# Patient Record
Sex: MALE | Race: WHITE | NOT HISPANIC OR LATINO | Employment: OTHER | URBAN - METROPOLITAN AREA
[De-identification: names, ages, dates, MRNs, and addresses within clinical notes are randomized per-mention and may not be internally consistent; named-entity substitution may affect disease eponyms.]

---

## 2020-08-03 ENCOUNTER — APPOINTMENT (OUTPATIENT)
Dept: GENERAL RADIOLOGY | Facility: HOSPITAL | Age: 30
End: 2020-08-03

## 2020-08-03 ENCOUNTER — ANESTHESIA (OUTPATIENT)
Dept: PERIOP | Facility: HOSPITAL | Age: 30
End: 2020-08-03

## 2020-08-03 ENCOUNTER — APPOINTMENT (OUTPATIENT)
Dept: CT IMAGING | Facility: HOSPITAL | Age: 30
End: 2020-08-03

## 2020-08-03 ENCOUNTER — HOSPITAL ENCOUNTER (INPATIENT)
Facility: HOSPITAL | Age: 30
LOS: 12 days | Discharge: NURSING, REHAB OR LONG-TERM CARE | End: 2020-08-15
Attending: EMERGENCY MEDICINE | Admitting: NEUROLOGICAL SURGERY

## 2020-08-03 ENCOUNTER — ANESTHESIA EVENT (OUTPATIENT)
Dept: PERIOP | Facility: HOSPITAL | Age: 30
End: 2020-08-03

## 2020-08-03 DIAGNOSIS — S06.5XAA SUBDURAL HEMATOMA (HCC): ICD-10-CM

## 2020-08-03 DIAGNOSIS — I61.0 NONTRAUMATIC SUBCORTICAL HEMORRHAGE OF RIGHT CEREBRAL HEMISPHERE (HCC): ICD-10-CM

## 2020-08-03 DIAGNOSIS — I61.9 RIGHT-SIDED NONTRAUMATIC INTRACEREBRAL HEMORRHAGE, UNSPECIFIED CEREBRAL LOCATION (HCC): Primary | ICD-10-CM

## 2020-08-03 LAB
ABO GROUP BLD: NORMAL
ALBUMIN SERPL-MCNC: 4.4 G/DL (ref 3.5–5.2)
ALBUMIN/GLOB SERPL: 1 G/DL
ALP SERPL-CCNC: 137 U/L (ref 39–117)
ALT SERPL W P-5'-P-CCNC: 66 U/L (ref 1–41)
ANION GAP SERPL CALCULATED.3IONS-SCNC: 16.2 MMOL/L (ref 5–15)
APTT PPP: 40.9 SECONDS (ref 22.7–35.4)
AST SERPL-CCNC: 174 U/L (ref 1–40)
B PARAPERT DNA SPEC QL NAA+PROBE: NOT DETECTED
B PERT DNA SPEC QL NAA+PROBE: NOT DETECTED
BASOPHILS # BLD AUTO: 0.07 10*3/MM3 (ref 0–0.2)
BASOPHILS NFR BLD AUTO: 0.8 % (ref 0–1.5)
BILIRUB SERPL-MCNC: 3.5 MG/DL (ref 0–1.2)
BLD GP AB SCN SERPL QL: NEGATIVE
BUN SERPL-MCNC: 4 MG/DL (ref 6–20)
BUN/CREAT SERPL: 5.5 (ref 7–25)
C PNEUM DNA NPH QL NAA+NON-PROBE: NOT DETECTED
CALCIUM SPEC-SCNC: 9.4 MG/DL (ref 8.6–10.5)
CHLORIDE SERPL-SCNC: 99 MMOL/L (ref 98–107)
CO2 SERPL-SCNC: 24.8 MMOL/L (ref 22–29)
CREAT SERPL-MCNC: 0.73 MG/DL (ref 0.76–1.27)
DEPRECATED RDW RBC AUTO: 45.7 FL (ref 37–54)
EOSINOPHIL # BLD AUTO: 0.27 10*3/MM3 (ref 0–0.4)
EOSINOPHIL NFR BLD AUTO: 3.2 % (ref 0.3–6.2)
ERYTHROCYTE [DISTWIDTH] IN BLOOD BY AUTOMATED COUNT: 12.7 % (ref 12.3–15.4)
ETHANOL BLD-MCNC: 154 MG/DL (ref 0–10)
ETHANOL UR QL: 0.15 %
FLUAV H1 2009 PAND RNA NPH QL NAA+PROBE: NOT DETECTED
FLUAV H1 HA GENE NPH QL NAA+PROBE: NOT DETECTED
FLUAV H3 RNA NPH QL NAA+PROBE: NOT DETECTED
FLUAV SUBTYP SPEC NAA+PROBE: NOT DETECTED
FLUBV RNA ISLT QL NAA+PROBE: NOT DETECTED
GFR SERPL CREATININE-BSD FRML MDRD: 126 ML/MIN/1.73
GLOBULIN UR ELPH-MCNC: 4.3 GM/DL
GLUCOSE BLDC GLUCOMTR-MCNC: 105 MG/DL (ref 70–130)
GLUCOSE SERPL-MCNC: 113 MG/DL (ref 65–99)
HADV DNA SPEC NAA+PROBE: NOT DETECTED
HCOV 229E RNA SPEC QL NAA+PROBE: NOT DETECTED
HCOV HKU1 RNA SPEC QL NAA+PROBE: NOT DETECTED
HCOV NL63 RNA SPEC QL NAA+PROBE: NOT DETECTED
HCOV OC43 RNA SPEC QL NAA+PROBE: NOT DETECTED
HCT VFR BLD AUTO: 35.7 % (ref 37.5–51)
HGB BLD-MCNC: 13 G/DL (ref 13–17.7)
HMPV RNA NPH QL NAA+NON-PROBE: NOT DETECTED
HOLD SPECIMEN: NORMAL
HOLD SPECIMEN: NORMAL
HPIV1 RNA SPEC QL NAA+PROBE: NOT DETECTED
HPIV2 RNA SPEC QL NAA+PROBE: NOT DETECTED
HPIV3 RNA NPH QL NAA+PROBE: NOT DETECTED
HPIV4 P GENE NPH QL NAA+PROBE: NOT DETECTED
IMM GRANULOCYTES # BLD AUTO: 0.06 10*3/MM3 (ref 0–0.05)
IMM GRANULOCYTES NFR BLD AUTO: 0.7 % (ref 0–0.5)
INR PPP: 1.39 (ref 0.9–1.1)
LYMPHOCYTES # BLD AUTO: 1.32 10*3/MM3 (ref 0.7–3.1)
LYMPHOCYTES NFR BLD AUTO: 15.5 % (ref 19.6–45.3)
M PNEUMO IGG SER IA-ACNC: NOT DETECTED
MCH RBC QN AUTO: 35.9 PG (ref 26.6–33)
MCHC RBC AUTO-ENTMCNC: 36.4 G/DL (ref 31.5–35.7)
MCV RBC AUTO: 98.6 FL (ref 79–97)
MONOCYTES # BLD AUTO: 0.48 10*3/MM3 (ref 0.1–0.9)
MONOCYTES NFR BLD AUTO: 5.6 % (ref 5–12)
NEUTROPHILS NFR BLD AUTO: 6.31 10*3/MM3 (ref 1.7–7)
NEUTROPHILS NFR BLD AUTO: 74.2 % (ref 42.7–76)
NRBC BLD AUTO-RTO: 0 /100 WBC (ref 0–0.2)
PLATELET # BLD AUTO: 136 10*3/MM3 (ref 140–450)
PMV BLD AUTO: 9.1 FL (ref 6–12)
POTASSIUM SERPL-SCNC: 3.2 MMOL/L (ref 3.5–5.2)
PROT SERPL-MCNC: 8.7 G/DL (ref 6–8.5)
PROTHROMBIN TIME: 16.8 SECONDS (ref 11.7–14.2)
RBC # BLD AUTO: 3.62 10*6/MM3 (ref 4.14–5.8)
RH BLD: POSITIVE
RHINOVIRUS RNA SPEC NAA+PROBE: NOT DETECTED
RSV RNA NPH QL NAA+NON-PROBE: NOT DETECTED
SARS-COV-2 RNA NPH QL NAA+NON-PROBE: NOT DETECTED
SODIUM SERPL-SCNC: 140 MMOL/L (ref 136–145)
T&S EXPIRATION DATE: NORMAL
TROPONIN T SERPL-MCNC: <0.01 NG/ML (ref 0–0.03)
WBC # BLD AUTO: 8.51 10*3/MM3 (ref 3.4–10.8)
WHOLE BLOOD HOLD SPECIMEN: NORMAL
WHOLE BLOOD HOLD SPECIMEN: NORMAL

## 2020-08-03 PROCEDURE — 94799 UNLISTED PULMONARY SVC/PX: CPT

## 2020-08-03 PROCEDURE — 70450 CT HEAD/BRAIN W/O DYE: CPT

## 2020-08-03 PROCEDURE — 25010000002 PROPOFOL 10 MG/ML EMULSION: Performed by: ANESTHESIOLOGY

## 2020-08-03 PROCEDURE — 61313 CRNEC/CRNOT STTL ICERE: CPT | Performed by: NEUROLOGICAL SURGERY

## 2020-08-03 PROCEDURE — 86900 BLOOD TYPING SEROLOGIC ABO: CPT | Performed by: EMERGENCY MEDICINE

## 2020-08-03 PROCEDURE — 86850 RBC ANTIBODY SCREEN: CPT | Performed by: EMERGENCY MEDICINE

## 2020-08-03 PROCEDURE — 00C70ZZ EXTIRPATION OF MATTER FROM CEREBRAL HEMISPHERE, OPEN APPROACH: ICD-10-PCS | Performed by: NEUROLOGICAL SURGERY

## 2020-08-03 PROCEDURE — 82962 GLUCOSE BLOOD TEST: CPT

## 2020-08-03 PROCEDURE — 69990 MICROSURGERY ADD-ON: CPT | Performed by: SPECIALIST/TECHNOLOGIST, OTHER

## 2020-08-03 PROCEDURE — 82565 ASSAY OF CREATININE: CPT

## 2020-08-03 PROCEDURE — C1713 ANCHOR/SCREW BN/BN,TIS/BN: HCPCS | Performed by: NEUROLOGICAL SURGERY

## 2020-08-03 PROCEDURE — 69990 MICROSURGERY ADD-ON: CPT | Performed by: NEUROLOGICAL SURGERY

## 2020-08-03 PROCEDURE — 86901 BLOOD TYPING SEROLOGIC RH(D): CPT | Performed by: EMERGENCY MEDICINE

## 2020-08-03 PROCEDURE — 93005 ELECTROCARDIOGRAM TRACING: CPT | Performed by: EMERGENCY MEDICINE

## 2020-08-03 PROCEDURE — 84484 ASSAY OF TROPONIN QUANT: CPT | Performed by: EMERGENCY MEDICINE

## 2020-08-03 PROCEDURE — 31500 INSERT EMERGENCY AIRWAY: CPT

## 2020-08-03 PROCEDURE — 25010000003 LEVETIRACETAM IN NACL 0.75% 1000 MG/100ML SOLUTION: Performed by: EMERGENCY MEDICINE

## 2020-08-03 PROCEDURE — 99255 IP/OBS CONSLTJ NEW/EST HI 80: CPT | Performed by: NEUROLOGICAL SURGERY

## 2020-08-03 PROCEDURE — 25010000002 PROPOFOL 10 MG/ML EMULSION: Performed by: EMERGENCY MEDICINE

## 2020-08-03 PROCEDURE — 25010000003 CEFAZOLIN IN DEXTROSE 2-4 GM/100ML-% SOLUTION: Performed by: ANESTHESIOLOGY

## 2020-08-03 PROCEDURE — 71045 X-RAY EXAM CHEST 1 VIEW: CPT

## 2020-08-03 PROCEDURE — 80307 DRUG TEST PRSMV CHEM ANLYZR: CPT | Performed by: EMERGENCY MEDICINE

## 2020-08-03 PROCEDURE — 5A1945Z RESPIRATORY VENTILATION, 24-96 CONSECUTIVE HOURS: ICD-10-PCS | Performed by: INTERNAL MEDICINE

## 2020-08-03 PROCEDURE — 0202U NFCT DS 22 TRGT SARS-COV-2: CPT | Performed by: EMERGENCY MEDICINE

## 2020-08-03 PROCEDURE — 85730 THROMBOPLASTIN TIME PARTIAL: CPT | Performed by: EMERGENCY MEDICINE

## 2020-08-03 PROCEDURE — 88304 TISSUE EXAM BY PATHOLOGIST: CPT | Performed by: NEUROLOGICAL SURGERY

## 2020-08-03 PROCEDURE — 93010 ELECTROCARDIOGRAM REPORT: CPT | Performed by: INTERNAL MEDICINE

## 2020-08-03 PROCEDURE — 0BH17EZ INSERTION OF ENDOTRACHEAL AIRWAY INTO TRACHEA, VIA NATURAL OR ARTIFICIAL OPENING: ICD-10-PCS | Performed by: INTERNAL MEDICINE

## 2020-08-03 PROCEDURE — 88305 TISSUE EXAM BY PATHOLOGIST: CPT | Performed by: NEUROLOGICAL SURGERY

## 2020-08-03 PROCEDURE — 25010000002 PHENYLEPHRINE PER 1 ML: Performed by: ANESTHESIOLOGY

## 2020-08-03 PROCEDURE — 80053 COMPREHEN METABOLIC PANEL: CPT | Performed by: EMERGENCY MEDICINE

## 2020-08-03 PROCEDURE — 25010000002 PROPOFOL 10 MG/ML EMULSION

## 2020-08-03 PROCEDURE — 25010000002 SUCCINYLCHOLINE PER 20 MG: Performed by: EMERGENCY MEDICINE

## 2020-08-03 PROCEDURE — 94002 VENT MGMT INPAT INIT DAY: CPT

## 2020-08-03 PROCEDURE — 85610 PROTHROMBIN TIME: CPT | Performed by: EMERGENCY MEDICINE

## 2020-08-03 PROCEDURE — 25010000002 DEXAMETHASONE PER 1 MG: Performed by: ANESTHESIOLOGY

## 2020-08-03 PROCEDURE — 99291 CRITICAL CARE FIRST HOUR: CPT

## 2020-08-03 PROCEDURE — 85025 COMPLETE CBC W/AUTO DIFF WBC: CPT | Performed by: EMERGENCY MEDICINE

## 2020-08-03 PROCEDURE — 61313 CRNEC/CRNOT STTL ICERE: CPT | Performed by: SPECIALIST/TECHNOLOGIST, OTHER

## 2020-08-03 DEVICE — SCRW CRS/DRV DRL FREE MICRO TI 1.5X4MM: Type: IMPLANTABLE DEVICE | Site: BRAIN | Status: FUNCTIONAL

## 2020-08-03 DEVICE — FLOSEAL HEMOSTATIC MATRIX, 5ML
Type: IMPLANTABLE DEVICE | Site: BRAIN | Status: FUNCTIONAL
Brand: FLOSEAL HEMOSTATIC MATRIX

## 2020-08-03 DEVICE — DURAGEN® PLUS DURAL REGENERATION MATRIX , 4 IN X 5 IN
Type: IMPLANTABLE DEVICE | Site: BRAIN | Status: FUNCTIONAL
Brand: DURAGEN® PLUS

## 2020-08-03 DEVICE — SSC BONE WAX
Type: IMPLANTABLE DEVICE | Site: BRAIN | Status: FUNCTIONAL
Brand: SSC BONE WAX

## 2020-08-03 DEVICE — PLT CMF LEVELONE LP NEURO SQ SEG TI SYS 2X2HL .6X1.5MM: Type: IMPLANTABLE DEVICE | Site: BRAIN | Status: FUNCTIONAL

## 2020-08-03 DEVICE — FLOSEAL HEMOSTATIC MATRIX, 10ML
Type: IMPLANTABLE DEVICE | Site: BRAIN | Status: FUNCTIONAL
Brand: FLOSEAL HEMOSTATIC MATRIX

## 2020-08-03 RX ORDER — ETOMIDATE 2 MG/ML
INJECTION INTRAVENOUS
Status: COMPLETED | OUTPATIENT
Start: 2020-08-03 | End: 2020-08-03

## 2020-08-03 RX ORDER — MAGNESIUM SULFATE HEPTAHYDRATE 40 MG/ML
2 INJECTION, SOLUTION INTRAVENOUS AS NEEDED
Status: DISCONTINUED | OUTPATIENT
Start: 2020-08-03 | End: 2020-08-15 | Stop reason: HOSPADM

## 2020-08-03 RX ORDER — ACETAMINOPHEN 650 MG/1
650 SUPPOSITORY RECTAL EVERY 4 HOURS PRN
Status: DISCONTINUED | OUTPATIENT
Start: 2020-08-03 | End: 2020-08-15 | Stop reason: HOSPADM

## 2020-08-03 RX ORDER — FENTANYL CITRATE 50 UG/ML
INJECTION, SOLUTION INTRAMUSCULAR; INTRAVENOUS AS NEEDED
Status: DISCONTINUED | OUTPATIENT
Start: 2020-08-03 | End: 2020-08-04 | Stop reason: SURG

## 2020-08-03 RX ORDER — SODIUM CHLORIDE, SODIUM LACTATE, POTASSIUM CHLORIDE, CALCIUM CHLORIDE 600; 310; 30; 20 MG/100ML; MG/100ML; MG/100ML; MG/100ML
INJECTION, SOLUTION INTRAVENOUS CONTINUOUS PRN
Status: DISCONTINUED | OUTPATIENT
Start: 2020-08-03 | End: 2020-08-04 | Stop reason: SURG

## 2020-08-03 RX ORDER — PROPOFOL 10 MG/ML
VIAL (ML) INTRAVENOUS
Status: COMPLETED | OUTPATIENT
Start: 2020-08-03 | End: 2020-08-03

## 2020-08-03 RX ORDER — CEFAZOLIN SODIUM 2 G/100ML
INJECTION, SOLUTION INTRAVENOUS AS NEEDED
Status: DISCONTINUED | OUTPATIENT
Start: 2020-08-03 | End: 2020-08-04 | Stop reason: SURG

## 2020-08-03 RX ORDER — DEXAMETHASONE SODIUM PHOSPHATE 10 MG/ML
INJECTION INTRAMUSCULAR; INTRAVENOUS AS NEEDED
Status: DISCONTINUED | OUTPATIENT
Start: 2020-08-03 | End: 2020-08-04 | Stop reason: SURG

## 2020-08-03 RX ORDER — ACETAMINOPHEN 325 MG/1
650 TABLET ORAL EVERY 4 HOURS PRN
Status: DISCONTINUED | OUTPATIENT
Start: 2020-08-03 | End: 2020-08-04

## 2020-08-03 RX ORDER — PROPOFOL 10 MG/ML
VIAL (ML) INTRAVENOUS AS NEEDED
Status: DISCONTINUED | OUTPATIENT
Start: 2020-08-03 | End: 2020-08-04 | Stop reason: SURG

## 2020-08-03 RX ORDER — HYDROCODONE BITARTRATE AND ACETAMINOPHEN 5; 325 MG/1; MG/1
1 TABLET ORAL EVERY 4 HOURS PRN
Status: DISCONTINUED | OUTPATIENT
Start: 2020-08-03 | End: 2020-08-04

## 2020-08-03 RX ORDER — SODIUM CHLORIDE 0.9 % (FLUSH) 0.9 %
10 SYRINGE (ML) INJECTION EVERY 12 HOURS SCHEDULED
Status: DISCONTINUED | OUTPATIENT
Start: 2020-08-03 | End: 2020-08-15 | Stop reason: HOSPADM

## 2020-08-03 RX ORDER — LEVETIRACETAM 10 MG/ML
1000 INJECTION INTRAVASCULAR ONCE
Status: COMPLETED | OUTPATIENT
Start: 2020-08-03 | End: 2020-08-03

## 2020-08-03 RX ORDER — SODIUM CHLORIDE 0.9 % (FLUSH) 0.9 %
10 SYRINGE (ML) INJECTION AS NEEDED
Status: DISCONTINUED | OUTPATIENT
Start: 2020-08-03 | End: 2020-08-15 | Stop reason: HOSPADM

## 2020-08-03 RX ORDER — PROPOFOL 10 MG/ML
VIAL (ML) INTRAVENOUS
Status: DISPENSED
Start: 2020-08-03 | End: 2020-08-04

## 2020-08-03 RX ORDER — POTASSIUM CHLORIDE 1.5 G/1.77G
40 POWDER, FOR SOLUTION ORAL AS NEEDED
Status: DISCONTINUED | OUTPATIENT
Start: 2020-08-03 | End: 2020-08-15 | Stop reason: HOSPADM

## 2020-08-03 RX ORDER — POTASSIUM CHLORIDE 7.45 MG/ML
10 INJECTION INTRAVENOUS
Status: DISCONTINUED | OUTPATIENT
Start: 2020-08-03 | End: 2020-08-15 | Stop reason: HOSPADM

## 2020-08-03 RX ORDER — ROCURONIUM BROMIDE 10 MG/ML
INJECTION, SOLUTION INTRAVENOUS AS NEEDED
Status: DISCONTINUED | OUTPATIENT
Start: 2020-08-03 | End: 2020-08-04 | Stop reason: SURG

## 2020-08-03 RX ORDER — MAGNESIUM SULFATE HEPTAHYDRATE 40 MG/ML
4 INJECTION, SOLUTION INTRAVENOUS AS NEEDED
Status: DISCONTINUED | OUTPATIENT
Start: 2020-08-03 | End: 2020-08-15 | Stop reason: HOSPADM

## 2020-08-03 RX ORDER — SODIUM CHLORIDE 0.9 % (FLUSH) 0.9 %
10 SYRINGE (ML) INJECTION AS NEEDED
Status: DISCONTINUED | OUTPATIENT
Start: 2020-08-03 | End: 2020-08-09

## 2020-08-03 RX ORDER — MANNITOL 20 G/100ML
50 INJECTION, SOLUTION INTRAVENOUS ONCE
Status: COMPLETED | OUTPATIENT
Start: 2020-08-03 | End: 2020-08-03

## 2020-08-03 RX ORDER — POTASSIUM CHLORIDE 750 MG/1
40 CAPSULE, EXTENDED RELEASE ORAL AS NEEDED
Status: DISCONTINUED | OUTPATIENT
Start: 2020-08-03 | End: 2020-08-15 | Stop reason: HOSPADM

## 2020-08-03 RX ORDER — SUCCINYLCHOLINE CHLORIDE 20 MG/ML
INJECTION INTRAMUSCULAR; INTRAVENOUS
Status: COMPLETED | OUTPATIENT
Start: 2020-08-03 | End: 2020-08-03

## 2020-08-03 RX ADMIN — PROPOFOL 100 MG: 10 INJECTION, EMULSION INTRAVENOUS at 23:01

## 2020-08-03 RX ADMIN — EPHEDRINE SULFATE 10 MG: 50 INJECTION INTRAVENOUS at 23:51

## 2020-08-03 RX ADMIN — ROCURONIUM BROMIDE 20 MG: 10 INJECTION INTRAVENOUS at 23:19

## 2020-08-03 RX ADMIN — SUCCINYLCHOLINE CHLORIDE 100 MG: 20 INJECTION, SOLUTION INTRAMUSCULAR; INTRAVENOUS at 22:27

## 2020-08-03 RX ADMIN — FENTANYL CITRATE 50 MCG: 50 INJECTION INTRAMUSCULAR; INTRAVENOUS at 23:00

## 2020-08-03 RX ADMIN — PHENYLEPHRINE HYDROCHLORIDE 100 MCG: 10 INJECTION INTRAVENOUS at 23:45

## 2020-08-03 RX ADMIN — LEVETIRACETAM 1000 MG: 10 INJECTION INTRAVENOUS at 22:18

## 2020-08-03 RX ADMIN — CEFAZOLIN SODIUM 2 G: 2 INJECTION, SOLUTION INTRAVENOUS at 23:16

## 2020-08-03 RX ADMIN — DEXAMETHASONE SODIUM PHOSPHATE 8 MG: 10 INJECTION INTRAMUSCULAR; INTRAVENOUS at 23:03

## 2020-08-03 RX ADMIN — PHENYLEPHRINE HYDROCHLORIDE 100 MCG: 10 INJECTION INTRAVENOUS at 23:43

## 2020-08-03 RX ADMIN — PHENYLEPHRINE HYDROCHLORIDE 200 MCG: 10 INJECTION INTRAVENOUS at 23:51

## 2020-08-03 RX ADMIN — SODIUM CHLORIDE, POTASSIUM CHLORIDE, SODIUM LACTATE AND CALCIUM CHLORIDE: 600; 310; 30; 20 INJECTION, SOLUTION INTRAVENOUS at 23:00

## 2020-08-03 RX ADMIN — ROCURONIUM BROMIDE 30 MG: 10 INJECTION INTRAVENOUS at 23:14

## 2020-08-03 RX ADMIN — ETOMIDATE 20 MG: 2 INJECTION INTRAVENOUS at 22:24

## 2020-08-03 RX ADMIN — MANNITOL 50 G: 20 INJECTION, SOLUTION INTRAVENOUS at 22:43

## 2020-08-03 RX ADMIN — FENTANYL CITRATE 50 MCG: 50 INJECTION INTRAMUSCULAR; INTRAVENOUS at 23:24

## 2020-08-03 RX ADMIN — PROPOFOL 100 MG: 10 INJECTION, EMULSION INTRAVENOUS at 22:21

## 2020-08-03 RX ADMIN — PROPOFOL 30 MCG/KG/MIN: 10 INJECTION, EMULSION INTRAVENOUS at 22:33

## 2020-08-04 ENCOUNTER — ANESTHESIA (OUTPATIENT)
Dept: PERIOP | Facility: HOSPITAL | Age: 30
End: 2020-08-04

## 2020-08-04 ENCOUNTER — APPOINTMENT (OUTPATIENT)
Dept: GENERAL RADIOLOGY | Facility: HOSPITAL | Age: 30
End: 2020-08-04

## 2020-08-04 ENCOUNTER — APPOINTMENT (OUTPATIENT)
Dept: CT IMAGING | Facility: HOSPITAL | Age: 30
End: 2020-08-04

## 2020-08-04 ENCOUNTER — ANESTHESIA EVENT (OUTPATIENT)
Dept: PERIOP | Facility: HOSPITAL | Age: 30
End: 2020-08-04

## 2020-08-04 ENCOUNTER — APPOINTMENT (OUTPATIENT)
Dept: CARDIOLOGY | Facility: HOSPITAL | Age: 30
End: 2020-08-04

## 2020-08-04 PROBLEM — I61.0 NONTRAUMATIC SUBCORTICAL HEMORRHAGE OF RIGHT CEREBRAL HEMISPHERE (HCC): Status: ACTIVE | Noted: 2020-08-03

## 2020-08-04 LAB
ALBUMIN SERPL-MCNC: 3.8 G/DL (ref 3.5–5.2)
ALBUMIN/GLOB SERPL: 1 G/DL
ALP SERPL-CCNC: 128 U/L (ref 39–117)
ALT SERPL W P-5'-P-CCNC: 59 U/L (ref 1–41)
AMPHET+METHAMPHET UR QL: NEGATIVE
ANION GAP SERPL CALCULATED.3IONS-SCNC: 11.7 MMOL/L (ref 5–15)
ANION GAP SERPL CALCULATED.3IONS-SCNC: 16.2 MMOL/L (ref 5–15)
APTT PPP: 38.6 SECONDS (ref 22.7–35.4)
ARTERIAL PATENCY WRIST A: ABNORMAL
ASCENDING AORTA: 3.36 CM
AST SERPL-CCNC: 151 U/L (ref 1–40)
ATMOSPHERIC PRESS: 749.9 MMHG
BARBITURATES UR QL SCN: NEGATIVE
BASE EXCESS BLDA CALC-SCNC: 0.9 MMOL/L (ref 0–2)
BASOPHILS # BLD AUTO: 0.02 10*3/MM3 (ref 0–0.2)
BASOPHILS # BLD AUTO: 0.04 10*3/MM3 (ref 0–0.2)
BASOPHILS NFR BLD AUTO: 0.2 % (ref 0–1.5)
BASOPHILS NFR BLD AUTO: 0.4 % (ref 0–1.5)
BDY SITE: ABNORMAL
BENZODIAZ UR QL SCN: NEGATIVE
BH CV ECHO MEAS - ACS: 2.2 CM
BH CV ECHO MEAS - AO MAX PG (FULL): 7.6 MMHG
BH CV ECHO MEAS - AO MAX PG: 17.8 MMHG
BH CV ECHO MEAS - AO MEAN PG (FULL): 5 MMHG
BH CV ECHO MEAS - AO MEAN PG: 10 MMHG
BH CV ECHO MEAS - AO ROOT AREA (BSA CORRECTED): 1.6
BH CV ECHO MEAS - AO ROOT AREA: 8 CM^2
BH CV ECHO MEAS - AO ROOT DIAM: 3.2 CM
BH CV ECHO MEAS - AO V2 MAX: 211 CM/SEC
BH CV ECHO MEAS - AO V2 MEAN: 141 CM/SEC
BH CV ECHO MEAS - AO V2 VTI: 42.1 CM
BH CV ECHO MEAS - AVA(I,A): 2.2 CM^2
BH CV ECHO MEAS - AVA(I,D): 2.2 CM^2
BH CV ECHO MEAS - AVA(V,A): 2.4 CM^2
BH CV ECHO MEAS - AVA(V,D): 2.4 CM^2
BH CV ECHO MEAS - BSA(HAYCOCK): 2.1 M^2
BH CV ECHO MEAS - BSA: 2 M^2
BH CV ECHO MEAS - BZI_BMI: 26.8 KILOGRAMS/M^2
BH CV ECHO MEAS - BZI_METRIC_HEIGHT: 177.8 CM
BH CV ECHO MEAS - BZI_METRIC_WEIGHT: 84.8 KG
BH CV ECHO MEAS - EDV(MOD-SP2): 140 ML
BH CV ECHO MEAS - EDV(MOD-SP4): 169 ML
BH CV ECHO MEAS - EDV(TEICH): 109.6 ML
BH CV ECHO MEAS - EF(CUBED): 73.5 %
BH CV ECHO MEAS - EF(MOD-BP): 70 %
BH CV ECHO MEAS - EF(MOD-SP2): 69.3 %
BH CV ECHO MEAS - EF(MOD-SP4): 70.4 %
BH CV ECHO MEAS - EF(TEICH): 65.1 %
BH CV ECHO MEAS - ESV(MOD-SP2): 43 ML
BH CV ECHO MEAS - ESV(MOD-SP4): 50 ML
BH CV ECHO MEAS - ESV(TEICH): 38.2 ML
BH CV ECHO MEAS - FS: 35.7 %
BH CV ECHO MEAS - IVS/LVPW: 0.92
BH CV ECHO MEAS - IVSD: 0.88 CM
BH CV ECHO MEAS - LAT PEAK E' VEL: 19.7 CM/SEC
BH CV ECHO MEAS - LV DIASTOLIC VOL/BSA (35-75): 83.3 ML/M^2
BH CV ECHO MEAS - LV MASS(C)D: 154.6 GRAMS
BH CV ECHO MEAS - LV MASS(C)DI: 76.2 GRAMS/M^2
BH CV ECHO MEAS - LV MAX PG: 10.2 MMHG
BH CV ECHO MEAS - LV MEAN PG: 5 MMHG
BH CV ECHO MEAS - LV SYSTOLIC VOL/BSA (12-30): 24.6 ML/M^2
BH CV ECHO MEAS - LV V1 MAX: 160 CM/SEC
BH CV ECHO MEAS - LV V1 MEAN: 100 CM/SEC
BH CV ECHO MEAS - LV V1 VTI: 29.4 CM
BH CV ECHO MEAS - LVIDD: 4.8 CM
BH CV ECHO MEAS - LVIDS: 3.1 CM
BH CV ECHO MEAS - LVLD AP2: 9.1 CM
BH CV ECHO MEAS - LVLD AP4: 9.8 CM
BH CV ECHO MEAS - LVLS AP2: 6.4 CM
BH CV ECHO MEAS - LVLS AP4: 7 CM
BH CV ECHO MEAS - LVOT AREA (M): 3.1 CM^2
BH CV ECHO MEAS - LVOT AREA: 3.1 CM^2
BH CV ECHO MEAS - LVOT DIAM: 2 CM
BH CV ECHO MEAS - LVPWD: 0.96 CM
BH CV ECHO MEAS - MED PEAK E' VEL: 14.3 CM/SEC
BH CV ECHO MEAS - MV A DUR: 0.12 SEC
BH CV ECHO MEAS - MV A MAX VEL: 125 CM/SEC
BH CV ECHO MEAS - MV DEC SLOPE: 1102 CM/SEC^2
BH CV ECHO MEAS - MV DEC TIME: 0.07 SEC
BH CV ECHO MEAS - MV E MAX VEL: 93.4 CM/SEC
BH CV ECHO MEAS - MV E/A: 0.75
BH CV ECHO MEAS - MV MAX PG: 5.1 MMHG
BH CV ECHO MEAS - MV MEAN PG: 3 MMHG
BH CV ECHO MEAS - MV P1/2T MAX VEL: 96.1 CM/SEC
BH CV ECHO MEAS - MV P1/2T: 25.5 MSEC
BH CV ECHO MEAS - MV V2 MAX: 113 CM/SEC
BH CV ECHO MEAS - MV V2 MEAN: 83.6 CM/SEC
BH CV ECHO MEAS - MV V2 VTI: 22.5 CM
BH CV ECHO MEAS - MVA P1/2T LCG: 2.3 CM^2
BH CV ECHO MEAS - MVA(P1/2T): 8.6 CM^2
BH CV ECHO MEAS - MVA(VTI): 4.1 CM^2
BH CV ECHO MEAS - PA ACC TIME: 0.11 SEC
BH CV ECHO MEAS - PA MAX PG (FULL): 4.5 MMHG
BH CV ECHO MEAS - PA MAX PG: 9.6 MMHG
BH CV ECHO MEAS - PA PR(ACCEL): 28.2 MMHG
BH CV ECHO MEAS - PA V2 MAX: 155 CM/SEC
BH CV ECHO MEAS - PULM A REVS VEL: 18.2 CM/SEC
BH CV ECHO MEAS - PULM DIAS VEL: 55.4 CM/SEC
BH CV ECHO MEAS - PULM S/D: 1.3
BH CV ECHO MEAS - PULM SYS VEL: 70.5 CM/SEC
BH CV ECHO MEAS - PVA(V,A): 3.3 CM^2
BH CV ECHO MEAS - PVA(V,D): 3.3 CM^2
BH CV ECHO MEAS - QP/QS: 1
BH CV ECHO MEAS - RAP SYSTOLE: 15 MMHG
BH CV ECHO MEAS - RV MAX PG: 5.1 MMHG
BH CV ECHO MEAS - RV MEAN PG: 2 MMHG
BH CV ECHO MEAS - RV V1 MAX: 113 CM/SEC
BH CV ECHO MEAS - RV V1 MEAN: 71.8 CM/SEC
BH CV ECHO MEAS - RV V1 VTI: 20.5 CM
BH CV ECHO MEAS - RVOT AREA: 4.5 CM^2
BH CV ECHO MEAS - RVOT DIAM: 2.4 CM
BH CV ECHO MEAS - RVSP: 38 MMHG
BH CV ECHO MEAS - SI(AO): 166.9 ML/M^2
BH CV ECHO MEAS - SI(CUBED): 41.1 ML/M^2
BH CV ECHO MEAS - SI(LVOT): 45.5 ML/M^2
BH CV ECHO MEAS - SI(MOD-SP2): 47.8 ML/M^2
BH CV ECHO MEAS - SI(MOD-SP4): 58.7 ML/M^2
BH CV ECHO MEAS - SI(TEICH): 35.2 ML/M^2
BH CV ECHO MEAS - SUP REN AO DIAM: 1.8 CM
BH CV ECHO MEAS - SV(AO): 338.6 ML
BH CV ECHO MEAS - SV(CUBED): 83.3 ML
BH CV ECHO MEAS - SV(LVOT): 92.4 ML
BH CV ECHO MEAS - SV(MOD-SP2): 97 ML
BH CV ECHO MEAS - SV(MOD-SP4): 119 ML
BH CV ECHO MEAS - SV(RVOT): 92.7 ML
BH CV ECHO MEAS - SV(TEICH): 71.4 ML
BH CV ECHO MEAS - TAPSE (>1.6): 2.3 CM2
BH CV ECHO MEAS - TR MAX VEL: 236 CM/SEC
BH CV ECHO MEASUREMENTS AVERAGE E/E' RATIO: 5.49
BH CV XLRA - RV BASE: 4 CM
BH CV XLRA - RV LENGTH: 6.9 CM
BH CV XLRA - RV MID: 3.9 CM
BH CV XLRA - TDI S': 14.9 CM/SEC
BILIRUB SERPL-MCNC: 3.2 MG/DL (ref 0–1.2)
BUN SERPL-MCNC: 4 MG/DL (ref 6–20)
BUN SERPL-MCNC: 4 MG/DL (ref 6–20)
BUN/CREAT SERPL: 6.9 (ref 7–25)
BUN/CREAT SERPL: 8.7 (ref 7–25)
CALCIUM SPEC-SCNC: 8.8 MG/DL (ref 8.6–10.5)
CALCIUM SPEC-SCNC: 8.9 MG/DL (ref 8.6–10.5)
CANNABINOIDS SERPL QL: NEGATIVE
CHLORIDE SERPL-SCNC: 100 MMOL/L (ref 98–107)
CHLORIDE SERPL-SCNC: 97 MMOL/L (ref 98–107)
CHOLEST SERPL-MCNC: 250 MG/DL (ref 0–200)
CO2 SERPL-SCNC: 22.8 MMOL/L (ref 22–29)
CO2 SERPL-SCNC: 24.3 MMOL/L (ref 22–29)
COCAINE UR QL: NEGATIVE
CREAT SERPL-MCNC: 0.46 MG/DL (ref 0.76–1.27)
CREAT SERPL-MCNC: 0.58 MG/DL (ref 0.76–1.27)
DEPRECATED RDW RBC AUTO: 43.3 FL (ref 37–54)
DEPRECATED RDW RBC AUTO: 49.8 FL (ref 37–54)
EOSINOPHIL # BLD AUTO: 0.01 10*3/MM3 (ref 0–0.4)
EOSINOPHIL # BLD AUTO: 0.03 10*3/MM3 (ref 0–0.4)
EOSINOPHIL NFR BLD AUTO: 0.1 % (ref 0.3–6.2)
EOSINOPHIL NFR BLD AUTO: 0.3 % (ref 0.3–6.2)
ERYTHROCYTE [DISTWIDTH] IN BLOOD BY AUTOMATED COUNT: 12.1 % (ref 12.3–15.4)
ERYTHROCYTE [DISTWIDTH] IN BLOOD BY AUTOMATED COUNT: 12.9 % (ref 12.3–15.4)
GFR SERPL CREATININE-BSD FRML MDRD: >150 ML/MIN/1.73
GFR SERPL CREATININE-BSD FRML MDRD: >150 ML/MIN/1.73
GLOBULIN UR ELPH-MCNC: 4 GM/DL
GLUCOSE BLDC GLUCOMTR-MCNC: 157 MG/DL (ref 70–130)
GLUCOSE BLDC GLUCOMTR-MCNC: 160 MG/DL (ref 70–130)
GLUCOSE BLDC GLUCOMTR-MCNC: 161 MG/DL (ref 70–130)
GLUCOSE BLDC GLUCOMTR-MCNC: 176 MG/DL (ref 70–130)
GLUCOSE SERPL-MCNC: 121 MG/DL (ref 65–99)
GLUCOSE SERPL-MCNC: 163 MG/DL (ref 65–99)
HBA1C MFR BLD: 4.6 % (ref 4.8–5.6)
HCO3 BLDA-SCNC: 26.5 MMOL/L (ref 22–28)
HCT VFR BLD AUTO: 32.7 % (ref 37.5–51)
HCT VFR BLD AUTO: 34.8 % (ref 37.5–51)
HDLC SERPL-MCNC: 42 MG/DL (ref 40–60)
HGB BLD-MCNC: 11.8 G/DL (ref 13–17.7)
HGB BLD-MCNC: 11.9 G/DL (ref 13–17.7)
IMM GRANULOCYTES # BLD AUTO: 0.04 10*3/MM3 (ref 0–0.05)
IMM GRANULOCYTES # BLD AUTO: 0.05 10*3/MM3 (ref 0–0.05)
IMM GRANULOCYTES NFR BLD AUTO: 0.4 % (ref 0–0.5)
IMM GRANULOCYTES NFR BLD AUTO: 0.5 % (ref 0–0.5)
INHALED O2 CONCENTRATION: 100 %
INR PPP: 1.5 (ref 0.9–1.1)
INR PPP: 1.56 (ref 0.9–1.1)
LDLC SERPL CALC-MCNC: 169 MG/DL (ref 0–100)
LDLC/HDLC SERPL: 4.03 {RATIO}
LEFT ATRIUM VOLUME INDEX: 28 ML/M2
LV EF 2D ECHO EST: 70 %
LYMPHOCYTES # BLD AUTO: 0.26 10*3/MM3 (ref 0.7–3.1)
LYMPHOCYTES # BLD AUTO: 0.32 10*3/MM3 (ref 0.7–3.1)
LYMPHOCYTES NFR BLD AUTO: 2.6 % (ref 19.6–45.3)
LYMPHOCYTES NFR BLD AUTO: 3.5 % (ref 19.6–45.3)
MCH RBC QN AUTO: 35.4 PG (ref 26.6–33)
MCH RBC QN AUTO: 35.8 PG (ref 26.6–33)
MCHC RBC AUTO-ENTMCNC: 34.2 G/DL (ref 31.5–35.7)
MCHC RBC AUTO-ENTMCNC: 36.1 G/DL (ref 31.5–35.7)
MCV RBC AUTO: 104.8 FL (ref 79–97)
MCV RBC AUTO: 98.2 FL (ref 79–97)
METHADONE UR QL SCN: NEGATIVE
MODALITY: ABNORMAL
MONOCYTES # BLD AUTO: 0.17 10*3/MM3 (ref 0.1–0.9)
MONOCYTES # BLD AUTO: 0.25 10*3/MM3 (ref 0.1–0.9)
MONOCYTES NFR BLD AUTO: 1.7 % (ref 5–12)
MONOCYTES NFR BLD AUTO: 2.7 % (ref 5–12)
MRSA DNA SPEC QL NAA+PROBE: NORMAL
NEUTROPHILS NFR BLD AUTO: 8.58 10*3/MM3 (ref 1.7–7)
NEUTROPHILS NFR BLD AUTO: 9.44 10*3/MM3 (ref 1.7–7)
NEUTROPHILS NFR BLD AUTO: 92.7 % (ref 42.7–76)
NEUTROPHILS NFR BLD AUTO: 94.9 % (ref 42.7–76)
NRBC BLD AUTO-RTO: 0 /100 WBC (ref 0–0.2)
NRBC BLD AUTO-RTO: 0 /100 WBC (ref 0–0.2)
O2 A-A PPRESDIFF RESPIRATORY: 0.5 MMHG
OPIATES UR QL: NEGATIVE
OXYCODONE UR QL SCN: NEGATIVE
PCO2 BLDA: 45.6 MM HG (ref 35–45)
PEEP RESPIRATORY: 5 CM[H2O]
PH BLDA: 7.37 PH UNITS (ref 7.35–7.45)
PLATELET # BLD AUTO: 119 10*3/MM3 (ref 140–450)
PLATELET # BLD AUTO: 133 10*3/MM3 (ref 140–450)
PMV BLD AUTO: 9 FL (ref 6–12)
PMV BLD AUTO: 9.5 FL (ref 6–12)
PO2 BLDA: 317.8 MM HG (ref 80–100)
POTASSIUM SERPL-SCNC: 4.1 MMOL/L (ref 3.5–5.2)
POTASSIUM SERPL-SCNC: 4.4 MMOL/L (ref 3.5–5.2)
PROT SERPL-MCNC: 7.8 G/DL (ref 6–8.5)
PROTHROMBIN TIME: 17.8 SECONDS (ref 11.7–14.2)
PROTHROMBIN TIME: 18.3 SECONDS (ref 11.7–14.2)
RBC # BLD AUTO: 3.32 10*6/MM3 (ref 4.14–5.8)
RBC # BLD AUTO: 3.33 10*6/MM3 (ref 4.14–5.8)
SAO2 % BLDCOA: 99.9 % (ref 92–99)
SET MECH RESP RATE: 16
SINUS: 3 CM
SODIUM SERPL-SCNC: 136 MMOL/L (ref 136–145)
SODIUM SERPL-SCNC: 136 MMOL/L (ref 136–145)
STJ: 2.7 CM
TOTAL RATE: 16 BREATHS/MINUTE
TRIGL SERPL-MCNC: 194 MG/DL (ref 0–150)
VENTILATOR MODE: AC
VLDLC SERPL-MCNC: 38.8 MG/DL (ref 5–40)
VT ON VENT VENT: 500 ML
WBC # BLD AUTO: 9.26 10*3/MM3 (ref 3.4–10.8)
WBC # BLD AUTO: 9.95 10*3/MM3 (ref 3.4–10.8)

## 2020-08-04 PROCEDURE — 25010000002 FENTANYL CITRATE (PF) 100 MCG/2ML SOLUTION 20 ML VIAL: Performed by: INTERNAL MEDICINE

## 2020-08-04 PROCEDURE — 94799 UNLISTED PULMONARY SVC/PX: CPT

## 2020-08-04 PROCEDURE — 70450 CT HEAD/BRAIN W/O DYE: CPT

## 2020-08-04 PROCEDURE — 86927 PLASMA FRESH FROZEN: CPT

## 2020-08-04 PROCEDURE — 25010000003 CEFAZOLIN IN DEXTROSE 2-4 GM/100ML-% SOLUTION: Performed by: NEUROLOGICAL SURGERY

## 2020-08-04 PROCEDURE — 80307 DRUG TEST PRSMV CHEM ANLYZR: CPT | Performed by: EMERGENCY MEDICINE

## 2020-08-04 PROCEDURE — 82962 GLUCOSE BLOOD TEST: CPT

## 2020-08-04 PROCEDURE — 25010000002 LEVETIRACETAM IN NACL 0.82% 500 MG/100ML SOLUTION: Performed by: NEUROLOGICAL SURGERY

## 2020-08-04 PROCEDURE — 71045 X-RAY EXAM CHEST 1 VIEW: CPT

## 2020-08-04 PROCEDURE — 85730 THROMBOPLASTIN TIME PARTIAL: CPT | Performed by: NEUROLOGICAL SURGERY

## 2020-08-04 PROCEDURE — 25010000002 ONDANSETRON PER 1 MG: Performed by: ANESTHESIOLOGY

## 2020-08-04 PROCEDURE — 80048 BASIC METABOLIC PNL TOTAL CA: CPT | Performed by: NEUROLOGICAL SURGERY

## 2020-08-04 PROCEDURE — 0 IOPAMIDOL PER 1 ML: Performed by: INTERNAL MEDICINE

## 2020-08-04 PROCEDURE — 25010000002 THIAMINE PER 100 MG: Performed by: NEUROLOGICAL SURGERY

## 2020-08-04 PROCEDURE — 70496 CT ANGIOGRAPHY HEAD: CPT

## 2020-08-04 PROCEDURE — 25010000002 FENTANYL CITRATE (PF) 100 MCG/2ML SOLUTION 5 ML AMPULE: Performed by: INTERNAL MEDICINE

## 2020-08-04 PROCEDURE — 93306 TTE W/DOPPLER COMPLETE: CPT | Performed by: INTERNAL MEDICINE

## 2020-08-04 PROCEDURE — 25010000002 FENTANYL CITRATE (PF) 100 MCG/2ML SOLUTION: Performed by: ANESTHESIOLOGY

## 2020-08-04 PROCEDURE — 36600 WITHDRAWAL OF ARTERIAL BLOOD: CPT

## 2020-08-04 PROCEDURE — 80053 COMPREHEN METABOLIC PANEL: CPT | Performed by: NEUROLOGICAL SURGERY

## 2020-08-04 PROCEDURE — 82803 BLOOD GASES ANY COMBINATION: CPT

## 2020-08-04 PROCEDURE — 25010000002 PERFLUTREN (DEFINITY) 8.476 MG IN SODIUM CHLORIDE 0.9 % 10 ML INJECTION: Performed by: NEUROLOGICAL SURGERY

## 2020-08-04 PROCEDURE — 25010000002 PROPOFOL 10 MG/ML EMULSION: Performed by: INTERNAL MEDICINE

## 2020-08-04 PROCEDURE — 94003 VENT MGMT INPAT SUBQ DAY: CPT

## 2020-08-04 PROCEDURE — 87641 MR-STAPH DNA AMP PROBE: CPT | Performed by: NEUROLOGICAL SURGERY

## 2020-08-04 PROCEDURE — 36430 TRANSFUSION BLD/BLD COMPNT: CPT

## 2020-08-04 PROCEDURE — 25010000002 DIPHENHYDRAMINE PER 50 MG: Performed by: INTERNAL MEDICINE

## 2020-08-04 PROCEDURE — 80061 LIPID PANEL: CPT | Performed by: NEUROLOGICAL SURGERY

## 2020-08-04 PROCEDURE — 85610 PROTHROMBIN TIME: CPT | Performed by: NEUROLOGICAL SURGERY

## 2020-08-04 PROCEDURE — 93306 TTE W/DOPPLER COMPLETE: CPT

## 2020-08-04 PROCEDURE — 85025 COMPLETE CBC W/AUTO DIFF WBC: CPT | Performed by: NEUROLOGICAL SURGERY

## 2020-08-04 PROCEDURE — 70498 CT ANGIOGRAPHY NECK: CPT

## 2020-08-04 PROCEDURE — P9017 PLASMA 1 DONOR FRZ W/IN 8 HR: HCPCS

## 2020-08-04 PROCEDURE — 83036 HEMOGLOBIN GLYCOSYLATED A1C: CPT | Performed by: NEUROLOGICAL SURGERY

## 2020-08-04 PROCEDURE — 25010000002 FENTANYL CITRATE (PF) 100 MCG/2ML SOLUTION 50 ML VIAL: Performed by: INTERNAL MEDICINE

## 2020-08-04 PROCEDURE — 25010000002 HYDROMORPHONE PER 4 MG: Performed by: NEUROLOGICAL SURGERY

## 2020-08-04 PROCEDURE — 99253 IP/OBS CNSLTJ NEW/EST LOW 45: CPT | Performed by: PSYCHIATRY & NEUROLOGY

## 2020-08-04 RX ORDER — SODIUM CHLORIDE, SODIUM LACTATE, POTASSIUM CHLORIDE, CALCIUM CHLORIDE 600; 310; 30; 20 MG/100ML; MG/100ML; MG/100ML; MG/100ML
100 INJECTION, SOLUTION INTRAVENOUS CONTINUOUS
Status: DISCONTINUED | OUTPATIENT
Start: 2020-08-04 | End: 2020-08-04

## 2020-08-04 RX ORDER — CEFAZOLIN SODIUM 2 G/100ML
2 INJECTION, SOLUTION INTRAVENOUS EVERY 8 HOURS
Status: COMPLETED | OUTPATIENT
Start: 2020-08-04 | End: 2020-08-04

## 2020-08-04 RX ORDER — HYDROCODONE BITARTRATE AND ACETAMINOPHEN 7.5; 325 MG/1; MG/1
1 TABLET ORAL ONCE AS NEEDED
Status: DISCONTINUED | OUTPATIENT
Start: 2020-08-04 | End: 2020-08-04 | Stop reason: HOSPADM

## 2020-08-04 RX ORDER — HYDROMORPHONE HYDROCHLORIDE 1 MG/ML
0.5 INJECTION, SOLUTION INTRAMUSCULAR; INTRAVENOUS; SUBCUTANEOUS
Status: DISCONTINUED | OUTPATIENT
Start: 2020-08-04 | End: 2020-08-12

## 2020-08-04 RX ORDER — ONDANSETRON 2 MG/ML
INJECTION INTRAMUSCULAR; INTRAVENOUS AS NEEDED
Status: DISCONTINUED | OUTPATIENT
Start: 2020-08-04 | End: 2020-08-04 | Stop reason: SURG

## 2020-08-04 RX ORDER — NALOXONE HCL 0.4 MG/ML
0.2 VIAL (ML) INJECTION AS NEEDED
Status: DISCONTINUED | OUTPATIENT
Start: 2020-08-04 | End: 2020-08-04 | Stop reason: HOSPADM

## 2020-08-04 RX ORDER — PROMETHAZINE HYDROCHLORIDE 25 MG/ML
6.25 INJECTION, SOLUTION INTRAMUSCULAR; INTRAVENOUS
Status: DISCONTINUED | OUTPATIENT
Start: 2020-08-04 | End: 2020-08-04 | Stop reason: HOSPADM

## 2020-08-04 RX ORDER — SODIUM CHLORIDE 0.9 % (FLUSH) 0.9 %
1-10 SYRINGE (ML) INJECTION AS NEEDED
Status: CANCELLED | OUTPATIENT
Start: 2020-08-05

## 2020-08-04 RX ORDER — LEVETIRACETAM 5 MG/ML
500 INJECTION INTRAVASCULAR EVERY 12 HOURS
Status: DISCONTINUED | OUTPATIENT
Start: 2020-08-04 | End: 2020-08-08

## 2020-08-04 RX ORDER — PROMETHAZINE HYDROCHLORIDE 25 MG/ML
12.5 INJECTION, SOLUTION INTRAMUSCULAR; INTRAVENOUS ONCE AS NEEDED
Status: DISCONTINUED | OUTPATIENT
Start: 2020-08-04 | End: 2020-08-04 | Stop reason: HOSPADM

## 2020-08-04 RX ORDER — DIPHENHYDRAMINE HYDROCHLORIDE 50 MG/ML
12.5 INJECTION INTRAMUSCULAR; INTRAVENOUS
Status: DISCONTINUED | OUTPATIENT
Start: 2020-08-04 | End: 2020-08-04 | Stop reason: HOSPADM

## 2020-08-04 RX ORDER — DIPHENHYDRAMINE HCL 25 MG
25 CAPSULE ORAL
Status: DISCONTINUED | OUTPATIENT
Start: 2020-08-04 | End: 2020-08-04 | Stop reason: HOSPADM

## 2020-08-04 RX ORDER — SODIUM CHLORIDE 9 MG/ML
100 INJECTION, SOLUTION INTRAVENOUS CONTINUOUS
Status: DISCONTINUED | OUTPATIENT
Start: 2020-08-04 | End: 2020-08-08

## 2020-08-04 RX ORDER — PANTOPRAZOLE SODIUM 40 MG/10ML
40 INJECTION, POWDER, LYOPHILIZED, FOR SOLUTION INTRAVENOUS
Status: DISCONTINUED | OUTPATIENT
Start: 2020-08-04 | End: 2020-08-07

## 2020-08-04 RX ORDER — ACETAMINOPHEN 325 MG/1
650 TABLET ORAL ONCE AS NEEDED
Status: DISCONTINUED | OUTPATIENT
Start: 2020-08-04 | End: 2020-08-04 | Stop reason: HOSPADM

## 2020-08-04 RX ORDER — NALOXONE HCL 0.4 MG/ML
0.4 VIAL (ML) INJECTION
Status: DISCONTINUED | OUTPATIENT
Start: 2020-08-04 | End: 2020-08-12

## 2020-08-04 RX ORDER — HYDROMORPHONE HYDROCHLORIDE 1 MG/ML
0.5 INJECTION, SOLUTION INTRAMUSCULAR; INTRAVENOUS; SUBCUTANEOUS
Status: DISCONTINUED | OUTPATIENT
Start: 2020-08-04 | End: 2020-08-04 | Stop reason: HOSPADM

## 2020-08-04 RX ORDER — PROMETHAZINE HYDROCHLORIDE 25 MG/1
25 SUPPOSITORY RECTAL ONCE AS NEEDED
Status: DISCONTINUED | OUTPATIENT
Start: 2020-08-04 | End: 2020-08-04 | Stop reason: HOSPADM

## 2020-08-04 RX ORDER — LABETALOL HYDROCHLORIDE 5 MG/ML
5 INJECTION, SOLUTION INTRAVENOUS
Status: DISCONTINUED | OUTPATIENT
Start: 2020-08-04 | End: 2020-08-04 | Stop reason: HOSPADM

## 2020-08-04 RX ORDER — SODIUM CHLORIDE 0.9 % (FLUSH) 0.9 %
3 SYRINGE (ML) INJECTION EVERY 12 HOURS SCHEDULED
Status: CANCELLED | OUTPATIENT
Start: 2020-08-05

## 2020-08-04 RX ORDER — DIPHENHYDRAMINE HYDROCHLORIDE 50 MG/ML
25 INJECTION INTRAMUSCULAR; INTRAVENOUS ONCE
Status: COMPLETED | OUTPATIENT
Start: 2020-08-04 | End: 2020-08-04

## 2020-08-04 RX ORDER — ONDANSETRON 2 MG/ML
4 INJECTION INTRAMUSCULAR; INTRAVENOUS ONCE AS NEEDED
Status: DISCONTINUED | OUTPATIENT
Start: 2020-08-04 | End: 2020-08-04 | Stop reason: HOSPADM

## 2020-08-04 RX ORDER — PROMETHAZINE HYDROCHLORIDE 25 MG/1
25 TABLET ORAL ONCE AS NEEDED
Status: DISCONTINUED | OUTPATIENT
Start: 2020-08-04 | End: 2020-08-04 | Stop reason: HOSPADM

## 2020-08-04 RX ORDER — OXYCODONE AND ACETAMINOPHEN 7.5; 325 MG/1; MG/1
1 TABLET ORAL ONCE AS NEEDED
Status: DISCONTINUED | OUTPATIENT
Start: 2020-08-04 | End: 2020-08-04 | Stop reason: HOSPADM

## 2020-08-04 RX ORDER — BISACODYL 10 MG
10 SUPPOSITORY, RECTAL RECTAL DAILY PRN
Status: DISCONTINUED | OUTPATIENT
Start: 2020-08-04 | End: 2020-08-15 | Stop reason: HOSPADM

## 2020-08-04 RX ORDER — FENTANYL CITRATE 50 UG/ML
50 INJECTION, SOLUTION INTRAMUSCULAR; INTRAVENOUS
Status: DISCONTINUED | OUTPATIENT
Start: 2020-08-04 | End: 2020-08-04 | Stop reason: HOSPADM

## 2020-08-04 RX ORDER — CHLORHEXIDINE GLUCONATE 0.12 MG/ML
15 RINSE ORAL EVERY 12 HOURS SCHEDULED
Status: DISCONTINUED | OUTPATIENT
Start: 2020-08-04 | End: 2020-08-07

## 2020-08-04 RX ORDER — EPHEDRINE SULFATE 50 MG/ML
INJECTION, SOLUTION INTRAVENOUS AS NEEDED
Status: DISCONTINUED | OUTPATIENT
Start: 2020-08-03 | End: 2020-08-04 | Stop reason: SURG

## 2020-08-04 RX ORDER — HYDRALAZINE HYDROCHLORIDE 20 MG/ML
5 INJECTION INTRAMUSCULAR; INTRAVENOUS
Status: DISCONTINUED | OUTPATIENT
Start: 2020-08-04 | End: 2020-08-04 | Stop reason: HOSPADM

## 2020-08-04 RX ORDER — ONDANSETRON 2 MG/ML
4 INJECTION INTRAMUSCULAR; INTRAVENOUS EVERY 6 HOURS PRN
Status: DISCONTINUED | OUTPATIENT
Start: 2020-08-04 | End: 2020-08-05

## 2020-08-04 RX ORDER — FLUMAZENIL 0.1 MG/ML
0.2 INJECTION INTRAVENOUS AS NEEDED
Status: DISCONTINUED | OUTPATIENT
Start: 2020-08-04 | End: 2020-08-04 | Stop reason: HOSPADM

## 2020-08-04 RX ORDER — BUPIVACAINE HYDROCHLORIDE AND EPINEPHRINE 2.5; 5 MG/ML; UG/ML
INJECTION, SOLUTION INFILTRATION; PERINEURAL AS NEEDED
Status: DISCONTINUED | OUTPATIENT
Start: 2020-08-04 | End: 2020-08-04 | Stop reason: HOSPADM

## 2020-08-04 RX ORDER — EPHEDRINE SULFATE 50 MG/ML
5 INJECTION, SOLUTION INTRAVENOUS ONCE AS NEEDED
Status: DISCONTINUED | OUTPATIENT
Start: 2020-08-04 | End: 2020-08-04 | Stop reason: HOSPADM

## 2020-08-04 RX ADMIN — EPHEDRINE SULFATE 10 MG: 50 INJECTION INTRAVENOUS at 00:09

## 2020-08-04 RX ADMIN — HYDROMORPHONE HYDROCHLORIDE 0.5 MG: 1 INJECTION, SOLUTION INTRAMUSCULAR; INTRAVENOUS; SUBCUTANEOUS at 04:08

## 2020-08-04 RX ADMIN — FENTANYL CITRATE 50 MCG: 50 INJECTION INTRAMUSCULAR; INTRAVENOUS at 00:38

## 2020-08-04 RX ADMIN — CEFAZOLIN SODIUM 2 G: 2 INJECTION, SOLUTION INTRAVENOUS at 16:14

## 2020-08-04 RX ADMIN — THIAMINE HYDROCHLORIDE 100 MG: 100 INJECTION, SOLUTION INTRAMUSCULAR; INTRAVENOUS at 10:01

## 2020-08-04 RX ADMIN — HYDROMORPHONE HYDROCHLORIDE 0.5 MG: 1 INJECTION, SOLUTION INTRAMUSCULAR; INTRAVENOUS; SUBCUTANEOUS at 05:56

## 2020-08-04 RX ADMIN — SODIUM CHLORIDE 100 ML/HR: 9 INJECTION, SOLUTION INTRAVENOUS at 10:01

## 2020-08-04 RX ADMIN — HYDROMORPHONE HYDROCHLORIDE 0.5 MG: 1 INJECTION, SOLUTION INTRAMUSCULAR; INTRAVENOUS; SUBCUTANEOUS at 23:19

## 2020-08-04 RX ADMIN — PROPOFOL 40 MCG/KG/MIN: 10 INJECTION, EMULSION INTRAVENOUS at 19:10

## 2020-08-04 RX ADMIN — PROPOFOL 50 MCG/KG/MIN: 10 INJECTION, EMULSION INTRAVENOUS at 12:29

## 2020-08-04 RX ADMIN — LEVETIRACETAM 500 MG: 5 INJECTION, SOLUTION INTRAVENOUS at 21:04

## 2020-08-04 RX ADMIN — DIPHENHYDRAMINE HYDROCHLORIDE 25 MG: 50 INJECTION INTRAMUSCULAR; INTRAVENOUS at 13:58

## 2020-08-04 RX ADMIN — HYDROMORPHONE HYDROCHLORIDE 0.5 MG: 1 INJECTION, SOLUTION INTRAMUSCULAR; INTRAVENOUS; SUBCUTANEOUS at 10:38

## 2020-08-04 RX ADMIN — SODIUM CHLORIDE, PRESERVATIVE FREE 10 ML: 5 INJECTION INTRAVENOUS at 20:22

## 2020-08-04 RX ADMIN — IOPAMIDOL 95 ML: 755 INJECTION, SOLUTION INTRAVENOUS at 11:29

## 2020-08-04 RX ADMIN — SODIUM CHLORIDE 5 MG/HR: 9 INJECTION, SOLUTION INTRAVENOUS at 02:24

## 2020-08-04 RX ADMIN — PROPOFOL 50 MCG/KG/MIN: 10 INJECTION, EMULSION INTRAVENOUS at 02:08

## 2020-08-04 RX ADMIN — FOLIC ACID 1 MG: 5 INJECTION, SOLUTION INTRAMUSCULAR; INTRAVENOUS; SUBCUTANEOUS at 10:01

## 2020-08-04 RX ADMIN — FENTANYL CITRATE 25 MCG/HR: 0.05 INJECTION, SOLUTION INTRAMUSCULAR; INTRAVENOUS at 12:33

## 2020-08-04 RX ADMIN — ONDANSETRON HYDROCHLORIDE 4 MG: 2 SOLUTION INTRAMUSCULAR; INTRAVENOUS at 00:05

## 2020-08-04 RX ADMIN — FENTANYL CITRATE 50 MCG: 50 INJECTION INTRAMUSCULAR; INTRAVENOUS at 00:22

## 2020-08-04 RX ADMIN — FENTANYL CITRATE 50 MCG: 50 INJECTION INTRAMUSCULAR; INTRAVENOUS at 02:09

## 2020-08-04 RX ADMIN — CHLORHEXIDINE GLUCONATE 15 ML: 1.2 RINSE ORAL at 20:22

## 2020-08-04 RX ADMIN — CHLORHEXIDINE GLUCONATE 15 ML: 1.2 RINSE ORAL at 12:30

## 2020-08-04 RX ADMIN — PROPOFOL 50 MCG/KG/MIN: 10 INJECTION, EMULSION INTRAVENOUS at 08:55

## 2020-08-04 RX ADMIN — PROPOFOL 50 MCG/KG/MIN: 10 INJECTION, EMULSION INTRAVENOUS at 23:19

## 2020-08-04 RX ADMIN — CEFAZOLIN SODIUM 2 G: 2 INJECTION, SOLUTION INTRAVENOUS at 06:29

## 2020-08-04 RX ADMIN — PANTOPRAZOLE SODIUM 40 MG: 40 INJECTION, POWDER, FOR SOLUTION INTRAVENOUS at 16:13

## 2020-08-04 RX ADMIN — SODIUM CHLORIDE, POTASSIUM CHLORIDE, SODIUM LACTATE AND CALCIUM CHLORIDE 100 ML/HR: 600; 310; 30; 20 INJECTION, SOLUTION INTRAVENOUS at 02:29

## 2020-08-04 RX ADMIN — SODIUM CHLORIDE, PRESERVATIVE FREE 10 ML: 5 INJECTION INTRAVENOUS at 10:02

## 2020-08-04 RX ADMIN — PROPOFOL 50 MCG/KG/MIN: 10 INJECTION, EMULSION INTRAVENOUS at 05:23

## 2020-08-04 RX ADMIN — PERFLUTREN 1 ML: 6.52 INJECTION, SUSPENSION INTRAVENOUS at 08:12

## 2020-08-04 RX ADMIN — HYDROMORPHONE HYDROCHLORIDE 0.5 MG: 1 INJECTION, SOLUTION INTRAMUSCULAR; INTRAVENOUS; SUBCUTANEOUS at 07:30

## 2020-08-04 RX ADMIN — LEVETIRACETAM 500 MG: 5 INJECTION, SOLUTION INTRAVENOUS at 10:38

## 2020-08-04 RX ADMIN — FENTANYL CITRATE 50 MCG: 50 INJECTION INTRAMUSCULAR; INTRAVENOUS at 03:14

## 2020-08-04 RX ADMIN — FENTANYL CITRATE 125 MCG/HR: 0.05 INJECTION, SOLUTION INTRAMUSCULAR; INTRAVENOUS at 22:40

## 2020-08-04 NOTE — PROGRESS NOTES
Discharge Planning Assessment  [facility]     Patient Name: Avinash Everett  MRN: [id number]  Today's Date: 8/4/2020    Admit Date: 8/3/2020    Discharge Needs Assessment     Row Name 08/04/20 1407       Living Environment    Lives With  spouse;child(essence), dependent    Name(s) of Who Lives With Patient  4 kids 15,13, 13,and 3    Current Living Arrangements  home/apartment/condo    Potentially Unsafe Housing Conditions  unable to assess    Primary Care Provided by  self;spouse/significant other    Provides Primary Care For  no one    Family Caregiver if Needed  none    Quality of Family Relationships  supportive    Able to Return to Prior Arrangements  yes       Resource/Environmental Concerns    Resource/Environmental Concerns  none    Transportation Concerns  car, none       Transition Planning    Patient/Family Anticipates Transition to  inpatient rehabilitation facility    Patient/Family Anticipated Services at Transition  rehabilitation services    Transportation Anticipated  family or friend will provide       Discharge Needs Assessment    Readmission Within the Last 30 Days  unable to assess    Concerns to be Addressed  discharge planning    Equipment Currently Used at Home  none    Equipment Needed After Discharge  other (see comments)    Discharge Facility/Level of Care Needs  other (see comments)        Discharge Plan     Row Name 08/04/20 0154       Plan    Plan  Undetermined    Plan Comments  CCP spoke to patient's wife Alea [phone] at bedside to discuss discharge planning.  Pt emergency contact is his wife.   Pt does not have a PCP.  Pt lives in a house with his wife and his 4 children.  He uses no DME to ambulate.  He is independent with ADL's.  He has no past history of Home Health.  He has not been to rehab.  Pt obtains his medications from [facility] pharmacy in [facility]  Pt has no difficulty paying for medications.  Plan is undetermined.  Acute vs sub acute rehab.  CCP following            Destination      Coordination has not been started for this encounter.      Durable Medical Equipment      Coordination has not been started for this encounter.      Dialysis/Infusion      Coordination has not been started for this encounter.      Home Medical Care      Coordination has not been started for this encounter.      Therapy      Coordination has not been started for this encounter.      Community Resources      Coordination has not been started for this encounter.          Demographic Summary    No documentation.       Functional Status    No documentation.       Psychosocial    No documentation.       Abuse/Neglect    No documentation.       Legal    No documentation.       Substance Abuse    No documentation.       Patient Forms    No documentation.           Cynthia Barney RN

## 2020-08-04 NOTE — PLAN OF CARE
Assumed care of patient in PACU around 0200, at that time patient was extremely restless and trying to get out of bed/fighting ventilator, but was not following commands. In the next 30 minutes patient had been settled down with adequate meds and started to follow commands on the right side, but was still flaccid on left side. Patient requiring max sedation of propofol and prn pushes of pain meds. BP kept under 140, cardene used initially but has been off for a few hours now. Patient neuro exam has been unchanged since assuming care, will continue to monitor.

## 2020-08-04 NOTE — OP NOTE
Preoperative diagnosis: Large right frontotemporoparietal intracerebral hemorrhage with mass-effect    Postoperative diagnosis: Same as above    Procedures performed: Emergent right frontoparietal craniotomy for evacuation of intracerebral hemorrhage    Surgeon: Miguel    First Assistant: Dixie Pratt  (She greatly assisted in the exposure, visualization of neural structures, control of bleeding, retraction, and closure of the incision. Her skilled assistance was necessary for the success of this case.)    Anesthesia: GET    EBL: 100 cc    Complications: none    Specimen sent: Blood clot and some brain tissue within the clot sent to pathology    Drains: None    Findings: Large hematoma with mass-effect    Postoperative condition: Critical    Indications for the operation:The patient is a 30-year-old gentleman who is generally healthy and who experienced severe headache with drowsiness and left-sided weakness. He was seen in the emergency room and a CT scan showed a very large, approximately 6-7 cm intracerebral hemorrhage in the right frontoparietal and temporal region causing significant mass effect.He was not on any blood thinners, and because of his declining neurological status and the presence of a large blood clot, he was taken emergently after being intubated in the emergency room for emergency surgery.         Informed consent:The patient's wife understood that the goal of surgery was evacuation of the clot to decrease mass effect and prevent neurological decline including death. The risks include, but are not limited to, infection, hemorrhage requiring transfusion or reoperation, CSF leak requiring reoperation, incomplete relief of symptoms, potential need for additional surgeries in the future, stroke, paralysis, coma, and death. She agreed to proceed.         Details of the operation:The patient was taken, already intubated from the emergency room, to the operating room and placed on the operating  table in the supine position. He already had a Ramirez catheter in place. We gave him 2 g of Kefzol. He had been given 50 g of mannitol and 1 g of Keppra in the emergency room. He was put in 3-point fixation with the Clayton headrest. The right side of his head had been shaved. The right frontoparietal temporal region was then prepped and draped in the usual sterile fashion. We did a surgical timeout. I injected 5 mL of 0.25% Marcaine with epinephrine into the scalp in the right frontoparietal junction. A linear incision that was approximately 10 cm in length was made just above the pinna close to the midline with a #10 skin knife. Hemostasis was obtained with monopolar cautery and Francisco clips. The temporalis muscle was split and retracted with self-retaining retractors. An AM3 drill bit on the electric [place] drill was used to make a single luis enrique hole in the inferior frontoparietal junction. A B1 with a full plate was used to create a trephine craniotomy. The dura was quite tense. Epidural bleeding was controlled with thrombin-soaked Gelfoam and Floseal. A cruciate incision was made in the dura and the dural leaflets were tacked up with 4-0 Nurolon. There was fungating brain coming out of the craniotomy defect. The clot was actually presenting itself to the surface, so using bipolar cautery and suction, I opened up a corticectomy site and immediately sucked out some intracerebral clot, which loosened the brain quite a bit. The microscope was draped and brought into the field. Its use was essential to the performance of microneurosurgical technique. The Usman halo was applied. Retractors in 4 quadrants were placed. Under magnification I advanced the retractors and removed blood clot, dissecting anteriorly, posteriorly, superiorly and inferiorly. There was 1 area where there was quite a bit of vascularized brain tissue potentially representing an arteriovenous malformation. It was hard to say for sure, but I simply  coagulated any bleeding points that I saw. These seemed to be mostly venous bleeders. I did not see any obvious arterial bleeders throughout the entire surgery, but I was able to extract quite a bit of clot and, therefore, decompress the brain. Cynthia arachnoidal oozing was controlled with bipolar cautery and I used cotton balls soaked in 0.5% peroxide as well as Floseal to stop the intracerebral oozing. Once that was under control, I then closed the dura with 4-0 Nurolons. Two layers of DuraGen were then placed, as well as Floseal. The bone flap was replaced with KLS plates. The galea was reapproximated with 2-0 interrupted Vicryl suture. The skin was closed with a running 4-0 Vicryl subcuticular. Steri-Strips and a clean, dry dressing were placed. The patient was taken out of 3-point fixation. His head was wrapped with Kerlix, Brittani and a stockinette dressing. We decided to keep him intubated and he was taken in critical but stable condition to the recovery room. He will get a CT scan on the way up to the ICU and eventually he will need a 4-vessel cerebral angiogram.

## 2020-08-04 NOTE — H&P
[facility]    Patient Care Team:  Provider, No Known as PCP - General    CC: Acute onset hemiplegia    HPI: Patient is a pleasant 30-year-old white male with no significant past medical history who was at his baseline health till around 8:30 PM when he started noticing acute onset left-sided weakness and came to the ER due to concern for stroke.  Patient had headache as well.  He was reportedly drinking alcohol today and took several shots.  He denied any illicit drug abuse or prior issues with any neurological conditions.  In the ER he was noted to have persistent left-sided weakness and stat CT head was done which showed large right intracerebral hemorrhage and significant midline shift.  Neurosurgery has been consulted stat and they have evaluated the patient and patient was placed on mechanical ventilator for airway protection and need for urgent surgery and we have been asked admit the patient to the ICU and help in management of medical issues.    No history could be obtained as patient already is on the mechanical ventilator and on his way to the OR.    I have reviewed (if any available) last discharge summaries as well as the outpatient notes from the patients other consultants available in the [facility] system as well previous notes from our group and summarized above    Objective     I have discussed the case with ED physician     Records Reviewed  Old medical records.  Nursing notes.  Previous radiology studies.    Review of Systems:  Unable to obtain as patient is intubated    History reviewed. No pertinent past medical history.    History reviewed. No pertinent surgical history.    Prior to Admission Medications     None          Promestriene    No family history on file.    Social History     Socioeconomic History   • Marital status:      Spouse name: Not on file   • Number of children: Not on file   • Years of education: Not on file   • Highest education level: Not on file        Physical Exam    Temp:  [96.5 °F (35.8 °C)] 96.5 °F (35.8 °C)  Heart Rate:  [112] 112  Resp:  [20] 20  BP: (143)/(80) 143/80    PHYSICAL EXAM   Constitutional:  Young white male pt in bed, sedated on the mechanical ventilator  Head: - NCAT  Eyes: No pallor, Anicteric conjunctiva, EOMI.  ENMT:  ETT+, no oral thrush. Moist MM.   NECK: Trachea midline, No thyromegaly, no palpable cervical LNpathy  Heart: RRR, no murmur. No pedal edema   Lungs: KATE +, No wheezes/ crackles heard    Abdomen: Soft. No tenderness, guarding or rigidity. No palpable masses  Extremities: Extremities warm and well perfused. No cyanosis/ clubbing  Neuro: Conscious, Sedated, Lt hemiplegia  Psych: Mood and affect unable to obtain     LABS and IMAGING DATA:    Lab Results (last 24 hours)     Procedure Component Value Units Date/Time    Troponin [[id number]]  (Normal) Collected:  08/03/20 2144    Specimen:  Blood Updated:  08/03/20 2223     Troponin T <0.010 ng/mL     Narrative:       Troponin T Reference Range:  <= 0.03 ng/mL-   Negative for AMI  >0.03 ng/mL-     Abnormal for myocardial necrosis.  Clinicians would have to utilize clinical acumen, EKG, Troponin and serial changes to determine if it is an Acute Myocardial Infarction or myocardial injury due to an underlying chronic condition.       Results may be falsely decreased if patient taking Biotin.      Comprehensive Metabolic Panel [[id number]]  (Abnormal) Collected:  08/03/20 2144    Specimen:  Blood Updated:  08/03/20 2223     Glucose 113 mg/dL      BUN 4 mg/dL      Creatinine 0.73 mg/dL      Sodium 140 mmol/L      Potassium 3.2 mmol/L      Chloride 99 mmol/L      CO2 24.8 mmol/L      Calcium 9.4 mg/dL      Total Protein 8.7 g/dL      Albumin 4.40 g/dL      ALT (SGPT) 66 U/L      AST (SGOT) 174 U/L      Alkaline Phosphatase 137 U/L      Total Bilirubin 3.5 mg/dL      eGFR Non African Amer 126 mL/min/1.73      Globulin 4.3 gm/dL      A/G Ratio 1.0 g/dL      BUN/Creatinine Ratio 5.5      Anion Gap 16.2 mmol/L     Narrative:       GFR Normal >60  Chronic Kidney Disease <60  Kidney Failure <15      Ethanol [[id number]] Collected:  08/03/20 2144    Specimen:  Blood Updated:  08/03/20 2221    Protime-INR [[id number]]  (Abnormal) Collected:  08/03/20 2144    Specimen:  Blood Updated:  08/03/20 2212     Protime 16.8 Seconds      INR 1.39    aPTT [[id number]]  (Abnormal) Collected:  08/03/20 2144    Specimen:  Blood Updated:  08/03/20 2212     PTT 40.9 seconds     CBC & Differential [[id number]] Collected:  08/03/20 2144    Specimen:  Blood Updated:  08/03/20 2200    Narrative:       The following orders were created for panel order CBC & Differential.  Procedure                               Abnormality         Status                     ---------                               -----------         ------                     CBC Auto Differential[[id number]]        Abnormal            Final result                 Please view results for these tests on the individual orders.    CBC Auto Differential [[id number]]  (Abnormal) Collected:  08/03/20 2144    Specimen:  Blood Updated:  08/03/20 2200     WBC 8.51 10*3/mm3      RBC 3.62 10*6/mm3      Hemoglobin 13.0 g/dL      Hematocrit 35.7 %      MCV 98.6 fL      MCH 35.9 pg      MCHC 36.4 g/dL      RDW 12.7 %      RDW-SD 45.7 fl      MPV 9.1 fL      Platelets 136 10*3/mm3      Neutrophil % 74.2 %      Lymphocyte % 15.5 %      Monocyte % 5.6 %      Eosinophil % 3.2 %      Basophil % 0.8 %      Immature Grans % 0.7 %      Neutrophils, Absolute 6.31 10*3/mm3      Lymphocytes, Absolute 1.32 10*3/mm3      Monocytes, Absolute 0.48 10*3/mm3      Eosinophils, Absolute 0.27 10*3/mm3      Basophils, Absolute 0.07 10*3/mm3      Immature Grans, Absolute 0.06 10*3/mm3      nRBC 0.0 /100 WBC     Light Blue Top [[id number]] Collected:  08/03/20 2144    Specimen:  Blood Updated:  08/03/20 2156    Lavender Top [[id number]] Collected:  08/03/20 2144    Specimen:  Blood Updated:  08/03/20  2156    Green Top (Gel) [[id number]] Collected:  08/03/20 2144    Specimen:  Blood Updated:  08/03/20 2156    [place] Draw [[id number]] Collected:  08/03/20 2144    Specimen:  Blood Updated:  08/03/20 2156    Narrative:       The following orders were created for panel order [place] Draw.  Procedure                               Abnormality         Status                     ---------                               -----------         ------                     Light Blue Top[[id number]]                                   In process                 Green Top (Gel)[[id number]]                                  In process                 Lavender Top[[id number]]                                     In process                 Gold Top - SST[[id number]]                                   In process                   Please view results for these tests on the individual orders.    Gold Top - SST [[id number]] Collected:  08/03/20 2144    Specimen:  Blood Updated:  08/03/20 2156    POC Glucose Once [[id number]]  (Normal) Collected:  08/03/20 2137    Specimen:  Blood Updated:  08/03/20 2139     Glucose 105 mg/dL         Lab Results   Component Value Date    TROPONINT <0.010 08/03/2020             Results from last 7 days   Lab Units 08/03/20 2144   INR  1.39*            I have personally reviewed the chest imaging from the last 3 days, agree with assessment of the radiologist and summarized it below    Assessment     ASSESSMENT:  Acute left-sided weakness  Acute right intracerebral hemorrhage; Large and nontraumatic  Brain compression with midline shift  Acute respiratory failure s/p mechanical ventilator  Acute alcohol intoxication  Acute alcoholic hepatitis  Acute anion gap metabolic acidosis  Acute hypokalemia    PLAN:  Patient with acute left-sided weakness from large right intracerebral hemorrhage of unclear etiology.  No obvious head injury noted  Neurosurgery has been consulted stat and the patient is going to the OR and has  been intubated by the emergency room for airway protection and planned surgery.   Blood pressure control per recommendations  Acute alcohol intoxication with alcoholic hepatitis noted.  Will start PRN low-dose Ativan once acute issues resolve given high risk of withdrawal given significant alcohol use.IV thiamine and folate   Electrolyte abnormalities are noted and will be corrected  Full code    Mechanical DVT prophylaxis  GI prophylaxis    Guarded prognosis    CC - 32 mins    I have discussed my findings and recommendations with nursing staff.     Nuno Witt MD  8/3/2020  22:25

## 2020-08-04 NOTE — CONSULTS
"Neurology Consult Note    Consult Date: 8/4/2020    Referring MD: Nuno Witt*    Reason for Consult I have been asked to see the patient in neurological consultation to render advice and opinion regarding intracerebral hemorrhage    Avinash Everett is a 30 y.o. male with no significant past medical history not on aspirin or anticoagulation, no known drug use apart from alcohol who last night at 8:30 PM developed headache and left-sided weakness.  CT head the emergency department showed large right frontal intracerebral hemorrhage with significant midline shift.  His mental status worsened in the emergency department requiring intubation.  He was taken emergently for craniotomy during which there was some suspicion for possible arteriovenous malformation.  Postoperatively his neurologic status has markedly improved and he has been following commands and moving the right side.  No witnessed seizures.  He was started on Keppra prophylactically.    History reviewed. No pertinent past medical history.    ROS: Per chart review  No fevers, chills  + Headache, weakness    Exam  /71   Pulse (!) 130   Temp 98.5 °F (36.9 °C) (Oral)   Resp 24   Ht 177.8 cm (70\")   Wt 84.8 kg (187 lb)   SpO2 98%   BMI 26.83 kg/m²   Gen: NAD, vitals reviewed  MS: Awake, alert, following commands, unable to assess orientation, memory, fund of knowledge due to endotracheal tube  CN: visual acuity grossly normal, PERRL, EOMI with some right gaze preference, left facial droop, tongue midline  Motor: 5/5 right upper and lower extremity, normal tone, no response to pain left upper extremity, triple flexion to pain left lower extremity, decreased tone left side  Reflexes: Plantars upgoing bilaterally    DATA:    Lab Results   Component Value Date    GLUCOSE 163 (H) 08/04/2020    CALCIUM 8.9 08/04/2020     08/04/2020    K 4.1 08/04/2020    CO2 24.3 08/04/2020     08/04/2020    BUN 4 (L) 08/04/2020    CREATININE 0.46 (L) " 08/04/2020    EGFRIFNONA >150 08/04/2020    BCR 8.7 08/04/2020    ANIONGAP 11.7 08/04/2020     Lab Results   Component Value Date    WBC 9.95 08/04/2020    HGB 11.8 (L) 08/04/2020    HCT 32.7 (L) 08/04/2020    MCV 98.2 (H) 08/04/2020     (L) 08/04/2020       Lab review: Hemoglobin 11.8, platelets 133, UDS negative    Imaging review: I personally reviewed his preop CT yesterday evening which showed a large right posterior frontal intracerebral hemorrhage with midline shift.  Discussed with the reading radiologist    Diagnoses:  Intracerebral hemorrhage, cortical right-sided  Status post craniotomy    Comment: Spontaneous right frontal lobar hemorrhage, unclear etiology.  Angiography planned.  Significant improvement after emergency surgery last night.    PLAN:  CTA and/or angio planned by neurosurgery  Would do MRI brain wo/w when able depending on angio results  No ASA/AC  BP <150

## 2020-08-04 NOTE — BRIEF OP NOTE
CRANIOTOMY FOR SUBDURAL HEMATOMA  Progress Note    Avinash Everett  8/3/2020 - 8/4/2020    Pre-op Diagnosis: Right frontotemporoparietal intracerebral hemorrhage with mass-effect       Post-Op Diagnosis Codes: Same as above       Procedure/CPT® Codes:        Procedure(s):  Emergent RIGHT FRONTOPARIETAL CRANIOTOMY FOR EVACUATION OF INTRACEREBRAL HEMORRHAGE    Surgeon(s):  Leobardo Rivera MD    Anesthesia: General    Staff:   Circulator: Elke Corrales RN; Rosalind Mcginnis RN; Tsering Ayoub RN; Naomi Oliveiar RN  Scrub Person: Jasmine Sargent; Subhash Aguilera         Estimated Blood Loss: 100 cc    Urine Voided: 300 mL    Specimens:                Specimens     ID Source Type Tests Collected By Collected At Frozen?      A Brain Tissue · TISSUE PATHOLOGY EXAM   Leobardo Rivera MD 8/3/20 5266      Description: blood clot    This specimen was not marked as sent.    B Brain Tissue · TISSUE PATHOLOGY EXAM   Leobardo Rivera MD 8/3/20 7144 No     Description: nigus    This specimen was not marked as sent.                Drains:   Urethral Catheter Silicone 16 Fr. (Active)   Site Assessment Clean;Skin intact 8/3/2020 10:41 PM   Collection Container Standard drainage bag 8/3/2020 10:41 PM   Securement Method Leg strap 8/3/2020 10:41 PM   Catheter care complete Yes 8/3/2020 10:41 PM       Findings: Large hematoma with significant mass-effect    Complications: None          Leobardo Rivera MD     Date: 8/4/2020  Time: 00:27

## 2020-08-04 NOTE — PROGRESS NOTES
[facility] NEUROSURGERY PROGRESS NOTE    PATIENT IDENTIFICATION:   Name:  Avinash Everett      MRN:  [id number]     30 y.o.  male               CC: ICH; POD 0 right Frontoparietal crani for evacuation of ICH    Subjective     Interval History:  Following commands; weaning vent. Had PO CTH    ROS:  Neuro: left side plegia    Objective     Vital signs in last 24 hours:  Temp:  [96.5 °F (35.8 °C)-98.6 °F (37 °C)] 98.5 °F (36.9 °C)  Heart Rate:  [] 130  Resp:  [16-24] 24  BP: (117-170)/() 127/71  FiO2 (%):  [39 %-100 %] 39 %      Intake/Output this shift:  No intake/output data recorded.      Intake/Output last 3 shifts:  I/O last 3 completed shifts:  In: 1344 [I.V.:1344]  Out: 1150 [Urine:950; Blood:200]    LABS:  Results from last 7 days   Lab Units 08/04/20  0606 08/04/20  0128 08/03/20  2144   WBC 10*3/mm3 9.95 9.26 8.51   HEMOGLOBIN g/dL 11.8* 11.9* 13.0   HEMATOCRIT % 32.7* 34.8* 35.7*   PLATELETS 10*3/mm3 133* 119* 136*     Results from last 7 days   Lab Units 08/04/20  0606 08/04/20  0130 08/03/20  2144   SODIUM mmol/L 136 136 140   POTASSIUM mmol/L 4.1 4.4 3.2*   CHLORIDE mmol/L 100 97* 99   CO2 mmol/L 24.3 22.8 24.8   BUN mg/dL 4* 4* 4*   CREATININE mg/dL 0.46* 0.58* 0.73*   CALCIUM mg/dL 8.9 8.8 9.4   BILIRUBIN mg/dL  --  3.2* 3.5*   ALK PHOS U/L  --  128* 137*   ALT (SGPT) U/L  --  59* 66*   AST (SGOT) U/L  --  151* 174*   GLUCOSE mg/dL 163* 121* 113*     Results from last 7 days   Lab Units 08/03/20  2144   INR  1.39*     Lab Results   Lab Value Date/Time    PTT 40.9 (H) 08/03/2020 4281        IMAGING STUDIES:  CTH- significant reduction in IPH, mass effect, and MLS    I personally viewed and interpreted the patient's cTH.    Meds reviewed/changed: Yes    Current Facility-Administered Medications:   •  [DISCONTINUED] acetaminophen (TYLENOL) tablet 650 mg, 650 mg, Oral, Q4H PRN **OR** acetaminophen (TYLENOL) suppository 650 mg, 650 mg, Rectal, Q4H PRN, Leobardo Rivera MD  •  bisacodyl (DULCOLAX)  suppository 10 mg, 10 mg, Rectal, Daily PRN, Leobardo Rivera MD  •  ceFAZolin in dextrose (ANCEF) IVPB solution 2 g, 2 g, Intravenous, Q8H, Leobardo Rivera MD, 2 g at 08/04/20 0629  •  folic acid 1 mg in sodium chloride 0.9 % 50 mL IVPB, 1 mg, Intravenous, Daily, Leobardo Rivera MD  •  HYDROmorphone (DILAUDID) injection 0.5 mg, 0.5 mg, Intravenous, Q2H PRN, 0.5 mg at 08/04/20 0730 **AND** naloxone (NARCAN) injection 0.4 mg, 0.4 mg, Intravenous, Q5 Min PRN, Leobardo Rivera MD  •  levETIRAcetam in NaCl 0.82% (KEPPRA) IVPB 500 mg, 500 mg, Intravenous, Q12H, Leobardo Rivera MD  •  Magnesium Sulfate 2 gram Bolus, followed by 8 gram infusion (total Mg dose 10 grams)- Mg less than or equal to 1mg/dL, 2 g, Intravenous, PRN **OR** Magnesium Sulfate 2 gram / 50mL Infusion (GIVE X 3 BAGS TO EQUAL 6GM TOTAL DOSE) - Mg 1.1 - 1.5 mg/dl, 2 g, Intravenous, PRN **OR** Magnesium Sulfate 4 gram infusion- Mg 1.6-1.9 mg/dL, 4 g, Intravenous, PRN, Leobardo Rivera MD  •  niCARdipine (CARDENE) 25 mg in 250 mL NS (0.1 mg/mL) infusion kit, 5-15 mg/hr, Intravenous, Titrated, Leobardo Rivera MD, Stopped at 08/04/20 0441  •  ondansetron (ZOFRAN) injection 4 mg, 4 mg, Intravenous, Q6H PRN, Leobardo Rivera MD  •  potassium chloride (MICRO-K) CR capsule 40 mEq, 40 mEq, Oral, PRN **OR** potassium chloride (KLOR-CON) packet 40 mEq, 40 mEq, Oral, PRN **OR** potassium chloride 10 mEq in 100 mL IVPB, 10 mEq, Intravenous, Q1H PRN, Leobardo Rivera MD  •  potassium phosphate 45 mmol in sodium chloride 0.9 % 500 mL infusion, 45 mmol, Intravenous, PRN **OR** potassium phosphate 30 mmol in sodium chloride 0.9 % 250 mL infusion, 30 mmol, Intravenous, PRN **OR** potassium phosphate 15 mmol in sodium chloride 0.9 % 100 mL infusion, 15 mmol, Intravenous, PRN **OR** sodium phosphates 40 mmol in sodium chloride 0.9 % 500 mL IVPB, 40 mmol, Intravenous, PRN **OR** sodium phosphates 20 mmol in sodium chloride 0.9 % 250 mL IVPB, 20 mmol,  Intravenous, PRN, Leobardo Rivera MD  •  propofol (DIPRIVAN) infusion 10 mg/mL 100 mL, 5-50 mcg/kg/min, Intravenous, Continuous, Nuno Witt MD, Last Rate: 26.9 mL/hr at 08/04/20 0523, 50 mcg/kg/min at 08/04/20 0523  •  sodium chloride 0.9 % flush 10 mL, 10 mL, Intravenous, PRN, Leobardo Rivera MD  •  sodium chloride 0.9 % flush 10 mL, 10 mL, Intravenous, Q12H, Leobardo Rivera MD  •  sodium chloride 0.9 % flush 10 mL, 10 mL, Intravenous, PRN, Leobardo Rivera MD  •  sodium chloride 0.9 % infusion, 100 mL/hr, Intravenous, Continuous, Margarita Fuentes APRN  •  thiamine (B-1) 100 mg in sodium chloride 0.9 % 100 mL IVPB, 100 mg, Intravenous, Daily, Leobardo Rivera MD      Physical Exam:    General:   Eyes open to name; follows commands HEENT:    Intubated; cranial dressing in place;     CN III IV VI: PERRL     Motor: Normal strength/movement on right UE/LE; flaccid left side  Sensation: Absent to pain/light touch left side  Extremities:   Wearing SCD    Assessment/Plan     ASSESSMENT:      Nontraumatic subcortical hemorrhage of right cerebral hemisphere (CMS/HCC)    Right-sided nontraumatic intracerebral hemorrhage (CMS/HCC)      PLAN: Patient with presentation of severe headache and left-sided plegia with findings of spontaneous right intraparenchymal hemorrhage.  Status post craniotomy for emergent evacuation of intracranial hemorrhage.  He is doing better this morning with spontaneous, purposeful movement on the right side and following commands.  Left side is plegic.  He is actually been weaning from the vent.  Etiology of hemorrhage unknown at this time.  No known history of illicit drugs or significant hypertension.  Vital signs are stable with systolic maintaining less than 140.  Dr. Rivera would like neuro interventionalists to evaluate for possible angiogram.  Seen by Dr. Malcolm this morning.  He is planning cerebral angiogram today.  He spoke to the patient's wife and discussed  "RBA's and obtained phone consent for procedure.  I modified his IV fluids from lactated Ringer's to normal saline due to intracranial hemorrhage.  Postoperative CT looks significantly better.  CTA head and neck to be obtained today as well.  Repeat coags.  Remain on vent until procedure complete.    I discussed the patients findings and my recommendations with patient, nursing staff and Dr. Rivera, Dr. Malcolm       LOS: 1 day       Margarita Fuentes, APRN  8/4/2020  08:47    \"Dictated utilizing Dragon dictation\".      "

## 2020-08-04 NOTE — CONSULTS
Consults    Patient Care Team:  Provider, No Known as PCP - General    Chief complaint: Headache, weakness, decline in level of consciousness related to a large right-sided intracerebral hemorrhage    Subjective     I was asked to see this patient by the emergency room physician Dr. Yee.  Is generally healthy individual although he does not go to the doctor.  He works as a video .  He does consume alcohol and had an elevated ethanol level today.  At about 8:30 in the evening apparently he experienced a headache drowsiness and weakness on the left side of his body.  He was taken to the emergency room where he was scanned and found to have a very large right frontal temporoparietal intracerebral hemorrhage without much subarachnoid blood with significant mass-effect.  He became more drowsy as I was speaking to Dr. Yee Quiring intubation.  I told the operating room that we were going to bring this patient up for an emergency craniotomy.  He had not been taking any blood thinners although his PT, PTT, and INR were slightly elevated, not dramatically so.  Just prior to surgery his pupils were sluggish but reactive at 3 mm and he was plegic on the left side.      Review of Systems   Eyes: Negative.    Respiratory: Negative.    Cardiovascular: Negative.    Gastrointestinal: Negative.    Endocrine: Negative.    Genitourinary: Negative.    Musculoskeletal: Negative.    Skin: Negative.    Allergic/Immunologic: Negative.    Neurological: Positive for weakness and headaches.   Hematological: Negative.    Psychiatric/Behavioral: Negative.         History reviewed. No pertinent past medical history., History reviewed. No pertinent surgical history., History reviewed. No pertinent family history.,   Social History     Tobacco Use   • Smoking status: Not on file   Substance Use Topics   • Alcohol use: Not on file   • Drug use: Not on file   ,   No medications prior to admission.   , Scheduled Meds:    [MAR Hold] folic  acid (FOLVITE) IVPB 1 mg Intravenous Daily   [MAR Hold] sodium chloride 10 mL Intravenous Q12H   [MAR Hold] thiamine (VITAMIN B1) IVPB 100 mg Intravenous Daily   , Continuous Infusions:    niCARdipine 5-15 mg/hr   , PRN Meds:  •  [MAR Hold] acetaminophen **OR** [MAR Hold] acetaminophen  •  bupivacaine-EPINEPHrine  •  gelatin absorbable  •  [MAR Hold] HYDROcodone-acetaminophen  •  [MAR Hold] magnesium sulfate **OR** [MAR Hold] magnesium sulfate **OR** [MAR Hold] magnesium sulfate  •  potassium chloride **OR** potassium chloride **OR** potassium chloride  •  [MAR Hold] potassium phosphate infusion greater than 15 mMoles **OR** [MAR Hold] potassium phosphate infusion greater than 15 mMoles **OR** [MAR Hold] potassium phosphate **OR** [MAR Hold] sodium phosphate IVPB **OR** [MAR Hold] sodium phosphate IVPB  •  [MAR Hold] sodium chloride  •  [MAR Hold] sodium chloride  •  BH OR ANTIBIOTIC IRRIGATION BUILDER  •  thrombin and Allergies:  Promestriene    Objective      Vital Signs  Temp:  [96.5 °F (35.8 °C)] 96.5 °F (35.8 °C)  Heart Rate:  [] 97  Resp:  [20] 20  BP: (133-170)/() 149/84  FiO2 (%):  [100 %] 100 %    Physical Exam   Constitutional: He appears well-developed and well-nourished.   HENT:   Head: Normocephalic and atraumatic.   Eyes:   Pupils just after intubation were 3 mm and sluggish bilaterally.   Neck: No tracheal deviation present. No thyromegaly present.   Cardiovascular: Normal rate.   Pulmonary/Chest:   Intubated on ventilator   Abdominal: Soft.   Musculoskeletal: He exhibits no edema or deformity.   Neurological:   Intubated on ventilator.  Prior to intubation he was purposeful with the right side but no longer obeying commands.  He was plegic on the left.   Skin: Skin is warm and dry.   Psychiatric:   Not applicable   Nursing note and vitals reviewed.      Results Review:    I reviewed the patient's new clinical results.  I reviewed the patient's new imaging results and agree with the  interpretation.  I reviewed the patient's other test results and agree with the interpretation  I reviewed the patient's new clinical results.  I reviewed the patient's new imaging results and agree with the interpretation.  I reviewed the patient's other test results and agree with the interpretation    CT HEAD WO CONTRAST STROKE PROTOCOL-     CLINICAL HISTORY: CVA. Left-sided weakness.     TECHNIQUE: Transverse 3 mm thick images were acquired from the base of  the skull to vertex without IV contrast.     Radiation dose reduction techniques were utilized, including automated  exposure control and exposure modulation based on body size.     COMPARISON: None     FINDINGS: There is a large area of acute intracerebral hemorrhage within  the posterior aspect of the right frontal lobe extending into the right  parietal lobe that measures approximately 9.6 cm in greatest AP  dimension and 4.9 cm in greatest mediolateral dimension no subarachnoid  or intraventricular hemorrhage is identified. There is mild edema  surrounding the area of hemorrhage. There is moderate mass effect with  right-to-left shift of midline structures by 1.2 cm. Effacement of  cortical sulci throughout the right cerebral hemisphere is also present.  There is no evidence of acute infarct.     IMPRESSION:  Large area of acute intracerebral hemorrhage in the right  cerebral hemisphere as described with moderate associated mass effect.  Otherwise unremarkable CT scan of the head.     Findings were communicated to the stroke neurologist 08/03/2020 at 9:56  PM     This report was finalized on 8/3/2020 10:09 PM by Dr. Norbert Mcgee,    Lab Results   Component Value Date    GLUCOSE 113 (H) 08/03/2020    BUN 4 (L) 08/03/2020    CREATININE 0.73 (L) 08/03/2020    EGFRIFNONA 126 08/03/2020    BCR 5.5 (L) 08/03/2020    K 3.2 (L) 08/03/2020    CO2 24.8 08/03/2020    CALCIUM 9.4 08/03/2020    ALBUMIN 4.40 08/03/2020     (H) 08/03/2020    ALT 66 (H)  08/03/2020     WBC   Date Value Ref Range Status   08/03/2020 8.51 3.40 - 10.80 10*3/mm3 Final     RBC   Date Value Ref Range Status   08/03/2020 3.62 (L) 4.14 - 5.80 10*6/mm3 Final     Hemoglobin   Date Value Ref Range Status   08/03/2020 13.0 13.0 - 17.7 g/dL Final     Hematocrit   Date Value Ref Range Status   08/03/2020 35.7 (L) 37.5 - 51.0 % Final     MCV   Date Value Ref Range Status   08/03/2020 98.6 (H) 79.0 - 97.0 fL Final     MCH   Date Value Ref Range Status   08/03/2020 35.9 (H) 26.6 - 33.0 pg Final     MCHC   Date Value Ref Range Status   08/03/2020 36.4 (H) 31.5 - 35.7 g/dL Final     RDW   Date Value Ref Range Status   08/03/2020 12.7 12.3 - 15.4 % Final     RDW-SD   Date Value Ref Range Status   08/03/2020 45.7 37.0 - 54.0 fl Final     MPV   Date Value Ref Range Status   08/03/2020 9.1 6.0 - 12.0 fL Final     Platelets   Date Value Ref Range Status   08/03/2020 136 (L) 140 - 450 10*3/mm3 Final     Neutrophil %   Date Value Ref Range Status   08/03/2020 74.2 42.7 - 76.0 % Final     Lymphocyte %   Date Value Ref Range Status   08/03/2020 15.5 (L) 19.6 - 45.3 % Final     Monocyte %   Date Value Ref Range Status   08/03/2020 5.6 5.0 - 12.0 % Final     Eosinophil %   Date Value Ref Range Status   08/03/2020 3.2 0.3 - 6.2 % Final     Basophil %   Date Value Ref Range Status   08/03/2020 0.8 0.0 - 1.5 % Final     Immature Grans %   Date Value Ref Range Status   08/03/2020 0.7 (H) 0.0 - 0.5 % Final     Neutrophils, Absolute   Date Value Ref Range Status   08/03/2020 6.31 1.70 - 7.00 10*3/mm3 Final     Lymphocytes, Absolute   Date Value Ref Range Status   08/03/2020 1.32 0.70 - 3.10 10*3/mm3 Final     Monocytes, Absolute   Date Value Ref Range Status   08/03/2020 0.48 0.10 - 0.90 10*3/mm3 Final     Eosinophils, Absolute   Date Value Ref Range Status   08/03/2020 0.27 0.00 - 0.40 10*3/mm3 Final     Basophils, Absolute   Date Value Ref Range Status   08/03/2020 0.07 0.00 - 0.20 10*3/mm3 Final     Immature Grans,  Absolute   Date Value Ref Range Status   08/03/2020 0.06 (H) 0.00 - 0.05 10*3/mm3 Final     nRBC   Date Value Ref Range Status   08/03/2020 0.0 0.0 - 0.2 /100 WBC Final         Assessment/Plan       Right-sided nontraumatic intracerebral hemorrhage (CMS/HCC)      Assessment:    Condition: In critical condition.  Worsening.   (Very large right frontotemporoparietal intracerebral hemorrhage with mass-effect.  No significant subarachnoid hemorrhage.  No history of hypertension.  No known history of drug use although there was some ethanol use.  The etiology of this is unclear although a arteriovenous malformation is a possibility.  I doubt hypertensive hemorrhage.  Drug related intracerebral hemorrhage is a possibility but the tox screen is still pending.).     Plan:   (I spoke briefly to the wife.  He will need an emergency craniotomy for evacuation of his hematoma.  The goal of surgery is removal of the hematoma to decrease mass-effect and prevent neurological deterioration and prevent death.  The risks include, but are not limited to, infection, hemorrhage requiring transfusion or reoperation, CSF leak requiring reoperation, incomplete relief of symptoms, potential need for additional surgeries in the future, stroke, paralysis, coma, and death.  The wife agrees to proceed.).       I discussed the patients findings and my recommendations with family, nursing staff and primary care team    Leobardo Rivera MD  08/04/20  00:45    Time:

## 2020-08-04 NOTE — ED TRIAGE NOTES
To [facility]    Pt was at bday party for son, started complaining of HA, with left sided numbness and weakness.  815pm -830pm aunt checks on pt, pt was complete flaccid on left side.

## 2020-08-04 NOTE — SIGNIFICANT NOTE
Called to evaluate patient with desaturation on the vent.  Patient was noted to be a little bit more agitated and currently on 80% FiO2.  He is following commands.  Neurosurgery at bedside discussing with family.  Vent was adjusted with multiple settings.  With higher PEEP and pressure control ventilation sats did improve finally.  Chest x-ray stat ordered.  Chest x-ray clear will need to have a CT chest PE protocol rule out acute PE.  Discussed plan of care in detail with the patient's family at bedside  Discussed with neurosurgery they are deciding about the timing of his angiogram      Critical care time 32 minutes

## 2020-08-04 NOTE — ED PROVIDER NOTES
EMERGENCY DEPARTMENT ENCOUNTER    Room Number:  19/19  Date of encounter:  8/3/2020  PCP: Provider, No Known  Historian: Patient, EMS    I used full protective equipment while examining this patient.  This includes face mask, gloves and protective eyewear.  I washed my hands before entering the room and immediately upon leaving the room      HPI:  Chief Complaint: Possible stroke  A complete HPI/ROS/PMH/PSH/SH/FH are unobtainable due to: None    Context: Avinash Everett is a 30 y.o. male who presents to the ED c/o possible stroke.  Patient had onset of left-sided weakness with onset about 8:30 PM.  Patient reports mild headache.  He does report drinking several shots of alcohol today.  He has no chronic medical problems and takes no medications on a daily basis.  Weakness is severe and worsened by nothing, improved by nothing.  EMS reports normal glucose and on remarkable vital signs.      PAST MEDICAL HISTORY  Active Ambulatory Problems     Diagnosis Date Noted   • No Active Ambulatory Problems     Resolved Ambulatory Problems     Diagnosis Date Noted   • No Resolved Ambulatory Problems     No Additional Past Medical History         PAST SURGICAL HISTORY  History reviewed. No pertinent surgical history.      FAMILY HISTORY  History reviewed. No pertinent family history.      SOCIAL HISTORY  Social History     Socioeconomic History   • Marital status:      Spouse name: Not on file   • Number of children: Not on file   • Years of education: Not on file   • Highest education level: Not on file         ALLERGIES  Promestriene       REVIEW OF SYSTEMS  Review of Systems   Constitutional: Negative for fever.   HENT: Negative.  Negative for sore throat.    Eyes: Negative.    Respiratory: Negative.  Negative for cough.    Cardiovascular: Negative.  Negative for chest pain.   Gastrointestinal: Negative.    Genitourinary: Negative.  Negative for dysuria.   Musculoskeletal: Negative.  Negative for back pain.   Skin:  Negative.  Negative for rash.   Neurological: Positive for weakness and numbness. Negative for headaches.   All other systems reviewed and are negative.          PHYSICAL EXAM    I have reviewed the triage vital signs and nursing notes.    ED Triage Vitals [08/03/20 2140]   Temp Heart Rate Resp BP SpO2   -- 112 20 143/80 --      Temp src Heart Rate Source Patient Position BP Location FiO2 (%)   -- Monitor -- Right arm --       Physical Exam  GENERAL: Drowsy male who is oriented to name and year but not location.  He does follow commands and answers questions reasonably appropriately  HENT: nares patent, atraumatic  EYES: no scleral icterus  CV: regular rhythm, regular rate-no murmur  RESPIRATORY: normal effort clear to auscultation bilaterally  ABDOMEN: soft, nontender to palpation  MUSCULOSKELETAL: no deformity  NEURO: Marketed left-sided weakness involving the face arm and leg.  There is also left-sided inattention and sensory deficit.  NIH scale equals 13.  SKIN: warm, dry      LAB RESULTS  Recent Results (from the past 24 hour(s))   POC Glucose Once    Collection Time: 08/03/20  9:37 PM   Result Value Ref Range    Glucose 105 70 - 130 mg/dL   Protime-INR    Collection Time: 08/03/20  9:44 PM   Result Value Ref Range    Protime 16.8 (H) 11.7 - 14.2 Seconds    INR 1.39 (H) 0.90 - 1.10   aPTT    Collection Time: 08/03/20  9:44 PM   Result Value Ref Range    PTT 40.9 (H) 22.7 - 35.4 seconds   Troponin    Collection Time: 08/03/20  9:44 PM   Result Value Ref Range    Troponin T <0.010 0.000 - 0.030 ng/mL   Comprehensive Metabolic Panel    Collection Time: 08/03/20  9:44 PM   Result Value Ref Range    Glucose 113 (H) 65 - 99 mg/dL    BUN 4 (L) 6 - 20 mg/dL    Creatinine 0.73 (L) 0.76 - 1.27 mg/dL    Sodium 140 136 - 145 mmol/L    Potassium 3.2 (L) 3.5 - 5.2 mmol/L    Chloride 99 98 - 107 mmol/L    CO2 24.8 22.0 - 29.0 mmol/L    Calcium 9.4 8.6 - 10.5 mg/dL    Total Protein 8.7 (H) 6.0 - 8.5 g/dL    Albumin 4.40 3.50 -  5.20 g/dL    ALT (SGPT) 66 (H) 1 - 41 U/L    AST (SGOT) 174 (H) 1 - 40 U/L    Alkaline Phosphatase 137 (H) 39 - 117 U/L    Total Bilirubin 3.5 (H) 0.0 - 1.2 mg/dL    eGFR Non African Amer 126 >60 mL/min/1.73    Globulin 4.3 gm/dL    A/G Ratio 1.0 g/dL    BUN/Creatinine Ratio 5.5 (L) 7.0 - 25.0    Anion Gap 16.2 (H) 5.0 - 15.0 mmol/L   Light Blue Top    Collection Time: 08/03/20  9:44 PM   Result Value Ref Range    Extra Tube hold for add-on    Green Top (Gel)    Collection Time: 08/03/20  9:44 PM   Result Value Ref Range    Extra Tube Hold for add-ons.    Lavender Top    Collection Time: 08/03/20  9:44 PM   Result Value Ref Range    Extra Tube hold for add-on    Gold Top - SST    Collection Time: 08/03/20  9:44 PM   Result Value Ref Range    Extra Tube Hold for add-ons.    CBC Auto Differential    Collection Time: 08/03/20  9:44 PM   Result Value Ref Range    WBC 8.51 3.40 - 10.80 10*3/mm3    RBC 3.62 (L) 4.14 - 5.80 10*6/mm3    Hemoglobin 13.0 13.0 - 17.7 g/dL    Hematocrit 35.7 (L) 37.5 - 51.0 %    MCV 98.6 (H) 79.0 - 97.0 fL    MCH 35.9 (H) 26.6 - 33.0 pg    MCHC 36.4 (H) 31.5 - 35.7 g/dL    RDW 12.7 12.3 - 15.4 %    RDW-SD 45.7 37.0 - 54.0 fl    MPV 9.1 6.0 - 12.0 fL    Platelets 136 (L) 140 - 450 10*3/mm3    Neutrophil % 74.2 42.7 - 76.0 %    Lymphocyte % 15.5 (L) 19.6 - 45.3 %    Monocyte % 5.6 5.0 - 12.0 %    Eosinophil % 3.2 0.3 - 6.2 %    Basophil % 0.8 0.0 - 1.5 %    Immature Grans % 0.7 (H) 0.0 - 0.5 %    Neutrophils, Absolute 6.31 1.70 - 7.00 10*3/mm3    Lymphocytes, Absolute 1.32 0.70 - 3.10 10*3/mm3    Monocytes, Absolute 0.48 0.10 - 0.90 10*3/mm3    Eosinophils, Absolute 0.27 0.00 - 0.40 10*3/mm3    Basophils, Absolute 0.07 0.00 - 0.20 10*3/mm3    Immature Grans, Absolute 0.06 (H) 0.00 - 0.05 10*3/mm3    nRBC 0.0 0.0 - 0.2 /100 WBC   Ethanol    Collection Time: 08/03/20  9:44 PM   Result Value Ref Range    Ethanol 154 (H) 0 - 10 mg/dL    Ethanol % 0.154 %   Type & Screen    Collection Time: 08/03/20   9:45 PM   Result Value Ref Range    ABO Type O     RH type Positive     Antibody Screen Negative     T&S Expiration Date 8/6/2020 11:59:59 PM        Ordered the above labs and independently reviewed the results.      RADIOLOGY  Xr Chest 1 View    Result Date: 8/3/2020  PORTABLE CHEST 08/03/2020 AT 9:57 PM  CLINICAL HISTORY: Acute CVA. Rule out aspiration.  The lungs are somewhat poorly inflated but appear free of acute infiltrates. There are no pleural effusions. The cardiomediastinal silhouette is unremarkable  IMPRESSIONS: No evidence of acute disease within the chest.  This report was finalized on 8/3/2020 10:21 PM by Dr. Norbert Mcgee M.D.      Ct Head Without Contrast Stroke Protocol    Result Date: 8/3/2020  CT HEAD WO CONTRAST STROKE PROTOCOL-  CLINICAL HISTORY: CVA. Left-sided weakness.  TECHNIQUE: Transverse 3 mm thick images were acquired from the base of the skull to vertex without IV contrast.  Radiation dose reduction techniques were utilized, including automated exposure control and exposure modulation based on body size.  COMPARISON: None  FINDINGS: There is a large area of acute intracerebral hemorrhage within the posterior aspect of the right frontal lobe extending into the right parietal lobe that measures approximately 9.6 cm in greatest AP dimension and 4.9 cm in greatest mediolateral dimension no subarachnoid or intraventricular hemorrhage is identified. There is mild edema surrounding the area of hemorrhage. There is moderate mass effect with right-to-left shift of midline structures by 1.2 cm. Effacement of cortical sulci throughout the right cerebral hemisphere is also present. There is no evidence of acute infarct.      Large area of acute intracerebral hemorrhage in the right cerebral hemisphere as described with moderate associated mass effect. Otherwise unremarkable CT scan of the head.  Findings were communicated to the stroke neurologist 08/03/2020 at 9:56 PM  This report was finalized  on 8/3/2020 10:09 PM by Dr. Norbert Mcgee M.D.        I ordered the above noted radiological studies. Reviewed by me and discussed with radiologist.  See dictation for official radiology interpretation.      PROCEDURES  Intubation  Date/Time: 8/3/2020 10:59 PM  Performed by: Norbert Yee MD  Authorized by: Norbert Yee MD     Consent:     Consent obtained:  Emergent situation  Pre-procedure details:     Patient status:  Altered mental status    Pretreatment meds: Etomidate, propofol.    Paralytics:  Succinylcholine  Procedure details:     Preoxygenation:  Bag valve mask    CPR in progress: no      Intubation method:  Oral    Oral intubation technique:  Video-assisted    Laryngoscope blade:  Mac 4    Tube size (mm):  7.5    Tube type:  Cuffed    Number of attempts:  2    Ventilation between attempts: yes      Cricoid pressure: no    Placement assessment:     ETT to teeth:  23    Tube secured with:  ETT navarrete    Breath sounds:  Equal    Placement verification: chest rise, ETCO2 detector, fiberoptic scope and tube exhalation    Post-procedure details:     Patient tolerance of procedure:  Tolerated well, no immediate complications  Critical Care  Performed by: Norbert Yee MD  Authorized by: Norbert Yee MD     Critical care provider statement:     Critical care time (minutes):  40    Critical care time was exclusive of:  Separately billable procedures and treating other patients    Critical care was time spent personally by me on the following activities:  Obtaining history from patient or surrogate, evaluation of patient's response to treatment, examination of patient, discussions with consultants, development of treatment plan with patient or surrogate, ordering and review of laboratory studies, ordering and review of radiographic studies, pulse oximetry, re-evaluation of patient's condition and ordering and performing treatments and interventions          MEDICATIONS GIVEN IN ER    Medications    sodium chloride 0.9 % flush 10 mL (has no administration in time range)   mannitol 20 % infusion 50 g (50 g Intravenous New Bag 8/3/20 3718)   thiamine (B-1) 100 mg in sodium chloride 0.9 % 100 mL IVPB (has no administration in time range)   folic acid 1 mg in sodium chloride 0.9 % 50 mL IVPB (has no administration in time range)   sodium chloride 0.9 % flush 10 mL (has no administration in time range)   sodium chloride 0.9 % flush 10 mL (has no administration in time range)   niCARdipine (CARDENE) 25 mg in 250 mL NS (0.1 mg/mL) infusion kit (has no administration in time range)   acetaminophen (TYLENOL) tablet 650 mg (has no administration in time range)     Or   acetaminophen (TYLENOL) suppository 650 mg (has no administration in time range)   HYDROcodone-acetaminophen (NORCO) 5-325 MG per tablet 1 tablet (has no administration in time range)   Magnesium Sulfate 2 gram Bolus, followed by 8 gram infusion (total Mg dose 10 grams)- Mg less than or equal to 1mg/dL (has no administration in time range)     Or   Magnesium Sulfate 2 gram / 50mL Infusion (GIVE X 3 BAGS TO EQUAL 6GM TOTAL DOSE) - Mg 1.1 - 1.5 mg/dl (has no administration in time range)     Or   Magnesium Sulfate 4 gram infusion- Mg 1.6-1.9 mg/dL (has no administration in time range)   potassium chloride (MICRO-K) CR capsule 40 mEq (has no administration in time range)     Or   potassium chloride (KLOR-CON) packet 40 mEq (has no administration in time range)     Or   potassium chloride 10 mEq in 100 mL IVPB (has no administration in time range)   potassium phosphate 45 mmol in sodium chloride 0.9 % 500 mL infusion (has no administration in time range)     Or   potassium phosphate 30 mmol in sodium chloride 0.9 % 250 mL infusion (has no administration in time range)     Or   potassium phosphate 15 mmol in sodium chloride 0.9 % 100 mL infusion (has no administration in time range)     Or   sodium phosphates 40 mmol in sodium chloride 0.9 % 500 mL IVPB (has  no administration in time range)     Or   sodium phosphates 20 mmol in sodium chloride 0.9 % 250 mL IVPB (has no administration in time range)   levETIRAcetam in NaCl 0.75% (KEPPRA) IVPB 1,000 mg (0 mg Intravenous Stopped 8/3/20 2233)   Propofol (DIPRIVAN) injection (100 mg Intravenous Not Given 8/3/20 2233)   etomidate (AMIDATE) injection (20 mg Intravenous Given 8/3/20 2224)   succinylcholine (ANECTINE) injection (100 mg Intravenous Given 8/3/20 2227)         PROGRESS, DATA ANALYSIS, CONSULTS, AND MEDICAL DECISION MAKING    All labs have been independently reviewed by me.  All radiology studies have been reviewed by me and discussed with radiologist dictating the report.   EKG's independently viewed and interpreted by me.  Discussion below represents my analysis of pertinent findings related to patient's condition, differential diagnosis, treatment plan and final disposition.      ED Course as of Aug 03 2303   Mon Aug 03, 2020   2150 I discussed patient's evaluation with on-call stroke physician Dr. Giuseppe Felipe.  It is a team D candidate and he has gone off to radiology to obtain multiple imaging studies.    [DB]   2152 Very large bleed noted on CT scan with midline shift.  Will contact neurosurgery on-call.    [DB]   2204 Discussed patient's evaluation with Dr. Leobardo Rivera on-call for neurosurgery.  We reviewed the images and he is asking that I give 1 g of Keppra as well as 50 mg of mannitol.  I will go ahead and intubate due to patient's worsening level of consciousness.  Dr. Rivera is planning to come in for likely operative intervention.    [DB]   2208 EKG          EKG time: 2200  Rhythm/Rate: Sinus tachycardia 101  P waves and AL: Normal P waves and AL intervals  QRS, axis: Left axis deviation, IVCD  ST and T waves: Unremarkable ST and T wave-prolonged QT interval    Interpreted Contemporaneously by me, independently viewed  No prior to compare      [DB]   2216 I discussed patient's evaluation with  Dr. double-lumen from the ICU who will admit after he gets out of the OR.    [DB]   2256 Multiple repeat evaluations of the patient show that he had worsening of his mental status and became more drowsy and less responsive.  I did discuss patient's evaluation with wife who showed up after his initial arrival.  I also discussed with Dr. Darci Rivera who came from home to take patient to the operating room.    [DB]   2257 Patient is been orally intubated, see procedure note.  He has been given IV Keppra as well as mannitol.  He is currently on his way to the operating room for likely intracranial evacuation.    [DB]   2302 Labs have been reviewed and are notable for elevated LFTs of 66 and 174 on AST and ALT.  Serum alcohol is 154.  CBC is fairly unremarkable with exception of slightly low platelet count of 136.    [DB]   2303 Of note Capper was worn throughout intubation as well as gown and gloves.  I did wash my hands prior to entering room and upon leaving the room.    [DB]      ED Course User Index  [DB] Norbert Yee MD       AS OF 23:03 VITALS:    BP - 143/80  HR - 112  TEMP - 96.5 °F (35.8 °C) (Tympanic)  O2 SATS - 96%      DIAGNOSIS  Final diagnoses:   Right-sided nontraumatic intracerebral hemorrhage, unspecified cerebral location (CMS/HCC)         DISPOSITION  To operating room         Nobrert Yee MD  08/03/20 8673

## 2020-08-04 NOTE — ED NOTES
During pt encounter, MD, RT, Tech and 2 nurses wore CAPRs.     Urmila Jackson, AVA  08/03/20 0043

## 2020-08-04 NOTE — PROGRESS NOTES
"NEUROSURGERY - ADDENDUM TO PREVIOUS NOTE      Spoke to patient's wife over phone who states that she and the patient are recently . She was able to confirm that patient takes no blood thinning medications whatsoever at home. She stated that he is an alcoholic who to her knowledge had been sober for the last month.    Just returned from CTA head and neck. Awakens to verbal stimuli, nods head \"yes\" \"no\" to questions. States that his head hurts but not as severe as before surgery. Uses R hand to make gestures to questions and the responses are verified by asking simple yes, no questions.     Lab work this am reveals INR of 1.56. Patient has a known history of alcohol abuse. Held up 2 fingers when asked if he drank beer last night. I reiterated question stating \"so you drank two beers last night?\" He nodded head yes. When asked if he had been told by a physician that his liver is functioning poorly he held up 5 fingers which were confirmed when he nodded head yes to 5 months and no to 5 years. Follows commands in R arm and R leg. Plegic on L side with obvious L neglect.       Blood pressure 127/71, pulse (!) 130, temperature 98.6 °F (37 °C), temperature source Oral, resp. rate 24, height 177.8 cm (70\"), weight 84.8 kg (187 lb), SpO2 98 %.    Intubated, sedated on propofol.  Opened eyes readily to verbal stimuli.   Unable to participate with EOM exam - gets drowsy  Nods head in response to questions and motions with R hand to elaborate his responses to questions  Follows commands readily R arm and R leg.  Plegic on L side  Cranial dressing intact.   .  Results from last 7 days   Lab Units 08/04/20  0927 08/03/20  2144   INR  1.56* 1.39*   APTT seconds 38.6* 40.9*     .  Results from last 7 days   Lab Units 08/04/20  0606 08/04/20  0128 08/03/20  2144   WBC 10*3/mm3 9.95 9.26 8.51   HEMOGLOBIN g/dL 11.8* 11.9* 13.0   HEMATOCRIT % 32.7* 34.8* 35.7*   PLATELETS 10*3/mm3 133* 119* 136*       Informed Dr. Malcolm of " above.  Transfuse 2 units FFP  Check CTA head - just completed  Recheck INR after FFP infused  Plan for 4 vessel cerebral angiogram this afternoon

## 2020-08-04 NOTE — NURSING NOTE
Acute rehab referral received via stroke order set. Please note that the acute rehab admission RNs will not be actively evaluating this patient. If it is felt that the patient is or will be acute rehab appropriate please call the admissions office at [phone] and a full evaluation will be initiated.    Thank you!    Chino Adler RN  p   f

## 2020-08-04 NOTE — CONSULTS
" consult per pt request.    Pt was feeling a little anxious and nervous and requested to see a  for prayer. Pt shared about his growing up in a [employer] and grade school and how that was really formative and helpful for him. In his adult years he has not regularly attended [employer] or a part of any [employer] community. However, the recent and unexpected changes in his health have caused him some anxiety and a sense of \"need some support from the [place] upstairs.\" Pt is very worried about his wife and her well being, which is causing him more stress. I offered prayer and pt expressed the prayer and my listening to him was helpful.     I will follow up for spiritual/emotional support. Contact  if needed for emergency.   "

## 2020-08-04 NOTE — ANESTHESIA PROCEDURE NOTES
Airway  Date/Time: 8/3/2020 11:02 PM    General Information and Staff    Patient location during procedure: OR  Anesthesiologist: Ramy Singleton DO    Indications and Patient Condition  Indications for airway management: airway protection and CNS depression    Preoxygenated: NA  Mask difficulty assessment: 0 - not attempted    Final Airway Details  Final airway type: endotracheal airway      Cuffed: yes   Assessment: lips, teeth, and gum same as pre-op and atraumatic intubation    Additional Comments  Insitu from ER, emergently placed following ICH

## 2020-08-04 NOTE — SIGNIFICANT NOTE
08/04/20 1246   Rehab Time/Intention   Evaluation Not Performed unable to evaluate, medical status change  (Attempted to see pt for swallowing evaluation, however, pt remains intubated at this time. RN states pt may be extubated later today or tomorrow. Will follow up tomorrow to determine appropriateness of evaluation.)   Rehab Treatment   Discipline speech language pathologist

## 2020-08-04 NOTE — ANESTHESIA PREPROCEDURE EVALUATION
Anesthesia Evaluation     NPO Solid Status: Waived due to emergency  NPO Liquid Status: Waived due to emergency           Airway   Dental      Pulmonary - normal exam   Cardiovascular - normal exam  Exercise tolerance: unable to assess    ECG reviewed        Neuro/Psych  (+) CVA (R Frontal ICH), weakness (L face), numbness,     GI/Hepatic/Renal/Endo      Musculoskeletal     Abdominal    Substance History      OB/GYN          Other            Phys Exam Other: intubated              Anesthesia Plan    ASA 4 - emergent     general     intravenous induction   Postoperative Plan: Expected vent after surgery

## 2020-08-04 NOTE — PROGRESS NOTES
"      [facility]         Dr Robertson Sayied   LOS: 1 day   Patient Care Team:  Provider, No Known as PCP - General    Chief Complaint: Acute ICH large and nontraumatic status post emergent craniotomy suspected to be due to AVM postop on the vent    Interval History: Events noted chart reviewed.  Patient agitated on the vent.  Currently on propofol drip.  Following simple commands.    REVIEW OF SYSTEMS:   Sedated intubated    Ventilator/Non-Invasive Ventilation Settings (From admission, onward)     Start     Ordered    08/03/20 2320  Ventilator - AC/VC; (16); 100; 5; 500  Continuous     Question Answer Comment   Vent Mode AC/VC    Breath rate  16   FiO2 100    PEEP 5    Tidal Volume 500        08/03/20 2319                  Vital Signs  Temp:  [96.5 °F (35.8 °C)-98.6 °F (37 °C)] 98.5 °F (36.9 °C)  Heart Rate:  [] 130  Resp:  [16-24] 24  BP: (117-170)/() 127/71  FiO2 (%):  [39 %-100 %] 39 %    Intake/Output Summary (Last 24 hours) at 8/4/2020 0940  Last data filed at 8/4/2020 0400  Gross per 24 hour   Intake 1344 ml   Output 1150 ml   Net 194 ml     Flowsheet Rows      First Filed Value   Admission Height  177.8 cm (70\") Documented at 08/03/2020 2203   Admission Weight  96 kg (211 lb 11.2 oz) Documented at 08/03/2020 2140          Physical Exam:  Patient is examined using the personal protective equipment as per guidelines from infection control for this particular patient as enacted.  Hand hygiene was performed before and after patient interaction.   General Appearance:   Sedated intubated.  Wakes up follow simple commands.  Gets agitated at times.  ET tube good position orally  Neck midline trachea no thyromegaly   Lungs:    Equal breath sounds on the vent.    Heart:    Regular rhythm and normal rate, normal S1 and S2, no            murmur, no gallop, no rub, no click   Chest Wall:    No abnormalities observed   Abdomen:    Soft nondistended bowel sounds positive   Extremities:   Moves all " extremities well, no edema, no cyanosis, no             redness  CNS wakes up follow simple commands  Skin no rashes no nodules  Musculoskeletal no cyanosis no clubbing normal range of motion     Results Review:        Results from last 7 days   Lab Units 08/04/20  0606 08/04/20  0130 08/03/20  2144   SODIUM mmol/L 136 136 140   POTASSIUM mmol/L 4.1 4.4 3.2*   CHLORIDE mmol/L 100 97* 99   CO2 mmol/L 24.3 22.8 24.8   BUN mg/dL 4* 4* 4*   CREATININE mg/dL 0.46* 0.58* 0.73*   GLUCOSE mg/dL 163* 121* 113*   CALCIUM mg/dL 8.9 8.8 9.4     Results from last 7 days   Lab Units 08/03/20  2144   TROPONIN T ng/mL <0.010     Results from last 7 days   Lab Units 08/04/20  0606 08/04/20  0128 08/03/20  2144   WBC 10*3/mm3 9.95 9.26 8.51   HEMOGLOBIN g/dL 11.8* 11.9* 13.0   HEMATOCRIT % 32.7* 34.8* 35.7*   PLATELETS 10*3/mm3 133* 119* 136*     Results from last 7 days   Lab Units 08/03/20  2144   INR  1.39*   APTT seconds 40.9*     Results from last 7 days   Lab Units 08/04/20  0606   CHOLESTEROL mg/dL 250*         Results from last 7 days   Lab Units 08/04/20  0606   CHOLESTEROL mg/dL 250*   TRIGLYCERIDES mg/dL 194*   HDL CHOL mg/dL 42   LDL CHOL mg/dL 169*     Results from last 7 days   Lab Units 08/04/20  0124   PH, ARTERIAL pH units 7.372   PO2 ART mm Hg 317.8*   PCO2, ARTERIAL mm Hg 45.6*   HCO3 ART mmol/L 26.5       I reviewed the patient's new clinical results.  I personally viewed and interpreted the patient's CXR        Medication Review:     ceFAZolin 2 g Intravenous Q8H   folic acid (FOLVITE) IVPB 1 mg Intravenous Daily   levETIRAcetam 500 mg Intravenous Q12H   sodium chloride 10 mL Intravenous Q12H   thiamine (VITAMIN B1) IVPB 100 mg Intravenous Daily         fentaNYL (SUBLIMAZE) infusion 5 mcg/ml 250 mL  mcg/hr    niCARdipine 5-15 mg/hr Last Rate: Stopped (08/04/20 0441)   propofol 5-50 mcg/kg/min Last Rate: 50 mcg/kg/min (08/04/20 0523)   sodium chloride 100 mL/hr        ASSESSMENT:   Acute left-sided weakness  due to right ICH  Acute right intracerebral hemorrhage; Large and nontraumatic status post emergent craniotomy  Brain compression with midline shift status post emergent craniotomy  Acute respiratory failure s/p mechanical ventilator  Acute alcohol intoxication  Acute alcoholic hepatitis  Acute anion gap metabolic acidosis  Acute hypokalemia    PLAN:  Status post emergent craniotomy last night.  Postop on vent following commands.  Wean off the vent once okay with neurosurgery and no plans for OR again.  Currently on propofol drip with increased agitation.  Will add fentanyl drip.  ICU core measures  Thiamine will be continued  Keppra for seizure prophylaxis.  Plans for cerebral angiogram per neurosurgery.  Fluid and electrolyte replacement  Discussed with nursing staff  Continue current supportive care    Critical care time 35 minutes    Ailyn Bateman MD  08/04/20  09:40

## 2020-08-05 ENCOUNTER — APPOINTMENT (OUTPATIENT)
Dept: GENERAL RADIOLOGY | Facility: HOSPITAL | Age: 30
End: 2020-08-05

## 2020-08-05 ENCOUNTER — APPOINTMENT (OUTPATIENT)
Dept: CT IMAGING | Facility: HOSPITAL | Age: 30
End: 2020-08-05

## 2020-08-05 LAB
ALBUMIN SERPL-MCNC: 3.7 G/DL (ref 3.5–5.2)
ALBUMIN/GLOB SERPL: 1.1 G/DL
ALP SERPL-CCNC: 97 U/L (ref 39–117)
ALT SERPL W P-5'-P-CCNC: 39 U/L (ref 1–41)
ANION GAP SERPL CALCULATED.3IONS-SCNC: 7.7 MMOL/L (ref 5–15)
APTT PPP: 35.1 SECONDS (ref 22.7–35.4)
AST SERPL-CCNC: 85 U/L (ref 1–40)
BACTERIA UR QL AUTO: ABNORMAL /HPF
BASOPHILS # BLD AUTO: 0.03 10*3/MM3 (ref 0–0.2)
BASOPHILS # BLD AUTO: 0.05 10*3/MM3 (ref 0–0.2)
BASOPHILS NFR BLD AUTO: 0.4 % (ref 0–1.5)
BASOPHILS NFR BLD AUTO: 0.6 % (ref 0–1.5)
BH BB BLOOD EXPIRATION DATE: NORMAL
BH BB BLOOD EXPIRATION DATE: NORMAL
BH BB BLOOD TYPE BARCODE: 1700
BH BB BLOOD TYPE BARCODE: 1700
BH BB DISPENSE STATUS: NORMAL
BH BB DISPENSE STATUS: NORMAL
BH BB PRODUCT CODE: NORMAL
BH BB PRODUCT CODE: NORMAL
BH BB UNIT NUMBER: NORMAL
BH BB UNIT NUMBER: NORMAL
BILIRUB SERPL-MCNC: 3.1 MG/DL (ref 0–1.2)
BILIRUB UR QL STRIP: ABNORMAL
BUN SERPL-MCNC: 7 MG/DL (ref 6–20)
BUN/CREAT SERPL: 13.7 (ref 7–25)
CALCIUM SPEC-SCNC: 7.9 MG/DL (ref 8.6–10.5)
CHLORIDE SERPL-SCNC: 102 MMOL/L (ref 98–107)
CLARITY UR: ABNORMAL
CLOSURE TME COLL+ADP BLD: 84 SECONDS (ref 52–122)
CLOSURE TME COLL+EPINEP BLD: 87 SECONDS (ref 68–148)
CO2 SERPL-SCNC: 27.3 MMOL/L (ref 22–29)
COAG MIXING STUDY INTERP: ABNORMAL
COAG MIXING STUDY INTERP: ABNORMAL
COLOR UR: ABNORMAL
CREAT SERPL-MCNC: 0.51 MG/DL (ref 0.76–1.27)
CRP SERPL-MCNC: 1.48 MG/DL (ref 0–0.5)
DAT POLY-SP REAG RBC QL: NEGATIVE
DEPRECATED RDW RBC AUTO: 44.3 FL (ref 37–54)
DEPRECATED RDW RBC AUTO: 45.4 FL (ref 37–54)
EOSINOPHIL # BLD AUTO: 0.03 10*3/MM3 (ref 0–0.4)
EOSINOPHIL # BLD AUTO: 0.08 10*3/MM3 (ref 0–0.4)
EOSINOPHIL NFR BLD AUTO: 0.4 % (ref 0.3–6.2)
EOSINOPHIL NFR BLD AUTO: 1 % (ref 0.3–6.2)
ERYTHROCYTE [DISTWIDTH] IN BLOOD BY AUTOMATED COUNT: 12 % (ref 12.3–15.4)
ERYTHROCYTE [DISTWIDTH] IN BLOOD BY AUTOMATED COUNT: 12.2 % (ref 12.3–15.4)
ERYTHROCYTE [SEDIMENTATION RATE] IN BLOOD: 48 MM/HR (ref 0–15)
FIBRINOGEN PPP-MCNC: 274 MG/DL (ref 219–464)
FOLATE SERPL-MCNC: 2.71 NG/ML (ref 4.78–24.2)
FSP PPP LA-ACNC: ABNORMAL
GFR SERPL CREATININE-BSD FRML MDRD: >150 ML/MIN/1.73
GLOBULIN UR ELPH-MCNC: 3.4 GM/DL
GLUCOSE BLDC GLUCOMTR-MCNC: 80 MG/DL (ref 70–130)
GLUCOSE BLDC GLUCOMTR-MCNC: 87 MG/DL (ref 70–130)
GLUCOSE BLDC GLUCOMTR-MCNC: 97 MG/DL (ref 70–130)
GLUCOSE SERPL-MCNC: 118 MG/DL (ref 65–99)
GLUCOSE UR STRIP-MCNC: NEGATIVE MG/DL
HAPTOGLOB SERPL-MCNC: 19 MG/DL (ref 30–200)
HCT VFR BLD AUTO: 30.5 % (ref 37.5–51)
HCT VFR BLD AUTO: 31.4 % (ref 37.5–51)
HGB BLD-MCNC: 10.8 G/DL (ref 13–17.7)
HGB BLD-MCNC: 11 G/DL (ref 13–17.7)
HGB UR QL STRIP.AUTO: ABNORMAL
HYALINE CASTS UR QL AUTO: ABNORMAL /LPF
IMM GRANULOCYTES # BLD AUTO: 0.03 10*3/MM3 (ref 0–0.05)
IMM GRANULOCYTES # BLD AUTO: 0.03 10*3/MM3 (ref 0–0.05)
IMM GRANULOCYTES NFR BLD AUTO: 0.4 % (ref 0–0.5)
IMM GRANULOCYTES NFR BLD AUTO: 0.4 % (ref 0–0.5)
INR PPP: 1.47 (ref 0.9–1.1)
INR PPP: 1.49 (ref 0.9–1.1)
KETONES UR QL STRIP: NEGATIVE
LAB AP CASE REPORT: NORMAL
LAB AP CLINICAL INFORMATION: NORMAL
LAB AP INTRADEPARTMENTAL CONSULT: NORMAL
LDH SERPL-CCNC: 139 U/L (ref 135–225)
LDH SERPL-CCNC: 152 U/L (ref 135–225)
LEUKOCYTE ESTERASE UR QL STRIP.AUTO: ABNORMAL
LYMPHOCYTES # BLD AUTO: 0.66 10*3/MM3 (ref 0.7–3.1)
LYMPHOCYTES # BLD AUTO: 0.98 10*3/MM3 (ref 0.7–3.1)
LYMPHOCYTES NFR BLD AUTO: 11.8 % (ref 19.6–45.3)
LYMPHOCYTES NFR BLD AUTO: 8.5 % (ref 19.6–45.3)
MCH RBC QN AUTO: 35.8 PG (ref 26.6–33)
MCH RBC QN AUTO: 36.4 PG (ref 26.6–33)
MCHC RBC AUTO-ENTMCNC: 35 G/DL (ref 31.5–35.7)
MCHC RBC AUTO-ENTMCNC: 35.4 G/DL (ref 31.5–35.7)
MCV RBC AUTO: 102.3 FL (ref 79–97)
MCV RBC AUTO: 102.7 FL (ref 79–97)
MONOCYTES # BLD AUTO: 0.66 10*3/MM3 (ref 0.1–0.9)
MONOCYTES # BLD AUTO: 0.76 10*3/MM3 (ref 0.1–0.9)
MONOCYTES NFR BLD AUTO: 8.5 % (ref 5–12)
MONOCYTES NFR BLD AUTO: 9.1 % (ref 5–12)
NEUTROPHILS NFR BLD AUTO: 6.4 10*3/MM3 (ref 1.7–7)
NEUTROPHILS NFR BLD AUTO: 6.43 10*3/MM3 (ref 1.7–7)
NEUTROPHILS NFR BLD AUTO: 77.1 % (ref 42.7–76)
NEUTROPHILS NFR BLD AUTO: 81.8 % (ref 42.7–76)
NITRITE UR QL STRIP: POSITIVE
NORMAL PLASMA PT: 13.5 SECONDS (ref 11.7–14.2)
NRBC BLD AUTO-RTO: 0 /100 WBC (ref 0–0.2)
NRBC BLD AUTO-RTO: 0 /100 WBC (ref 0–0.2)
PATH REPORT.FINAL DX SPEC: NORMAL
PH UR STRIP.AUTO: <=5 [PH] (ref 5–8)
PHOSPHATE SERPL-MCNC: 2.7 MG/DL (ref 2.5–4.5)
PLATELET # BLD AUTO: 125 10*3/MM3 (ref 140–450)
PLATELET # BLD AUTO: 141 10*3/MM3 (ref 140–450)
PLATELET # BLD AUTO: 141 10*3/MM3 (ref 140–450)
PLATELETS.RETICULATED NFR BLD AUTO: 3.3 % (ref 0.9–6.5)
PMV BLD AUTO: 9.4 FL (ref 6–12)
PMV BLD AUTO: 9.6 FL (ref 6–12)
POTASSIUM SERPL-SCNC: 3.6 MMOL/L (ref 3.5–5.2)
PROT SERPL-MCNC: 7.1 G/DL (ref 6–8.5)
PROT UR QL STRIP: ABNORMAL
PROTHROMBIN TIME: 17.4 SECONDS (ref 11.7–14.2)
PROTHROMBIN TIME: 17.4 SECONDS (ref 11.7–14.2)
PROTHROMBIN TIME: 17.6 SECONDS (ref 11.7–14.2)
PROTHROMBIN TIME: 17.7 SECONDS (ref 11.7–14.2)
PT MIX W PLASMA: 14.7 SECONDS (ref 11.7–14.2)
RBC # BLD AUTO: 2.97 10*6/MM3 (ref 4.14–5.8)
RBC # BLD AUTO: 3.07 10*6/MM3 (ref 4.14–5.8)
RBC # UR: ABNORMAL /HPF
REF LAB TEST METHOD: ABNORMAL
RETICS # AUTO: 0.1 10*6/MM3 (ref 0.02–0.13)
RETICS/RBC NFR AUTO: 3.33 % (ref 0.7–1.9)
SODIUM SERPL-SCNC: 137 MMOL/L (ref 136–145)
SP GR UR STRIP: >=1.03 (ref 1–1.03)
SQUAMOUS #/AREA URNS HPF: ABNORMAL /HPF
THROMBIN TIME: 18.5 SECONDS (ref 15.7–20.4)
THROMBIN TIME: 18.5 SECONDS (ref 15.7–20.4)
TRANSFUSION REACTION INTERPRETATION 1: NORMAL
TRIGL SERPL-MCNC: 231 MG/DL (ref 0–150)
UNIT  ABO: NORMAL
UNIT  ABO: NORMAL
UNIT  RH: NORMAL
UNIT  RH: NORMAL
URINE MYOGLOBIN, QUALITATIVE: POSITIVE
UROBILINOGEN UR QL STRIP: ABNORMAL
VIT B12 BLD-MCNC: 1718 PG/ML (ref 211–946)
WBC # BLD AUTO: 7.81 10*3/MM3 (ref 3.4–10.8)
WBC # BLD AUTO: 8.33 10*3/MM3 (ref 3.4–10.8)
WBC UR QL AUTO: ABNORMAL /HPF

## 2020-08-05 PROCEDURE — B31N1ZZ FLUOROSCOPY OF OTHER UPPER ARTERIES USING LOW OSMOLAR CONTRAST: ICD-10-PCS | Performed by: NEUROLOGICAL SURGERY

## 2020-08-05 PROCEDURE — 25010000002 LEVETIRACETAM IN NACL 0.82% 500 MG/100ML SOLUTION: Performed by: NEUROLOGICAL SURGERY

## 2020-08-05 PROCEDURE — 85610 PROTHROMBIN TIME: CPT | Performed by: INTERNAL MEDICINE

## 2020-08-05 PROCEDURE — 83874 ASSAY OF MYOGLOBIN: CPT | Performed by: INTERNAL MEDICINE

## 2020-08-05 PROCEDURE — C1760 CLOSURE DEV, VASC: HCPCS | Performed by: NEUROLOGICAL SURGERY

## 2020-08-05 PROCEDURE — C9132 KCENTRA, PER I.U.: HCPCS | Performed by: INTERNAL MEDICINE

## 2020-08-05 PROCEDURE — 85220 BLOOC CLOT FACTOR V TEST: CPT | Performed by: INTERNAL MEDICINE

## 2020-08-05 PROCEDURE — 25010000002 FENTANYL CITRATE (PF) 100 MCG/2ML SOLUTION 20 ML VIAL: Performed by: NEUROLOGICAL SURGERY

## 2020-08-05 PROCEDURE — 85210 CLOT FACTOR II PROTHROM SPEC: CPT | Performed by: INTERNAL MEDICINE

## 2020-08-05 PROCEDURE — 86235 NUCLEAR ANTIGEN ANTIBODY: CPT | Performed by: INTERNAL MEDICINE

## 2020-08-05 PROCEDURE — 25010000002 PIPERACILLIN SOD-TAZOBACTAM PER 1 G: Performed by: INTERNAL MEDICINE

## 2020-08-05 PROCEDURE — 94799 UNLISTED PULMONARY SVC/PX: CPT

## 2020-08-05 PROCEDURE — 86880 COOMBS TEST DIRECT: CPT | Performed by: INTERNAL MEDICINE

## 2020-08-05 PROCEDURE — B3121ZZ FLUOROSCOPY OF LEFT SUBCLAVIAN ARTERY USING LOW OSMOLAR CONTRAST: ICD-10-PCS | Performed by: NEUROLOGICAL SURGERY

## 2020-08-05 PROCEDURE — 85260 CLOT FACTOR X STUART-POWER: CPT | Performed by: INTERNAL MEDICINE

## 2020-08-05 PROCEDURE — 85240 CLOT FACTOR VIII AHG 1 STAGE: CPT | Performed by: INTERNAL MEDICINE

## 2020-08-05 PROCEDURE — 85652 RBC SED RATE AUTOMATED: CPT | Performed by: INTERNAL MEDICINE

## 2020-08-05 PROCEDURE — 25010000002 HEPARIN (PORCINE) PER 1000 UNITS: Performed by: NEUROLOGICAL SURGERY

## 2020-08-05 PROCEDURE — 85245 CLOT FACTOR VIII VW RISTOCTN: CPT | Performed by: INTERNAL MEDICINE

## 2020-08-05 PROCEDURE — 80053 COMPREHEN METABOLIC PANEL: CPT | Performed by: NEUROLOGICAL SURGERY

## 2020-08-05 PROCEDURE — B3181ZZ FLUOROSCOPY OF BILATERAL INTERNAL CAROTID ARTERIES USING LOW OSMOLAR CONTRAST: ICD-10-PCS | Performed by: NEUROLOGICAL SURGERY

## 2020-08-05 PROCEDURE — 85055 RETICULATED PLATELET ASSAY: CPT | Performed by: INTERNAL MEDICINE

## 2020-08-05 PROCEDURE — 86225 DNA ANTIBODY NATIVE: CPT | Performed by: INTERNAL MEDICINE

## 2020-08-05 PROCEDURE — 25010000002 PROPOFOL 10 MG/ML EMULSION: Performed by: INTERNAL MEDICINE

## 2020-08-05 PROCEDURE — 93005 ELECTROCARDIOGRAM TRACING: CPT | Performed by: INTERNAL MEDICINE

## 2020-08-05 PROCEDURE — 25010000002 FENTANYL CITRATE (PF) 100 MCG/2ML SOLUTION 50 ML VIAL: Performed by: INTERNAL MEDICINE

## 2020-08-05 PROCEDURE — 83615 LACTATE (LD) (LDH) ENZYME: CPT | Performed by: INTERNAL MEDICINE

## 2020-08-05 PROCEDURE — 83010 ASSAY OF HAPTOGLOBIN QUANT: CPT | Performed by: INTERNAL MEDICINE

## 2020-08-05 PROCEDURE — C1769 GUIDE WIRE: HCPCS | Performed by: NEUROLOGICAL SURGERY

## 2020-08-05 PROCEDURE — 36224 PLACE CATH CAROTD ART: CPT | Performed by: NEUROLOGICAL SURGERY

## 2020-08-05 PROCEDURE — 87040 BLOOD CULTURE FOR BACTERIA: CPT | Performed by: INTERNAL MEDICINE

## 2020-08-05 PROCEDURE — 94003 VENT MGMT INPAT SUBQ DAY: CPT

## 2020-08-05 PROCEDURE — 85230 CLOT FACTOR VII PROCONVERTIN: CPT | Performed by: INTERNAL MEDICINE

## 2020-08-05 PROCEDURE — 99255 IP/OBS CONSLTJ NEW/EST HI 80: CPT | Performed by: INTERNAL MEDICINE

## 2020-08-05 PROCEDURE — 85025 COMPLETE CBC W/AUTO DIFF WBC: CPT | Performed by: INTERNAL MEDICINE

## 2020-08-05 PROCEDURE — 83520 IMMUNOASSAY QUANT NOS NONAB: CPT | Performed by: INTERNAL MEDICINE

## 2020-08-05 PROCEDURE — 84100 ASSAY OF PHOSPHORUS: CPT | Performed by: INTERNAL MEDICINE

## 2020-08-05 PROCEDURE — 82746 ASSAY OF FOLIC ACID SERUM: CPT | Performed by: INTERNAL MEDICINE

## 2020-08-05 PROCEDURE — B31C1ZZ FLUOROSCOPY OF BILATERAL EXTERNAL CAROTID ARTERIES USING LOW OSMOLAR CONTRAST: ICD-10-PCS | Performed by: NEUROLOGICAL SURGERY

## 2020-08-05 PROCEDURE — 81001 URINALYSIS AUTO W/SCOPE: CPT | Performed by: INTERNAL MEDICINE

## 2020-08-05 PROCEDURE — 99024 POSTOP FOLLOW-UP VISIT: CPT | Performed by: NURSE PRACTITIONER

## 2020-08-05 PROCEDURE — 36227 PLACE CATH XTRNL CAROTID: CPT | Performed by: NEUROLOGICAL SURGERY

## 2020-08-05 PROCEDURE — 76377 3D RENDER W/INTRP POSTPROCES: CPT

## 2020-08-05 PROCEDURE — 93010 ELECTROCARDIOGRAM REPORT: CPT | Performed by: INTERNAL MEDICINE

## 2020-08-05 PROCEDURE — 25010000002 THIAMINE PER 100 MG: Performed by: NEUROLOGICAL SURGERY

## 2020-08-05 PROCEDURE — 85576 BLOOD PLATELET AGGREGATION: CPT | Performed by: INTERNAL MEDICINE

## 2020-08-05 PROCEDURE — 71045 X-RAY EXAM CHEST 1 VIEW: CPT

## 2020-08-05 PROCEDURE — 84478 ASSAY OF TRIGLYCERIDES: CPT | Performed by: INTERNAL MEDICINE

## 2020-08-05 PROCEDURE — C1894 INTRO/SHEATH, NON-LASER: HCPCS | Performed by: NEUROLOGICAL SURGERY

## 2020-08-05 PROCEDURE — 86140 C-REACTIVE PROTEIN: CPT | Performed by: INTERNAL MEDICINE

## 2020-08-05 PROCEDURE — 86901 BLOOD TYPING SEROLOGIC RH(D): CPT

## 2020-08-05 PROCEDURE — 25010000002 VITAMIN K1 PER 1 MG: Performed by: INTERNAL MEDICINE

## 2020-08-05 PROCEDURE — 83051 HEMOGLOBIN PLASMA: CPT | Performed by: INTERNAL MEDICINE

## 2020-08-05 PROCEDURE — 85246 CLOT FACTOR VIII VW ANTIGEN: CPT | Performed by: INTERNAL MEDICINE

## 2020-08-05 PROCEDURE — 85730 THROMBOPLASTIN TIME PARTIAL: CPT | Performed by: INTERNAL MEDICINE

## 2020-08-05 PROCEDURE — 82962 GLUCOSE BLOOD TEST: CPT

## 2020-08-05 PROCEDURE — 85362 FIBRIN DEGRADATION PRODUCTS: CPT | Performed by: INTERNAL MEDICINE

## 2020-08-05 PROCEDURE — 25010000002 PROPOFOL 10 MG/ML EMULSION: Performed by: NEUROLOGICAL SURGERY

## 2020-08-05 PROCEDURE — 25010000002 FENTANYL CITRATE (PF) 100 MCG/2ML SOLUTION 5 ML AMPULE: Performed by: NEUROLOGICAL SURGERY

## 2020-08-05 PROCEDURE — 83070 ASSAY OF HEMOSIDERIN QUAL: CPT | Performed by: INTERNAL MEDICINE

## 2020-08-05 PROCEDURE — 36226 PLACE CATH VERTEBRAL ART: CPT | Performed by: NEUROLOGICAL SURGERY

## 2020-08-05 PROCEDURE — 85611 PROTHROMBIN TEST: CPT | Performed by: INTERNAL MEDICINE

## 2020-08-05 PROCEDURE — 25010000002 PROTHROMBIN COMPLEX CONC HUMAN 1000 UNITS KIT: Performed by: INTERNAL MEDICINE

## 2020-08-05 PROCEDURE — B31G1ZZ FLUOROSCOPY OF BILATERAL VERTEBRAL ARTERIES USING LOW OSMOLAR CONTRAST: ICD-10-PCS | Performed by: NEUROLOGICAL SURGERY

## 2020-08-05 PROCEDURE — 85670 THROMBIN TIME PLASMA: CPT | Performed by: INTERNAL MEDICINE

## 2020-08-05 PROCEDURE — 85384 FIBRINOGEN ACTIVITY: CPT | Performed by: INTERNAL MEDICINE

## 2020-08-05 PROCEDURE — 87086 URINE CULTURE/COLONY COUNT: CPT | Performed by: INTERNAL MEDICINE

## 2020-08-05 PROCEDURE — 86880 COOMBS TEST DIRECT: CPT

## 2020-08-05 PROCEDURE — 85045 AUTOMATED RETICULOCYTE COUNT: CPT | Performed by: INTERNAL MEDICINE

## 2020-08-05 PROCEDURE — 86900 BLOOD TYPING SEROLOGIC ABO: CPT

## 2020-08-05 PROCEDURE — 70450 CT HEAD/BRAIN W/O DYE: CPT

## 2020-08-05 PROCEDURE — 82607 VITAMIN B-12: CPT | Performed by: INTERNAL MEDICINE

## 2020-08-05 RX ORDER — PROMETHAZINE HYDROCHLORIDE 25 MG/ML
6.25 INJECTION, SOLUTION INTRAMUSCULAR; INTRAVENOUS
Status: DISCONTINUED | OUTPATIENT
Start: 2020-08-05 | End: 2020-08-05 | Stop reason: HOSPADM

## 2020-08-05 RX ORDER — SODIUM CHLORIDE 0.9 % (FLUSH) 0.9 %
3-10 SYRINGE (ML) INJECTION AS NEEDED
Status: CANCELLED | OUTPATIENT
Start: 2020-08-05

## 2020-08-05 RX ORDER — LIDOCAINE HYDROCHLORIDE 10 MG/ML
0.5 INJECTION, SOLUTION EPIDURAL; INFILTRATION; INTRACAUDAL; PERINEURAL ONCE AS NEEDED
Status: CANCELLED | OUTPATIENT
Start: 2020-08-05

## 2020-08-05 RX ORDER — FENTANYL CITRATE 50 UG/ML
50 INJECTION, SOLUTION INTRAMUSCULAR; INTRAVENOUS
Status: DISCONTINUED | OUTPATIENT
Start: 2020-08-05 | End: 2020-08-05 | Stop reason: HOSPADM

## 2020-08-05 RX ORDER — LABETALOL HYDROCHLORIDE 5 MG/ML
5 INJECTION, SOLUTION INTRAVENOUS
Status: DISCONTINUED | OUTPATIENT
Start: 2020-08-05 | End: 2020-08-05 | Stop reason: HOSPADM

## 2020-08-05 RX ORDER — ONDANSETRON 2 MG/ML
4 INJECTION INTRAMUSCULAR; INTRAVENOUS ONCE AS NEEDED
Status: DISCONTINUED | OUTPATIENT
Start: 2020-08-05 | End: 2020-08-05 | Stop reason: HOSPADM

## 2020-08-05 RX ORDER — FLUMAZENIL 0.1 MG/ML
0.2 INJECTION INTRAVENOUS AS NEEDED
Status: DISCONTINUED | OUTPATIENT
Start: 2020-08-05 | End: 2020-08-05 | Stop reason: HOSPADM

## 2020-08-05 RX ORDER — ACETAMINOPHEN 325 MG/1
650 TABLET ORAL ONCE AS NEEDED
Status: DISCONTINUED | OUTPATIENT
Start: 2020-08-05 | End: 2020-08-05 | Stop reason: HOSPADM

## 2020-08-05 RX ORDER — EPHEDRINE SULFATE 50 MG/ML
5 INJECTION, SOLUTION INTRAVENOUS ONCE AS NEEDED
Status: DISCONTINUED | OUTPATIENT
Start: 2020-08-05 | End: 2020-08-05 | Stop reason: HOSPADM

## 2020-08-05 RX ORDER — PHYTONADIONE 10 MG/ML
5 INJECTION, EMULSION INTRAMUSCULAR; INTRAVENOUS; SUBCUTANEOUS ONCE
Status: DISCONTINUED | OUTPATIENT
Start: 2020-08-05 | End: 2020-08-05

## 2020-08-05 RX ORDER — NALOXONE HCL 0.4 MG/ML
0.2 VIAL (ML) INJECTION AS NEEDED
Status: DISCONTINUED | OUTPATIENT
Start: 2020-08-05 | End: 2020-08-05 | Stop reason: HOSPADM

## 2020-08-05 RX ORDER — SODIUM CHLORIDE, SODIUM LACTATE, POTASSIUM CHLORIDE, CALCIUM CHLORIDE 600; 310; 30; 20 MG/100ML; MG/100ML; MG/100ML; MG/100ML
9 INJECTION, SOLUTION INTRAVENOUS CONTINUOUS
Status: CANCELLED | OUTPATIENT
Start: 2020-08-05

## 2020-08-05 RX ORDER — HYDRALAZINE HYDROCHLORIDE 20 MG/ML
5 INJECTION INTRAMUSCULAR; INTRAVENOUS
Status: DISCONTINUED | OUTPATIENT
Start: 2020-08-05 | End: 2020-08-05 | Stop reason: HOSPADM

## 2020-08-05 RX ORDER — DIPHENHYDRAMINE HCL 25 MG
25 CAPSULE ORAL
Status: DISCONTINUED | OUTPATIENT
Start: 2020-08-05 | End: 2020-08-05 | Stop reason: HOSPADM

## 2020-08-05 RX ORDER — OXYCODONE AND ACETAMINOPHEN 7.5; 325 MG/1; MG/1
1 TABLET ORAL ONCE AS NEEDED
Status: DISCONTINUED | OUTPATIENT
Start: 2020-08-05 | End: 2020-08-05 | Stop reason: HOSPADM

## 2020-08-05 RX ORDER — PROMETHAZINE HYDROCHLORIDE 25 MG/ML
12.5 INJECTION, SOLUTION INTRAMUSCULAR; INTRAVENOUS ONCE AS NEEDED
Status: DISCONTINUED | OUTPATIENT
Start: 2020-08-05 | End: 2020-08-05 | Stop reason: HOSPADM

## 2020-08-05 RX ORDER — DIPHENHYDRAMINE HYDROCHLORIDE 50 MG/ML
12.5 INJECTION INTRAMUSCULAR; INTRAVENOUS
Status: DISCONTINUED | OUTPATIENT
Start: 2020-08-05 | End: 2020-08-05 | Stop reason: HOSPADM

## 2020-08-05 RX ORDER — SODIUM CHLORIDE 0.9 % (FLUSH) 0.9 %
3 SYRINGE (ML) INJECTION EVERY 12 HOURS SCHEDULED
Status: CANCELLED | OUTPATIENT
Start: 2020-08-05

## 2020-08-05 RX ORDER — HYDROMORPHONE HYDROCHLORIDE 1 MG/ML
0.5 INJECTION, SOLUTION INTRAMUSCULAR; INTRAVENOUS; SUBCUTANEOUS
Status: DISCONTINUED | OUTPATIENT
Start: 2020-08-05 | End: 2020-08-05 | Stop reason: HOSPADM

## 2020-08-05 RX ORDER — FAMOTIDINE 10 MG/ML
20 INJECTION, SOLUTION INTRAVENOUS ONCE
Status: CANCELLED | OUTPATIENT
Start: 2020-08-05 | End: 2020-08-05

## 2020-08-05 RX ORDER — LORAZEPAM 2 MG/ML
1 INJECTION INTRAMUSCULAR EVERY 4 HOURS PRN
Status: DISPENSED | OUTPATIENT
Start: 2020-08-05 | End: 2020-08-15

## 2020-08-05 RX ORDER — MIDAZOLAM HYDROCHLORIDE 1 MG/ML
1 INJECTION INTRAMUSCULAR; INTRAVENOUS
Status: CANCELLED | OUTPATIENT
Start: 2020-08-05

## 2020-08-05 RX ORDER — HYDROCODONE BITARTRATE AND ACETAMINOPHEN 7.5; 325 MG/1; MG/1
1 TABLET ORAL ONCE AS NEEDED
Status: DISCONTINUED | OUTPATIENT
Start: 2020-08-05 | End: 2020-08-05 | Stop reason: HOSPADM

## 2020-08-05 RX ORDER — IODIXANOL 320 MG/ML
250 INJECTION, SOLUTION INTRAVASCULAR
Status: DISCONTINUED | OUTPATIENT
Start: 2020-08-05 | End: 2020-08-08

## 2020-08-05 RX ORDER — PROMETHAZINE HYDROCHLORIDE 25 MG/1
25 TABLET ORAL ONCE AS NEEDED
Status: DISCONTINUED | OUTPATIENT
Start: 2020-08-05 | End: 2020-08-05 | Stop reason: HOSPADM

## 2020-08-05 RX ORDER — FENTANYL CITRATE 50 UG/ML
50 INJECTION, SOLUTION INTRAMUSCULAR; INTRAVENOUS
Status: CANCELLED | OUTPATIENT
Start: 2020-08-05

## 2020-08-05 RX ORDER — PROMETHAZINE HYDROCHLORIDE 25 MG/1
25 SUPPOSITORY RECTAL ONCE AS NEEDED
Status: DISCONTINUED | OUTPATIENT
Start: 2020-08-05 | End: 2020-08-05 | Stop reason: HOSPADM

## 2020-08-05 RX ADMIN — FENTANYL CITRATE 150 MCG/HR: 0.05 INJECTION, SOLUTION INTRAMUSCULAR; INTRAVENOUS at 06:30

## 2020-08-05 RX ADMIN — SODIUM CHLORIDE 100 ML/HR: 9 INJECTION, SOLUTION INTRAVENOUS at 00:34

## 2020-08-05 RX ADMIN — LEVETIRACETAM 500 MG: 5 INJECTION, SOLUTION INTRAVENOUS at 09:22

## 2020-08-05 RX ADMIN — FOLIC ACID 1 MG: 5 INJECTION, SOLUTION INTRAMUSCULAR; INTRAVENOUS; SUBCUTANEOUS at 08:46

## 2020-08-05 RX ADMIN — SODIUM CHLORIDE, PRESERVATIVE FREE 10 ML: 5 INJECTION INTRAVENOUS at 21:00

## 2020-08-05 RX ADMIN — SODIUM CHLORIDE, PRESERVATIVE FREE 10 ML: 5 INJECTION INTRAVENOUS at 08:46

## 2020-08-05 RX ADMIN — SODIUM CHLORIDE 100 ML/HR: 9 INJECTION, SOLUTION INTRAVENOUS at 23:33

## 2020-08-05 RX ADMIN — CHLORHEXIDINE GLUCONATE 15 ML: 1.2 RINSE ORAL at 08:47

## 2020-08-05 RX ADMIN — FENTANYL CITRATE 150 MCG/HR: 0.05 INJECTION, SOLUTION INTRAMUSCULAR; INTRAVENOUS at 14:10

## 2020-08-05 RX ADMIN — PHYTONADIONE 5 MG: 10 INJECTION, EMULSION INTRAMUSCULAR; INTRAVENOUS; SUBCUTANEOUS at 00:33

## 2020-08-05 RX ADMIN — PROPOFOL 10 MCG/KG/MIN: 10 INJECTION, EMULSION INTRAVENOUS at 23:50

## 2020-08-05 RX ADMIN — SODIUM CHLORIDE 100 ML/HR: 9 INJECTION, SOLUTION INTRAVENOUS at 13:51

## 2020-08-05 RX ADMIN — PROPOFOL 20 MCG/KG/MIN: 10 INJECTION, EMULSION INTRAVENOUS at 07:45

## 2020-08-05 RX ADMIN — PROPOFOL 50 MCG/KG/MIN: 10 INJECTION, EMULSION INTRAVENOUS at 02:56

## 2020-08-05 RX ADMIN — FENTANYL CITRATE 150 MCG/HR: 0.05 INJECTION, SOLUTION INTRAMUSCULAR; INTRAVENOUS at 22:39

## 2020-08-05 RX ADMIN — TAZOBACTAM SODIUM AND PIPERACILLIN SODIUM 3.38 G: 375; 3 INJECTION, SOLUTION INTRAVENOUS at 23:29

## 2020-08-05 RX ADMIN — PANTOPRAZOLE SODIUM 40 MG: 40 INJECTION, POWDER, FOR SOLUTION INTRAVENOUS at 08:46

## 2020-08-05 RX ADMIN — CHLORHEXIDINE GLUCONATE 15 ML: 1.2 RINSE ORAL at 21:00

## 2020-08-05 RX ADMIN — Medication 2183 UNITS: at 11:54

## 2020-08-05 RX ADMIN — LEVETIRACETAM 500 MG: 5 INJECTION, SOLUTION INTRAVENOUS at 22:38

## 2020-08-05 RX ADMIN — PHYTONADIONE 5 MG: 10 INJECTION, EMULSION INTRAMUSCULAR; INTRAVENOUS; SUBCUTANEOUS at 01:52

## 2020-08-05 RX ADMIN — THIAMINE HYDROCHLORIDE 100 MG: 100 INJECTION, SOLUTION INTRAMUSCULAR; INTRAVENOUS at 08:46

## 2020-08-05 NOTE — CONSULTS
Subjective     REASON FOR CONSULTATION: Coagulopathy  Provide an opinion on any further workup or treatment                             REQUESTING PHYSICIAN: Dr. Nj    RECORDS OBTAINED:  Records of the patients history including those obtained from the referring provider were reviewed and summarized in detail.    HISTORY OF PRESENT ILLNESS:  The patient is a 30 y.o. year old male who is here for an opinion about the above issue.    History of Present Illness patient is a 30-year-old white male with history of alcohol abuse who was admitted on August 3, 2020 because of stating he noticed acute onset of left-sided weakness and came to the emergency room because of concern of stroke.  He had severe headache as well.  He has been drinking alcohol and took several shots the day he got admitted.  He denied any illicit drug use or prior issues with any neurological conditions.  In the emergency room he was noticed to have a persistent left-sided weakness and stat CT head was done which showed large right intracerebral hemorrhage and significant midline shift.  Neurosurgery was consulted and the patient was placed on mechanical ventilator for airway protection and need for urgent surgery.  The patient was admitted to the ICU.    On admission he was afebrile and a heart rate of 112.  His blood pressure was stable at one 3043 x 80.  Troponin was less than 0.01.  BUN of 4 and a creatinine of 2.73.  His potassium was 3.2.  His liver function tests were elevated with ALT of 66 AST of 174 and an alkaline phosphatase of 137.  His total bilirubin was elevated to 3.5.  At admission his hemoglobin was 13.0 with a white count of 8.5 and a platelet of 136.  He had 74% neutrophils 15% lymphocytes 5.6% monocytes 3.2% eosinophils and 0.8% basophils    Patient was taken to surgery by Dr. Leobardo Rivera.  He underwent right frontoparietal craniotomy for evacuation of intracerebral hemorrhage.  He had blood loss of 100 cc at that  time.  He had a large hematoma with significant mass-effect.  The etiology of the hemorrhage was unclear but there was a concern that could be arteriovenous malformation as a possibility.  And drug related intracerebral hemorrhage is a possibility but tox screen was pending.  Following the surgery patient initially was restless but after 30minutes he calmed down and started to follow commands.  He required sedation with propofol.    At surgery Dr. Rivera thought there was one area where there was quite a bit of vascularized brain tissue potentially representing an arteriovenous malformation.  He coagulated the bleeding points that he saw.  These seem to be mostly venous bleeders.  He did not see any obvious arterial bleeders throughout the entire surgery.  He did extract quite a bit of clot and decompress the  Brain.    On August 4 he was doing better with spontaneous purposeful movements on the right side and following commands though he is hemiplegic on the left side.  He did not have significant hypertension and no history of illicit drug abuse except that he had been drinking alcohol.  Dr. Rivera consulted neuro interventionalists to evaluate for possible angiogram.  Patient was seen by Dr. leon.  He was planning to do the angiogram.    His hemoglobin was 13-11 point 9-11 point 8- 10.8.  INR 1.39 on August 3 and PTT was 40.9.    Patient has been placed on Keppra for seizure prophylaxis and was placed on thiamine.    Allergy has been consulted.  CT in the emergency room had shown right frontal frontal intracerebral hemorrhage which was large with midline shift.  Patient was thought to have a spontaneous right frontal lobar hemorrhage unclear etiology.  Angiography was planned.  They also suggested obtaining an MRI brain.    Given elevated INR patient was given 2 units of FFP.  On August 4, 2020.    According to blood bank the first unit of FFP was given at 12 noon.  30 minutes into the infusion  patient had itching, hypoxia, tachypnea and tachycardia and they had to hold it.  They thought it was an allergic reaction to FFP.  A second unit was given  with Benadryl prior and according to the nurse patient did fine with the second unit of FFP.    Patient was seen by Dr. Aguillon pulmonary patient was agitated with oxygen saturation of 80% FiO2.  Chest x-ray was ordered and if CT chest x-ray is clear then he was considering ordering CT chest PE protocol.    Patient has not been on any anticoagulants.  His wife states he is alcoholic but she was recently .  Though she did not know that he was drinking patient had initially stated he was drinking on the day he we came in in the emergency room.  His INR yesterday was 1.56 prior to FFP and dropped down to 1.5 following FFP.  The plan was to do a four-vessel cerebral angiogram.    I had discussed with blood bank and they were not notified about the reaction to the FFP yesterday at 12 noon.  On discussing with the supervisor again today they will do work-up but patient got compatible FFP.    Dr. Bello wanted his INR to be below 1.2 before proceeding with four-vessel cerebral angiogram.  Patient received 10 mg total of vitaminK.  Dose was given at 11 PM 5 mg and a second dose of 5 mg was given at 12:15 AM on August 5, 2020.    Patient's urine analysis was positive for nitrites, 100 mg of protein, too numerous to count RBCs and very small bilirubin.    Day his potassium was 3.6 BUN of 7 and a creatinine of 0.51.  His AST has improved to 85 ALT 39, total bilirubin is down to 3.1.    Folate is 2.71 which is decreased, B12 1718.  INR 1.49, PTT 35.1.  Hemoglobin 10.8 with white count of 7.8 and a platelet of 141.  His reticulocyte count is elevated to 3.3.  LDH pending.    We have been consulted in order to see the cause of his elevated INR.    History reviewed. No pertinent past medical history.     History reviewed. No pertinent surgical history.     No current  facility-administered medications on file prior to encounter.      No current outpatient medications on file prior to encounter.        ALLERGIES:    Allergies   Allergen Reactions   • Promestriene GI Intolerance        Social History     Socioeconomic History   • Marital status:      Spouse name: Not on file   • Number of children: Not on file   • Years of education: Not on file   • Highest education level: Not on file        History reviewed. No pertinent family history.     Review of Systems   Patient is on the vent but communicates well.  He is able to write on a piece of paper and can communicate with us.  He does say he did drink alcohol on the day and he does drink heavily.    Objective     Vitals:    08/05/20 0559 08/05/20 0600 08/05/20 0700 08/05/20 0750   BP:  98/63 115/86    Pulse:  73 98 102   Resp:    16   Temp:       TempSrc:       SpO2:  99% 97% 99%   Weight: 88.3 kg (194 lb 10.7 oz)      Height:         No flowsheet data found.    Physical examination    Patient is on the vent, opens his eyes and responds to questions and communicates.  His left arm is weak but he is able to move the right upper extremity right lower extremity and left lower extremity.  HEENT: S/p surgery and is not actively bleeding.  Lungs clear  Cardiac examination regular rate rhythm  Abdomen soft nontender positive bowel sounds no organomegaly  Extremities no cyanosis congestion or edema  There is no bleeding at the site of IV lines or in the ET tube  Urine does look bloody        RECENT LABS:  Hematology WBC   Date Value Ref Range Status   08/05/2020 7.81 3.40 - 10.80 10*3/mm3 Final     RBC   Date Value Ref Range Status   08/05/2020 2.97 (L) 4.14 - 5.80 10*6/mm3 Final     Hemoglobin   Date Value Ref Range Status   08/05/2020 10.8 (L) 13.0 - 17.7 g/dL Final     Hematocrit   Date Value Ref Range Status   08/05/2020 30.5 (L) 37.5 - 51.0 % Final     Platelets   Date Value Ref Range Status   08/05/2020 141 140 - 450 10*3/mm3  Final   08/05/2020 141 140 - 450 10*3/mm3 Final      CT head , August 3, 2020  FINDINGS: There is a large area of acute intracerebral hemorrhage within  the posterior aspect of the right frontal lobe extending into the right  parietal lobe that measures approximately 9.6 cm in greatest AP  dimension and 4.9 cm in greatest mediolateral dimension no subarachnoid  or intraventricular hemorrhage is identified. There is mild edema  surrounding the area of hemorrhage. There is moderate mass effect with  right-to-left shift of midline structures by 1.2 cm. Effacement of  cortical sulci throughout the right cerebral hemisphere is also present.  There is no evidence of acute infarct.     IMPRESSION:  Large area of acute intracerebral hemorrhage in the right  cerebral hemisphere as described with moderate associated mass effect.  Otherwise unremarkable CT scan of the head.     Chest x-ray negative    CT head August 4, 2020     FINDINGS: There is a closed craniotomy defect in the right parietal bone  that is new since the preceding CT scan. The large intracerebral  hematoma within the right cerebral hemisphere has been completely  evacuated. Only minimal residual hemorrhage persists along the periphery  of the resection cavity. No new hemorrhage is identified. Mass effect  has diminished significantly. There is only very minimal residual  right-to-left shift of the midline structures.    CT angiogram of the head and  IMPRESSION:     The patient is status post a right lateral parietal craniotomy for the  purposes of evacuation of an intraparenchymal hematoma involving the  lateral aspect of the right frontal and parietal lobes. An evacuation  cavity within the lateral aspect of the right frontal and parietal lobes  traverses the expected locations of the right pre and postcentral gyri.  Again, the evacuation cavity measures up to 5.9 x 2.8 x 4.0 cm in  greatest dimensions and there is a thin rim of circumscribing  hemorrhage  which is stable when compared to the prior study. More discontiguous  foci of hemorrhage within the right frontal and parietal lobes are also  stable when compared to the head CT performed earlier today. A thin  acute subdural hematoma lateral to the left frontal and parietal lobes  measures 3-4 mm in diameter and is also stable.     Otherwise, the CT angiogram of the head and neck is unremarkable. No  asymmetric vasculature is appreciated to suggest an underlying AVM at  the site of the hemorrhage although an AVM cannot be excluded on the  basis of this study and would necessitate a catheter-based arteriogram  for definitive assessment.    Chest x-ray from August 4 minimal left base atelectasis at the left base that is new compared to the previous chest x-ray.  The heart is borderline enlarged.       C-reactive protein 1.48, haptoglobin 19, FSP greater than 5 less than 20, PT 17.6 with INR of 1.47.,  Thrombin time 18.5 normal, myoglobin screen positive, plasma hemoglobin pending    Reviewed peripheral smear, no evidence of schistocytes, decreased platelets, no spherocytes, no immature cells.  Neutrophils increased    Mixing study corrected partially.  Repeat hemoglobin of 11 with white count of 8.33 and a platelet of 125    Chest x-ray, atelectasis and mild vascular crowding    Blood bank work-up: Findings are nonfebrile non-hemolytic allergic transfusion reaction, ESR 48    Plasma hemoglobin pending, urine for hemosiderin pending    Assessment/Plan     1.  Right frontal parietal hemorrhage with mildly elevated INR at 1.56 on admission.  Patient has history of alcohol abuse and in the ER he stated that he was drinking alcohol several shots on the day of admission.  He presented with left hemiplegia and severe headache.  CT of the head showed a large rent right frontal hemorrhage  · CT head showed 5.4 cm large right frontal hemorrhage with 2 small additional hemorrhages in the parenchyma with midline  shift  · S/p evacuation of the large frontal hemorrhage with improvement in his clinical symptoms  · Repeat CT showed no bleeding in the cavity but mild increase in the 2 additional hemorrhages in the parenchyma.  With 1 lesion increased from 1.2 cm to 1.7 cm and the second area of hemorrhage increased from 2.7 cm to 3.1 cm.  · New onset right lateral and medial epidural which on discussing with neurosurgery they think it secondary to underlying evacuation  · Neurosurgery wants to do CT angiogram in order to evaluate for AV malformation  · On discussing with neurosurgery when they try to evacuate there was bleeding and likely AV malformation that Dr. Rivera cauterized  · Interventionist neurosurgery  wants to do CT angiogram in order to evaluate if bleeding from arteriovenous malformation and likely due a second surgery to clip this    2.  Coagulopathy with elevated PT.  INR was 1.56  · Patient received FFP without much benefit with INR going from 1.56-1.5  · This a.m. INR of 1.49.  · Neurosurgery wanted INR to be less than 1.2 as they need to do CT angiogram to evaluate for concern of AV malformation  · Mixing study done with partial correction of INR from 17.4-14.7 both immediate and delayed  · Discussion done between Dr. Anderson and myself and gave 1 dose of Kcentra 25 mg/kg IV x1 dose  · On discussion with patient's mother, patient's grandmother is a patient of     3.  Allergic reaction to FFP, discussed with blood bank and they are going to do a transfusion work-up    4.  Blood in the urine, on discussing with patient's wife he has had dark urine for at least 6 months and she has been trying to hydrate him.  · Urine analysis shows too numerous to count red blood cells 100 mg of protein and mild bilirubin  · Discussed with Dr. Aguillon, questionable rhabdomyolysis    5.  Seizures prophylaxis on Keppra    6.  Questionable autoimmune hemolytic anemia,  · Patient was evaluated by her internist at  [facility] who thought he had autoimmune hemolytic anemia  · Received records but no indication of that.    Plan  1. Reviewed mixing study, only partially corrected  2. Obtain PFA-100, von Willebrand's profile  3. S/p 1 dose of Kcentra given today  4. Patient to go for CT angiogram today  5. Reviewed repeat CT of the head which shows mild worsening  6. Will obtain haptoglobin, plasma hemoglobin, urine hemosiderin, LDH as patient's hemoglobin dropping.  7. Questionable allergic reaction to FFP.  Transfusion work-up pending  8. Direct antiglobulin test negative.  9. Urine culture pending.  Will check rest of the labs    Mya Tee MD

## 2020-08-05 NOTE — PROGRESS NOTES
I spoke to Dr Nj on the phone. I reviewed the chart . pts INR is 1.5 . And no bleeding anywere else. Hemoglobin is not dropping . It is stable. Pt had allergic reaction after first FFP with itching ,tachypnea and tachycardia. This was at 12 noon on 8/4/20. Pt then received 2nd unit FFP at 2 pm with benadryl prior and he tolerated it. But I was consulted  at 11 pm . On discussing with Dr Nj he said the patient can be seen morning of 8/52020. I have given the patient vitamin k 10 mgs iv. I have also discussed with blood bank . Blood Bank will discuss with supervisor tomorrow morning to see if any testing needs to be done. But feel this could be allergic reaction and if we need to try FFPin am needs to be given with benadryl.    At present will check if INR will correct with vitamin k.   Mya Tee MD

## 2020-08-05 NOTE — SIGNIFICANT NOTE
08/05/20 0922   Rehab Time/Intention   Evaluation Not Performed other (see comments)  (SLP discussed patient care with RN, patient is still intubated and surgery is tentatively planned. Will s/o, please re consult when indicated.)   Rehab Treatment   Discipline speech language pathologist

## 2020-08-05 NOTE — NURSING NOTE
~2100- Spoke with Iman Ritter over the phone about patient due to INR being too low for for surgery tomorrow. Dr. Malcolm would like INR to be lower than 1.2 before proceeding. Riya did not want to give FFP due to patient having reaction with first unit earlier in the day. She said she would communicate to Dr. Malcolm and call back to the unit.     ~2215- Riya called back to unit, had not heard back from Dr. Malcolm. She informed me to hold off on any intervention for the night as intervention could be achieved before patient case tomorrow if need be.    ~2230- Spoke with Dr. Malcolm on unit. Informed me he also did not want to give FFP because of reaction, but wanted to request advice from hematology stat as patient could be bleeding and this needed to be corrected before surgery tomorrow. Stat consult was called at this time.    ~2300- Spoke with Dr. Tee from hematology. Her advice was to give 5mg vitamin K, but she needed to speak with pharmacy about IV dosing. Her number was relayed to pharmacist and then Dr. Tee placed appropriate orders.    ~2345- Dr. Tee called back to unit regarding events of likely transfusion reaction earlier in the day before assumed care of patient, I communicated these events to the best of my ability through reviewing the chart. Dr. Tee spoke with blood bank and then blood Bank contacted me and informed me that they will likely need to draw labs related to this suspected reaction at some point tomorrow (8/5). Randal requested to speak to Dr. Malcolm directly so I relayed his number to her, she also ordered a series of labs for a.m. Draw.    ~0015- Randal called back to unit after speaking with Yun, requested that I ordered an additional 5mg of vitamin K. Appropriate orders were placed.

## 2020-08-05 NOTE — PLAN OF CARE
Hematology added to the case last night for high INR that could not be corrected. 10 mg of vitamin k given IV. Awaiting results of morning labs. Dr. Malcolm would like INR below 1.2 to proceed with surgery. Neuro exam is unchanged, currently decreasing patient sedation. Vitals are stable, will continue to monitor.

## 2020-08-05 NOTE — PROGRESS NOTES
"      [facility]         Dr Robertson Sayied   LOS: 2 days   Patient Care Team:  Provider, No Known as PCP - General    Chief Complaint: Acute ICH large and nontraumatic status post emergent craniotomy suspected to be due to AVM postop on the vent    Interval History: Sedated intubated on the vent.  Wakes up follow simple commands.  Apparently yesterday had transfusion reaction with FFP.  Currently being worked up per hematology.  Noted dark tan urine in the Rmairez catheter.    REVIEW OF SYSTEMS:   Sedated intubated    Ventilator/Non-Invasive Ventilation Settings (From admission, onward)     Start     Ordered    08/03/20 2320  Ventilator - AC/VC; (16); 100; 5; 500  Continuous     Question Answer Comment   Vent Mode AC/VC    Breath rate  16   FiO2 100    PEEP 5    Tidal Volume 500        08/03/20 2319                  Vital Signs  Temp:  [98.3 °F (36.8 °C)-99 °F (37.2 °C)] 98.5 °F (36.9 °C)  Heart Rate:  [] 78  Resp:  [16-21] 17  BP: ()/(63-86) 110/64  FiO2 (%):  [30 %-50 %] 50 %    Intake/Output Summary (Last 24 hours) at 8/5/2020 1213  Last data filed at 8/5/2020 0559  Gross per 24 hour   Intake 4105 ml   Output 1350 ml   Net 2755 ml     Flowsheet Rows      First Filed Value   Admission Height  177.8 cm (70\") Documented at 08/03/2020 2203   Admission Weight  96 kg (211 lb 11.2 oz) Documented at 08/03/2020 2140          Physical Exam:  Patient is examined using the personal protective equipment as per guidelines from infection control for this particular patient as enacted.  Hand hygiene was performed before and after patient interaction.   General Appearance:   Sedated intubated.  Wakes up follow simple commands.  Gets agitated at times.  ET tube good position orally  Neck midline trachea no thyromegaly   Lungs:    Equal breath sounds on the vent.    Heart:    Regular rhythm and normal rate, normal S1 and S2, no            murmur, no gallop, no rub, no click   Chest Wall:    No abnormalities " observed   Abdomen:    Soft nondistended bowel sounds positive   Extremities:   Moves all extremities well, no edema, no cyanosis, no             redness  CNS wakes up follow simple commands  Skin no rashes no nodules  Musculoskeletal no cyanosis no clubbing normal range of motion     Results Review:        Results from last 7 days   Lab Units 08/05/20  0613 08/04/20  0606 08/04/20  0130   SODIUM mmol/L 137 136 136   POTASSIUM mmol/L 3.6 4.1 4.4   CHLORIDE mmol/L 102 100 97*   CO2 mmol/L 27.3 24.3 22.8   BUN mg/dL 7 4* 4*   CREATININE mg/dL 0.51* 0.46* 0.58*   GLUCOSE mg/dL 118* 163* 121*   CALCIUM mg/dL 7.9* 8.9 8.8     Results from last 7 days   Lab Units 08/03/20  2144   TROPONIN T ng/mL <0.010     Results from last 7 days   Lab Units 08/05/20  0613 08/04/20  0606 08/04/20  0128   WBC 10*3/mm3 7.81 9.95 9.26   HEMOGLOBIN g/dL 10.8* 11.8* 11.9*   HEMATOCRIT % 30.5* 32.7* 34.8*   PLATELETS 10*3/mm3 141  141 133* 119*     Results from last 7 days   Lab Units 08/05/20  0613 08/04/20  1657 08/04/20  0927 08/03/20  2144   INR  1.49* 1.50* 1.56* 1.39*   APTT seconds 35.1  --  38.6* 40.9*     Results from last 7 days   Lab Units 08/04/20  0606   CHOLESTEROL mg/dL 250*         Results from last 7 days   Lab Units 08/05/20  0613 08/04/20  0606   CHOLESTEROL mg/dL  --  250*   TRIGLYCERIDES mg/dL 231* 194*   HDL CHOL mg/dL  --  42   LDL CHOL mg/dL  --  169*     Results from last 7 days   Lab Units 08/04/20  0124   PH, ARTERIAL pH units 7.372   PO2 ART mm Hg 317.8*   PCO2, ARTERIAL mm Hg 45.6*   HCO3 ART mmol/L 26.5       I reviewed the patient's new clinical results.  I personally viewed and interpreted the patient's CXR        Medication Review:     chlorhexidine 15 mL Mouth/Throat Q12H   folic acid (FOLVITE) IVPB 1 mg Intravenous Daily   levETIRAcetam 500 mg Intravenous Q12H   pantoprazole 40 mg Intravenous Q24H   sodium chloride 10 mL Intravenous Q12H   thiamine (VITAMIN B1) IVPB 100 mg Intravenous Daily         fentaNYL  (SUBLIMAZE) infusion 5 mcg/ml 250 mL  mcg/hr Last Rate: 150 mcg/hr (08/05/20 0616)   niCARdipine 5-15 mg/hr Last Rate: Stopped (08/04/20 0441)   propofol 5-50 mcg/kg/min Last Rate: 20 mcg/kg/min (08/05/20 0745)   sodium chloride 100 mL/hr Last Rate: 100 mL/hr (08/05/20 0034)       ASSESSMENT:   Acute left-sided weakness due to right ICH  Acute right intracerebral hemorrhage; Large and nontraumatic status post emergent craniotomy  Possible transfusion reaction  Brain compression with midline shift status post emergent craniotomy  Acute respiratory failure s/p mechanical ventilator  Acute alcohol intoxication  Acute alcoholic hepatitis  Acute anion gap metabolic acidosis  Acute hypokalemia    PLAN:  Yesterday possibly had a transfusion reaction with FFP.  Discussed with hematology.  Plans to give Kcentra.  Reviewed urine with hematology.  They will do additional testing.  I will check myoglobin in the urine also.  Status post emergent craniotomy .  Postop on vent following commands.  Wean off the vent once okay with neurosurgery after angiogram  Propofol and fentanyl for agitation  ICU core measures  Thiamine will be continued  Keppra for seizure prophylaxis.  Plans for cerebral angiogram per neurosurgery.  Fluid and electrolyte replacement  Discussed with nursing staff  Continue current supportive care  Replace electrolytes per protocol  Hematology currently following for correction of INR    Critical care time 35 minutes    Ailyn Bateman MD  08/05/20  12:13

## 2020-08-05 NOTE — PROGRESS NOTES
"Adult Nutrition  Assessment/PES    Patient Name:  Avinash Everett  YOB: 1990  MRN: [id number]  Admit Date:  8/3/2020    Assessment Date:  8/5/2020    Comments:  Nutrition assessment complete. Pt has been on the vent x 2 days s/p emergent crani for large ICH.  Plan is to extubate him after he gets his angiogram.  If unable to extubate in the next 24 hrs, would consider starting enteral nutrition. Will follow clinical course, nutritional needs.     Reason for Assessment     Row Name 08/05/20 1400          Reason for Assessment    Reason For Assessment  identified at risk by screening criteria     Diagnosis  pulmonary disease;neurologic conditions  Acute ICH large and nontraumatic S/P emergent craniotomy suspected to be due to AVM; on vent post op, ETOH Abuse         Nutrition/Diet History     Row Name 08/05/20 1402          Nutrition/Diet History    Typical Food/Fluid Intake  Needs Angiogram - to extubate afterwards     Factors Affecting Nutritional Intake  compromised airway         Anthropometrics     Row Name 08/05/20 1402          Anthropometrics    Height  177.8 cm (70\")        Admit Weight    Admit Weight  89.8 kg (197 lb 15.6 oz)        Ideal Body Weight (IBW)    Ideal Body Weight (IBW) (kg)  76.48     % of Ideal Body Weight Assessment  -- 111% IBW        Body Mass Index (BMI)    BMI Assessment  BMI 25-29.9: overweight BMI 27         Labs/Tests/Procedures/Meds     Row Name 08/05/20 1403          Labs/Procedures/Meds    Lab Results Reviewed  reviewed     Lab Results Comments  INR, h/h, chol, trig, tbili        Diagnostic Tests/Procedures    Diagnostic Test/Procedure Reviewed  reviewed     Diagnostic Test/Procedures Comments  head CT, CT angiogram        Medications    Pertinent Medications Reviewed  reviewed     Pertinent Medications Comments  folic acid, protonix, keppra, thiamine, fentanyl, propofol, ivf         Physical Findings     Row Name 08/05/20 1407          Physical Findings    Overall " "Physical Appearance  overweight;on ventilator support     Skin  other (see comments);surgical incision;jaundice B=14         Estimated/Assessed Needs     Row Name 08/05/20 1408 08/05/20 1402       Calculation Measurements    Weight Used For Calculations  88.3 kg (194 lb 10.7 oz)  --    Height  --  177.8 cm (70\")       Estimated/Assessed Needs    Additional Documentation  Fluid Requirements (Group);KCAL/KG (Group);Protein Requirements (Group)  --       KCAL/KG    KCAL/KG  25 Kcal/Kg (kcal)  --    25 Kcal/Kg (kcal)  2207.5  --       Protein Requirements    Weight Used For Protein Calculations  88.3 kg (194 lb 10.7 oz)  --    Est Protein Requirement Amount (gms/kg)  1.2 gm protein  --    Estimated Protein Requirements (gms/day)  105.96  --       Fluid Requirements    Estimated Fluid Requirements (mL/day)  2400  --       --        Nutrition Prescription Ordered     Row Name 08/05/20 1408          Nutrition Prescription PO    Current PO Diet  NPO        Propofol Considerations    Propofol (mL/hr)  10 mL/hr     Propofol (Kcal/day)  264 Kcal/day         Evaluation of Received Nutrient/Fluid Intake     Row Name 08/05/20 1408          Fluid Intake Evaluation    IV Fluid (mL)  2400         Problem/Interventions:  Problem 1     Row Name 08/05/20 1409          Nutrition Diagnoses Problem 1    Problem 1  Nutrition Appropriate for Condition at this Time         Intervention Goal     Row Name 08/05/20 1409          Intervention Goal    General  Maintain nutrition;Reduce/improve symptoms;Meet nutritional needs for age/condition;Improved nutrition related lab(s)     PO  Initiate feeding     TF/PN  Inititiate TF/PN     Weight  No significant weight loss         Nutrition Intervention     Row Name 08/05/20 1409          Nutrition Intervention    RD/Tech Action  Care plan reviewd;Follow Tx progress;Await begin PO           Education/Evaluation     Row Name 08/05/20 1409          Education    Education  Education not appropriate at this " time     Please explain  Patient intubated;Patient sedation status        Monitor/Evaluation    Monitor  Per protocol;Weight;Skin status;I&O;Symptoms;Pertinent labs     Education Follow-up  Reinforce PRN           Electronically signed by:  Milka Howell RD  08/05/20 14:10

## 2020-08-05 NOTE — PLAN OF CARE
Problem: Patient Care Overview  Goal: Plan of Care Review  Outcome: Ongoing (interventions implemented as appropriate)  Flowsheets  Taken 8/5/2020 1657  Progress: no change  Taken 8/5/2020 1600  Plan of Care Reviewed With: patient;spouse;mother     Problem: Skin Injury Risk (Adult)  Goal: Skin Health and Integrity  Outcome: Ongoing (interventions implemented as appropriate)  Flowsheets (Taken 8/5/2020 1657)  Skin Health and Integrity: making progress toward outcome     Problem: Fall Risk (Adult)  Goal: Absence of Fall  Outcome: Ongoing (interventions implemented as appropriate)  Flowsheets (Taken 8/5/2020 1657)  Absence of Fall: making progress toward outcome     Problem: Restraint, Nonbehavioral (Nonviolent)  Goal: Rationale and Justification  Outcome: Ongoing (interventions implemented as appropriate)  Flowsheets (Taken 8/5/2020 1657)  Rationale and Justification: prevent harm to self; prevent line/tube removal; failure of less restrictive safety measures  Goal: Nonbehavioral (Nonviolent) Restraint: Absence of Injury/Harm  Outcome: Ongoing (interventions implemented as appropriate)  Flowsheets (Taken 8/5/2020 1657)  Nonbehavioral (Nonviolent) Restraint: Absence of Injury/Harm: met  Goal: Nonbehavioral (Nonviolent) Restraint: Achievement of Discontinuation Criteria  Outcome: Ongoing (interventions implemented as appropriate)  Flowsheets (Taken 8/5/2020 1657)  Nonbehavioral (Nonviolent) Restraint: Achievement of Discontinuation Criteria: not met  Goal: Nonbehavioral (Nonviolent) Restraint: Preservation of Dignity and Wellbeing  Outcome: Ongoing (interventions implemented as appropriate)  Flowsheets (Taken 8/5/2020 1657)  Nonbehavioral (Nonviolent) Restraint: Preservation of Dignity and Wellbeing: met

## 2020-08-05 NOTE — PROGRESS NOTES
[facility] NEUROSURGERY PROGRESS NOTE    PATIENT IDENTIFICATION:   Name:  Avinash Everett      MRN:  [id number]     30 y.o.  male               CC: Unable to obtain - patient intubated    Subjective     Interval History:  4 vessel cerebral angiogram postponed due to elevated INR despite FFP and Vitamin K. Hematology consulted and will see today. Patient had a reaction after first unit of FFP infused but tolerated transfusion of 2nd unit after premedication with Benadryl.   .      ROS: Unable to obtain - patient intubated      Objective      Vital signs in last 24 hours:  Temp:  [98.3 °F (36.8 °C)-99 °F (37.2 °C)] 98.7 °F (37.1 °C)  Heart Rate:  [] 102  Resp:  [16-21] 16  BP: ()/(60-86) 115/86  FiO2 (%):  [30 %-50 %] 50 %    LABS:    .  Results from last 7 days   Lab Units 08/05/20  1049 08/05/20  0613 08/04/20  0606   WBC 10*3/mm3 8.33 7.81 9.95   HEMOGLOBIN g/dL 11.0* 10.8* 11.8*   HEMATOCRIT % 31.4* 30.5* 32.7*   PLATELETS 10*3/mm3 125* 141  141 133*     .  Results from last 7 days   Lab Units 08/05/20  0613 08/04/20  0606 08/04/20  0130   SODIUM mmol/L 137 136 136   POTASSIUM mmol/L 3.6 4.1 4.4   CHLORIDE mmol/L 102 100 97*   CO2 mmol/L 27.3 24.3 22.8   BUN mg/dL 7 4* 4*   CREATININE mg/dL 0.51* 0.46* 0.58*   GLUCOSE mg/dL 118* 163* 121*   CALCIUM mg/dL 7.9* 8.9 8.8     Results from last 7 days   Lab Units 08/05/20  1049 08/05/20  0613 08/04/20  1657 08/04/20  0927 08/03/20  2144   INR  1.47* 1.49* 1.50* 1.56* 1.39*   APTT seconds  --  35.1  --  38.6* 40.9*       IMAGING STUDIES:    CT SCAN HEAD WITHOUT CONTRAST 8/5/2020    Right lateral parietal craniotomy noted for evacuation of an acute intraparenchymal hemorrhage within the right frontal and parietal lobes.  A thin rim of hemorrhage circumscribes the resection cavity and there are areas of hemorrhage abutting the resection cavity as well.  There is no sign of interval change in this area.  There is also a some increase in size of the hemorrhage  "located in the right posterior frontal and right parietal lobes.  It currently measures 1.7 x 1.5 cm whereas previously it measured 1.3 x 1.3 cm.  No interval change in the thin 3 to 4 mm acute left frontal and parietal subdural hematoma along the anterior falx.  New area of subdural hematoma layering along left tentorial leaf near the medial aspects of the left temporal and occipital lobes measures 2 to 3 mm in diameter.  Mass-effect noted with 2 to 3 mm of right to left midline shift.    I personally viewed and discussed the above images with Dr Rivera by phone.       Physical Exam:    Opens eyes to verbal stimuli. PERRL.   Nods head yes/no to questions  Follows commands in and spontaneously moves R arm, R leg.  Plegic on L side.      Assessment/Plan     ASSESSMENT:      Nontraumatic subcortical hemorrhage of right cerebral hemisphere (CMS/HCC)    Right-sided nontraumatic intracerebral hemorrhage (CMS/HCC)    Brain compression with midline shift status post emergent    craniotomy    Suspected arteriovenous malformation - not confirmed    L hemiplegia    Coagulopathy; ETOH abuse        PLAN:       Continue Keppra for seizure prophylaxis     Needs 4 vessel cerebral angiogram; on hold due to coagulopathy - prefer INR 1.4 or less and PT < 15 - relayed this information to Dr. Reynolds with Hematology.       Dr. Malcolm also made aware of continued coagulopathy and will decide later today on whether to proceed.         I discussed the patients findings and my recommendations with Dr. Rivera.       LOS: 2 days       Katya Centeno, APRN  8/5/2020  08:08    \"Dictated utilizing Dragon dictation\".    "

## 2020-08-05 NOTE — ANESTHESIA PREPROCEDURE EVALUATION
Anesthesia Evaluation     Patient summary reviewed and Nursing notes reviewed                Airway   Mallampati: II  Dental      Pulmonary - negative pulmonary ROS     ROS comment: Intubated, respiratory failure s/p intracerebral hemorrhage and craniotomy   PE comment: Intubated    Cardiovascular - negative cardio ROS    ECG reviewed  Rhythm: regular  Rate: normal        Neuro/Psych- negative ROS  GI/Hepatic/Renal/Endo    (+)   hepatitis, liver disease,     Musculoskeletal (-) negative ROS    Abdominal    Substance History   (+) alcohol use,      OB/GYN negative ob/gyn ROS         Other                      Anesthesia Plan    ASA 4     general   (Currently intubated and ventilated.  Will plan to continue mechanical ventilation postop.)  Postoperative Plan: Expected vent after surgery

## 2020-08-06 ENCOUNTER — APPOINTMENT (OUTPATIENT)
Dept: CARDIOLOGY | Facility: HOSPITAL | Age: 30
End: 2020-08-06

## 2020-08-06 LAB
ALBUMIN SERPL-MCNC: 3.3 G/DL (ref 3.5–5.2)
ALBUMIN/GLOB SERPL: 1.1 G/DL
ALP SERPL-CCNC: 78 U/L (ref 39–117)
ALT SERPL W P-5'-P-CCNC: 32 U/L (ref 1–41)
ANION GAP SERPL CALCULATED.3IONS-SCNC: 9.9 MMOL/L (ref 5–15)
APTT PPP: 37.7 SECONDS (ref 22.7–35.4)
AST SERPL-CCNC: 70 U/L (ref 1–40)
BACTERIA SPEC AEROBE CULT: NO GROWTH
BASOPHILS # BLD AUTO: 0.05 10*3/MM3 (ref 0–0.2)
BASOPHILS NFR BLD AUTO: 0.5 % (ref 0–1.5)
BH CV LOW VAS LEFT LESSER SAPH VESSEL: 1
BH CV LOWER VASCULAR LEFT COMMON FEMORAL AUGMENT: NORMAL
BH CV LOWER VASCULAR LEFT COMMON FEMORAL COMPETENT: NORMAL
BH CV LOWER VASCULAR LEFT COMMON FEMORAL COMPRESS: NORMAL
BH CV LOWER VASCULAR LEFT COMMON FEMORAL PHASIC: NORMAL
BH CV LOWER VASCULAR LEFT COMMON FEMORAL SPONT: NORMAL
BH CV LOWER VASCULAR LEFT DISTAL FEMORAL COMPRESS: NORMAL
BH CV LOWER VASCULAR LEFT GASTRONEMIUS COMPRESS: NORMAL
BH CV LOWER VASCULAR LEFT GREATER SAPH AK COMPRESS: NORMAL
BH CV LOWER VASCULAR LEFT GREATER SAPH BK COMPRESS: NORMAL
BH CV LOWER VASCULAR LEFT LESSER SAPH COMPRESS: NORMAL
BH CV LOWER VASCULAR LEFT LESSER SAPH THROMBUS: NORMAL
BH CV LOWER VASCULAR LEFT MID FEMORAL AUGMENT: NORMAL
BH CV LOWER VASCULAR LEFT MID FEMORAL COMPETENT: NORMAL
BH CV LOWER VASCULAR LEFT MID FEMORAL COMPRESS: NORMAL
BH CV LOWER VASCULAR LEFT MID FEMORAL PHASIC: NORMAL
BH CV LOWER VASCULAR LEFT MID FEMORAL SPONT: NORMAL
BH CV LOWER VASCULAR LEFT PERONEAL COMPRESS: NORMAL
BH CV LOWER VASCULAR LEFT POPLITEAL AUGMENT: NORMAL
BH CV LOWER VASCULAR LEFT POPLITEAL COMPETENT: NORMAL
BH CV LOWER VASCULAR LEFT POPLITEAL COMPRESS: NORMAL
BH CV LOWER VASCULAR LEFT POPLITEAL PHASIC: NORMAL
BH CV LOWER VASCULAR LEFT POPLITEAL SPONT: NORMAL
BH CV LOWER VASCULAR LEFT POSTERIOR TIBIAL COMPRESS: NORMAL
BH CV LOWER VASCULAR LEFT PROXIMAL FEMORAL COMPRESS: NORMAL
BH CV LOWER VASCULAR LEFT SAPHENOFEMORAL JUNCTION COMPRESS: NORMAL
BH CV LOWER VASCULAR RIGHT COMMON FEMORAL AUGMENT: NORMAL
BH CV LOWER VASCULAR RIGHT COMMON FEMORAL COMPETENT: NORMAL
BH CV LOWER VASCULAR RIGHT COMMON FEMORAL COMPRESS: NORMAL
BH CV LOWER VASCULAR RIGHT COMMON FEMORAL PHASIC: NORMAL
BH CV LOWER VASCULAR RIGHT COMMON FEMORAL SPONT: NORMAL
BH CV LOWER VASCULAR RIGHT DISTAL FEMORAL COMPRESS: NORMAL
BH CV LOWER VASCULAR RIGHT GASTRONEMIUS COMPRESS: NORMAL
BH CV LOWER VASCULAR RIGHT GREATER SAPH AK COMPRESS: NORMAL
BH CV LOWER VASCULAR RIGHT GREATER SAPH BK COMPRESS: NORMAL
BH CV LOWER VASCULAR RIGHT MID FEMORAL AUGMENT: NORMAL
BH CV LOWER VASCULAR RIGHT MID FEMORAL COMPETENT: NORMAL
BH CV LOWER VASCULAR RIGHT MID FEMORAL COMPRESS: NORMAL
BH CV LOWER VASCULAR RIGHT MID FEMORAL PHASIC: NORMAL
BH CV LOWER VASCULAR RIGHT MID FEMORAL SPONT: NORMAL
BH CV LOWER VASCULAR RIGHT PERONEAL COMPRESS: NORMAL
BH CV LOWER VASCULAR RIGHT POPLITEAL AUGMENT: NORMAL
BH CV LOWER VASCULAR RIGHT POPLITEAL COMPETENT: NORMAL
BH CV LOWER VASCULAR RIGHT POPLITEAL COMPRESS: NORMAL
BH CV LOWER VASCULAR RIGHT POPLITEAL PHASIC: NORMAL
BH CV LOWER VASCULAR RIGHT POPLITEAL SPONT: NORMAL
BH CV LOWER VASCULAR RIGHT POSTERIOR TIBIAL COMPRESS: NORMAL
BH CV LOWER VASCULAR RIGHT PROXIMAL FEMORAL COMPRESS: NORMAL
BH CV LOWER VASCULAR RIGHT SAPHENOFEMORAL JUNCTION COMPRESS: NORMAL
BH CV UPPER VENOUS LEFT AXILLARY AUGMENT: NORMAL
BH CV UPPER VENOUS LEFT AXILLARY COMPETENT: NORMAL
BH CV UPPER VENOUS LEFT AXILLARY COMPRESS: NORMAL
BH CV UPPER VENOUS LEFT AXILLARY PHASIC: NORMAL
BH CV UPPER VENOUS LEFT AXILLARY SPONT: NORMAL
BH CV UPPER VENOUS LEFT BASILIC FOREARM COMPRESS: NORMAL
BH CV UPPER VENOUS LEFT BASILIC UPPER COMPRESS: NORMAL
BH CV UPPER VENOUS LEFT BRACHIAL COMPRESS: NORMAL
BH CV UPPER VENOUS LEFT CEPHALIC FOREARM COMPRESS: NORMAL
BH CV UPPER VENOUS LEFT CEPHALIC UPPER COMPRESS: NORMAL
BH CV UPPER VENOUS LEFT INTERNAL JUGULAR AUGMENT: NORMAL
BH CV UPPER VENOUS LEFT INTERNAL JUGULAR COMPETENT: NORMAL
BH CV UPPER VENOUS LEFT INTERNAL JUGULAR COMPRESS: NORMAL
BH CV UPPER VENOUS LEFT INTERNAL JUGULAR PHASIC: NORMAL
BH CV UPPER VENOUS LEFT INTERNAL JUGULAR SPONT: NORMAL
BH CV UPPER VENOUS LEFT RADIAL COMPRESS: NORMAL
BH CV UPPER VENOUS LEFT SUBCLAVIAN AUGMENT: NORMAL
BH CV UPPER VENOUS LEFT SUBCLAVIAN COMPETENT: NORMAL
BH CV UPPER VENOUS LEFT SUBCLAVIAN COMPRESS: NORMAL
BH CV UPPER VENOUS LEFT SUBCLAVIAN PHASIC: NORMAL
BH CV UPPER VENOUS LEFT SUBCLAVIAN SPONT: NORMAL
BH CV UPPER VENOUS LEFT ULNAR COMPRESS: NORMAL
BH CV UPPER VENOUS RIGHT AXILLARY AUGMENT: NORMAL
BH CV UPPER VENOUS RIGHT AXILLARY COMPETENT: NORMAL
BH CV UPPER VENOUS RIGHT AXILLARY COMPRESS: NORMAL
BH CV UPPER VENOUS RIGHT AXILLARY PHASIC: NORMAL
BH CV UPPER VENOUS RIGHT AXILLARY SPONT: NORMAL
BH CV UPPER VENOUS RIGHT BASILIC FOREARM COMPRESS: NORMAL
BH CV UPPER VENOUS RIGHT BASILIC UPPER COMPRESS: NORMAL
BH CV UPPER VENOUS RIGHT BRACHIAL COMPRESS: NORMAL
BH CV UPPER VENOUS RIGHT CEPHALIC FOREARM COLOR: 1
BH CV UPPER VENOUS RIGHT CEPHALIC FOREARM COMPRESS: NORMAL
BH CV UPPER VENOUS RIGHT CEPHALIC FOREARM THROMBUS: NORMAL
BH CV UPPER VENOUS RIGHT CEPHALIC UPPER COLOR: 1
BH CV UPPER VENOUS RIGHT CEPHALIC UPPER COMPRESS: NORMAL
BH CV UPPER VENOUS RIGHT CEPHALIC UPPER THROMBUS: NORMAL
BH CV UPPER VENOUS RIGHT INTERNAL JUGULAR AUGMENT: NORMAL
BH CV UPPER VENOUS RIGHT INTERNAL JUGULAR COMPETENT: NORMAL
BH CV UPPER VENOUS RIGHT INTERNAL JUGULAR COMPRESS: NORMAL
BH CV UPPER VENOUS RIGHT INTERNAL JUGULAR PHASIC: NORMAL
BH CV UPPER VENOUS RIGHT INTERNAL JUGULAR SPONT: NORMAL
BH CV UPPER VENOUS RIGHT RADIAL COMPRESS: NORMAL
BH CV UPPER VENOUS RIGHT SUBCLAVIAN AUGMENT: NORMAL
BH CV UPPER VENOUS RIGHT SUBCLAVIAN COMPETENT: NORMAL
BH CV UPPER VENOUS RIGHT SUBCLAVIAN COMPRESS: NORMAL
BH CV UPPER VENOUS RIGHT SUBCLAVIAN PHASIC: NORMAL
BH CV UPPER VENOUS RIGHT SUBCLAVIAN SPONT: NORMAL
BH CV UPPER VENOUS RIGHT ULNAR COMPRESS: NORMAL
BILIRUB CONJ SERPL-MCNC: 4.6 MG/DL (ref 0–0.3)
BILIRUB SERPL-MCNC: 5.3 MG/DL (ref 0–1.2)
BUN SERPL-MCNC: 7 MG/DL (ref 6–20)
BUN/CREAT SERPL: 13.2 (ref 7–25)
CALCIUM SPEC-SCNC: 7.8 MG/DL (ref 8.6–10.5)
CENTROMERE B AB SER-ACNC: <0.2 AI (ref 0–0.9)
CHLORIDE SERPL-SCNC: 104 MMOL/L (ref 98–107)
CHROMATIN AB SERPL-ACNC: <0.2 AI (ref 0–0.9)
CO2 SERPL-SCNC: 24.1 MMOL/L (ref 22–29)
CREAT SERPL-MCNC: 0.53 MG/DL (ref 0.76–1.27)
DAT POLY-SP REAG RBC QL: NEGATIVE
DEPRECATED RDW RBC AUTO: 43 FL (ref 37–54)
DSDNA AB SER-ACNC: <1 IU/ML (ref 0–9)
ENA JO1 AB SER-ACNC: <0.2 AI (ref 0–0.9)
ENA RNP AB SER-ACNC: <0.2 AI (ref 0–0.9)
ENA SCL70 AB SER-ACNC: <0.2 AI (ref 0–0.9)
ENA SM AB SER-ACNC: <0.2 AI (ref 0–0.9)
ENA SS-A AB SER-ACNC: 0.3 AI (ref 0–0.9)
ENA SS-B AB SER-ACNC: <0.2 AI (ref 0–0.9)
EOSINOPHIL # BLD AUTO: 0.07 10*3/MM3 (ref 0–0.4)
EOSINOPHIL NFR BLD AUTO: 0.7 % (ref 0.3–6.2)
ERYTHROCYTE [DISTWIDTH] IN BLOOD BY AUTOMATED COUNT: 11.7 % (ref 12.3–15.4)
GFR SERPL CREATININE-BSD FRML MDRD: >150 ML/MIN/1.73
GLOBULIN UR ELPH-MCNC: 3 GM/DL
GLUCOSE BLDC GLUCOMTR-MCNC: 107 MG/DL (ref 70–130)
GLUCOSE BLDC GLUCOMTR-MCNC: 111 MG/DL (ref 70–130)
GLUCOSE BLDC GLUCOMTR-MCNC: 117 MG/DL (ref 70–130)
GLUCOSE BLDC GLUCOMTR-MCNC: 91 MG/DL (ref 70–130)
GLUCOSE SERPL-MCNC: 99 MG/DL (ref 65–99)
HAPTOGLOB SERPL-MCNC: 30 MG/DL (ref 30–200)
HCT VFR BLD AUTO: 27.7 % (ref 37.5–51)
HGB BLD-MCNC: 10 G/DL (ref 13–17.7)
INR PPP: 1.42 (ref 0.9–1.1)
LDH SERPL-CCNC: 145 U/L (ref 135–225)
LYMPHOCYTES # BLD AUTO: 0.61 10*3/MM3 (ref 0.7–3.1)
LYMPHOCYTES NFR BLD AUTO: 6 % (ref 19.6–45.3)
Lab: NORMAL
MAGNESIUM SERPL-MCNC: 1.2 MG/DL (ref 1.6–2.6)
MCH RBC QN AUTO: 36.1 PG (ref 26.6–33)
MCHC RBC AUTO-ENTMCNC: 36.1 G/DL (ref 31.5–35.7)
MCV RBC AUTO: 100 FL (ref 79–97)
MONOCYTES # BLD AUTO: 0.89 10*3/MM3 (ref 0.1–0.9)
MONOCYTES NFR BLD AUTO: 8.8 % (ref 5–12)
NEUTROPHILS NFR BLD AUTO: 8.38 10*3/MM3 (ref 1.7–7)
NEUTROPHILS NFR BLD AUTO: 82.6 % (ref 42.7–76)
PHOSPHATE SERPL-MCNC: 1.3 MG/DL (ref 2.5–4.5)
PLATELET # BLD AUTO: 131 10*3/MM3 (ref 140–450)
PMV BLD AUTO: 9.6 FL (ref 6–12)
POTASSIUM SERPL-SCNC: 2.9 MMOL/L (ref 3.5–5.2)
PROCALCITONIN SERPL-MCNC: 0.49 NG/ML (ref 0–0.25)
PROT SERPL-MCNC: 6.3 G/DL (ref 6–8.5)
PROTHROMBIN TIME: 17.1 SECONDS (ref 11.7–14.2)
RBC # BLD AUTO: 2.77 10*6/MM3 (ref 4.14–5.8)
RETICS # AUTO: 0.08 10*6/MM3 (ref 0.02–0.13)
RETICS/RBC NFR AUTO: 2.97 % (ref 0.7–1.9)
SODIUM SERPL-SCNC: 138 MMOL/L (ref 136–145)
TRIGL SERPL-MCNC: 159 MG/DL (ref 0–150)
WBC # BLD AUTO: 10.14 10*3/MM3 (ref 3.4–10.8)

## 2020-08-06 PROCEDURE — 83010 ASSAY OF HAPTOGLOBIN QUANT: CPT | Performed by: INTERNAL MEDICINE

## 2020-08-06 PROCEDURE — 85610 PROTHROMBIN TIME: CPT | Performed by: NEUROLOGICAL SURGERY

## 2020-08-06 PROCEDURE — 82962 GLUCOSE BLOOD TEST: CPT

## 2020-08-06 PROCEDURE — 25010000003 POTASSIUM CHLORIDE 10 MEQ/100ML SOLUTION: Performed by: NEUROLOGICAL SURGERY

## 2020-08-06 PROCEDURE — 93970 EXTREMITY STUDY: CPT

## 2020-08-06 PROCEDURE — 94799 UNLISTED PULMONARY SVC/PX: CPT

## 2020-08-06 PROCEDURE — 86880 COOMBS TEST DIRECT: CPT | Performed by: INTERNAL MEDICINE

## 2020-08-06 PROCEDURE — 84478 ASSAY OF TRIGLYCERIDES: CPT | Performed by: NEUROLOGICAL SURGERY

## 2020-08-06 PROCEDURE — 80053 COMPREHEN METABOLIC PANEL: CPT | Performed by: NEUROLOGICAL SURGERY

## 2020-08-06 PROCEDURE — 85027 COMPLETE CBC AUTOMATED: CPT | Performed by: NEUROLOGICAL SURGERY

## 2020-08-06 PROCEDURE — 85730 THROMBOPLASTIN TIME PARTIAL: CPT | Performed by: NEUROLOGICAL SURGERY

## 2020-08-06 PROCEDURE — 87205 SMEAR GRAM STAIN: CPT | Performed by: INTERNAL MEDICINE

## 2020-08-06 PROCEDURE — 25010000002 MAGNESIUM SULFATE 2 GM/50ML SOLUTION: Performed by: NEUROLOGICAL SURGERY

## 2020-08-06 PROCEDURE — 84145 PROCALCITONIN (PCT): CPT | Performed by: INTERNAL MEDICINE

## 2020-08-06 PROCEDURE — 87070 CULTURE OTHR SPECIMN AEROBIC: CPT | Performed by: INTERNAL MEDICINE

## 2020-08-06 PROCEDURE — 25010000002 PIPERACILLIN SOD-TAZOBACTAM PER 1 G: Performed by: INTERNAL MEDICINE

## 2020-08-06 PROCEDURE — 84100 ASSAY OF PHOSPHORUS: CPT | Performed by: INTERNAL MEDICINE

## 2020-08-06 PROCEDURE — 99232 SBSQ HOSP IP/OBS MODERATE 35: CPT | Performed by: INTERNAL MEDICINE

## 2020-08-06 PROCEDURE — 83615 LACTATE (LD) (LDH) ENZYME: CPT | Performed by: NEUROLOGICAL SURGERY

## 2020-08-06 PROCEDURE — 94003 VENT MGMT INPAT SUBQ DAY: CPT

## 2020-08-06 PROCEDURE — 25010000002 HYDROMORPHONE PER 4 MG: Performed by: NEUROLOGICAL SURGERY

## 2020-08-06 PROCEDURE — 99024 POSTOP FOLLOW-UP VISIT: CPT | Performed by: NURSE PRACTITIONER

## 2020-08-06 PROCEDURE — 25010000002 LEVETIRACETAM IN NACL 0.82% 500 MG/100ML SOLUTION: Performed by: NEUROLOGICAL SURGERY

## 2020-08-06 PROCEDURE — 82248 BILIRUBIN DIRECT: CPT | Performed by: INTERNAL MEDICINE

## 2020-08-06 PROCEDURE — 99232 SBSQ HOSP IP/OBS MODERATE 35: CPT | Performed by: PSYCHIATRY & NEUROLOGY

## 2020-08-06 PROCEDURE — 83735 ASSAY OF MAGNESIUM: CPT | Performed by: INTERNAL MEDICINE

## 2020-08-06 PROCEDURE — 85045 AUTOMATED RETICULOCYTE COUNT: CPT | Performed by: NEUROLOGICAL SURGERY

## 2020-08-06 PROCEDURE — 25010000002 THIAMINE PER 100 MG: Performed by: NEUROLOGICAL SURGERY

## 2020-08-06 RX ADMIN — PANTOPRAZOLE SODIUM 40 MG: 40 INJECTION, POWDER, FOR SOLUTION INTRAVENOUS at 08:25

## 2020-08-06 RX ADMIN — THIAMINE HYDROCHLORIDE 100 MG: 100 INJECTION, SOLUTION INTRAMUSCULAR; INTRAVENOUS at 08:25

## 2020-08-06 RX ADMIN — TAZOBACTAM SODIUM AND PIPERACILLIN SODIUM 3.38 G: 375; 3 INJECTION, SOLUTION INTRAVENOUS at 05:34

## 2020-08-06 RX ADMIN — MAGNESIUM SULFATE HEPTAHYDRATE 2 G: 40 INJECTION, SOLUTION INTRAVENOUS at 17:46

## 2020-08-06 RX ADMIN — HYDROMORPHONE HYDROCHLORIDE 0.5 MG: 1 INJECTION, SOLUTION INTRAMUSCULAR; INTRAVENOUS; SUBCUTANEOUS at 17:46

## 2020-08-06 RX ADMIN — SODIUM CHLORIDE 100 ML/HR: 9 INJECTION, SOLUTION INTRAVENOUS at 05:32

## 2020-08-06 RX ADMIN — SODIUM CHLORIDE, PRESERVATIVE FREE 10 ML: 5 INJECTION INTRAVENOUS at 08:25

## 2020-08-06 RX ADMIN — TAZOBACTAM SODIUM AND PIPERACILLIN SODIUM 3.38 G: 375; 3 INJECTION, SOLUTION INTRAVENOUS at 14:33

## 2020-08-06 RX ADMIN — HYDROMORPHONE HYDROCHLORIDE 0.5 MG: 1 INJECTION, SOLUTION INTRAMUSCULAR; INTRAVENOUS; SUBCUTANEOUS at 12:10

## 2020-08-06 RX ADMIN — CHLORHEXIDINE GLUCONATE 15 ML: 1.2 RINSE ORAL at 08:22

## 2020-08-06 RX ADMIN — POTASSIUM PHOSPHATE, MONOBASIC AND POTASSIUM PHOSPHATE, DIBASIC 30 MMOL: 224; 236 INJECTION, SOLUTION, CONCENTRATE INTRAVENOUS at 12:28

## 2020-08-06 RX ADMIN — MAGNESIUM SULFATE HEPTAHYDRATE 2 G: 40 INJECTION, SOLUTION INTRAVENOUS at 11:10

## 2020-08-06 RX ADMIN — LEVETIRACETAM 500 MG: 5 INJECTION, SOLUTION INTRAVENOUS at 10:04

## 2020-08-06 RX ADMIN — SODIUM CHLORIDE, PRESERVATIVE FREE 10 ML: 5 INJECTION INTRAVENOUS at 21:34

## 2020-08-06 RX ADMIN — ACETAMINOPHEN 650 MG: 650 SUPPOSITORY RECTAL at 01:05

## 2020-08-06 RX ADMIN — MAGNESIUM SULFATE HEPTAHYDRATE 2 G: 40 INJECTION, SOLUTION INTRAVENOUS at 14:33

## 2020-08-06 RX ADMIN — POTASSIUM CHLORIDE 10 MEQ: 7.46 INJECTION, SOLUTION INTRAVENOUS at 10:04

## 2020-08-06 RX ADMIN — FOLIC ACID 1 MG: 5 INJECTION, SOLUTION INTRAMUSCULAR; INTRAVENOUS; SUBCUTANEOUS at 08:25

## 2020-08-06 RX ADMIN — LEVETIRACETAM 500 MG: 5 INJECTION, SOLUTION INTRAVENOUS at 21:34

## 2020-08-06 RX ADMIN — CHLORHEXIDINE GLUCONATE 15 ML: 1.2 RINSE ORAL at 21:34

## 2020-08-06 RX ADMIN — TAZOBACTAM SODIUM AND PIPERACILLIN SODIUM 3.38 G: 375; 3 INJECTION, SOLUTION INTRAVENOUS at 21:34

## 2020-08-06 NOTE — ANESTHESIA POSTPROCEDURE EVALUATION
"Patient: Avinash Everett    Procedure Summary     Date:  08/05/20 Room / Location:  [facility]    Anesthesia Start:  1818 Anesthesia Stop:  2036    Procedure:  diagnostic angiogram possible EMBOLIZATION CEREBRAL. right radial approach (N/A ) Diagnosis:       Nontraumatic subcortical hemorrhage of right cerebral hemisphere (CMS/HCC)      (Nontraumatic subcortical hemorrhage of right cerebral hemisphere (CMS/HCC) [I61.0])    Surgeon:  Alfonso Malcolm MD Provider:  Prateek Hendrickson MD    Anesthesia Type:  general ASA Status:  4          Anesthesia Type: general    Vitals  Vitals Value Taken Time   /79 8/5/2020  9:00 PM   Temp     Pulse 111 8/5/2020  9:21 PM   Resp     SpO2 95 % 8/5/2020  9:21 PM   Vitals shown include unvalidated device data.        Post Anesthesia Care and Evaluation    Patient location during evaluation: ICU  Patient participation: complete - patient cannot participate  Level of consciousness: obtunded/minimal responses  Pain management: adequate  Airway patency: patent  Anesthetic complications: No anesthetic complications  PONV Status: none  Cardiovascular status: acceptable  Respiratory status: intubated and acceptable  Hydration status: acceptable    Comments: /77   Pulse 94   Temp 37.4 °C (99.3 °F) (Oral)   Resp 17   Ht 177.8 cm (70\")   Wt 88.3 kg (194 lb 10.7 oz)   SpO2 96%   BMI 27.93 kg/m²         "

## 2020-08-06 NOTE — PROGRESS NOTES
"      [place] [facility]         Dr Robertson Sayied   LOS: 3 days   Patient Care Team:  Provider, No Known as PCP - General    Chief Complaint: Acute ICH large and nontraumatic status post emergent craniotomy suspected to be due to AVM postop on the vent    Interval History: Status post diagnostic cerebral angiogram.  Febrile up to 102.6.  Sedated intubated.  Wakes up follow simple commands    REVIEW OF SYSTEMS:   Sedated intubated    Ventilator/Non-Invasive Ventilation Settings (From admission, onward)     Start     Ordered    08/03/20 2320  Ventilator - AC/VC; (16); 100; 5; 500  Continuous     Question Answer Comment   Vent Mode AC/VC    Breath rate  16   FiO2 100    PEEP 5    Tidal Volume 500        08/03/20 2319                  Vital Signs  Temp:  [99.1 °F (37.3 °C)-102.6 °F (39.2 °C)] 99.1 °F (37.3 °C)  Heart Rate:  [] 117  Resp:  [16-18] 16  BP: (103-139)/(45-85) 113/60  FiO2 (%):  [24 %-40 %] 24 %    Intake/Output Summary (Last 24 hours) at 8/6/2020 1319  Last data filed at 8/6/2020 1200  Gross per 24 hour   Intake 3829.28 ml   Output 650 ml   Net 3179.28 ml     Flowsheet Rows      First Filed Value   Admission Height  177.8 cm (70\") Documented at 08/03/2020 2203   Admission Weight  96 kg (211 lb 11.2 oz) Documented at 08/03/2020 2140          Physical Exam:  Patient is examined using the personal protective equipment as per guidelines from infection control for this particular patient as enacted.  Hand hygiene was performed before and after patient interaction.   General Appearance:   Sedated intubated.  Wakes up follow simple commands.  Gets agitated at times.  ET tube good position orally  Neck midline trachea no thyromegaly   Lungs:    Equal breath sounds on the vent.    Heart:    Regular rhythm and normal rate, normal S1 and S2, no            murmur, no gallop, no rub, no click   Chest Wall:    No abnormalities observed   Abdomen:    Soft nondistended bowel sounds positive   Extremities:   " Moves all extremities well, no edema, no cyanosis, no             redness  CNS wakes up follow simple commands  Skin no rashes no nodules  Musculoskeletal no cyanosis no clubbing normal range of motion     Results Review:        Results from last 7 days   Lab Units 08/06/20 0552 08/05/20 0613 08/04/20  0606   SODIUM mmol/L 138 137 136   POTASSIUM mmol/L 2.9* 3.6 4.1   CHLORIDE mmol/L 104 102 100   CO2 mmol/L 24.1 27.3 24.3   BUN mg/dL 7 7 4*   CREATININE mg/dL 0.53* 0.51* 0.46*   GLUCOSE mg/dL 99 118* 163*   CALCIUM mg/dL 7.8* 7.9* 8.9     Results from last 7 days   Lab Units 08/03/20  2144   TROPONIN T ng/mL <0.010     Results from last 7 days   Lab Units 08/06/20  0552 08/05/20  1049 08/05/20  0613   WBC 10*3/mm3 10.14 8.33 7.81   HEMOGLOBIN g/dL 10.0* 11.0* 10.8*   HEMATOCRIT % 27.7* 31.4* 30.5*   PLATELETS 10*3/mm3 131* 125* 141  141     Results from last 7 days   Lab Units 08/06/20  0552 08/05/20  1049 08/05/20 0613  08/04/20  0927   INR  1.42* 1.47* 1.49*   < > 1.56*   APTT seconds 37.7*  --  35.1  --  38.6*    < > = values in this interval not displayed.     Results from last 7 days   Lab Units 08/04/20  0606   CHOLESTEROL mg/dL 250*     Results from last 7 days   Lab Units 08/06/20  0552   MAGNESIUM mg/dL 1.2*     Results from last 7 days   Lab Units 08/06/20  0552  08/04/20  0606   CHOLESTEROL mg/dL  --   --  250*   TRIGLYCERIDES mg/dL 159*   < > 194*   HDL CHOL mg/dL  --   --  42   LDL CHOL mg/dL  --   --  169*    < > = values in this interval not displayed.     Results from last 7 days   Lab Units 08/04/20  0124   PH, ARTERIAL pH units 7.372   PO2 ART mm Hg 317.8*   PCO2, ARTERIAL mm Hg 45.6*   HCO3 ART mmol/L 26.5       I reviewed the patient's new clinical results.  I personally viewed and interpreted the patient's CXR        Medication Review:     chlorhexidine 15 mL Mouth/Throat Q12H   folic acid (FOLVITE) IVPB 1 mg Intravenous Daily   iodixanol 250 mL Intra-arterial Once in imaging    levETIRAcetam 500 mg Intravenous Q12H   pantoprazole 40 mg Intravenous Q24H   piperacillin-tazobactam 3.375 g Intravenous Q8H   sodium chloride 10 mL Intravenous Q12H   thiamine (VITAMIN B1) IVPB 100 mg Intravenous Daily         fentaNYL (SUBLIMAZE) infusion 5 mcg/ml 250 mL  mcg/hr Last Rate: Stopped (08/06/20 1132)   niCARdipine 5-15 mg/hr Last Rate: Stopped (08/04/20 0441)   propofol 5-50 mcg/kg/min Last Rate: Stopped (08/06/20 0043)   sodium chloride 100 mL/hr Last Rate: 100 mL/hr (08/06/20 0532)       ASSESSMENT:   Acute left-sided weakness due to right ICH  Acute right intracerebral hemorrhage; Large and nontraumatic status post emergent craniotomy  Possible transfusion reaction  Brain compression with midline shift status post emergent craniotomy  Acute respiratory failure s/p mechanical ventilator  Possible aspiration pneumonia  Acute alcohol intoxication  Acute alcoholic hepatitis  Acute anion gap metabolic acidosis  Acute hypokalemia  Fever      PLAN:  Discussed with neurosurgery no objections to extubation.  Will wean from the vent and possibly extubate today.  Fever suspect likely noninfectious however patient currently on Zosyn to cover for aspiration pneumonia.  We will see what Dopplers of her upper extremities show.  Some thrombophlebitis noted  Transfusion reaction hematology currently following..  Etiology of ICH unclear.  Discussed with neurology could be related to his coagulopathy?  His INR is now corrected per hematology.  Continue thiamine folate  Keppra for seizure prophylaxis  ICU core measures    Critical care time 35 minutes    Ailyn Bateman MD  08/06/20  13:19

## 2020-08-06 NOTE — PROGRESS NOTES
"DOS: 2020  NAME: Avinash Everett   : 1990  PCP: Provider, No Known  Chief Complaint   Patient presents with   • Neuro Deficit(s)       Chief complaint: ICH  Subjective: febrile yesterday after returning from [facility]. SB this am    Objective:  Vital signs: /51   Pulse (!) 130   Temp 100.4 °F (38 °C) (Oral)   Resp 18   Ht 177.8 cm (70\")   Wt 88 kg (194 lb 0.1 oz)   SpO2 94%   BMI 27.84 kg/m²    Gen: NAD, vitals reviewed, intubated, sedated on fentanyl  MS: awake, alert, following commands on right side  CN: visual acuity grossly normal, PERRL, EOMI, left facial droop, tongue midline  Motor: 5/5 RUE/LE, 0/5 LUE/LE decreased tone  Reflexes, right plantar downgoing, left plantar upgoing    ROS:  + fevers, no chills  + weakness, numbness      Laboratory results:  Lab Results   Component Value Date    GLUCOSE 99 2020    CALCIUM 7.8 (L) 2020     2020    K 2.9 (L) 2020    CO2 24.1 2020     2020    BUN 7 2020    CREATININE 0.53 (L) 2020    EGFRIFNONA >150 2020    BCR 13.2 2020    ANIONGAP 9.9 2020     Lab Results   Component Value Date    WBC 10.14 2020    HGB 10.0 (L) 2020    HCT 27.7 (L) 2020    .0 (H) 2020     (L) 2020     Lab Results   Component Value Date     (H) 2020     @[email or web address]@     Review of labs: Hgb 10.0, platelets 131, INR 1.4,     Review and interpretation of imaging: arteriogram images and report reviewed, no AVM seen.    Workup to date: large spontaneous right frontal intracerebral hemorrhage requiring emergency decompression, etiology unclear, suspected related to underlying coagulopathy.    CT 8/3: 9.6x4.9 cm ICH with 1.2cm midline shift  S/p craniotomy and drainage  CTA : negative for AVM  Dx angio : negative for aneurysm or AVM  2D echo: EF 70%, grade I diastolic dysfunction  INR 1.39 on admission, remaining 1.4-1.5 despite FFP, vit " K  ALT 59    Hematology workup ongoing, reported history of autoimmune hemolytic anemia but workup not consistent with that.    Diagnoses:  Spontaneous intracerebral hemorrhagic, right frontal  Elevated INR  History of alcohol dependence    Comment: Angio negative for AVM. Suspect spontaneous ICH related to coagulopathy, etiology unknown. Hematology workup ongoing, ? Hepatic etiology    Plan:  On prophylactic Keppra, no witnessed seizures

## 2020-08-06 NOTE — PROGRESS NOTES
"Pharmacy Consult - Zosyn Dosing     Avinash Everett has a consult for pharmacy to dose Zosyn for bacteremia prophylaxis.  Pharmacy dosing Zosyn per Dr. Witt's request.       Relevant clinical data and objective history reviewed:  30 y.o. male 177.8 cm (70\") 88.3 kg (194 lb 10.7 oz)    History reviewed. No pertinent past medical history.  Creatinine   Date Value Ref Range Status   08/05/2020 0.51 (L) 0.76 - 1.27 mg/dL Final   08/04/2020 0.46 (L) 0.76 - 1.27 mg/dL Final   08/04/2020 0.58 (L) 0.76 - 1.27 mg/dL Final     BUN   Date Value Ref Range Status   08/05/2020 7 6 - 20 mg/dL Final     Estimated Creatinine Clearance: 237 mL/min (A) (by C-G formula based on SCr of 0.51 mg/dL (L)).    Lab Results   Component Value Date    WBC 8.33 08/05/2020     Temp Readings from Last 3 Encounters:   08/05/20 99.3 °F (37.4 °C) (Oral)          Assessment/Plan  Will start Zosyn 3.375g IV q8h extended-interval infusion. Pharmacy will continue to follow daily while on Zosyn.     Pharmacy will discontinue the Pharmacy to Dose consult at this time. Renal function will continue to be monitored and dosing adjustments will be made by pharmacy based on renal function if necessary.      Carley Hill, [facility]    "

## 2020-08-06 NOTE — PLAN OF CARE
Patient extubated this am to 4 liters; doing well and handling secretions reasonably well. Fentanyl gtt weaned off; PRN dilaudid given. Frequent loose stools per incontinence pad and per bedpan. ST ordered; waiting for evaluation before starting diet/placing cortrak. Vital signs stable. Neurologically stable with visual field cut, gaze deviation, and left side flaccidity.     Problem: Patient Care Overview  Goal: Plan of Care Review  Outcome: Ongoing (interventions implemented as appropriate)     Problem: Patient Care Overview  Goal: Interprofessional Rounds/Family Conf  Outcome: Ongoing (interventions implemented as appropriate)     Problem: Skin Injury Risk (Adult)  Goal: Identify Related Risk Factors and Signs and Symptoms  Outcome: Ongoing (interventions implemented as appropriate)     Problem: Skin Injury Risk (Adult)  Goal: Skin Health and Integrity  Outcome: Ongoing (interventions implemented as appropriate)     Problem: Fall Risk (Adult)  Goal: Identify Related Risk Factors and Signs and Symptoms  Outcome: Ongoing (interventions implemented as appropriate)     Problem: Fall Risk (Adult)  Goal: Absence of Fall  Outcome: Ongoing (interventions implemented as appropriate)     Problem: Stroke (Hemorrhagic) (Adult)  Goal: Signs and Symptoms of Listed Potential Problems Will be Absent, Minimized or Managed (Stroke)  Outcome: Ongoing (interventions implemented as appropriate)

## 2020-08-06 NOTE — PROGRESS NOTES
[facility] INPATIENT PROGRESS NOTE  Subjective Wakes to simple commands    CC: Coagulopathy    Interval history:     The patient is a 30-year-old male with a history of alcohol abuse admitted August 3 because of acute onset of left-sided weakness.  He had been drinking fairly heavily the day of admission and after presentation had persistent left-sided weakness with stat CT had demonstrated a large right intracerebral hemorrhage and significant midline shift.  Patient was mated to ICU and seen by neurosurgery.  He proceeded to surgery undergoing right frontoparietal craniotomy for evacuation of intracerebral hemorrhage having a large hematoma with significant mass-effect with concerned that he had AV malformation as underlying issue.  On August 4 the patient had improvement per right side though he remained hemiplegic on the left.  He was placed on Keppra and thiamine and had received FFP August 4 with an apparent reaction.  We had seen him August 5 for coagulopathy with the patient receiving additional IV vitamin K and request to improve his PT/INR to below 1.2.  Additional laboratory studies included normal thrombin time, normal platelet function testing, repeat PT/INR at that point of 1.47  We proceeded to a mixing study finding only partial correction, obtained additional laboratory studies and the patient was given Kcentra.         August 6, 2020  T-max 102.1, T 98.3, normotensive, repeat PT/INR 1.42, BUN/creatinine of 17.53, BUN 3.3 total bilirubin of 5.3 reticulocyte count of 2.97, CBC with H&H of 10.0 and 27.7 white count of 10,001 or 40 platelet count of 31,000.  No abnormalities found on cerebral vessel angiogram occluding any AVM-suspected spontaneous ICH related to coagulopathy.  Additional studies pending     Medications:  The current medication list was reviewed in the EMR.    Allergies:    Allergies   Allergen Reactions   • Promestriene GI Intolerance       Objective      Vitals:     08/06/20 1603   BP:    Pulse:    Resp:    Temp: 98.3 °F (36.8 °C)   SpO2:         Physical Exam   Constitutional: He appears well-developed and well-nourished.   HENT:   Nose: Nose normal.   Mouth/Throat: Oropharynx is clear and moist.   Status post craniotomy   Eyes: Pupils are equal, round, and reactive to light. Conjunctivae are normal.   Neck: Neck supple.   Cardiovascular: Normal rate, regular rhythm, normal heart sounds and intact distal pulses.   Pulmonary/Chest: Effort normal and breath sounds normal.   Abdominal: Soft. Bowel sounds are normal.   Musculoskeletal:   Left-sided weakness upper and lower extremities   Neurological: He is alert.           RECENT LABS:    Results from last 7 days   Lab Units 08/06/20  0552 08/05/20  1049 08/05/20  0613   WBC 10*3/mm3 10.14 8.33 7.81   HEMOGLOBIN g/dL 10.0* 11.0* 10.8*   HEMATOCRIT % 27.7* 31.4* 30.5*   PLATELETS 10*3/mm3 131* 125* 141  141     Results from last 7 days   Lab Units 08/06/20  0552 08/05/20  0613 08/04/20  0606 08/04/20  0130   SODIUM mmol/L 138 137 136 136   POTASSIUM mmol/L 2.9* 3.6 4.1 4.4   CHLORIDE mmol/L 104 102 100 97*   CO2 mmol/L 24.1 27.3 24.3 22.8   BUN mg/dL 7 7 4* 4*   CREATININE mg/dL 0.53* 0.51* 0.46* 0.58*   CALCIUM mg/dL 7.8* 7.9* 8.9 8.8   BILIRUBIN mg/dL 5.3* 3.1*  --  3.2*   ALK PHOS U/L 78 97  --  128*   ALT (SGPT) U/L 32 39  --  59*   AST (SGOT) U/L 70* 85*  --  151*   GLUCOSE mg/dL 99 118* 163* 121*     Results from last 7 days   Lab Units 08/06/20  0552   MAGNESIUM mg/dL 1.2*       Assessment/Plan     1.  Right frontal parietal hemorrhage with mildly elevated INR at 1.56 on admission.  Patient has history of alcohol abuse and in the ER he stated that he was drinking alcohol several shots on the day of admission.  He presented with left hemiplegia and severe headache.  CT of the head showed a large rent right frontal hemorrhage  · CT head showed 5.4 cm large right frontal hemorrhage with 2 small additional hemorrhages in the  parenchyma with midline shift  · S/p evacuation of the large frontal hemorrhage with improvement in his clinical symptoms  · Repeat CT showed no bleeding in the cavity but mild increase in the 2 additional hemorrhages in the parenchyma.  With 1 lesion increased from 1.2 cm to 1.7 cm and the second area of hemorrhage increased from 2.7 cm to 3.1 cm.  · New onset right lateral and medial epidural which on discussing with neurosurgery they think it secondary to underlying evacuation  · Neurosurgery wants to do CT angiogram in order to evaluate for AV malformation  · On discussing with neurosurgery when they try to evacuate there was bleeding and likely AV malformation that Dr. Rivera cauterized  · Interventionist neurosurgery  wants to do CT angiogram in order to evaluate if bleeding from arteriovenous malformation and likely due a second surgery to clip this  · Subsequent arteriogram without evidence of AVM     2.  Coagulopathy with elevated PT.  INR was 1.56  · Patient received FFP without much benefit with INR going from 1.56-1.5  · This a.m. INR of 1.49.  · Neurosurgery wanted INR to be less than 1.2 as they need to do CT angiogram to evaluate for concern of AV malformation  · Mixing study done with partial correction of INR from 17.4-14.7 both immediate and delayed  · Discussion done between Dr. Anderson and myself and gave 1 dose of Kcentra 25 mg/kg IV x1 dose  · On discussion with patient's mother, patient's grandmother is a patient of      3.  Allergic reaction to FFP, discussed with blood bank and they are going to do a transfusion work-up     4.  Blood in the urine, on discussing with patient's wife he has had dark urine for at least 6 months and she has been trying to hydrate him.  · Urine analysis shows too numerous to count red blood cells 100 mg of protein and mild bilirubin  · Discussed with Dr. Aguillon, questionable rhabdomyolysis     5.  Seizures prophylaxis on Keppra     6.   Questionable autoimmune hemolytic anemia,  · Patient was evaluated by his internist at [facility] who thought he had autoimmune hemolytic anemia  · Received records but no indication of that.  · Elevated bilirubin, plan fractionation, haptoglobin and ELIESER testing                 Michael Anderson MD  8/6/2020  16:23

## 2020-08-06 NOTE — OP NOTE
Procedure: Diagnostic cerebral angiogram      Surgeon: Taco Malcolm MD     Anesthesia Type         General endotracheal anesthesia.  See anesthesia note for complete details     Pre-operative Diagnosis: Right frontal intraparenchymal hematoma       Post-operative Diagnosis:  No evidence of aneurysm, AVM, or dural AV fistula.        Name of Procedure: Diagnostic cerebral angiogram.     Vessels catheterized:  1.  Right femoral artery  2.  Right internal carotid artery   3.  Right external carotid artery  4.  Left internal carotid artery  5.  Left external carotid artery  6.  Left subclavian artery   8.  Left vertebral artery  9.  Right subclavian artery  10. Right vertebral artery      Indications for procedure and informed consent:        Patient is a 30-year-old male status post emergency craniotomy for evacuation of a right frontal hematoma.  There was concern that the hematoma was caused by an underlying AVM.  I was consulted to do a diagnostic angiogram to evaluate and rule out AVM.  The patient's family was consented for the procedure the risks and benefits of the procedure were discussed with the patient's family including but not limited to infection, hemorrhage, vessel injury, stroke.  The patient's family expressed understanding of the risks and benefits of the procedure and elected to proceed with the diagnostic angiogram and possible intervention.    Sedation and monitoring:  General endotracheal anesthesia.  See anesthesia note for complete details     Description of the procedure: The patient was brought to the angio suite and placed in a supine position on the exam table.    The patient's groin was shaved, prepped, and draped in the usual sterile fashion.  A timeout was performed.  Access to the right femoral artery was obtained using a micropuncture needle.  There was no resistance to threading the microwire. Using modified Seldinger technique, a 5 Armenian introducer sheath was inserted into the right  femoral artery.  A femoral artery angiogram was performed which showed the sheath in good position with access over the femoral head and no extravasation of contrast.  Then a 5 Irish diagnostic catheter was introduced into the system over 35 Glidewire with the wire ahead of the catheter and the system was navigated through the normal vascular channels into the right internal carotid artery, right external carotid artery, left internal carotid artery, left external carotid artery, left subclavian artery, left vertebral artery, right subclavian artery, right vertebral artery.   Roadmap was performed in front of each vessel and cranial and cervical angiograms were performed.  The catheter positioned in the right internal carotid artery a 3D angiogram was performed using the power injector and images were processed and stored on a separate machine and measurements were taken under my direct supervision.  After completing the study all the images were reviewed carefully there was no evidence of AVM, aneurysm, or dural AV fistula.  The catheter was then removed and the groin was closed with a 5 Irish minx closure device.  His groin was flat and dry and he was transferred to the ICU in stable condition.           Interpretation of the films:  1. Right common carotid artery: AP, lateral, and obliques. Cervical views. Left common carotid artery bifurcates into the internal and external carotid arteries with normal branches of the external carotid artery. Mild tortuosity is noted. There is  no significant atherosclerotic changes or stenosis of the external or internal carotid arteries distal to the bifurcation.   2. Right internal carotid artery. Intracranial views: AP, lateral, and obliques. The internal carotid artery injection shows the course of the ICA at the skull base and there is mild tortuosity with no significant stenosis. The cavernous segment is unremarkable.  The branches of the internal carotid artery are seen  occluding the posterior communicating arteries. the internal carotid artery terminates bifurcating into the middle cerebral artery and anterior cerebral artery.   There is no evidence of aneurysm or AVM from these images.    3. Right external carotid artery: Intracranial views: AP, lateral; the external carotid artery branches are seen with no evidence of dural arteriovenous fistula and no significant flow-limiting stenosis.  4. Left common carotid artery: AP, lateral, and obliques. Cervical views. Left common carotid artery bifurcates into the internal and external carotid arteries with normal branches of the external carotid artery. Mild tortuosity is noted. There is  no significant atherosclerotic changes or stenosis of the external or internal carotid arteries distal to the bifurcation.   5. Left internal carotid artery. Intracranial views: AP, lateral, and obliques. The internal carotid artery injection shows the course of the ICA at the skull base and there is mild tortuosity with no significant stenosis. The cavernous segment is unremarkable.  The branches of the internal carotid artery are seen occluding the posterior communicating arteries. the internal carotid artery terminates bifurcating into the middle cerebral artery and anterior cerebral artery.   There is no evidence of aneurysm or AVM from these images.    6.  Left external carotid artery: Intracranial views: AP, lateral; the external carotid artery branches are seen with no evidence of dural arteriovenous fistula and no significant flow-limiting stenosis.  7.  Left subclavian artery, AP view.  There is no evidence of significant stenosis, the origin of the vertebral artery is seen with no significant stenosis.  8.  Left vertebral artery, AP and lateral cervical views.  The catheter is seen positioned with the tip in the V1 segment.  There is no evidence of significant flow-limiting stenosis in the V2 or V3 segments  9.  Left vertebral artery: AP  and lateral cranial views.  The left V4 segment is seen tapering as it merges into the right vertebral artery to become the basilar artery.  The normal branches are seen including the bilateral PCA, SCA's, and the AICAs.  There is no evidence of aneurysm or AVM in this view  10.  Right subclavian artery: AP view.  There is no evidence of significant stenosis, the origin of the vertebral artery is seen with no significant stenosis.  11. Right vertebral artery, AP and lateral cervical views.  The catheter is seen positioned with the tip in the V1 segment.  There is no evidence of significant flow-limiting stenosis in the V2 or V3 segments  12. Right vertebral artery: AP and lateral cranial views.  The left V4 segment is seen tapering as it merges into the right vertebral artery to become the basilar artery.  PICA is seen without any evidence of aneurysm.  The normal branches are seen including the bilateral PCA, SCA's, and the AICAs.  There is no evidence of aneurysm or AVM in this view

## 2020-08-06 NOTE — PROGRESS NOTES
NEUROSURGERY PROGRESS NOTE     LOS: 3 days   Patient Care Team:  Provider, No Known as PCP - General    Chief Complaint:  Unable to obtain    Subjective     Interval History:     Spoke with mother at bedside.  Diagnostic four-vessel cerebral angiogram performed yesterday showed no evidence of aneurysm, AVM, or dural AV fistula.  Febrile last evening. T-max 102.6; currently 100.4.  Remains intubated.  Hopefully extubate today.    History taken from: chart family RN    Objective      Vital Signs  Temp:  [99.3 °F (37.4 °C)-102.6 °F (39.2 °C)] 100.4 °F (38 °C)  Heart Rate:  [] 122  Resp:  [16-18] 18  BP: (103-139)/(45-85) 103/51  FiO2 (%):  [24 %-40 %] 24 %  Body mass index is 27.84 kg/m².      Physical Exam    Cranial incision well approximated. Steri strips intact. Mild swelling to surrounding tissue.   Opens eyes to verbal stimuli.  PERRL.   Unable to participate with extraocular movement. Cannot assess completely as patient is intubated.    Plegic on left side.  Holds up 2 fingers on the right, raises right leg on command.    Results Review:     I reviewed the patient's new clinical results.    Labs:    Lab Results (last 24 hours)     Procedure Component Value Units Date/Time    Platelet Function Test [[id number]]  (Normal) Collected:  08/05/20 1049    Specimen:  Blood Updated:  08/05/20 1252     Collagen/Epinephrine 87 Seconds      Collagen/ADP 84 Seconds     Narrative:       PFA Interpretation    Norm ADP / Norm EPI: Normal Plt Function                                                                                                                  Norm ADP / Abn EPI: Drug Induced Platelet Dysfunction, most commonly seen with Aspirin                              Abn ADP / Abn EPI: Abnormal Platelet Function, recommend further evaluation    Fibrin Split Products [[id number]]  (Abnormal) Collected:  08/05/20 1049    Specimen:  Blood Updated:  08/05/20 1319     Fibrin Split Products Greater than or equal to 5, but  less than 20 mcg/mL    Thrombin Time [[id number]]  (Normal) Collected:  08/05/20 1049    Specimen:  Blood Updated:  08/05/20 1303     Thrombin Time 18.5 SECONDS     Von Willebrand Disease Profile [[id number]] Collected:  08/05/20 1049    Specimen:  Blood Updated:  08/05/20 1202    Factor 7 Activity [[id number]] Collected:  08/05/20 1049    Specimen:  Blood Updated:  08/05/20 1202    Factor 2 Activity [[id number]] Collected:  08/05/20 1049    Specimen:  Blood Updated:  08/05/20 1202    Factor 5 Activity [[id number]] Collected:  08/05/20 1049    Specimen:  Blood Updated:  08/05/20 1202    Factor 10 Activity [[id number]] Collected:  08/05/20 1049    Specimen:  Blood Updated:  08/05/20 1203    CBC & Differential [[id number]] Collected:  08/05/20 1049    Specimen:  Blood Updated:  08/05/20 1216    Narrative:       The following orders were created for panel order CBC & Differential.  Procedure                               Abnormality         Status                     ---------                               -----------         ------                     CBC Auto Differential[[id number]]        Abnormal            Final result                 Please view results for these tests on the individual orders.    CBC Auto Differential [[id number]]  (Abnormal) Collected:  08/05/20 1049    Specimen:  Blood Updated:  08/05/20 1216     WBC 8.33 10*3/mm3      RBC 3.07 10*6/mm3      Hemoglobin 11.0 g/dL      Hematocrit 31.4 %      .3 fL      MCH 35.8 pg      MCHC 35.0 g/dL      RDW 12.0 %      RDW-SD 44.3 fl      MPV 9.4 fL      Platelets 125 10*3/mm3      Neutrophil % 77.1 %      Lymphocyte % 11.8 %      Monocyte % 9.1 %      Eosinophil % 1.0 %      Basophil % 0.6 %      Immature Grans % 0.4 %      Neutrophils, Absolute 6.43 10*3/mm3      Lymphocytes, Absolute 0.98 10*3/mm3      Monocytes, Absolute 0.76 10*3/mm3      Eosinophils, Absolute 0.08 10*3/mm3      Basophils, Absolute 0.05 10*3/mm3      Immature Grans, Absolute 0.03 10*3/mm3       nRBC 0.0 /100 WBC     Protime-INR [[id number]]  (Abnormal) Collected:  08/05/20 1049    Specimen:  Blood Updated:  08/05/20 1303     Protime 17.6 Seconds      INR 1.47    Hemosiderin, Urine - Urine, Catheter [[id number]] Collected:  08/05/20 1124    Specimen:  Urine, Catheter Updated:  08/05/20 1204    Hemoglobin Free, Plasma [[id number]] Collected:  08/05/20 1155    Specimen:  Blood Updated:  08/05/20 1202    Haptoglobin [[id number]]  (Abnormal) Collected:  08/05/20 1155    Specimen:  Blood Updated:  08/05/20 1238     Haptoglobin 19 mg/dL     [facility] Comprehensive Panel [[id number]] Collected:  08/05/20 1155    Specimen:  Blood Updated:  08/05/20 1202    C-reactive Protein [[id number]]  (Abnormal) Collected:  08/05/20 1155    Specimen:  Blood Updated:  08/05/20 1238     C-Reactive Protein 1.48 mg/dL     Myoglobin Screen, Urine - Urine, Clean Catch [[id number]]  (Abnormal) Collected:  08/05/20 1259    Specimen:  Urine, Clean Catch Updated:  08/05/20 1356     Myoglobin, Qualitative Positive    Narrative:       Due to the similarities in the chemical properties of Hemoglobin and and Myoglobin, the presence of either will yield a positive Myoglobin Screen.    POC Glucose Once [[id number]]  (Normal) Collected:  08/05/20 1732    Specimen:  Blood Updated:  08/05/20 1745     Glucose 97 mg/dL     POC Glucose Once [[id number]]  (Normal) Collected:  08/05/20 2036    Specimen:  Blood Updated:  08/05/20 2038     Glucose 80 mg/dL     Blood Culture - Blood, Arm, Right [[id number]] Collected:  08/05/20 2326    Specimen:  Blood from Arm, Right Updated:  08/05/20 2346    Blood Culture - Blood, Arm, Left [[id number]] Collected:  08/05/20 2326    Specimen:  Blood from Arm, Left Updated:  08/05/20 2346    POC Glucose Once [[id number]]  (Normal) Collected:  08/06/20 0046    Specimen:  Blood Updated:  08/06/20 0047     Glucose 91 mg/dL     Respiratory Culture - Sputum, ET Suction [[id number]] Collected:  08/06/20 0353    Specimen:  Sputum from ET  Suction Updated:  08/06/20 0446    Protime-INR [[id number]]  (Abnormal) Collected:  08/06/20 0552    Specimen:  Blood Updated:  08/06/20 0632     Protime 17.1 Seconds      INR 1.42    Comprehensive Metabolic Panel [[id number]]  (Abnormal) Collected:  08/06/20 0552    Specimen:  Blood Updated:  08/06/20 0639     Glucose 99 mg/dL      BUN 7 mg/dL      Creatinine 0.53 mg/dL      Sodium 138 mmol/L      Potassium 2.9 mmol/L      Chloride 104 mmol/L      CO2 24.1 mmol/L      Calcium 7.8 mg/dL      Total Protein 6.3 g/dL      Albumin 3.30 g/dL      ALT (SGPT) 32 U/L      AST (SGOT) 70 U/L      Alkaline Phosphatase 78 U/L      Total Bilirubin 5.3 mg/dL      eGFR Non African Amer >150 mL/min/1.73      Globulin 3.0 gm/dL      A/G Ratio 1.1 g/dL      BUN/Creatinine Ratio 13.2     Anion Gap 9.9 mmol/L     Narrative:       GFR Normal >60  Chronic Kidney Disease <60  Kidney Failure <15      CBC & Differential [[id number]] Collected:  08/06/20 0552    Specimen:  Blood Updated:  08/06/20 0656    Narrative:       The following orders were created for panel order CBC & Differential.  Procedure                               Abnormality         Status                     ---------                               -----------         ------                     Manual Differential[[id number]]                                                         CBC Auto Differential[[id number]]        Abnormal            Final result                 Please view results for these tests on the individual orders.    aPTT [[id number]]  (Abnormal) Collected:  08/06/20 0552    Specimen:  Blood Updated:  08/06/20 0632     PTT 37.7 seconds     Lactate Dehydrogenase [[id number]]  (Normal) Collected:  08/06/20 0552    Specimen:  Blood Updated:  08/06/20 0637      U/L     Reticulocytes [[id number]]  (Abnormal) Collected:  08/06/20 0552    Specimen:  Blood Updated:  08/06/20 0648     Reticulocyte % 2.97 %      Reticulocyte Absolute 0.0823 10*6/mm3     Triglycerides  [[id number]]  (Abnormal) Collected:  08/06/20 0552    Specimen:  Blood Updated:  08/06/20 0637     Triglycerides 159 mg/dL     CBC Auto Differential [[id number]]  (Abnormal) Collected:  08/06/20 0552    Specimen:  Blood Updated:  08/06/20 0656     WBC 10.14 10*3/mm3      RBC 2.77 10*6/mm3      Hemoglobin 10.0 g/dL      Hematocrit 27.7 %      .0 fL      MCH 36.1 pg      MCHC 36.1 g/dL      RDW 11.7 %      RDW-SD 43.0 fl      MPV 9.6 fL      Platelets 131 10*3/mm3      Neutrophil % 82.6 %      Lymphocyte % 6.0 %      Monocyte % 8.8 %      Eosinophil % 0.7 %      Basophil % 0.5 %      Neutrophils, Absolute 8.38 10*3/mm3      Lymphocytes, Absolute 0.61 10*3/mm3      Monocytes, Absolute 0.89 10*3/mm3      Eosinophils, Absolute 0.07 10*3/mm3      Basophils, Absolute 0.05 10*3/mm3           Current Medications:   Scheduled Meds:  chlorhexidine 15 mL Mouth/Throat Q12H   folic acid (FOLVITE) IVPB 1 mg Intravenous Daily   iodixanol 250 mL Intra-arterial Once in imaging   levETIRAcetam 500 mg Intravenous Q12H   pantoprazole 40 mg Intravenous Q24H   piperacillin-tazobactam 3.375 g Intravenous Q8H   sodium chloride 10 mL Intravenous Q12H   thiamine (VITAMIN B1) IVPB 100 mg Intravenous Daily     Continuous Infusions:  fentaNYL (SUBLIMAZE) infusion 5 mcg/ml 250 mL  mcg/hr Last Rate: 100 mcg/hr (08/06/20 0043)   niCARdipine 5-15 mg/hr Last Rate: Stopped (08/04/20 0441)   propofol 5-50 mcg/kg/min Last Rate: Stopped (08/06/20 0043)   sodium chloride 100 mL/hr Last Rate: 100 mL/hr (08/06/20 0532)       Assessment/Plan       Nontraumatic subcortical hemorrhage of right cerebral hemisphere (CMS/HCC)    Right-sided nontraumatic intracerebral hemorrhage (CMS/HCC)    Elevated INR; ETOH dependency    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

## 2020-08-06 NOTE — PLAN OF CARE
No abnormalities found on cerebral vessel angiogram. Patient had a fever of 101.5 upon return from surgery. Blood/sputum cx drawn. Lower extremity doppler ordered. Zosyn started. Neuro exam is unchanged. Propofol titrated off. Will continue to monitor.

## 2020-08-07 ENCOUNTER — APPOINTMENT (OUTPATIENT)
Dept: MRI IMAGING | Facility: HOSPITAL | Age: 30
End: 2020-08-07

## 2020-08-07 LAB
ALBUMIN SERPL-MCNC: 3.2 G/DL (ref 3.5–5.2)
ALBUMIN/GLOB SERPL: 0.9 G/DL
ALP SERPL-CCNC: 76 U/L (ref 39–117)
ALT SERPL W P-5'-P-CCNC: 27 U/L (ref 1–41)
ANION GAP SERPL CALCULATED.3IONS-SCNC: 8.4 MMOL/L (ref 5–15)
AST SERPL-CCNC: 45 U/L (ref 1–40)
BILIRUB SERPL-MCNC: 5.1 MG/DL (ref 0–1.2)
BUN SERPL-MCNC: 8 MG/DL (ref 6–20)
BUN/CREAT SERPL: 18.6 (ref 7–25)
CALCIUM SPEC-SCNC: 7.8 MG/DL (ref 8.6–10.5)
CHLORIDE SERPL-SCNC: 106 MMOL/L (ref 98–107)
CO2 SERPL-SCNC: 23.6 MMOL/L (ref 22–29)
CREAT SERPL-MCNC: 0.43 MG/DL (ref 0.76–1.27)
DEPRECATED RDW RBC AUTO: 42.3 FL (ref 37–54)
ERYTHROCYTE [DISTWIDTH] IN BLOOD BY AUTOMATED COUNT: 11.6 % (ref 12.3–15.4)
FACT V ACT/NOR PPP: 52 % (ref 70–150)
FACT VII ACT/NOR PPP: 40 % (ref 51–186)
FACT X ACT/NOR PPP: 54 % (ref 76–183)
GFR SERPL CREATININE-BSD FRML MDRD: >150 ML/MIN/1.73
GLOBULIN UR ELPH-MCNC: 3.4 GM/DL
GLUCOSE BLDC GLUCOMTR-MCNC: 96 MG/DL (ref 70–130)
GLUCOSE SERPL-MCNC: 90 MG/DL (ref 65–99)
HAPTOGLOB SERPL-MCNC: 61 MG/DL (ref 30–200)
HCT VFR BLD AUTO: 29.8 % (ref 37.5–51)
HGB BLD-MCNC: 10.9 G/DL (ref 13–17.7)
HGB FREE SER-MCNC: 3.3 MG/DL (ref 0–4.9)
MAGNESIUM SERPL-MCNC: 2.1 MG/DL (ref 1.6–2.6)
MCH RBC QN AUTO: 36.6 PG (ref 26.6–33)
MCHC RBC AUTO-ENTMCNC: 36.6 G/DL (ref 31.5–35.7)
MCV RBC AUTO: 100 FL (ref 79–97)
PHOSPHATE SERPL-MCNC: 1.4 MG/DL (ref 2.5–4.5)
PLATELET # BLD AUTO: 130 10*3/MM3 (ref 140–450)
PMV BLD AUTO: 9.2 FL (ref 6–12)
POTASSIUM SERPL-SCNC: 2.8 MMOL/L (ref 3.5–5.2)
PROCALCITONIN SERPL-MCNC: 0.7 NG/ML (ref 0–0.25)
PROT SERPL-MCNC: 6.6 G/DL (ref 6–8.5)
PROTHROM ACT/NOR PPP: 57 % (ref 50–154)
RBC # BLD AUTO: 2.98 10*6/MM3 (ref 4.14–5.8)
SODIUM SERPL-SCNC: 138 MMOL/L (ref 136–145)
WBC # BLD AUTO: 7.04 10*3/MM3 (ref 3.4–10.8)

## 2020-08-07 PROCEDURE — 83735 ASSAY OF MAGNESIUM: CPT | Performed by: NEUROLOGICAL SURGERY

## 2020-08-07 PROCEDURE — 25010000002 LEVETIRACETAM IN NACL 0.82% 500 MG/100ML SOLUTION: Performed by: INTERNAL MEDICINE

## 2020-08-07 PROCEDURE — 25010000002 PIPERACILLIN SOD-TAZOBACTAM PER 1 G: Performed by: INTERNAL MEDICINE

## 2020-08-07 PROCEDURE — 83010 ASSAY OF HAPTOGLOBIN QUANT: CPT | Performed by: INTERNAL MEDICINE

## 2020-08-07 PROCEDURE — 25010000002 LEVETIRACETAM IN NACL 0.82% 500 MG/100ML SOLUTION: Performed by: NEUROLOGICAL SURGERY

## 2020-08-07 PROCEDURE — 85027 COMPLETE CBC AUTOMATED: CPT | Performed by: NEUROLOGICAL SURGERY

## 2020-08-07 PROCEDURE — 99233 SBSQ HOSP IP/OBS HIGH 50: CPT | Performed by: INTERNAL MEDICINE

## 2020-08-07 PROCEDURE — 84145 PROCALCITONIN (PCT): CPT | Performed by: INTERNAL MEDICINE

## 2020-08-07 PROCEDURE — 92610 EVALUATE SWALLOWING FUNCTION: CPT

## 2020-08-07 PROCEDURE — 25010000002 THIAMINE PER 100 MG: Performed by: NEUROLOGICAL SURGERY

## 2020-08-07 PROCEDURE — 84100 ASSAY OF PHOSPHORUS: CPT | Performed by: INTERNAL MEDICINE

## 2020-08-07 PROCEDURE — 82962 GLUCOSE BLOOD TEST: CPT

## 2020-08-07 PROCEDURE — 97530 THERAPEUTIC ACTIVITIES: CPT

## 2020-08-07 PROCEDURE — 99024 POSTOP FOLLOW-UP VISIT: CPT | Performed by: NURSE PRACTITIONER

## 2020-08-07 PROCEDURE — A9575 INJ GADOTERATE MEGLUMI 0.1ML: HCPCS | Performed by: INTERNAL MEDICINE

## 2020-08-07 PROCEDURE — 70553 MRI BRAIN STEM W/O & W/DYE: CPT

## 2020-08-07 PROCEDURE — 97162 PT EVAL MOD COMPLEX 30 MIN: CPT | Performed by: PHYSICAL THERAPIST

## 2020-08-07 PROCEDURE — 97167 OT EVAL HIGH COMPLEX 60 MIN: CPT

## 2020-08-07 PROCEDURE — 99232 SBSQ HOSP IP/OBS MODERATE 35: CPT | Performed by: PSYCHIATRY & NEUROLOGY

## 2020-08-07 PROCEDURE — 97112 NEUROMUSCULAR REEDUCATION: CPT

## 2020-08-07 PROCEDURE — 97110 THERAPEUTIC EXERCISES: CPT | Performed by: PHYSICAL THERAPIST

## 2020-08-07 PROCEDURE — 80053 COMPREHEN METABOLIC PANEL: CPT | Performed by: NEUROLOGICAL SURGERY

## 2020-08-07 PROCEDURE — 25010000002 GADOTERATE MEGLUMINE 10 MMOL/20ML SOLUTION: Performed by: INTERNAL MEDICINE

## 2020-08-07 PROCEDURE — 25010000002 HYDROMORPHONE PER 4 MG: Performed by: NEUROLOGICAL SURGERY

## 2020-08-07 RX ORDER — GADOTERATE MEGLUMINE 376.9 MG/ML
19 INJECTION INTRAVENOUS
Status: COMPLETED | OUTPATIENT
Start: 2020-08-07 | End: 2020-08-07

## 2020-08-07 RX ADMIN — POTASSIUM CHLORIDE 40 MEQ: 1.5 POWDER, FOR SOLUTION ORAL at 15:54

## 2020-08-07 RX ADMIN — TAZOBACTAM SODIUM AND PIPERACILLIN SODIUM 3.38 G: 375; 3 INJECTION, SOLUTION INTRAVENOUS at 21:44

## 2020-08-07 RX ADMIN — THIAMINE HYDROCHLORIDE 100 MG: 100 INJECTION, SOLUTION INTRAMUSCULAR; INTRAVENOUS at 08:53

## 2020-08-07 RX ADMIN — TAZOBACTAM SODIUM AND PIPERACILLIN SODIUM 3.38 G: 375; 3 INJECTION, SOLUTION INTRAVENOUS at 15:37

## 2020-08-07 RX ADMIN — PANTOPRAZOLE SODIUM 40 MG: 40 INJECTION, POWDER, FOR SOLUTION INTRAVENOUS at 08:53

## 2020-08-07 RX ADMIN — FOLIC ACID 1 MG: 5 INJECTION, SOLUTION INTRAMUSCULAR; INTRAVENOUS; SUBCUTANEOUS at 08:53

## 2020-08-07 RX ADMIN — LEVETIRACETAM 500 MG: 5 INJECTION, SOLUTION INTRAVENOUS at 21:44

## 2020-08-07 RX ADMIN — SODIUM CHLORIDE, PRESERVATIVE FREE 10 ML: 5 INJECTION INTRAVENOUS at 08:53

## 2020-08-07 RX ADMIN — HYDROMORPHONE HYDROCHLORIDE 0.5 MG: 1 INJECTION, SOLUTION INTRAMUSCULAR; INTRAVENOUS; SUBCUTANEOUS at 09:23

## 2020-08-07 RX ADMIN — POTASSIUM PHOSPHATE, MONOBASIC AND POTASSIUM PHOSPHATE, DIBASIC 30 MMOL: 224; 236 INJECTION, SOLUTION, CONCENTRATE INTRAVENOUS at 11:41

## 2020-08-07 RX ADMIN — GADOTERATE MEGLUMINE 19 ML: 376.9 INJECTION, SOLUTION INTRAVENOUS at 14:47

## 2020-08-07 RX ADMIN — LEVETIRACETAM 500 MG: 5 INJECTION, SOLUTION INTRAVENOUS at 09:23

## 2020-08-07 RX ADMIN — POTASSIUM CHLORIDE 40 MEQ: 1.5 POWDER, FOR SOLUTION ORAL at 20:13

## 2020-08-07 RX ADMIN — TAZOBACTAM SODIUM AND PIPERACILLIN SODIUM 3.38 G: 375; 3 INJECTION, SOLUTION INTRAVENOUS at 06:35

## 2020-08-07 NOTE — PROGRESS NOTES
Continued Stay Note  [facility]     Patient Name: Avinash Everett  MRN: [id number]  Today's Date: 8/7/2020    Admit Date: 8/3/2020    Discharge Plan     Row Name 08/07/20 0905       Plan    Plan  Undetermined    Plan Comments  [facility] following clinicals for discharge needs  List for 3 closest acute  rehabs given.        Discharge Codes    No documentation.             Cynthia Barney RN

## 2020-08-07 NOTE — THERAPY EVALUATION
Acute Care - Speech Language Pathology   Swallow Initial Evaluation [facility]     Patient Name: Avinash Everett  : 1990  MRN: [id number]  Today's Date: 2020               Admit Date: 8/3/2020    Visit Dx:     ICD-10-CM ICD-9-CM   1. Right-sided nontraumatic intracerebral hemorrhage, unspecified cerebral location (CMS/HCC) I61.9 431   2. Subdural hematoma (CMS/HCC) S06.5X9A 432.1   3. Nontraumatic subcortical hemorrhage of right cerebral hemisphere (CMS/HCC) I61.0 431     Patient Active Problem List   Diagnosis   • Right-sided nontraumatic intracerebral hemorrhage (CMS/HCC)   • Nontraumatic subcortical hemorrhage of right cerebral hemisphere (CMS/HCC)     History reviewed. No pertinent past medical history.  History reviewed. No pertinent surgical history.     SWALLOW EVALUATION (last 72 hours)      SLP Adult Swallow Evaluation     Row Name 20 1200                   Rehab Evaluation    Document Type  evaluation  -AW        Subjective Information  no complaints  -AW        Patient Observations  alert;cooperative;agree to therapy  -AW        Patient/Family Observations  Pt positioned upright in bed, wife at bedside. Pt a little confused, L neglect noted.  -AW        Patient Effort  good  -AW        Symptoms Noted During/After Treatment  none  -AW           General Information    Patient Profile Reviewed  yes  -AW        Pertinent History Of Current Problem  Pt s/p emergent crani for evacuation of R frontal hematoma, intubated 8/3 - 20.  -AW        Current Method of Nutrition  NPO  -AW        Precautions/Limitations, Vision  neglect, left  -AW        Precautions/Limitations, Hearing  WFL  -AW        Prior Level of Function-Communication  WFL  -AW        Prior Level of Function-Swallowing  no diet consistency restrictions  -AW        Plans/Goals Discussed with  patient;spouse/S.O.;agreed upon  -AW        Barriers to Rehab  none identified  -AW        Patient's Goals for Discharge  return to PO diet   -AW        Family Goals for Discharge  patient able to return to PO diet  -AW           Pain Assessment    Additional Documentation  Pain Scale: Numbers Pre/Post-Treatment (Group)  -AW           Pain Scale: Numbers Pre/Post-Treatment    Pain Scale: Numbers, Pretreatment  8/10  -AW        Pain Scale: Numbers, Post-Treatment  8/10  -AW        Pain Location  head  -AW        Pain Intervention(s)  Repositioned  -AW           Oral Motor and Function    Dentition Assessment  natural, present and adequate  -AW        Secretion Management  requires suctioning to control secretions;other (see comments) at times  -AW        Mucosal Quality  moist, healthy  -AW        Volitional Swallow  weak  -AW        Volitional Cough  non-productive  -AW           Oral Musculature and Cranial Nerve Assessment    Oral Labial or Buccal Impairment, Detail, Cranial Nerve VII (Facial):  left labial droop  -AW           General Eating/Swallowing Observations    Respiratory Support Currently in Use  room air  -AW        Eating/Swallowing Skills  fed by SLP;self-fed  -AW        Positioning During Eating  upright in bed  -AW        Utensils Used  spoon;cup  -AW        Consistencies Trialed  thin liquids;honey-thick liquids;pureed  -AW        Pre SpO2 (%)  98  -AW        Post SpO2 (%)  97  -AW           Clinical Swallow Eval    Oral Prep Phase  WFL  -AW        Oral Transit  impaired  -AW        Oral Residue  WFL  -AW        Pharyngeal Phase  suspected pharyngeal impairment  -AW        Clinical Swallow Evaluation Summary  Pt very anxious to eat and drink. Pt had multiple swallows with ice chips. Wet vocal quality noted with teaspoon sip of thin. Pt cleared with cue. With cup sip of thin, pt had choking noted. Suspect premature spillage. Swallow delayed and weak with reduced laryngeal elevation. Pt tolerated spoon and cup sips of HTL and pureed. No overt s/s. Pt needed cues for small sips and to not talk immediately after swallowing. Recommend start  on pureed diet with HTL; meds crushed in pureed; upright for meals and 30 min after; slow rate; small bites/sips; 1:1 supervision due to impulsivity. ST will follow for reeval and diet tolerance/advancement as indicated. Recommend therapy at next level of care for dysphagia and cognition, consider [facility] acute rehab. ST to follow.  -AW           Clinical Impression    SLP Swallowing Diagnosis  oral dysfunction;suspected pharyngeal dysfunction  -AW        Functional Impact  risk of aspiration/pneumonia  -AW        Rehab Potential/Prognosis, Swallowing  good, to achieve stated therapy goals  -AW        Swallow Criteria for Skilled Therapeutic Interventions Met  demonstrates skilled criteria  -AW           Recommendations    Therapy Frequency (Swallow)  PRN  -AW        Predicted Duration Therapy Intervention (Days)  until discharge  -AW        SLP Diet Recommendation  puree;honey thick liquids  -AW        Recommended Diagnostics  reassess via clinical swallow evaluation  -AW        Recommended Precautions and Strategies  upright posture during/after eating;small bites of food and sips of liquid;no straw  -AW        SLP Rec. for Method of Medication Administration  meds crushed;with pudding or applesauce  -AW        Monitor for Signs of Aspiration  yes  -AW        Anticipated Dischage Disposition (SLP)  inpatient rehabilitation facility;anticipate therapy at next level of care  -AW           Swallow Goals (SLP)    Oral Nutrition/Hydration Goal Selection (SLP)  oral nutrition/hydration, SLP goal 1  -AW           Oral Nutrition/Hydration Goal 1 (SLP)    Oral Nutrition/Hydration Goal 1, SLP  Pt will safely tolerate the least restrictive diet with no s/s of aspiration.  -AW        Time Frame (Oral Nutrition/Hydration Goal 1, SLP)  by discharge  -AW          User Key  (r) = Recorded By, (t) = Taken By, (c) = Cosigned By    Initials Name Effective Dates    Ailin Kwan MS CCC-SLP 06/08/18 -           EDUCATION  The patient  has been educated in the following areas:   Dysphagia (Swallowing Impairment) Oral Care/Hydration Modified Diet Instruction.    SLP Recommendation and Plan  SLP Swallowing Diagnosis: oral dysfunction, suspected pharyngeal dysfunction  SLP Diet Recommendation: puree, honey thick liquids  Recommended Precautions and Strategies: upright posture during/after eating, small bites of food and sips of liquid, no straw  SLP Rec. for Method of Medication Administration: meds crushed, with pudding or applesauce     Monitor for Signs of Aspiration: yes  Recommended Diagnostics: reassess via clinical swallow evaluation  Swallow Criteria for Skilled Therapeutic Interventions Met: demonstrates skilled criteria  Anticipated Dischage Disposition (SLP): inpatient rehabilitation facility, anticipate therapy at next level of care  Rehab Potential/Prognosis, Swallowing: good, to achieve stated therapy goals  Therapy Frequency (Swallow): PRN  Predicted Duration Therapy Intervention (Days): until discharge       Plan of Care Reviewed With: patient  Outcome Summary: Pt very anxious to eat and drink. Pt had multiple swallows with ice chips. Wet vocal quality noted with teaspoon sip of thin. Pt cleared with cue. With cup sip of thin, pt had choking noted. Suspect premature spillage. Swallow delayed and weak with reduced laryngeal elevation. Pt tolerated spoon and cup sips of HTL and pureed. No overt s/s. Pt needed cues for small sips and to not talk immediately after swallowing. Recommend start on pureed diet with HTL; meds crushed in pureed; upright for meals and 30 min after; slow rate; small bites/sips; 1:1 supervision due to impulsivity. ST will follow for reeval and diet tolerance/advancement as indicated. Recommend therapy at next level of care for dysphagia and cognition, consider [facility] acute rehab. ST to follow.     Patient was not wearing a face mask during this therapy encounter. Therapist used appropriate personal protective  equipment including mask, eye protection and gloves.  Mask used was standard procedure mask. Appropriate PPE was worn during the entire therapy session. Hand hygiene was completed before and after therapy session. Patient is not in enhanced droplet precautions.         SLP GOALS     Row Name 08/07/20 1200             Oral Nutrition/Hydration Goal 1 (SLP)    Oral Nutrition/Hydration Goal 1, SLP  Pt will safely tolerate the least restrictive diet with no s/s of aspiration.  -AW      Time Frame (Oral Nutrition/Hydration Goal 1, SLP)  by discharge  -AW        User Key  (r) = Recorded By, (t) = Taken By, (c) = Cosigned By    Initials Name Provider Type    Ailin Kwan MS CCC-SLP Speech and Language Pathologist           SLP Outcome Measures (last 72 hours)      SLP Outcome Measures     Row Name 08/07/20 1400             SLP Outcome Measures    Outcome Measure Used?  Adult NOMS  -AW         Adult FCM Scores    FCM Chosen  Swallowing  -AW      Swallowing FCM Score  3  -AW        User Key  (r) = Recorded By, (t) = Taken By, (c) = Cosigned By    Initials Name Effective Dates    Ailin Kwan MS CCC-SLP 06/08/18 -            Time Calculation:   Time Calculation- SLP     Row Name 08/07/20 1412             Time Calculation- SLP    SLP Start Time  1200  -AW      SLP Received On  08/07/20  -AW        User Key  (r) = Recorded By, (t) = Taken By, (c) = Cosigned By    Initials Name Provider Type    Ailin Kwan MS CCC-SLP Speech and Language Pathologist          Therapy Charges for Today     Code Description Service Date Service Provider Modifiers Qty    [id number] HC ST EVAL ORAL PHARYNG SWALLOW 4 8/7/2020 Ailin Ty MS CCC-SLP GN 1               MS MAREK Galaviz  8/7/2020

## 2020-08-07 NOTE — CONSULTS
Responded to pt req. Pt is is very teary, feels that he is going to die. He did not seem to have problms speaking. I said a prayer with him assured him of my support

## 2020-08-07 NOTE — PROGRESS NOTES
"[facility] PULMONARY CARE         Dr Robertson Sayied   LOS: 4 days   Patient Care Team:  Provider, No Known as PCP - General    Chief Complaint: Acute ICH large and nontraumatic status post emergent craniotomy suspected to be due to AVM postop on the vent    Interval History: Extubated yesterday.  Tolerating extubation well.  Wants to eat    REVIEW OF SYSTEMS:   No chest pain no shortness of breath    Ventilator/Non-Invasive Ventilation Settings (From admission, onward)   Nasal cannula oxygen      Vital Signs  Temp:  [98 °F (36.7 °C)-98.4 °F (36.9 °C)] 98 °F (36.7 °C)  Heart Rate:  [] 91  Resp:  [16] 16  BP: ()/(52-80) 112/72    Intake/Output Summary (Last 24 hours) at 8/7/2020 1154  Last data filed at 8/7/2020 0559  Gross per 24 hour   Intake 2201 ml   Output 1150 ml   Net 1051 ml     Flowsheet Rows      First Filed Value   Admission Height  177.8 cm (70\") Documented at 08/03/2020 2203   Admission Weight  96 kg (211 lb 11.2 oz) Documented at 08/03/2020 2140          Physical Exam:  Patient is examined using the personal protective equipment as per guidelines from infection control for this particular patient as enacted.  Hand hygiene was performed before and after patient interaction.   General Appearance:   Sedated intubated.  Wakes up follow simple commands.  Gets agitated at times.  ET tube good position orally  Neck midline trachea no thyromegaly   Lungs:    Diminished breath sounds rhonchi bilaterally in the bases    Heart:    Regular rhythm and normal rate, normal S1 and S2, no            murmur, no gallop, no rub, no click   Chest Wall:    No abnormalities observed   Abdomen:    Soft nondistended bowel sounds positive   Extremities:   Moves all extremities well, no edema, no cyanosis, no             redness  CNS left hemiparesis following commands  Skin no rashes no nodules  Musculoskeletal no cyanosis no clubbing normal range of motion     Results Review:        Results from last 7 days "   Lab Units 08/07/20  0612 08/06/20  0552 08/05/20  0613   SODIUM mmol/L 138 138 137   POTASSIUM mmol/L 2.8* 2.9* 3.6   CHLORIDE mmol/L 106 104 102   CO2 mmol/L 23.6 24.1 27.3   BUN mg/dL 8 7 7   CREATININE mg/dL 0.43* 0.53* 0.51*   GLUCOSE mg/dL 90 99 118*   CALCIUM mg/dL 7.8* 7.8* 7.9*     Results from last 7 days   Lab Units 08/03/20  2144   TROPONIN T ng/mL <0.010     Results from last 7 days   Lab Units 08/07/20  0612 08/06/20  0552 08/05/20  1049   WBC 10*3/mm3 7.04 10.14 8.33   HEMOGLOBIN g/dL 10.9* 10.0* 11.0*   HEMATOCRIT % 29.8* 27.7* 31.4*   PLATELETS 10*3/mm3 130* 131* 125*     Results from last 7 days   Lab Units 08/06/20  0552 08/05/20  1049 08/05/20  0613  08/04/20  0927   INR  1.42* 1.47* 1.49*   < > 1.56*   APTT seconds 37.7*  --  35.1  --  38.6*    < > = values in this interval not displayed.     Results from last 7 days   Lab Units 08/04/20  0606   CHOLESTEROL mg/dL 250*     Results from last 7 days   Lab Units 08/07/20  0612   MAGNESIUM mg/dL 2.1     Results from last 7 days   Lab Units 08/06/20  0552  08/04/20  0606   CHOLESTEROL mg/dL  --   --  250*   TRIGLYCERIDES mg/dL 159*   < > 194*   HDL CHOL mg/dL  --   --  42   LDL CHOL mg/dL  --   --  169*    < > = values in this interval not displayed.     Results from last 7 days   Lab Units 08/04/20  0124   PH, ARTERIAL pH units 7.372   PO2 ART mm Hg 317.8*   PCO2, ARTERIAL mm Hg 45.6*   HCO3 ART mmol/L 26.5       I reviewed the patient's new clinical results.  I personally viewed and interpreted the patient's CXR        Medication Review:     chlorhexidine 15 mL Mouth/Throat Q12H   folic acid (FOLVITE) IVPB 1 mg Intravenous Daily   iodixanol 250 mL Intra-arterial Once in imaging   levETIRAcetam 500 mg Intravenous Q12H   pantoprazole 40 mg Intravenous Q24H   piperacillin-tazobactam 3.375 g Intravenous Q8H   sodium chloride 10 mL Intravenous Q12H   thiamine (VITAMIN B1) IVPB 100 mg Intravenous Daily         niCARdipine 5-15 mg/hr Last Rate: Stopped  (08/04/20 0441)   sodium chloride 100 mL/hr Last Rate: 100 mL/hr (08/06/20 0532)       ASSESSMENT:   Acute left-sided weakness due to right ICH  Acute right intracerebral hemorrhage; Large and nontraumatic status post emergent craniotomy  Possible transfusion reaction  Coagulopathy  Brain compression with midline shift status post emergent craniotomy  Acute respiratory failure s/p mechanical ventilator  Possible aspiration pneumonia  Acute alcohol intoxication  Acute alcoholic hepatitis  Acute anion gap metabolic acidosis  Acute hypokalemia  Fever      PLAN:  Discussed with neurosurgery no objections to extubation.  Will wean from the vent and possibly extubate today.  Fever curve is improved.  Doppler upper and lower extremity superficial thrombophlebitis.  Suspect likely noninfectious however patient currently on Zosyn to cover for aspiration pneumonia.   extremities show.  Some thrombophlebitis noted  Transfusion reaction hematology currently following.  Coagulopathy currently being worked up by hematology.  Etiology of ICH unclear.  Discussed with neurology could be related to his coagulopathy?  His INR is now corrected per hematology.  Continue thiamine folate  Keppra for seizure prophylaxis  PT OT  Diet per speech  Transfer patient out of the ICU    Ailyn Bateman MD  08/07/20  11:54

## 2020-08-07 NOTE — PROGRESS NOTES
"NEUROSURGERY POST OPERATIVE PROGRESS NOTE     LOS: 4 days   Patient Care Team:  Provider, No Known as PCP - General    Chief Complaint:      Subjective     Interval History:     Extubated. Denies headache but does feel \"sore all over.\"    History taken from: patient chart RN    Objective      Vital Signs  Temp:  [98 °F (36.7 °C)-98.4 °F (36.9 °C)] 98.2 °F (36.8 °C)  Heart Rate:  [] 86  BP: (102-144)/(55-80) 115/70  Body mass index is 29.04 kg/m².      Physical Exam    AA&O x 3. Respirations even and unlabored.   A bit drowsy but conversation is for the most part appropriate. Able to answer questions and follow commands.  R cranial incision well approximated. No redness, swelling or drainage. Steri strips intact.   PERRL.  Had difficulty with EOM's   Tongue midline; face symmetric.  L hemiplegia.  Follows commands readily R arm and R leg.    Results Review:     I reviewed the patient's new clinical results.  I reviewed the patient's new imaging results and agree with the interpretation.    Labs:    Lab Results (last 24 hours)     Procedure Component Value Units Date/Time    POC Glucose Once [[id number]]  (Normal) Collected:  08/06/20 1715    Specimen:  Blood Updated:  08/06/20 1716     Glucose 117 mg/dL     Bilirubin, Direct [[id number]]  (Abnormal) Collected:  08/06/20 1816    Specimen:  Blood Updated:  08/06/20 1844     Bilirubin, Direct 4.6 mg/dL     POC Glucose Once [[id number]]  (Normal) Collected:  08/06/20 2141    Specimen:  Blood Updated:  08/06/20 2143     Glucose 111 mg/dL     CBC (No Diff) [[id number]]  (Abnormal) Collected:  08/07/20 0612    Specimen:  Blood Updated:  08/07/20 0642     WBC 7.04 10*3/mm3      RBC 2.98 10*6/mm3      Hemoglobin 10.9 g/dL      Hematocrit 29.8 %      .0 fL      MCH 36.6 pg      MCHC 36.6 g/dL      RDW 11.6 %      RDW-SD 42.3 fl      MPV 9.2 fL      Platelets 130 10*3/mm3     Comprehensive Metabolic Panel [[id number]]  (Abnormal) Collected:  08/07/20 0612    Specimen:  " "Blood Updated:  08/07/20 0720     Glucose 90 mg/dL      BUN 8 mg/dL      Creatinine 0.43 mg/dL      Sodium 138 mmol/L      Potassium 2.8 mmol/L      Chloride 106 mmol/L      CO2 23.6 mmol/L      Calcium 7.8 mg/dL      Total Protein 6.6 g/dL      Albumin 3.20 g/dL      ALT (SGPT) 27 U/L      AST (SGOT) 45 U/L      Alkaline Phosphatase 76 U/L      Total Bilirubin 5.1 mg/dL      eGFR Non African Amer >150 mL/min/1.73      Globulin 3.4 gm/dL      A/G Ratio 0.9 g/dL      BUN/Creatinine Ratio 18.6     Anion Gap 8.4 mmol/L     Narrative:       GFR Normal >60  Chronic Kidney Disease <60  Kidney Failure <15      Phosphorus [[id number]]  (Abnormal) Collected:  08/07/20 0612    Specimen:  Blood Updated:  08/07/20 0800     Phosphorus 1.4 mg/dL     Procalcitonin [[id number]]  (Abnormal) Collected:  08/07/20 0612    Specimen:  Blood Updated:  08/07/20 0759     Procalcitonin 0.70 ng/mL     Narrative:       As a Marker for Sepsis (Non-Neonates):   1. <0.5 ng/mL represents a low risk of severe sepsis and/or septic shock.  1. >2 ng/mL represents a high risk of severe sepsis and/or septic shock.    As a Marker for Lower Respiratory Tract Infections that require antibiotic therapy:  PCT on Admission     Antibiotic Therapy             6-12 Hrs later  > 0.5                Strongly Recommended            >0.25 - <0.5         Recommended  0.1 - 0.25           Discouraged                   Remeasure/reassess PCT  <0.1                 Strongly Discouraged          Remeasure/reassess PCT      As 28 day mortality risk marker: \"Change in Procalcitonin Result\" (> 80 % or <=80 %) if Day 0 (or Day 1) and Day 4 values are available. Refer to http://www.[employer]-pct-calculator.com/   Change in PCT <=80 %   A decrease of PCT levels below or equal to 80 % defines a positive change in PCT test result representing a higher risk for 28-day all-cause mortality of patients diagnosed with severe sepsis or septic shock.  Change in PCT > 80 %   A decrease of PCT " levels of more than 80 % defines a negative change in PCT result representing a lower risk for 28-day all-cause mortality of patients diagnosed with severe sepsis or septic shock.                Results may be falsely decreased if patient taking Biotin.     Haptoglobin [[id number]]  (Normal) Collected:  08/07/20 0612    Specimen:  Blood Updated:  08/07/20 1006     Haptoglobin 61 mg/dL     Magnesium [[id number]]  (Normal) Collected:  08/07/20 0612    Specimen:  Blood Updated:  08/07/20 0856     Magnesium 2.1 mg/dL     POC Glucose Once [[id number]]  (Normal) Collected:  08/07/20 1214    Specimen:  Blood Updated:  08/07/20 1229     Glucose 96 mg/dL         Blood cultures 8/5 negative x 24 hrs  Sputum cultures 8/6 negative   Urine - negative micro      Current Medications:   Scheduled Meds:  folic acid (FOLVITE) IVPB 1 mg Intravenous Daily   iodixanol 250 mL Intra-arterial Once in imaging   levETIRAcetam 500 mg Intravenous Q12H   piperacillin-tazobactam 3.375 g Intravenous Q8H   sodium chloride 10 mL Intravenous Q12H   thiamine (VITAMIN B1) IVPB 100 mg Intravenous Daily     Continuous Infusions:  sodium chloride 100 mL/hr Last Rate: 100 mL/hr (08/06/20 0532)       Assessment/Plan       Nontraumatic subcortical hemorrhage of right cerebral hemisphere (CMS/HCC)    Right-sided nontraumatic intracerebral hemorrhage (CMS/HCC)      Elevated INR; ETOH dependency    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      OK for transfer to out of ICU per DARLEEN standpoint        ADDENDUM:    Pathology 8/3/20    Final Diagnosis   1. Hematoma, Subdural, Evacuation:               A. Blood, organizing clot and mixed inflammatory cells.                 2. Blood, Hematoma, Evacuation:               A. Disorganized vascular channels suggesting possible malformation.               B. Blood  and clot.               C. Reactive brain parenchyma.               D. Negative for malignancy.     Discussed with Dr. Rivera; request faxed to pathology requesting specimen be sent for outside pathologic consultation.     Katya Centeno, NATHALY  08/07/20  16:30

## 2020-08-07 NOTE — PLAN OF CARE
Problem: Patient Care Overview  Goal: Plan of Care Review  Note:   Pt presents to [facility] 2/2 to Acute ICH large and nontraumatic status post emergent craniotomy suspected to be due to AVM. Pt reports living with his wife and children and working full time. Pt oriented x3 and noticed to be inattentive to people/objects in L visual field. Pt completed bed mobility to EOB with Mod A x1-2 and cues for safety due to impulsivity. Pt required hands on assist for movement of LUE. Pt with no active movement in L shoulder, elbow flexion, wrist or hand. Minimal active L elbow extension noticed in gravity eliminated positions. Mod-max verbal cues required for pt to attend to L side. Min-Mod A provided for static sitting balance - cues to lean to R side. Pt attempted to pull up R sock - impaired dynamic sitting balance and Mod-Max A required. Pt verbalizing he wanted to walk in hallway and OT provided explanation on safety and current assist levels for static and dynamic sitting. Mod A x2 provided for transfer to supine. Education provided on HEP exercises and positioning of LUE - good carryover of education provided by wife. OT recommends continued skilled inpatient OT services with a d/c to Acute Rehab. Pt and wife in agreement with this d/c plan.    Patient was wearing a face mask during this therapy encounter. Therapist used appropriate personal protective equipment including mask, goggles and gloves. Mask used was standard procedure mask. Appropriate PPE was worn during the entire therapy session. Hand hygiene was completed before and after therapy session. Patient is not in enhanced droplet precautions.

## 2020-08-07 NOTE — PROGRESS NOTES
Continued Stay Note  [facility]     Patient Name: Avinash Everett  MRN: [id number]  Today's Date: 8/7/2020    Admit Date: 8/3/2020    Discharge Plan     Row Name 08/07/20 0905       Plan    Plan  Undetermined    Plan Comments  CCP following clinicals for discharge needs         Discharge Codes    No documentation.             Cynthia Barney RN

## 2020-08-07 NOTE — PROGRESS NOTES
REASON FOR FOLLOWUP/CHIEF COMPLAINT:  Elevated INR  HISTORY OF PRESENT ILLNESS:   No changes overnight.    Past Medical History, Past Surgical History, Social History, Family History have been reviewed and are without significant changes except as mentioned.    Review of Systems   Review of Systems   Constitutional: Negative for activity change.   HENT: Negative for nosebleeds and trouble swallowing.    Respiratory: Negative for shortness of breath and wheezing.    Cardiovascular: Negative for chest pain and palpitations.   Gastrointestinal: Negative for constipation, diarrhea and nausea.   Genitourinary: Negative for dysuria and hematuria.   Musculoskeletal: Negative for arthralgias and myalgias.   Neurological: Negative for seizures and syncope.   Hematological: Negative for adenopathy. Does not bruise/bleed easily.   Psychiatric/Behavioral: Negative for confusion.       Medications:  The current medication list was reviewed in the EMR    ALLERGIES:    Allergies   Allergen Reactions   • Promestriene GI Intolerance              Vitals:    08/07/20 0500 08/07/20 0559 08/07/20 0600 08/07/20 0700   BP: 108/60  115/80 121/77   Pulse: 93  100 95   Resp:       Temp:    98 °F (36.7 °C)   TempSrc:    Oral   SpO2: 90%  94% 95%   Weight:  91.8 kg (202 lb 6.1 oz)     Height:         Physical Exam    CONSTITUTIONAL:  Vital signs reviewed.  No distress, looks comfortable.  EYES:  Conjunctiva and lids unremarkable.  PERRLA  EARS,NOSE,MOUTH,THROAT:  Ears and nose appear unremarkable.  Lips, teeth, gums appear unremarkable.  RESPIRATORY:  Normal respiratory effort.  Lungs clear to auscultation bilaterally.  CARDIOVASCULAR:  Normal S1, S2.  No murmurs rubs or gallops.  No significant lower extremity edema.  GASTROINTESTINAL: Abdomen appears unremarkable.  Nontender.  No hepatomegaly.  No splenomegaly.  NEURO: cranial nerves 2-12 grossly intact.  Left hemiplegia   mUSCULOSKELETAL:  Unremarkable digits/nails.  No cyanosis or  clubbing.  SKIN:  Warm.  No rashes.  PSYCHIATRIC: Normal attention and concentration.  Appropriate affect.       RECENT LABS:  WBC   Date Value Ref Range Status   08/07/2020 7.04 3.40 - 10.80 10*3/mm3 Final   08/06/2020 10.14 3.40 - 10.80 10*3/mm3 Final   08/05/2020 8.33 3.40 - 10.80 10*3/mm3 Final   08/05/2020 7.81 3.40 - 10.80 10*3/mm3 Final     Hemoglobin   Date Value Ref Range Status   08/07/2020 10.9 (L) 13.0 - 17.7 g/dL Final   08/06/2020 10.0 (L) 13.0 - 17.7 g/dL Final   08/05/2020 11.0 (L) 13.0 - 17.7 g/dL Final   08/05/2020 10.8 (L) 13.0 - 17.7 g/dL Final     Platelets   Date Value Ref Range Status   08/07/2020 130 (L) 140 - 450 10*3/mm3 Final   08/06/2020 131 (L) 140 - 450 10*3/mm3 Final   08/05/2020 125 (L) 140 - 450 10*3/mm3 Final   08/05/2020 141 140 - 450 10*3/mm3 Final   08/05/2020 141 140 - 450 10*3/mm3 Final       ASSESSMENT/PLAN:  Avinash Everett I385/1        1.  Right frontal parietal hemorrhage with mildly elevated INR at 1.56 on admission.  Patient has history of alcohol abuse and in the ER he stated that he was drinking alcohol several shots on the day of admission.  He presented with left hemiplegia and severe headache.  CT of the head showed a large rent right frontal hemorrhage  · CT head showed 5.4 cm large right frontal hemorrhage with 2 small additional hemorrhages in the parenchyma with midline shift  · S/p evacuation of the large frontal hemorrhage with improvement in his clinical symptoms  · Repeat CT showed no bleeding in the cavity but mild increase in the 2 additional hemorrhages in the parenchyma.  With 1 lesion increased from 1.2 cm to 1.7 cm and the second area of hemorrhage increased from 2.7 cm to 3.1 cm.  · New onset right lateral and medial epidural which on discussing with neurosurgery they think it secondary to underlying evacuation  · Neurosurgery wants to do CT angiogram in order to evaluate for AV malformation  · On discussing with neurosurgery when they try to evacuate  there was bleeding and likely AV malformation that Dr. Rivera cauterized  · Interventionist neurosurgery  wants to do CT angiogram in order to evaluate if bleeding from arteriovenous malformation and likely due a second surgery to clip this  · Subsequent arteriogram without evidence of AVM     2.  Coagulopathy with elevated PT.  INR was 1.56  · Patient received FFP without much benefit with INR going from 1.56-1.5  · This a.m. INR of 1.49.  · Neurosurgery wanted INR to be less than 1.2 as they need to do CT angiogram to evaluate for concern of AV malformation  · Mixing study done with partial correction of INR from 17.4-14.7 both immediate and delayed  · Discussion done between Dr. Anderson and Dr. Tee and 28/5/20, she gave 1 dose of Kcentra 25 mg/kg IV x1 dose  · On discussion with patient's mother, patient's grandmother is a patient of   · Current INR 1.42, reasonably stable  · I do not think an INR of 1.4 should be the reason for intracranial hemorrhage     3.  Allergic reaction to FFP, discussed with blood bank and they are going to do a transfusion work-up     4.  Blood in the urine, on discussing with patient's wife he has had dark urine for at least 6 months and she has been trying to hydrate him.  · Urine analysis shows too numerous to count red blood cells 100 mg of protein and mild bilirubin  · Discussed with Dr. Aguillon, questionable rhabdomyolysis     5.  Seizures prophylaxis on Keppra     6.  Questionable autoimmune hemolytic anemia,  · Patient was evaluated by his internist at [facility] who thought he had autoimmune hemolytic anemia  · Received records but no indication of that.  · Elevated bilirubin, plan fractionation, haptoglobin and ELIESER testing  · Haptoglobin was only 19 on 8/5/2015  · With paralysis.  However, it is up to 61 today, 8/7/2020.  Direct Wendy negative.  LDH normal.  Total bilirubin elevated, but mostly is direct.  Hemolysis should have an elevation  of indirect bilirubin.    *Acute alcohol intoxication.  Acute alcoholic hepatitis.    *Possible aspiration pneumonia.  On Zosyn to cover for this.    *Acute right superficial cephalic thrombophlebitis and chronic left superficial small saphenous thrombophlebitis on 8/6/2020.  Not on prophylactic anticoagulation due to intracranial hemorrhage.    Plans  We will follow.    Wife assisted with history.  Chart reviewed and summarized including pulmonary note.

## 2020-08-07 NOTE — PROGRESS NOTES
"DOS: 2020  NAME: Avinash Everett   : 1990  PCP: Provider, No Known  Chief Complaint   Patient presents with   • Neuro Deficit(s)       Chief complaint: intracerebral hemorrhage  Subjective: Extubated. Complaining of throat pain and dry mouth. Persistent left hemiplegia    Objective:  Vital signs: /77   Pulse 95   Temp 98 °F (36.7 °C) (Oral)   Resp 16   Ht 177.8 cm (70\")   Wt 91.8 kg (202 lb 6.1 oz)   SpO2 95%   BMI 29.04 kg/m²    Gen: NAD, vitals reviewed  MS: oriented x3, recent/remote memory intact, normal attention/concentration, language intact, minimal neglect.  CN: visual acuity grossly normal, decreased blink to threat left side, PERRL, right gaze preference, cannot cross midline, left facial droop, moderate dysarthria  Motor: 5/5 throughout RUE/LE, 0/5 LUE, 1/5 LLE. Decreased tone left side  Sensory: intact to light touch all 4 ext.    ROS:  + weakness, numbness  No fevers, chills      Laboratory results:  Lab Results   Component Value Date    GLUCOSE 90 2020    CALCIUM 7.8 (L) 2020     2020    K 2.8 (L) 2020    CO2 23.6 2020     2020    BUN 8 2020    CREATININE 0.43 (L) 2020    EGFRIFNONA >150 2020    BCR 18.6 2020    ANIONGAP 8.4 2020     Lab Results   Component Value Date    WBC 7.04 2020    HGB 10.9 (L) 2020    HCT 29.8 (L) 2020    .0 (H) 2020     (L) 2020     Lab Results   Component Value Date     (H) 2020     @[email or web address]@     Review of labs: Hgb 10.9, platelets 130, INR    Review and interpretation of imaging: MRI brain pending    Workup to date: large spontaneous right frontal intracerebral hemorrhage requiring emergency decompression, etiology unclear, suspected related to underlying coagulopathy.     CT 8/3: 9.6x4.9 cm ICH with 1.2cm midline shift  S/p craniotomy and drainage  CTA : negative for AVM  Dx angio : negative for aneurysm or AVM  2D " "echo: EF 70%, grade I diastolic dysfunction  INR 1.39 on admission, remaining 1.4-1.5 despite FFP, vit K  ALT 59      Reviewed heme eval, they really do not think that INR of 1.4 on admission was high enough for severe spontaneous ICH. Overall heme workup for coagulopathy has been negative     Diagnoses:  Spontaneous intracerebral hemorrhagic, right frontal  Elevated INR  History of alcohol dependence    Comment: Given negative heme workup, negative angio, etiology for this very large spontaneous ICH in this 30 year old patient is entirely unclear. MRI brain is pending to look for any other structural cause. He has been extubated and is neurologically improving    Plan:  1. Keppra 500 bid for sz prophylaxis  2. MRI brain wo/w pending    Discussed with Dr. Bateman    Addendum: Neurosurgery informed me that pathology here reviewed, has been read out as \"disorganized vascular channels suggesting possible malformation.\" This will be sent out for neuropath eval. So at this point etiology of his ICH is more consistent with AVM.     "

## 2020-08-07 NOTE — PROGRESS NOTES
Adult Nutrition  Assessment/PES    Patient Name:  Avinash Everett  YOB: 1990  MRN: [id number]  Admit Date:  8/3/2020    Assessment Date:  8/7/2020    Comments:  Nutrition follow up. Extubated yesterday.   Awaiting Swallow eval.  Has been NPO x 4 days.  RN hopeful he will pass, has facial droop.  PT working with him now.  Labs reviewed.  Await po, intake. Will cont to follow.    Reason for Assessment     Row Name 08/07/20 1122          Reason for Assessment    Reason For Assessment  follow-up protocol     Diagnosis  -- extubated yesterday         Nutrition/Diet History     Row Name 08/07/20 1122          Nutrition/Diet History    Factors Affecting Nutritional Intake  other (see comments) facial droop         Anthropometrics     Row Name 08/07/20 0559          Anthropometrics    Weight  91.8 kg (202 lb 6.1 oz)         Labs/Tests/Procedures/Meds     Row Name 08/07/20 1123          Labs/Procedures/Meds    Lab Results Reviewed  reviewed, pertinent     Lab Results Comments  K+, Phos, tbili, H/H        Diagnostic Tests/Procedures    Diagnostic Test/Procedure Reviewed  reviewed, pertinent     Diagnostic Test/Procedures Comments  SLP eval pending        Medications    Pertinent Medications Reviewed  reviewed, pertinent     Pertinent Medications Comments  folic acid, keppra, protonix, zosyn, tihamine         Physical Findings     Row Name 08/07/20 1124          Physical Findings    Overall Physical Appearance  overweight     Skin  other (see comments);surgical incision b=12           Nutrition Prescription Ordered     Row Name 08/07/20 1126          Nutrition Prescription PO    Current PO Diet  NPO         Evaluation of Received Nutrient/Fluid Intake     Row Name 08/07/20 1126          Fluid Intake Evaluation    IV Fluid (mL)  2400         Problem/Interventions:    Intervention Goal     Row Name 08/07/20 1126          Intervention Goal    General  Maintain nutrition;Reduce/improve symptoms;Disease  management/therapy;Meet nutritional needs for age/condition     PO  Tolerate PO;Initiate feeding     Weight  No significant weight loss         Nutrition Intervention     Row Name 08/07/20 1126          Nutrition Intervention    RD/Tech Action  Follow Tx progress;Await begin PO;Care plan reviewd           Education/Evaluation     Row Name 08/07/20 1126          Education    Education  Will Instruct as appropriate        Monitor/Evaluation    Monitor  Per protocol;Skin status;Weight;I&O;Symptoms;PO intake;Pertinent labs           Electronically signed by:  Milka Howell RD  08/07/20 11:26

## 2020-08-07 NOTE — PLAN OF CARE
Problem: Patient Care Overview  Goal: Plan of Care Review  Outcome: Ongoing (interventions implemented as appropriate)  Flowsheets (Taken 8/7/2020 1410)  Plan of Care Reviewed With: patient  Outcome Summary: Clinical swallow eval completed. Pt very anxious to eat and drink. Pt had multiple swallows with ice chips. Wet vocal quality noted with teaspoon sip of thin. Pt cleared with cue. With cup sip of thin, pt had choking noted. Suspect premature spillage. Swallow delayed and weak with reduced laryngeal elevation. Pt tolerated spoon and cup sips of HTL and pureed. No overt s/s. Pt needed cues for small sips and to not talk immediately after swallowing. Recommend start on pureed diet with HTL; meds crushed in pureed; upright for meals and 30 min after; slow rate; small bites/sips; no straws; 1:1 supervision due to impulsivity. ST will follow for reeval and diet tolerance/advancement as indicated. Recommend therapy at next level of care for dysphagia and cognition, consider [facility] acute rehab. ST to follow.

## 2020-08-07 NOTE — PLAN OF CARE
Patient typically alert and oriented x4, but requires reorientation to location at times. Still flaccid on left but says he can feel deep pressure on the left now. Has done well off the ventilator, still on room air. No fevers for shift. Vitals stable will continue to monitor.

## 2020-08-07 NOTE — THERAPY EVALUATION
Acute Care - Occupational Therapy Initial Evaluation  [facility]     Patient Name: Avinash Everett  : 1990  MRN: [id number]  Today's Date: 2020             Admit Date: 8/3/2020       ICD-10-CM ICD-9-CM   1. Right-sided nontraumatic intracerebral hemorrhage, unspecified cerebral location (CMS/HCC) I61.9 431   2. Subdural hematoma (CMS/HCC) S06.5X9A 432.1   3. Nontraumatic subcortical hemorrhage of right cerebral hemisphere (CMS/HCC) I61.0 431     Patient Active Problem List   Diagnosis   • Right-sided nontraumatic intracerebral hemorrhage (CMS/HCC)   • Nontraumatic subcortical hemorrhage of right cerebral hemisphere (CMS/HCC)     History reviewed. No pertinent past medical history.  History reviewed. No pertinent surgical history.       OT ASSESSMENT FLOWSHEET (last 12 hours)      Occupational Therapy Evaluation     Row Name 20 1324                   OT Evaluation Time/Intention    Subjective Information  complains of;fatigue  -RB        Document Type  evaluation  -RB        Mode of Treatment  individual therapy;occupational therapy  -RB        Patient Effort  good  -RB        Symptoms Noted During/After Treatment  fatigue  -RB        Comment  Pt pleasantly confused today.   -RB           General Information    Patient Profile Reviewed?  yes  -RB        Patient Observations  alert;cooperative;agree to therapy  -RB        Prior Level of Function  independent:;ADL's;transfer  -RB        Existing Precautions/Restrictions  fall  -RB        Limitations/Impairments  safety/cognitive  -RB        Benefits Reviewed  patient:;spouse/S.O.:  -RB        Barriers to Rehab  medically complex;cognitive status  -RB           Relationship/Environment    Primary Source of Support/Comfort  spouse  -RB        Lives With  spouse  -RB        Family Caregiver if Needed  none  -RB           Resource/Environmental Concerns    Current Living Arrangements  home/apartment/condo  -RB        Resource/Environmental Concerns  none   -RB        Transportation Concerns  car, none  -RB           Cognitive Assessment/Intervention- PT/OT    Orientation Status (Cognition)  oriented to;person;place  -RB        Follows Commands (Cognition)  50-74% accuracy;delayed response/completion;increased processing time needed;initiation impaired;physical/tactile prompts required;repetition of directions required;verbal cues/prompting required  -RB        Safety Deficit (Cognitive)  moderate deficit;awareness of need for assistance;impulsivity;insight into deficits/self awareness;judgment;safety precautions awareness;problem solving;safety precautions follow-through/compliance  -RB           Safety Issues, Functional Mobility    Impairments Affecting Function (Mobility)  balance;cognition;coordination;endurance/activity tolerance;grasp;motor control;motor planning;muscle tone abnormal;pain;postural/trunk control;range of motion (ROM);sensation/sensory awareness;strength;visual/perceptual  -RB        Comment, Safety Issues/Impairments (Mobility)  Impulsive, impaired insight into his deficits.   -RB           Bed Mobility Assessment/Treatment    Bed Mobility Assessment/Treatment  bed mobility (all) activities  -RB        [place] Level (Bed Mobility)  moderate assist (50% patient effort);2 person assist  -RB        Assistive Device (Bed Mobility)  bed rails;draw sheet;head of bed elevated  -RB        Comment (Bed Mobility)  Cues to decrease impulsive movements. Attempting to transfer very quickly. Hands on assist for positioning of LUE due to inattention.   -RB           Functional Mobility    Functional Mobility- Ind. Level  unable to perform;not tested  -RB           Transfer Assessment/Treatment    Comment (Transfers)  No transfers completed due to working on static/dynamic sitting balance.  -RB           Sit-Stand Transfer    Sit-Stand [place] (Transfers)  unable to assess;not tested  -RB           ADL Assessment/Intervention    BADL  Assessment/Intervention  lower body dressing  -RB           Lower Body Dressing Assessment/Training    Lower Body Dressing [place] Level  socks;maximum assist (25% patient effort)  -RB        Lower Body Dressing Position  edge of bed sitting  -RB        Comment (Lower Body Dressing)  Pt quickly placed RLE over LLE. Pt used RUE to pull up sock on RLE with Mod A provided for dynamic sitting balance.   -RB           BADL Safety/Performance    Skilled BADL Treatment/Intervention  BADL process/adaptation training;adaptive equipment training;cognitive/safety deficit modifications;compensatory training;energy conservation;environmental modifications;hand-over-hand training/cues;anny/one-hand technique;sensory/visual deficit compensatory training  -RB        Progress in BADL Status  level of supervision required  -RB           General ROM    GENERAL ROM COMMENTS  RUE WFL, Trace L elbow extension - remaining portion of arm flacid. Baseline L wrist/hand impairment and minimal functional participation completed with that hand prior to admission.   -RB           MMT (Manual Muscle Testing)    General MMT Comments  5/5 RUE, 2/5 L elbow extension. Flaccid wrist/hand.   -RB           Motor Assessment/Interventions    Additional Documentation  Therapeutic Exercise (Group);Therapeutic Exercise Interventions (Group)  -RB           Therapeutic Exercise    Upper Extremity Range of Motion (Therapeutic Exercise)  shoulder flexion/extension, left;shoulder abduction/adduction, left;shoulder horizontal abduction/adduction, left;shoulder internal/external rotation, left;elbow flexion/extension, left;forearm supination/pronation, left;wrist flexion/extension, left  -RB        Hand (Therapeutic Exercise)  finger flexion/extension, left;thumb finger opposition, left;hand , left  -RB        Exercise Type (Therapeutic Exercise)  AROM (active range of motion);AAROM (active assistive range of motion);PROM (passive range of motion) SROM   -RB        Position (Therapeutic Exercise)  seated;supine  -RB        Sets/Reps (Therapeutic Exercise)  1 X 10  -RB        Expected Outcome (Therapeutic Exercise)  improve functional stability;facilitate normal movement patterns;improve motor control;improve neuromuscular control;improve performance, BADLs  -RB        Comment (Therapeutic Exercise)  Pt's LUE in dependent position on OT arrival. OT provided PROM/AAROM in LUE. OT taught wife exercises as well as SROM to pt for carryover outside of session.   -RB           Static Sitting Balance    Level of [place] (Unsupported Sitting, Static Balance)  minimal assist, 75% patient effort;1 person assist;moderate assist, 50 to 74% patient effort  -RB        Sitting Position (Unsupported Sitting, Static Balance)  sitting on edge of bed  -RB        Time Able to Maintain Position (Unsupported Sitting, Static Balance)  more than 5 minutes  -RB        Comment (Unsupported Sitting, Static Balance)  Cues to lean to R due to moderate L lateral lean.   -RB           Dynamic Sitting Balance    Level of [place], Reaches Outside Midline (Sitting, Dynamic Balance)  moderate assist, 50 to 74% patient effort LB dressing tasks.   -RB        Sitting Position, Reaches Outside Midline (Sitting, Dynamic Balance)  sitting on edge of bed  -RB           Static Standing Balance    Level of [place] (Supported Standing, Static Balance)  unable to perform activity  -RB           Dynamic Standing Balance    Level of [place], Reaches Outside Midline (Standing, Dynamic Balance)  unable to perform activity  -RB           Sensory Assessment/Intervention    Sensory General Assessment  -- Impaired LUE. Inattention.  -RB           Positioning and Restraints    Pre-Treatment Position  in bed  -RB        Post Treatment Position  bed  -RB        In Bed  notified nsg;supine;call light within reach;encouraged to call for assist;exit alarm on;with family/caregiver  -RB           Pain Scale:  Numbers Pre/Post-Treatment    Pain Scale: Numbers, Pretreatment  5/10  -RB        Pain Scale: Numbers, Post-Treatment  5/10  -RB        Pain Location  neck  -RB        Pain Intervention(s)  Repositioned  -RB           Wound 08/04/20 Right head Incision    Wound - Properties Group Date first assessed: 08/04/20  -SB Present on Hospital Admission: N  -SB Side: Right  -SB Location: head  -SB Primary Wound Type: Incision  -SB       Wound 08/05/20 1858 Right groin Puncture    Wound - Properties Group Date first assessed: 08/05/20  -KM Time first assessed: 1858 -KM Side: Right  -KM Location: groin  -KM Primary Wound Type: Puncture  -KM       Coping    Observed Emotional State  accepting;calm;cooperative  -RB        Verbalized Emotional State  acceptance  -RB           Plan of Care Review    Plan of Care Reviewed With  patient;spouse  -RB           Clinical Impression (OT)    Criteria for Skilled Therapeutic Interventions Met (OT Eval)  yes;treatment indicated  -RB        Rehab Potential (OT Eval)  good, to achieve stated therapy goals  -RB        Therapy Frequency (OT Eval)  5 times/wk  -RB        Care Plan Review (OT)  patient/other agree to care plan  -RB        Care Plan Review, Other Participant (OT Eval)  spouse  -RB        Anticipated Discharge Disposition (OT)  inpatient rehabilitation facility  -RB           Vital Signs    O2 Delivery Pre Treatment  room air  -RB        O2 Delivery Intra Treatment  room air  -RB        O2 Delivery Post Treatment  room air  -RB           Planned OT Interventions    Planned Therapy Interventions (OT Eval)  activity tolerance training;adaptive equipment training;BADL retraining;transfer/mobility retraining;strengthening exercise;ROM/therapeutic exercise;patient/caregiver education/training;passive ROM/stretching;occupation/activity based interventions;neuromuscular control/coordination retraining;IADL retraining;functional balance retraining;cognitive/visual perception retraining   -RB           OT Goals    Bed Mobility Goal Selection (OT)  bed mobility, OT goal 1  -RB        Transfer Goal Selection (OT)  transfer, OT goal 1  -RB        Bathing Goal Selection (OT)  bathing, OT goal 1  -RB        Dressing Goal Selection (OT)  dressing, OT goal 1  -RB        Toileting Goal Selection (OT)  toileting, OT goal 1  -RB        Grooming Goal Selection (OT)  grooming, OT goal 1  -RB        Balance Goal Selection (OT)  balance, OT goal 1  -RB        Isolated Movement Goal Selection (OT)  isolated movement, OT goal 1  -RB        Additional Documentation  Grooming Goal Selection (OT) (Row);Balance Goal Selection (OT) (Row);Isolated Movement Goal Selection (OT) (Row)  -RB           Bed Mobility Goal 1 (OT)    Activity/Assistive Device (Bed Mobility Goal 1, OT)  bed mobility activities, all  -RB        [place] Level/Cues Needed (Bed Mobility Goal 1, OT)  supervision required  -RB        Time Frame (Bed Mobility Goal 1, OT)  short term goal (STG);2 weeks  -RB        Progress/Outcomes (Bed Mobility Goal 1, OT)  continuing progress toward goal  -RB           Transfer Goal 1 (OT)    Activity/Assistive Device (Transfer Goal 1, OT)  transfers, all  -RB        [place] Level/Cues Needed (Transfer Goal 1, OT)  moderate assist (50-74% patient effort)  -RB        Time Frame (Transfer Goal 1, OT)  short term goal (STG);2 weeks  -RB        Progress/Outcome (Transfer Goal 1, OT)  continuing progress toward goal  -RB           Bathing Goal 1 (OT)    Activity/Assistive Device (Bathing Goal 1, OT)  bathing skills, all  -RB        [place] Level/Cues Needed (Bathing Goal 1, OT)  moderate assist (50-74% patient effort)  -RB        Time Frame (Bathing Goal 1, OT)  short term goal (STG);2 weeks  -RB        Progress/Outcomes (Bathing Goal 1, OT)  continuing progress toward goal  -RB           Dressing Goal 1 (OT)    Activity/Assistive Device (Dressing Goal 1, OT)  dressing skills, all  -RB        [place]/Cues  Needed (Dressing Goal 1, OT)  moderate assist (50-74% patient effort)  -RB        Time Frame (Dressing Goal 1, OT)  short term goal (STG);2 weeks  -RB        Progress/Outcome (Dressing Goal 1, OT)  continuing progress toward goal  -RB           Toileting Goal 1 (OT)    Activity/Device (Toileting Goal 1, OT)  toileting skills, all  -RB        [place] Level/Cues Needed (Toileting Goal 1, OT)  moderate assist (50-74% patient effort)  -RB        Time Frame (Toileting Goal 1, OT)  short term goal (STG);2 weeks  -RB        Progress/Outcome (Toileting Goal 1, OT)  continuing progress toward goal  -RB           Grooming Goal 1 (OT)    Activity/Device (Grooming Goal 1, OT)  grooming skills, all  -RB        [place] (Grooming Goal 1, OT)  minimum assist (75% or more patient effort)  -RB        Time Frame (Grooming Goal 1, OT)  short term goal (STG);2 weeks  -RB        Progress/Outcome (Grooming Goal 1, OT)  continuing progress toward goal  -RB           Balance Goal 1 (OT)    Activity/Assistive Device (Balance Goal 1, OT)  sitting, static;sitting, dynamic  -RB        [place] Level/Cues Needed (Balance Goal 1, OT)  supervision required  -RB        Time Frame (Balance Goal 1, OT)  short term goal (STG);2 weeks  -RB        Progress/Outcomes (Balance Goal 1, OT)  continuing progress toward goal  -RB           Isolated Movement Goal 1 (OT)    Body Area (Isolated Movement Goal 1, OT)  left scapula;left shoulder;left elbow;left forearm;left wrist;left fingers  -RB        Level (Isolated Movement Goal 1, OT)  facilitate movement  -RB        Time Frame (Isolated Movement Goal 1, OT)  short term goal (STG);2 weeks  -RB        Progress/Outcomes (Isolated Movement Goal 1, OT)  continuing progress toward goal  -RB          User Key  (r) = Recorded By, (t) = Taken By, (c) = Cosigned By    Initials Name Effective Dates    Gracie Larry, AVA 11/27/17 -     Elke Barillas RN 06/16/16 -     Giovana Rosas  OT 04/02/20 -          Occupational Therapy Education                 Title: PT OT SLP Therapies (In Progress)     Topic: Occupational Therapy (Not Started)     Point: ADL training (Not Started)     Description:   Instruct learner(s) on proper safety adaptation and remediation techniques during self care or transfers.   Instruct in proper use of assistive devices.              Learner Progress:   Not documented in this visit.          Point: Home exercise program (Not Started)     Description:   Instruct learner(s) on appropriate technique for monitoring, assisting and/or progressing therapeutic exercises/activities.              Learner Progress:   Not documented in this visit.          Point: Precautions (Not Started)     Description:   Instruct learner(s) on prescribed precautions during self-care and functional transfers.              Learner Progress:   Not documented in this visit.          Point: Body mechanics (Not Started)     Description:   Instruct learner(s) on proper positioning and spine alignment during self-care, functional mobility activities and/or exercises.              Learner Progress:   Not documented in this visit.                              OT Recommendation and Plan  Outcome Summary/Treatment Plan (OT)  Anticipated Discharge Disposition (OT): inpatient rehabilitation facility  Planned Therapy Interventions (OT Eval): activity tolerance training, adaptive equipment training, BADL retraining, transfer/mobility retraining, strengthening exercise, ROM/therapeutic exercise, patient/caregiver education/training, passive ROM/stretching, occupation/activity based interventions, neuromuscular control/coordination retraining, IADL retraining, functional balance retraining, cognitive/visual perception retraining  Therapy Frequency (OT Eval): 5 times/wk  Plan of Care Review  Plan of Care Reviewed With: patient, spouse  Plan of Care Reviewed With: patient, spouse    Outcome Measures     Row Name  08/07/20 1300             How much help from another is currently needed...    Putting on and taking off regular lower body clothing?  2  -RB      Bathing (including washing, rinsing, and drying)  2  -RB      Toileting (which includes using toilet bed pan or urinal)  1  -RB      Putting on and taking off regular upper body clothing  2  -RB      Taking care of personal grooming (such as brushing teeth)  2  -RB      Eating meals  2  -RB      AM-PAC 6 Clicks Score (OT)  11  -RB         Modified Whitley Scale    Modified [place]osh Scale  4 - Moderately severe disability.  Unable to walk without assistance, and unable to attend to own bodily needs without assistance.  -RB         Functional Assessment    Outcome Measure Options  AM-PAC 6 Clicks Daily Activity (OT);Modified [place]  -RB        User Key  (r) = Recorded By, (t) = Taken By, (c) = Cosigned By    Initials Name Provider Type    Giovana Rosas OT Occupational Therapist          Time Calculation:   Time Calculation- OT     Row Name 08/07/20 1336             Time Calculation- OT    OT Start Time  1247  -RB      OT Stop Time  1320  -RB      OT Time Calculation (min)  33 min  -RB      Total Timed Code Minutes- OT  23 minute(s)  -RB      OT Received On  08/07/20  -RB      OT - Next Appointment  08/08/20  -RB      OT Goal Re-Cert Due Date  08/21/20  -RB        User Key  (r) = Recorded By, (t) = Taken By, (c) = Cosigned By    Initials Name Provider Type    Giovana Rosas OT Occupational Therapist        Therapy Charges for Today     Code Description Service Date Service Provider Modifiers Qty    [id number]  OT EVAL HIGH COMPLEXITY 2 8/7/2020 Giovana Jenkins OT GO 1    [id number]  OT NEUROMUSC RE EDUCATION EA 15 MIN 8/7/2020 Giovana Jenkins OT GO 1    [id number]  OT THERAPEUTIC ACT EA 15 MIN 8/7/2020 Giovana Jenkins OT GO 1               Giovana Jenkins OT  8/7/2020

## 2020-08-07 NOTE — PLAN OF CARE
Patient worked with PT, OT and ST. Cleared for a puree with HTL diet. MRI completed. Vital signs clear. Left side mostly flaccid with slight movement in LLE. Overflow to neuro-telemetry. Potassium and phosphorous low and replacing.     Problem: Patient Care Overview  Goal: Plan of Care Review  Outcome: Ongoing (interventions implemented as appropriate)     Problem: Patient Care Overview  Goal: Individualization and Mutuality  Outcome: Ongoing (interventions implemented as appropriate)     Problem: Patient Care Overview  Goal: Interprofessional Rounds/Family Conf  Outcome: Ongoing (interventions implemented as appropriate)     Problem: Skin Injury Risk (Adult)  Goal: Identify Related Risk Factors and Signs and Symptoms  Outcome: Ongoing (interventions implemented as appropriate)     Problem: Skin Injury Risk (Adult)  Goal: Skin Health and Integrity  Outcome: Ongoing (interventions implemented as appropriate)     Problem: Fall Risk (Adult)  Goal: Identify Related Risk Factors and Signs and Symptoms  Outcome: Ongoing (interventions implemented as appropriate)     Problem: Fall Risk (Adult)  Goal: Absence of Fall  Outcome: Ongoing (interventions implemented as appropriate)     Problem: Stroke (Hemorrhagic) (Adult)  Goal: Signs and Symptoms of Listed Potential Problems Will be Absent, Minimized or Managed (Stroke)  Outcome: Ongoing (interventions implemented as appropriate)

## 2020-08-07 NOTE — PLAN OF CARE
Problem: Patient Care Overview  Goal: Plan of Care Review  Flowsheets (Taken 8/7/2020 1157)  Outcome Summary: Pt admitted with R cerebral hemorrhage. Pt is flaccid on the L side and presents with L neglect. Pt was able to shift his gaze to midline but not past it. He tends to hold his head tilted and rotated to the R.  He was able to sit EOB with max Ax2. Sitting balance initally required max A with pt pushing to the L. After WB through RUE, pt's orientation to midline was improved and with verbal and tactile cueing he was able to weight shift to the R to sit in a neutral position with min A. Pt fatigued quickly and c/o nausea at times througohut the session. Pt would benefit from Pt to address his impairments and work towards the goals outlined.  Patient was not wearing a face mask during this therapy encounter. Therapist used appropriate personal protective equipment including eye protection, mask, and gloves.  Mask used was standard procedure mask. Appropriate PPE was worn during the entire therapy session. Hand hygiene was completed before and after therapy session. Patient is not in enhanced droplet precautions.

## 2020-08-07 NOTE — THERAPY EVALUATION
Patient Name: Avinash Everett  : 1990    MRN: [id number]                              Today's Date: 2020       Admit Date: 8/3/2020    Visit Dx:     ICD-10-CM ICD-9-CM   1. Right-sided nontraumatic intracerebral hemorrhage, unspecified cerebral location (CMS/HCC) I61.9 431   2. Subdural hematoma (CMS/HCC) S06.5X9A 432.1   3. Nontraumatic subcortical hemorrhage of right cerebral hemisphere (CMS/HCC) I61.0 431     Patient Active Problem List   Diagnosis   • Right-sided nontraumatic intracerebral hemorrhage (CMS/HCC)   • Nontraumatic subcortical hemorrhage of right cerebral hemisphere (CMS/HCC)     History reviewed. No pertinent past medical history.  History reviewed. No pertinent surgical history.  General Information     Row Name 20 1150          PT Evaluation Time/Intention    Document Type  evaluation  -     Mode of Treatment  individual therapy;physical therapy  -     Row Name 20 1150          General Information    Prior Level of Function  independent:  -     Existing Precautions/Restrictions  fall  -     Barriers to Rehab  medically complex  -     Row Name 20 1150          Relationship/Environment    Lives With  spouse  -     Row Name 20 1150          Cognitive Assessment/Intervention- PT/OT    Orientation Status (Cognition)  oriented to;person;place  -     Row Name 20 1150          Safety Issues, Functional Mobility    Impairments Affecting Function (Mobility)  balance;cognition;endurance/activity tolerance;strength;postural/trunk control  -       User Key  (r) = Recorded By, (t) = Taken By, (c) = Cosigned By    Initials Name Provider Type     Raysa Devine, PT Physical Therapist        Mobility     Row Name 20 1151          Bed Mobility Assessment/Treatment    Bed Mobility Assessment/Treatment  supine-sit;sit-supine  -     Supine-Sit [place] (Bed Mobility)  maximum assist (25% patient effort);2 person assist  -     Sit-Supine  [place] (Bed Mobility)  maximum assist (25% patient effort);2 person assist  -     Assistive Device (Bed Mobility)  bed rails;draw sheet;head of bed elevated  -KH     Row Name 08/07/20 1151          Transfer Assessment/Treatment    Comment (Transfers)  Transfers unsafe to attempt secondary to poor sitting balance and fatigue  -KH     Row Name 08/07/20 1151          Sit-Stand Transfer    Sit-Stand [place] (Transfers)  not tested  -KH     Row Name 08/07/20 1151          Gait/Stairs Assessment/Training    [place] Level (Gait)  not tested  -       User Key  (r) = Recorded By, (t) = Taken By, (c) = Cosigned By    Initials Name Provider Type    Raysa Ga, PT Physical Therapist        Obj/Interventions     Row Name 08/07/20 1152          General ROM    GENERAL ROM COMMENTS  RLE WFL AROM, LLE PROM WFL  -KH     Row Name 08/07/20 1152          MMT (Manual Muscle Testing)    General MMT Comments  5/5 on RLE, 0/5 on LLE  -[facility] 08/07/20 1152          Static Sitting Balance    Level of [place] (Unsupported Sitting, Static Balance)  minimal assist, 75% patient effort;maximal assist, 25 to 49% patient effort  -     Sitting Position (Unsupported Sitting, Static Balance)  sitting on edge of bed  -     Time Able to Maintain Position (Unsupported Sitting, Static Balance)  more than 5 minutes  -     Comment (Unsupported Sitting, Static Balance)  max A with poor trunbk and head control initially, pushing to the L. Improved with verbal and tactile cueing and after WB on R forearm  -KH     Row Name 08/07/20 1152          Dynamic Sitting Balance    Level of [place], Reaches Outside Midline (Sitting, Dynamic Balance)  moderate assist, 50 to 74% patient effort  -     Sitting Position, Reaches Outside Midline (Sitting, Dynamic Balance)  sitting on edge of bed  -KH     Row Name 08/07/20 1152          Static Standing Balance    Level of [place] (Supported Standing, Static  Balance)  unable to perform activity  -[facility] Name 08/07/20 1152          Dynamic Standing Balance    Level of [place], Reaches Outside Midline (Standing, Dynamic Balance)  unable to perform activity  -       User Key  (r) = Recorded By, (t) = Taken By, (c) = Cosigned By    Initials Name Provider Type    Raysa Ga, PT Physical Therapist        Goals/Plan     Row Name 08/07/20 1202          Bed Mobility Goal 1 (PT)    Activity/Assistive Device (Bed Mobility Goal 1, PT)  bed mobility activities, all  -     [place] Level/Cues Needed (Bed Mobility Goal 1, PT)  moderate assist (50-74% patient effort)  -     Time Frame (Bed Mobility Goal 1, PT)  1 week  -[facility] Name 08/07/20 1202          Transfer Goal 1 (PT)    Activity/Assistive Device (Transfer Goal 1, PT)  transfers, all;sit-to-stand/stand-to-sit  -     [place] Level/Cues Needed (Transfer Goal 1, PT)  maximum assist (25-49% patient effort)  -     Time Frame (Transfer Goal 1, PT)  1 week  -KH     Row Name 08/07/20 1202          Patient Education Goal (PT)    Activity (Patient Education Goal, PT)  HEP  -     [place]/Cues/Accuracy (Memory Goal 2, PT)  demonstrates adequately  -     Time Frame (Patient Education Goal, PT)  1 week  -       User Key  (r) = Recorded By, (t) = Taken By, (c) = Cosigned By    Initials Name Provider Type    Raysa Ga, PT Physical Therapist        Clinical Impression     Row Name 08/07/20 1154          Pain Assessment    Additional Documentation  Pain Scale: Numbers Pre/Post-Treatment (Group)  -KH     Row Name 08/07/20 1154          Pain Scale: Numbers Pre/Post-Treatment    Pain Scale: Numbers, Pretreatment  8/10  -     Pain Scale: Numbers, Post-Treatment  9/10  -     Pain Location  head and neck  -     Pain Intervention(s)  Repositioned  -KH     Row Name 08/07/20 1154          Plan of Care Review    Plan of Care Reviewed With  patient;spouse  -     Outcome  Summary  Pt admitted with R cerebral hemorrhage. Pt is flaccid on the L side and presents with L neglect. Pt was able to shift his gaze to midline but not past it. He tends to hold his head tilted and rotated to the R.  He was able to sit EOB with max Ax2. Sitting balance initally required max A with pt pushing to the L. After WB through RUE, pt's orientation to midline was improved and with verbal and tactile cueing he was able to weight shift to the R to sit in a neutral position with min A. Pt fatigued quickly and c/o nausea at times througohut the session. Pt would benefit from Pt to address his impairments and work towards the goals outlined.    -COLLIN     Row Name 08/07/20 1154          Physical Therapy Clinical Impression    Patient/Family Goals Statement (PT Clinical Impression)  return to PLOF  -     Criteria for Skilled Interventions Met (PT Clinical Impression)  treatment indicated  -COLLIN     Rehab Potential (PT Clinical Summary)  good, to achieve stated therapy goals  -     Row Name 08/07/20 1154          Positioning and Restraints    Pre-Treatment Position  in bed  -     Post Treatment Position  bed  -KH     In Bed  fowlers;call light within reach;encouraged to call for assist;with family/caregiver;notified nsg pt placed on bedpan, RN notified  -       User Key  (r) = Recorded By, (t) = Taken By, (c) = Cosigned By    Initials Name Provider Type    Raysa Ga, PT Physical Therapist        Outcome Measures     Row Name 08/07/20 1203          How much help from another person do you currently need...    Turning from your back to your side while in flat bed without using bedrails?  2  -KH     Moving from lying on back to sitting on the side of a flat bed without bedrails?  2  -KH     Moving to and from a bed to a chair (including a wheelchair)?  1  -KH     Standing up from a chair using your arms (e.g., wheelchair, bedside chair)?  1  -KH     Climbing 3-5 steps with a railing?  1  -KH      To walk in hospital room?  1  -     AM-PAC 6 Clicks Score (PT)  8  -     Row Name 08/07/20 1203          Functional Assessment    Outcome Measure Options  AM-PAC 6 Clicks Basic Mobility (PT)  -COLLIN       User Key  (r) = Recorded By, (t) = Taken By, (c) = Cosigned By    Initials Name Provider Type    Raysa Ga, PT Physical Therapist        Physical Therapy Education                 Title: PT OT SLP Therapies (In Progress)     Topic: Physical Therapy (In Progress)     Point: Mobility training (In Progress)     Description:   Instruct learner(s) on safety and technique for assisting patient out of bed, chair or wheelchair.  Instruct in the proper use of assistive devices, such as walker, crutches, cane or brace.              Patient Friendly Description:   It's important to get you on your feet again, but we need to do so in a way that is safe for you. Falling has serious consequences, and your personal safety is the most important thing of all.        When it's time to get out of bed, one of us or a family member will sit next to you on the bed to give you support.     If your doctor or nurse tells you to use a walker, crutches, a cane, or a brace, be sure you use it every time you get out of bed, even if you think you don't need it.    Learning Progress Summary           Patient Acceptance, E,D, NR by COLLIN at 8/7/2020 1204                   Point: Home exercise program (In Progress)     Description:   Instruct learner(s) on appropriate technique for monitoring, assisting and/or progressing patient with therapeutic exercises and activities.              Learning Progress Summary           Patient Acceptance, E,D, NR by COLLIN at 8/7/2020 1204                   Point: Body mechanics (In Progress)     Description:   Instruct learner(s) on proper positioning and spine alignment for patient and/or caregiver during mobility tasks and/or exercises.              Learning Progress Summary           Patient  Acceptance, E,D, NR by  at 8/7/2020 1204                   Point: Precautions (In Progress)     Description:   Instruct learner(s) on prescribed precautions during mobility and gait tasks              Learning Progress Summary           Patient Acceptance, E,D, NR by  at 8/7/2020 1204                               User Key     Initials Effective Dates Name Provider Type Discipline     02/05/19 -  Raysa Devine, PT Physical Therapist PT              PT Recommendation and Plan  Planned Therapy Interventions (PT Eval): balance training, bed mobility training, gait training, home exercise program, patient/family education, strengthening, stretching, ROM (range of motion), transfer training  Outcome Summary/Treatment Plan (PT)  Anticipated Discharge Disposition (PT): inpatient rehabilitation facility  Plan of Care Reviewed With: patient, spouse  Outcome Summary: Pt admitted with R cerebral hemorrhage. Pt is flaccid on the L side and presents with L neglect. Pt was able to shift his gaze to midline but not past it. He tends to hold his head tilted and rotated to the R.  He was able to sit EOB with max Ax2. Sitting balance initally required max A with pt pushing to the L. After WB through RUE, pt's orientation to midline was improved and with verbal and tactile cueing he was able to weight shift to the R to sit in a neutral position with min A. Pt fatigued quickly and c/o nausea at times througohut the session. Pt would benefit from Pt to address his impairments and work towards the goals outlined.       Time Calculation:   PT Charges     Row Name 08/07/20 1204             Time Calculation    Start Time  1105  -      Stop Time  1130  -      Time Calculation (min)  25 min  -      PT Received On  08/07/20  -      PT - Next Appointment  08/08/20  -         Time Calculation- PT    Total Timed Code Minutes- PT  10 minute(s)  -        User Key  (r) = Recorded By, (t) = Taken By, (c) = Cosigned By     Initials Name Provider Type     Raysa Devine, PT Physical Therapist        Therapy Charges for Today     Code Description Service Date Service Provider Modifiers Qty    49184770696 HC PT EVAL MOD COMPLEXITY 2 8/7/2020 Raysa Devine, PT GP 1    23338278036 HC PT THER PROC EA 15 MIN 8/7/2020 Raysa Devine, PT GP 1    37857800473 HC PT THER SUPP EA 15 MIN 8/7/2020 Raysa Devine, PT GP 1          PT G-Codes  Outcome Measure Options: AM-PAC 6 Clicks Basic Mobility (PT)  AM-PAC 6 Clicks Score (PT): 8    Raysa Devine, PT  8/7/2020

## 2020-08-08 ENCOUNTER — APPOINTMENT (OUTPATIENT)
Dept: GENERAL RADIOLOGY | Facility: HOSPITAL | Age: 30
End: 2020-08-08

## 2020-08-08 LAB
ALBUMIN SERPL-MCNC: 3.2 G/DL (ref 3.5–5.2)
ALBUMIN/GLOB SERPL: 0.9 G/DL
ALP SERPL-CCNC: 77 U/L (ref 39–117)
ALT SERPL W P-5'-P-CCNC: 25 U/L (ref 1–41)
ANION GAP SERPL CALCULATED.3IONS-SCNC: 8.2 MMOL/L (ref 5–15)
APTT PPP: 36.7 SECONDS (ref 22.7–35.4)
AST SERPL-CCNC: 36 U/L (ref 1–40)
BACTERIA SPEC RESP CULT: NORMAL
BILIRUB SERPL-MCNC: 3.8 MG/DL (ref 0–1.2)
BUN SERPL-MCNC: 7 MG/DL (ref 6–20)
BUN/CREAT SERPL: 15.6 (ref 7–25)
CALCIUM SPEC-SCNC: 8.2 MG/DL (ref 8.6–10.5)
CHLORIDE SERPL-SCNC: 108 MMOL/L (ref 98–107)
CO2 SERPL-SCNC: 18.8 MMOL/L (ref 22–29)
CREAT SERPL-MCNC: 0.45 MG/DL (ref 0.76–1.27)
DEPRECATED RDW RBC AUTO: 42 FL (ref 37–54)
ERYTHROCYTE [DISTWIDTH] IN BLOOD BY AUTOMATED COUNT: 11.6 % (ref 12.3–15.4)
GFR SERPL CREATININE-BSD FRML MDRD: >150 ML/MIN/1.73
GLOBULIN UR ELPH-MCNC: 3.4 GM/DL
GLUCOSE SERPL-MCNC: 98 MG/DL (ref 65–99)
GRAM STN SPEC: NORMAL
HCT VFR BLD AUTO: 30.5 % (ref 37.5–51)
HGB BLD-MCNC: 10.9 G/DL (ref 13–17.7)
INR PPP: 1.42 (ref 0.9–1.1)
MCH RBC QN AUTO: 35.5 PG (ref 26.6–33)
MCHC RBC AUTO-ENTMCNC: 35.7 G/DL (ref 31.5–35.7)
MCV RBC AUTO: 99.3 FL (ref 79–97)
PHOSPHATE SERPL-MCNC: 1.5 MG/DL (ref 2.5–4.5)
PLATELET # BLD AUTO: 151 10*3/MM3 (ref 140–450)
PMV BLD AUTO: 8.9 FL (ref 6–12)
POTASSIUM SERPL-SCNC: 3.5 MMOL/L (ref 3.5–5.2)
PROT SERPL-MCNC: 6.6 G/DL (ref 6–8.5)
PROTHROMBIN TIME: 17.1 SECONDS (ref 11.7–14.2)
RBC # BLD AUTO: 3.07 10*6/MM3 (ref 4.14–5.8)
SODIUM SERPL-SCNC: 135 MMOL/L (ref 136–145)
WBC # BLD AUTO: 5.88 10*3/MM3 (ref 3.4–10.8)

## 2020-08-08 PROCEDURE — 85610 PROTHROMBIN TIME: CPT | Performed by: INTERNAL MEDICINE

## 2020-08-08 PROCEDURE — 99024 POSTOP FOLLOW-UP VISIT: CPT | Performed by: NURSE PRACTITIONER

## 2020-08-08 PROCEDURE — 84100 ASSAY OF PHOSPHORUS: CPT | Performed by: INTERNAL MEDICINE

## 2020-08-08 PROCEDURE — 97530 THERAPEUTIC ACTIVITIES: CPT

## 2020-08-08 PROCEDURE — 99232 SBSQ HOSP IP/OBS MODERATE 35: CPT | Performed by: INTERNAL MEDICINE

## 2020-08-08 PROCEDURE — 99232 SBSQ HOSP IP/OBS MODERATE 35: CPT | Performed by: PSYCHIATRY & NEUROLOGY

## 2020-08-08 PROCEDURE — 80053 COMPREHEN METABOLIC PANEL: CPT | Performed by: INTERNAL MEDICINE

## 2020-08-08 PROCEDURE — 36415 COLL VENOUS BLD VENIPUNCTURE: CPT | Performed by: INTERNAL MEDICINE

## 2020-08-08 PROCEDURE — 25010000002 THIAMINE PER 100 MG: Performed by: INTERNAL MEDICINE

## 2020-08-08 PROCEDURE — 71045 X-RAY EXAM CHEST 1 VIEW: CPT

## 2020-08-08 PROCEDURE — 85730 THROMBOPLASTIN TIME PARTIAL: CPT | Performed by: INTERNAL MEDICINE

## 2020-08-08 PROCEDURE — 25010000002 PIPERACILLIN SOD-TAZOBACTAM PER 1 G: Performed by: INTERNAL MEDICINE

## 2020-08-08 PROCEDURE — 92526 ORAL FUNCTION THERAPY: CPT

## 2020-08-08 PROCEDURE — 25010000002 LORAZEPAM PER 2 MG: Performed by: INTERNAL MEDICINE

## 2020-08-08 PROCEDURE — 85027 COMPLETE CBC AUTOMATED: CPT | Performed by: INTERNAL MEDICINE

## 2020-08-08 PROCEDURE — 25010000002 HYDROMORPHONE PER 4 MG: Performed by: INTERNAL MEDICINE

## 2020-08-08 RX ORDER — FOLIC ACID 1 MG/1
1 TABLET ORAL DAILY
Status: DISCONTINUED | OUTPATIENT
Start: 2020-08-09 | End: 2020-08-09

## 2020-08-08 RX ORDER — THIAMINE MONONITRATE (VIT B1) 100 MG
100 TABLET ORAL DAILY
Status: DISCONTINUED | OUTPATIENT
Start: 2020-08-09 | End: 2020-08-09

## 2020-08-08 RX ORDER — AMOXICILLIN AND CLAVULANATE POTASSIUM 500; 125 MG/1; MG/1
1 TABLET, FILM COATED ORAL EVERY 8 HOURS SCHEDULED
Status: DISCONTINUED | OUTPATIENT
Start: 2020-08-08 | End: 2020-08-09

## 2020-08-08 RX ORDER — LEVETIRACETAM 100 MG/ML
500 SOLUTION ORAL EVERY 12 HOURS SCHEDULED
Status: DISCONTINUED | OUTPATIENT
Start: 2020-08-08 | End: 2020-08-09

## 2020-08-08 RX ADMIN — TAZOBACTAM SODIUM AND PIPERACILLIN SODIUM 3.38 G: 375; 3 INJECTION, SOLUTION INTRAVENOUS at 13:43

## 2020-08-08 RX ADMIN — HYDROMORPHONE HYDROCHLORIDE 0.5 MG: 1 INJECTION, SOLUTION INTRAMUSCULAR; INTRAVENOUS; SUBCUTANEOUS at 00:19

## 2020-08-08 RX ADMIN — SODIUM CHLORIDE, PRESERVATIVE FREE 10 ML: 5 INJECTION INTRAVENOUS at 10:51

## 2020-08-08 RX ADMIN — FOLIC ACID 1 MG: 5 INJECTION, SOLUTION INTRAMUSCULAR; INTRAVENOUS; SUBCUTANEOUS at 10:50

## 2020-08-08 RX ADMIN — HYDROMORPHONE HYDROCHLORIDE 0.5 MG: 1 INJECTION, SOLUTION INTRAMUSCULAR; INTRAVENOUS; SUBCUTANEOUS at 03:54

## 2020-08-08 RX ADMIN — LORAZEPAM 1 MG: 2 INJECTION INTRAMUSCULAR; INTRAVENOUS at 03:55

## 2020-08-08 RX ADMIN — HYDROMORPHONE HYDROCHLORIDE 0.5 MG: 1 INJECTION, SOLUTION INTRAMUSCULAR; INTRAVENOUS; SUBCUTANEOUS at 10:51

## 2020-08-08 RX ADMIN — POTASSIUM CHLORIDE 40 MEQ: 1.5 POWDER, FOR SOLUTION ORAL at 00:01

## 2020-08-08 RX ADMIN — HYDROMORPHONE HYDROCHLORIDE 0.5 MG: 1 INJECTION, SOLUTION INTRAMUSCULAR; INTRAVENOUS; SUBCUTANEOUS at 13:43

## 2020-08-08 RX ADMIN — THIAMINE HYDROCHLORIDE 100 MG: 100 INJECTION, SOLUTION INTRAMUSCULAR; INTRAVENOUS at 10:50

## 2020-08-08 RX ADMIN — HYDROMORPHONE HYDROCHLORIDE 0.5 MG: 1 INJECTION, SOLUTION INTRAMUSCULAR; INTRAVENOUS; SUBCUTANEOUS at 17:31

## 2020-08-08 RX ADMIN — LEVETIRACETAM 500 MG: 100 SOLUTION ORAL at 10:50

## 2020-08-08 RX ADMIN — HYDROMORPHONE HYDROCHLORIDE 0.5 MG: 1 INJECTION, SOLUTION INTRAMUSCULAR; INTRAVENOUS; SUBCUTANEOUS at 21:52

## 2020-08-08 RX ADMIN — TAZOBACTAM SODIUM AND PIPERACILLIN SODIUM 3.38 G: 375; 3 INJECTION, SOLUTION INTRAVENOUS at 06:15

## 2020-08-08 RX ADMIN — AMOXICILLIN AND CLAVULANATE POTASSIUM 500 MG: 500; 125 TABLET, FILM COATED ORAL at 20:37

## 2020-08-08 RX ADMIN — POTASSIUM PHOSPHATE, MONOBASIC AND POTASSIUM PHOSPHATE, DIBASIC 30 MMOL: 224; 236 INJECTION, SOLUTION, CONCENTRATE INTRAVENOUS at 18:47

## 2020-08-08 RX ADMIN — LEVETIRACETAM 500 MG: 100 SOLUTION ORAL at 20:37

## 2020-08-08 NOTE — THERAPY TREATMENT NOTE
Patient Name: Avinash Everett  : 1990    MRN: [id number]                              Today's Date: 2020       Admit Date: 8/3/2020    Visit Dx:     ICD-10-CM ICD-9-CM   1. Right-sided nontraumatic intracerebral hemorrhage, unspecified cerebral location (CMS/HCC) I61.9 431   2. Subdural hematoma (CMS/HCC) S06.5X9A 432.1   3. Nontraumatic subcortical hemorrhage of right cerebral hemisphere (CMS/HCC) I61.0 431     Patient Active Problem List   Diagnosis   • Right-sided nontraumatic intracerebral hemorrhage (CMS/HCC)   • Nontraumatic subcortical hemorrhage of right cerebral hemisphere (CMS/HCC)     History reviewed. No pertinent past medical history.  History reviewed. No pertinent surgical history.  General Information     Row Name 20 1518          PT Evaluation Time/Intention    Document Type  therapy note (daily note)  -MW     Mode of Treatment  physical therapy  -     Row Name 20 1518          General Information    Patient Profile Reviewed?  yes  -MW     Existing Precautions/Restrictions  fall  -     Row Name 20 1518          Cognitive Assessment/Intervention- PT/OT    Orientation Status (Cognition)  oriented to;person;place  -     Row Name 20 1518          Safety Issues, Functional Mobility    Safety Issues Affecting Function (Mobility)  at risk behavior observed;insight into deficits/self awareness  -     Impairments Affecting Function (Mobility)  balance;cognition;coordination;endurance/activity tolerance;motor control;motor planning;muscle tone abnormal;pain;postural/trunk control;strength  -     Comment, Safety Issues/Impairments (Mobility)  Gazit belt used for safety.   -       User Key  (r) = Recorded By, (t) = Taken By, (c) = Cosigned By    Initials Name Provider Type    MW Geri Glez, PT Physical Therapist        Mobility     Row Name 20 1520          Bed Mobility Assessment/Treatment    Bed Mobility Assessment/Treatment  supine-sit-supine  -MW      Supine-Sit-Supine [place] (Bed Mobility)  2 person assist;maximum assist (25% patient effort);verbal cues  -MW     Assistive Device (Bed Mobility)  bed rails;draw sheet;head of bed elevated  -MW     Row Name 08/08/20 1520          Transfer Assessment/Treatment    Comment (Transfers)  Unable to perform tranfers at this time due to impaired sitting balance.  -MW     Row Name 08/08/20 1520          Sit-Stand Transfer    Sit-Stand [place] (Transfers)  unable to assess;not tested  -MW     Row Name 08/08/20 1520          Gait/Stairs Assessment/Training    [place] Level (Gait)  unable to assess;not tested  -       User Key  (r) = Recorded By, (t) = Taken By, (c) = Cosigned By    Initials Name Provider Type    Geri Marley PT Physical Therapist        Obj/Interventions     Row Name 08/08/20 1521          Static Sitting Balance    Level of [place] (Unsupported Sitting, Static Balance)  moderate assist, 50 to 74% patient effort  -MW     Sitting Position (Unsupported Sitting, Static Balance)  sitting edge of mat  -MW     Time Able to Maintain Position (Unsupported Sitting, Static Balance)  4 to 5 minutes  -MW     Comment (Unsupported Sitting, Static Balance)  Loses balance backwards and to the left and also over corrects by leaning forward excessively; able to sit without support with frequent cueing for 5-10 seconds  -MW       User Key  (r) = Recorded By, (t) = Taken By, (c) = Cosigned By    Initials Name Provider Type    Geri Marley PT Physical Therapist        Goals/Plan    No documentation.       Clinical Impression     Row Name 08/08/20 1523          Pain Assessment    Additional Documentation  Pain Scale: Numbers Pre/Post-Treatment (Group)  -MW     Row Name 08/08/20 1523          Pain Scale: Numbers Pre/Post-Treatment    Pain Scale: Numbers, Pretreatment  6/10  -MW     Pain Scale: Numbers, Post-Treatment  6/10  -MW     Pain Location  neck neck and head  -MW     Pain Intervention(s)   Repositioned;Rest  -MW     Row Name 08/08/20 1523          Plan of Care Review    Plan of Care Reviewed With  patient;family  -MW     Progress  no change  -MW     Outcome Summary  Patient presents with continued right head tilt and c/o neck pain. He is unable to hold his head in midline or upright. Able to partially raise his head to approx 45 deg for 5 seconds then drops his head back down. Mod A required for sitting balance at EOB, able to sit with CG/Sup and frequent cueing for 5-10 sec before losing balance. Cont with skillled PT for strength and balance with functional mobility.  -MW     Row Name 08/08/20 1523          Vital Signs    Pre Systolic BP Rehab  121  -MW     Pre Treatment Diastolic BP  69  -MW     Pre SpO2 (%)  94  -MW     O2 Delivery Pre Treatment  room air  -MW     O2 Delivery Intra Treatment  room air  -MW     Post SpO2 (%)  95  -MW     O2 Delivery Post Treatment  room air  -MW     Pre Patient Position  Supine  -MW     Intra Patient Position  Sitting  -MW     Post Patient Position  Supine  -MW     Row Name 08/08/20 1523          Positioning and Restraints    Pre-Treatment Position  in bed  -MW     Post Treatment Position  bed  -MW     In Bed  with family/caregiver;supine;call light within reach;encouraged to call for assist;exit alarm on  -MW       User Key  (r) = Recorded By, (t) = Taken By, (c) = Cosigned By    Initials Name Provider Type    Geri Marley, PT Physical Therapist        Outcome Measures     Row Name 08/08/20 1529          How much help from another person do you currently need...    Turning from your back to your side while in flat bed without using bedrails?  2  -MW     Moving from lying on back to sitting on the side of a flat bed without bedrails?  2  -MW     Moving to and from a bed to a chair (including a wheelchair)?  1  -MW     Standing up from a chair using your arms (e.g., wheelchair, bedside chair)?  1  -MW     Climbing 3-5 steps with a railing?  1  -MW     To walk  in hospital room?  1  -     AM-[facility] 6 Clicks Score (PT)  8  -     Row Name 08/08/20 1529          Modified [place] Scale    Modified [place] Scale  4 - Moderately severe disability.  Unable to walk without assistance, and unable to attend to own bodily needs without assistance.  -     Row Name 08/08/20 1529          Functional Assessment    Outcome Measure Options  AM-PAC 6 Clicks Basic Mobility (PT);Modified [place]  -       User Key  (r) = Recorded By, (t) = Taken By, (c) = Cosigned By    Initials Name Provider Type    Geri Marley, PT Physical Therapist        Physical Therapy Education                 Title: PT OT SLP Therapies (In Progress)     Topic: Physical Therapy (In Progress)     Point: Mobility training (Done)     Description:   Instruct learner(s) on safety and technique for assisting patient out of bed, chair or wheelchair.  Instruct in the proper use of assistive devices, such as walker, crutches, cane or brace.              Patient Friendly Description:   It's important to get you on your feet again, but we need to do so in a way that is safe for you. Falling has serious consequences, and your personal safety is the most important thing of all.        When it's time to get out of bed, one of us or a family member will sit next to you on the bed to give you support.     If your doctor or nurse tells you to use a walker, crutches, a cane, or a brace, be sure you use it every time you get out of bed, even if you think you don't need it.    Learning Progress Summary           Patient Acceptance, E, VU,NR by ERIS at 8/8/2020 1530    Acceptance, E,D, NR by COLLIN at 8/7/2020 1204   Family Acceptance, E, VU,NR by ERIS at 8/8/2020 1530                   Point: Home exercise program (In Progress)     Description:   Instruct learner(s) on appropriate technique for monitoring, assisting and/or progressing patient with therapeutic exercises and activities.              Learning Progress Summary            Patient Acceptance, E,D, NR by  at 8/7/2020 1204                   Point: Body mechanics (Done)     Description:   Instruct learner(s) on proper positioning and spine alignment for patient and/or caregiver during mobility tasks and/or exercises.              Learning Progress Summary           Patient Acceptance, E, VU,NR by  at 8/8/2020 1530    Acceptance, E,D, NR by  at 8/7/2020 1204   Family Acceptance, E, VU,NR by  at 8/8/2020 1530                   Point: Precautions (Done)     Description:   Instruct learner(s) on prescribed precautions during mobility and gait tasks              Learning Progress Summary           Patient Acceptance, E, VU,NR by  at 8/8/2020 1530    Acceptance, E,D, NR by  at 8/7/2020 1204   Family Acceptance, E, VU,NR by  at 8/8/2020 1530                               User Key     Initials Effective Dates Name Provider Type Discipline     02/05/19 -  Raysa Devine, PT Physical Therapist PT     09/17/19 -  Geri Glez PT Physical Therapist PT              PT Recommendation and Plan     Outcome Summary/Treatment Plan (PT)  Anticipated Discharge Disposition (PT): inpatient rehabilitation facility  Plan of Care Reviewed With: patient, family  Progress: no change  Outcome Summary: Patient presents with continued right head tilt and c/o neck pain. He is unable to hold his head in midline or upright. Able to partially raise his head to approx 45 deg for 5 seconds then drops his head back down. Mod A required for sitting balance at EOB, able to sit with CG/Sup and frequent cueing for 5-10 sec before losing balance. Cont with skillled PT for strength and balance with functional mobility.     Time Calculation:   PT Charges     Row Name 08/08/20 1532             Time Calculation    Start Time  1343  -MW      Stop Time  1401  -MW      Time Calculation (min)  18 min  -MW      PT Received On  08/08/20  -MW      PT - Next Appointment  08/09/20  -MW         Time  Calculation- PT    Total Timed Code Minutes- PT  17 minute(s)  -ERIS        User Key  (r) = Recorded By, (t) = Taken By, (c) = Cosigned By    Initials Name Provider Type    Geri Marley PT Physical Therapist        Therapy Charges for Today     Code Description Service Date Service Provider Modifiers Qty    86003877943  PT THER SUPP EA 15 MIN 8/8/2020 Geri Glez, PT GP 1    [id number]  PT THERAPEUTIC ACT EA 15 MIN 8/8/2020 Geri Glez, PT GP 1          PT G-Codes  Outcome Measure Options: AM-PAC 6 Clicks Basic Mobility (PT), Modified [place]  AM-PAC 6 Clicks Score (PT): 8  AM-PAC 6 Clicks Score (OT): 11  Modified [place] Scale: 4 - Moderately severe disability.  Unable to walk without assistance, and unable to attend to own bodily needs without assistance.    Geri Glez PT  8/8/2020

## 2020-08-08 NOTE — THERAPY RE-EVALUATION
Acute Care - Speech Language Pathology   Swallow Re-Evaluation [facility]     Patient Name: Avinash Everett  : 1990  MRN: [id number]  Today's Date: 2020               Admit Date: 8/3/2020    Visit Dx:     ICD-10-CM ICD-9-CM   1. Right-sided nontraumatic intracerebral hemorrhage, unspecified cerebral location (CMS/HCC) I61.9 431   2. Subdural hematoma (CMS/HCC) S06.5X9A 432.1   3. Nontraumatic subcortical hemorrhage of right cerebral hemisphere (CMS/HCC) I61.0 431     Patient Active Problem List   Diagnosis   • Right-sided nontraumatic intracerebral hemorrhage (CMS/HCC)   • Nontraumatic subcortical hemorrhage of right cerebral hemisphere (CMS/HCC)     History reviewed. No pertinent past medical history.  History reviewed. No pertinent surgical history.     SWALLOW EVALUATION (last 72 hours)      SLP Adult Swallow Evaluation     Row Name 20 1525 20 1200                Rehab Evaluation    Document Type  re-evaluation  -CP  evaluation  -AW       Subjective Information  fatigue  -CP  no complaints  -AW       Patient Observations  lethargic;cooperative  -CP  alert;cooperative;agree to therapy  -AW       Patient/Family Observations  confused  -CP  Pt positioned upright in bed, wife at bedside. Pt a little confused, L neglect noted.  -AW       Patient Effort  fair  -CP  good  -AW       Symptoms Noted During/After Treatment  none  -CP  none  -AW          General Information    Patient Profile Reviewed  yes  -CP  yes  -AW       Pertinent History Of Current Problem  Suspected aspiration PNA  -CP  Pt s/p emergent crani for evacuation of R frontal hematoma, intubated 8/3 - 20.  -AW       Current Method of Nutrition  pureed;honey-thick liquids  -CP  NPO  -AW       Precautions/Limitations, Vision  --  neglect, left  -AW       Precautions/Limitations, Hearing  --  WFL  -AW       Prior Level of Function-Communication  --  WFL  -AW       Prior Level of Function-Swallowing  --  no diet consistency  restrictions  -AW       Plans/Goals Discussed with  patient;agreed upon  -CP  patient;spouse/S.O.;agreed upon  -AW       Barriers to Rehab  medically complex  -CP  none identified  -AW       Patient's Goals for Discharge  return to PO diet  -CP  return to PO diet  -AW       Family Goals for Discharge  --  patient able to return to PO diet  -AW          Pain Assessment    Additional Documentation  --  Pain Scale: Numbers Pre/Post-Treatment (Group)  -AW          Pain Scale: Numbers Pre/Post-Treatment    Pain Scale: Numbers, Pretreatment  0/10 - no pain  -CP  8/10  -AW       Pain Scale: Numbers, Post-Treatment  0/10 - no pain  -CP  8/10  -AW       Pain Location  --  head  -AW       Pain Intervention(s)  --  Repositioned  -AW          Oral Motor and Function    Dentition Assessment  --  natural, present and adequate  -AW       Secretion Management  --  requires suctioning to control secretions;other (see comments) at times  -AW       Mucosal Quality  --  moist, healthy  -AW       Volitional Swallow  --  weak  -AW       Volitional Cough  --  non-productive  -AW          Oral Musculature and Cranial Nerve Assessment    Oral Labial or Buccal Impairment, Detail, Cranial Nerve VII (Facial):  --  left labial droop  -AW          General Eating/Swallowing Observations    Respiratory Support Currently in Use  room air  -CP  room air  -AW       Eating/Swallowing Skills  self-fed;fed by SLP  -CP  fed by SLP;self-fed  -AW       Positioning During Eating  upright in bed  -CP  upright in bed  -AW       Utensils Used  spoon;cup  -CP  spoon;cup  -AW       Consistencies Trialed  honey-thick liquids;pureed  -CP  thin liquids;honey-thick liquids;pureed  -AW       Pre SpO2 (%)  95  -CP  98  -AW       Post SpO2 (%)  95  -CP  97  -AW          Clinical Swallow Eval    Oral Prep Phase  --  WFL  -AW       Oral Transit  --  impaired  -AW       Oral Residue  --  WFL  -AW       Pharyngeal Phase  --  suspected pharyngeal impairment  -AW        Clinical Swallow Evaluation Summary  RN reports significant rhonchi on R. Pt being treated for aspiration PNA. Mother reports minimal PO intake since being cleared- only a couple bites per meal. The patient was sleepy and confused for re-eval, though his mother reports this is not atypical the past few days. Wet, congested cough noted with both HTL and pureed trials. Given lethargy, cognition, weakness, and lung status, do not feel patient is safe for PO at this time, and he is not currently eating enough to meet nutritional needs regardless. REC NPO, and consideration of cortrak for meds and nutrition. OK for ice after oral care. Will f/u Mon for re-eval as appropriate.  -CP  Pt very anxious to eat and drink. Pt had multiple swallows with ice chips. Wet vocal quality noted with teaspoon sip of thin. Pt cleared with cue. With cup sip of thin, pt had choking noted. Suspect premature spillage. Swallow delayed and weak with reduced laryngeal elevation. Pt tolerated spoon and cup sips of HTL and pureed. No overt s/s. Pt needed cues for small sips and to not talk immediately after swallowing. Recommend start on pureed diet with HTL; meds crushed in pureed; upright for meals and 30 min after; slow rate; small bites/sips; 1:1 supervision due to impulsivity. ST will follow for reeval and diet tolerance/advancement as indicated. Recommend therapy at next level of care for dysphagia and cognition, consider [facility] acute rehab. ST to follow.  -AW          Clinical Impression    SLP Swallowing Diagnosis  oral dysfunction;suspected pharyngeal dysfunction  -CP  oral dysfunction;suspected pharyngeal dysfunction  -AW       Functional Impact  risk of aspiration/pneumonia  -CP  risk of aspiration/pneumonia  -AW       Rehab Potential/Prognosis, Swallowing  good, to achieve stated therapy goals  -CP  good, to achieve stated therapy goals  -AW       Swallow Criteria for Skilled Therapeutic Interventions Met  demonstrates skilled criteria   -CP  demonstrates skilled criteria  -AW          Recommendations    Therapy Frequency (Swallow)  PRN  -CP  PRN  -AW       Predicted Duration Therapy Intervention (Days)  until discharge  -CP  until discharge  -AW       SLP Diet Recommendation  NPO;ice chips between meals after oral care, with supervision;temporary alternate methods of nutrition/hydration  -CP  puree;honey thick liquids  -AW       Recommended Diagnostics  reassess via clinical swallow evaluation  -CP  reassess via clinical swallow evaluation  -AW       Recommended Precautions and Strategies  --  upright posture during/after eating;small bites of food and sips of liquid;no straw  -AW       SLP Rec. for Method of Medication Administration  meds via alternate route  -CP  meds crushed;with pudding or applesauce  -AW       Monitor for Signs of Aspiration  --  yes  -AW       Anticipated Dischage Disposition (SLP)  inpatient rehabilitation facility  -CP  inpatient rehabilitation facility;anticipate therapy at next level of care  -AW          Swallow Goals (SLP)    Oral Nutrition/Hydration Goal Selection (SLP)  --  oral nutrition/hydration, SLP goal 1  -AW          Oral Nutrition/Hydration Goal 1 (SLP)    Oral Nutrition/Hydration Goal 1, SLP  --  Pt will safely tolerate the least restrictive diet with no s/s of aspiration.  -AW       Time Frame (Oral Nutrition/Hydration Goal 1, SLP)  --  by discharge  -AW         User Key  (r) = Recorded By, (t) = Taken By, (c) = Cosigned By    Initials Name Effective Dates    Gwendolyn Ulloa, MS CCC-SLP 06/08/18 -     Ailin Kwan, MS CCC-SLP 06/08/18 -           EDUCATION  The patient has been educated in the following areas:   Dysphagia (Swallowing Impairment) Oral Care/Hydration NPO rationale.    SLP Recommendation and Plan  SLP Swallowing Diagnosis: oral dysfunction, suspected pharyngeal dysfunction  SLP Diet Recommendation: NPO, ice chips between meals after oral care, with supervision, temporary alternate  methods of nutrition/hydration     SLP Rec. for Method of Medication Administration: meds via alternate route        Recommended Diagnostics: reassess via clinical swallow evaluation  Swallow Criteria for Skilled Therapeutic Interventions Met: demonstrates skilled criteria  Anticipated Dischage Disposition (SLP): inpatient rehabilitation facility  Rehab Potential/Prognosis, Swallowing: good, to achieve stated therapy goals  Therapy Frequency (Swallow): PRN  Predicted Duration Therapy Intervention (Days): until discharge       Plan of Care Reviewed With: patient  Outcome Summary: RN reports significant rhonchi on R. Pt being treated for aspiration PNA. Mother reports minimal PO intake since being cleared- only a couple bites per meal. The patient was sleepy and confused for re-eval, though his mother reports this is not atypical the past few days. Wet, congested cough noted with both HTL and pureed trials. Given lethargy, cognition, weakness, and lung status, do not feel patient is safe for PO at this time, and he is not currently eating enough to meet nutritional needs regardless. REC NPO, and consideration of cortrak for meds and nutrition. OK for ice after oral care. Will f/u Mon for re-eval as appropriate.    SLP GOALS     Row Name 08/07/20 1200             Oral Nutrition/Hydration Goal 1 (SLP)    Oral Nutrition/Hydration Goal 1, SLP  Pt will safely tolerate the least restrictive diet with no s/s of aspiration.  -AW      Time Frame (Oral Nutrition/Hydration Goal 1, SLP)  by discharge  -AW        User Key  (r) = Recorded By, (t) = Taken By, (c) = Cosigned By    Initials Name Provider Type    Ailin Kwan MS CCC-SLP Speech and Language Pathologist           SLP Outcome Measures (last 72 hours)      SLP Outcome Measures     Row Name 08/08/20 1500 08/07/20 1400          SLP Outcome Measures    Outcome Measure Used?  Adult NOMS  -CP  Adult NOMS  -AW        Adult FCM Scores    FCM Chosen  Swallowing  -CP  Swallowing   -AW     Swallowing FCM Score  1  -CP  3  -AW       User Key  (r) = Recorded By, (t) = Taken By, (c) = Cosigned By    Initials Name Effective Dates    Gwendolyn Ulloa MS CCC-SLP 06/08/18 -     Ailin Kwan MS CCC-SLP 06/08/18 -            Time Calculation:   Time Calculation- SLP     Row Name 08/08/20 1538             Time Calculation- SLP    SLP Start Time  1300  -CP      SLP Stop Time  1400  -CP      SLP Time Calculation (min)  60 min  -CP      SLP Received On  08/08/20  -CP        User Key  (r) = Recorded By, (t) = Taken By, (c) = Cosigned By    Initials Name Provider Type    Gwendolyn Ulloa MS CCC-SLP Speech and Language Pathologist          Therapy Charges for Today     Code Description Service Date Service Provider Modifiers Qty    [id number]  ST TREATMENT SWALLOW 4 8/8/2020 Gwendolyn Ash MS CCC-SLP GN 1               Gwendolyn Ash MS CCC-ANGELES  8/8/2020

## 2020-08-08 NOTE — PLAN OF CARE
Problem: Patient Care Overview  Goal: Plan of Care Review  Outcome: Ongoing (interventions implemented as appropriate)   Patient rested well today, still c/o of severe headache right frontal area. Worse with coughing. Lungs sound with a lot of rhonchi, encouraged TCDB and IS use.     ST recommended NPO status and DHT. Nutrition consult pending, DHT inserted without issue.     VSS NIH16. Mother at bedside. Remains flaccid on left side, UP to BSC with max assist of 3 today 2x. Still no BM but refuses the suppository at this time. Emotionally labile at times. Meds changed to PO.

## 2020-08-08 NOTE — PROGRESS NOTES
"        REASON FOR FOLLOWUP/CHIEF COMPLAINT: Intracranial hemorrhage, coagulopathy     INTERVAL HISTORY:   Some cough.  Remains weak.      Past Medical History, Past Surgical History, Social History, Family History have been reviewed and are without significant changes except as mentioned.    Review of Systems  + for weakness, cough  - for bleeding    Medications:  The current medication list was reviewed in the EMR    ALLERGIES:    Allergies   Allergen Reactions   • Promestriene GI Intolerance              Vitals:    08/07/20 1833 08/07/20 1952 08/07/20 2319 08/08/20 0708   BP: 113/80 104/66 96/59 114/71   BP Location: Right arm Right arm Left arm Right arm   Patient Position: Lying Lying Lying Lying   Pulse: 89 80 96 98   Resp: 18 16 16 16   Temp: 98.3 °F (36.8 °C) 97.8 °F (36.6 °C) 97.9 °F (36.6 °C) 98.2 °F (36.8 °C)   TempSrc: Oral Oral Oral Oral   SpO2: 96% 95% 94% 91%   Weight: 92.8 kg (204 lb 8 oz)      Height: 185.4 cm (73\")        Physical Exam    CONSTITUTIONAL:  Vital signs reviewed.  No distress, looks comfortable.  Cranial incisions without erythema  Erythamatous facies  Answers questions appropriately       RECENT LABS:  WBC   Date Value Ref Range Status   08/08/2020 5.88 3.40 - 10.80 10*3/mm3 Final   08/07/2020 7.04 3.40 - 10.80 10*3/mm3 Final   08/06/2020 10.14 3.40 - 10.80 10*3/mm3 Final     Hemoglobin   Date Value Ref Range Status   08/08/2020 10.9 (L) 13.0 - 17.7 g/dL Final   08/07/2020 10.9 (L) 13.0 - 17.7 g/dL Final   08/06/2020 10.0 (L) 13.0 - 17.7 g/dL Final     Platelets   Date Value Ref Range Status   08/08/2020 151 140 - 450 10*3/mm3 Final   08/07/2020 130 (L) 140 - 450 10*3/mm3 Final   08/06/2020 131 (L) 140 - 450 10*3/mm3 Final       ASSESSMENT/PLAN:  Avinash Everett S503/[id number]        1.  Right frontal parietal hemorrhage with mildly elevated INR at 1.56 on admission.  Patient has history of alcohol abuse and in the ER he stated that he was drinking alcohol several shots on the day of admission. "  He presented with left hemiplegia and severe headache.  CT of the head showed a large rent right frontal hemorrhage  · CT head showed 5.4 cm large right frontal hemorrhage with 2 small additional hemorrhages in the parenchyma with midline shift  · S/p evacuation of the large frontal hemorrhage with improvement in his clinical symptoms  · Repeat CT showed no bleeding in the cavity but mild increase in the 2 additional hemorrhages in the parenchyma.  With 1 lesion increased from 1.2 cm to 1.7 cm and the second area of hemorrhage increased from 2.7 cm to 3.1 cm.  · New onset right lateral and medial epidural which on discussing with neurosurgery they think it secondary to underlying evacuation  · Neurosurgery wants to do CT angiogram in order to evaluate for AV malformation  · On discussing with neurosurgery when they try to evacuate there was bleeding and likely AV malformation that Dr. Rivera cauterized  · Subsequent arteriogram without evidence of AVM     2.  Coagulopathy with elevated PT.  INR was 1.56  · Patient received FFP without much benefit with INR going from 1.56-1.5  · This a.m. INR of 1.49.  · Neurosurgery wanted INR to be less than 1.2 as they need to do CT angiogram to evaluate for concern of AV malformation  · Mixing study done with partial correction of INR from 17.4-14.7 both immediate and delayed  · Discussion done between Dr. Anderson and Dr. Tee and 28/5/20, she gave 1 dose of Kcentra 25 mg/kg IV x1 dose  · On discussion with patient's mother, patient's grandmother is a patient of   · Current INR 1.42, stable  · we do not think an INR of 1.4 should be the reason for intracranial hemorrhage  · Factor II activity 57%, factor V activity 52%, factor VII activity 40%, factor X activity 54%.  Most of these are vitamin K dependent (2, 7, 10).  Therefore, results would be consistent with vitamin K deficiency.  · Von Willebrand's panel is pending.  His PFA-100 is normal so therefore  suspicion for von Willebrand's disease is extremely low.     3.  Allergic reaction to FFP, discussed with blood bank.  Evaluation with nonfebrile, non-hemolytic reaction.  Likely unit specific.     4.  Blood in the urine, on discussing with patient's wife he has had dark urine for at least 6 months and she has been trying to hydrate him.  · Urine analysis shows too numerous to count red blood cells 100 mg of protein and mild bilirubin  · Discussed with Dr. Aguillon, questionable rhabdomyolysis     5.  Seizures prophylaxis on Keppra     6.  Questionable autoimmune hemolytic anemia,  · Patient was evaluated by his internist at [facility] who thought he had autoimmune hemolytic anemia  · Received records but no indication of that.  · ELIESER negative  · Haptoglobin was only 19 on 8/5/2015, improved/normalized.  LDH normal.  Total bilirubin elevated, but mostly is direct.  Hemolysis should have an elevation of indirect bilirubin.  · Therefore, no evidence for hemolytic anemia    *Acute alcohol intoxication.  Acute alcoholic hepatitis.    *Possible aspiration pneumonia.  On Zosyn now Augmentin to cover for this.    *Acute right superficial cephalic thrombophlebitis and chronic left superficial small saphenous thrombophlebitis on 8/6/2020.  Not on prophylactic anticoagulation due to intracranial hemorrhage.    PLAN.  No further evaluation from our standpoint.  Needs to maintain proper nutrition and avoid alcohol consumption.      We will sign off.  Call us back if necessary.    Discussed with his mother at the bedside today.  Once he recovers we can see him in the office if desired.  His grandmother sees Dr. Anderson I believe.

## 2020-08-08 NOTE — PROGRESS NOTES
"DOS: 2020  NAME: Avinash Everett   : 1990  PCP: Provider, No Known  Chief Complaint   Patient presents with   • Neuro Deficit(s)       Chief complaint: intracerebral hemorrhage  Subjective: MRI completed. NAEON. Persistent left sided weakness    Objective:  Vital signs: /71 (BP Location: Right arm, Patient Position: Lying)   Pulse 98   Temp 98.2 °F (36.8 °C) (Oral)   Resp 16   Ht 185.4 cm (73\")   Wt 92.8 kg (204 lb 8 oz)   SpO2 91%   BMI 26.98 kg/m²    Gen: NAD, vitals reviewed  MS: oriented x3, recent/remote memory intact, normal attention/concentration, language intact, minimal neglect.  CN: visual acuity grossly normal, decreased blink to threat left side, PERRL, right gaze preference, cannot cross midline, left facial droop, moderate dysarthria  Motor: 5/5 throughout RUE/LE, 0/5 LUE, 1/5 LLE. Decreased tone left side  Sensory: intact to light touch all 4 ext.    Exam unchanged for me today     ROS:  + weakness, numbness  No fevers, chills      Laboratory results:  Lab Results   Component Value Date    GLUCOSE 90 2020    CALCIUM 7.8 (L) 2020     2020    K 2.8 (L) 2020    CO2 23.6 2020     2020    BUN 8 2020    CREATININE 0.43 (L) 2020    EGFRIFNONA >150 2020    BCR 18.6 2020    ANIONGAP 8.4 2020     Lab Results   Component Value Date    WBC 5.88 2020    HGB 10.9 (L) 2020    HCT 30.5 (L) 2020    MCV 99.3 (H) 2020     2020     Lab Results   Component Value Date     (H) 2020     @[email or web address]@     Review of labs: Hgb 10.9, GFR normal, K 2.8    Review and interpretation of imaging: I personally reviewed his brain MRI which shows no additional vascular lesions or chronic microhemorrhages apart from his large lobar hemorrhage.  There is a tiny 3 mm left frontal infarct.  Radiology report reviewed    Workup to date: large spontaneous right frontal intracerebral hemorrhage " requiring emergency decompression, etiology unclear, suspected related to underlying coagulopathy.     CT 8/3: 9.6x4.9 cm ICH with 1.2cm midline shift  S/p craniotomy and drainage  CTA 8/4: negative for AVM  Dx angio 8/5: negative for aneurysm or AVM  2D echo: EF 70%, grade I diastolic dysfunction  INR 1.39 on admission, remaining 1.4-1.5 despite FFP, vit K  ALT 59    MRI 8/7: Large right intracerebral hemorrhage, 3 mm left frontal stroke  Reviewed heme eval, they really do not think that INR of 1.4 on admission was high enough for severe spontaneous ICH. Overall heme workup for coagulopathy has been negative     Diagnoses:  Spontaneous intracerebral hemorrhagic, right frontal  Elevated INR  History of alcohol dependence    Comment: MRI shows a small incidental left frontal stroke, the significance of which is unclear.  Working diagnosis regarding the etiology of his hemorrhage is arteriovenous malformation based on intraoperative findings and pathology    Plan:  1.  Keppra 500 twice daily for seizure prophylaxis  2.  Pathology being sent down for suspected AVM  3.  Will need aggressive rehabilitation given his severe deficits    No further neurology work-up planned.  I will see him as needed

## 2020-08-08 NOTE — PROGRESS NOTES
Postop   LOS: 5 days   Patient Care Team:  Provider, No Known as PCP - General    Chief Complaint: Follow-up ICH    Subjective     Denies headache.  Mother at bedside.    Interval History:     History taken from: family    Objective        Awake   Plegic on left  Face symmetric  Right cranial incision with steri strips intact no drainage or swelling  Follows commands on right    Vital Signs  Temp:  [97.8 °F (36.6 °C)-98.3 °F (36.8 °C)] 98.2 °F (36.8 °C)  Heart Rate:  [80-98] 98  Resp:  [16-18] 16  BP: ()/(59-80) 114/71       Results Review:     I reviewed the patient's new clinical results.  I reviewed the patient's new imaging results and agree with the interpretation.    .  Results from last 7 days   Lab Units 08/08/20  1005 08/07/20  0612 08/06/20  0552   WBC 10*3/mm3 5.88 7.04 10.14   HEMOGLOBIN g/dL 10.9* 10.9* 10.0*   HEMATOCRIT % 30.5* 29.8* 27.7*   PLATELETS 10*3/mm3 151 130* 131*     .  Results from last 7 days   Lab Units 08/08/20  1005 08/07/20  0612 08/06/20  0552   SODIUM mmol/L 135* 138 138   POTASSIUM mmol/L 3.5 2.8* 2.9*   CHLORIDE mmol/L 108* 106 104   CO2 mmol/L 18.8* 23.6 24.1   BUN mg/dL 7 8 7   CREATININE mg/dL 0.45* 0.43* 0.53*   CALCIUM mg/dL 8.2* 7.8* 7.8*   BILIRUBIN mg/dL 3.8* 5.1* 5.3*   ALK PHOS U/L 77 76 78   ALT (SGPT) U/L 25 27 32   AST (SGOT) U/L 36 45* 70*   GLUCOSE mg/dL 98 90 99     Mri Brain With & Without Contrast    Result Date: 8/7/2020   The patient is status post a right lateral parietal craniotomy for the purposes of evacuation of an intraparenchymal hematoma involving the lateral aspect of the right frontal and parietal lobes. This evacuation cavity measures up to 6.3 x 3.5 cm in greatest axial dimensions and is remarkable for areas of susceptibility artifact consistent with foci of air, packing material, and blood products. Again, this evacuation cavity traverses the right pre and postcentral gyri. Additionally, there are some small areas of hemorrhage along the  periphery of the evacuation cavity as well as more discontiguous foci of hemorrhage within the right frontal and parietal lobes. Surrounding vasogenic edema is seen. Overall, it difficult to compare the extent of hemorrhage and edema to the prior head CT, and given differences in modalities although there does appear to be a greater degree of mass effect on the current exam with midline shift to the left by approximately 5 mm on the current study as compared to 2 to 3 mm on the prior exam. There is no convincing evidence to suggest an underlying tumor on this examination although I would recommend a delayed follow-up MRI of the brain for further assessment.  Note is made of a subdural hematoma along the undersurface of the left temporal and occipital lobes extending along the posterior and lateral margins of the left occipital lobe as well as to a lesser extent the lateral aspect of the left temporal lobe.  Incidental is note is made of a 3 mm focus of restricted diffusion within the left frontal lobe white matter compatible with an incidental tiny acute infarct.  This report was finalized on 8/7/2020 3:50 PM by Dr. Michael Richmond M.D.        Assessment/Plan       Nontraumatic subcortical hemorrhage of right cerebral hemisphere (CMS/HCC)    Right-sided nontraumatic intracerebral hemorrhage (CMS/HCC)    Postop day #4 right frontoparietal craniotomy for evacuation of ICH  Cerebral angiogram 8/5/2020 by Dr. Malcolm = no evidence of aneurysm, AVM or dural AV fistula   MRI revealed no evidence of underlying mass, I discussed this with the patient's mother who is at bedside, but there was a tiny incidental acute left frontal infarct  Await final path report, some preliminary findings do raise question for AVM. Await rehab plan      Iman Ritter, NATHALY  08/08/20  11:16

## 2020-08-08 NOTE — PLAN OF CARE
Problem: Patient Care Overview  Goal: Plan of Care Review  Flowsheets (Taken 8/8/2020 6874)  Plan of Care Reviewed With: patient  Outcome Summary: Clinical swallow re-eval completed. RN reports significant rhonchi on R. Pt being treated for aspiration PNA. Mother reports minimal PO intake since being cleared- only a couple bites per meal. The patient was sleepy and confused for re-eval, though his mother reports this is not atypical the past few days. Wet, congested cough noted with both HTL and pureed trials. Given lethargy, cognition, weakness, and lung status, do not feel patient is safe for PO at this time, and he is not currently eating enough to meet nutritional needs regardless. REC NPO, and consideration of cortrak for meds and nutrition. OK for ice after oral care. Will f/u Mon for re-eval as appropriate.

## 2020-08-08 NOTE — PLAN OF CARE
Problem: Patient Care Overview  Goal: Plan of Care Review  Outcome: Ongoing (interventions implemented as appropriate)  Flowsheets  Taken 8/5/2020 1877 by Jasmine Tomlin, RN  Progress: no change  Taken 8/8/2020 4568 by Mariana Bojorquez, RN  Outcome Summary: VVS. Yordan shortness of air, nausea, and vomiting. Complaints of pain, PRN medication used and seems to give patient relieve. Education patient on pain scale and voicing concerns about anxiety. Tolerated diet. NS @ 100. SR-ST on heart monitor, increased with activity. NIH totaling at 16. Patient remains flaccid on left side, face droop, and vision trouble when looking to the left. Potassium and phosphorous replaced pre protocol. No new orders at this time, continue to monitor.

## 2020-08-08 NOTE — PROGRESS NOTES
"      [facility]         Dr Robertson Sayied   LOS: 5 days   Patient Care Team:  Provider, No Known as PCP - General    Chief Complaint: Acute ICH large and nontraumatic status post emergent craniotomy suspected to be due to AVM postop on the vent    Interval History: Resting comfortably today.  No overnight complaints.    REVIEW OF SYSTEMS:   No chest pain no shortness of breath    Ventilator/Non-Invasive Ventilation Settings (From admission, onward)   Nasal cannula oxygen      Vital Signs  Temp:  [97.8 °F (36.6 °C)-98.3 °F (36.8 °C)] 98.2 °F (36.8 °C)  Heart Rate:  [] 100  Resp:  [16-22] 22  BP: ()/(59-80) 121/69    Intake/Output Summary (Last 24 hours) at 8/8/2020 1409  Last data filed at 8/8/2020 1343  Gross per 24 hour   Intake 220 ml   Output 550 ml   Net -330 ml     Flowsheet Rows      First Filed Value   Admission Height  177.8 cm (70\") Documented at 08/03/2020 2203   Admission Weight  96 kg (211 lb 11.2 oz) Documented at 08/03/2020 2140          Physical Exam:  Patient is examined using the personal protective equipment as per guidelines from infection control for this particular patient as enacted.  Hand hygiene was performed before and after patient interaction.   General Appearance:   Sedated intubated.  Wakes up follow simple commands.  Gets agitated at times.  ET tube good position orally  Neck midline trachea no thyromegaly   Lungs:    Diminished breath sounds rhonchi bilaterally in the bases    Heart:    Regular rhythm and normal rate, normal S1 and S2, no            murmur, no gallop, no rub, no click   Chest Wall:    No abnormalities observed   Abdomen:    Soft nondistended bowel sounds positive   Extremities:   Moves all extremities well, no edema, no cyanosis, no             redness  CNS left hemiparesis following commands  Skin no rashes no nodules  Musculoskeletal no cyanosis no clubbing normal range of motion     Results Review:        Results from last 7 days   Lab " Units 08/08/20  1005 08/07/20  0612 08/06/20  0552   SODIUM mmol/L 135* 138 138   POTASSIUM mmol/L 3.5 2.8* 2.9*   CHLORIDE mmol/L 108* 106 104   CO2 mmol/L 18.8* 23.6 24.1   BUN mg/dL 7 8 7   CREATININE mg/dL 0.45* 0.43* 0.53*   GLUCOSE mg/dL 98 90 99   CALCIUM mg/dL 8.2* 7.8* 7.8*     Results from last 7 days   Lab Units 08/03/20  2144   TROPONIN T ng/mL <0.010     Results from last 7 days   Lab Units 08/08/20  1005 08/07/20  0612 08/06/20  0552   WBC 10*3/mm3 5.88 7.04 10.14   HEMOGLOBIN g/dL 10.9* 10.9* 10.0*   HEMATOCRIT % 30.5* 29.8* 27.7*   PLATELETS 10*3/mm3 151 130* 131*     Results from last 7 days   Lab Units 08/08/20  1005 08/06/20  0552 08/05/20  1049 08/05/20  0613   INR  1.42* 1.42* 1.47* 1.49*   APTT seconds 36.7* 37.7*  --  35.1     Results from last 7 days   Lab Units 08/04/20  0606   CHOLESTEROL mg/dL 250*     Results from last 7 days   Lab Units 08/07/20  0612   MAGNESIUM mg/dL 2.1     Results from last 7 days   Lab Units 08/06/20  0552  08/04/20  0606   CHOLESTEROL mg/dL  --   --  250*   TRIGLYCERIDES mg/dL 159*   < > 194*   HDL CHOL mg/dL  --   --  42   LDL CHOL mg/dL  --   --  169*    < > = values in this interval not displayed.     Results from last 7 days   Lab Units 08/04/20  0124   PH, ARTERIAL pH units 7.372   PO2 ART mm Hg 317.8*   PCO2, ARTERIAL mm Hg 45.6*   HCO3 ART mmol/L 26.5       I reviewed the patient's new clinical results.  I personally viewed and interpreted the patient's CXR        Medication Review:     folic acid (FOLVITE) IVPB 1 mg Intravenous Daily   levETIRAcetam 500 mg Oral Q12H   piperacillin-tazobactam 3.375 g Intravenous Q8H   sodium chloride 10 mL Intravenous Q12H   thiamine (VITAMIN B1) IVPB 100 mg Intravenous Daily         sodium chloride 100 mL/hr Last Rate: 100 mL/hr (08/06/20 0532)       ASSESSMENT:   Acute left-sided weakness due to right ICH  Acute right intracerebral hemorrhage; Large and nontraumatic status post emergent craniotomy  Possible transfusion  reaction  Coagulopathy  Brain compression with midline shift status post emergent craniotomy  Acute respiratory failure s/p mechanical ventilator  Possible aspiration pneumonia  Acute alcohol intoxication  Acute alcoholic hepatitis  Acute anion gap metabolic acidosis  Acute hypokalemia  Fever      PLAN:  Neuro status remained stable.  Neurology input noted  Fever curve is improved.  Doppler upper and lower extremity superficial thrombophlebitis.  Suspect likely noninfectious however patient for fever.   Doppler extremities show thrombophlebitis.  I will switch to oral antibiotics and complete course for aspiration pneumonia  Transfusion reaction hematology currently following.  Coagulopathy currently being worked up by hematology.  Etiology of ICH unclear.  Discussed with neurology could be related to his coagulopathy?  His INR is now corrected per hematology.  Continue thiamine folate  Keppra for seizure prophylaxis  PT OT  Diet per speech  Will need rehab at discharge    Ailyn Bateman MD  08/08/20  14:09

## 2020-08-08 NOTE — PLAN OF CARE
Problem: Patient Care Overview  Goal: Plan of Care Review  Outcome: Ongoing (interventions implemented as appropriate)  Flowsheets (Taken 8/8/2020 1523)  Progress: no change  Plan of Care Reviewed With: patient;family  Outcome Summary: Patient presents with continued right head tilt and c/o neck pain. He is unable to hold his head in midline or upright. Able to partially raise his head to approx 45 deg for 5 seconds while sitting EOB then drops his head back down. Mod A required for sitting balance at EOB, able to sit with CG/Sup A and frequent cueing for 5-10 sec before losing balance. Cont with skillled PT for strength and balance with functional mobility.   ..Patient was not wearing a face mask during this therapy encounter. Therapist used appropriate personal protective equipment including eye protection, mask, and gloves.  Mask used was standard procedure mask. Appropriate PPE was worn during the entire therapy session. Hand hygiene was completed before and after therapy session. Patient is not in enhanced droplet precautions.

## 2020-08-09 ENCOUNTER — APPOINTMENT (OUTPATIENT)
Dept: CT IMAGING | Facility: HOSPITAL | Age: 30
End: 2020-08-09

## 2020-08-09 LAB
ALBUMIN SERPL-MCNC: 3.2 G/DL (ref 3.5–5.2)
ALBUMIN/GLOB SERPL: 0.9 G/DL
ALP SERPL-CCNC: 80 U/L (ref 39–117)
ALT SERPL W P-5'-P-CCNC: 24 U/L (ref 1–41)
ANION GAP SERPL CALCULATED.3IONS-SCNC: 8.6 MMOL/L (ref 5–15)
AST SERPL-CCNC: 36 U/L (ref 1–40)
BILIRUB SERPL-MCNC: 3.8 MG/DL (ref 0–1.2)
BUN SERPL-MCNC: 4 MG/DL (ref 6–20)
BUN/CREAT SERPL: 10 (ref 7–25)
CALCIUM SPEC-SCNC: 8.6 MG/DL (ref 8.6–10.5)
CHLORIDE SERPL-SCNC: 109 MMOL/L (ref 98–107)
CO2 SERPL-SCNC: 18.4 MMOL/L (ref 22–29)
CREAT SERPL-MCNC: 0.4 MG/DL (ref 0.76–1.27)
DEPRECATED RDW RBC AUTO: 43.5 FL (ref 37–54)
ERYTHROCYTE [DISTWIDTH] IN BLOOD BY AUTOMATED COUNT: 11.7 % (ref 12.3–15.4)
GFR SERPL CREATININE-BSD FRML MDRD: >150 ML/MIN/1.73
GLOBULIN UR ELPH-MCNC: 3.5 GM/DL
GLUCOSE SERPL-MCNC: 85 MG/DL (ref 65–99)
HCT VFR BLD AUTO: 31.6 % (ref 37.5–51)
HGB BLD-MCNC: 10.9 G/DL (ref 13–17.7)
MAGNESIUM SERPL-MCNC: 1.6 MG/DL (ref 1.6–2.6)
MCH RBC QN AUTO: 35.2 PG (ref 26.6–33)
MCHC RBC AUTO-ENTMCNC: 34.5 G/DL (ref 31.5–35.7)
MCV RBC AUTO: 101.9 FL (ref 79–97)
PHOSPHATE SERPL-MCNC: 2.9 MG/DL (ref 2.5–4.5)
PLATELET # BLD AUTO: 140 10*3/MM3 (ref 140–450)
PMV BLD AUTO: 8.8 FL (ref 6–12)
POTASSIUM SERPL-SCNC: 3.4 MMOL/L (ref 3.5–5.2)
PROT SERPL-MCNC: 6.7 G/DL (ref 6–8.5)
RBC # BLD AUTO: 3.1 10*6/MM3 (ref 4.14–5.8)
SODIUM SERPL-SCNC: 136 MMOL/L (ref 136–145)
WBC # BLD AUTO: 5.36 10*3/MM3 (ref 3.4–10.8)

## 2020-08-09 PROCEDURE — 83735 ASSAY OF MAGNESIUM: CPT | Performed by: INTERNAL MEDICINE

## 2020-08-09 PROCEDURE — 25010000002 ENOXAPARIN PER 10 MG: Performed by: NEUROLOGICAL SURGERY

## 2020-08-09 PROCEDURE — 94799 UNLISTED PULMONARY SVC/PX: CPT

## 2020-08-09 PROCEDURE — 99024 POSTOP FOLLOW-UP VISIT: CPT | Performed by: NEUROLOGICAL SURGERY

## 2020-08-09 PROCEDURE — 25010000002 DEXAMETHASONE PER 1 MG: Performed by: NEUROLOGICAL SURGERY

## 2020-08-09 PROCEDURE — 25010000002 LORAZEPAM PER 2 MG: Performed by: INTERNAL MEDICINE

## 2020-08-09 PROCEDURE — 25010000002 DEXAMETHASONE SODIUM PHOSPHATE 100 MG/10ML SOLUTION 10 ML VIAL: Performed by: NURSE PRACTITIONER

## 2020-08-09 PROCEDURE — 94640 AIRWAY INHALATION TREATMENT: CPT

## 2020-08-09 PROCEDURE — 80053 COMPREHEN METABOLIC PANEL: CPT | Performed by: INTERNAL MEDICINE

## 2020-08-09 PROCEDURE — 85027 COMPLETE CBC AUTOMATED: CPT | Performed by: INTERNAL MEDICINE

## 2020-08-09 PROCEDURE — 25010000002 HYDROMORPHONE PER 4 MG: Performed by: INTERNAL MEDICINE

## 2020-08-09 PROCEDURE — 70450 CT HEAD/BRAIN W/O DYE: CPT

## 2020-08-09 PROCEDURE — 84100 ASSAY OF PHOSPHORUS: CPT | Performed by: INTERNAL MEDICINE

## 2020-08-09 RX ORDER — DEXAMETHASONE SODIUM PHOSPHATE 4 MG/ML
4 INJECTION, SOLUTION INTRA-ARTICULAR; INTRALESIONAL; INTRAMUSCULAR; INTRAVENOUS; SOFT TISSUE EVERY 6 HOURS
Status: DISCONTINUED | OUTPATIENT
Start: 2020-08-09 | End: 2020-08-10

## 2020-08-09 RX ORDER — FOLIC ACID 1 MG/1
1 TABLET ORAL DAILY
Status: DISCONTINUED | OUTPATIENT
Start: 2020-08-09 | End: 2020-08-15 | Stop reason: HOSPADM

## 2020-08-09 RX ORDER — OXYCODONE HYDROCHLORIDE AND ACETAMINOPHEN 5; 325 MG/1; MG/1
1 TABLET ORAL EVERY 4 HOURS PRN
Status: DISCONTINUED | OUTPATIENT
Start: 2020-08-09 | End: 2020-08-15 | Stop reason: HOSPADM

## 2020-08-09 RX ORDER — IPRATROPIUM BROMIDE AND ALBUTEROL SULFATE 2.5; .5 MG/3ML; MG/3ML
3 SOLUTION RESPIRATORY (INHALATION)
Status: DISCONTINUED | OUTPATIENT
Start: 2020-08-09 | End: 2020-08-11

## 2020-08-09 RX ORDER — AMOXICILLIN AND CLAVULANATE POTASSIUM 500; 125 MG/1; MG/1
1 TABLET, FILM COATED ORAL EVERY 8 HOURS SCHEDULED
Status: DISPENSED | OUTPATIENT
Start: 2020-08-09 | End: 2020-08-12

## 2020-08-09 RX ORDER — THIAMINE MONONITRATE (VIT B1) 100 MG
100 TABLET ORAL DAILY
Status: DISCONTINUED | OUTPATIENT
Start: 2020-08-09 | End: 2020-08-15 | Stop reason: HOSPADM

## 2020-08-09 RX ORDER — LEVETIRACETAM 100 MG/ML
500 SOLUTION ORAL EVERY 12 HOURS SCHEDULED
Status: DISCONTINUED | OUTPATIENT
Start: 2020-08-09 | End: 2020-08-11

## 2020-08-09 RX ORDER — QUETIAPINE FUMARATE 25 MG/1
25 TABLET, FILM COATED ORAL NIGHTLY
Status: DISCONTINUED | OUTPATIENT
Start: 2020-08-09 | End: 2020-08-09

## 2020-08-09 RX ORDER — DEXAMETHASONE SODIUM PHOSPHATE 4 MG/ML
4 INJECTION, SOLUTION INTRA-ARTICULAR; INTRALESIONAL; INTRAMUSCULAR; INTRAVENOUS; SOFT TISSUE EVERY 6 HOURS
Status: DISCONTINUED | OUTPATIENT
Start: 2020-08-09 | End: 2020-08-09

## 2020-08-09 RX ORDER — ONDANSETRON 2 MG/ML
4 INJECTION INTRAMUSCULAR; INTRAVENOUS EVERY 6 HOURS PRN
Status: DISCONTINUED | OUTPATIENT
Start: 2020-08-09 | End: 2020-08-15 | Stop reason: HOSPADM

## 2020-08-09 RX ORDER — QUETIAPINE FUMARATE 25 MG/1
25 TABLET, FILM COATED ORAL NIGHTLY
Status: DISCONTINUED | OUTPATIENT
Start: 2020-08-09 | End: 2020-08-10

## 2020-08-09 RX ADMIN — BISACODYL 10 MG: 10 SUPPOSITORY RECTAL at 20:33

## 2020-08-09 RX ADMIN — AMOXICILLIN AND CLAVULANATE POTASSIUM 500 MG: 500; 125 TABLET, FILM COATED ORAL at 13:53

## 2020-08-09 RX ADMIN — QUETIAPINE FUMARATE 25 MG: 25 TABLET ORAL at 20:31

## 2020-08-09 RX ADMIN — HYDROMORPHONE HYDROCHLORIDE 0.5 MG: 1 INJECTION, SOLUTION INTRAMUSCULAR; INTRAVENOUS; SUBCUTANEOUS at 08:31

## 2020-08-09 RX ADMIN — POTASSIUM CHLORIDE 40 MEQ: 1.5 POWDER, FOR SOLUTION ORAL at 06:24

## 2020-08-09 RX ADMIN — LORAZEPAM 1 MG: 2 INJECTION INTRAMUSCULAR; INTRAVENOUS at 21:55

## 2020-08-09 RX ADMIN — HYDROMORPHONE HYDROCHLORIDE 0.5 MG: 1 INJECTION, SOLUTION INTRAMUSCULAR; INTRAVENOUS; SUBCUTANEOUS at 00:07

## 2020-08-09 RX ADMIN — Medication 100 MG: at 08:31

## 2020-08-09 RX ADMIN — LEVETIRACETAM 500 MG: 100 SOLUTION ORAL at 08:31

## 2020-08-09 RX ADMIN — IPRATROPIUM BROMIDE AND ALBUTEROL SULFATE 3 ML: 2.5; .5 SOLUTION RESPIRATORY (INHALATION) at 11:13

## 2020-08-09 RX ADMIN — POTASSIUM CHLORIDE 40 MEQ: 1.5 POWDER, FOR SOLUTION ORAL at 12:23

## 2020-08-09 RX ADMIN — FOLIC ACID 1 MG: 1 TABLET ORAL at 08:31

## 2020-08-09 RX ADMIN — HYDROCODONE POLISTIREX AND CHLORPHENIRAMINE POLISITREX 5 ML: 10; 8 SUSPENSION, EXTENDED RELEASE ORAL at 17:40

## 2020-08-09 RX ADMIN — IPRATROPIUM BROMIDE AND ALBUTEROL SULFATE 3 ML: 2.5; .5 SOLUTION RESPIRATORY (INHALATION) at 16:11

## 2020-08-09 RX ADMIN — LEVETIRACETAM 500 MG: 100 SOLUTION ORAL at 20:34

## 2020-08-09 RX ADMIN — LORAZEPAM 1 MG: 2 INJECTION INTRAMUSCULAR; INTRAVENOUS at 00:07

## 2020-08-09 RX ADMIN — SODIUM CHLORIDE, PRESERVATIVE FREE 10 ML: 5 INJECTION INTRAVENOUS at 08:31

## 2020-08-09 RX ADMIN — HYDROMORPHONE HYDROCHLORIDE 0.5 MG: 1 INJECTION, SOLUTION INTRAMUSCULAR; INTRAVENOUS; SUBCUTANEOUS at 12:23

## 2020-08-09 RX ADMIN — ENOXAPARIN SODIUM 40 MG: 40 INJECTION SUBCUTANEOUS at 17:40

## 2020-08-09 RX ADMIN — OXYCODONE HYDROCHLORIDE AND ACETAMINOPHEN 1 TABLET: 5; 325 TABLET ORAL at 13:53

## 2020-08-09 RX ADMIN — SODIUM CHLORIDE, PRESERVATIVE FREE 10 ML: 5 INJECTION INTRAVENOUS at 20:32

## 2020-08-09 RX ADMIN — DEXAMETHASONE SODIUM PHOSPHATE 10 MG: 10 INJECTION, SOLUTION INTRAMUSCULAR; INTRAVENOUS at 13:53

## 2020-08-09 RX ADMIN — AMOXICILLIN AND CLAVULANATE POTASSIUM 500 MG: 500; 125 TABLET, FILM COATED ORAL at 21:54

## 2020-08-09 RX ADMIN — IPRATROPIUM BROMIDE AND ALBUTEROL SULFATE 3 ML: 2.5; .5 SOLUTION RESPIRATORY (INHALATION) at 20:41

## 2020-08-09 RX ADMIN — AMOXICILLIN AND CLAVULANATE POTASSIUM 500 MG: 500; 125 TABLET, FILM COATED ORAL at 06:23

## 2020-08-09 RX ADMIN — DEXAMETHASONE SODIUM PHOSPHATE 4 MG: 4 INJECTION, SOLUTION INTRAMUSCULAR; INTRAVENOUS at 20:31

## 2020-08-09 NOTE — CONSULTS
"Adult Nutrition  Assessment/PES    Patient Name:  Avinash Everett  YOB: 1990  MRN: [id number]  Admit Date:  8/3/2020    Assessment Date:  8/9/2020    Comments:  Consult for TF assessment.     Pt back to NPO status (ice chip sparingly) per SLP recommendations.     Will start Nutren 1.5 @ goal rate 60 cc/her, water flushes 50 cc hourly. Watch serum Na - may need to lower free water flush. NG in place.    Provision at goal: 2160 kcal, 98 gm pro, 2294 cc free water.     RD to follow.    RD working remotely due to covid-19 pandemic. Assessment completed based on review of electronic medical record. RD may be reached via secure chat or email.     Reason for Assessment     Row Name 08/09/20 1017          Reason for Assessment    Reason For Assessment  TF/PN;physician consult     Diagnosis  neurologic conditions Nontraumatic subcortical hemorrhage of right cerebral hemisphere     Identified At Risk by Screening Criteria  tube feeding or parenteral nutrition;difficulty chewing/swallowing           Anthropometrics     Row Name 08/09/20 1018          Anthropometrics    Height  185.4 cm (73\")        Admit Weight    Admit Weight  89.4 kg (197 lb)        Ideal Body Weight (IBW)    Ideal Body Weight (IBW) (kg)  84.86        Body Mass Index (BMI)    BMI Assessment  BMI 25-29.9: overweight         Labs/Tests/Procedures/Meds     Row Name 08/09/20 1019          Labs/Procedures/Meds    Lab Results Reviewed  reviewed        Diagnostic Tests/Procedures    Diagnostic Test/Procedure Reviewed  reviewed     Diagnostic Test/Procedures Comments  SLP bedside eval 8/8 - too lethargic, not safe for PO        Medications    Pertinent Medications Reviewed  reviewed         Physical Findings     Row Name 08/09/20 1020          Physical Findings    Overall Physical Appearance  overweight     Gastrointestinal  feeding tube     Tubes  nasogastric tube     Skin  surgical incision         Estimated/Assessed Needs     Row Name 08/09/20 1018    " "      Calculation Measurements    Height  185.4 cm (73\")         Nutrition Prescription Ordered     Row Name 08/09/20 1023          Nutrition Prescription PO    Current PO Diet  NPO;Sips/ice chips                 Problem/Interventions:  Problem 1     Row Name 08/09/20 1023          Nutrition Diagnoses Problem 1    Problem 1  Needs Alternate Route     Etiology (related to)  MNT for Treatment/Condition     Signs/Symptoms (evidenced by)  SLP/Swallow eval     Swallow eval status  Done     Type of SLP Evaluation  Bedside               Intervention Goal     Row Name 08/09/20 1024          Intervention Goal    General  Maintain nutrition;Nutrition support treatment     TF/PN  Inititiate TF/PN;Tolerate TF at goal     Weight  No significant weight loss         Nutrition Intervention     Row Name 08/09/20 1024          Nutrition Intervention    RD/Tech Action  Recommend/ordered;Follow Tx progress;Care plan reviewd     Recommended/Ordered  EN         Nutrition Prescription     Row Name 08/09/20 1024          Nutrition Prescription EN    Enteral Prescription  Enteral begin/change;Enteral to supply     Enteral Route  NG     Product  Nutren 1.5 richa     TF Delivery Method  Continuous     Continuous TF Goal Rate (mL/hr)  60 mL/hr     Continuous TF Starting Rate (mL/hr)  20 mL/hr     Continuous TF Goal Volume (mL)  1440 mL     Water flush (mL)   50 mL     Water Flush Frequency  Per hour     New EN Prescription Ordered?  Yes        EN to Supply    Kcal/Day  2160 Kcal/Day     Protein (gm/day)  98 gm/day     Meet Estimated Kcal Need (%)  97 %     Meet Estimated Protein Need (%)  93 %     TF Free H2O (mL)  1094 mL     Total Free H2O (mL/day)  2294 mL/day         Education/Evaluation     Row Name 08/09/20 1026          Education    Education  Will Instruct as appropriate        Monitor/Evaluation    Monitor  Per protocol           Electronically signed by:  Mariana Mcdowell RD  08/09/20 10:26  "

## 2020-08-09 NOTE — PLAN OF CARE
Problem: Patient Care Overview  Goal: Plan of Care Review  Outcome: Ongoing (interventions implemented as appropriate)   Patient c/o loss of vision in right eye for a few moments this morning, also blurred and slightly double vision. NIH was 13. Spoke with Dr. Felipe and ordered CT stat. It had slightly worsened. This was reported to [facility] Neuro and Dr. Bateman.     Patient still very emotionally labile, noted seroquel to be started tonight.   TF started this afternoon, tolerated well so far, no residual. Tolerated PO pain medication and states relief is much better. Can also attribute relief to steroid injection. Patient is overall more alert and not having episodes of delirium or hallucinations this afternoon.   VSS. Plan for video swallow evaluation tomorrow.   Rehab consult still pending. Confirmation of receipt completed.   Cough medication and duo nebs started. Patient's lungs sound better after treatments.  [facility] ok'd patient to continue with therapies.    Wife at bedside stated that the patient's mother would be best to be designated visitor because she can't be up here everyday right now since she has young children. I assured her if she called we would give her updates. His mother will be returning to [place] at the end of the week and she was concerned he would have no one with access to the hospital and I assured her that we would reevaluate at that time.

## 2020-08-09 NOTE — PROGRESS NOTES
"      [facility]         Dr Robertson Sayied   LOS: 6 days   Patient Care Team:  Provider, No Known as PCP - General    Chief Complaint: Acute ICH large and nontraumatic status post emergent craniotomy suspected to be due to AVM postop on the vent    Interval History: Issues with visual problems and headaches and nausea.  CT head ordered.  Patient came back from CT head reports mild headaches bifrontal.    REVIEW OF SYSTEMS:   No chest pain no shortness of breath    Ventilator/Non-Invasive Ventilation Settings (From admission, onward)   Nasal cannula oxygen      Vital Signs  Temp:  [97.4 °F (36.3 °C)-98.4 °F (36.9 °C)] 97.4 °F (36.3 °C)  Heart Rate:  [] 87  Resp:  [18-22] 20  BP: (112-126)/(64-81) 112/64    Intake/Output Summary (Last 24 hours) at 8/9/2020 1239  Last data filed at 8/9/2020 1100  Gross per 24 hour   Intake 390 ml   Output 700 ml   Net -310 ml     Flowsheet Rows      First Filed Value   Admission Height  177.8 cm (70\") Documented at 08/03/2020 2203   Admission Weight  96 kg (211 lb 11.2 oz) Documented at 08/03/2020 2140          Physical Exam:  Patient is examined using the personal protective equipment as per guidelines from infection control for this particular patient as enacted.  Hand hygiene was performed before and after patient interaction.   General Appearance:   Awake no distress.  Following simple commands  Neck midline trachea no thyromegaly   Lungs:    Diminished breath sounds rhonchi bilaterally in the bases    Heart:    Regular rhythm and normal rate, normal S1 and S2, no            murmur, no gallop, no rub, no click   Chest Wall:    No abnormalities observed   Abdomen:    Soft nondistended bowel sounds positive   Extremities:   Moves all extremities well, no edema, no cyanosis, no             redness  CNS left hemiparesis following commands  Skin no rashes no nodules  Musculoskeletal no cyanosis no clubbing normal range of motion     Results Review:        Results " from last 7 days   Lab Units 08/09/20  0656 08/08/20  1005 08/07/20  0612   SODIUM mmol/L 136 135* 138   POTASSIUM mmol/L 3.4* 3.5 2.8*   CHLORIDE mmol/L 109* 108* 106   CO2 mmol/L 18.4* 18.8* 23.6   BUN mg/dL 4* 7 8   CREATININE mg/dL 0.40* 0.45* 0.43*   GLUCOSE mg/dL 85 98 90   CALCIUM mg/dL 8.6 8.2* 7.8*     Results from last 7 days   Lab Units 08/03/20  2144   TROPONIN T ng/mL <0.010     Results from last 7 days   Lab Units 08/09/20  0656 08/08/20  1005 08/07/20  0612   WBC 10*3/mm3 5.36 5.88 7.04   HEMOGLOBIN g/dL 10.9* 10.9* 10.9*   HEMATOCRIT % 31.6* 30.5* 29.8*   PLATELETS 10*3/mm3 140 151 130*     Results from last 7 days   Lab Units 08/08/20  1005 08/06/20  0552 08/05/20  1049 08/05/20  0613   INR  1.42* 1.42* 1.47* 1.49*   APTT seconds 36.7* 37.7*  --  35.1     Results from last 7 days   Lab Units 08/04/20  0606   CHOLESTEROL mg/dL 250*     Results from last 7 days   Lab Units 08/09/20  0656   MAGNESIUM mg/dL 1.6     Results from last 7 days   Lab Units 08/06/20  0552  08/04/20  0606   CHOLESTEROL mg/dL  --   --  250*   TRIGLYCERIDES mg/dL 159*   < > 194*   HDL CHOL mg/dL  --   --  42   LDL CHOL mg/dL  --   --  169*    < > = values in this interval not displayed.     Results from last 7 days   Lab Units 08/04/20  0124   PH, ARTERIAL pH units 7.372   PO2 ART mm Hg 317.8*   PCO2, ARTERIAL mm Hg 45.6*   HCO3 ART mmol/L 26.5       I reviewed the patient's new clinical results.  I personally viewed and interpreted the patient's CXR        Medication Review:     amoxicillin-clavulanate 1 tablet Nasogastric Q8H   dexamethasone 10 mg Intravenous Once   dexamethasone 4 mg Intravenous Q6H   folic acid 1 mg Nasogastric Daily   ipratropium-albuterol 3 mL Nebulization 4x Daily - RT   levETIRAcetam 500 mg Nasogastric Q12H   QUEtiapine 25 mg Oral Nightly   sodium chloride 10 mL Intravenous Q12H   thiamine 100 mg Nasogastric Daily            ASSESSMENT:   Acute left-sided weakness due to right ICH  Acute right  intracerebral hemorrhage; Large and nontraumatic status post emergent craniotomy  Possible transfusion reaction  Coagulopathy  Brain compression with midline shift status post emergent craniotomy  Acute respiratory failure s/p mechanical ventilator  Possible aspiration pneumonia  Acute alcohol intoxication  Acute alcoholic hepatitis  Acute anion gap metabolic acidosis  Acute hypokalemia  Fever      PLAN:  CT head report noted.  Neurology currently following.  I will switch him to oral pain medications  Fever curve is improved.  Doppler upper and lower extremity superficial thrombophlebitis.  Suspect likely noninfectious however patient for fever.   Doppler extremities show thrombophlebitis. oral antibiotics and complete course for aspiration pneumonia.  Chest x-ray and oxygenation status remains stable  Coagulopathy felt to be due to vitamin K deficiency.  Etiology of ICH unclear.  His INR is now corrected per hematology.  Continue thiamine folate  Keppra for seizure prophylaxis  PT OT  Diet per speech  Will need rehab at discharge once cleared by neurology    Ailyn Bateman MD  08/09/20  12:39

## 2020-08-09 NOTE — PLAN OF CARE
Problem: Stroke (Hemorrhagic) (Adult)  Intervention: Monitor/Manage Eating/Swallowing Impairment  Flowsheets (Taken 8/9/2020 1030)  Aspiration Precautions: other (see comments)  NPO per SLP recs  TF to start today with Nutren 1.5, goal 60 cc  Water flushes 50 cc hourly

## 2020-08-09 NOTE — PROGRESS NOTES
"POD 5\  S/p Right FT craniotomy for evacuation of ICH  The final path showed possible evidence for vascular malformation but no evidence of malignancy.  We have asked them to send the path specimens out for an additional opinion.  Vitals:    08/09/20 1018 08/09/20 1113 08/09/20 1118 08/09/20 1329   BP:    120/81   BP Location:    Right arm   Patient Position:    Lying   Pulse:  87 87 91   Resp:  20 20 20   Temp:    98.5 °F (36.9 °C)   TempSrc:    Oral   SpO2:  95%     Weight:       Height: 185.4 cm (73\")      The MRI done 2 days ago and the CT scan shows a little bit more perilesional edema and perhaps a small amount of recurrent blood in the surgical bed.  There is some mass-effect and the patient has headache but he is still overall neurologically the same.  He is awake and obeys commands with the right side and is plegic on the left.  Despite the pathology report I am still concerned about the possibility of malignant glioma that bled at this initial presentation  Will await to see the second opinion about the possibility of a vascular malformation.  But will add steroids to see if that will help with some of the edema and mass-effect  We will continue to follow him with you.  I do not see a need for reoperative surgery at this point.  We will add Lovenox for DVT prophylaxis.  Lab Results   Component Value Date    GLUCOSE 85 08/09/2020    BUN 4 (L) 08/09/2020    CREATININE 0.40 (L) 08/09/2020    EGFRIFNONA >150 08/09/2020    BCR 10.0 08/09/2020    K 3.4 (L) 08/09/2020    CO2 18.4 (L) 08/09/2020    CALCIUM 8.6 08/09/2020    ALBUMIN 3.20 (L) 08/09/2020    AST 36 08/09/2020    ALT 24 08/09/2020     WBC   Date Value Ref Range Status   08/09/2020 5.36 3.40 - 10.80 10*3/mm3 Final     RBC   Date Value Ref Range Status   08/09/2020 3.10 (L) 4.14 - 5.80 10*6/mm3 Final     Hemoglobin   Date Value Ref Range Status   08/09/2020 10.9 (L) 13.0 - 17.7 g/dL Final     Hematocrit   Date Value Ref Range Status   08/09/2020 31.6 (L) " 37.5 - 51.0 % Final     MCV   Date Value Ref Range Status   08/09/2020 101.9 (H) 79.0 - 97.0 fL Final     MCH   Date Value Ref Range Status   08/09/2020 35.2 (H) 26.6 - 33.0 pg Final     MCHC   Date Value Ref Range Status   08/09/2020 34.5 31.5 - 35.7 g/dL Final     RDW   Date Value Ref Range Status   08/09/2020 11.7 (L) 12.3 - 15.4 % Final     RDW-SD   Date Value Ref Range Status   08/09/2020 43.5 37.0 - 54.0 fl Final     MPV   Date Value Ref Range Status   08/09/2020 8.8 6.0 - 12.0 fL Final     Platelets   Date Value Ref Range Status   08/09/2020 140 140 - 450 10*3/mm3 Final

## 2020-08-09 NOTE — PLAN OF CARE
Problem: Patient Care Overview  Goal: Plan of Care Review  Outcome: Ongoing (interventions implemented as appropriate)  Flowsheets  Taken 8/9/2020 0002  Plan of Care Reviewed With: patient  Taken 8/9/2020 0527  Outcome Summary: VVS. Complaints of pain and anxiety stated per patient, PRN medication given and symptoms relieved. NIH totaling 16. Med's crushed per NGT, patient tolerated well. Placement in the left mare measuring at 55cm. Left side noted to still have severe deficits. No new orders at this time, continue to monitor.

## 2020-08-10 ENCOUNTER — APPOINTMENT (OUTPATIENT)
Dept: GENERAL RADIOLOGY | Facility: HOSPITAL | Age: 30
End: 2020-08-10

## 2020-08-10 PROBLEM — E56.1 VITAMIN K DEFICIENCY: Status: ACTIVE | Noted: 2020-08-10

## 2020-08-10 PROBLEM — F10.10 ALCOHOL ABUSE: Status: ACTIVE | Noted: 2020-08-10

## 2020-08-10 PROBLEM — G93.41 METABOLIC ENCEPHALOPATHY: Status: ACTIVE | Noted: 2020-08-10

## 2020-08-10 LAB
ALBUMIN SERPL-MCNC: 3.9 G/DL (ref 3.5–5.2)
ALBUMIN/GLOB SERPL: 1 G/DL
ALP SERPL-CCNC: 100 U/L (ref 39–117)
ALT SERPL W P-5'-P-CCNC: 29 U/L (ref 1–41)
ANION GAP SERPL CALCULATED.3IONS-SCNC: 11.5 MMOL/L (ref 5–15)
AST SERPL-CCNC: 44 U/L (ref 1–40)
BILIRUB SERPL-MCNC: 3.6 MG/DL (ref 0–1.2)
BILIRUB UR QL STRIP: NEGATIVE
BUN SERPL-MCNC: 5 MG/DL (ref 6–20)
BUN/CREAT SERPL: 11.6 (ref 7–25)
CALCIUM SPEC-SCNC: 10.3 MG/DL (ref 8.6–10.5)
CHLORIDE SERPL-SCNC: 104 MMOL/L (ref 98–107)
CLARITY UR: CLEAR
CO2 SERPL-SCNC: 18.5 MMOL/L (ref 22–29)
COLOR UR: ABNORMAL
CREAT SERPL-MCNC: 0.43 MG/DL (ref 0.76–1.27)
DEPRECATED RDW RBC AUTO: 42.1 FL (ref 37–54)
ERYTHROCYTE [DISTWIDTH] IN BLOOD BY AUTOMATED COUNT: 11.7 % (ref 12.3–15.4)
GFR SERPL CREATININE-BSD FRML MDRD: >150 ML/MIN/1.73
GLOBULIN UR ELPH-MCNC: 3.8 GM/DL
GLUCOSE SERPL-MCNC: 154 MG/DL (ref 65–99)
GLUCOSE UR STRIP-MCNC: NEGATIVE MG/DL
HCT VFR BLD AUTO: 33.5 % (ref 37.5–51)
HGB BLD-MCNC: 12.2 G/DL (ref 13–17.7)
HGB UR QL STRIP.AUTO: NEGATIVE
KETONES UR QL STRIP: NEGATIVE
LEUKOCYTE ESTERASE UR QL STRIP.AUTO: NEGATIVE
MCH RBC QN AUTO: 35.9 PG (ref 26.6–33)
MCHC RBC AUTO-ENTMCNC: 36.4 G/DL (ref 31.5–35.7)
MCV RBC AUTO: 98.5 FL (ref 79–97)
NITRITE UR QL STRIP: NEGATIVE
PH UR STRIP.AUTO: 7 [PH] (ref 5–8)
PLATELET # BLD AUTO: 196 10*3/MM3 (ref 140–450)
PMV BLD AUTO: 9.1 FL (ref 6–12)
POTASSIUM SERPL-SCNC: 3.5 MMOL/L (ref 3.5–5.2)
PROCALCITONIN SERPL-MCNC: 0.16 NG/ML (ref 0–0.25)
PROT SERPL-MCNC: 7.7 G/DL (ref 6–8.5)
PROT UR QL STRIP: NEGATIVE
RBC # BLD AUTO: 3.4 10*6/MM3 (ref 4.14–5.8)
SODIUM SERPL-SCNC: 134 MMOL/L (ref 136–145)
SP GR UR STRIP: 1.02 (ref 1–1.03)
UROBILINOGEN UR QL STRIP: ABNORMAL
WBC # BLD AUTO: 6.49 10*3/MM3 (ref 3.4–10.8)

## 2020-08-10 PROCEDURE — 85027 COMPLETE CBC AUTOMATED: CPT | Performed by: INTERNAL MEDICINE

## 2020-08-10 PROCEDURE — 74018 RADEX ABDOMEN 1 VIEW: CPT

## 2020-08-10 PROCEDURE — 99024 POSTOP FOLLOW-UP VISIT: CPT | Performed by: NURSE PRACTITIONER

## 2020-08-10 PROCEDURE — 80053 COMPREHEN METABOLIC PANEL: CPT | Performed by: INTERNAL MEDICINE

## 2020-08-10 PROCEDURE — 97112 NEUROMUSCULAR REEDUCATION: CPT

## 2020-08-10 PROCEDURE — 25010000002 LORAZEPAM PER 2 MG: Performed by: INTERNAL MEDICINE

## 2020-08-10 PROCEDURE — 94799 UNLISTED PULMONARY SVC/PX: CPT

## 2020-08-10 PROCEDURE — 99232 SBSQ HOSP IP/OBS MODERATE 35: CPT | Performed by: PSYCHIATRY & NEUROLOGY

## 2020-08-10 PROCEDURE — 25010000002 DEXAMETHASONE PER 1 MG: Performed by: NEUROLOGICAL SURGERY

## 2020-08-10 PROCEDURE — 25010000002 ONDANSETRON PER 1 MG: Performed by: INTERNAL MEDICINE

## 2020-08-10 PROCEDURE — 25010000002 HALOPERIDOL LACTATE PER 5 MG: Performed by: PSYCHIATRY & NEUROLOGY

## 2020-08-10 PROCEDURE — 97110 THERAPEUTIC EXERCISES: CPT

## 2020-08-10 PROCEDURE — 84145 PROCALCITONIN (PCT): CPT | Performed by: INTERNAL MEDICINE

## 2020-08-10 PROCEDURE — 25010000002 ENOXAPARIN PER 10 MG: Performed by: NEUROLOGICAL SURGERY

## 2020-08-10 PROCEDURE — 81003 URINALYSIS AUTO W/O SCOPE: CPT | Performed by: INTERNAL MEDICINE

## 2020-08-10 RX ORDER — PANTOPRAZOLE SODIUM 40 MG/10ML
40 INJECTION, POWDER, LYOPHILIZED, FOR SOLUTION INTRAVENOUS
Status: DISCONTINUED | OUTPATIENT
Start: 2020-08-11 | End: 2020-08-13

## 2020-08-10 RX ORDER — OLANZAPINE 10 MG/1
10 TABLET ORAL
Status: DISCONTINUED | OUTPATIENT
Start: 2020-08-10 | End: 2020-08-12

## 2020-08-10 RX ORDER — DEXAMETHASONE SODIUM PHOSPHATE 4 MG/ML
4 INJECTION, SOLUTION INTRA-ARTICULAR; INTRALESIONAL; INTRAMUSCULAR; INTRAVENOUS; SOFT TISSUE EVERY 12 HOURS
Status: DISCONTINUED | OUTPATIENT
Start: 2020-08-11 | End: 2020-08-12

## 2020-08-10 RX ORDER — OLANZAPINE 5 MG/1
5 TABLET ORAL
Status: DISCONTINUED | OUTPATIENT
Start: 2020-08-10 | End: 2020-08-10

## 2020-08-10 RX ORDER — HALOPERIDOL 5 MG/ML
2 INJECTION INTRAMUSCULAR
Status: DISCONTINUED | OUTPATIENT
Start: 2020-08-10 | End: 2020-08-15 | Stop reason: HOSPADM

## 2020-08-10 RX ADMIN — IPRATROPIUM BROMIDE AND ALBUTEROL SULFATE 3 ML: 2.5; .5 SOLUTION RESPIRATORY (INHALATION) at 10:50

## 2020-08-10 RX ADMIN — IPRATROPIUM BROMIDE AND ALBUTEROL SULFATE 3 ML: 2.5; .5 SOLUTION RESPIRATORY (INHALATION) at 15:54

## 2020-08-10 RX ADMIN — DEXAMETHASONE SODIUM PHOSPHATE 4 MG: 4 INJECTION, SOLUTION INTRAMUSCULAR; INTRAVENOUS at 14:45

## 2020-08-10 RX ADMIN — OXYCODONE HYDROCHLORIDE AND ACETAMINOPHEN 1 TABLET: 5; 325 TABLET ORAL at 00:45

## 2020-08-10 RX ADMIN — SODIUM CHLORIDE, PRESERVATIVE FREE 10 ML: 5 INJECTION INTRAVENOUS at 08:03

## 2020-08-10 RX ADMIN — AMOXICILLIN AND CLAVULANATE POTASSIUM 500 MG: 500; 125 TABLET, FILM COATED ORAL at 06:13

## 2020-08-10 RX ADMIN — OLANZAPINE 10 MG: 5 TABLET ORAL at 18:27

## 2020-08-10 RX ADMIN — DEXAMETHASONE SODIUM PHOSPHATE 4 MG: 4 INJECTION, SOLUTION INTRAMUSCULAR; INTRAVENOUS at 08:03

## 2020-08-10 RX ADMIN — ENOXAPARIN SODIUM 40 MG: 40 INJECTION SUBCUTANEOUS at 14:45

## 2020-08-10 RX ADMIN — LEVETIRACETAM 500 MG: 100 SOLUTION ORAL at 08:03

## 2020-08-10 RX ADMIN — Medication 100 MG: at 08:03

## 2020-08-10 RX ADMIN — FOLIC ACID 1 MG: 1 TABLET ORAL at 08:03

## 2020-08-10 RX ADMIN — IPRATROPIUM BROMIDE AND ALBUTEROL SULFATE 3 ML: 2.5; .5 SOLUTION RESPIRATORY (INHALATION) at 20:01

## 2020-08-10 RX ADMIN — AMOXICILLIN AND CLAVULANATE POTASSIUM 500 MG: 500; 125 TABLET, FILM COATED ORAL at 14:44

## 2020-08-10 RX ADMIN — SODIUM CHLORIDE, PRESERVATIVE FREE 10 ML: 5 INJECTION INTRAVENOUS at 21:00

## 2020-08-10 RX ADMIN — ONDANSETRON 4 MG: 2 INJECTION INTRAMUSCULAR; INTRAVENOUS at 18:27

## 2020-08-10 RX ADMIN — HALOPERIDOL LACTATE 2 MG: 5 INJECTION, SOLUTION INTRAMUSCULAR at 20:59

## 2020-08-10 RX ADMIN — LORAZEPAM 1 MG: 2 INJECTION INTRAMUSCULAR; INTRAVENOUS at 09:40

## 2020-08-10 RX ADMIN — DEXAMETHASONE SODIUM PHOSPHATE 4 MG: 4 INJECTION, SOLUTION INTRAMUSCULAR; INTRAVENOUS at 03:13

## 2020-08-10 RX ADMIN — LORAZEPAM 1 MG: 2 INJECTION INTRAMUSCULAR; INTRAVENOUS at 03:13

## 2020-08-10 NOTE — CONSULTS
"  Patient Identification:  NAME:  Avinash Everett  Age:  30 y.o.   Sex:  male   :  1990   MRN:  [id number]   This is a follow-up note with a previous neurology consult performed on 2020.    Chief complaint: He does not have one, reason for consult change in mental status    History of present illness: Patient is a 30-year-old right-handed white male with an unremarkable medical history although he is a longstanding alcoholic and it is caused him his marriage.  He comes to the hospital with the sudden onset of some confusion and left-sided clumsiness context patient on CAT scan shows a large right hemisphere intraparenchymal hemorrhage modifying factors he had a craniotomy associated symptoms he is weak on the left side quality is weakness on the left side no seizure activity associated symptoms he has been wild actively hallucinating talking about \"taking him away to be murdered\" etc. context is a patient who is a heavy drinker.  No seizure activity modifying factors she is been getting Ativan here which may be helping some MRI by my independent eyeball review shows right craniotomy site hemorrhage cavity also left subdural hematoma and a small left frontal acute stroke there is no definite history of head trauma noted    PPE was warned by the patient by me at all times I was never near him for more than 5 minutes within 6 feet washed before I put the gloves gown mask goggles on and disposed of it properly afterwards and washed up afterwards no aerosols were used extreme caution was used at all times to avoid any type of COVID exposure appropriate  Past medical history:  History reviewed. No pertinent past medical history.    Past surgical history:  History reviewed. No pertinent surgical history.    Allergies:  Citrus and Promethazine    Home medications:  No medications prior to admission.        Hospital medications:    amoxicillin-clavulanate 1 tablet Nasogastric Q8H   dexamethasone 4 mg Intravenous Q6H "   enoxaparin 40 mg Subcutaneous Q24H   folic acid 1 mg Nasogastric Daily   ipratropium-albuterol 3 mL Nebulization 4x Daily - RT   levETIRAcetam 500 mg Nasogastric Q12H   [START ON 8/11/2020] pantoprazole 40 mg Intravenous Q AM   QUEtiapine 25 mg Nasogastric Nightly   sodium chloride 10 mL Intravenous Q12H   thiamine 100 mg Nasogastric Daily        •  [DISCONTINUED] acetaminophen **OR** acetaminophen  •  bisacodyl  •  HYDROcod Polst-CPM Polst ER  •  HYDROmorphone **AND** naloxone  •  LORazepam  •  magnesium sulfate **OR** magnesium sulfate **OR** magnesium sulfate  •  ondansetron  •  oxyCODONE-acetaminophen  •  potassium chloride **OR** potassium chloride **OR** potassium chloride  •  potassium phosphate infusion greater than 15 mMoles **OR** potassium phosphate infusion greater than 15 mMoles **OR** potassium phosphate **OR** sodium phosphate IVPB **OR** sodium phosphate IVPB  •  sodium chloride    Family history:  History reviewed. No pertinent family history.    Social history:  Social History     Tobacco Use   • Smoking status: Current Every Day Smoker     Packs/day: 0.50   • Smokeless tobacco: Never Used   Substance Use Topics   • Alcohol use: Not on file   • Drug use: Not on file       Review of systems:    He cannot give me any review of systems his mom is here and she cannot really give me any review of systems other than to say he is a very big drinker and it ruined his marriage and he has had recently had a break-up with his wife, no review of systems possible from the patient or his mom but I reviewed the chart fully    Objective:  Vitals Ranges:   Temp:  [98.2 °F (36.8 °C)-98.6 °F (37 °C)] 98.4 °F (36.9 °C)  Heart Rate:  [] 115  Resp:  [18-20] 20  BP: (121-146)/(75-82) 146/82      Physical Exam: Patient is lethargic keeps his eyes closed but awakens and says a few things he is hallucinating but currently will not answer any questions of orientation fund of knowledge attention span concentration is  "fair recent remote memory cannot be tested language she is able to comprehend name and repeat does not appear to be a phasic he is in no distress pupils 4 constricting to 2-1/2 eyes are conjugate decreased left nasolabial fold he would not follow any other cranial nerve testing's although he did count fingers correctly at 2 feet motor dense left hemiparesis moves right side well no rigidity atrophy or fasciculations reflexes depressed on the left side left toe upgoing right toe downgoing sensation coordination station and gait completely impossible for this patient to follow heart regular without murmur neck is moderately rigid without bruits extremities no clubbing cyanosis edema visual acuity normal at 3 feet    Results review:   I reviewed the patient's new clinical results.    Data review:  Lab Results (last 24 hours)     Procedure Component Value Units Date/Time    Procalcitonin [[id number]]  (Normal) Collected:  08/10/20 0431    Specimen:  Blood Updated:  08/10/20 0557     Procalcitonin 0.16 ng/mL     Narrative:       As a Marker for Sepsis (Non-Neonates):   1. <0.5 ng/mL represents a low risk of severe sepsis and/or septic shock.  1. >2 ng/mL represents a high risk of severe sepsis and/or septic shock.    As a Marker for Lower Respiratory Tract Infections that require antibiotic therapy:  PCT on Admission     Antibiotic Therapy             6-12 Hrs later  > 0.5                Strongly Recommended            >0.25 - <0.5         Recommended  0.1 - 0.25           Discouraged                   Remeasure/reassess PCT  <0.1                 Strongly Discouraged          Remeasure/reassess PCT      As 28 day mortality risk marker: \"Change in Procalcitonin Result\" (> 80 % or <=80 %) if Day 0 (or Day 1) and Day 4 values are available. Refer to http://www.[employer]-pct-calculator.com/   Change in PCT <=80 %   A decrease of PCT levels below or equal to 80 % defines a positive change in PCT test result representing a higher " risk for 28-day all-cause mortality of patients diagnosed with severe sepsis or septic shock.  Change in PCT > 80 %   A decrease of PCT levels of more than 80 % defines a negative change in PCT result representing a lower risk for 28-day all-cause mortality of patients diagnosed with severe sepsis or septic shock.                Results may be falsely decreased if patient taking Biotin.     Comprehensive Metabolic Panel [[id number]]  (Abnormal) Collected:  08/10/20 0431    Specimen:  Blood Updated:  08/10/20 0548     Glucose 154 mg/dL      BUN 5 mg/dL      Creatinine 0.43 mg/dL      Sodium 134 mmol/L      Potassium 3.5 mmol/L      Chloride 104 mmol/L      CO2 18.5 mmol/L      Calcium 10.3 mg/dL      Total Protein 7.7 g/dL      Albumin 3.90 g/dL      ALT (SGPT) 29 U/L      AST (SGOT) 44 U/L      Alkaline Phosphatase 100 U/L      Total Bilirubin 3.6 mg/dL      eGFR Non African Amer >150 mL/min/1.73      Globulin 3.8 gm/dL      A/G Ratio 1.0 g/dL      BUN/Creatinine Ratio 11.6     Anion Gap 11.5 mmol/L     Narrative:       GFR Normal >60  Chronic Kidney Disease <60  Kidney Failure <15      CBC (No Diff) [[id number]]  (Abnormal) Collected:  08/10/20 0431    Specimen:  Blood Updated:  08/10/20 0523     WBC 6.49 10*3/mm3      RBC 3.40 10*6/mm3      Hemoglobin 12.2 g/dL      Hematocrit 33.5 %      MCV 98.5 fL      MCH 35.9 pg      MCHC 36.4 g/dL      RDW 11.7 %      RDW-SD 42.1 fl      MPV 9.1 fL      Platelets 196 10*3/mm3     Blood Culture - Blood, Arm, Right [[id number]] Collected:  08/05/20 2326    Specimen:  Blood from Arm, Right Updated:  08/10/20 0000     Blood Culture No growth at 4 days    Blood Culture - Blood, Arm, Left [[id number]] Collected:  08/05/20 2326    Specimen:  Blood from Arm, Left Updated:  08/10/20 0000     Blood Culture No growth at 4 days           Imaging:  Imaging Results (Last 24 Hours)     Procedure Component Value Units Date/Time    XR Abdomen KUB [[id number]] Collected:  08/10/20 0549      Updated:  08/10/20 0553    Narrative:       KUB     HISTORY: Nasogastric tube placement     COMPARISON: 08/07/2020     FINDINGS:  Weighted enteric feeding tube has been removed, and nasogastric tube has  been placed. This terminates at the GE junction. I would suggest  advancement by at least 8 to 10 cm. Bowel gas pattern is unremarkable.     This report was finalized on 8/10/2020 5:50 AM by Dr. Annelise Long M.D.       XR Abdomen KUB [[id number]] Collected:  08/10/20 0442     Updated:  08/10/20 0446    Narrative:       KUB     HISTORY: Feeding tube placement     COMPARISON: None available.     FINDINGS:  Weighted enteric feeding tube appears to extend into the proximal body  of the stomach. Visualized bowel gas pattern is not frankly obstructive.     This report was finalized on 8/10/2020 4:43 AM by Dr. Annelise Long M.D.                Assessment and Plan:     This patient has been an alcoholic and I believe that his blood indices as well as his hepatic enzymes are consistent with such a diagnosis.  Likewise the right brain bleed would not be uncommon in a significant alcoholic.  Note he did not appear to have a aneurysm AVM or cavernous hemangioma, etc.  He has some been some subdural hematoma on the left side as well as a small acute stroke in the left frontal lobe which, blessedly, does not seem to be affecting his speech or his right side strength which is still good.  He does have a left hemiparesis related to the right hemisphere bleed.  He has almost also definitely gone through some alcohol withdrawal and currently is having hallucinations and psychosis.  The Zyprexa at a tiny dose along with the Haldol are both very QT interval friendly and the benefits outweigh any risk  I will add a neuroleptic tonight as that is the only class of medications that is going to get rid of his anger agitation and psychosis.  Benefits outweigh risks  There is almost certainly some element of steroid psychosis as  well and I will decrease the steroids down to 4 mg IV every 12 hours and plan on a repeat CAT scan tomorrow to follow-up accordingly.  Thank you      Prateek Villafuerte MD  08/10/20  15:33

## 2020-08-10 NOTE — PROGRESS NOTES
"      [facility]         Dr Robertson Sayied   LOS: 7 days   Patient Care Team:  Provider, No Known as PCP - General    Chief Complaint: Acute ICH large and nontraumatic status post emergent craniotomy suspected to be due to AVM postop on the vent    Interval History: Worsening mental status and currently actively hallucinating.  Patient had to be placed on restraint due to increased agitation.    REVIEW OF SYSTEMS:   Confused    Ventilator/Non-Invasive Ventilation Settings (From admission, onward)   Nasal cannula oxygen      Vital Signs  Temp:  [98.2 °F (36.8 °C)-98.6 °F (37 °C)] 98.4 °F (36.9 °C)  Heart Rate:  [] 115  Resp:  [18-20] 20  BP: (121-146)/(75-82) 146/82    Intake/Output Summary (Last 24 hours) at 8/10/2020 1339  Last data filed at 8/10/2020 1300  Gross per 24 hour   Intake 1400 ml   Output 1700 ml   Net -300 ml     Flowsheet Rows      First Filed Value   Admission Height  177.8 cm (70\") Documented at 08/03/2020 2203   Admission Weight  96 kg (211 lb 11.2 oz) Documented at 08/03/2020 2140          Physical Exam:  Patient is examined using the personal protective equipment as per guidelines from infection control for this particular patient as enacted.  Hand hygiene was performed before and after patient interaction.   General Appearance:   Confused not following commands.  Actively hallucinating  Neck midline trachea no thyromegaly   Lungs:    Diminished breath sounds rhonchi bilaterally in the bases    Heart:    Regular rhythm and normal rate, normal S1 and S2, no            murmur, no gallop, no rub, no click   Chest Wall:    No abnormalities observed   Abdomen:    Soft nondistended bowel sounds positive   Extremities:   Moves all extremities well, no edema, no cyanosis, no             redness  CNS left hemiparesis hallucinating.  Not following commands  Skin no rashes no nodules  Musculoskeletal no cyanosis no clubbing normal range of motion     Results Review:        Results from " last 7 days   Lab Units 08/10/20  0431 08/09/20  0656 08/08/20  1005   SODIUM mmol/L 134* 136 135*   POTASSIUM mmol/L 3.5 3.4* 3.5   CHLORIDE mmol/L 104 109* 108*   CO2 mmol/L 18.5* 18.4* 18.8*   BUN mg/dL 5* 4* 7   CREATININE mg/dL 0.43* 0.40* 0.45*   GLUCOSE mg/dL 154* 85 98   CALCIUM mg/dL 10.3 8.6 8.2*     Results from last 7 days   Lab Units 08/03/20  2144   TROPONIN T ng/mL <0.010     Results from last 7 days   Lab Units 08/10/20  0431 08/09/20  0656 08/08/20  1005   WBC 10*3/mm3 6.49 5.36 5.88   HEMOGLOBIN g/dL 12.2* 10.9* 10.9*   HEMATOCRIT % 33.5* 31.6* 30.5*   PLATELETS 10*3/mm3 196 140 151     Results from last 7 days   Lab Units 08/08/20  1005 08/06/20  0552 08/05/20  1049 08/05/20  0613   INR  1.42* 1.42* 1.47* 1.49*   APTT seconds 36.7* 37.7*  --  35.1     Results from last 7 days   Lab Units 08/04/20  0606   CHOLESTEROL mg/dL 250*     Results from last 7 days   Lab Units 08/09/20  0656   MAGNESIUM mg/dL 1.6     Results from last 7 days   Lab Units 08/06/20  0552  08/04/20  0606   CHOLESTEROL mg/dL  --   --  250*   TRIGLYCERIDES mg/dL 159*   < > 194*   HDL CHOL mg/dL  --   --  42   LDL CHOL mg/dL  --   --  169*    < > = values in this interval not displayed.     Results from last 7 days   Lab Units 08/04/20  0124   PH, ARTERIAL pH units 7.372   PO2 ART mm Hg 317.8*   PCO2, ARTERIAL mm Hg 45.6*   HCO3 ART mmol/L 26.5       I reviewed the patient's new clinical results.  I personally viewed and interpreted the patient's CXR        Medication Review:     amoxicillin-clavulanate 1 tablet Nasogastric Q8H   dexamethasone 4 mg Intravenous Q6H   enoxaparin 40 mg Subcutaneous Q24H   folic acid 1 mg Nasogastric Daily   ipratropium-albuterol 3 mL Nebulization 4x Daily - RT   levETIRAcetam 500 mg Nasogastric Q12H   [START ON 8/11/2020] pantoprazole 40 mg Intravenous Q AM   QUEtiapine 25 mg Nasogastric Nightly   sodium chloride 10 mL Intravenous Q12H   thiamine 100 mg Nasogastric Daily            ASSESSMENT:    Altered mental status  Acute right intracerebral hemorrhage; Large and nontraumatic status post emergent craniotomy  Possible transfusion reaction  Coagulopathy  Brain compression with midline shift status post emergent craniotomy  Acute respiratory failure s/p mechanical ventilator  Possible aspiration pneumonia  Acute alcohol intoxication  Acute alcoholic hepatitis  Acute anion gap metabolic acidosis  Acute hypokalemia  Fever      PLAN:  Worsening mental status with active hallucination.  Is too far out for DTs however seems to be actively hallucinating.  We tried Seroquel last night but he appears to be worse.  I will go ahead and asked neurology to see.  I will check a UA also  CT head report noted.  Neurosurgery currently monitoring.  Neurology consult as above  Fever curve is improved.  Doppler upper and lower extremity superficial thrombophlebitis.  Suspect likely noninfectious however patient for fever.   Doppler extremities show thrombophlebitis. oral antibiotics and complete course for aspiration pneumonia.  Chest x-ray and oxygenation status remains stable  Coagulopathy felt to be due to vitamin K deficiency.  Etiology of ICH unclear.  His INR is now corrected per hematology.  Continue thiamine folate  Keppra for seizure prophylaxis  PT OT  Diet per speech  Multiple medical problems and issues going.  I will ask hospitalist for inpatient consult and to assume care  Will need rehab at discharge once cleared by neurology    Ailyn Bateman MD  08/10/20  13:39

## 2020-08-10 NOTE — PROGRESS NOTES
"NEUROSURGERY POST OPERATIVE PROGRESS NOTE     LOS: 7 days   Patient Care Team:  Provider, No Known as PCP - General  Subjective        Interval History:       CT HEAD yesterday showed blood and worsening edema at edges of resection cavity. Increased midline shift. Dr. Rivera reviewed this study yesterday and started patient on IV steroids. Outside pathology is pending.       Objective      Vital Signs  Temp:  [98.2 °F (36.8 °C)-98.6 °F (37 °C)] 98.4 °F (36.9 °C)  Heart Rate:  [] 115  Resp:  [18-20] 20  BP: (120-146)/(75-82) 146/82  Body mass index is 26.67 kg/m².      Physical Exam    Drowsy but arouses easily to verbal stimuli.  Oriented to person and year.  Easily reoriented to place.  Initially thought that he was at \"[place].\"  Denies headache.  Feeding tube intact.  PERRLA.  Unable to participate with EOM exam due to drowsiness.  Patient is receiving Ativan on a as needed basis and has been getting it about every 6 hours.  R cranial incision is well approximated. No redness, swelling or drainage.   Follows commands readily on the R. Remains plegic on L side      Results Review:     I reviewed the patient's new clinical results.    Labs:    Lab Results (last 24 hours)     Procedure Component Value Units Date/Time    Procalcitonin [[id number]]  (Normal) Collected:  08/10/20 0431    Specimen:  Blood Updated:  08/10/20 0557     Procalcitonin 0.16 ng/mL     Narrative:       As a Marker for Sepsis (Non-Neonates):   1. <0.5 ng/mL represents a low risk of severe sepsis and/or septic shock.  1. >2 ng/mL represents a high risk of severe sepsis and/or septic shock.    As a Marker for Lower Respiratory Tract Infections that require antibiotic therapy:  PCT on Admission     Antibiotic Therapy             6-12 Hrs later  > 0.5                Strongly Recommended            >0.25 - <0.5         Recommended  0.1 - 0.25           Discouraged                   Remeasure/reassess PCT  <0.1                 Strongly " "Discouraged          Remeasure/reassess PCT      As 28 day mortality risk marker: \"Change in Procalcitonin Result\" (> 80 % or <=80 %) if Day 0 (or Day 1) and Day 4 values are available. Refer to http://[email or web address]-pct-calculator.com/   Change in PCT <=80 %   A decrease of PCT levels below or equal to 80 % defines a positive change in PCT test result representing a higher risk for 28-day all-cause mortality of patients diagnosed with severe sepsis or septic shock.  Change in PCT > 80 %   A decrease of PCT levels of more than 80 % defines a negative change in PCT result representing a lower risk for 28-day all-cause mortality of patients diagnosed with severe sepsis or septic shock.                Results may be falsely decreased if patient taking Biotin.     CBC (No Diff) [[id number]]  (Abnormal) Collected:  08/10/20 0431    Specimen:  Blood Updated:  08/10/20 0523     WBC 6.49 10*3/mm3      RBC 3.40 10*6/mm3      Hemoglobin 12.2 g/dL      Hematocrit 33.5 %      MCV 98.5 fL      MCH 35.9 pg      MCHC 36.4 g/dL      RDW 11.7 %      RDW-SD 42.1 fl      MPV 9.1 fL      Platelets 196 10*3/mm3     Comprehensive Metabolic Panel [[id number]]  (Abnormal) Collected:  08/10/20 0431    Specimen:  Blood Updated:  08/10/20 0548     Glucose 154 mg/dL      BUN 5 mg/dL      Creatinine 0.43 mg/dL      Sodium 134 mmol/L      Potassium 3.5 mmol/L      Chloride 104 mmol/L      CO2 18.5 mmol/L      Calcium 10.3 mg/dL      Total Protein 7.7 g/dL      Albumin 3.90 g/dL      ALT (SGPT) 29 U/L      AST (SGOT) 44 U/L      Alkaline Phosphatase 100 U/L      Total Bilirubin 3.6 mg/dL      eGFR Non African Amer >150 mL/min/1.73      Globulin 3.8 gm/dL      A/G Ratio 1.0 g/dL      BUN/Creatinine Ratio 11.6     Anion Gap 11.5 mmol/L     Narrative:       GFR Normal >60  Chronic Kidney Disease <60  Kidney Failure <15            Current Medications:   Scheduled Meds:  amoxicillin-clavulanate 1 tablet Nasogastric Q8H   dexamethasone 4 mg Intravenous Q6H "   enoxaparin 40 mg Subcutaneous Q24H   folic acid 1 mg Nasogastric Daily   ipratropium-albuterol 3 mL Nebulization 4x Daily - RT   levETIRAcetam 500 mg Nasogastric Q12H   [START ON 8/11/2020] pantoprazole 40 mg Intravenous Q AM   QUEtiapine 25 mg Nasogastric Nightly   sodium chloride 10 mL Intravenous Q12H   thiamine 100 mg Nasogastric Daily     Assessment/Plan       Nontraumatic subcortical hemorrhage of right cerebral hemisphere (CMS/HCC)    Right-sided nontraumatic intracerebral hemorrhage (CMS/HCC)       POD 7 emergent right frontoparietal craniotomy for evacuation of intracerebral hemorrhage of uncertain etiology     Diagnostic cerebral angiogram performed August 5, 2020 showed no evidence of aneurysm, AVM or dural AV fistula     Headache, improved with steroids     Cerebral edema on repeat head CT yesterday    Plan:       Await outside pathology results  Continue IV steroids - plan for repeat MRI brain w/wo in 1-2 weeks  Add Protonix for GI prophylaxis  Continue mobilizing with therapies      NATHALY Castellano  08/10/20  13:23

## 2020-08-10 NOTE — CONSULTS
[facility]    Inpatient Internal Medicine Consult  Consult performed by: Antonio Christie MD  Consult ordered by: Ailyn Bateman MD  Reason for consult: Medical management/assume care      Date of Admit: 8/3/2020  Date of Consult: 08/10/20    Subjective   30 y.o. male with a history of alcoholism had confusion and left-sided weakness last Sunday/Monday found to have large right sided intracerebral hemorrhage with mass-effect.  On 8/3/2020 he underwent emergent right frontoparietal craniotomy for evacuation of intracerebral hemorrhage.  After extubation he was alert according to his mother yesterday however he developed confusion and agitation and having hallucinations.  He has persistent left-sided weakness.  He is confused and not oriented.  Patient not able to provide any history, history obtained from nursing staff and patient's mother (an LPN).  CT scan yesterday shows slight worsening of mass-effect.  Patient denies any pain at this time.    History reviewed. No pertinent past medical history.  History reviewed. No pertinent surgical history.  Current medications reviewed.    History reviewed. No pertinent family history.  Social History     Tobacco Use   • Smoking status: Current Every Day Smoker     Packs/day: 0.50   • Smokeless tobacco: Never Used   Substance Use Topics   • Alcohol use: Not on file   • Drug use: Not on file     Review of Systems   Unable to perform ROS: Mental status change     Objective      Vital Signs  Temp:  [98.2 °F (36.8 °C)-98.6 °F (37 °C)] 98.4 °F (36.9 °C)  Heart Rate:  [] 115  Resp:  [18-20] 20  BP: (121-146)/(75-82) 146/82    Physical Exam   Constitutional: He appears well-developed and well-nourished.  Non-toxic appearance. He does not have a sickly appearance. He does not appear ill. No distress. He is restrained.   HENT:   Head: Normocephalic and atraumatic.   Status post right craniotomy-right-sided head shaved.   NG tube   Eyes: Conjunctivae are normal.   Neck: Neck supple. No tracheal deviation present.   Cardiovascular: Regular rhythm and intact distal pulses. Tachycardia present.   Pulses:       Radial pulses are 2+ on the right side, and 2+ on the left side.        Dorsalis pedis pulses are 2+ on the right side, and 2+ on the left side.   Pulmonary/Chest: Effort normal and breath sounds normal. No respiratory distress. He has no wheezes. He has no rales.   Abdominal: Soft. Bowel sounds are normal. He exhibits no distension. There is no tenderness. There is no rebound and no guarding.   Musculoskeletal: He exhibits no edema.   Lymphadenopathy:     He has no cervical adenopathy.   Neurological: He is alert.   Left-sided paralysis.  Oriented to year and self.  He thought he was at his uncles house.  Did not know situation.  Following commands.   Skin: Skin is warm and dry.   Psychiatric: His affect is inappropriate. He is agitated. He is not aggressive.     While in the room and during my examination of the patient I wore gloves, mask, eye protection.  I washed my hands before and after this patient encounter.     Results Review:  I reviewed the patient's new clinical results.  I reviewed the patient's new imaging results and agree with the interpretation.  I personally viewed and interpreted the patient's EKG/Telemetry data    Estimated Creatinine Clearance: 325.8 mL/min (A) (by C-G formula based on SCr of 0.43 mg/dL (L)).    Scheduled Meds  amoxicillin-clavulanate 1 tablet Nasogastric Q8H   [START ON 8/11/2020] dexamethasone 4 mg Intravenous Q12H   enoxaparin 40 mg Subcutaneous Q24H   folic acid 1 mg Nasogastric Daily   ipratropium-albuterol 3 mL Nebulization 4x Daily - RT   levETIRAcetam 500 mg Nasogastric Q12H   OLANZapine 10 mg Nasogastric Daily Before Supper   [START ON 8/11/2020] pantoprazole 40 mg Intravenous Q AM   sodium chloride 10 mL Intravenous Q12H   thiamine 100 mg Nasogastric Daily     Continuous Infusions    PRN Meds  •  [DISCONTINUED] acetaminophen **OR** acetaminophen  •  bisacodyl  •  haloperidol lactate  •  HYDROcod Polst-CPM Polst ER  •  HYDROmorphone **AND** naloxone  •  LORazepam  •  magnesium sulfate **OR** magnesium sulfate **OR** magnesium sulfate  •  ondansetron  •  oxyCODONE-acetaminophen  •  potassium chloride **OR** potassium chloride **OR** potassium chloride  •  potassium phosphate infusion greater than 15 mMoles **OR** potassium phosphate infusion greater than 15 mMoles **OR** potassium phosphate **OR** sodium phosphate IVPB **OR** sodium phosphate IVPB  •  sodium chloride     Assessment/Plan   Active Hospital Problems    Diagnosis  POA   • **Nontraumatic subcortical hemorrhage of right cerebral hemisphere (CMS/HCC) [I61.0]  Unknown   • Alcohol abuse [F10.10]  Unknown   • Metabolic encephalopathy [G93.41]  Unknown   • Vitamin K deficiency [E56.1]  Unknown   • Right-sided nontraumatic intracerebral hemorrhage (CMS/HCC) [I61.9]  Yes      Resolved Hospital Problems   No resolved problems to display.     30 y.o. male history of alcohol use admitted with large right intracerebral hemorrhage status post craniotomy 8/3/2020    · Neurosurgery plans repeat MRI 1 to 2 weeks cerebral angiogram showed no evidence of aneurysm, AVM, or dural AV fistula  · Keppra for seizure prophylaxis  · Neurology has evaluated the patient and has started Zyprexa with Haldol and decreasing steroids.  He was also taken off IV Dilaudid.  He had a recent MRI 8/7/2020 showing incidental left frontal stroke  · He is on antibiotics treating/covering aspiration-now on Augmentin.  Fever however was felt to be noninfectious.  Check follow-up x-ray.  Currently has a feeding tube  · Coagulopathy due to vitamin K deficiency (hematology signed off)  · PT/rehab efforts  · He is on Lovenox for DVT prophylaxis.  He is chronic left lower extremity superficial thrombophlebitis noted on ultrasound 8/6/2020  · discussed with patient, family and  nursing staff.  · Thank you for asking [facility] to be involved in this patient's care. Will be happy to assume care.    Antonio Christie MD  08/10/20  17:27

## 2020-08-10 NOTE — SIGNIFICANT NOTE
08/10/20 1519   Rehab Time/Intention   Evaluation Not Performed unable to evaluate, medical status change  (RN reports decline in status and not appropriate for swallow re-eval at this time.)   Rehab Treatment   Discipline speech language pathologist

## 2020-08-10 NOTE — PLAN OF CARE
Problem: Patient Care Overview  Goal: Plan of Care Review  Outcome: Ongoing (interventions implemented as appropriate)  Flowsheets (Taken 8/10/2020 1155)  Progress: improving  Plan of Care Reviewed With: patient  Outcome Summary: Pt tolerated treatment fair this date. Increased overall activity slightly, though still very fatigued by end. No movement noted on either L side extremity, though tolerated some PROM to L LE. Required mod-max A x2 for bed mobility, then max A to maintain static and dynamic sitting balance. Performed sitting balance activities including lateral leans on elbows, and reaching across midline. Pt was given several cues throughout to look head up and to the L, and was successful w/ increased time required to achieve. Pt's mother was present throughout, and RN Dianne was present most of treatment. Patient was not wearing a face mask during this therapy encounter. Therapist used appropriate personal protective equipment including eye protection, mask, and gloves.  Mask used was standard procedure mask. Appropriate PPE was worn during the entire therapy session. Hand hygiene was completed before and after therapy session. Patient is not in enhanced droplet precautions. 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

## 2020-08-10 NOTE — PLAN OF CARE
Problem: Patient Care Overview  Goal: Plan of Care Review  Flowsheets  Taken 8/9/2020 2020  Plan of Care Reviewed With: patient  Taken 8/9/2020 0527  Outcome Summary: VVS. Complaints of pain and anxiety stated per patient, PRN medication given and symptoms relieved. NIH totaling 13. Med's crushed per NGT, patient tolerated well. Awaiting placement verification. Placed in the left mare measuring at 55cm. Left side extremities noted to still have severe deficits. Non-violent restraints placed on patient, RUE soft. Voicemail left on mother and wife's cell phones. No new orders at this time, continue to monitor.

## 2020-08-10 NOTE — THERAPY TREATMENT NOTE
Patient Name: Avinash Everett  : 1990    MRN: [id number]                              Today's Date: 8/10/2020       Admit Date: 8/3/2020    Visit Dx:     ICD-10-CM ICD-9-CM   1. Right-sided nontraumatic intracerebral hemorrhage, unspecified cerebral location (CMS/HCC) I61.9 431   2. Subdural hematoma (CMS/HCC) S06.5X9A 432.1   3. Nontraumatic subcortical hemorrhage of right cerebral hemisphere (CMS/HCC) I61.0 431     Patient Active Problem List   Diagnosis   • Right-sided nontraumatic intracerebral hemorrhage (CMS/HCC)   • Nontraumatic subcortical hemorrhage of right cerebral hemisphere (CMS/HCC)     History reviewed. No pertinent past medical history.  History reviewed. No pertinent surgical history.  General Information     Row Name 08/10/20 1148          PT Evaluation Time/Intention    Document Type  therapy note (daily note)  -     Mode of Treatment  physical therapy  -     Row Name 08/10/20 1148          General Information    Existing Precautions/Restrictions  fall  -     Barriers to Rehab  cognitive status  -     Row Name 08/10/20 1148          Cognitive Assessment/Intervention- PT/OT    Orientation Status (Cognition)  oriented to;person  -       User Key  (r) = Recorded By, (t) = Taken By, (c) = Cosigned By    Initials Name Provider Type     Lissa Finn PTA Physical Therapy Assistant        Mobility     Row Name 08/10/20 1149          Bed Mobility Assessment/Treatment    Bed Mobility Assessment/Treatment  supine-sit;sit-supine  -     Supine-Sit [place] (Bed Mobility)  moderate assist (50% patient effort);maximum assist (25% patient effort);2 person assist  -     Sit-Supine [place] (Bed Mobility)  maximum assist (25% patient effort);2 person assist  -     Assistive Device (Bed Mobility)  bed rails;head of bed elevated  -     Row Name 08/10/20 1149          Transfer Assessment/Treatment    Comment (Transfers)  NT - poor sitting balance  -       User Key  (r) =  Recorded By, (t) = Taken By, (c) = Cosigned By    Initials Name Provider Type    Lissa Arriola PTA Physical Therapy Assistant        Obj/Interventions     Row Name 08/10/20 1150          Therapeutic Exercise    Lower Extremity (Therapeutic Exercise)  LAQ (long arc quad), bilateral;gastroc stretch, left  -SM     Lower Extremity Range of Motion (Therapeutic Exercise)  ankle dorsiflexion/plantar flexion, bilateral  -     Exercise Type (Therapeutic Exercise)  AROM (active range of motion);PROM (passive range of motion) PROM on L LE  -SM     Position (Therapeutic Exercise)  seated  -     Sets/Reps (Therapeutic Exercise)  5 reps  -SM     Row Name 08/10/20 1150          Static Sitting Balance    Level of [place] (Unsupported Sitting, Static Balance)  maximal assist, 25 to 49% patient effort  -     Sitting Position (Unsupported Sitting, Static Balance)  sitting on edge of bed  -     Time Able to Maintain Position (Unsupported Sitting, Static Balance)  more than 5 minutes  -     Comment (Unsupported Sitting, Static Balance)  posterior lean; cues for posture and to turn head towards the L  -SM     Row Name 08/10/20 1150          Dynamic Sitting Balance    Level of [place], Reaches Outside Midline (Sitting, Dynamic Balance)  maximal assist, 25 to 49% patient effort;2 person assist  -     Sitting Position, Reaches Outside Midline (Sitting, Dynamic Balance)  sitting on edge of bed  -     Comment, Reaches Outside Midline (Sitting, Dynamic Balance)  practiced lateral leaning on each elbow; a lot of cues for hand placement to relax R extremities  -       User Key  (r) = Recorded By, (t) = Taken By, (c) = Cosigned By    Initials Name Provider Type    Lissa Arriola PTA Physical Therapy Assistant        Goals/Plan    No documentation.       Clinical Impression     Row Name 08/10/20 1152          Pain Assessment    Additional Documentation  Pain Scale: Numbers Pre/Post-Treatment (Group)   -SM     Row Name 08/10/20 1152          Pain Scale: Numbers Pre/Post-Treatment    Pain Scale: Numbers, Pretreatment  0/10 - no pain  -SM     Pain Scale: Numbers, Post-Treatment  0/10 - no pain  -SM     Row Name 08/10/20 1152          Positioning and Restraints    Pre-Treatment Position  in bed  -SM     Post Treatment Position  bed  -SM     In Bed  supine;call light within reach;encouraged to call for assist;exit alarm on;with family/caregiver;with nsg  -SM     Restraints  reapplied:;soft limb R UE and LE  -SM       User Key  (r) = Recorded By, (t) = Taken By, (c) = Cosigned By    Initials Name Provider Type    Lissa Arriola PTA Physical Therapy Assistant        Outcome Measures     Row Name 08/10/20 1154          How much help from another person do you currently need...    Turning from your back to your side while in flat bed without using bedrails?  2  -SM     Moving from lying on back to sitting on the side of a flat bed without bedrails?  2  -SM     Moving to and from a bed to a chair (including a wheelchair)?  1  -SM     Standing up from a chair using your arms (e.g., wheelchair, bedside chair)?  1  -SM     Climbing 3-5 steps with a railing?  1  -SM     To walk in hospital room?  1  -SM     AM-PAC 6 Clicks Score (PT)  8  -SM     Row Name 08/10/20 1154          Functional Assessment    Outcome Measure Options  AM-PAC 6 Clicks Basic Mobility (PT)  -SM       User Key  (r) = Recorded By, (t) = Taken By, (c) = Cosigned By    Initials Name Provider Type    Lissa Arriola PTA Physical Therapy Assistant        Physical Therapy Education                 Title: PT OT SLP Therapies (In Progress)     Topic: Physical Therapy (In Progress)     Point: Mobility training (In Progress)     Description:   Instruct learner(s) on safety and technique for assisting patient out of bed, chair or wheelchair.  Instruct in the proper use of assistive devices, such as walker, crutches, cane or brace.               Patient Friendly Description:   It's important to get you on your feet again, but we need to do so in a way that is safe for you. Falling has serious consequences, and your personal safety is the most important thing of all.        When it's time to get out of bed, one of us or a family member will sit next to you on the bed to give you support.     If your doctor or nurse tells you to use a walker, crutches, a cane, or a brace, be sure you use it every time you get out of bed, even if you think you don't need it.    Learning Progress Summary           Patient Acceptance, E,D, NR by SM at 8/10/2020 1154    Acceptance, E, VU,NR by LL at 8/9/2020 0527    Acceptance, E, VU,NR by ERIS at 8/8/2020 1530    Acceptance, E,D, NR by COLLIN at 8/7/2020 1204   Family Acceptance, E, VU,NR by ERIS at 8/8/2020 1530                   Point: Home exercise program (In Progress)     Description:   Instruct learner(s) on appropriate technique for monitoring, assisting and/or progressing patient with therapeutic exercises and activities.              Learning Progress Summary           Patient Acceptance, E,D, NR by  at 8/10/2020 1154    Acceptance, E, VU,NR by LL at 8/9/2020 0527    Acceptance, E,D, NR by COLLIN at 8/7/2020 1204                   Point: Body mechanics (In Progress)     Description:   Instruct learner(s) on proper positioning and spine alignment for patient and/or caregiver during mobility tasks and/or exercises.              Learning Progress Summary           Patient Acceptance, E,D, NR by  at 8/10/2020 1154    Acceptance, E, VU,NR by LL at 8/9/2020 0527    Acceptance, E, VU,NR by ERIS at 8/8/2020 1530    Acceptance, E,D, NR by COLLIN at 8/7/2020 1204   Family Acceptance, E, VU,NR by ERIS at 8/8/2020 1530                   Point: Precautions (In Progress)     Description:   Instruct learner(s) on prescribed precautions during mobility and gait tasks              Learning Progress Summary           Patient Acceptance, E,D, NR by  at  8/10/2020 1154    Acceptance, E, VU,NR by  at 8/9/2020 0527    Acceptance, E, VU,NR by  at 8/8/2020 1530    Acceptance, E,D, NR by  at 8/7/2020 1204   Family Acceptance, E, VU,NR by  at 8/8/2020 1530                               User Key     Initials Effective Dates Name Provider Type Discipline     02/05/19 -  Raysa Devine, PT Physical Therapist PT     03/07/18 -  Lissa Finn PTA Physical Therapy Assistant PT     09/17/19 -  Geri Glez, PT Physical Therapist PT     02/11/20 -  Mariana Bojorquez, RN Registered Nurse Nurse              PT Recommendation and Plan     Outcome Summary/Treatment Plan (PT)  Anticipated Discharge Disposition (PT): inpatient rehabilitation facility  Plan of Care Reviewed With: patient  Progress: improving  Outcome Summary: Pt tolerated treatment fair this date. increased overall activity slightly, though still very fatigued by end. No movement noted on either L side extremity, though tolerated some PROM to L LE. Required mod-max A x2 for bed mobility, then max A to maintain static and dynamic sitting balance. Performed sitting balance activities including lateral leans on elbows, and reaching across midline. Pt was given several cues throughout to look head up and to the L, and was successful w/ increased time required to achieve. Pt's mother was present throughout, and AVA Dean was present most of treatment. Patient was not wearing a face mask during this therapy encounter. Therapist used appropriate personal protective equipment including eye protection, mask, and gloves.  Mask used was standard procedure mask. Appropriate PPE was worn during the entire therapy session. Hand hygiene was completed before and after therapy session. Patient is not in enhanced droplet precautions.     Time Calculation:   PT Charges     Row Name 08/10/20 1159             Time Calculation    Start Time  1100  -SM      Stop Time  1128  -SM      Time Calculation (min)  28  min  -SM      PT Received On  08/10/20  -ROBYN      PT - Next Appointment  08/11/20  -        User Key  (r) = Recorded By, (t) = Taken By, (c) = Cosigned By    Initials Name Provider Type    Lissa Arriola PTA Physical Therapy Assistant        Therapy Charges for Today     Code Description Service Date Service Provider Modifiers Qty    14759311902 HC PT THER PROC EA 15 MIN 8/10/2020 Lissa Finn PTA GP 1    17069051857 HC PT THER SUPP EA 15 MIN 8/10/2020 Lissa Finn PTA GP 2    77726099611 HC PT NEUROMUSC RE EDUCATION EA 15 MIN 8/10/2020 Lissa Finn, BHUPENDRA GP 1          PT G-Codes  Outcome Measure Options: AM-PAC 6 Clicks Basic Mobility (PT)  AM-PAC 6 Clicks Score (PT): 8  AM-PAC 6 Clicks Score (OT): 11  Modified Gladwin Scale: 4 - Moderately severe disability.  Unable to walk without assistance, and unable to attend to own bodily needs without assistance.    Lissa Finn PTA  8/10/2020

## 2020-08-10 NOTE — PLAN OF CARE
Problem: Restraint, Nonbehavioral (Nonviolent)  Goal: Nonbehavioral (Nonviolent) Restraint: Achievement of Discontinuation Criteria  Outcome: Ongoing (interventions implemented as appropriate)  Flowsheets (Taken 8/10/2020 0607 by Mariana Bojorquez RN)  Nonbehavioral (Nonviolent) Restraint: Achievement of Discontinuation Criteria: not met

## 2020-08-10 NOTE — NURSING NOTE
Voice mail left on wife's cell phone. Patient having difficult time pulling on lines and attempting to get out of bed. Patient placed in RUE restraints. Attempt to update wife of condition.

## 2020-08-10 NOTE — PLAN OF CARE
Problem: Restraint, Nonbehavioral (Nonviolent)  Goal: Rationale and Justification  Flowsheets (Taken 8/5/2020 1657 by Jasmine Tomlin, RN)  Rationale and Justification: prevent harm to self;prevent line/tube removal;failure of less restrictive safety measures     Problem: Restraint, Nonbehavioral (Nonviolent)  Goal: Nonbehavioral (Nonviolent) Restraint: Absence of Injury/Harm  Flowsheets (Taken 8/10/2020 0607)  Nonbehavioral (Nonviolent) Restraint: Absence of Injury/Harm: met     Problem: Restraint, Nonbehavioral (Nonviolent)  Goal: Nonbehavioral (Nonviolent) Restraint: Achievement of Discontinuation Criteria  Flowsheets (Taken 8/10/2020 0607)  Nonbehavioral (Nonviolent) Restraint: Achievement of Discontinuation Criteria: not met     Problem: Restraint, Nonbehavioral (Nonviolent)  Goal: Nonbehavioral (Nonviolent) Restraint: Preservation of Dignity and Wellbeing  Flowsheets (Taken 8/10/2020 0607)  Nonbehavioral (Nonviolent) Restraint: Preservation of Dignity and Wellbeing: met

## 2020-08-11 ENCOUNTER — APPOINTMENT (OUTPATIENT)
Dept: CT IMAGING | Facility: HOSPITAL | Age: 30
End: 2020-08-11

## 2020-08-11 ENCOUNTER — APPOINTMENT (OUTPATIENT)
Dept: GENERAL RADIOLOGY | Facility: HOSPITAL | Age: 30
End: 2020-08-11

## 2020-08-11 LAB
ALBUMIN SERPL-MCNC: 3.7 G/DL (ref 3.5–5.2)
ALBUMIN/GLOB SERPL: 0.9 G/DL
ALP SERPL-CCNC: 101 U/L (ref 39–117)
ALT SERPL W P-5'-P-CCNC: 35 U/L (ref 1–41)
ANION GAP SERPL CALCULATED.3IONS-SCNC: 12.1 MMOL/L (ref 5–15)
AST SERPL-CCNC: 49 U/L (ref 1–40)
BACTERIA SPEC AEROBE CULT: NORMAL
BACTERIA SPEC AEROBE CULT: NORMAL
BILIRUB SERPL-MCNC: 3.1 MG/DL (ref 0–1.2)
BUN SERPL-MCNC: 11 MG/DL (ref 6–20)
BUN/CREAT SERPL: 25.6 (ref 7–25)
CALCIUM SPEC-SCNC: 10 MG/DL (ref 8.6–10.5)
CHLORIDE SERPL-SCNC: 105 MMOL/L (ref 98–107)
CO2 SERPL-SCNC: 20.9 MMOL/L (ref 22–29)
CREAT SERPL-MCNC: 0.43 MG/DL (ref 0.76–1.27)
DEPRECATED RDW RBC AUTO: 42 FL (ref 37–54)
ERYTHROCYTE [DISTWIDTH] IN BLOOD BY AUTOMATED COUNT: 11.9 % (ref 12.3–15.4)
GFR SERPL CREATININE-BSD FRML MDRD: >150 ML/MIN/1.73
GLOBULIN UR ELPH-MCNC: 4.2 GM/DL
GLUCOSE BLDC GLUCOMTR-MCNC: 188 MG/DL (ref 70–130)
GLUCOSE SERPL-MCNC: 179 MG/DL (ref 65–99)
HCT VFR BLD AUTO: 33.2 % (ref 37.5–51)
HGB BLD-MCNC: 11.8 G/DL (ref 13–17.7)
MCH RBC QN AUTO: 34.5 PG (ref 26.6–33)
MCHC RBC AUTO-ENTMCNC: 35.5 G/DL (ref 31.5–35.7)
MCV RBC AUTO: 97.1 FL (ref 79–97)
PLATELET # BLD AUTO: 231 10*3/MM3 (ref 140–450)
PMV BLD AUTO: 9.4 FL (ref 6–12)
POTASSIUM SERPL-SCNC: 3.5 MMOL/L (ref 3.5–5.2)
PROT SERPL-MCNC: 7.9 G/DL (ref 6–8.5)
RBC # BLD AUTO: 3.42 10*6/MM3 (ref 4.14–5.8)
SODIUM SERPL-SCNC: 138 MMOL/L (ref 136–145)
WBC # BLD AUTO: 10.37 10*3/MM3 (ref 3.4–10.8)

## 2020-08-11 PROCEDURE — 80053 COMPREHEN METABOLIC PANEL: CPT | Performed by: INTERNAL MEDICINE

## 2020-08-11 PROCEDURE — 99024 POSTOP FOLLOW-UP VISIT: CPT | Performed by: NURSE PRACTITIONER

## 2020-08-11 PROCEDURE — 25010000002 HALOPERIDOL LACTATE PER 5 MG: Performed by: PSYCHIATRY & NEUROLOGY

## 2020-08-11 PROCEDURE — 25010000002 DEXAMETHASONE PER 1 MG: Performed by: PSYCHIATRY & NEUROLOGY

## 2020-08-11 PROCEDURE — 70450 CT HEAD/BRAIN W/O DYE: CPT

## 2020-08-11 PROCEDURE — 94799 UNLISTED PULMONARY SVC/PX: CPT

## 2020-08-11 PROCEDURE — 74018 RADEX ABDOMEN 1 VIEW: CPT

## 2020-08-11 PROCEDURE — 82962 GLUCOSE BLOOD TEST: CPT

## 2020-08-11 PROCEDURE — 85027 COMPLETE CBC AUTOMATED: CPT | Performed by: INTERNAL MEDICINE

## 2020-08-11 PROCEDURE — 99231 SBSQ HOSP IP/OBS SF/LOW 25: CPT | Performed by: PSYCHIATRY & NEUROLOGY

## 2020-08-11 PROCEDURE — 97110 THERAPEUTIC EXERCISES: CPT

## 2020-08-11 PROCEDURE — 25010000002 ENOXAPARIN PER 10 MG: Performed by: NEUROLOGICAL SURGERY

## 2020-08-11 RX ORDER — LEVETIRACETAM 5 MG/ML
500 INJECTION INTRAVASCULAR EVERY 12 HOURS SCHEDULED
Status: DISCONTINUED | OUTPATIENT
Start: 2020-08-12 | End: 2020-08-13

## 2020-08-11 RX ORDER — LEVETIRACETAM 5 MG/ML
500 INJECTION INTRAVASCULAR EVERY 12 HOURS SCHEDULED
Status: DISCONTINUED | OUTPATIENT
Start: 2020-08-12 | End: 2020-08-11

## 2020-08-11 RX ORDER — IPRATROPIUM BROMIDE AND ALBUTEROL SULFATE 2.5; .5 MG/3ML; MG/3ML
3 SOLUTION RESPIRATORY (INHALATION) EVERY 4 HOURS PRN
Status: DISCONTINUED | OUTPATIENT
Start: 2020-08-11 | End: 2020-08-15 | Stop reason: HOSPADM

## 2020-08-11 RX ADMIN — DEXAMETHASONE SODIUM PHOSPHATE 4 MG: 4 INJECTION, SOLUTION INTRAMUSCULAR; INTRAVENOUS at 14:02

## 2020-08-11 RX ADMIN — AMOXICILLIN AND CLAVULANATE POTASSIUM 500 MG: 500; 125 TABLET, FILM COATED ORAL at 05:55

## 2020-08-11 RX ADMIN — SODIUM CHLORIDE, PRESERVATIVE FREE 10 ML: 5 INJECTION INTRAVENOUS at 08:49

## 2020-08-11 RX ADMIN — HALOPERIDOL LACTATE 2 MG: 5 INJECTION, SOLUTION INTRAMUSCULAR at 08:49

## 2020-08-11 RX ADMIN — ENOXAPARIN SODIUM 40 MG: 40 INJECTION SUBCUTANEOUS at 14:03

## 2020-08-11 RX ADMIN — IPRATROPIUM BROMIDE AND ALBUTEROL SULFATE 3 ML: 2.5; .5 SOLUTION RESPIRATORY (INHALATION) at 11:14

## 2020-08-11 RX ADMIN — DEXAMETHASONE SODIUM PHOSPHATE 4 MG: 4 INJECTION, SOLUTION INTRAMUSCULAR; INTRAVENOUS at 03:24

## 2020-08-11 RX ADMIN — FOLIC ACID 1 MG: 1 TABLET ORAL at 08:49

## 2020-08-11 RX ADMIN — LEVETIRACETAM 500 MG: 100 SOLUTION ORAL at 08:49

## 2020-08-11 RX ADMIN — LEVETIRACETAM 500 MG: 100 SOLUTION ORAL at 01:16

## 2020-08-11 RX ADMIN — SODIUM CHLORIDE, PRESERVATIVE FREE 10 ML: 5 INJECTION INTRAVENOUS at 03:25

## 2020-08-11 RX ADMIN — PANTOPRAZOLE SODIUM 40 MG: 40 INJECTION, POWDER, FOR SOLUTION INTRAVENOUS at 06:26

## 2020-08-11 RX ADMIN — Medication 100 MG: at 08:49

## 2020-08-11 RX ADMIN — AMOXICILLIN AND CLAVULANATE POTASSIUM 500 MG: 500; 125 TABLET, FILM COATED ORAL at 14:02

## 2020-08-11 RX ADMIN — IPRATROPIUM BROMIDE AND ALBUTEROL SULFATE 3 ML: 2.5; .5 SOLUTION RESPIRATORY (INHALATION) at 07:10

## 2020-08-11 NOTE — PLAN OF CARE
Problem: Patient Care Overview  Goal: Plan of Care Review  Flowsheets  Taken 8/11/2020 1503  Progress: improving (Pended)  Taken 8/11/2020 1504  Plan of Care Reviewed With: patient;mother (Pended)  Outcome Summary: Pt very lethargic this session, mother said he hasnt been sleeping. Pt able to increase activity and decrease the amount of assitance needed. Pt says he felt like his LLE was burning and is able to have trace movements now. Pt LUE has trace movements in shoulder to help support his leaning on his L side. Pt improving, but can still benefit from skilled PT for balance, strength, and endurance.  (Pended)  Note:   Patient was not wearing a face mask during this therapy encounter. Therapist used appropriate personal protective equipment including eye protection, mask, and gloves.  Mask used was standard procedure mask. Appropriate PPE was worn during the entire therapy session. Hand hygiene was completed before and after therapy session. Patient is not in enhanced droplet precautions.

## 2020-08-11 NOTE — THERAPY TREATMENT NOTE
Patient Name: Avinash Everett  : 1990    MRN: [id number]                              Today's Date: 2020       Admit Date: 8/3/2020    Visit Dx:     ICD-10-CM ICD-9-CM   1. Right-sided nontraumatic intracerebral hemorrhage, unspecified cerebral location (CMS/HCC) I61.9 431   2. Subdural hematoma (CMS/HCC) S06.5X9A 432.1   3. Nontraumatic subcortical hemorrhage of right cerebral hemisphere (CMS/HCC) I61.0 431     Patient Active Problem List   Diagnosis   • Right-sided nontraumatic intracerebral hemorrhage (CMS/HCC)   • Nontraumatic subcortical hemorrhage of right cerebral hemisphere (CMS/HCC)   • Alcohol abuse   • Metabolic encephalopathy   • Vitamin K deficiency     History reviewed. No pertinent past medical history.  History reviewed. No pertinent surgical history.  General Information     Row Name 20 1457          PT Evaluation Time/Intention    Document Type  therapy note (daily note)  (Pended)   -CB     Mode of Treatment  physical therapy  (Pended)   -CB     Row Name 20 1457          General Information    Patient Profile Reviewed?  yes  (Pended)   -CB     Existing Precautions/Restrictions  fall  (Pended)   -CB     Barriers to Rehab  cognitive status;medically complex  (Pended)   -CB     Row Name 20 1457          Cognitive Assessment/Intervention- PT/OT    Orientation Status (Cognition)  disoriented to;person;place;situation;time  (Pended)   -CB     Cognitive Assessment/Intervention Comment  Pt hallucinating that we are talking to his son and not him   (Pended)   -CB     Row Name 20 1457          Safety Issues, Functional Mobility    Safety Issues Affecting Function (Mobility)  insight into deficits/self awareness  (Pended)   -CB     Impairments Affecting Function (Mobility)  balance;cognition;coordination;endurance/activity tolerance;motor control;motor planning;muscle tone abnormal;pain;postural/trunk control;strength  (Pended)   -CB       User Key  (r) = Recorded By, (t) =  Taken By, (c) = Cosigned By    Initials Name Provider Type    CB Jony Zambrano, PT Student PT Student        Mobility     Row Name 08/11/20 1459          Bed Mobility Assessment/Treatment    Bed Mobility Assessment/Treatment  bed mobility (all) activities  (Pended)   -CB     [place] Level (Bed Mobility)  2 person assist;maximum assist (25% patient effort)  (Pended)   -CB     Assistive Device (Bed Mobility)  draw sheet;head of bed elevated;bed rails  (Pended)   -CB     Comment (Bed Mobility)  Pt very lethargic today, but had some movement in extremities  (Pended)   -CB       User Key  (r) = Recorded By, (t) = Taken By, (c) = Cosigned By    Initials Name Provider Type    CB Jony Zambrano, PT Student PT Student        Obj/Interventions     Row Name 08/11/20 1500          Therapeutic Exercise    Exercise Type (Therapeutic Exercise)  AAROM (active assistive range of motion);PROM (passive range of motion)  (Pended)   -CB     Position (Therapeutic Exercise)  seated  (Pended)   -CB     Sets/Reps (Therapeutic Exercise)  B LE LAQ x15; B lateral elbow leansx2; B reaching across midline x2  (Pended)   -CB     Comment (Therapeutic Exercise)  AAROM on RLE, PROM on LLE. PT appied deep pressure stimulation to L quad during exercise  (Pended)   -CB     Row Name 08/11/20 1500          Static Sitting Balance    Level of [place] (Unsupported Sitting, Static Balance)  moderate assist, 50 to 74% patient effort;maximal assist, 25 to 49% patient effort  (Pended)   -CB     Sitting Position (Unsupported Sitting, Static Balance)  sitting on edge of bed  (Pended)   -CB     Time Able to Maintain Position (Unsupported Sitting, Static Balance)  more than 5 minutes  (Pended)   -CB     Comment (Unsupported Sitting, Static Balance)  Pt able to support himself for limited amount of time. Needed less assitance when not able to fully support himself. Lots of verbal and nonverbal cueing   (Pended)   -CB     Row Name 08/11/20 1500           Dynamic Sitting Balance    Level of [place], Reaches Outside Midline (Sitting, Dynamic Balance)  moderate assist, 50 to 74% patient effort  (Pended)   -CB     Sitting Position, Reaches Outside Midline (Sitting, Dynamic Balance)  sitting on edge of bed  (Pended)   -CB     Comment, Reaches Outside Midline (Sitting, Dynamic Balance)  Pt needed assistance to move his elbow to side to lean, but could hold himself up with less assitance  (Pended)   -CB       User Key  (r) = Recorded By, (t) = Taken By, (c) = Cosigned By    Initials Name Provider Type    CB Jony Zambrano, PT Student PT Student        Goals/Plan    No documentation.       Clinical Impression     Row Name 08/11/20 150          Pain Assessment    Additional Documentation  Pain Scale: Numbers Pre/Post-Treatment (Group)  (Pended)   -CB     Row Name 08/11/20 2491          Pain Scale: Numbers Pre/Post-Treatment    Pain Scale: Numbers, Pretreatment  0/10 - no pain  (Pended)   -CB     Pain Scale: Numbers, Post-Treatment  0/10 - no pain  (Pended)   -CB     Row Name 08/11/20 9073          Plan of Care Review    Plan of Care Reviewed With  patient;mother  (Pended)   -CB     Outcome Summary  Pt very lethargic this session, mother said he hasnt been sleeping. Pt able to increase activity and decrease the amount of assitance needed. Pt says he felt like his LLE was burning and is able to have trace movements now. Pt LUE has trace movements in shoulder to help support his leaning on his L side. Pt improving, but can still benefit from skilled PT for balance, strength, and endurance.   (Pended)   -CB     Row Name 08/11/20 1597          Physical Therapy Clinical Impression    Criteria for Skilled Interventions Met (PT Clinical Impression)  treatment indicated  (Pended)   -CB     Rehab Potential (PT Clinical Summary)  good, to achieve stated therapy goals  (Pended)   -CB     Row Name 08/11/20 6674          Positioning and Restraints    Pre-Treatment Position   in bed  (Pended)   -CB     Post Treatment Position  bed  (Pended)   -CB     In Bed  call light within reach;supine;notified nsg;encouraged to call for assist;exit alarm on;with family/caregiver  (Pended)   -CB     Restraints  soft limb;reapplied:  (Pended)  Pt mother suggested we not put them back on, but we did re-apply restraints  -CB       User Key  (r) = Recorded By, (t) = Taken By, (c) = Cosigned By    Initials Name Provider Type    Jony Cohen, PT Student PT Student        Outcome Measures     Row Name 08/11/20 1507          How much help from another person do you currently need...    Turning from your back to your side while in flat bed without using bedrails?  2  (Pended)   -CB     Moving from lying on back to sitting on the side of a flat bed without bedrails?  2  (Pended)   -CB     Moving to and from a bed to a chair (including a wheelchair)?  1  (Pended)   -CB     Standing up from a chair using your arms (e.g., wheelchair, bedside chair)?  1  (Pended)   -CB     Climbing 3-5 steps with a railing?  1  (Pended)   -CB     To walk in hospital room?  1  (Pended)   -CB     AM-PAC 6 Clicks Score (PT)  8  (Pended)   -DC (r) CB (t)     Row Name 08/11/20 1507          Functional Assessment    Outcome Measure Options  AM-PAC 6 Clicks Basic Mobility (PT)  (Pended)   -CB       User Key  (r) = Recorded By, (t) = Taken By, (c) = Cosigned By    Initials Name Provider Type    Dianne Marcos, RN Registered Nurse    Jony Cohen, PT Student PT Student        Physical Therapy Education                 Title: PT OT SLP Therapies (In Progress)     Topic: Physical Therapy (In Progress)     Point: Mobility training (In Progress)     Description:   Instruct learner(s) on safety and technique for assisting patient out of bed, chair or wheelchair.  Instruct in the proper use of assistive devices, such as walker, crutches, cane or brace.              Patient Friendly Description:   It's important to get you  on your feet again, but we need to do so in a way that is safe for you. Falling has serious consequences, and your personal safety is the most important thing of all.        When it's time to get out of bed, one of us or a family member will sit next to you on the bed to give you support.     If your doctor or nurse tells you to use a walker, crutches, a cane, or a brace, be sure you use it every time you get out of bed, even if you think you don't need it.    Learning Progress Summary           Patient Acceptance, E,D, NR by SM at 8/10/2020 1154    Acceptance, E, VU,NR by LL at 8/9/2020 0527    Acceptance, E, VU,NR by ERIS at 8/8/2020 1530    Acceptance, E,D, NR by COLLIN at 8/7/2020 1204   Family Acceptance, E, VU,NR by MW at 8/8/2020 1530                   Point: Home exercise program (In Progress)     Description:   Instruct learner(s) on appropriate technique for monitoring, assisting and/or progressing patient with therapeutic exercises and activities.              Learning Progress Summary           Patient Acceptance, E,D, NR by SM at 8/10/2020 1154    Acceptance, E, VU,NR by LL at 8/9/2020 0527    Acceptance, E,D, NR by COLLIN at 8/7/2020 1204                   Point: Body mechanics (In Progress)     Description:   Instruct learner(s) on proper positioning and spine alignment for patient and/or caregiver during mobility tasks and/or exercises.              Learning Progress Summary           Patient Acceptance, E,D, NR by SM at 8/10/2020 1154    Acceptance, E, VU,NR by LL at 8/9/2020 0527    Acceptance, E, VU,NR by MW at 8/8/2020 1530    Acceptance, E,D, NR by KH at 8/7/2020 1204   Family Acceptance, E, VU,NR by MW at 8/8/2020 1530                   Point: Precautions (In Progress)     Description:   Instruct learner(s) on prescribed precautions during mobility and gait tasks              Learning Progress Summary           Patient Acceptance, E,D, NR by SM at 8/10/2020 1154    Acceptance, E, VU,NR by LL at 8/9/2020  0527    Acceptance, E, VU,NR by  at 8/8/2020 1530    Acceptance, E,D, NR by  at 8/7/2020 1204   Family Acceptance, E, VU,NR by  at 8/8/2020 1530                               User Key     Initials Effective Dates Name Provider Type Discipline     02/05/19 -  Raysa Devine, PT Physical Therapist PT     03/07/18 -  Lissa Finn, PTA Physical Therapy Assistant PT     09/17/19 -  Geri Glez, PT Physical Therapist PT     02/11/20 -  Mariana Bojorquez, RN Registered Nurse Nurse              PT Recommendation and Plan     Outcome Summary/Treatment Plan (PT)  Anticipated Discharge Disposition (PT): (P) inpatient rehabilitation facility  Plan of Care Reviewed With: (P) patient, mother  Progress: (P) improving  Outcome Summary: (P) Pt very lethargic this session, mother said he hasnt been sleeping. Pt able to increase activity and decrease the amount of assitance needed. Pt says he felt like his LLE was burning and is able to have trace movements now. Pt LUE has trace movements in shoulder to help support his leaning on his L side. Pt improving, but can still benefit from skilled PT for balance, strength, and endurance.      Time Calculation:   PT Charges     Row Name 08/11/20 1510             Time Calculation    Start Time  1400  (Pended)   -CB      Stop Time  1418  (Pended)   -CB      Time Calculation (min)  18 min  (Pended)   -CB      PT Received On  08/11/20  (Pended)   -CB      PT - Next Appointment  08/12/20  (Pended)   -CB         Time Calculation- PT    Total Timed Code Minutes- PT  16 minute(s)  (Pended)   -CB        User Key  (r) = Recorded By, (t) = Taken By, (c) = Cosigned By    Initials Name Provider Type    CB Jony Zambrano, PT Student PT Student        Therapy Charges for Today     Code Description Service Date Service Provider Modifiers Qty    96452097137 HC PT THER PROC EA 15 MIN 8/11/2020 Jony Zambrano, PT Student GP 1          PT G-Codes  Outcome Measure Options:  (P) AM-PAC 6 Clicks Basic Mobility (PT)  AM-PAC 6 Clicks Score (PT): (P) 8  AM-PAC 6 Clicks Score (OT): 11  Modified Bailey Scale: 4 - Moderately severe disability.  Unable to walk without assistance, and unable to attend to own bodily needs without assistance.    Jony Zambrano, PT Student  8/11/2020

## 2020-08-11 NOTE — PROGRESS NOTES
"      [facility]         Dr Robertson Sayied   LOS: 8 days   Patient Care Team:  Provider, No Known as PCP - General    Chief Complaint: Acute ICH large and nontraumatic status post emergent craniotomy suspected to be due to AVM postop on the vent    Interval History: Much improved this morning.  Still having some hallucination off and on but more lucid no further agitation.    REVIEW OF SYSTEMS:   Unreliable    Ventilator/Non-Invasive Ventilation Settings (From admission, onward)   Nasal cannula oxygen      Vital Signs  Temp:  [97.8 °F (36.6 °C)-98.7 °F (37.1 °C)] 98.7 °F (37.1 °C)  Heart Rate:  [] 96  Resp:  [14-18] 14  BP: (127-139)/(73-89) 127/73    Intake/Output Summary (Last 24 hours) at 8/11/2020 1314  Last data filed at 8/11/2020 0855  Gross per 24 hour   Intake 240 ml   Output 150 ml   Net 90 ml     Flowsheet Rows      First Filed Value   Admission Height  177.8 cm (70\") Documented at 08/03/2020 2203   Admission Weight  96 kg (211 lb 11.2 oz) Documented at 08/03/2020 2140          Physical Exam:  Patient is examined using the personal protective equipment as per guidelines from infection control for this particular patient as enacted.  Hand hygiene was performed before and after patient interaction.   General Appearance:   Less restless with no active hallucination at this time.  Following simple commands   Lungs:    Diminished breath sounds rhonchi bilaterally in the bases    Heart:    Regular rhythm and normal rate, normal S1 and S2, no            murmur, no gallop, no rub, no click   Chest Wall:    No abnormalities observed   Abdomen:    Soft nondistended bowel sounds positive   Extremities:   Moves all extremities well, no edema, no cyanosis, no             redness  CNS left hemiparesis.  Not following commands  Skin no rashes no nodules  Musculoskeletal no cyanosis no clubbing normal range of motion     Results Review:        Results from last 7 days   Lab Units 08/11/20  0440 " 08/10/20  0431 08/09/20  0656   SODIUM mmol/L 138 134* 136   POTASSIUM mmol/L 3.5 3.5 3.4*   CHLORIDE mmol/L 105 104 109*   CO2 mmol/L 20.9* 18.5* 18.4*   BUN mg/dL 11 5* 4*   CREATININE mg/dL 0.43* 0.43* 0.40*   GLUCOSE mg/dL 179* 154* 85   CALCIUM mg/dL 10.0 10.3 8.6         Results from last 7 days   Lab Units 08/11/20  0440 08/10/20  0431 08/09/20  0656   WBC 10*3/mm3 10.37 6.49 5.36   HEMOGLOBIN g/dL 11.8* 12.2* 10.9*   HEMATOCRIT % 33.2* 33.5* 31.6*   PLATELETS 10*3/mm3 231 196 140     Results from last 7 days   Lab Units 08/08/20  1005 08/06/20  0552 08/05/20  1049 08/05/20  0613   INR  1.42* 1.42* 1.47* 1.49*   APTT seconds 36.7* 37.7*  --  35.1         Results from last 7 days   Lab Units 08/09/20  0656   MAGNESIUM mg/dL 1.6     Results from last 7 days   Lab Units 08/06/20  0552   TRIGLYCERIDES mg/dL 159*           I reviewed the patient's new clinical results.  I personally viewed and interpreted the patient's CXR        Medication Review:     amoxicillin-clavulanate 1 tablet Nasogastric Q8H   dexamethasone 4 mg Intravenous Q12H   enoxaparin 40 mg Subcutaneous Q24H   folic acid 1 mg Nasogastric Daily   ipratropium-albuterol 3 mL Nebulization 4x Daily - RT   levETIRAcetam 500 mg Nasogastric Q12H   OLANZapine 10 mg Nasogastric Daily Before Supper   pantoprazole 40 mg Intravenous Q AM   sodium chloride 10 mL Intravenous Q12H   thiamine 100 mg Nasogastric Daily            ASSESSMENT:   Altered mental status  Acute right intracerebral hemorrhage; Large and nontraumatic status post emergent craniotomy  Possible transfusion reaction  Coagulopathy  Brain compression with midline shift status post emergent craniotomy  Acute respiratory failure s/p mechanical ventilator  Possible aspiration pneumonia  Acute alcohol intoxication  Acute alcoholic hepatitis  Acute anion gap metabolic acidosis  Acute hypokalemia  Fever      PLAN:  Mental status better this morning.  Still having hallucination off-and-on per nursing  staff.  UA negative.  CT head report noted.  Neurosurgery currently monitoring.  Neurology following  Fever curve is improved.  Doppler upper and lower extremity superficial thrombophlebitis.  Suspect likely noninfectious however patient for fever.   Doppler extremities show thrombophlebitis. oral antibiotics and complete course for aspiration pneumonia.  Chest x-ray and oxygenation status remains stable  Coagulopathy felt to be due to vitamin K deficiency.  Etiology of ICH unclear.  His INR is now corrected per hematology.  Continue thiamine folate  Keppra for seizure prophylaxis  PT OT  Diet per speech  Multiple medical problems and issues going.  Appreciate input from hospitalist  Nothing to add from pulmonary point of view  We will sign off    Ailyn Bateman MD  08/11/20  13:14

## 2020-08-11 NOTE — PROGRESS NOTES
Continued Stay Note  [facility]     Patient Name: Avinash Everett  MRN: [id number]  Today's Date: 8/11/2020    Admit Date: 8/3/2020    Discharge Plan     Row Name 08/11/20 1500       Plan    Plan  Skilled rehab.  CCP following with family to determine location    Plan Comments  CCP spoke with wife, Alea today by phone to discuss dc planning.  She is very concerned about pt having hallucinations, she has not picked a rehab facility yet.  She lives in [place] and is considering [facility].  She is not ready for CCP to make referral yet, wanting to wait till pt is out of restraints, CCP will follow. Salima Rockwell RN        Discharge Codes    No documentation.             Salima Rockwell RN

## 2020-08-11 NOTE — PLAN OF CARE
Problem: Patient Care Overview  Goal: Plan of Care Review  Outcome: Ongoing (interventions implemented as appropriate)  Flowsheets (Taken 8/11/2020 1628)  Progress: improving  Outcome Summary: Pt. does seem to be doing better today. He is still hallucinating but he was able to hold a conversation with me. He worked better with PT today. Pt mother lossen his restraint without my knowing and pt. pulled out his dobb parag tube. Family is wanting to keep it out and have speech therapy re-evaluate his swallow. I educated family that he will not be getting any nutrition during the time that he is not getting the tube feeds. VSS. Will continue to monitor.     Problem: Restraint, Nonbehavioral (Nonviolent)  Goal: Nonbehavioral (Nonviolent) Restraint: Achievement of Discontinuation Criteria  Outcome: Ongoing (interventions implemented as appropriate)  Flowsheets (Taken 8/10/2020 0607 by Mariana Bojorquez RN)  Nonbehavioral (Nonviolent) Restraint: Achievement of Discontinuation Criteria: not met  Note:   Pt is still trying to pull out his dobb parag and climb out of bed. Pt. Mother lossen his restraints and pt did pull out his dobb parag under her supervision.

## 2020-08-11 NOTE — PROGRESS NOTES
"Adult Nutrition  Assessment/PES    Patient Name:  Avinash Everett  YOB: 1990  MRN: [id number]  Admit Date:  8/3/2020    Assessment Date:  8/11/2020    Comments:  Nutrition follow up.  TFs of Nutren 1.5 now at goal rate of 60 ml/hr.  RN reports patient is tolerating TFs well.  Alert to self only.  NG pulled out overnight by patient, replaced by nursing staff and confirmed by JUAN QUIROGA to continue to follow.    Reason for Assessment     Row Name 08/11/20 1331          Reason for Assessment    Reason For Assessment  TF/PN;follow-up protocol         Nutrition/Diet History     Row Name 08/11/20 1331          Nutrition/Diet History    Typical Food/Fluid Intake  RN report gabriella TFs well at goal rate         Anthropometrics     Row Name 08/11/20 1331          Anthropometrics    Height  185.4 cm (72.99\")        Admit Weight    Admit Weight  -- 188# 8/11        Ideal Body Weight (IBW)    Ideal Body Weight (IBW) (kg)  84.84        Body Mass Index (BMI)    BMI Assessment  BMI 18.5-24.9: normal 24.76         Labs/Tests/Procedures/Meds     Row Name 08/11/20 1332          Labs/Procedures/Meds    Lab Results Reviewed  reviewed, pertinent     Lab Results Comments  Gluc, Creat, AST, Hgb, Hct        Diagnostic Tests/Procedures    Diagnostic Test/Procedure Reviewed  reviewed, pertinent        Medications    Pertinent Medications Reviewed  reviewed, pertinent     Pertinent Medications Comments  augmentin, decadron, folic acid, keppra, protonix, thiamine         Physical Findings     Row Name 08/11/20 1336          Physical Findings    Gastrointestinal  feeding tube     Tubes  nasogastric tube     Skin  surgical incision;edema B=14, R head inc, R groin puncture; trace edema         Estimated/Assessed Needs     Row Name 08/11/20 1331          Calculation Measurements    Height  185.4 cm (72.99\")         Nutrition Prescription Ordered     Row Name 08/11/20 1344          Nutrition Prescription PO    Current PO Diet  NPO;Sips/ice " chips        Nutrition Prescription EN    Enteral Route  NG     Product  Nutren 1.5 (Osmolite 1.5)     TF Delivery Method  Continuous     Continuous TF Goal Rate (mL/hr)  60 mL/hr     Continuous TF Current Rate (mL/hr)  60 mL/hr     Water flush (mL)   50 mL     Water Flush Frequency  Per hour         Evaluation of Received Nutrient/Fluid Intake     Row Name 08/11/20 1342          Nutrient/Fluid Evaluation    Additional Documentation  Calories Evaluation (Group);Protein Evaluation (Group);Fluid Intake Evaluation (Group)        Calories Evaluation    Enteral Calories (kcal)  2160     % of Kcal Needs  98        Protein Evaluation    Enteral Protein (gm)  98     % of Protein Needs  92        Intake Assessment    Energy/Calorie Requirement Assessment  meeting needs     Protein Requirement Assessment  meeting needs     Fluid Requirement Assessment  meeting needs     Tolerance  tolerating        Fluid Intake Evaluation    Enteral (Free Water) Fluid (mL)  1094     Free Water Flush Fluid (mL)  1200     Total Free Water Intake (mL)  2294 mL        EN Evaluation    HOB  Greater than or equal to 30 degress         Problem/Interventions:    Intervention Goal     Row Name 08/11/20 9194          Intervention Goal    General  Maintain nutrition;Nutrition support treatment;Disease management/therapy;Reduce/improve symptoms     TF/PN  Maintain TF/PN;Tolerate TF at goal     Weight  Maintain weight         Nutrition Intervention     Row Name 08/11/20 5884          Nutrition Intervention    RD/Tech Action  Follow Tx progress;Care plan reviewd     Recommended/Ordered  EN         Nutrition Prescription     Row Name 08/11/20 4872          Nutrition Prescription EN    Enteral Prescription  Continue same protocol         Education/Evaluation     Row Name 08/11/20 4571          Education    Education  Will Instruct as appropriate        Monitor/Evaluation    Monitor  Per protocol;I&O;Pertinent labs;TF delivery/tolerance;Weight;Skin  status;Symptoms     Education Follow-up  Reinforce PRN           Electronically signed by:  Raiza Connelly RD  08/11/20 13:47

## 2020-08-11 NOTE — PLAN OF CARE
Pt alert to self. Confusion and hallucinations observed. Pt continues to pull at lines, handing legs over side of bed. Resp even and unlabored. Pt denies pain this shift. NG tube pulled out. Replaced to left nare and placement confirmed via KUB xray. NG taped for maintenance. Pt tolerating tube feeds well at 60ml/hr with water flush. Pt needs frequent reorientation.

## 2020-08-11 NOTE — PROGRESS NOTES
"DAILY PROGRESS NOTE  [facility]    Patient Identification:  Name: Avinash Everett  Age: 30 y.o.  Sex: male  :  1990  MRN: [id number]         Primary Care Physician: Provider, No Known      Subjective  Patient presently with no acute complaints.  His mother is at bedside.    Objective:  General Appearance:  Comfortable, well-appearing, in no acute distress and not in pain.    Vital signs: (most recent): Blood pressure 127/73, pulse 96, temperature 98.7 °F (37.1 °C), temperature source Oral, resp. rate 14, height 185.4 cm (73\"), weight 85.3 kg (188 lb), SpO2 98 %.    HEENT: (Craniotomy wound healing nicely on the right.  Feeding tube in place.)    Lungs:  Normal effort and normal respiratory rate.  Breath sounds clear to auscultation.  (Coughing frequently.)  Heart: Normal rate.  Regular rhythm.    Abdomen: Abdomen is soft and non-distended.  Bowel sounds are normal.     Extremities: There is no dependent edema.    Neurological: Patient is alert.  (Oriented to person and place.  Conversant and interactive.  Flaccid left upper extremity paralysis.  Weakness left lower extremity.).    Skin:  Warm and dry.                Vital signs in last 24 hours:  Temp:  [97.8 °F (36.6 °C)-98.7 °F (37.1 °C)] 98.7 °F (37.1 °C)  Heart Rate:  [] 96  Resp:  [14-18] 14  BP: (127-139)/(73-89) 127/73    Intake/Output:    Intake/Output Summary (Last 24 hours) at 2020 1139  Last data filed at 2020 0855  Gross per 24 hour   Intake 240 ml   Output 350 ml   Net -110 ml         Results from last 7 days   Lab Units 20  0440 08/10/20  0431 20  0656 20  1005 20  0612 20  0552 20  1049   WBC 10*3/mm3 10.37 6.49 5.36 5.88 7.04 10.14 8.33   HEMOGLOBIN g/dL 11.8* 12.2* 10.9* 10.9* 10.9* 10.0* 11.0*   PLATELETS 10*3/mm3 231 196 140 151 130* 131* 125*     Results from last 7 days   Lab Units 20  0440 08/10/20  0431 20  0656 20  1005 20  0612 20  0552 " 08/05/20  0613   SODIUM mmol/L 138 134* 136 135* 138 138 137   POTASSIUM mmol/L 3.5 3.5 3.4* 3.5 2.8* 2.9* 3.6   CHLORIDE mmol/L 105 104 109* 108* 106 104 102   CO2 mmol/L 20.9* 18.5* 18.4* 18.8* 23.6 24.1 27.3   BUN mg/dL 11 5* 4* 7 8 7 7   CREATININE mg/dL 0.43* 0.43* 0.40* 0.45* 0.43* 0.53* 0.51*   GLUCOSE mg/dL 179* 154* 85 98 90 99 118*   Estimated Creatinine Clearance: 303.1 mL/min (A) (by C-G formula based on SCr of 0.43 mg/dL (L)).  Results from last 7 days   Lab Units 08/11/20  0440 08/10/20  0431 08/09/20  0656 08/08/20  1005 08/07/20  0612 08/06/20  0552 08/05/20  0613   CALCIUM mg/dL 10.0 10.3 8.6 8.2* 7.8* 7.8* 7.9*   ALBUMIN g/dL 3.70 3.90 3.20* 3.20* 3.20* 3.30* 3.70   MAGNESIUM mg/dL  --   --  1.6  --  2.1 1.2*  --    PHOSPHORUS mg/dL  --   --  2.9 1.5* 1.4* 1.3* 2.7     Results from last 7 days   Lab Units 08/11/20  0440 08/10/20  0431 08/09/20  0656 08/08/20  1005 08/07/20  0612 08/06/20  0552 08/05/20  0613   ALBUMIN g/dL 3.70 3.90 3.20* 3.20* 3.20* 3.30* 3.70   BILIRUBIN mg/dL 3.1* 3.6* 3.8* 3.8* 5.1* 5.3* 3.1*   ALK PHOS U/L 101 100 80 77 76 78 97   AST (SGOT) U/L 49* 44* 36 36 45* 70* 85*   ALT (SGPT) U/L 35 29 24 25 27 32 39       Assessment:    Nontraumatic subcortical hemorrhage of right cerebral hemisphere: Status post right parietal craniotomy and evacuation of hemorrhage.  8/3/2020.  Follow-up CT scan stable.    Left-sided weakness secondary to above.    Severe dysphasia: Nasogastric feedings started.    Aspiration pneumonia versus pneumonitis.  Patient presently on Augmentin.    Alcohol abuse    Macrocytosis secondary to alcohol abuse and folate deficiency.  Improving.    Metabolic encephalopathy    Coagulopathy with vitamin K deficiency: Last INR 1.4.  Will have that rechecked tomorrow.    All problems new to this examiner.        Plan:  Please see above.  Discussed with neurology.  Discussed with mother at bedside.    Donato Velazquez MD  8/11/2020  11:39

## 2020-08-11 NOTE — PROGRESS NOTES
Patient Identification:  NAME:  Avinash Everett  Age:  30 y.o.   Sex:  male   :  1990   MRN:  [id number]       Chief complaint: Intracranial hemorrhage metabolic encephalopathy cerebral edema craniotomy    History of present illness: He is much more awake and alert today joking around no seizure activity his mom is here as well.  The hallucinations and paranoia that he had yesterday appears to have resolved nicely the CT by my independent eyeball review today looks good there is still evidence of some cerebral edema and hemorrhage but it is absolutely stable if not improving.      Past medical history:  History reviewed. No pertinent past medical history.    Allergies:  Citrus and Promethazine    Home medications:  No medications prior to admission.        Hospital medications:    amoxicillin-clavulanate 1 tablet Nasogastric Q8H   dexamethasone 4 mg Intravenous Q12H   enoxaparin 40 mg Subcutaneous Q24H   folic acid 1 mg Nasogastric Daily   ipratropium-albuterol 3 mL Nebulization 4x Daily - RT   levETIRAcetam 500 mg Nasogastric Q12H   OLANZapine 10 mg Nasogastric Daily Before Supper   pantoprazole 40 mg Intravenous Q AM   sodium chloride 10 mL Intravenous Q12H   thiamine 100 mg Nasogastric Daily        •  [DISCONTINUED] acetaminophen **OR** acetaminophen  •  bisacodyl  •  haloperidol lactate  •  HYDROcod Polst-CPM Polst ER  •  HYDROmorphone **AND** naloxone  •  LORazepam  •  magnesium sulfate **OR** magnesium sulfate **OR** magnesium sulfate  •  ondansetron  •  oxyCODONE-acetaminophen  •  potassium chloride **OR** potassium chloride **OR** potassium chloride  •  potassium phosphate infusion greater than 15 mMoles **OR** potassium phosphate infusion greater than 15 mMoles **OR** potassium phosphate **OR** sodium phosphate IVPB **OR** sodium phosphate IVPB  •  sodium chloride      Objective:  Vitals Ranges:   Temp:  [97.8 °F (36.6 °C)-98.6 °F (37 °C)] 98.6 °F (37 °C)  Heart Rate:  [] 90  Resp:  [14-18]  14  BP: (129-139)/(73-89) 139/79      Physical Exam:  Much more awake and alert than yesterday jokes around with me has a more symmetrical spontaneous smile voluntary smile decreased left nasolabial fold left upper extremity 0 out of 5 left lower extremity he could extend the knee but no toe wiggle he can feel light touch on the left leg.  Reflexes depressed throughout left toe upgoing right toe downgoing    Results review:   I reviewed the patient's new clinical results.    Data review:  Lab Results (last 24 hours)     Procedure Component Value Units Date/Time    POC Glucose Once [[id number]]  (Abnormal) Collected:  08/11/20 0613    Specimen:  Blood Updated:  08/11/20 0616     Glucose 188 mg/dL     Comprehensive Metabolic Panel [[id number]]  (Abnormal) Collected:  08/11/20 0440    Specimen:  Blood Updated:  08/11/20 0537     Glucose 179 mg/dL      BUN 11 mg/dL      Creatinine 0.43 mg/dL      Sodium 138 mmol/L      Potassium 3.5 mmol/L      Chloride 105 mmol/L      CO2 20.9 mmol/L      Calcium 10.0 mg/dL      Total Protein 7.9 g/dL      Albumin 3.70 g/dL      ALT (SGPT) 35 U/L      AST (SGOT) 49 U/L      Alkaline Phosphatase 101 U/L      Total Bilirubin 3.1 mg/dL      eGFR Non African Amer >150 mL/min/1.73      Globulin 4.2 gm/dL      A/G Ratio 0.9 g/dL      BUN/Creatinine Ratio 25.6     Anion Gap 12.1 mmol/L     Narrative:       GFR Normal >60  Chronic Kidney Disease <60  Kidney Failure <15      CBC (No Diff) [[id number]]  (Abnormal) Collected:  08/11/20 0440    Specimen:  Blood Updated:  08/11/20 0513     WBC 10.37 10*3/mm3      RBC 3.42 10*6/mm3      Hemoglobin 11.8 g/dL      Hematocrit 33.2 %      MCV 97.1 fL      MCH 34.5 pg      MCHC 35.5 g/dL      RDW 11.9 %      RDW-SD 42.0 fl      MPV 9.4 fL      Platelets 231 10*3/mm3     Blood Culture - Blood, Arm, Right [[id number]] Collected:  08/05/20 2326    Specimen:  Blood from Arm, Right Updated:  08/11/20 0000     Blood Culture No growth at 5 days    Blood Culture -  Blood, Arm, Left [[id number]] Collected:  08/05/20 2326    Specimen:  Blood from Arm, Left Updated:  08/11/20 0000     Blood Culture No growth at 5 days    Urinalysis With Culture If Indicated - Urine, Clean Catch [[id number]]  (Abnormal) Collected:  08/10/20 1706    Specimen:  Urine, Clean Catch Updated:  08/10/20 1717     Color, UA Dark Yellow     Appearance, UA Clear     pH, UA 7.0     Specific Gravity, UA 1.019     Glucose, UA Negative     Ketones, UA Negative     Bilirubin, UA Negative     Blood, UA Negative     Protein, UA Negative     Leuk Esterase, UA Negative     Nitrite, UA Negative     Urobilinogen, UA 1.0 E.U./dL    Narrative:       Urine microscopic not indicated.           Imaging:  Imaging Results (Last 24 Hours)     Procedure Component Value Units Date/Time    CT Head Without Contrast [[id number]] Collected:  08/11/20 0933     Updated:  08/11/20 1002    Narrative:       CT SCAN OF THE HEAD WITHOUT CONTRAST     CLINICAL HISTORY: Syncope and fainting.     TECHNIQUE: CT scan of the head was obtained with 3 mm axial images. No  intravenous contrast was administered.     COMPARISON: Comparison is made to previous CT scan of head dated  08/09/2020.     FINDINGS:     The patient is status post a right lateral parietal craniotomy for the  purposes of evacuation of an intraparenchymal hematoma involving the  lateral aspect of the right frontal and parietal lobes. An evacuation  cavity measures up to approximately 3.1 x 2.0 cm in greatest axial  dimensions on the current exam. There are surrounding areas of subacute  hemorrhage which demonstrate no significant interval change when  compared to the previous examination. The focus of hemorrhage posterior  to the evacuation cavity again measures up to 4.4 x 1.8 cm in greatest  axial dimensions. This is the largest of these areas of hemorrhage.  Surrounding areas of hypodensity are noted within the right frontal and  parietal lobes compatible with vasogenic and to  lesser extent cytotoxic  edema and these findings are also very similar in appearance compared to  the prior study. There is mass effect with midline shift to the left by  approximately 7 mm. The degree of mass effect and midline shift is  stable to slightly improved. There is a thin subdural hematoma layering  along the left tentorium cerebelli measuring up to 3-4 mm in diameter  which demonstrates no interval detrimental change. This subdural  hematoma is contiguous with a similar thin component along the posterior  aspect of left parietal and occipital lobes, as well as lateral to the  left temporal and occipital lobes. An extra-axial and extradural  collection deep to the craniotomy flap measuring up to 9 mm in diameter  consisting of air and higher density fluid components also demonstrates  no interval detrimental change.       Impression:          Stable findings when compared to the previous CT scan of the head dated  08/09/2020. Again noted are postsurgical changes from a right lateral  parietal craniotomy for the purposes of evacuation of an  intraparenchymal hematoma within the lateral aspects of the right  frontal and parietal lobes. The areas of subacute hemorrhage  circumscribing the evacuation cavity demonstrate no interval change.  Also, there is no convincing interval detrimental change with regards to  the surrounding areas of hypodensity within the right frontal and  parietal lobes compatible with a combination of vasogenic and to a  lesser extent cytotoxic edema. Overall, the degree of mass effect with  midline shift to the left by approximately 7 mm is stable to slightly  improved.     Again noted are relatively thin subdural hematomas layering along the  left tentorium cerebelli and extending along the undersurface of the  left occipital lobe, as well as posterior and lateral to the left  parietal and occipital lobes and lateral to the left temporal and  occipital lobes. These demonstrate no  interval detrimental change. An  extradural collection deep to the craniotomy flap also demonstrates no  significant interval change.     Radiation dose reduction techniques were utilized, including automated  exposure control and exposure modulation based on body size.     This report was finalized on 8/11/2020 9:58 AM by Dr. Michael Richmond M.D.       XR Abdomen KUB [[id number]] Collected:  08/11/20 0039     Updated:  08/11/20 0042    Narrative:       KUB     HISTORY: Feeding tube placement     COMPARISON: 08/10/2020     FINDINGS:  Weighted enteric feeding tube extends into the body of the stomach.  Visualized bowel gas pattern is unremarkable.     This report was finalized on 8/11/2020 12:39 AM by Dr. Annelise Long M.D.         PPE was used at all times before during and afterwards.  Washed up appropriately before and afterwards disposed of everything afterwards was never near him for more than the 10 minutes no aerosols used PPE precautions at all times    Assessment and Plan:       Nontraumatic subcortical hemorrhage of right cerebral hemisphere (CMS/HCC)    Right-sided nontraumatic intracerebral hemorrhage (CMS/HCC)    Alcohol abuse    Metabolic encephalopathy    Vitamin K deficiency  Much more alert today still significant left-sided weakness but joking around and seizure-free.  The follow-up CT by my independent eyeball review is absolutely stable.      Prateek Villafuerte MD  08/11/20  11:17

## 2020-08-11 NOTE — PROGRESS NOTES
Postop   LOS: 8 days   Patient Care Team:  Provider, No Known as PCP - General    Chief Complaint: Status post craniotomy for ICH    Subjective     Resting in bed, denies headache today    Interval History:     History taken from: patient chart    Objective    Awake and alert, answers questions appropriately  Follows commands on right, flaccid on left  Pupils equal, reactive  E OEM intact  Right cranial incision well approximated, no swelling, erythema or drainage    Vital Signs  Temp:  [97.8 °F (36.6 °C)-98.6 °F (37 °C)] 98.6 °F (37 °C)  Heart Rate:  [102-115] 105  Resp:  [14-18] 16  BP: (129-139)/(73-89) 139/79       Results Review:     I reviewed the patient's new clinical results.  I reviewed the patient's new imaging results and agree with the interpretation.  .  Results from last 7 days   Lab Units 08/11/20  0440 08/10/20  0431 08/09/20  0656   WBC 10*3/mm3 10.37 6.49 5.36   HEMOGLOBIN g/dL 11.8* 12.2* 10.9*   HEMATOCRIT % 33.2* 33.5* 31.6*   PLATELETS 10*3/mm3 231 196 140     .  Results from last 7 days   Lab Units 08/11/20  0440 08/10/20  0431 08/09/20  0656   SODIUM mmol/L 138 134* 136   POTASSIUM mmol/L 3.5 3.5 3.4*   CHLORIDE mmol/L 105 104 109*   CO2 mmol/L 20.9* 18.5* 18.4*   BUN mg/dL 11 5* 4*   CREATININE mg/dL 0.43* 0.43* 0.40*   CALCIUM mg/dL 10.0 10.3 8.6   BILIRUBIN mg/dL 3.1* 3.6* 3.8*   ALK PHOS U/L 101 100 80   ALT (SGPT) U/L 35 29 24   AST (SGOT) U/L 49* 44* 36   GLUCOSE mg/dL 179* 154* 85     Ct Head Without Contrast    Result Date: 8/11/2020   Stable findings when compared to the previous CT scan of the head dated 08/09/2020. Again noted are postsurgical changes from a right lateral parietal craniotomy for the purposes of evacuation of an intraparenchymal hematoma within the lateral aspects of the right frontal and parietal lobes. The areas of subacute hemorrhage circumscribing the evacuation cavity demonstrate no interval change. Also, there is no convincing interval detrimental change with  regards to the surrounding areas of hypodensity within the right frontal and parietal lobes compatible with a combination of vasogenic and to a lesser extent cytotoxic edema. Overall, the degree of mass effect with midline shift to the left by approximately 7 mm is stable to slightly improved.  Again noted are relatively thin subdural hematomas layering along the left tentorium cerebelli and extending along the undersurface of the left occipital lobe, as well as posterior and lateral to the left parietal and occipital lobes and lateral to the left temporal and occipital lobes. These demonstrate no interval detrimental change. An extradural collection deep to the craniotomy flap also demonstrates no significant interval change.  Radiation dose reduction techniques were utilized, including automated exposure control and exposure modulation based on body size.  This report was finalized on 8/11/2020 9:58 AM by Dr. Michael Richmond M.D.        Assessment/Plan       Nontraumatic subcortical hemorrhage of right cerebral hemisphere (CMS/HCC)    Right-sided nontraumatic intracerebral hemorrhage (CMS/HCC)    Alcohol abuse    Metabolic encephalopathy    Vitamin K deficiency      Postop day #7 right frontoparietal craniotomy for evacuation of ICH  Cerebral angiogram 8/5/2020 by Dr. Malcolm revealed no evidence of aneurysm, AVM or dural AV fistula  Postop MRI revealed no underlying mass, but will repeat in 1 to 2 weeks to confirm as of yet the spontaneous hemorrhage is unexplained, postop edema raises concern for underlying mass.  Some concern for AVM on pathology, awaiting final path from outside facility ([place]). I spoke to pathology today and it was just sent 8/10/20.  OK for rehab when medically ready. Will taper steroids in a day or two.       Iman Ritter, APRN  08/11/20  10:07

## 2020-08-12 ENCOUNTER — APPOINTMENT (OUTPATIENT)
Dept: ULTRASOUND IMAGING | Facility: HOSPITAL | Age: 30
End: 2020-08-12

## 2020-08-12 LAB
ALBUMIN SERPL-MCNC: 3.6 G/DL (ref 3.5–5.2)
ALBUMIN/GLOB SERPL: 0.9 G/DL
ALP SERPL-CCNC: 93 U/L (ref 39–117)
ALT SERPL W P-5'-P-CCNC: 59 U/L (ref 1–41)
ANION GAP SERPL CALCULATED.3IONS-SCNC: 10 MMOL/L (ref 5–15)
AST SERPL-CCNC: 82 U/L (ref 1–40)
BILIRUB SERPL-MCNC: 3.1 MG/DL (ref 0–1.2)
BUN SERPL-MCNC: 16 MG/DL (ref 6–20)
BUN/CREAT SERPL: 34.8 (ref 7–25)
CALCIUM SPEC-SCNC: 9.5 MG/DL (ref 8.6–10.5)
CHLORIDE SERPL-SCNC: 103 MMOL/L (ref 98–107)
CO2 SERPL-SCNC: 23 MMOL/L (ref 22–29)
CREAT SERPL-MCNC: 0.46 MG/DL (ref 0.76–1.27)
DEPRECATED RDW RBC AUTO: 44.8 FL (ref 37–54)
ERYTHROCYTE [DISTWIDTH] IN BLOOD BY AUTOMATED COUNT: 12.4 % (ref 12.3–15.4)
GFR SERPL CREATININE-BSD FRML MDRD: >150 ML/MIN/1.73
GLOBULIN UR ELPH-MCNC: 4.1 GM/DL
GLUCOSE SERPL-MCNC: 129 MG/DL (ref 65–99)
HCT VFR BLD AUTO: 32.7 % (ref 37.5–51)
HGB BLD-MCNC: 11.7 G/DL (ref 13–17.7)
INR PPP: 1.46 (ref 0.9–1.1)
MCH RBC QN AUTO: 35.9 PG (ref 26.6–33)
MCHC RBC AUTO-ENTMCNC: 35.8 G/DL (ref 31.5–35.7)
MCV RBC AUTO: 100.3 FL (ref 79–97)
PLATELET # BLD AUTO: 226 10*3/MM3 (ref 140–450)
PMV BLD AUTO: 9.9 FL (ref 6–12)
POTASSIUM SERPL-SCNC: 3.8 MMOL/L (ref 3.5–5.2)
PROT SERPL-MCNC: 7.7 G/DL (ref 6–8.5)
PROTHROMBIN TIME: 17.5 SECONDS (ref 11.7–14.2)
RBC # BLD AUTO: 3.26 10*6/MM3 (ref 4.14–5.8)
SODIUM SERPL-SCNC: 136 MMOL/L (ref 136–145)
WBC # BLD AUTO: 7.61 10*3/MM3 (ref 3.4–10.8)

## 2020-08-12 PROCEDURE — 85610 PROTHROMBIN TIME: CPT | Performed by: HOSPITALIST

## 2020-08-12 PROCEDURE — 25010000002 ENOXAPARIN PER 10 MG: Performed by: NEUROLOGICAL SURGERY

## 2020-08-12 PROCEDURE — 94799 UNLISTED PULMONARY SVC/PX: CPT

## 2020-08-12 PROCEDURE — 80053 COMPREHEN METABOLIC PANEL: CPT | Performed by: INTERNAL MEDICINE

## 2020-08-12 PROCEDURE — 99231 SBSQ HOSP IP/OBS SF/LOW 25: CPT | Performed by: PSYCHIATRY & NEUROLOGY

## 2020-08-12 PROCEDURE — 76705 ECHO EXAM OF ABDOMEN: CPT

## 2020-08-12 PROCEDURE — 92526 ORAL FUNCTION THERAPY: CPT

## 2020-08-12 PROCEDURE — 97112 NEUROMUSCULAR REEDUCATION: CPT

## 2020-08-12 PROCEDURE — 85027 COMPLETE CBC AUTOMATED: CPT | Performed by: INTERNAL MEDICINE

## 2020-08-12 PROCEDURE — 25010000002 LEVETIRACETAM IN NACL 0.82% 500 MG/100ML SOLUTION: Performed by: NURSE PRACTITIONER

## 2020-08-12 PROCEDURE — 97112 NEUROMUSCULAR REEDUCATION: CPT | Performed by: OCCUPATIONAL THERAPIST

## 2020-08-12 PROCEDURE — 99024 POSTOP FOLLOW-UP VISIT: CPT | Performed by: NURSE PRACTITIONER

## 2020-08-12 PROCEDURE — 97110 THERAPEUTIC EXERCISES: CPT

## 2020-08-12 PROCEDURE — 25010000002 DEXAMETHASONE PER 1 MG: Performed by: PSYCHIATRY & NEUROLOGY

## 2020-08-12 RX ORDER — PHYTONADIONE 5 MG/1
5 TABLET ORAL ONCE
Status: COMPLETED | OUTPATIENT
Start: 2020-08-12 | End: 2020-08-12

## 2020-08-12 RX ORDER — OLANZAPINE 5 MG/1
5 TABLET ORAL
Status: DISCONTINUED | OUTPATIENT
Start: 2020-08-12 | End: 2020-08-15 | Stop reason: HOSPADM

## 2020-08-12 RX ORDER — DEXAMETHASONE SODIUM PHOSPHATE 4 MG/ML
2 INJECTION, SOLUTION INTRA-ARTICULAR; INTRALESIONAL; INTRAMUSCULAR; INTRAVENOUS; SOFT TISSUE EVERY 12 HOURS
Status: DISCONTINUED | OUTPATIENT
Start: 2020-08-13 | End: 2020-08-13

## 2020-08-12 RX ADMIN — FOLIC ACID 1 MG: 1 TABLET ORAL at 14:24

## 2020-08-12 RX ADMIN — PANTOPRAZOLE SODIUM 40 MG: 40 INJECTION, POWDER, FOR SOLUTION INTRAVENOUS at 05:45

## 2020-08-12 RX ADMIN — IPRATROPIUM BROMIDE AND ALBUTEROL SULFATE 3 ML: 2.5; .5 SOLUTION RESPIRATORY (INHALATION) at 17:14

## 2020-08-12 RX ADMIN — LEVETIRACETAM 500 MG: 5 INJECTION INTRAVENOUS at 20:49

## 2020-08-12 RX ADMIN — OLANZAPINE 5 MG: 5 TABLET ORAL at 17:50

## 2020-08-12 RX ADMIN — Medication 100 MG: at 14:24

## 2020-08-12 RX ADMIN — SODIUM CHLORIDE, PRESERVATIVE FREE 10 ML: 5 INJECTION INTRAVENOUS at 03:11

## 2020-08-12 RX ADMIN — PHYTONADIONE 5 MG: 5 TABLET ORAL at 14:24

## 2020-08-12 RX ADMIN — LEVETIRACETAM 500 MG: 5 INJECTION INTRAVENOUS at 00:48

## 2020-08-12 RX ADMIN — LEVETIRACETAM 500 MG: 5 INJECTION INTRAVENOUS at 09:48

## 2020-08-12 RX ADMIN — ENOXAPARIN SODIUM 40 MG: 40 INJECTION SUBCUTANEOUS at 14:31

## 2020-08-12 RX ADMIN — DEXAMETHASONE SODIUM PHOSPHATE 4 MG: 4 INJECTION, SOLUTION INTRAMUSCULAR; INTRAVENOUS at 03:10

## 2020-08-12 RX ADMIN — SODIUM CHLORIDE, PRESERVATIVE FREE 10 ML: 5 INJECTION INTRAVENOUS at 20:49

## 2020-08-12 RX ADMIN — IPRATROPIUM BROMIDE AND ALBUTEROL SULFATE 3 ML: 2.5; .5 SOLUTION RESPIRATORY (INHALATION) at 10:40

## 2020-08-12 RX ADMIN — SODIUM CHLORIDE, PRESERVATIVE FREE 10 ML: 5 INJECTION INTRAVENOUS at 09:49

## 2020-08-12 NOTE — THERAPY EVALUATION
Acute Care - Speech Language Pathology   Swallow Initial Evaluation [facility]     Patient Name: Avinash Everett  : 1990  MRN: [id number]  Today's Date: 2020               Admit Date: 8/3/2020    Visit Dx:     ICD-10-CM ICD-9-CM   1. Right-sided nontraumatic intracerebral hemorrhage, unspecified cerebral location (CMS/HCC) I61.9 431   2. Subdural hematoma (CMS/HCC) S06.5X9A 432.1   3. Nontraumatic subcortical hemorrhage of right cerebral hemisphere (CMS/HCC) I61.0 431     Patient Active Problem List   Diagnosis   • Right-sided nontraumatic intracerebral hemorrhage (CMS/HCC)   • Nontraumatic subcortical hemorrhage of right cerebral hemisphere (CMS/HCC)   • Alcohol abuse   • Metabolic encephalopathy   • Vitamin K deficiency     History reviewed. No pertinent past medical history.  History reviewed. No pertinent surgical history.     SWALLOW EVALUATION (last 72 hours)      SLP Adult Swallow Evaluation     Row Name 20 1358          Document Type  re-evaluation  -OC    Subjective Information  fatigue  -OC    Patient Observations  alert;agree to therapy;cooperative  -OC    Patient/Family Observations  up in chair after OT  -OC    Patient Effort  good  -OC    Symptoms Noted During/After Treatment  none  -OC          Patient Profile Reviewed  yes  -OC    Current Method of Nutrition  NPO  -OC    Precautions/Limitations, Vision  neglect, left  -OC    Precautions/Limitations, Hearing  WFL  -OC    Prior Level of Function-Communication  WFL  -OC    Prior Level of Function-Swallowing  no diet consistency restrictions  -OC    Plans/Goals Discussed with  patient;agreed upon  -OC    Barriers to Rehab  medically complex  -OC    Patient's Goals for Discharge  return to PO diet  -OC    Family Goals for Discharge  patient able to return to PO diet  -OC          Pain Scale: Numbers, Pretreatment  0/10 - no pain  -OC    Pain Scale: Numbers, Post-Treatment  0/10 - no pain  -OC          Dentition Assessment  natural,  present and adequate  -OC    Secretion Management  WNL/WFL  -OC    Mucosal Quality  moist, healthy  -OC    Volitional Swallow  weak  -OC    Volitional Cough  non-productive  -OC          Oral Labial or Buccal Impairment, Detail, Cranial Nerve VII (Facial):  left labial droop  -OC          Oral Prep Phase  impaired  -OC    Oral Transit  impaired  -OC    Oral Residue  impaired  -OC    Pharyngeal Phase  suspected pharyngeal impairment  -OC    Clinical Swallow Evaluation Summary Patient demonstrated L anterior loss of thin, puree, and mech soft this date. Patient demonstrated inadequate mastication of mechanical soft with significant L oral residue. Patient required moderate verbal cues to clear oral residue. Anterior loss of puree. No change in vocal quality. Impulsive with thin liquids. No overt s/s with nectar thick and puree.     -OC          SLP Swallowing Diagnosis  oral dysfunction;suspected pharyngeal dysfunction  -OC    Functional Impact  risk of aspiration/pneumonia  -OC    Rehab Potential/Prognosis, Swallowing  good, to achieve stated therapy goals  -OC    Swallow Criteria for Skilled Therapeutic Interventions Met  demonstrates skilled criteria  -OC          Therapy Frequency (Swallow)  PRN  -OC    Predicted Duration Therapy Intervention (Days)  until discharge  -OC    SLP Diet Recommendation  puree with some mashed;nectar thick liquids;water between meals after oral care, with supervision  -OC    Recommended Diagnostics  reassess via VFSS ([facility])  -OC    Recommended Precautions and Strategies  upright posture during/after eating;small bites of food and sips of liquid;no straw  -OC    SLP Rec. for Method of Medication Administration  meds crushed;with pudding or applesauce  -OC    Monitor for Signs of Aspiration  yes;notify SLP if any concerns  -OC    Anticipated Dischage Disposition (SLP)  inpatient rehabilitation facility  -OC          Oral Nutrition/Hydration Goal 1, SLP  Pt will safely tolerate the least  restrictive diet with no s/s of aspiration.  -OC    Time Frame (Oral Nutrition/Hydration Goal 1, SLP)  by discharge  -OC      User Key  (r) = Recorded By, (t) = Taken By, (c) = Cosigned By    Initials Name Effective Dates    OC Card, MARILIN Camara,[facility]-SLP 06/08/18 -           EDUCATION  The patient has been educated in the following areas:   Dysphagia (Swallowing Impairment).    SLP Recommendation and Plan  SLP Swallowing Diagnosis: oral dysfunction, suspected pharyngeal dysfunction  SLP Diet Recommendation: puree with some mashed, nectar thick liquids, water between meals after oral care, with supervision  Recommended Precautions and Strategies: upright posture during/after eating, small bites of food and sips of liquid, no straw  SLP Rec. for Method of Medication Administration: meds crushed, with pudding or applesauce     Monitor for Signs of Aspiration: yes, notify SLP if any concerns  Recommended Diagnostics: reassess via VFSS ([facility] [place] [place])  Swallow Criteria for Skilled Therapeutic Interventions Met: demonstrates skilled criteria  Anticipated Dischage Disposition (SLP): inpatient rehabilitation facility  Rehab Potential/Prognosis, Swallowing: good, to achieve stated therapy goals  Therapy Frequency (Swallow): PRN  Predicted Duration Therapy Intervention (Days): until discharge       Progress: improving  Outcome Summary: Re-eval of swallow completed. Recommend nectar thick liquids and puree with some mash. Recommend meds crushed with puree. Recommend upright, alert, 1:1 supervision, check for L pocketing/oral residue. Water protocol with staff supervision, 30 mins after oral care. Recommend VFSS.    SLP GOALS     Row Name 08/12/20 0088             Oral Nutrition/Hydration Goal 1 (SLP)    Oral Nutrition/Hydration Goal 1, SLP  Pt will safely tolerate the least restrictive diet with no s/s of aspiration.  -OC      Time Frame (Oral Nutrition/Hydration Goal 1, SLP)  by discharge  -OC        User Key  (r) = Recorded By, (t) =  Taken By, (c) = Cosigned By    Initials Name Provider Type    Hoa Beatty MA, CCC-SLP Speech and Language Pathologist           SLP Outcome Measures (last 72 hours)      SLP Outcome Measures     Row Name 08/12/20 1414             SLP Outcome Measures    Outcome Measure Used?  Adult NOMS  -OC         Adult FCM Scores    FCM Chosen  Swallowing  -OC      Swallowing FCM Score  4  -OC        User Key  (r) = Recorded By, (t) = Taken By, (c) = Cosigned By    Initials Name Effective Dates    Hoa Beatty MA, CCC-ANGELES 06/08/18 -            Time Calculation:   Time Calculation- SLP     Row Name 08/12/20 1416             Time Calculation- SLP    SLP Start Time  1345  -OC      SLP Received On  08/12/20  -OC        User Key  (r) = Recorded By, (t) = Taken By, (c) = Cosigned By    Initials Name Provider Type    Hoa Beatty MA, CCC-SLP Speech and Language Pathologist          Therapy Charges for Today     Code Description Service Date Service Provider Modifiers Qty    [id number] HC ST TREATMENT SWALLOW 3 8/12/2020 Hoa Delaney MA, CCC-SLP GN 1               Hoa Delaney MA, CCC-SLP  8/12/2020

## 2020-08-12 NOTE — PROGRESS NOTES
Patient Identification:  NAME:  Avinash Everett  Age:  30 y.o.   Sex:  male   :  1990   MRN:  [id number]       Chief complaint: Intracranial hemorrhage  History of present illness: Much more awake and alert joking around a lot and feeling good tolerating the Keppra well moving his left leg a bit better still no movement in the left arm no seizure activity he is also tolerating the Zyprexa at night      Past medical history:  History reviewed. No pertinent past medical history.    Allergies:  Citrus and Promethazine    Home medications:  No medications prior to admission.        Hospital medications:    amoxicillin-clavulanate 1 tablet Nasogastric Q8H   dexamethasone 4 mg Intravenous Q12H   enoxaparin 40 mg Subcutaneous Q24H   folic acid 1 mg Nasogastric Daily   levETIRAcetam 500 mg Intravenous Q12H   OLANZapine 10 mg Nasogastric Daily Before Supper   pantoprazole 40 mg Intravenous Q AM   sodium chloride 10 mL Intravenous Q12H   thiamine 100 mg Nasogastric Daily        •  [DISCONTINUED] acetaminophen **OR** acetaminophen  •  bisacodyl  •  haloperidol lactate  •  HYDROcod Polst-CPM Polst ER  •  HYDROmorphone **AND** naloxone  •  ipratropium-albuterol  •  LORazepam  •  magnesium sulfate **OR** magnesium sulfate **OR** magnesium sulfate  •  ondansetron  •  oxyCODONE-acetaminophen  •  potassium chloride **OR** potassium chloride **OR** potassium chloride  •  potassium phosphate infusion greater than 15 mMoles **OR** potassium phosphate infusion greater than 15 mMoles **OR** potassium phosphate **OR** sodium phosphate IVPB **OR** sodium phosphate IVPB  •  sodium chloride      Objective:  Vitals Ranges:   Temp:  [97.7 °F (36.5 °C)-98.4 °F (36.9 °C)] 98 °F (36.7 °C)  Heart Rate:  [65-93] 67  Resp:  [16-20] 20  BP: (101-118)/(63-77) 114/66      Physical Exam: Awake alert oriented x3 joking around a lot has had a symmetrical voluntary smile symmetrical smile to a joke, left upper extremity 0 out of 5 left lower  extremity 2 out of 5 proximally moves the leg was able to stand briefly and support himself reflexes trace throughout symmetrical left toe upgoing right toe downgoing    Results review:   I reviewed the patient's new clinical results.    Data review:  Lab Results (last 24 hours)     Procedure Component Value Units Date/Time    Protime-INR [[id number]]  (Abnormal) Collected:  08/12/20 0523    Specimen:  Blood Updated:  08/12/20 0653     Protime 17.5 Seconds      INR 1.46    Comprehensive Metabolic Panel [[id number]]  (Abnormal) Collected:  08/12/20 0523    Specimen:  Blood Updated:  08/12/20 0651     Glucose 129 mg/dL      BUN 16 mg/dL      Creatinine 0.46 mg/dL      Sodium 136 mmol/L      Potassium 3.8 mmol/L      Chloride 103 mmol/L      CO2 23.0 mmol/L      Calcium 9.5 mg/dL      Total Protein 7.7 g/dL      Albumin 3.60 g/dL      ALT (SGPT) 59 U/L      AST (SGOT) 82 U/L      Alkaline Phosphatase 93 U/L      Total Bilirubin 3.1 mg/dL      eGFR Non African Amer >150 mL/min/1.73      Globulin 4.1 gm/dL      A/G Ratio 0.9 g/dL      BUN/Creatinine Ratio 34.8     Anion Gap 10.0 mmol/L     Narrative:       GFR Normal >60  Chronic Kidney Disease <60  Kidney Failure <15      CBC (No Diff) [[id number]]  (Abnormal) Collected:  08/12/20 0523    Specimen:  Blood Updated:  08/12/20 0627     WBC 7.61 10*3/mm3      RBC 3.26 10*6/mm3      Hemoglobin 11.7 g/dL      Hematocrit 32.7 %      .3 fL      MCH 35.9 pg      MCHC 35.8 g/dL      RDW 12.4 %      RDW-SD 44.8 fl      MPV 9.9 fL      Platelets 226 10*3/mm3            Imaging:  Imaging Results (Last 24 Hours)     ** No results found for the last 24 hours. **             Assessment and Plan:       Nontraumatic subcortical hemorrhage of right cerebral hemisphere (CMS/HCC)    Right-sided nontraumatic intracerebral hemorrhage (CMS/HCC)    Alcohol abuse    Metabolic encephalopathy    Vitamin K deficiency    Dramatically more alert moving the left leg a bit better still flaccid in  the left upper extremity but overall improving daily he is on Keppra which I would continue indefinitely and I will sign off and follow-up PRN reconsult thanks      Prateek Villafuerte MD  08/12/20  11:41

## 2020-08-12 NOTE — PLAN OF CARE
Pt resting quietly this shift. Mother at bedside with mask on. Pt more alert this shift. Pt able to straighten left leg out from bent position. Pt denies pain or discomfort. Pt NPO. NG pulled out by patient . Patient and mother do not wish NG tube replaced. Physician aware. Swallow evaluation possibly 8/12. Resp even and unlabored.

## 2020-08-12 NOTE — PLAN OF CARE
Problem: Patient Care Overview  Goal: Plan of Care Review  Flowsheets (Taken 8/12/2020 2562)  Progress: improving  Plan of Care Reviewed With: patient;family  Outcome Summary: pt completed bed mobility mod A. sat EOB CGA. tsf to chair w. min to mod x2 and VCs. A w. L LE. PROM/AAROM L UE noted some movement in UE. per pt states L hand draws in at night, wants a splint. OT to leave sticky note to MDs in chart. pt completed wt bearing EOB. pt cont to benefit from OT to incr ADL, strength, movement, tsf, balance.   Note:   OT wore mask, gloves, protective eye wear, and hand hygiene. Pt and parent wore mask.

## 2020-08-12 NOTE — PROGRESS NOTES
Neurosurgery Progress Note      Patient: Avinash Everett    YOB: 1990    Medical Record Number: [id number]    Attending Physician: Donato Velazquez MD    Date of Admission: 8/3/2020  9:41 PM    Admitting Dx:  Right-sided nontraumatic intracerebral hemorrhage, unspecified cerebral location (CMS/HCC) [I61.9]      Current Problem List:   Patient Active Problem List   Diagnosis   • Right-sided nontraumatic intracerebral hemorrhage (CMS/HCC)   • Nontraumatic subcortical hemorrhage of right cerebral hemisphere (CMS/HCC)   • Alcohol abuse   • Metabolic encephalopathy   • Vitamin K deficiency         History reviewed. No pertinent past medical history.    Current Medications:  Scheduled Meds:  amoxicillin-clavulanate 1 tablet Nasogastric Q8H   dexamethasone 4 mg Intravenous Q12H   enoxaparin 40 mg Subcutaneous Q24H   folic acid 1 mg Nasogastric Daily   levETIRAcetam 500 mg Intravenous Q12H   OLANZapine 10 mg Nasogastric Daily Before Supper   pantoprazole 40 mg Intravenous Q AM   sodium chloride 10 mL Intravenous Q12H   thiamine 100 mg Nasogastric Daily     PRN Meds:.•  [DISCONTINUED] acetaminophen **OR** acetaminophen  •  bisacodyl  •  haloperidol lactate  •  HYDROcod Polst-CPM Polst ER  •  HYDROmorphone **AND** naloxone  •  ipratropium-albuterol  •  LORazepam  •  magnesium sulfate **OR** magnesium sulfate **OR** magnesium sulfate  •  ondansetron  •  oxyCODONE-acetaminophen  •  potassium chloride **OR** potassium chloride **OR** potassium chloride  •  potassium phosphate infusion greater than 15 mMoles **OR** potassium phosphate infusion greater than 15 mMoles **OR** potassium phosphate **OR** sodium phosphate IVPB **OR** sodium phosphate IVPB  •  sodium chloride    SUBJECTIVE: 30 y.o.  male with non-traumatic intracerebral hemorrhage and subsequent L hemiplegia. 9 days out from craniotomy. Patient has been out of ICU for the last 3-4 days. He denies any pain or headache. L leg moving some.     OBJECTIVE:    Vitals:    08/12/20 0317 08/12/20 0712 08/12/20 0727 08/12/20 1040   BP: 104/63 118/77 114/66    BP Location: Right arm Right arm Right arm    Patient Position: Lying Lying Lying    Pulse: 73 67 65 67   Resp: 16 16 18 20   Temp: 97.7 °F (36.5 °C)  98 °F (36.7 °C)    TempSrc: Oral  Oral    SpO2: 95% 94% 94% 95%   Weight: 86.1 kg (189 lb 14.4 oz)      Height:         I/O last 3 completed shifts:  In: 360 [P.O.:240; Other:120]  Out: 550 [Urine:550]    Diagnostic Tests:   Lab Results (last 24 hours)     Procedure Component Value Units Date/Time    Protime-INR [[id number]]  (Abnormal) Collected:  08/12/20 0523    Specimen:  Blood Updated:  08/12/20 0653     Protime 17.5 Seconds      INR 1.46    Comprehensive Metabolic Panel [[id number]]  (Abnormal) Collected:  08/12/20 0523    Specimen:  Blood Updated:  08/12/20 0651     Glucose 129 mg/dL      BUN 16 mg/dL      Creatinine 0.46 mg/dL      Sodium 136 mmol/L      Potassium 3.8 mmol/L      Chloride 103 mmol/L      CO2 23.0 mmol/L      Calcium 9.5 mg/dL      Total Protein 7.7 g/dL      Albumin 3.60 g/dL      ALT (SGPT) 59 U/L      AST (SGOT) 82 U/L      Alkaline Phosphatase 93 U/L      Total Bilirubin 3.1 mg/dL      eGFR Non African Amer >150 mL/min/1.73      Globulin 4.1 gm/dL      A/G Ratio 0.9 g/dL      BUN/Creatinine Ratio 34.8     Anion Gap 10.0 mmol/L     Narrative:       GFR Normal >60  Chronic Kidney Disease <60  Kidney Failure <15      CBC (No Diff) [[id number]]  (Abnormal) Collected:  08/12/20 0523    Specimen:  Blood Updated:  08/12/20 0627     WBC 7.61 10*3/mm3      RBC 3.26 10*6/mm3      Hemoglobin 11.7 g/dL      Hematocrit 32.7 %      .3 fL      MCH 35.9 pg      MCHC 35.8 g/dL      RDW 12.4 %      RDW-SD 44.8 fl      MPV 9.9 fL      Platelets 226 10*3/mm3           Ct Head Without Contrast    Result Date: 8/11/2020   Stable findings when compared to the previous CT scan of the head dated 08/09/2020. Again noted are postsurgical changes from a right lateral  parietal craniotomy for the purposes of evacuation of an intraparenchymal hematoma within the lateral aspects of the right frontal and parietal lobes. The areas of subacute hemorrhage circumscribing the evacuation cavity demonstrate no interval change. Also, there is no convincing interval detrimental change with regards to the surrounding areas of hypodensity within the right frontal and parietal lobes compatible with a combination of vasogenic and to a lesser extent cytotoxic edema. Overall, the degree of mass effect with midline shift to the left by approximately 7 mm is stable to slightly improved.  Again noted are relatively thin subdural hematomas layering along the left tentorium cerebelli and extending along the undersurface of the left occipital lobe, as well as posterior and lateral to the left parietal and occipital lobes and lateral to the left temporal and occipital lobes. These demonstrate no interval detrimental change. An extradural collection deep to the craniotomy flap also demonstrates no significant interval change.  Radiation dose reduction techniques were utilized, including automated exposure control and exposure modulation based on body size.  This report was finalized on 8/11/2020 9:58 AM by Dr. Michael Richmond M.D.        CT HEAD 8/11/2020    Stable.  There are postsurgical changes from right lateral parietal craniotomy.  Some decrease in the cytotoxic edema.  Slight improvement in mass-effect.        PHYSICAL EXAM:    AA&O x 3. PERRL. EOM's intact.  Some continued L neglect - some difficulty maintaining eye contact centrally. Easy to re-direct.   L facial droop.   L arm plegic.   L leg paretic. Able to kick L leg out some while sitting on edge of bed.  Good strength on R side.  R cranial incision well approximated. No redness, swelling or drainage.       ASSESSMENT & PLAN:    POD 9 emergent right frontoparietal craniotomy for evacuation of intracerebral hemorrhage of uncertain  etiology  Diagnostic cerebral angiogram performed August 5, 2020 showed no evidence of aneurysm, AVM or dural AV fistula  L neglect improving some  L hemiparesis      Outside path still pending.  Decrease decadron to 2 mg q 12 in am  On Protonix for GI prophlaxis   Continue lovenox for DVT prophylaxis  Continue to mobilize  Consult rehab  Continue Keppra  Re-eval with speech therapy for swallowing and cognition  Repeat MRI brain in the next week to 10 days    Date: 8/12/2020    Katya Centeno, APRN

## 2020-08-12 NOTE — PLAN OF CARE
Problem: Patient Care Overview  Goal: Plan of Care Review  Outcome: Ongoing (interventions implemented as appropriate)  Flowsheets (Taken 8/12/2020 1411)  Progress: improving  Outcome Summary: Re-eval of swallow completed. Recommend nectar thick liquids and puree with some mash. Recommend meds crushed with puree. Recommend upright, alert, 1:1 supervision, check for L pocketing/oral residue, oral care after all PO. Water protocol with staff supervision, 30 mins after oral care. Recommend VFSS.     Patient was not wearing a face mask during this therapy encounter. Therapist used appropriate personal protective equipment including mask, eye protection and gloves.  Mask used was standard procedure mask. Appropriate PPE was worn during the entire therapy session. Hand hygiene was completed before and after therapy session. Patient is not in enhanced droplet precautions.

## 2020-08-12 NOTE — PROGRESS NOTES
Consult request received today.   Due to other clinical responsibilities, will not be able to see today.  I have asked my group for assistance with consult coverage, but none are apparently available.  On review of the clinical information in the chart, appears appropriate for acute inpt rehab, if he has adequate discharge support plan.   Patient will require pre-certification.    Prateek Diaz MD

## 2020-08-12 NOTE — PLAN OF CARE
Problem: Restraint, Nonbehavioral (Nonviolent)  Goal: Rationale and Justification  8/12/2020 0635 by Elizabeth Samuel RN  Flowsheets (Taken 8/5/2020 1657 by Jasmine Tomlin, RN)  Rationale and Justification: prevent harm to self;prevent line/tube removal;failure of less restrictive safety measures  8/12/2020 0418 by Elizabeth Samuel RN  Outcome: Ongoing (interventions implemented as appropriate)  Flowsheets (Taken 8/5/2020 1657 by Jasmine Tomlin, RN)  Rationale and Justification: prevent harm to self;prevent line/tube removal;failure of less restrictive safety measures  Goal: Nonbehavioral (Nonviolent) Restraint: Absence of Injury/Harm  8/12/2020 0635 by Elizabeth Samuel RN  Flowsheets (Taken 8/10/2020 0607 by Mariana Bojorquez RN)  Nonbehavioral (Nonviolent) Restraint: Absence of Injury/Harm: met  8/12/2020 0418 by Elizabeth Samuel RN  Outcome: Ongoing (interventions implemented as appropriate)  Flowsheets (Taken 8/10/2020 0607 by Mariana Bojorquez RN)  Nonbehavioral (Nonviolent) Restraint: Absence of Injury/Harm: met  Goal: Nonbehavioral (Nonviolent) Restraint: Achievement of Discontinuation Criteria  8/12/2020 0635 by Elizabeth Samuel RN  Flowsheets (Taken 8/10/2020 0607 by Mariana Bojorquez RN)  Nonbehavioral (Nonviolent) Restraint: Achievement of Discontinuation Criteria: not met  8/12/2020 0418 by Elizbaeth Samuel RN  Outcome: Ongoing (interventions implemented as appropriate)  Flowsheets (Taken 8/10/2020 0607 by Mariana Bojorquez RN)  Nonbehavioral (Nonviolent) Restraint: Achievement of Discontinuation Criteria: not met  Goal: Nonbehavioral (Nonviolent) Restraint: Preservation of Dignity and Wellbeing  8/12/2020 0635 by Elizabeth Samuel RN  Flowsheets (Taken 8/10/2020 0607 by Mariana Bojorquez RN)  Nonbehavioral (Nonviolent) Restraint: Preservation of Dignity and Wellbeing: met  8/12/2020 0418 by Elizabeth Samuel, RN  Outcome: Ongoing (interventions implemented as appropriate)  Flowsheets  (Taken 8/10/2020 0607 by Mariana Bojorquez RN)  Nonbehavioral (Nonviolent) Restraint: Preservation of Dignity and Wellbeing: met  Intervention: Protect Skin and Joint Integrity  Flowsheets (Taken 8/11/2020 0720)  LUE Range of Motion: ROM (range of motion) performed  RUE Range of Motion: ROM (range of motion) performed

## 2020-08-12 NOTE — PROGRESS NOTES
Continued Stay Note  [facility]     Patient Name: Avinash Everett  MRN: [id number]  Today's Date: 8/12/2020    Admit Date: 8/3/2020    Discharge Plan     Row Name 08/12/20 1647       Plan    Plan  CCP spoke with wife, Alea again today by phone, pt is doing much better, out of restraints.  Discussed making a referral to [facility], she wants to discuss with pt and call CCP back with answer on 8/13 Salima Rockwell RN        Discharge Codes    No documentation.             Salima Rockwell RN

## 2020-08-12 NOTE — THERAPY TREATMENT NOTE
Acute Care - Occupational Therapy Treatment Note  [facility]     Patient Name: Avinash Everett  : 1990  MRN: [id number]  Today's Date: 2020             Admit Date: 8/3/2020       ICD-10-CM ICD-9-CM   1. Right-sided nontraumatic intracerebral hemorrhage, unspecified cerebral location (CMS/HCC) I61.9 431   2. Subdural hematoma (CMS/HCC) S06.5X9A 432.1   3. Nontraumatic subcortical hemorrhage of right cerebral hemisphere (CMS/HCC) I61.0 431     Patient Active Problem List   Diagnosis   • Right-sided nontraumatic intracerebral hemorrhage (CMS/HCC)   • Nontraumatic subcortical hemorrhage of right cerebral hemisphere (CMS/HCC)   • Alcohol abuse   • Metabolic encephalopathy   • Vitamin K deficiency     History reviewed. No pertinent past medical history.  History reviewed. No pertinent surgical history.    Therapy Treatment    Rehabilitation Treatment Summary     Row Name 20 1527             Treatment Time/Intention    Discipline  occupational therapist  -      Document Type  therapy note (daily note)  -      Subjective Information  no complaints  -      Mode of Treatment  individual therapy;occupational therapy  -      Patient/Family Observations  pt supine bed. mom is present.  -KP      Patient Effort  good  -KP      Existing Precautions/Restrictions  fall  -KP      Recorded by [KP] Gracie Matos OTDONTA 20 1532      Row Name 20 1527             Cognitive Assessment/Intervention- PT/OT    Orientation Status (Cognition)  oriented x 4  -KP      Follows Commands (Cognition)  follows one step commands;over 90% accuracy  -KP      Safety Deficit (Cognitive)  mild deficit  -KP      Recorded by [KP] Gracie Matos OTR 20 1532      Row Name 20 1527             Bed Mobility Assessment/Treatment    Supine-Sit [place] (Bed Mobility)  set up;moderate assist (50% patient effort)  -KP      Recorded by [KP] Gracie Matos OTR 20 153      Row Name  08/12/20 1527             Transfer Assessment/Treatment    Transfer Assessment/Treatment  sit-stand transfer;stand-sit transfer;bed-chair transfer  -KP      Recorded by [KP] Gracie Matos R 08/12/20 1532      Row Name 08/12/20 1527             Bed-Chair Transfer    Bed-Chair [place] (Transfers)  set up;moderate assist (50% patient effort);minimum assist (75% patient effort);2 person assist;verbal cues  -KP      Recorded by [KP] Gracie Matos R 08/12/20 1532      Row Name 08/12/20 1527             Sit-Stand Transfer    Sit-Stand [place] (Transfers)  set up;moderate assist (50% patient effort)  -KP      Recorded by [] Gracie Matos [facility] 08/12/20 1532      Row Name 08/12/20 1527             Stand-Sit Transfer    Stand-Sit [place] (Transfers)  set up;minimum assist (75% patient effort);moderate assist (50% patient effort);2 person assist  -KP      Recorded by [KP] Gracie Matos R 08/12/20 1532      Row Name 08/12/20 1527             Lower Body Dressing Assessment/Training    Comment (Lower Body Dressing)  already washed up  -KP      Recorded by [] Gracie Matos R 08/12/20 1532      Row Name 08/12/20 1527             Therapeutic Exercise    Upper Extremity Range of Motion (Therapeutic Exercise)  shoulder flexion/extension, left;shoulder abduction/adduction, left;shoulder horizontal abduction/adduction, left;elbow flexion/extension, left;forearm supination/pronation, left;wrist flexion/extension, left  -KP      Hand (Therapeutic Exercise)  finger flexion/extension, left;hand , left  -KP      Exercise Type (Therapeutic Exercise)  PROM (passive range of motion);AROM (active range of motion)  -KP      Position (Therapeutic Exercise)  seated;supine  -KP      Sets/Reps (Therapeutic Exercise)  10x3 w rest breaks  -KP      Expected Outcome (Therapeutic Exercise)  improve neuromuscular control;improve motor control;improve performance, BADLs;improve  performance, transfer skills;improve functional tolerance, self-care activity;facilitate normal movement patterns  -KP      Comment (Therapeutic Exercise)  wt bearing EOB  -KP      Recorded by [KP] Gracie Matos OTR 08/12/20 1532      Row Name 08/12/20 1527             Static Sitting Balance    Level of [place] (Unsupported Sitting, Static Balance)  contact guard assist  -KP      Sitting Position (Unsupported Sitting, Static Balance)  sitting on edge of bed  -KP      Time Able to Maintain Position (Unsupported Sitting, Static Balance)  1 to 2 minutes  -KP      Comment (Unsupported Sitting, Static Balance)  VC for upright posture  -KP      Recorded by [KP] Gracie Matos OTR 08/12/20 1532      Row Name 08/12/20 1527             Static Standing Balance    Level of [place] (Supported Standing, Static Balance)  moderate assist, 50 to 74% patient effort;minimal assist, 75% patient effort;2 person assist  -KP      Recorded by [KP] Gracie Matos OTR 08/12/20 1532      Row Name 08/12/20 1527             Positioning and Restraints    Pre-Treatment Position  in bed  -KP      Post Treatment Position  chair  -KP      In Chair  sitting;call light within reach;encouraged to call for assist;with family/caregiver;notified nsg pillow on L side  -KP      Recorded by [KP] Gracie Matos OTR 08/12/20 1532      Row Name 08/12/20 1527             Pain Scale: Numbers Pre/Post-Treatment    Pain Scale: Numbers, Pretreatment  0/10 - no pain  -KP      Pain Scale: Numbers, Post-Treatment  0/10 - no pain  -KP      Recorded by [KP] Gracie Matos OTR 08/12/20 1532      Row Name                Wound 08/04/20 Right head Incision    Wound - Properties Group Date first assessed: 08/04/20 [SB] Present on Hospital Admission: N [SB] Side: Right [SB] Location: head [SB] Primary Wound Type: Incision [SB] Recorded by:  [SB] Elke Corrales RN 08/04/20 0020    Row Name                Wound  08/05/20 1858 Right groin Puncture    Wound - Properties Group Date first assessed: 08/05/20 [KM] Time first assessed: 1858 [KM] Side: Right [KM] Location: groin [KM] Primary Wound Type: Puncture [KM] Recorded by:  [KM] Gracie Valencia RN 08/05/20 1858    Row Name 08/12/20 1527             Plan of Care Review    Plan of Care Reviewed With  patient;family  -KP      Progress  improving  -      Outcome Summary  pt completed bed mobility mod A. sat EOB CGA. tsf to chair w. min to mod x2 and VCs. A w. L LE. PROM/AAROM L UE noted some movement in UE. per pt states L hand draws in at night, wants a splint. OT to leave sticky note to MDs in chart. pt completed wt bearing EOB. pt cont to benefit from OT to incr ADL, strength, movement, tsf, balance.   -KP      Recorded by [KP] Gracie Matos, OTR 08/12/20 1532      Row Name 08/12/20 1527             Outcome Summary/Treatment Plan (OT)    Daily Summary of Progress (OT)  progress toward functional goals is good  -KP      Anticipated Discharge Disposition (OT)  inpatient rehabilitation facility  -KP      Recorded by [KP] Gracie Matos, OTR 08/12/20 1532        User Key  (r) = Recorded By, (t) = Taken By, (c) = Cosigned By    Initials Name Effective Dates Discipline    KP Gracie Matos, OTR 06/08/18 -  OT     Gracie Valencia RN 11/27/17 -  Nurse    SB Elke Corrales RN 06/16/16 -  Nurse        Wound 08/04/20 Right head Incision (Active)   Dressing Appearance open to air;no drainage 8/12/2020  2:47 PM   Closure Liquid skin adhesive 8/12/2020  2:47 PM   Base clean;dry 8/12/2020  2:47 PM   Drainage Amount none 8/12/2020  2:47 PM   Dressing Care, Wound open to air 8/12/2020  9:56 AM       Wound 08/05/20 1858 Right groin Puncture (Active)   Dressing Appearance open to air;no drainage 8/12/2020  2:47 PM   Closure Open to air 8/12/2020  2:47 PM   Drainage Amount none 8/12/2020  2:47 PM   Dressing Care, Wound open to air 8/12/2020  9:56 AM      Rehab Goal Summary     Row Name 08/12/20 1358             Oral Nutrition/Hydration Goal 1 (SLP)    Oral Nutrition/Hydration Goal 1, SLP  Pt will safely tolerate the least restrictive diet with no s/s of aspiration.  -OC      Time Frame (Oral Nutrition/Hydration Goal 1, SLP)  by discharge  -OC        User Key  (r) = Recorded By, (t) = Taken By, (c) = Cosigned By    Initials Name Provider Type Discipline    OC Hoa Delaney MA,[facility]-SLP Speech and Language Pathologist SLP        Occupational Therapy Education                 Title: PT OT SLP Therapies (Done)     Topic: Occupational Therapy (Done)     Point: ADL training (Done)     Description:   Instruct learner(s) on proper safety adaptation and remediation techniques during self care or transfers.   Instruct in proper use of assistive devices.              Learning Progress Summary           Patient Acceptance, E, VU,NR by  at 8/9/2020 0527                   Point: Home exercise program (Done)     Description:   Instruct learner(s) on appropriate technique for monitoring, assisting and/or progressing therapeutic exercises/activities.              Learning Progress Summary           Patient Acceptance, E, VU,NR by  at 8/9/2020 0527                   Point: Precautions (Done)     Description:   Instruct learner(s) on prescribed precautions during self-care and functional transfers.              Learning Progress Summary           Patient Acceptance, E, VU,NR by  at 8/9/2020 0527                   Point: Body mechanics (Done)     Description:   Instruct learner(s) on proper positioning and spine alignment during self-care, functional mobility activities and/or exercises.              Learning Progress Summary           Patient Acceptance, E, VU,NR by  at 8/9/2020 0527                               User Key     Initials Effective Dates Name Provider Type Discipline     02/11/20 -  Mariana Bojorquez, RN Registered Nurse Nurse                OT Recommendation and  Plan  Outcome Summary/Treatment Plan (OT)  Daily Summary of Progress (OT): progress toward functional goals is good  Anticipated Discharge Disposition (OT): inpatient rehabilitation facility  Daily Summary of Progress (OT): progress toward functional goals is good  Plan of Care Review  Plan of Care Reviewed With: patient, family  Plan of Care Reviewed With: patient, family  Outcome Summary: pt completed bed mobility mod A. sat EOB CGA. tsf to chair w. min to mod x2 and VCs. A w. L LE. PROM/AAROM L UE noted some movement in UE. per pt states L hand draws in at night, wants a splint. OT to leave sticky note to MDs in chart. pt completed wt bearing EOB. pt cont to benefit from OT to incr ADL, strength, movement, tsf, balance.   Outcome Measures     Row Name 08/12/20 1534             How much help from another is currently needed...    Putting on and taking off regular lower body clothing?  1  -KP      Bathing (including washing, rinsing, and drying)  2  -KP      Toileting (which includes using toilet bed pan or urinal)  2  -KP      Putting on and taking off regular upper body clothing  2  -KP      Taking care of personal grooming (such as brushing teeth)  3  -KP      Eating meals  1  -KP      AM-PAC 6 Clicks Score (OT)  11  -KP         Functional Assessment    Outcome Measure Options  AM-PAC 6 Clicks Daily Activity (OT)  -        User Key  (r) = Recorded By, (t) = Taken By, (c) = Cosigned By    Initials Name Provider Type    Gracie Day, OTR Occupational Therapist           Time Calculation:   Time Calculation- OT     Row Name 08/12/20 1534             Time Calculation- OT    OT Start Time  1307  -      OT Stop Time  1348  -      OT Time Calculation (min)  41 min  -      Total Timed Code Minutes- OT  41 minute(s)  -      OT Received On  08/12/20  -      OT - Next Appointment  08/13/20  -        User Key  (r) = Recorded By, (t) = Taken By, (c) = Cosigned By    Initials Name Provider Type     Gracie Day, ELIZABET Occupational Therapist        Therapy Charges for Today     Code Description Service Date Service Provider Modifiers Qty    [id number] HC OT NEUROMUSC RE EDUCATION EA 15 MIN 8/12/2020 Gracie Matos OTR GO 3               ELIZABET Feliz  8/12/2020

## 2020-08-12 NOTE — PLAN OF CARE
Problem: Patient Care Overview  Goal: Plan of Care Review  Outcome: Ongoing (interventions implemented as appropriate)  Flowsheets (Taken 8/12/2020 8327)  Progress: improving  Plan of Care Reviewed With: patient  Outcome Summary: Pt tolerated treatment well this date. Increased overall activity and is moving his L LE much better. Pt able to perform quad sets and LAQs on L LE, though still very weak and poor eccentric control. Pt was able to hold self up much better while sitting EOB, though still w/ L lean. Able to correct w/ several non-verbal and verbal cues. Pt stood 2x w/ min A x2, though fatigued quickly and shaking d/t weakness. Will attempt transfer to chair tomorrow. Encouraged pt and his mother to work on a few bed exercises later tonight. Patient was wearing a face mask during this therapy encounter. Therapist used appropriate personal protective equipment including eye protection, mask, and gloves.  Mask used was standard procedure mask. Appropriate PPE was worn during the entire therapy session. Hand hygiene was completed before and after therapy session. Patient is not in enhanced droplet precautions. Also present: RAO Ordaz tech.

## 2020-08-12 NOTE — PROGRESS NOTES
"DAILY PROGRESS NOTE  [facility]    Patient Identification:  Name: Avinash Everett  Age: 30 y.o.  Sex: male  :  1990  MRN: [id number]         Primary Care Physician: Provider, No Known      Subjective  No new complaints.  Overall patient is feeling better.  He did pull out his feeding tube.  Awaiting input from speech therapy.  He was able to stand today with physical therapy.  Discussed some his test results.  Especially noting LFTs with the bilirubin being up.  His mom is at bedside and they note that he has had issues with elevated bilirubins in the past.    Objective:  General Appearance:  Comfortable, well-appearing, in no acute distress and not in pain.    Vital signs: (most recent): Blood pressure 114/66, pulse 67, temperature 98 °F (36.7 °C), temperature source Oral, resp. rate 20, height 185.4 cm (72.99\"), weight 86.1 kg (189 lb 14.4 oz), SpO2 95 %.    Lungs:  Normal effort and normal respiratory rate.  Breath sounds clear to auscultation.    Heart: Normal rate.  Regular rhythm.    Abdomen: Abdomen is soft and rigid.    Neurological: Patient is alert.  (Oriented to person and place.  Interactive and in fact very talkative today.  Still with pronounced left upper extremity weakness.).    Skin:  Warm and dry.                Vital signs in last 24 hours:  Temp:  [97.7 °F (36.5 °C)-98.4 °F (36.9 °C)] 98 °F (36.7 °C)  Heart Rate:  [65-93] 67  Resp:  [16-20] 20  BP: (101-118)/(63-77) 114/66    Intake/Output:    Intake/Output Summary (Last 24 hours) at 2020 1232  Last data filed at 2020 0551  Gross per 24 hour   Intake 240 ml   Output 550 ml   Net -310 ml         Results from last 7 days   Lab Units 20  0523 20  0440 08/10/20  0431 20  0656 20  1005 20  0612 20  0552   WBC 10*3/mm3 7.61 10.37 6.49 5.36 5.88 7.04 10.14   HEMOGLOBIN g/dL 11.7* 11.8* 12.2* 10.9* 10.9* 10.9* 10.0*   PLATELETS 10*3/mm3 226 231 196 140 151 130* 131*     Results from last " 7 days   Lab Units 08/12/20  0523 08/11/20  0440 08/10/20  0431 08/09/20  0656 08/08/20  1005 08/07/20  0612 08/06/20  0552   SODIUM mmol/L 136 138 134* 136 135* 138 138   POTASSIUM mmol/L 3.8 3.5 3.5 3.4* 3.5 2.8* 2.9*   CHLORIDE mmol/L 103 105 104 109* 108* 106 104   CO2 mmol/L 23.0 20.9* 18.5* 18.4* 18.8* 23.6 24.1   BUN mg/dL 16 11 5* 4* 7 8 7   CREATININE mg/dL 0.46* 0.43* 0.43* 0.40* 0.45* 0.43* 0.53*   GLUCOSE mg/dL 129* 179* 154* 85 98 90 99   Estimated Creatinine Clearance: 286 mL/min (A) (by C-G formula based on SCr of 0.46 mg/dL (L)).  Results from last 7 days   Lab Units 08/12/20  0523 08/11/20  0440 08/10/20  0431 08/09/20  0656 08/08/20  1005 08/07/20  0612 08/06/20  0552   CALCIUM mg/dL 9.5 10.0 10.3 8.6 8.2* 7.8* 7.8*   ALBUMIN g/dL 3.60 3.70 3.90 3.20* 3.20* 3.20* 3.30*   MAGNESIUM mg/dL  --   --   --  1.6  --  2.1 1.2*   PHOSPHORUS mg/dL  --   --   --  2.9 1.5* 1.4* 1.3*     Results from last 7 days   Lab Units 08/12/20  0523 08/11/20  0440 08/10/20  0431 08/09/20  0656 08/08/20  1005 08/07/20 0612 08/06/20  0552   ALBUMIN g/dL 3.60 3.70 3.90 3.20* 3.20* 3.20* 3.30*   BILIRUBIN mg/dL 3.1* 3.1* 3.6* 3.8* 3.8* 5.1* 5.3*   ALK PHOS U/L 93 101 100 80 77 76 78   AST (SGOT) U/L 82* 49* 44* 36 36 45* 70*   ALT (SGPT) U/L 59* 35 29 24 25 27 32       Assessment:    Nontraumatic subcortical hemorrhage of right cerebral hemisphere: Status post right parietal craniotomy and evacuation of hemorrhage.  8/3/2020.  Follow-up CT scan stable.    Left-sided weakness secondary to above.    Severe dysphasia: Feeding tube is been pulled out.  Awaiting speech therapy evaluation    Aspiration pneumonia versus pneumonitis.  Patient presently on Augmentin.  Much improved.    Alcohol abuse:    Lipid LFTs: Likely secondary to alcohol abuse.  Will check hepatitis profile in the morning.    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  Check fractionated bilirubin the morning.  We will also check hepatic  ultrasound.    Macrocytosis secondary to alcohol abuse and folate deficiency.  Improving.    Metabolic encephalopathy    Coagulopathy: INR remains elevated.  Will re-dose with vitamin K.      Plan:  Please see above.  Discussed with patient and his mother at bedside.    Donato Velazquez MD  8/12/2020  12:32

## 2020-08-12 NOTE — THERAPY TREATMENT NOTE
Patient Name: Avinash Everett  : 1990    MRN: [id number]                              Today's Date: 2020       Admit Date: 8/3/2020    Visit Dx:     ICD-10-CM ICD-9-CM   1. Right-sided nontraumatic intracerebral hemorrhage, unspecified cerebral location (CMS/HCC) I61.9 431   2. Subdural hematoma (CMS/HCC) S06.5X9A 432.1   3. Nontraumatic subcortical hemorrhage of right cerebral hemisphere (CMS/HCC) I61.0 431     Patient Active Problem List   Diagnosis   • Right-sided nontraumatic intracerebral hemorrhage (CMS/HCC)   • Nontraumatic subcortical hemorrhage of right cerebral hemisphere (CMS/HCC)   • Alcohol abuse   • Metabolic encephalopathy   • Vitamin K deficiency     History reviewed. No pertinent past medical history.  History reviewed. No pertinent surgical history.  General Information     Row Name 20 1329          PT Evaluation Time/Intention    Document Type  therapy note (daily note)  -     Mode of Treatment  physical therapy  -     Row Name 20 1329          General Information    Existing Precautions/Restrictions  fall  -     Row Name 20 1329          Cognitive Assessment/Intervention- PT/OT    Orientation Status (Cognition)  oriented x 3  -SM     Cognitive Assessment/Intervention Comment  initially thought it was Saturday, though once oriented to the day of the week, pt able to recall the date  -       User Key  (r) = Recorded By, (t) = Taken By, (c) = Cosigned By    Initials Name Provider Type     Lissa Finn PTA Physical Therapy Assistant        Mobility     Row Name 20 4936          Bed Mobility Assessment/Treatment    Bed Mobility Assessment/Treatment  supine-sit;sit-supine  -     Supine-Sit [place] (Bed Mobility)  moderate assist (50% patient effort);2 person assist  -     Sit-Supine [place] (Bed Mobility)  moderate assist (50% patient effort);2 person assist  -     Assistive Device (Bed Mobility)  bed rails;head of bed elevated  -      "Comment (Bed Mobility)  pt hooked R LE under L LE  -     Row Name 08/12/20 1330          Transfer Assessment/Treatment    Comment (Transfers)  stood 2x, blocking L knee in case of buckling; some L lean, though corrected w/ cues  -     Row Name 08/12/20 1330          Sit-Stand Transfer    Sit-Stand [place] (Transfers)  minimum assist (75% patient effort);2 person assist  -     Assistive Device (Sit-Stand Transfers)  -- HHA  -       User Key  (r) = Recorded By, (t) = Taken By, (c) = Cosigned By    Initials Name Provider Type     Lissa Finn PTA Physical Therapy Assistant        Obj/Interventions     Row Name 08/12/20 1333          Therapeutic Exercise    Lower Extremity (Therapeutic Exercise)  LAQ (long arc quad), bilateral;quad sets, bilateral  -     Lower Extremity Range of Motion (Therapeutic Exercise)  ankle dorsiflexion/plantar flexion, right  -     Exercise Type (Therapeutic Exercise)  AROM (active range of motion)  -     Position (Therapeutic Exercise)  seated;supine  -     Sets/Reps (Therapeutic Exercise)  10 reps  -     Row Name 08/12/20 1333          Static Sitting Balance    Level of [place] (Unsupported Sitting, Static Balance)  contact guard assist;minimal assist, 75% patient effort;moderate assist, 50 to 74% patient effort  -     Sitting Position (Unsupported Sitting, Static Balance)  sitting on edge of bed  -     Time Able to Maintain Position (Unsupported Sitting, Static Balance)  more than 5 minutes  -     Comment (Unsupported Sitting, Static Balance)  cues for posture (\"head up, back straight, lean R\")  -       User Key  (r) = Recorded By, (t) = Taken By, (c) = Cosigned By    Initials Name Provider Type     Lissa Finn PTA Physical Therapy Assistant        Goals/Plan    No documentation.       Clinical Impression     Row Name 08/12/20 0447          Pain Assessment    Additional Documentation  Pain Scale: Numbers Pre/Post-Treatment (Group)  - "     Row Name 08/12/20 1335          Pain Scale: Numbers Pre/Post-Treatment    Pain Scale: Numbers, Pretreatment  0/10 - no pain  -SM     Pain Scale: Numbers, Post-Treatment  0/10 - no pain  -SM     Row Name 08/12/20 1335          Positioning and Restraints    Pre-Treatment Position  in bed  -SM     Post Treatment Position  bed  -SM     In Bed  supine;call light within reach;encouraged to call for assist;exit alarm on;with family/caregiver  -       User Key  (r) = Recorded By, (t) = Taken By, (c) = Cosigned By    Initials Name Provider Type    Lissa Arriola PTA Physical Therapy Assistant        Outcome Measures     Row Name 08/12/20 4716          How much help from another person do you currently need...    Turning from your back to your side while in flat bed without using bedrails?  2  -SM     Moving from lying on back to sitting on the side of a flat bed without bedrails?  2  -SM     Moving to and from a bed to a chair (including a wheelchair)?  1  -SM     Standing up from a chair using your arms (e.g., wheelchair, bedside chair)?  2  -SM     Climbing 3-5 steps with a railing?  1  -SM     To walk in hospital room?  1  -SM     AM-PAC 6 Clicks Score (PT)  9  -SM     Row Name 08/12/20 1336          Functional Assessment    Outcome Measure Options  AM-PAC 6 Clicks Basic Mobility (PT)  -       User Key  (r) = Recorded By, (t) = Taken By, (c) = Cosigned By    Initials Name Provider Type    Lissa Arriola PTA Physical Therapy Assistant        Physical Therapy Education                 Title: PT OT SLP Therapies (Done)     Topic: Physical Therapy (Done)     Point: Mobility training (Done)     Description:   Instruct learner(s) on safety and technique for assisting patient out of bed, chair or wheelchair.  Instruct in the proper use of assistive devices, such as walker, crutches, cane or brace.              Patient Friendly Description:   It's important to get you on your feet again, but we need to  do so in a way that is safe for you. Falling has serious consequences, and your personal safety is the most important thing of all.        When it's time to get out of bed, one of us or a family member will sit next to you on the bed to give you support.     If your doctor or nurse tells you to use a walker, crutches, a cane, or a brace, be sure you use it every time you get out of bed, even if you think you don't need it.    Learning Progress Summary           Patient Acceptance, E,TB,D, VU,NR by  at 8/12/2020 1337    Acceptance, E,D, NR by SM at 8/10/2020 1154    Acceptance, E, VU,NR by LL at 8/9/2020 0527    Acceptance, E, VU,NR by  at 8/8/2020 1530    Acceptance, E,D, NR by  at 8/7/2020 1204   Family Acceptance, E, VU,NR by MW at 8/8/2020 1530                   Point: Home exercise program (Done)     Description:   Instruct learner(s) on appropriate technique for monitoring, assisting and/or progressing patient with therapeutic exercises and activities.              Learning Progress Summary           Patient Acceptance, E,TB,D, VU,NR by  at 8/12/2020 1337    Acceptance, E,D, NR by  at 8/10/2020 1154    Acceptance, E, VU,NR by  at 8/9/2020 0527    Acceptance, E,D, NR by  at 8/7/2020 1204                   Point: Body mechanics (Done)     Description:   Instruct learner(s) on proper positioning and spine alignment for patient and/or caregiver during mobility tasks and/or exercises.              Learning Progress Summary           Patient Acceptance, E,TB,D, VU,NR by  at 8/12/2020 1337    Acceptance, E,D, NR by  at 8/10/2020 1154    Acceptance, E, VU,NR by LL at 8/9/2020 0527    Acceptance, E, VU,NR by  at 8/8/2020 1530    Acceptance, E,D, NR by  at 8/7/2020 1204   Family Acceptance, E, VU,NR by MW at 8/8/2020 1530                   Point: Precautions (Done)     Description:   Instruct learner(s) on prescribed precautions during mobility and gait tasks              Learning Progress Summary            Patient Acceptance, E,TB,D, VU,NR by  at 8/12/2020 1337    Acceptance, E,D, NR by  at 8/10/2020 1154    Acceptance, E, VU,NR by  at 8/9/2020 0527    Acceptance, E, VU,NR by  at 8/8/2020 1530    Acceptance, E,D, NR by  at 8/7/2020 1204   Family Acceptance, E, VU,NR by  at 8/8/2020 1530                               User Key     Initials Effective Dates Name Provider Type Discipline     02/05/19 -  Raysa Devine, PT Physical Therapist PT     03/07/18 -  Lissa Finn PTA Physical Therapy Assistant PT     09/17/19 -  Geri Glez PT Physical Therapist PT     02/11/20 -  Mariana Bojorquez RN Registered Nurse Nurse              PT Recommendation and Plan     Outcome Summary/Treatment Plan (PT)  Anticipated Discharge Disposition (PT): inpatient rehabilitation facility  Plan of Care Reviewed With: patient  Progress: improving  Outcome Summary: Pt tolerated treatment well this date. Increased overall activity and is moving his L LE much better. Pt able to perform quad sets and LAQs on L LE, though still very weak and poor eccentric control. Pt was able to hold self up much better while sitting EOB, though still w/ L lean. Able to correct w/ several non-verbal and verbal cues. Pt stood 2x w/ min A x2, though fatigued quickly and shaking d/t weakness. Will attempt transfer to chair tomorrow. Encouraged pt and his mother to work on a few bed exercises later tonight. Patient was wearing a face mask during this therapy encounter. Therapist used appropriate personal protective equipment including eye protection, mask, and gloves.  Mask used was standard procedure mask. Appropriate PPE was worn during the entire therapy session. Hand hygiene was completed before and after therapy session. Patient is not in enhanced droplet precautions.     Time Calculation:   PT Charges     Row Name 08/12/20 1342             Time Calculation    Start Time  1009  -SM      Stop Time  1040  -      Time  Calculation (min)  31 min  -      PT Received On  08/12/20  -ROBYN      PT - Next Appointment  08/13/20  -        User Key  (r) = Recorded By, (t) = Taken By, (c) = Cosigned By    Initials Name Provider Type    Lissa Arriola PTA Physical Therapy Assistant        Therapy Charges for Today     Code Description Service Date Service Provider Modifiers Qty    71201431153 HC PT THER PROC EA 15 MIN 8/12/2020 Lissa Finn PTA GP 1    37570302582 HC PT THER SUPP EA 15 MIN 8/12/2020 Lissa Finn PTA GP 2    18283416770 HC PT NEUROMUSC RE EDUCATION EA 15 MIN 8/12/2020 Lissa Finn, BHUPENDRA GP 1          PT G-Codes  Outcome Measure Options: AM-PAC 6 Clicks Basic Mobility (PT)  AM-PAC 6 Clicks Score (PT): 9  AM-PAC 6 Clicks Score (OT): 11  Modified [place] Scale: 4 - Moderately severe disability.  Unable to walk without assistance, and unable to attend to own bodily needs without assistance.    Lissa Finn PTA  8/12/2020

## 2020-08-12 NOTE — PLAN OF CARE
Problem: Patient Care Overview  Goal: Plan of Care Review  Outcome: Ongoing (interventions implemented as appropriate)  Flowsheets (Taken 8/12/2020 3468)  Progress: improving  Plan of Care Reviewed With: patient;family  Note:   NIH 9: left facial droop, LUE 4+ drift/flaccid, LLE 2+/some mild effort, decreased left-sided sensation. Pt alert, oriented x4, forgetful at times. Decreased steroids, decreased zyprexa. Was able to tolerate working w/ PT, OT. Up to chair, MAX assist x2-3 and gait belt. ST approved for pureed/some mashed, NTL, no straws, meds crushed in puree. Requires 1:1 supervision w/ all PO intake, and requires oral care after all PO intake. U/S liver pending. Labs in AM. Rehab consult pending. CTM closely.

## 2020-08-13 ENCOUNTER — APPOINTMENT (OUTPATIENT)
Dept: CT IMAGING | Facility: HOSPITAL | Age: 30
End: 2020-08-13

## 2020-08-13 ENCOUNTER — APPOINTMENT (OUTPATIENT)
Dept: GENERAL RADIOLOGY | Facility: HOSPITAL | Age: 30
End: 2020-08-13

## 2020-08-13 LAB
ALBUMIN SERPL-MCNC: 3.7 G/DL (ref 3.5–5.2)
ALP SERPL-CCNC: 93 U/L (ref 39–117)
ALT SERPL W P-5'-P-CCNC: 106 U/L (ref 1–41)
AMMONIA BLD-SCNC: 70 UMOL/L (ref 16–60)
ANION GAP SERPL CALCULATED.3IONS-SCNC: 9.5 MMOL/L (ref 5–15)
AST SERPL-CCNC: 129 U/L (ref 1–40)
BILIRUB CONJ SERPL-MCNC: 1.2 MG/DL (ref 0–0.3)
BILIRUB INDIRECT SERPL-MCNC: 1.6 MG/DL
BILIRUB SERPL-MCNC: 2.8 MG/DL (ref 0–1.2)
BUN SERPL-MCNC: 13 MG/DL (ref 6–20)
BUN/CREAT SERPL: 26 (ref 7–25)
CALCIUM SPEC-SCNC: 9.2 MG/DL (ref 8.6–10.5)
CHLORIDE SERPL-SCNC: 102 MMOL/L (ref 98–107)
CO2 SERPL-SCNC: 22.5 MMOL/L (ref 22–29)
CREAT SERPL-MCNC: 0.5 MG/DL (ref 0.76–1.27)
GFR SERPL CREATININE-BSD FRML MDRD: >150 ML/MIN/1.73
GLUCOSE SERPL-MCNC: 142 MG/DL (ref 65–99)
INR PPP: 1.62 (ref 0.9–1.1)
POTASSIUM SERPL-SCNC: 3.6 MMOL/L (ref 3.5–5.2)
PROT SERPL-MCNC: 7.5 G/DL (ref 6–8.5)
PROTHROMBIN TIME: 18.8 SECONDS (ref 11.7–14.2)
SODIUM SERPL-SCNC: 134 MMOL/L (ref 136–145)
TSH SERPL DL<=0.05 MIU/L-ACNC: 2.4 UIU/ML (ref 0.27–4.2)

## 2020-08-13 PROCEDURE — 85610 PROTHROMBIN TIME: CPT | Performed by: HOSPITALIST

## 2020-08-13 PROCEDURE — 97112 NEUROMUSCULAR REEDUCATION: CPT | Performed by: OCCUPATIONAL THERAPIST

## 2020-08-13 PROCEDURE — 86709 HEPATITIS A IGM ANTIBODY: CPT | Performed by: HOSPITALIST

## 2020-08-13 PROCEDURE — 80076 HEPATIC FUNCTION PANEL: CPT | Performed by: HOSPITALIST

## 2020-08-13 PROCEDURE — 92611 MOTION FLUOROSCOPY/SWALLOW: CPT

## 2020-08-13 PROCEDURE — 99024 POSTOP FOLLOW-UP VISIT: CPT | Performed by: NURSE PRACTITIONER

## 2020-08-13 PROCEDURE — 87340 HEPATITIS B SURFACE AG IA: CPT | Performed by: HOSPITALIST

## 2020-08-13 PROCEDURE — 25010000002 LEVETIRACETAM IN NACL 0.82% 500 MG/100ML SOLUTION: Performed by: NURSE PRACTITIONER

## 2020-08-13 PROCEDURE — 82140 ASSAY OF AMMONIA: CPT | Performed by: HOSPITALIST

## 2020-08-13 PROCEDURE — 80048 BASIC METABOLIC PNL TOTAL CA: CPT | Performed by: HOSPITALIST

## 2020-08-13 PROCEDURE — 25010000002 DEXAMETHASONE PER 1 MG: Performed by: NURSE PRACTITIONER

## 2020-08-13 PROCEDURE — 97110 THERAPEUTIC EXERCISES: CPT

## 2020-08-13 PROCEDURE — 70450 CT HEAD/BRAIN W/O DYE: CPT

## 2020-08-13 PROCEDURE — 84443 ASSAY THYROID STIM HORMONE: CPT | Performed by: HOSPITALIST

## 2020-08-13 PROCEDURE — 74230 X-RAY XM SWLNG FUNCJ C+: CPT

## 2020-08-13 PROCEDURE — 86705 HEP B CORE ANTIBODY IGM: CPT | Performed by: HOSPITALIST

## 2020-08-13 RX ORDER — PHYTONADIONE 5 MG/1
5 TABLET ORAL ONCE
Status: COMPLETED | OUTPATIENT
Start: 2020-08-13 | End: 2020-08-13

## 2020-08-13 RX ORDER — PANTOPRAZOLE SODIUM 40 MG/1
40 TABLET, DELAYED RELEASE ORAL
Status: DISCONTINUED | OUTPATIENT
Start: 2020-08-14 | End: 2020-08-15 | Stop reason: HOSPADM

## 2020-08-13 RX ORDER — LEVETIRACETAM 500 MG/1
500 TABLET ORAL EVERY 12 HOURS SCHEDULED
Status: DISCONTINUED | OUTPATIENT
Start: 2020-08-13 | End: 2020-08-15 | Stop reason: HOSPADM

## 2020-08-13 RX ORDER — ACETAMINOPHEN 325 MG/1
650 TABLET ORAL EVERY 6 HOURS PRN
Status: DISCONTINUED | OUTPATIENT
Start: 2020-08-13 | End: 2020-08-15 | Stop reason: HOSPADM

## 2020-08-13 RX ORDER — DEXAMETHASONE 2 MG/1
2 TABLET ORAL EVERY 12 HOURS SCHEDULED
Status: DISCONTINUED | OUTPATIENT
Start: 2020-08-13 | End: 2020-08-15 | Stop reason: HOSPADM

## 2020-08-13 RX ADMIN — OLANZAPINE 5 MG: 5 TABLET ORAL at 17:59

## 2020-08-13 RX ADMIN — ACETAMINOPHEN 650 MG: 325 TABLET, FILM COATED ORAL at 11:57

## 2020-08-13 RX ADMIN — LEVETIRACETAM 500 MG: 5 INJECTION INTRAVENOUS at 10:05

## 2020-08-13 RX ADMIN — SODIUM CHLORIDE, PRESERVATIVE FREE 10 ML: 5 INJECTION INTRAVENOUS at 10:05

## 2020-08-13 RX ADMIN — BARIUM SULFATE 4 ML: 980 POWDER, FOR SUSPENSION ORAL at 09:34

## 2020-08-13 RX ADMIN — FOLIC ACID 1 MG: 1 TABLET ORAL at 10:05

## 2020-08-13 RX ADMIN — DEXAMETHASONE SODIUM PHOSPHATE 2 MG: 4 INJECTION, SOLUTION INTRAMUSCULAR; INTRAVENOUS at 01:08

## 2020-08-13 RX ADMIN — Medication 100 MG: at 10:05

## 2020-08-13 RX ADMIN — PHYTONADIONE 5 MG: 5 TABLET ORAL at 17:59

## 2020-08-13 RX ADMIN — ACETAMINOPHEN 650 MG: 325 TABLET, FILM COATED ORAL at 21:26

## 2020-08-13 RX ADMIN — ACETAMINOPHEN 650 MG: 325 TABLET, FILM COATED ORAL at 01:29

## 2020-08-13 RX ADMIN — LEVETIRACETAM 500 MG: 500 TABLET, FILM COATED ORAL at 21:26

## 2020-08-13 RX ADMIN — BARIUM SULFATE 55 ML: 0.81 POWDER, FOR SUSPENSION ORAL at 09:33

## 2020-08-13 RX ADMIN — ACETAMINOPHEN 650 MG: 325 TABLET, FILM COATED ORAL at 17:59

## 2020-08-13 RX ADMIN — PANTOPRAZOLE SODIUM 40 MG: 40 INJECTION, POWDER, FOR SOLUTION INTRAVENOUS at 06:43

## 2020-08-13 RX ADMIN — DEXAMETHASONE 2 MG: 2 TABLET ORAL at 21:26

## 2020-08-13 NOTE — PLAN OF CARE
Problem: Patient Care Overview  Goal: Plan of Care Review  Outcome: Ongoing (interventions implemented as appropriate)  Flowsheets (Taken 8/13/2020 1035)  Progress: improving  Plan of Care Reviewed With: patient  Outcome Summary: VFSS completed. Pt exhibited mild-moderate oropharyngeal dysphagia. No aspiration observed with any consistency. Trace, shallow, transient penetration observed with thins by cup/straw and mixed. Pt with impulsivity, often taking several sips consecutively. Mastication appeared mildly prolonged, with trace buccal residue on the left observed with mixed and regular. Pt able to clear with lingual sweep and liquid wash.     Recommend: Change diet to fork mashed with thins; no straws. Small bites/sips, alternate bite/sips. Meds one at a time with pudding or applesauce. SLP to continue to follow and assess with trials of mechanical soft and thins due to impulsivity and reduced recall of safety instructions.

## 2020-08-13 NOTE — NURSING NOTE
Inpatient Acute Rehab    Referral received and evaluation started.  I met with patient, his mother, and talked with his wife on the phone and completed the evaluation.  I also spoke with Dr. Diaz and the patient is approved for acute inpatient rehab pending precertication, bed availabilty and medical stability.     -HOA Martinez, Rehab Admission RN

## 2020-08-13 NOTE — PLAN OF CARE
Problem: Patient Care Overview  Goal: Plan of Care Review  Flowsheets (Taken 8/13/2020 6259)  Progress: improving  Plan of Care Reviewed With: patient;family  Outcome Summary: pt completed PROM L UE, muscle tapping, wt bearing, ed pt and parent on wt bearing and ROM ex. pt cont to benefit from OT to incr ADL, strength, movement, balance, ADLs  Note:   OT wore mask, gloves, hand hygiene, protective eye wear, pt and parent wore mask.

## 2020-08-13 NOTE — MBS/VFSS/FEES
Acute Care - Speech Language Pathology   Swallow Initial Evaluation [facility]     Patient Name: Avinash Everett  : 1990  MRN: [id number]  Today's Date: 2020               Admit Date: 8/3/2020    Visit Dx:     ICD-10-CM ICD-9-CM   1. Right-sided nontraumatic intracerebral hemorrhage, unspecified cerebral location (CMS/HCC) I61.9 431   2. Subdural hematoma (CMS/HCC) S06.5X9A 432.1   3. Nontraumatic subcortical hemorrhage of right cerebral hemisphere (CMS/HCC) I61.0 431     Patient Active Problem List   Diagnosis   • Right-sided nontraumatic intracerebral hemorrhage (CMS/HCC)   • Nontraumatic subcortical hemorrhage of right cerebral hemisphere (CMS/HCC)   • Alcohol abuse   • Metabolic encephalopathy   • Vitamin K deficiency     History reviewed. No pertinent past medical history.  History reviewed. No pertinent surgical history.     SWALLOW EVALUATION (last 72 hours)      SLP Adult Swallow Evaluation     Row Name 20 0915 20 0768                Rehab Evaluation    Document Type  evaluation  -AL  re-evaluation  -OC       Subjective Information  no complaints  -AL  fatigue  -OC       Patient Observations  alert  -AL  alert;agree to therapy;cooperative  -OC       Patient/Family Observations  --  up in chair after OT  -OC       Patient Effort  excellent  -AL  good  -OC       Symptoms Noted During/After Treatment  --  none  -OC          General Information    Patient Profile Reviewed  yes  -AL  yes  -OC       Pertinent History Of Current Problem  Pt s/p emergent crani for evaluation of R frontal hematoma, intubated 8/3-20.  -AL  --       Current Method of Nutrition  pureed with some mashed;nectar/syrup-thick liquids  -AL  NPO  -OC       Precautions/Limitations, Vision  neglect, left  -AL  neglect, left  -OC       Precautions/Limitations, Hearing  WFL  -AL  WFL  -OC       Prior Level of Function-Communication  WFL  -AL  WFL  -OC       Prior Level of Function-Swallowing  no diet consistency  restrictions  -AL  no diet consistency restrictions  -OC       Plans/Goals Discussed with  patient  -AL  patient;agreed upon  -OC       Barriers to Rehab  none identified  -AL  medically complex  -OC       Patient's Goals for Discharge  return to regular diet  -AL  return to PO diet  -OC       Family Goals for Discharge  --  patient able to return to PO diet  -OC          Pain Scale: Numbers Pre/Post-Treatment    Pain Scale: Numbers, Pretreatment  0/10 - no pain  -AL  0/10 - no pain  -OC       Pain Scale: Numbers, Post-Treatment  0/10 - no pain  -AL  0/10 - no pain  -OC          Oral Motor and Function    Dentition Assessment  natural, present and adequate  -AL  natural, present and adequate  -OC       Secretion Management  WNL/WFL  -AL  WNL/WFL  -OC       Mucosal Quality  moist, healthy  -AL  moist, healthy  -OC       Volitional Swallow  --  weak  -OC       Volitional Cough  --  non-productive  -OC          Oral Musculature and Cranial Nerve Assessment    Oral Labial or Buccal Impairment, Detail, Cranial Nerve VII (Facial):  --  left labial droop  -OC       Oral Motor, Comment  Left labial weakness.  -AL  --          General Eating/Swallowing Observations    Respiratory Support Currently in Use  room air  -AL  --       Eating/Swallowing Skills  self-fed;fed by SLP  -AL  --       Positioning During Eating  upright 90 degree  -AL  --       Utensils Used  spoon;cup;straw  -AL  --       Consistencies Trialed  regular textures;soft textures;pureed;thin liquids Mixed  -AL  --          Clinical Swallow Eval    Oral Prep Phase  --  impaired  -OC       Oral Transit  --  impaired  -OC       Oral Residue  --  impaired  -OC       Pharyngeal Phase  --  suspected pharyngeal impairment  -OC       Clinical Swallow Evaluation Summary  --  Patient demonstrated L anterior loss of thin, puree, and mech soft this date. Patient demonstrated inadequate mastication of mechanical soft with significant L oral residue. Patient required  moderate verbal cues to clear oral residue.    -OC          MBS/VFSS    Consistencies Trialed  regular textures;soft textures;pureed;thin liquids thins by spoon, cup and straw, mixed  -AL  --          MBS/VFSS Interpretation    Oral Prep Phase  impaired oral phase of swallowing  -AL  --       Oral Transit Phase  impaired  -AL  --       Oral Residue  impaired  -AL  --       VFSS Summary  Pt presented with mild-moderate oropharyngeal dysphagia. Mastication appeared milldy prolonged, with trace left buccal residue noted with solids. Pt able to clear with lingual sweep and liquid wash. Anterior loss on left observed with thins by straw. Impulsivity with liquids observed, with pt often taking consecutive, large sips. Discoordination led to trace, transient penetration with thins by cup/straw and mixed. Pt did not sensate transient penetration. No aspiration observed with any consistency. Mild valleculae residue observed with all consistencies. Esophageal dysmotility observed with puree; liquid wash assisted in clearing stasis. Recommend fork mashed with thins; no straws. Trials of mechanical soft with thins with SLP only.  -AL  --       Oral Phase, Comment  Left anterior loss x1 with thins by straw, mild left buccal residue with mixed and regular, pt cleared with lingual sweep and liquid wash  -AL  --          Initiation of Pharyngeal Swallow    Pharyngeal Phase, Comment  Premature spillage to valleculae with thins by cup, mixed, puree and mechanical soft. Premature spillage to valleculae and occasionally pyriforms with thins via straw (during consecutive sips).   -AL  --          Esophageal Phase    Esophageal Phase  esophageal retention with retrograde flow below PES  -AL  --       Esophageal Phase, Comment  Reduced motility with puree, with stasis throughout esophagus and retrograde flow below UES: liquid wash of thins cleared stasis  -AL  --          SLP Communication to Radiology    Severity Level of Dysphagia   mild-moderate dysphagia;oral dysfunction;pharyngeal dysfunction  -AL  --       Consistencies Aspirated/Penetrated  penetrated;thin liquids;mixed consistency  -AL  --       Summary Statement  Pt exhibited mild-moderate oropharyngeal dysphagia. Pt exhibited trace, transient penetration with thins by cup, thins by straw and mixed. No aspiration observed with any consistency. No cough reponse observed to transient penetration. Esophageal dysmotility observed with puree. Stasis cleared with liquid wash of thins.  -AL  --          Clinical Impression    SLP Swallowing Diagnosis  mild-moderate;oral dysfunction;pharyngeal dysfunction  -AL  oral dysfunction;suspected pharyngeal dysfunction  -OC       Functional Impact  risk of aspiration/pneumonia  -AL  risk of aspiration/pneumonia  -OC       Rehab Potential/Prognosis, Swallowing  good, to achieve stated therapy goals  -AL  good, to achieve stated therapy goals  -OC       Swallow Criteria for Skilled Therapeutic Interventions Met  demonstrates skilled criteria  -AL  demonstrates skilled criteria  -OC          Recommendations    Therapy Frequency (Swallow)  PRN  -AL  PRN  -OC       Predicted Duration Therapy Intervention (Days)  until discharge  -AL  until discharge  -OC       SLP Diet Recommendation  puree with some mashed;thin liquids no straws; trials of mechanical soft with thins with SLP   -AL  puree with some mashed;nectar thick liquids;water between meals after oral care, with supervision  -OC       Recommended Diagnostics  reassess via clinical swallow evaluation  -AL  reassess via VFSS (MBS)  -OC       Recommended Precautions and Strategies  upright posture during/after eating;no straw;small bites of food and sips of liquid;alternate between small bites of food and sips of liquid  -AL  upright posture during/after eating;small bites of food and sips of liquid;no straw  -OC       SLP Rec. for Method of Medication Administration  meds whole;with pudding or applesauce   -AL  meds crushed;with pudding or applesauce  -OC       Monitor for Signs of Aspiration  yes;notify SLP if any concerns  -AL  yes;notify SLP if any concerns  -OC       Anticipated Dischage Disposition (SLP)  inpatient rehabilitation facility  -AL  inpatient rehabilitation facility  -OC          Oral Nutrition/Hydration Goal 1 (SLP)    Oral Nutrition/Hydration Goal 1, SLP  --  Pt will safely tolerate the least restrictive diet with no s/s of aspiration.  -OC       Time Frame (Oral Nutrition/Hydration Goal 1, SLP)  --  by discharge  -OC         User Key  (r) = Recorded By, (t) = Taken By, (c) = Cosigned By    Initials Name Effective Dates    OC Hoa Delaney MA,[facility]-SLP 06/08/18 -     Angie Lim MS CCC-SLP 08/30/19 -           EDUCATION  The patient has been educated in the following areas:   Dysphagia (Swallowing Impairment).    SLP Recommendation and Plan  SLP Swallowing Diagnosis: mild-moderate, oral dysfunction, pharyngeal dysfunction  SLP Diet Recommendation: puree with some mashed, thin liquids(no straws; trials of mechanical soft with thins with SLP )  Recommended Precautions and Strategies: upright posture during/after eating, no straw, small bites of food and sips of liquid, alternate between small bites of food and sips of liquid  SLP Rec. for Method of Medication Administration: meds whole, with pudding or applesauce     Monitor for Signs of Aspiration: yes, notify SLP if any concerns  Recommended Diagnostics: reassess via clinical swallow evaluation  Swallow Criteria for Skilled Therapeutic Interventions Met: demonstrates skilled criteria  Anticipated Dischage Disposition (SLP): inpatient rehabilitation facility  Rehab Potential/Prognosis, Swallowing: good, to achieve stated therapy goals  Therapy Frequency (Swallow): PRN  Predicted Duration Therapy Intervention (Days): until discharge     Patient was not wearing a face mask during this therapy encounter. Therapist used appropriate personal  protective equipment including mask, eye protection and gloves.  Mask used was standard procedure mask. Appropriate PPE was worn during the entire therapy session. Hand hygiene was completed before and after therapy session. Patient is not in enhanced droplet precautions.        Plan of Care Reviewed With: patient  Progress: improving  Outcome Summary: VFSS completed. Pt exhibited mild-moderate oropharyngeal dysphagia. No aspiration observed with any consistency. Trace, transient penetration observed with thins by cup and straw and mixed. Pt with impulsivity, often taking several sips consecutively. Mastication appeared mildly prolonged, with trace buccal residue on the left observed. Pt able to clear with lingual sweep and liquid wash. Recommend: Change diet to fork mashed with thins; no straws. Small bites/sips, alternate bite/sips. Meds one at a time with pudding or applesauce. SLP to continue to follow to assess safety with meal of mechanical soft and thins due to impulsivity.    SLP GOALS     Row Name 08/12/20 1358             Oral Nutrition/Hydration Goal 1 (SLP)    Oral Nutrition/Hydration Goal 1, SLP  Pt will safely tolerate the least restrictive diet with no s/s of aspiration.  -OC      Time Frame (Oral Nutrition/Hydration Goal 1, SLP)  by discharge  -OC        User Key  (r) = Recorded By, (t) = Taken By, (c) = Cosigned By    Initials Name Provider Type    ROBINSON Delaney, MARILIN Camara,CCC-SLP Speech and Language Pathologist           SLP Outcome Measures (last 72 hours)      SLP Outcome Measures     Row Name 08/13/20 1100 08/12/20 1414          SLP Outcome Measures    Outcome Measure Used?  Adult NOMS  -AL  Adult NOMS  -OC        Adult FCM Scores    FCM Chosen  Swallowing  -AL  Swallowing  -OC     Swallowing FCM Score  4  -AL  4  -OC       User Key  (r) = Recorded By, (t) = Taken By, (c) = Cosigned By    Initials Name Effective Dates    Hoa Beatty MA,CCC-SLP 06/08/18 -     Angie Lim, MS CCC-SLP  08/30/19 -            Time Calculation:   Time Calculation- SLP     Row Name 08/13/20 1045             Time Calculation- SLP    SLP Start Time  0915  -AL      SLP Stop Time  1015  -AL      SLP Time Calculation (min)  60 min  -AL      SLP Received On  08/13/20  -AL        User Key  (r) = Recorded By, (t) = Taken By, (c) = Cosigned By    Initials Name Provider Type    Angie Lim MS CCC-SLP Speech and Language Pathologist          Therapy Charges for Today     Code Description Service Date Service Provider Modifiers Qty    21099282167 HC ST MOTION FLUORO EVAL SWALLOW 4 8/13/2020 Angie Suazo MS CCC-SLP GN 1               Angie Suazo MS CCC-SLP  8/13/2020

## 2020-08-13 NOTE — SIGNIFICANT NOTE
"Ivett ESCALANTE responded to chair alarm sounding. Pt reported he was \"reaching for his glasses that fell on the ground\". Pt fell out of chair onto ground, face-down onto his left side of head and left arm. Pt reports hitting his head. Dr. Velazquez on unit, notified of fall, rec'd orders for stat head ct. Neurosurg also paged. Maya Rojas  notified. Pt's mother on unit.   "

## 2020-08-13 NOTE — DISCHARGE PLACEMENT REQUEST
"Avinash Everett (30 y.o. Male)     Date of Birth Social Security Number Address Home Phone MRN    1990  [address] [facility] [address] [phone] [id number]    Taoism Marital Status          None        Admission Date Admission Type Admitting Provider Attending Provider Department, Room/Bed    8/3/20 Emergency Nuno Witt MD Broaddus, Emmett J., MD [address], [address]/1    Discharge Date Discharge Disposition Discharge Destination                       Attending Provider:  Donato Velazquez MD    Allergies:  Citrus, Promethazine    Isolation:  None   Infection:  None   Code Status:  CPR    Ht:  185.4 cm (72.99\")   Wt:  84.4 kg (186 lb 1.1 oz)    Admission Cmt:  None   Principal Problem:  Nontraumatic subcortical hemorrhage of right cerebral hemisphere (CMS/HCC) [I61.0] More...                 Active Insurance as of 8/3/2020     Primary Coverage     Payor Plan Insurance Group Employer/Plan Group    [facility] [employer] [id number]     Payor Plan Address Payor Plan Phone Number Payor Plan Fax Number Effective Dates    [address] [phone]  1/1/2020 - None Entered    [facility] [id number]       Subscriber Name Subscriber Birth Date Member ID       JACOB EVERETT  [id number]                 Emergency Contacts      (Rel.) Home Phone Work Phone Mobile Phone    EverettAlea morin (Spouse) [phone] -- --    Sangeetha Tidwell (Mother) -- -- [phone]              "

## 2020-08-13 NOTE — CONSULTS
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   status post right frontal craniotomy for evacuation of hemorrhage August 3, 2020  Left hemiparesis/left inattention  Dysphagia  Impaired mobility/impaired self-care/impaired cognition  Status post aspiration pneumonia versus pneumonitis  Metabolic encephalopathy-improved  History of alcohol abuse  Coagulopathy-increase INR-vitamin K replacement  DVT prophylaxis-SCDs  Seizure prophylaxis-Keppra  Headache-Decadron-taper    Subjective     History of Present Illness  Patient is a 30-year-old male with no significant past medical history who presented with acute onset left-sided weakness and presented to the emergency room due to concern for stroke.  Also had complaint of headache.  CT of the head showed a large right intracerebral hemorrhage and significant midline shift.  Patient underwent urgent surgery with right frontal craniotomy for evacuation of hemorrhage on August 3, 2020.  His hospital course is noted for metabolic encephalopathy, left hemiparesis, left neglect, dysphagia, headache.  His headache is shown improvement and Decadron is being tapered.  Metabolic encephalopathy is also show improvement.  He is on Keppra for seizure prophylaxis.  He also has had coagulopathy with increasing INR, not responsive to vitamin K.  He was on Lovenox for DVT prophylaxis.  Has chronic left lower extremity superficial thrombophlebitis known to on ultrasound August 6, 2020.  Lovenox has been discontinued with his coagulopathy.    He is awake alert and oriented today.  Headaches improving.  He slid out of his chair earlier this afternoon onto his left side.  Denies any neck pain or shoulder pain or hip or leg pain or any injury.  He continues with no significant movement on  the left side in the arm.  He states occasionally his fingers will move but may be a reflex.  Has some movement in the left leg.     Functionally, premorbidly was independent.  He had a left foot fracture treated with a boot 8 weeks ago that is healed without any residual problems.  History of a left wrist surgery after hockey injury with some chronic imitations in his wrist range of motion.  He had significant metabolic encephalopathy with shown improvement over the last couple of days.  He is out of restraints.  He is on Zyprexa 5 mg daily before supper.  He is oriented x3.  Follows one-step commands over 90% accuracy.  Moderate assist for bed mobility.  Lean to the left.  Moderate assist for sit to stand from edge of the bed and transfer with stand pivot to the recliner chair.  Left upper extremity remains flaccid.  He had a videofluoroscopic swallow study on 8/13/2020 that showed mild to moderate oropharyngeal dysphasia.  No aspiration observed with any consistency.  Trace, transient penetration observed with thin liquids by cup and straw and mixed.  Patient is impulsivity with often taking several steps consecutively.  Mastication appeared mildly prolonged, with trace fecal residue on the left observed.  Patient able to clear with language sleep and liquid wash.  Diet recommendation for nectar thin liquids, no straws, small bites and sips, alternate bites and sips.  Meds 1 at a time with pudding or applesauce.    Review of Systems   10 point review of systems reviewed as per his HPI  Past medical history-history of alcohol abuse  Past surgical history-left wrist fracture surgery.  Left foot fracture treated with a boot summer 2020.  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  The patient is  and lives with his wife and a 3-year-old child.  He was drinking a sixpack a day.  He has an john on his phone that he indicates he is able to do as part of alcohol counseling but is agreeable to pursuing Alcoholic Anonymous if recommended.  His wife is in Al- Anon.  There are 3 steps to enter the home.  They do have a ramp in place.    No medications prior to admission.   , Scheduled Meds:    dexamethasone 2 mg Oral Q12H   folic acid 1 mg Nasogastric Daily   levETIRAcetam 500 mg Oral Q12H   OLANZapine 5 mg Nasogastric Daily Before Supper   [START ON 8/14/2020] pantoprazole 40 mg Oral Q AM   phytonadione 5 mg Oral Once   sodium chloride 10 mL Intravenous Q12H   thiamine 100 mg Nasogastric Daily   , Continuous Infusions:   , PRN Meds:  •  [DISCONTINUED] acetaminophen **OR** acetaminophen  •  acetaminophen  •  bisacodyl  •  haloperidol lactate  •  HYDROcod Polst-CPM Polst ER  •  ipratropium-albuterol  •  LORazepam  •  magnesium sulfate **OR** magnesium sulfate **OR** magnesium sulfate  •  ondansetron  •  oxyCODONE-acetaminophen  •  potassium chloride **OR** potassium chloride **OR** potassium chloride  •  potassium phosphate infusion greater than 15 mMoles **OR** potassium phosphate infusion greater than 15 mMoles **OR** potassium phosphate **OR** sodium phosphate IVPB **OR** sodium phosphate IVPB  •  sodium chloride and Allergies:  Citrus and Promethazine    Objective      Vital Signs  Temp:  [97.4 °F (36.3 °C)-98 °F (36.7 °C)] 98 °F (36.7 °C)  Heart Rate:  [60-74] 66  Resp:  [16-18] 18  BP: ()/(53-67) 110/67    Physical Exam  MENTAL STATUS -  AWAKE / ALERT  HEENT-right craniotomy incision clean dry and intact.  SCLERAE ANICTERIC, CONJUNCTIVAE PINK, OP MOIST, NO JVD, EARS UNREMARKABLE EXTERNALLY.  Neck nontender  LUNGS - CTA, NO WHEEZES, RALES OR RHONCHI  HEART- RRR, NO RUB, MURMUR, OR GALLOP  ABD - NORMOACTIVE BOWEL SOUNDS, SOFT, NT. NO  HEPATOSPLENOMEGALY APPRECIATED  EXT -nontender range of motion left upper extremity left lower extremity  NO  CYANOSIS  NEURO -oriented person place time and situation.  Readily conversant.  Able to spell hisname forward and backward.  Able to do simple time management.  Required cueing with simple money management.  Recognize finger movement all 4 quadrants.  Extraocular movements intact.  No dysarthria.  Proprioception absent at the left great toe  MOTOR EXAM - RUE/RLE 5/5. LUE 0/5.  LLE active hip extension, knee extension 2-/5, ankle dorsiflexion 0/5, ankle plantar flexion 0/5.  Tone decreased in the left upper extremity    Results Review:   Results from last 7 days   Lab Units 08/13/20  0550 08/12/20  0523 08/08/20  1005   INR  1.62* 1.46* 1.42*     Results from last 7 days   Lab Units 08/12/20  0523 08/11/20  0440 08/10/20  0431   WBC 10*3/mm3 7.61 10.37 6.49   HEMOGLOBIN g/dL 11.7* 11.8* 12.2*   HEMATOCRIT % 32.7* 33.2* 33.5*   PLATELETS 10*3/mm3 226 231 196     Results from last 7 days   Lab Units 08/13/20  0550 08/12/20  0523 08/11/20  0440   SODIUM mmol/L 134* 136 138   POTASSIUM mmol/L 3.6 3.8 3.5   CHLORIDE mmol/L 102 103 105   CO2 mmol/L 22.5 23.0 20.9*   BUN mg/dL 13 16 11   CREATININE mg/dL 0.50* 0.46* 0.43*   CALCIUM mg/dL 9.2 9.5 10.0   BILIRUBIN mg/dL 2.8* 3.1* 3.1*   ALK PHOS U/L 93 93 101   ALT (SGPT) U/L 106* 59* 35   AST (SGOT) U/L 129* 82* 49*   GLUCOSE mg/dL 142* 129* 179*        I reviewed the patient's new clinical results.        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 supper  History of alcohol abuse  Coagulopathy-increase INR-vitamin K replacement  DVT prophylaxis-SCDs  Seizure prophylaxis-Keppra  Headache-Decadron-taper    Given the patient's functional impairments and comorbidities, would recommend comprehensive acute inpatient rehabilitation .  This would be an interdisciplinary program with physical therapy 1 hour,  occupational therapy 1 hour, and speech therapy 1 hour, 5 days a week.  Rehabilitation nursing for carryover, monitoring of coagulopathy and neurologic   status, bowel and bladder, and skin  Ongoing physician follow-up.  Weekly team conferences.       I discussed the patients findings and my recommendations with patient    Prateek Diaz MD  08/13/20  16:47    Time:   During rounds, used appropriate personal protective equipment including mask and gloves.  Additional gown if indicated.  Mask used was standard procedure mask. Appropriate PPE was worn during the entire visit.  Hand hygiene was completed before and after.

## 2020-08-13 NOTE — PLAN OF CARE
Problem: Patient Care Overview  Goal: Plan of Care Review  Outcome: Ongoing (interventions implemented as appropriate)  Flowsheets  Taken 8/13/2020 1351  Progress: improving  Plan of Care Reviewed With: patient  Taken 8/13/2020 1346  Outcome Summary: Pt req mod A for bed mobility today. Initially had sig L lean while sitting EOB but able to correct with verbal and tactile cues. Pt req mod A for sit/stand from EOB and transfer stand pivot to recliner chair. Pt performed trunk/postural exs EOB and LAQ. L UE remains flaccid. Pt instructed to use R UE to help move L UE - still awaiting L UE sling. Cont PT daily for ther ex, gt/transfer training, bed mobility, balance, and activity tolerance to prepare pt for transfer to inpt rehab upon d/c.  Patient was intermittently wearing a face mask during this therapy encounter. Therapist used appropriate personal protective equipment including eye protection, mask, and gloves.  Mask used was standard procedure mask. Appropriate PPE was worn during the entire therapy session. Hand hygiene was completed before and after therapy session. Patient is not in enhanced droplet precautions.

## 2020-08-13 NOTE — PROGRESS NOTES
"DAILY PROGRESS NOTE  [facility]    Patient Identification:  Name: Avinash Everett  Age: 30 y.o.  Sex: male  :  1990  MRN: [id number]         Primary Care Physician: Provider, No Known      Subjective  No new complaints.    Objective:  General Appearance:  Comfortable, well-appearing, in no acute distress and not in pain.    Vital signs: (most recent): Blood pressure 110/67, pulse 66, temperature 98 °F (36.7 °C), temperature source Oral, resp. rate 18, height 185.4 cm (72.99\"), weight 84.4 kg (186 lb 1.1 oz), SpO2 92 %.    HEENT: (Surgical incision healing well.)    Lungs:  Normal effort and normal respiratory rate.  Breath sounds clear to auscultation.    Heart: Normal rate.  Regular rhythm.    Abdomen: Abdomen is soft and non-distended.  Bowel sounds are normal.   There is no abdominal tenderness.     Extremities: There is no dependent edema.    Neurological: Patient is alert and oriented to person, place and time.  (Severe left upper extremity weakness persists).    Skin:  Warm and dry.                Vital signs in last 24 hours:  Temp:  [97.4 °F (36.3 °C)-98.3 °F (36.8 °C)] 98 °F (36.7 °C)  Heart Rate:  [] 66  Resp:  [16-18] 18  BP: ()/(53-74) 110/67    Intake/Output:    Intake/Output Summary (Last 24 hours) at 2020 1141  Last data filed at 2020 0720  Gross per 24 hour   Intake 240 ml   Output 550 ml   Net -310 ml         Results from last 7 days   Lab Units 20  0523 20  0440 08/10/20  0431 20  0656 20  1005 20  0612   WBC 10*3/mm3 7.61 10.37 6.49 5.36 5.88 7.04   HEMOGLOBIN g/dL 11.7* 11.8* 12.2* 10.9* 10.9* 10.9*   PLATELETS 10*3/mm3 226 231 196 140 151 130*     Results from last 7 days   Lab Units 20  0550 20  0523 20  0440 08/10/20  0431 20  0656 20  1005 20  0612   SODIUM mmol/L 134* 136 138 134* 136 135* 138   POTASSIUM mmol/L 3.6 3.8 3.5 3.5 3.4* 3.5 2.8*   CHLORIDE mmol/L 102 103 105 104 109* 108* " 106   CO2 mmol/L 22.5 23.0 20.9* 18.5* 18.4* 18.8* 23.6   BUN mg/dL 13 16 11 5* 4* 7 8   CREATININE mg/dL 0.50* 0.46* 0.43* 0.43* 0.40* 0.45* 0.43*   GLUCOSE mg/dL 142* 129* 179* 154* 85 98 90   Estimated Creatinine Clearance: 257.9 mL/min (A) (by C-G formula based on SCr of 0.5 mg/dL (L)).  Results from last 7 days   Lab Units 08/13/20  0550 08/12/20  0523 08/11/20  0440 08/10/20  0431 08/09/20  0656 08/08/20  1005 08/07/20  0612   CALCIUM mg/dL 9.2 9.5 10.0 10.3 8.6 8.2* 7.8*   ALBUMIN g/dL 3.70 3.60 3.70 3.90 3.20* 3.20* 3.20*   MAGNESIUM mg/dL  --   --   --   --  1.6  --  2.1   PHOSPHORUS mg/dL  --   --   --   --  2.9 1.5* 1.4*     Results from last 7 days   Lab Units 08/13/20  0550 08/12/20  0523 08/11/20  0440 08/10/20  0431 08/09/20  0656 08/08/20  1005 08/07/20  0612   ALBUMIN g/dL 3.70 3.60 3.70 3.90 3.20* 3.20* 3.20*   BILIRUBIN mg/dL 2.8* 3.1* 3.1* 3.6* 3.8* 3.8* 5.1*   ALK PHOS U/L 93 93 101 100 80 77 76   AST (SGOT) U/L 129* 82* 49* 44* 36 36 45*   ALT (SGPT) U/L 106* 59* 35 29 24 25 27       Assessment:    Nontraumatic subcortical hemorrhage of right cerebral hemisphere: Status post right parietal craniotomy and evacuation of hemorrhage.  8/3/2020.  Follow-up CT scan stable.    Left-sided weakness secondary to above.    Severe dysphasia: Await results of video swallow study.    Aspiration pneumonia versus pneumonitis.  Patient presently on Augmentin.  Much improved.    Alcohol abuse:    Elevated LFTs: Transaminase levels rising on today's labs.  Will check hepatitis viral panels with morning labs and monitor closely.  Bilirubin elevated disproportionately: Possible Guilbert's disease.      Macrocytosis secondary to alcohol abuse and folate deficiency.      Metabolic encephalopathy: Much improved.  NH 3 level bit on the high side with his worsening hepatic function will need to follow this.    Coagulopathy: INR rising and so far not responding well to vitamin K.  Will re-dose vitamin K today and recheck  in the morning.    Liver ultrasound with probable fatty liver and possible nodule.  Follow-up with CT scan when stable.    DVT prophylaxis: With worsening coagulopathy and recent intracranial bleed I will put his Lovenox on hold and switch to sequential compression stockings.        Plan:  Please see above.  Discussed with patient and his mother at bedside.    Donato Velazquez MD  8/13/2020  11:41

## 2020-08-13 NOTE — PROGRESS NOTES
"Adult Nutrition  Assessment/PES    Patient Name:  Avinash Everett  YOB: 1990  MRN: [id number]  Admit Date:  8/3/2020    Assessment Date:  8/13/2020    Comments:  Nutrition follow up.  S/p SLP evaluation yesterday and VFSS today.  Patient now on pureed diet with some mashed and thin liquids.  LUE remains flacid per chart report.  Possible plans for BAR, being evaluated.    Patient reports good appetite.  He says he ate 80-85% at lunch today.  Obtained food preferences.    RD to continue to follow.    Reason for Assessment     Row Name 08/13/20 1517          Reason for Assessment    Reason For Assessment  follow-up protocol         Nutrition/Diet History     Row Name 08/13/20 1518          Nutrition/Diet History    Typical Food/Fluid Intake  passed SLP evaluation yesterday, s/p VFSS today; patient reports good john, says ate 80-85% of lunch today, RN reports eating well, hungry     Food Preferences  wants prune juice with every meal and coffee at breakfast     Food Allergies  other (see comments) citrus         Anthropometrics     Row Name 08/13/20 1519          Anthropometrics    Height  185.4 cm (72.99\")        Admit Weight    Admit Weight  -- 186# 8/13        Ideal Body Weight (IBW)    Ideal Body Weight (IBW) (kg)  84.84        Body Mass Index (BMI)    BMI Assessment  BMI 18.5-24.9: normal 24.49         Labs/Tests/Procedures/Meds     Row Name 08/13/20 1521          Labs/Procedures/Meds    Lab Results Reviewed  reviewed, pertinent     Lab Results Comments  Gluc, Na, Creat, ALT, AST        Diagnostic Tests/Procedures    Diagnostic Test/Procedure Reviewed  reviewed, pertinent     Diagnostic Test/Procedures Comments  s/p SLP eval yesterday and VFSS today        Medications    Pertinent Medications Reviewed  reviewed, pertinent     Pertinent Medications Comments  decadron, folic acid, keppra, protonix, thiamine         Physical Findings     Row Name 08/13/20 1524          Physical Findings    Skin  surgical " "incision;edema;other (see comments) B=13, bruised, R head inc, R groin puncture         Estimated/Assessed Needs     Row Name 08/13/20 1519          Calculation Measurements    Height  185.4 cm (72.99\")         Nutrition Prescription Ordered     Row Name 08/13/20 1526          Nutrition Prescription PO    Current PO Diet  Dysphagia     Dysphagia Level  3  Pureed with some mashed     Fluid Consistency  Thin     Other Modifiers  No Straws         Evaluation of Received Nutrient/Fluid Intake     Row Name 08/13/20 1526          PO Evaluation    Number of Meals  1     % PO Intake  50 100% x 1 snack; patient reports he ate 80-85% at lunch today         Problem/Interventions:    Problem 2     Row Name 08/13/20 1527          Nutrition Diagnoses Problem 2    Problem 2  Nutrition Appropriate for Condition at this Time     Etiology (related to)  Factors Affecting Nutrition     Appetite  Good     Signs/Symptoms (evidenced by)  Report/Observation     Reported/Observed By  Patient;Family         Intervention Goal     Row Name 08/13/20 1527          Intervention Goal    General  Maintain nutrition;Reduce/improve symptoms;Disease management/therapy;Meet nutritional needs for age/condition     PO  Tolerate PO;Increase intake;PO intake (%)     PO Intake %  75 %     Weight  Maintain weight         Nutrition Intervention     Row Name 08/13/20 1527          Nutrition Intervention    RD/Tech Action  Follow Tx progress;Care plan reviewd;Encourage intake;Interview for preference         Education/Evaluation     Row Name 08/13/20 1527          Education    Education  Will Instruct as appropriate        Monitor/Evaluation    Monitor  Per protocol;PO intake;Pertinent labs;Weight;Skin status;Symptoms     Education Follow-up  Reinforce PRN           Electronically signed by:  Raiza Connelly RD  08/13/20 15:27  "

## 2020-08-13 NOTE — THERAPY TREATMENT NOTE
Patient Name: Avinash Everett  : 1990    MRN: [id number]                              Today's Date: 2020       Admit Date: 8/3/2020    Visit Dx:     ICD-10-CM ICD-9-CM   1. Right-sided nontraumatic intracerebral hemorrhage, unspecified cerebral location (CMS/HCC) I61.9 431   2. Subdural hematoma (CMS/HCC) S06.5X9A 432.1   3. Nontraumatic subcortical hemorrhage of right cerebral hemisphere (CMS/HCC) I61.0 431     Patient Active Problem List   Diagnosis   • Right-sided nontraumatic intracerebral hemorrhage (CMS/HCC)   • Nontraumatic subcortical hemorrhage of right cerebral hemisphere (CMS/HCC)   • Alcohol abuse   • Metabolic encephalopathy   • Vitamin K deficiency     History reviewed. No pertinent past medical history.  History reviewed. No pertinent surgical history.  General Information     Row Name 20 1340          PT Evaluation Time/Intention    Document Type  therapy note (daily note)  -DJ     Mode of Treatment  individual therapy;physical therapy  -DJ     Row Name 20 1340          General Information    Patient Profile Reviewed?  yes  -DJ     Existing Precautions/Restrictions  fall  -DJ     Row Name 20 1340          Cognitive Assessment/Intervention- PT/OT    Orientation Status (Cognition)  oriented x 3  -DJ     Cognitive Assessment/Intervention Comment  Pleasant and cooperative  -DJ     Row Name 20 1340          Safety Issues, Functional Mobility    Comment, Safety Issues/Impairments (Mobility)  gt belt, sandals  -DJ       User Key  (r) = Recorded By, (t) = Taken By, (c) = Cosigned By    Initials Name Provider Type    Allyssa Pedro, PT Physical Therapist        Mobility     Row Name 20 1341          Bed Mobility Assessment/Treatment    Bed Mobility Assessment/Treatment  supine-sit  -DJ     Supine-Sit [place] (Bed Mobility)  moderate assist (50% patient effort);set up;verbal cues;2 person assist  -DJ     Assistive Device (Bed Mobility)  bed rails;head of bed  elevated  -DJ     Comment (Bed Mobility)  Uses R LE to help move L LE, initial sig L lean but able to correct with verbal and tactile cues  -DJ     Row Name 08/13/20 1341          Transfer Assessment/Treatment    Comment (Transfers)  sit/stand x 2 from EOB  -DJ     Row Name 08/13/20 1341          Bed-Chair Transfer    Bed-Chair [place] (Transfers)  moderate assist (50% patient effort);maximum assist (25% patient effort);2 person assist  -DJ     Row Name 08/13/20 1341          Sit-Stand Transfer    Sit-Stand [place] (Transfers)  set up;moderate assist (50% patient effort);2 person assist  -DJ     Row Name 08/13/20 1341          Gait/Stairs Assessment/Training    Distance in Feet (Gait)  Pt only able to pivot on R LE; req A to advance L LE and unable to WB thru L LE  -DJ       User Key  (r) = Recorded By, (t) = Taken By, (c) = Cosigned By    Initials Name Provider Type    Allyssa Pedro, PT Physical Therapist        Obj/Interventions     Row Name 08/13/20 1344          Therapeutic Exercise    Sets/Reps (Therapeutic Exercise)  10-15x  -DJ     Comment (Therapeutic Exercise)  LAQ L LE (unable to complete full ROM), side leaning while sitting EOB 3x B with CGA  -DJ     Row Name 08/13/20 1344          Static Sitting Balance    Level of [place] (Unsupported Sitting, Static Balance)  contact guard assist  -DJ     Sitting Position (Unsupported Sitting, Static Balance)  sitting on edge of bed  -DJ     Time Able to Maintain Position (Unsupported Sitting, Static Balance)  1 to 2 minutes  -DJ     Comment (Unsupported Sitting, Static Balance)  initially has sig L lean but was able to correct iwth verbal and tactile cues  -DJ     Row Name 08/13/20 1344          Static Standing Balance    Level of [place] (Supported Standing, Static Balance)  moderate assist, 50 to 74% patient effort;2 person assist  -DJ     Time Able to Maintain Position (Supported Standing, Static Balance)  15 to 30 seconds  -DJ       User Key   (r) = Recorded By, (t) = Taken By, (c) = Cosigned By    Initials Name Provider Type    DJ Allyssa Edmondson, PT Physical Therapist        Goals/Plan    No documentation.       Clinical Impression     Row Name 08/13/20 7628          Pain Assessment    Additional Documentation  Pain Scale: Numbers Pre/Post-Treatment (Group)  -DJ     Row Name 08/13/20 1346          Pain Scale: Numbers Pre/Post-Treatment    Pre/Post Treatment Pain Comment  generally without c/o   -DJ     Row Name 08/13/20 8739          Plan of Care Review    Plan of Care Reviewed With  patient;mother  -DJ     Progress  no change  -DJ     Outcome Summary  Pt req mod A for bed mobility today. Initially had sig L lean while sitting EOB but able to correct with verbal and tactile cues. Pt req mod A for sit/stand from EOB and transfer stand pivot to recliner chair. Pt performed trunk/postural exs EOB and LAQ. L UE remains flaccid. Cont PT daily for ther ex, gt/transfer training, bed mobility, balance, and activity tolerance to prepare pt for transfer to inpt rehab upon d/c  -DJ     Row Name 08/13/20 [facility]          Physical Therapy Clinical Impression    Patient/Family Goals Statement (PT Clinical Impression)  rehab  -DJ     Criteria for Skilled Interventions Met (PT Clinical Impression)  yes;treatment indicated  -DJ     Row Name 08/13/20 1346          Vital Signs    O2 Delivery Pre Treatment  room air  -DJ     O2 Delivery Intra Treatment  room air  -DJ     O2 Delivery Post Treatment  room air  -DJ     Pre Patient Position  Supine  -DJ     Intra Patient Position  Standing  -DJ     Post Patient Position  Sitting  -DJ     Row Name 08/13/20 1345          Positioning and Restraints    Pre-Treatment Position  in bed  -DJ     Post Treatment Position  chair  -DJ     In Chair  reclined;call light within reach;encouraged to call for assist;exit alarm on;with family/caregiver;legs elevated  -DJ       User Key  (r) = Recorded By, (t) = Taken By, (c) = Cosigned By    Initials  Name Provider Type    Allyssa Pedro PT Physical Therapist        Outcome Measures     Row Name 08/13/20 1350          How much help from another person do you currently need...    Turning from your back to your side while in flat bed without using bedrails?  2  -DJ     Moving from lying on back to sitting on the side of a flat bed without bedrails?  2  -DJ     Moving to and from a bed to a chair (including a wheelchair)?  2  -DJ     Standing up from a chair using your arms (e.g., wheelchair, bedside chair)?  2  -DJ     Climbing 3-5 steps with a railing?  1  -DJ     To walk in hospital room?  1  -DJ     AM-PAC 6 Clicks Score (PT)  10  -DJ     Row Name 08/13/20 1350          Functional Assessment    Outcome Measure Options  AM-PAC 6 Clicks Basic Mobility (PT)  -DJ       User Key  (r) = Recorded By, (t) = Taken By, (c) = Cosigned By    Initials Name Provider Type    Allyssa Pedro PT Physical Therapist        Physical Therapy Education                 Title: PT OT SLP Therapies (Done)     Topic: Physical Therapy (Done)     Point: Mobility training (Done)     Description:   Instruct learner(s) on safety and technique for assisting patient out of bed, chair or wheelchair.  Instruct in the proper use of assistive devices, such as walker, crutches, cane or brace.              Patient Friendly Description:   It's important to get you on your feet again, but we need to do so in a way that is safe for you. Falling has serious consequences, and your personal safety is the most important thing of all.        When it's time to get out of bed, one of us or a family member will sit next to you on the bed to give you support.     If your doctor or nurse tells you to use a walker, crutches, a cane, or a brace, be sure you use it every time you get out of bed, even if you think you don't need it.    Learning Progress Summary           Patient Eager, REINALDO LAM, MADHURI,DEIDRA,NR by EB at 8/13/2020 1350    Acceptance, GENARO,REINALDO COLINDRES, MADHURI,NR by  at  8/12/2020 1337    Acceptance, E,D, NR by SM at 8/10/2020 1154    Acceptance, E, VU,NR by TJ at 8/9/2020 0527    Acceptance, E, VU,NR by ERIS at 8/8/2020 1530    Acceptance, E,D, NR by COLLIN at 8/7/2020 1204   Family Acceptance, E, VU,NR by ERIS at 8/8/2020 1530   Mother Eager, E,D, VU,DU,NR by EB at 8/13/2020 1350                   Point: Home exercise program (Done)     Description:   Instruct learner(s) on appropriate technique for monitoring, assisting and/or progressing patient with therapeutic exercises and activities.              Learning Progress Summary           Patient Eager, E,D, VU,DU,NR by EB at 8/13/2020 1350    Acceptance, E,TB,D, VU,NR by ROBYN at 8/12/2020 1337    Acceptance, E,D, NR by  at 8/10/2020 1154    Acceptance, E, VU,NR by TJ at 8/9/2020 0527    Acceptance, E,D, NR by COLLIN at 8/7/2020 1204   Mother Eager, E,D, VU,DU,NR by EB at 8/13/2020 1350                   Point: Body mechanics (Done)     Description:   Instruct learner(s) on proper positioning and spine alignment for patient and/or caregiver during mobility tasks and/or exercises.              Learning Progress Summary           Patient Eager, E,D, VU,DU,NR by EB at 8/13/2020 1350    Acceptance, E,TB,D, VU,NR by  at 8/12/2020 1337    Acceptance, E,D, NR by  at 8/10/2020 1154    Acceptance, E, VU,NR by TJ at 8/9/2020 0527    Acceptance, E, VU,NR by ERIS at 8/8/2020 1530    Acceptance, E,D, NR by COLLIN at 8/7/2020 1204   Family Acceptance, E, VU,NR by ERIS at 8/8/2020 1530   Mother Eager, E,D, VU,DU,NR by EB at 8/13/2020 1350                   Point: Precautions (Done)     Description:   Instruct learner(s) on prescribed precautions during mobility and gait tasks              Learning Progress Summary           Patient Eager, E,D, VU,DU,NR by EB at 8/13/2020 1350    Acceptance, E,TB,D, VU,NR by SM at 8/12/2020 1337    Acceptance, E,D, NR by SM at 8/10/2020 1154    Acceptance, E, VU,NR by TJ at 8/9/2020 0527    Acceptance, E, VU,NR by ERIS at 8/8/2020  1530    Acceptance, E,D, NR by  at 8/7/2020 1204   Family Acceptance, E, VU,NR by  at 8/8/2020 1530   Mother Eager, E,D, MADHURI,DU,NR by  at 8/13/2020 1350                               User Key     Initials Effective Dates Name Provider Type Discipline     02/05/19 -  Raysa Devine, PT Physical Therapist PT     03/07/18 -  Lissa Finn, PTA Physical Therapy Assistant PT    MW 09/17/19 -  Geri Glez, PT Physical Therapist PT    DJ 10/25/19 -  Allyssa Edmondson, PT Physical Therapist PT     02/11/20 -  Mariana Bojorquez, RN Registered Nurse Nurse              PT Recommendation and Plan     Outcome Summary/Treatment Plan (PT)  Anticipated Discharge Disposition (PT): inpatient rehabilitation facility  Plan of Care Reviewed With: patient  Progress: improving  Outcome Summary: Pt req mod A for bed mobility today. Initially had sig L lean while sitting EOB but able to correct with verbal and tactile cues. Pt req mod A for sit/stand from EOB and transfer stand pivot to recliner chair. Pt performed trunk/postural exs EOB and LAQ. L UE remains flaccid. Cont PT daily for ther ex, gt/transfer training, bed mobility, balance, and activity tolerance to prepare pt for transfer to inpt rehab upon d/c     Time Calculation:   PT Charges     Row Name 08/13/20 1353             Time Calculation    Start Time  1101  -DJ      Stop Time  1124  -DJ      Time Calculation (min)  23 min  -DJ      PT Non-Billable Time (min)  10 min  -DJ      PT Received On  08/13/20  -DJ      PT - Next Appointment  08/14/20  -DJ        User Key  (r) = Recorded By, (t) = Taken By, (c) = Cosigned By    Initials Name Provider Type    Allyssa Pedro, PT Physical Therapist        Therapy Charges for Today     Code Description Service Date Service Provider Modifiers Qty    07206000203 HC PT THER PROC EA 15 MIN 8/13/2020 Allyssa Edmondson, PT GP 2    00781617381 HC PT THER SUPP EA 15 MIN 8/13/2020 Allyssa Edmondson, PT GP 1          PT G-Codes  Outcome  Measure Options: AM-PAC 6 Clicks Basic Mobility (PT)  AM-PAC 6 Clicks Score (PT): 10  AM-PAC 6 Clicks Score (OT): 11  Modified [place] Scale: 4 - Moderately severe disability.  Unable to walk without assistance, and unable to attend to own bodily needs without assistance.    Allyssa Edmondson, PT  8/13/2020

## 2020-08-13 NOTE — THERAPY TREATMENT NOTE
Acute Care - Occupational Therapy Treatment Note  [facility]     Patient Name: Avinash Everett  : 1990  MRN: [id number]  Today's Date: 2020             Admit Date: 8/3/2020       ICD-10-CM ICD-9-CM   1. Right-sided nontraumatic intracerebral hemorrhage, unspecified cerebral location (CMS/HCC) I61.9 431   2. Subdural hematoma (CMS/HCC) S06.5X9A 432.1   3. Nontraumatic subcortical hemorrhage of right cerebral hemisphere (CMS/HCC) I61.0 431     Patient Active Problem List   Diagnosis   • Right-sided nontraumatic intracerebral hemorrhage (CMS/HCC)   • Nontraumatic subcortical hemorrhage of right cerebral hemisphere (CMS/HCC)   • Alcohol abuse   • Metabolic encephalopathy   • Vitamin K deficiency     History reviewed. No pertinent past medical history.  History reviewed. No pertinent surgical history.    Therapy Treatment    Rehabilitation Treatment Summary     Row Name 20 1534             Treatment Time/Intention    Discipline  occupational therapist  -      Document Type  therapy note (daily note)  -      Subjective Information  no complaints  -      Mode of Treatment  individual therapy;occupational therapy  -      Patient/Family Observations  pt sitting in chair. parent present in room  -      Patient Effort  good  -KP      Existing Precautions/Restrictions  fall  -KP      Recorded by [KP] Gracie Matos OTR 20 1537      Row Name 20 1534             Cognitive Assessment/Intervention- PT/OT    Orientation Status (Cognition)  oriented x 3  -KP      Follows Commands (Cognition)  follows one step commands;over 90% accuracy  -      Safety Deficit (Cognitive)  mild deficit  -KP      Recorded by [] Gracie Matos OTR 20 1537      Row Name 20 1534             Bed Mobility Assessment/Treatment    Comment (Bed Mobility)  [facility]  -      Recorded by [] Gracie Matos OTR 20 153      Row Name 20 1534             Therapeutic Exercise     Upper Extremity Range of Motion (Therapeutic Exercise)  shoulder flexion/extension, left;shoulder abduction/adduction, left;shoulder horizontal abduction/adduction, left;elbow flexion/extension, left;forearm supination/pronation, left;wrist flexion/extension, left  -KP      Hand (Therapeutic Exercise)  finger flexion/extension, left;thumb finger opposition, left;hand , left  -KP      Exercise Type (Therapeutic Exercise)  PROM (passive range of motion)  -KP      Position (Therapeutic Exercise)  seated  -KP      Sets/Reps (Therapeutic Exercise)  10x3 w rest breaks  -KP      Expected Outcome (Therapeutic Exercise)  improve motor control;improve neuromuscular control;facilitate normal movement patterns  -KP      Comment (Therapeutic Exercise)  wt bearing L UE muscle tapping to L bicep to facil movement  -KP      Recorded by [KP] Gracie Matos OTR 08/13/20 1537      Row Name 08/13/20 1534             Static Sitting Balance    Level of Archer (Unsupported Sitting, Static Balance)  supervision  -KP      Sitting Position (Unsupported Sitting, Static Balance)  sitting in chair  -KP      Time Able to Maintain Position (Unsupported Sitting, Static Balance)  more than 5 minutes  -KP      Recorded by [KP] Gracie Matos OTR 08/13/20 1537      Row Name 08/13/20 1534             Positioning and Restraints    Pre-Treatment Position  sitting in chair/recliner  -KP      Post Treatment Position  chair  -KP      In Chair  call light within reach;sitting;encouraged to call for assist;with family/caregiver  -KP      Recorded by [KP] Gracie Matos OTR 08/13/20 1537      Row Name 08/13/20 1534             Pain Scale: Numbers Pre/Post-Treatment    Pain Scale: Numbers, Pretreatment  0/10 - no pain  -KP      Pain Scale: Numbers, Post-Treatment  0/10 - no pain  -KP      Recorded by [KP] Gracie Matos, OTR 08/13/20 1537      Row Name                Wound 08/04/20 Right head Incision    Wound -  Properties Group Date first assessed: 08/04/20 [SB] Present on Hospital Admission: N [SB] Side: Right [SB] Location: head [SB] Primary Wound Type: Incision [SB] Recorded by:  [SB] Elke Corrales RN 08/04/20 0020    Row Name                Wound 08/05/20 1858 Right groin Puncture    Wound - Properties Group Date first assessed: 08/05/20 [KM] Time first assessed: 1858 [KM] Side: Right [KM] Location: groin [KM] Primary Wound Type: Puncture [KM] Recorded by:  [KM] Gracie Valencia RN 08/05/20 1858    Row Name 08/13/20 1534             Plan of Care Review    Plan of Care Reviewed With  patient;family  -KP      Progress  improving  -KP      Outcome Summary  pt completed PROM L UE, muscle tapping, wt bearing, ed pt and parent on wt bearing and ROM ex. pt cont to benefit from OT to incr ADL, strength, movement, balance, ADLs  -KP      Recorded by [KP] Gracie Matos OTR 08/13/20 1537      Row Name 08/13/20 1534             Outcome Summary/Treatment Plan (OT)    Daily Summary of Progress (OT)  progress toward functional goals is good  -KP      Anticipated Discharge Disposition (OT)  inpatient rehabilitation facility  -      Recorded by [KP] Gracie Matos, OTR 08/13/20 1537        User Key  (r) = Recorded By, (t) = Taken By, (c) = Cosigned By    Initials Name Effective Dates Discipline     Gracie Matos, ERICR 06/08/18 -  OT    KM Gracie Valencia RN 11/27/17 -  Nurse    SB Elke Corrales RN 06/16/16 -  Nurse        Wound 08/04/20 Right head Incision (Active)   Dressing Appearance open to air;no drainage 8/13/2020 12:55 PM   Closure Liquid skin adhesive 8/13/2020 12:55 PM   Base clean;dry 8/13/2020 12:55 PM   Periwound dry;intact 8/13/2020 12:54 AM   Periwound Temperature warm 8/13/2020 12:54 AM   Periwound Skin Turgor soft 8/13/2020 12:54 AM   Drainage Amount none 8/13/2020 12:55 PM   Dressing Care, Wound open to air 8/13/2020 10:05 AM       Wound 08/05/20 6312 Right groin  Puncture (Active)   Dressing Appearance open to air;no drainage 8/13/2020 12:55 PM   Closure Open to air 8/13/2020 12:55 PM   Drainage Amount none 8/13/2020 12:55 PM   Dressing Care, Wound open to air 8/12/2020  8:52 PM       Occupational Therapy Education                 Title: PT OT SLP Therapies (Done)     Topic: Occupational Therapy (Done)     Point: ADL training (Done)     Description:   Instruct learner(s) on proper safety adaptation and remediation techniques during self care or transfers.   Instruct in proper use of assistive devices.              Learning Progress Summary           Patient Acceptance, E, VU,NR by  at 8/9/2020 0527                   Point: Home exercise program (Done)     Description:   Instruct learner(s) on appropriate technique for monitoring, assisting and/or progressing therapeutic exercises/activities.              Learning Progress Summary           Patient Acceptance, E, VU,NR by  at 8/9/2020 0527                   Point: Precautions (Done)     Description:   Instruct learner(s) on prescribed precautions during self-care and functional transfers.              Learning Progress Summary           Patient Acceptance, E, VU,NR by  at 8/9/2020 0527                   Point: Body mechanics (Done)     Description:   Instruct learner(s) on proper positioning and spine alignment during self-care, functional mobility activities and/or exercises.              Learning Progress Summary           Patient Acceptance, E, VU,NR by  at 8/9/2020 0527                               User Key     Initials Effective Dates Name Provider Type Discipline     02/11/20 -  Mariana Bojorquez RN Registered Nurse Nurse                OT Recommendation and Plan  Outcome Summary/Treatment Plan (OT)  Daily Summary of Progress (OT): progress toward functional goals is good  Anticipated Discharge Disposition (OT): inpatient rehabilitation facility  Daily Summary of Progress (OT): progress toward functional goals  is good  Plan of Care Review  Plan of Care Reviewed With: patient, family  Plan of Care Reviewed With: patient, family  Outcome Summary: pt completed PROM L UE, muscle tapping, wt bearing, ed pt and parent on wt bearing and ROM ex. pt cont to benefit from OT to incr ADL, strength, movement, balance, ADLs  Outcome Measures     Row Name 08/13/20 1538 08/12/20 1534          How much help from another is currently needed...    Putting on and taking off regular lower body clothing?  1  -KP  1  -KP     Bathing (including washing, rinsing, and drying)  2  -KP  2  -KP     Toileting (which includes using toilet bed pan or urinal)  2  -KP  2  -KP     Putting on and taking off regular upper body clothing  2  -KP  2  -KP     Taking care of personal grooming (such as brushing teeth)  3  -KP  3  -KP     Eating meals  3  -KP  1  -KP     AM-PAC 6 Clicks Score (OT)  13  -KP  11  -KP        Functional Assessment    Outcome Measure Options  AM-PAC 6 Clicks Daily Activity (OT)  -KP  AM-PAC 6 Clicks Daily Activity (OT)  -       User Key  (r) = Recorded By, (t) = Taken By, (c) = Cosigned By    Initials Name Provider Type    Gracie Day OTR Occupational Therapist           Time Calculation:   Time Calculation- OT     Row Name 08/13/20 1538             Time Calculation- OT    OT Start Time  1417  -      OT Stop Time  1447  -      OT Time Calculation (min)  30 min  -      Total Timed Code Minutes- OT  30 minute(s)  -      OT Received On  08/13/20  -      OT - Next Appointment  08/14/20  -        User Key  (r) = Recorded By, (t) = Taken By, (c) = Cosigned By    Initials Name Provider Type    Gracie Day OTR Occupational Therapist        Therapy Charges for Today     Code Description Service Date Service Provider Modifiers Qty    10943140115 HC OT NEUROMUSC RE EDUCATION EA 15 MIN 8/12/2020 Gracie Matos OTR GO 3    66029465701 HC OT NEUROMUSC RE EDUCATION EA 15 MIN 8/13/2020 Shawna  Gracie Guerrero, OTR GO 2               Gracie Matos, OTR  8/13/2020

## 2020-08-13 NOTE — PLAN OF CARE
Problem: Patient Care Overview  Goal: Plan of Care Review  Outcome: Ongoing (interventions implemented as appropriate)  Flowsheets (Taken 2020 0548)  Progress: improving  Plan of Care Reviewed With: patient  Outcome Summary: NIH 9. Q2 turn. increasing movement in LLE. LUE remains flaccid. Pt wants a sling to hold left arm. pureed, NTL. plan for video swallow. vss. will continue to monitor.     Problem: Skin Injury Risk (Adult)  Goal: Skin Health and Integrity  Description  Patient will demonstrate the desired outcomes by discharge/transition of care.  Outcome: Ongoing (interventions implemented as appropriate)     Problem: Fall Risk (Adult)  Goal: Absence of Fall  Description  Patient will demonstrate the desired outcomes by discharge/transition of care.  Outcome: Ongoing (interventions implemented as appropriate)     Problem: Stroke (Hemorrhagic) (Adult)  Description  Prevent and manage potential problems includin. acute neurologic deterioration  2. bladder/bowel dysfunction  3. cognitive impairment  4. communication impairment  5. eating/swallowing impairment  6. fever  7. hemodynamic instability  8. motor/sensory impairment  9. muscle tone abnormal  10. respiratory compromise  11. seizure activity  12. situational response  13. skin breakdown  14. VTE (venous thromboembolism)  Goal: Signs and Symptoms of Listed Potential Problems Will be Absent, Minimized or Managed (Stroke)  Description  Signs and symptoms of listed potential problems will be absent, minimized or managed by discharge/transition of care (reference Stroke (Hemorrhagic) (Adult) CPG).  Outcome: Ongoing (interventions implemented as appropriate)

## 2020-08-13 NOTE — PROGRESS NOTES
Continued Stay Note  [facility]     Patient Name: Avinash Everett  MRN: [id number]  Today's Date: 8/13/2020    Admit Date: 8/3/2020    Discharge Plan     Row Name 08/13/20 1101       Plan    Plan  Plan is for acute rehab.  Referrals to [facility] and [facility].    Plan Comments  [facility] spoke with pt and his wife Alea by phone [phone].  Their first choice is [facility]. Backup choice is [facility].  Referrals called.  [place]vals pending.         Discharge Codes    No documentation.             Tennille Vazquez RN

## 2020-08-13 NOTE — PROGRESS NOTES
Neurosurgery Postop Note      Patient: Avinash Everett    YOB: 1990    Medical Record Number: [id number]    Attending Physician: Donato Velazquez MD    Date of Admission: 8/3/2020  9:41 PM    Admitting Dx:  Right-sided nontraumatic intracerebral hemorrhage, unspecified cerebral location (CMS/HCC) [I61.9]    Current Problem List:   Patient Active Problem List   Diagnosis   • Right-sided nontraumatic intracerebral hemorrhage (CMS/HCC)   • Nontraumatic subcortical hemorrhage of right cerebral hemisphere (CMS/HCC)   • Alcohol abuse   • Metabolic encephalopathy   • Vitamin K deficiency         History reviewed. No pertinent past medical history.    SUBJECTIVE: 30 y.o.  male now 10 days out from the above stated surgery. Continues to make improvements. Mild scalp pain but no deep headache. Cleared for thin liquids by speech therapy this am.     OBJECTIVE:   Vitals:    08/12/20 1900 08/12/20 2300 08/13/20 0625 08/13/20 0720   BP: 113/60 92/53  110/67   BP Location: Right arm Right arm  Right arm   Patient Position: Lying Lying  Lying   Pulse: 74 70  66   Resp: 16 16  18   Temp: 97.5 °F (36.4 °C) 97.4 °F (36.3 °C)  98 °F (36.7 °C)   TempSrc: Oral Oral  Oral   SpO2:  96%  92%   Weight:   84.4 kg (186 lb 1.1 oz)    Height:           Current Medications:  Scheduled Meds:  dexamethasone 2 mg Intravenous Q12H   folic acid 1 mg Nasogastric Daily   levETIRAcetam 500 mg Intravenous Q12H   OLANZapine 5 mg Nasogastric Daily Before Supper   pantoprazole 40 mg Intravenous Q AM   sodium chloride 10 mL Intravenous Q12H   thiamine 100 mg Nasogastric Daily     PRN Meds:.•  [DISCONTINUED] acetaminophen **OR** acetaminophen  •  acetaminophen  •  bisacodyl  •  haloperidol lactate  •  HYDROcod Polst-CPM Polst ER  •  ipratropium-albuterol  •  LORazepam  •  magnesium sulfate **OR** magnesium sulfate **OR** magnesium sulfate  •  ondansetron  •  oxyCODONE-acetaminophen  •  potassium chloride **OR** potassium chloride **OR**  potassium chloride  •  potassium phosphate infusion greater than 15 mMoles **OR** potassium phosphate infusion greater than 15 mMoles **OR** potassium phosphate **OR** sodium phosphate IVPB **OR** sodium phosphate IVPB  •  sodium chloride    Diagnostic Tests:   Lab Results (last 24 hours)     Procedure Component Value Units Date/Time    Hepatitis Diagnostic Profile [[id number]] Collected:  08/13/20 1041    Specimen:  Blood Updated:  08/13/20 1103    TSH [[id number]]  (Normal) Collected:  08/13/20 0550    Specimen:  Blood Updated:  08/13/20 0800     TSH 2.400 uIU/mL     Hepatic Function Panel [[id number]]  (Abnormal) Collected:  08/13/20 0550    Specimen:  Blood Updated:  08/13/20 0756     Total Protein 7.5 g/dL      Albumin 3.70 g/dL      ALT (SGPT) 106 U/L      AST (SGOT) 129 U/L      Alkaline Phosphatase 93 U/L      Total Bilirubin 2.8 mg/dL      Bilirubin, Direct 1.2 mg/dL      Comment: Specimen hemolyzed. Results may be affected.        Bilirubin, Indirect 1.6 mg/dL     Ammonia [[id number]]  (Abnormal) Collected:  08/13/20 0550    Specimen:  Blood Updated:  08/13/20 0753     Ammonia 70 umol/L     Basic Metabolic Panel [[id number]]  (Abnormal) Collected:  08/13/20 0550    Specimen:  Blood Updated:  08/13/20 0751     Glucose 142 mg/dL      BUN 13 mg/dL      Creatinine 0.50 mg/dL      Sodium 134 mmol/L      Potassium 3.6 mmol/L      Comment: Specimen hemolyzed.  Results may be affected.        Chloride 102 mmol/L      CO2 22.5 mmol/L      Calcium 9.2 mg/dL      eGFR Non African Amer >150 mL/min/1.73      BUN/Creatinine Ratio 26.0     Anion Gap 9.5 mmol/L     Narrative:       GFR Normal >60  Chronic Kidney Disease <60  Kidney Failure <15      Protime-INR [[id number]]  (Abnormal) Collected:  08/13/20 0550    Specimen:  Blood Updated:  08/13/20 0732     Protime 18.8 Seconds      INR 1.62        No radiology results for the last day    PHYSICAL EXAM:    AA&O x 3. Looking more to left side. More conversational, appropriate.    Follows commands x 3 extremities. Still flaccid L arm.   Able to raise left leg slightly off of bed and kick out. Strength normal R arm and R leg.   R cranial incision well approximated. No redness, swelling or drainage.   Voiding in urinal.      ASSESSMENT & PLAN:    POD 10 emergent right craniotomy to evacuate R frontoparietal ICH - etiology of hemorrhage unknown  Cerebral angiogram 8/5/20 negative for aneurysm, AVM or dural AV fistula.  L hemiparesis - some improved function in L leg  L neglect - improved  Headache - improved - weaning steroids      Await outside path  Continue decadron; wean further tomorrow - switch to oral form  Continue Keppra for seizure prophylaxis - switch to suspension form  Repeat MRI brain w/wo contrast in the next week  Continue to maximize therapies  Patient is an excellent candidate for acute rehab; Dr. Diaz to evaluate.       Date: 8/13/2020    NATHALY Castellano

## 2020-08-14 ENCOUNTER — APPOINTMENT (OUTPATIENT)
Dept: GENERAL RADIOLOGY | Facility: HOSPITAL | Age: 30
End: 2020-08-14

## 2020-08-14 LAB
ALBUMIN SERPL-MCNC: 3.8 G/DL (ref 3.5–5.2)
ALBUMIN/GLOB SERPL: 1.1 G/DL
ALP SERPL-CCNC: 99 U/L (ref 39–117)
ALT SERPL W P-5'-P-CCNC: 132 U/L (ref 1–41)
AMMONIA BLD-SCNC: 61 UMOL/L (ref 16–60)
ANION GAP SERPL CALCULATED.3IONS-SCNC: 9 MMOL/L (ref 5–15)
APTT PPP: 29.4 SEC
AST SERPL-CCNC: 123 U/L (ref 1–40)
BASOPHILS # BLD AUTO: 0.02 10*3/MM3 (ref 0–0.2)
BASOPHILS NFR BLD AUTO: 0.2 % (ref 0–1.5)
BILIRUB SERPL-MCNC: 2.8 MG/DL (ref 0–1.2)
BUN SERPL-MCNC: 8 MG/DL (ref 6–20)
BUN/CREAT SERPL: 17.8 (ref 7–25)
CALCIUM SPEC-SCNC: 9.3 MG/DL (ref 8.6–10.5)
CHLORIDE SERPL-SCNC: 105 MMOL/L (ref 98–107)
CO2 SERPL-SCNC: 22 MMOL/L (ref 22–29)
CREAT SERPL-MCNC: 0.45 MG/DL (ref 0.76–1.27)
DEPRECATED RDW RBC AUTO: 44.3 FL (ref 37–54)
EOSINOPHIL # BLD AUTO: 0.08 10*3/MM3 (ref 0–0.4)
EOSINOPHIL NFR BLD AUTO: 0.9 % (ref 0.3–6.2)
ERYTHROCYTE [DISTWIDTH] IN BLOOD BY AUTOMATED COUNT: 12 % (ref 12.3–15.4)
FACT VIII ACT/NOR PPP: 204 %
GFR SERPL CREATININE-BSD FRML MDRD: >150 ML/MIN/1.73
GLOBULIN UR ELPH-MCNC: 3.6 GM/DL
GLUCOSE SERPL-MCNC: 132 MG/DL (ref 65–99)
HAV IGM SERPL QL IA: NEGATIVE
HBV CORE IGM SERPL QL IA: NEGATIVE
HBV SURFACE AG SERPL QL IA: NEGATIVE
HCT VFR BLD AUTO: 35 % (ref 37.5–51)
HGB BLD-MCNC: 12.2 G/DL (ref 13–17.7)
IMM GRANULOCYTES # BLD AUTO: 0.14 10*3/MM3 (ref 0–0.05)
IMM GRANULOCYTES NFR BLD AUTO: 1.5 % (ref 0–0.5)
INR PPP: 1.47 (ref 0.9–1.1)
LYMPHOCYTES # BLD AUTO: 0.94 10*3/MM3 (ref 0.7–3.1)
LYMPHOCYTES NFR BLD AUTO: 10 % (ref 19.6–45.3)
MCH RBC QN AUTO: 35.3 PG (ref 26.6–33)
MCHC RBC AUTO-ENTMCNC: 34.9 G/DL (ref 31.5–35.7)
MCV RBC AUTO: 101.2 FL (ref 79–97)
MONOCYTES # BLD AUTO: 0.5 10*3/MM3 (ref 0.1–0.9)
MONOCYTES NFR BLD AUTO: 5.3 % (ref 5–12)
NEUTROPHILS NFR BLD AUTO: 7.69 10*3/MM3 (ref 1.7–7)
NEUTROPHILS NFR BLD AUTO: 82.1 % (ref 42.7–76)
NRBC BLD AUTO-RTO: 0 /100 WBC (ref 0–0.2)
PLATELET # BLD AUTO: 196 10*3/MM3 (ref 140–450)
PMV BLD AUTO: 9.9 FL (ref 6–12)
POTASSIUM SERPL-SCNC: 3.6 MMOL/L (ref 3.5–5.2)
PROT SERPL-MCNC: 7.4 G/DL (ref 6–8.5)
PROTHROMBIN TIME: 17.5 SECONDS (ref 11.7–14.2)
RBC # BLD AUTO: 3.46 10*6/MM3 (ref 4.14–5.8)
RETICS # AUTO: 0.07 10*6/MM3 (ref 0.02–0.13)
RETICS/RBC NFR AUTO: 2.11 % (ref 0.7–1.9)
SODIUM SERPL-SCNC: 136 MMOL/L (ref 136–145)
VWF AG ACT/NOR PPP IA: 414 %
VWF COLLAGEN BINDING ACT.: >360 %
VWF COLLAGEN BINDING: >0.9 RATIO
VWF CP PPP QL: 318 %
WBC # BLD AUTO: 9.37 10*3/MM3 (ref 3.4–10.8)

## 2020-08-14 PROCEDURE — 94640 AIRWAY INHALATION TREATMENT: CPT

## 2020-08-14 PROCEDURE — 85045 AUTOMATED RETICULOCYTE COUNT: CPT | Performed by: HOSPITALIST

## 2020-08-14 PROCEDURE — 97110 THERAPEUTIC EXERCISES: CPT

## 2020-08-14 PROCEDURE — 99024 POSTOP FOLLOW-UP VISIT: CPT | Performed by: NURSE PRACTITIONER

## 2020-08-14 PROCEDURE — 92526 ORAL FUNCTION THERAPY: CPT

## 2020-08-14 PROCEDURE — 82140 ASSAY OF AMMONIA: CPT | Performed by: HOSPITALIST

## 2020-08-14 PROCEDURE — 85610 PROTHROMBIN TIME: CPT | Performed by: HOSPITALIST

## 2020-08-14 PROCEDURE — 85025 COMPLETE CBC W/AUTO DIFF WBC: CPT | Performed by: HOSPITALIST

## 2020-08-14 PROCEDURE — 80053 COMPREHEN METABOLIC PANEL: CPT | Performed by: HOSPITALIST

## 2020-08-14 PROCEDURE — 86709 HEPATITIS A IGM ANTIBODY: CPT | Performed by: HOSPITALIST

## 2020-08-14 PROCEDURE — 86705 HEP B CORE ANTIBODY IGM: CPT | Performed by: HOSPITALIST

## 2020-08-14 PROCEDURE — 73502 X-RAY EXAM HIP UNI 2-3 VIEWS: CPT

## 2020-08-14 PROCEDURE — 87340 HEPATITIS B SURFACE AG IA: CPT | Performed by: HOSPITALIST

## 2020-08-14 PROCEDURE — 97112 NEUROMUSCULAR REEDUCATION: CPT

## 2020-08-14 PROCEDURE — 94799 UNLISTED PULMONARY SVC/PX: CPT

## 2020-08-14 RX ADMIN — SODIUM CHLORIDE, PRESERVATIVE FREE 10 ML: 5 INJECTION INTRAVENOUS at 20:58

## 2020-08-14 RX ADMIN — DEXAMETHASONE 2 MG: 2 TABLET ORAL at 20:58

## 2020-08-14 RX ADMIN — PANTOPRAZOLE SODIUM 40 MG: 40 TABLET, DELAYED RELEASE ORAL at 06:41

## 2020-08-14 RX ADMIN — SODIUM CHLORIDE, PRESERVATIVE FREE 10 ML: 5 INJECTION INTRAVENOUS at 08:43

## 2020-08-14 RX ADMIN — FOLIC ACID 1 MG: 1 TABLET ORAL at 08:43

## 2020-08-14 RX ADMIN — IPRATROPIUM BROMIDE AND ALBUTEROL SULFATE 3 ML: 2.5; .5 SOLUTION RESPIRATORY (INHALATION) at 14:54

## 2020-08-14 RX ADMIN — POTASSIUM CHLORIDE 40 MEQ: 10 CAPSULE, COATED, EXTENDED RELEASE ORAL at 08:43

## 2020-08-14 RX ADMIN — OLANZAPINE 5 MG: 5 TABLET ORAL at 17:24

## 2020-08-14 RX ADMIN — LEVETIRACETAM 500 MG: 500 TABLET, FILM COATED ORAL at 08:43

## 2020-08-14 RX ADMIN — DEXAMETHASONE 2 MG: 2 TABLET ORAL at 08:43

## 2020-08-14 RX ADMIN — Medication 100 MG: at 08:43

## 2020-08-14 RX ADMIN — LEVETIRACETAM 500 MG: 500 TABLET, FILM COATED ORAL at 20:58

## 2020-08-14 NOTE — PROGRESS NOTES
LOS: 11 days   Patient Care Team:  Provider, No Known as PCP - General    Chief Complaint:   Status post nontraumatic right frontal parietal subcortical intracerebral hemorrhage-etiology unknown      Subjective     History of Present Illness    Subjective  Patient does not describe any significant headache.  Continues with weakness on the left side.  He thought he had a little bit of movement in the left hand earlier today.  Is moving the left leg some.  Does not describe any pain in the left upper extremity or left lower extremity.    History taken from: patient    Objective     Vital Signs  Temp:  [97.5 °F (36.4 °C)-98.1 °F (36.7 °C)] 98.1 °F (36.7 °C)  Heart Rate:  [63-84] 68  Resp:  [16-18] 18  BP: (105-120)/(51-76) 120/76    Objective  Physical Exam  MENTAL STATUS -  AWAKE / ALERT  HEENT-right craniotomy incision clean dry and intact.  SCLERAE ANICTERIC, CONJUNCTIVAE PINK, OP MOIST, NO JVD, EARS UNREMARKABLE EXTERNALLY.     LUNGS -normal respiration  HEART- RRR   ABD -nondistended  EXT -nontender range of motion left upper extremity or left lower extremity    Passive range of motion was decreased for left hip adduction and internal rotation.  It was difficult to determine if this was from mechanical block versus hypertonicity.  There was no pain with range of motion of the hip.  With the external rotation of the leg and flexion position of the knee, the left lower extremity appeared possibly, but not clearly, shorter than the right.      NO  CYANOSIS  NEURO -oriented person place time and situation.  Readily conversant.     Recognize finger movement all 4 quadrants.  Extraocular movements intact.  No dysarthria.  Proprioception absent at the left great toe  MOTOR EXAM - RUE/RLE 5/5. LUE 0/5.  LLE active hip extension, knee extension 2-/5, ankle dorsiflexion 0/5, ankle plantar flexion 0/5.  Tone decreased in the left upper extremity      Results Review:     I reviewed the patient's new clinical  results.  Results from last 7 days   Lab Units 08/14/20  0510 08/12/20  0523 08/11/20  0440   WBC 10*3/mm3 9.37 7.61 10.37   HEMOGLOBIN g/dL 12.2* 11.7* 11.8*   HEMATOCRIT % 35.0* 32.7* 33.2*   PLATELETS 10*3/mm3 196 226 231     Results from last 7 days   Lab Units 08/14/20  0510 08/13/20  0550 08/12/20  0523   SODIUM mmol/L 136 134* 136   POTASSIUM mmol/L 3.6 3.6 3.8   CHLORIDE mmol/L 105 102 103   CO2 mmol/L 22.0 22.5 23.0   BUN mg/dL 8 13 16   CREATININE mg/dL 0.45* 0.50* 0.46*   CALCIUM mg/dL 9.3 9.2 9.5   BILIRUBIN mg/dL 2.8* 2.8* 3.1*   ALK PHOS U/L 99 93 93   ALT (SGPT) U/L 132* 106* 59*   AST (SGOT) U/L 123* 129* 82*   GLUCOSE mg/dL 132* 142* 129*       Medication Review: done  Scheduled Meds:  dexamethasone 2 mg Oral Q12H   folic acid 1 mg Nasogastric Daily   levETIRAcetam 500 mg Oral Q12H   OLANZapine 5 mg Nasogastric Daily Before Supper   pantoprazole 40 mg Oral Q AM   sodium chloride 10 mL Intravenous Q12H   thiamine 100 mg Nasogastric Daily     Continuous Infusions:   PRN Meds:.•  [DISCONTINUED] acetaminophen **OR** acetaminophen  •  acetaminophen  •  bisacodyl  •  haloperidol lactate  •  HYDROcod Polst-CPM Polst ER  •  ipratropium-albuterol  •  LORazepam  •  magnesium sulfate **OR** magnesium sulfate **OR** magnesium sulfate  •  ondansetron  •  oxyCODONE-acetaminophen  •  potassium chloride **OR** potassium chloride **OR** potassium chloride  •  potassium phosphate infusion greater than 15 mMoles **OR** potassium phosphate infusion greater than 15 mMoles **OR** potassium phosphate **OR** sodium phosphate IVPB **OR** sodium phosphate IVPB  •  sodium chloride      Assessment/Plan       Nontraumatic subcortical hemorrhage of right cerebral hemisphere (CMS/HCC)    Right-sided nontraumatic intracerebral hemorrhage (CMS/HCC)    Alcohol abuse    Metabolic encephalopathy    Vitamin K deficiency      Assessment & Plan  Status post nontraumatic right frontal parietal subcortical intracerebral hemorrhage-etiology  unknown  Cerebral angiogram 8/5/2020- for aneurysm/AVM/dural AV fistula   status post right frontal craniotomy for evacuation of hemorrhage August 3, 2020  Left hemiparesis/left inattention  Dysphagia-purée with some mashed, thin liquid, no straws  Impaired mobility/impaired self-care/impaired cognition  Status post aspiration pneumonia versus pneumonitis  Metabolic encephalopathy-improved-Zyprexa 5 mg q. supper  History of alcohol abuse  Coagulopathy-increase INR-vitamin K replacement  DVT prophylaxis-SCDs  Seizure prophylaxis-Keppra  Headache-Decadron-taper    Left hip-x-ray ordered to assess for any bony changes with the decreased hip internal rotation and hip adduction range of motion passively but denies any pain with range of motion.Addendum: 20:52- No acute fracture, erosion, or dislocation is identified.    Given the patient's functional impairments and comorbidities, would recommend comprehensive acute inpatient rehabilitation .  This would be an interdisciplinary program with physical therapy 1 hour,  occupational therapy 1 hour, and speech therapy 1 hour, 5 days a week.  Rehabilitation nursing for carryover, monitoring of coagulopathy and neurologic   status, bowel and bladder, and skin  Ongoing physician follow-up.  Weekly team conferences.      Precertification has been obtained.  Per internal medicine, anticipate transfer to rehab tomorrow.    Prateek Diaz MD  08/14/20  18:22    Time:     During rounds, used appropriate personal protective equipment including mask and gloves.  Additional gown if indicated.  Mask used was standard procedure mask. Appropriate PPE was worn during the entire visit.  Hand hygiene was completed before and after.

## 2020-08-14 NOTE — PROGRESS NOTES
Name: Avinash Everett ADMIT: 8/3/2020   : 1990  PCP: Provider, No Known    MRN: [id number] LOS: 11 days   AGE/SEX: 30 y.o. male  ROOM: [facility]     Subjective   Subjective   No new complaints    Review of Systems     Objective   Objective   Vital Signs  Temp:  [97.5 °F (36.4 °C)-98.1 °F (36.7 °C)] 98.1 °F (36.7 °C)  Heart Rate:  [63-84] 68  Resp:  [16-18] 18  BP: (105-128)/(51-81) 120/76  SpO2:  [92 %-99 %] 99 %  on  Flow (L/min):  [4] 4;   Device (Oxygen Therapy): room air  Body mass index is 24.55 kg/m².  Physical Exam  General Appearance:  Comfortable, well-appearing, in no acute distress and not in pain.    .    HEENT: (Surgical incision healing well.)    Lungs:  Normal effort and normal respiratory rate.  Breath sounds clear to auscultation.    Heart: Normal rate.  Regular rhythm.    Abdomen: Abdomen is soft and non-distended.  Bowel sounds are normal.   There is no abdominal tenderness.     Extremities: There is no dependent edema.    Neurological: Patient is alert and oriented to person, place and time.  (Severe left upper extremity weakness persists).    Results Review:       I reviewed the patient's new clinical results.  Results from last 7 days   Lab Units 20  0510 20  0523 20  0440 08/10/20  0431   WBC 10*3/mm3 9.37 7.61 10.37 6.49   HEMOGLOBIN g/dL 12.2* 11.7* 11.8* 12.2*   PLATELETS 10*3/mm3 196 226 231 196     Results from last 7 days   Lab Units 20  0510 20  0550 20  0523 20  0440   SODIUM mmol/L 136 134* 136 138   POTASSIUM mmol/L 3.6 3.6 3.8 3.5   CHLORIDE mmol/L 105 102 103 105   CO2 mmol/L 22.0 22.5 23.0 20.9*   BUN mg/dL 8 13 16 11   CREATININE mg/dL 0.45* 0.50* 0.46* 0.43*   GLUCOSE mg/dL 132* 142* 129* 179*   Estimated Creatinine Clearance: 286.5 mL/min (A) (by C-G formula based on SCr of 0.45 mg/dL (L)).  Results from last 7 days   Lab Units 20  0510 20  0550 20  0523 20  0440   ALBUMIN g/dL 3.80 3.70 3.60 3.70    BILIRUBIN mg/dL 2.8* 2.8* 3.1* 3.1*   ALK PHOS U/L 99 93 93 101   AST (SGOT) U/L 123* 129* 82* 49*   ALT (SGPT) U/L 132* 106* 59* 35     Results from last 7 days   Lab Units 08/14/20  0510 08/13/20  0550 08/12/20  0523 08/11/20  0440  08/09/20  0656 08/08/20  1005   CALCIUM mg/dL 9.3 9.2 9.5 10.0   < > 8.6 8.2*   ALBUMIN g/dL 3.80 3.70 3.60 3.70   < > 3.20* 3.20*   MAGNESIUM mg/dL  --   --   --   --   --  1.6  --    PHOSPHORUS mg/dL  --   --   --   --   --  2.9 1.5*    < > = values in this interval not displayed.     Results from last 7 days   Lab Units 08/10/20  0431   PROCALCITONIN ng/mL 0.16     COVID19   Date Value Ref Range Status   08/03/2020 Not Detected Not Detected - Ref. Range Final     No results found for: HGBA1C, POCGLU    CT Head Without Contrast  Narrative: CT HEAD WO CONTRAST-     INDICATIONS: A new fall injury     TECHNIQUE: Radiation dose reduction techniques were utilized, including  automated exposure control and exposure modulation based on body size.  Noncontrast head CT     COMPARISON: 08/11/2020     FINDINGS:     Right parietal craniotomy and right frontoparietal hematoma evacuation  changes are redemonstrated. The evacuation cavity is slightly decreased,  2.8 cm, previously 3.1 cm. Several other foci of gas are seen around the  evacuation cavity and deep to the craniotomy flap. Areas of subacute  hemorrhage surrounding the evacuation cavity do not appear significantly  changed in size. For example, 4.4 cm focus of hemorrhage posterior to  the evacuation cavity on axial image 34 appears stable. Surrounding  edema is again present with mass effect, partial effacement of right  lateral ventricle, and leftward midline shift by 7 mm, stable the  ventricles otherwise appear stable. Subdural hematoma is again seen  along the left tentorium, and does not appear significantly changed.  Previous thin extension of subdural hematoma along the posterior lateral  aspect of the cerebrum is again  apparent.                 Impression:    Evolving postsurgical changes and residual areas of intracranial  hemorrhage, without interval increase in hemorrhage or new areas of  hemorrhage identified.     This report was finalized on 8/13/2020 7:57 PM by Dr. Rell Hernández M.D.     FL Video Swallow With Speech Single Contrast  VIDEO SWALLOWING EXAMINATION BY SPEECH PATHOLOGY     Clinical: Dysphasia     Video swallowing examination performed under the direction of speech  pathology. Imaging reviewed by radiologist who concurs with the  findings.     Speech pathology summary:Pt exhibited mild-moderate oropharyngeal  dysphagia. Pt exhibited trace, transient penetration with thins by cup,  thins by straw and mixed. No aspiration observed with any consistency.  No cough response observed to transient penetration. Esophageal  dysmotility observed with puree. Stasis cleared with liquid wash of  thins     FLUOROSCOPY TIME: 2 minutes 59 seconds, 5084 images.     This report was finalized on 8/13/2020 12:57 PM by Dr. Haim Smith M.D.           dexamethasone 2 mg Oral Q12H   folic acid 1 mg Nasogastric Daily   levETIRAcetam 500 mg Oral Q12H   OLANZapine 5 mg Nasogastric Daily Before Supper   pantoprazole 40 mg Oral Q AM   sodium chloride 10 mL Intravenous Q12H   thiamine 100 mg Nasogastric Daily      Diet Dysphagia; IV - Mechanical Soft No Mixed Consistencies; Thin; No Straws       Assessment/Plan     Active Hospital Problems    Diagnosis  POA   • **Nontraumatic subcortical hemorrhage of right cerebral hemisphere (CMS/HCC) [I61.0]  Unknown   • Alcohol abuse [F10.10]  Unknown   • Metabolic encephalopathy [G93.41]  Unknown   • Vitamin K deficiency [E56.1]  Unknown   • Right-sided nontraumatic intracerebral hemorrhage (CMS/HCC) [I61.9]  Yes      Resolved Hospital Problems   No resolved problems to display.       30 y.o. male admitted with Nontraumatic subcortical hemorrhage of right cerebral hemisphere  (CMS/HCC).    Nontraumatic subcortical hemorrhage of right cerebral hemisphere: Status post right parietal craniotomy and evacuation of hemorrhage.  8/3/2020.  Follow-up CT scan stable.    Left-sided weakness secondary to above.    Severe dysphasia: Await results of video swallow study.    Aspiration pneumonia versus pneumonitis.  Patient presently on Augmentin.  Much improved.    Alcohol abuse:    Elevated LFTs: Transaminase levels rising on today's labs.  Will check hepatitis viral panels with morning labs and monitor closely.  Bilirubin elevated disproportionately: Possible Guilbert's disease.      Macrocytosis secondary to alcohol abuse and folate deficiency.      Metabolic encephalopathy: Much improved.  NH 3 level bit on the high side with his worsening hepatic function will need to follow this.    Coagulopathy: INR rising and so far not responding well to vitamin K.  Will re-dose vitamin K today and recheck in the morning.    Liver ultrasound with probable fatty liver and possible nodule.  Follow-up with CT scan when stable.  · SCDs for DVT prophylaxis.  · Full code.  · Discussed with family and nursing staff.  · Anticipate discharge TBD.  timing yet to be determined.      Zay Jean MD  [facility]  08/14/20  15:43    Patient was wearing facemask when I entered the room and throughout our encounter.  I wore protective equipment throughout this patient encounter including a face mask, gloves and protective eyewear.  Hand hygiene was performed before donning protective equipment and after removal when leaving the room.

## 2020-08-14 NOTE — PLAN OF CARE
Problem: Patient Care Overview  Goal: Plan of Care Review  Outcome: Ongoing (interventions implemented as appropriate)  Flowsheets (Taken 8/14/2020 1058)  Progress: improving  Plan of Care Reviewed With: patient  Outcome Summary: Pt tolerated treatment well this date. Reports no pain from previous fall last night. Pt's sitting balance is getting better each day, required only SBA-min A for static, and up to mod A for dynamic while doing LE exercises. Cues still were given for correcting posture. Required min A x2 to stand, then mod A x2 to pivot towards R side to chair. L knee buckle ninahen attempting to put more weight on that side, though knee was blocked to prevent falls. Patient was intermittently wearing a face mask during this therapy encounter. Therapist used appropriate personal protective equipment including eye protection, mask, and gloves.  Mask used was standard procedure mask. Appropriate PPE was worn during the entire therapy session. Hand hygiene was completed before and after therapy session. Patient is not in enhanced droplet precautions. Also present: Radu PT tech.

## 2020-08-14 NOTE — PROGRESS NOTES
Continued Stay Note  [facility]     Patient Name: Avinash Everett  MRN: [id number]  Today's Date: 8/14/2020    Admit Date: 8/3/2020    Discharge Plan     Row Name 08/14/20 0926       Plan    Plan  Plan is [facility].     Plan Comments  CCP spoke with pt who confirms [facility] is his choice.  CCP spoke lenny Wills/ [facility] who states they will initiate precert today.  Await determination.        Discharge Codes    No documentation.             Tennille Vazquez RN

## 2020-08-14 NOTE — NURSING NOTE
Tam obtained. [facility] [place] notified. SHe spoke with Dr. Jean who states he will plan for dc tomorrow for patient.   Dr. Diaz notified. Will follow up tomorrow    Elma Price RN  Acute Rehab Admission Nurse

## 2020-08-14 NOTE — THERAPY TREATMENT NOTE
Patient Name: Avinash Everett  : 1990    MRN: [id number]                              Today's Date: 2020       Admit Date: 8/3/2020    Visit Dx:     ICD-10-CM ICD-9-CM   1. Right-sided nontraumatic intracerebral hemorrhage, unspecified cerebral location (CMS/HCC) I61.9 431   2. Subdural hematoma (CMS/HCC) S06.5X9A 432.1   3. Nontraumatic subcortical hemorrhage of right cerebral hemisphere (CMS/HCC) I61.0 431     Patient Active Problem List   Diagnosis   • Right-sided nontraumatic intracerebral hemorrhage (CMS/HCC)   • Nontraumatic subcortical hemorrhage of right cerebral hemisphere (CMS/HCC)   • Alcohol abuse   • Metabolic encephalopathy   • Vitamin K deficiency     History reviewed. No pertinent past medical history.  History reviewed. No pertinent surgical history.  General Information     [facility] Name 20 1049          PT Evaluation Time/Intention    Document Type  therapy note (daily note)  -     Mode of Treatment  physical therapy  -[facility] Name 20 104          General Information    Existing Precautions/Restrictions  fall  -SM     Row Name 20 1049          Cognitive Assessment/Intervention- PT/OT    Orientation Status (Cognition)  oriented x 3  -       User Key  (r) = Recorded By, (t) = Taken By, (c) = Cosigned By    Initials Name Provider Type     Lissa Finn PTA Physical Therapy Assistant        Mobility     [facility] Name 20 1049          Bed Mobility Assessment/Treatment    Bed Mobility Assessment/Treatment  supine-sit  -     Supine-Sit [place] (Bed Mobility)  moderate assist (50% patient effort);2 person assist  -     Sit-Supine [place] (Bed Mobility)  not tested  -     Assistive Device (Bed Mobility)  bed rails;head of bed elevated  -     Row Name 20 1049          Transfer Assessment/Treatment    Comment (Transfers)  pivot to chair towards R side  -     Row Name 20 1049          Bed-Chair Transfer    Bed-Chair [place]  (Transfers)  moderate assist (50% patient effort);2 person assist  -     Assistive Device (Bed-Chair Transfers)  -- HHA  -[facility] Name 08/14/20 1049          Sit-Stand Transfer    Sit-Stand [place] (Transfers)  minimum assist (75% patient effort);2 person assist  -     Assistive Device (Sit-Stand Transfers)  -- [facility]       User Key  (r) = Recorded By, (t) = Taken By, (c) = Cosigned By    Initials Name Provider Type    Lissa Arriola PTA Physical Therapy Assistant        Obj/Interventions     [facility] Name 08/14/20 1051          Therapeutic Exercise    Lower Extremity (Therapeutic Exercise)  LAQ (long arc quad), bilateral;marching while seated;quad sets, bilateral  -     Lower Extremity Range of Motion (Therapeutic Exercise)  hip abduction/adduction, bilateral;ankle dorsiflexion/plantar flexion, bilateral  -     Exercise Type (Therapeutic Exercise)  AROM (active range of motion);AAROM (active assistive range of motion)  -     Position (Therapeutic Exercise)  seated  -     Sets/Reps (Therapeutic Exercise)  10 reps  -SM     Row Name 08/14/20 1051          Static Sitting Balance    Level of [place] (Unsupported Sitting, Static Balance)  standby assist;contact guard assist;minimal assist, 75% patient effort  -     Sitting Position (Unsupported Sitting, Static Balance)  sitting on edge of bed  -     Time Able to Maintain Position (Unsupported Sitting, Static Balance)  more than 5 minutes  -     Comment (Unsupported Sitting, Static Balance)  cues still given for correcting posture and L side awareness  -[facility] Name 08/14/20 1051          Dynamic Sitting Balance    Level of [place], Reaches Outside Midline (Sitting, Dynamic Balance)  minimal assist, 75% patient effort;moderate assist, 50 to 74% patient effort  -     Sitting Position, Reaches Outside Midline (Sitting, Dynamic Balance)  sitting on edge of bed  -     Comment, Reaches Outside Midline (Sitting, Dynamic Balance)   while performing exercises  -       User Key  (r) = Recorded By, (t) = Taken By, (c) = Cosigned By    Initials Name Provider Type    Lissa Arriola PTA Physical Therapy Assistant        Goals/Plan    No documentation.       Clinical Impression     [facility] Name 08/14/20 1055          Pain Assessment    Additional Documentation  Pain Scale: Numbers Pre/Post-Treatment (Group)  -SM     Row Name 08/14/20 1055          Pain Scale: Numbers Pre/Post-Treatment    Pain Scale: Numbers, Pretreatment  0/10 - no pain  -SM     Pain Scale: Numbers, Post-Treatment  0/10 - no pain  -SM     Row Name 08/14/20 1055          Positioning and Restraints    Pre-Treatment Position  in bed  -SM     Post Treatment Position  chair  -SM     In Chair  reclined;call light within reach;encouraged to call for assist;exit alarm on;notified nsg  -       User Key  (r) = Recorded By, (t) = Taken By, (c) = Cosigned By    Initials Name Provider Type    Lissa Arriola PTA Physical Therapy Assistant        Outcome Measures     [facility] Name 08/14/20 1056          How much help from another person do you currently need...    Turning from your back to your side while in flat bed without using bedrails?  3  -SM     Moving from lying on back to sitting on the side of a flat bed without bedrails?  2  -SM     Moving to and from a bed to a chair (including a wheelchair)?  2  -SM     Standing up from a chair using your arms (e.g., wheelchair, bedside chair)?  2  -SM     Climbing 3-5 steps with a railing?  1  -SM     To walk in hospital room?  1  -SM     AM-PAC 6 Clicks Score (PT)  11  -SM     Row Name 08/14/20 1056          Functional Assessment    Outcome Measure Options  AM-PAC 6 Clicks Basic Mobility (PT)  -       User Key  (r) = Recorded By, (t) = Taken By, (c) = Cosigned By    Initials Name Provider Type    Lissa Arriola PTA Physical Therapy Assistant        Physical Therapy Education                 Title: PT OT SLP Therapies (Done)      Topic: Physical Therapy (Done)     Point: Mobility training (Done)     Description:   Instruct learner(s) on safety and technique for assisting patient out of bed, chair or wheelchair.  Instruct in the proper use of assistive devices, such as walker, crutches, cane or brace.              Patient Friendly Description:   It's important to get you on your feet again, but we need to do so in a way that is safe for you. Falling has serious consequences, and your personal safety is the most important thing of all.        When it's time to get out of bed, one of us or a family member will sit next to you on the bed to give you support.     If your doctor or nurse tells you to use a walker, crutches, a cane, or a brace, be sure you use it every time you get out of bed, even if you think you don't need it.    Learning Progress Summary           Patient Acceptance, E,TB,D, VU,NR by  at 8/14/2020 1057    Eager, E,D, VU,DU,NR by EB at 8/13/2020 1350    Acceptance, E,TB,D, VU,NR by  at 8/12/2020 1337    Acceptance, E,D, NR by  at 8/10/2020 1154    Acceptance, E, VU,NR by TJ at 8/9/2020 0527    Acceptance, E, VU,NR by ERIS at 8/8/2020 1530    Acceptance, E,D, NR by  at 8/7/2020 1204   Family Acceptance, E, VU,NR by ERIS at 8/8/2020 1530   Mother Eager, E,D, VU,DU,NR by EB at 8/13/2020 1350                   Point: Home exercise program (Done)     Description:   Instruct learner(s) on appropriate technique for monitoring, assisting and/or progressing patient with therapeutic exercises and activities.              Learning Progress Summary           Patient Acceptance, E,TB,D, VU,NR by  at 8/14/2020 1057    Eager, E,D, VU,DU,NR by EB at 8/13/2020 1350    Acceptance, E,TB,D, VU,NR by  at 8/12/2020 1337    Acceptance, E,D, NR by  at 8/10/2020 1154    Acceptance, E, VU,NR by LL at 8/9/2020 0527    Acceptance, E,D, NR by COLLIN at 8/7/2020 1204   Mother GENARO Stearns D, DEIDRA DHALIWAL,NR by EB at 8/13/2020 1350                   Point: Body  mechanics (Done)     Description:   Instruct learner(s) on proper positioning and spine alignment for patient and/or caregiver during mobility tasks and/or exercises.              Learning Progress Summary           Patient Acceptance, E,TB,D, VU,NR by  at 8/14/2020 1057    Eager, E,D, VU,DU,NR by  at 8/13/2020 1350    Acceptance, E,TB,D, VU,NR by  at 8/12/2020 1337    Acceptance, E,D, NR by  at 8/10/2020 1154    Acceptance, E, VU,NR by  at 8/9/2020 0527    Acceptance, E, VU,NR by  at 8/8/2020 1530    Acceptance, E,D, NR by  at 8/7/2020 1204   Family Acceptance, E, VU,NR by  at 8/8/2020 1530   Mother Eager, E,D, VU,DU,NR by  at 8/13/2020 1350                   Point: Precautions (Done)     Description:   Instruct learner(s) on prescribed precautions during mobility and gait tasks              Learning Progress Summary           Patient Acceptance, E,TB,D, VU,NR by  at 8/14/2020 1057    Eager, E,D, VU,DU,NR by  at 8/13/2020 1350    Acceptance, E,TB,D, VU,NR by  at 8/12/2020 1337    Acceptance, E,D, NR by  at 8/10/2020 1154    Acceptance, E, VU,NR by  at 8/9/2020 0527    Acceptance, E, VU,NR by  at 8/8/2020 1530    Acceptance, E,D, NR by  at 8/7/2020 1204   Family Acceptance, E, VU,NR by  at 8/8/2020 1530   Mother Eager, E,D, VU,DU,NR by  at 8/13/2020 1350                               User Key     Initials Effective Dates Name Provider Type Discipline     02/05/19 -  Raysa Devine, PT Physical Therapist PT     03/07/18 -  Lissa Finn, PTA Physical Therapy Assistant PT     09/17/19 -  Geri Glez, PT Physical Therapist PT    DJ 10/25/19 -  Allyssa Edmondson, PT Physical Therapist PT     02/11/20 -  Mariana Bojorquez, RN Registered Nurse Nurse              PT Recommendation and Plan     Outcome Summary/Treatment Plan (PT)  Anticipated Discharge Disposition (PT): inpatient rehabilitation facility  Plan of Care Reviewed With: patient  Progress: improving  Outcome  Summary: Pt tolerated treatment well this date. Reports no pain from previous fall last night. Pt's sitting balance is getting better each day, required only SBA-min A for static, and up to mod A for dynamic while doing LE exercises. Cues still were given for correcting posture. Required min A x2 to stand, then mod A x2 to pivot towards R side to chair. L knee buckle dwhen attempting to put more weight on that side, though knee was blocked to prevent falls. Patient was intermittently wearing a face mask during this therapy encounter. Therapist used appropriate personal protective equipment including eye protection, mask, and gloves.  Mask used was standard procedure mask. Appropriate PPE was worn during the entire therapy session. Hand hygiene was completed before and after therapy session. Patient is not in enhanced droplet precautions.     Time Calculation:   PT Charges     Row Name 08/14/20 1106             Time Calculation    Start Time  0935  -      Stop Time  1006  -      Time Calculation (min)  31 min  -SM      PT Received On  08/14/20  -      PT - Next Appointment  08/15/20  -        User Key  (r) = Recorded By, (t) = Taken By, (c) = Cosigned By    Initials Name Provider Type     Lissa Finn PTA Physical Therapy Assistant        Therapy Charges for Today     Code Description Service Date Service Provider Modifiers Qty    45990772691 HC PT THER PROC EA 15 MIN 8/14/2020 Lissa Finn PTA GP 1    98489477797 HC PT THER SUPP EA 15 MIN 8/14/2020 Lissa Finn PTA GP 2    84355584474 HC PT NEUROMUSC RE EDUCATION EA 15 MIN 8/14/2020 Lissa Finn PTA GP 1          PT G-Codes  Outcome Measure Options: AM-PAC 6 Clicks Basic Mobility (PT)  AM-PAC 6 Clicks Score (PT): 11  AM-PAC 6 Clicks Score (OT): 13  Modified Sear[place] Scale: 4 - Moderately severe disability.  Unable to walk without assistance, and unable to attend to own bodily needs without assistance.    Lissa Finn  PTA  8/14/2020

## 2020-08-14 NOTE — PLAN OF CARE
swallow reeval completed for possible diet upgrade. no overt s/s of aspiration with thin liquids by spoon/cup/straw, puree, mech soft solids. mastication adequate for mech soft. pt independently performs lingual sweep to clear L buccal cavity. REC: upgrade diet to mech soft/thin liquids, meds with thin or puree, safe swallow precautions. SLP to continue to follow.

## 2020-08-14 NOTE — THERAPY RE-EVALUATION
Acute Care - Speech Language Pathology   Swallow Re-Evaluation [facility]     Patient Name: Avinash Everett  : 1990  MRN: [id number]  Today's Date: 2020               Admit Date: 8/3/2020    Visit Dx:     ICD-10-CM ICD-9-CM   1. Right-sided nontraumatic intracerebral hemorrhage, unspecified cerebral location (CMS/HCC) I61.9 431   2. Subdural hematoma (CMS/HCC) S06.5X9A 432.1   3. Nontraumatic subcortical hemorrhage of right cerebral hemisphere (CMS/HCC) I61.0 431     Patient Active Problem List   Diagnosis   • Right-sided nontraumatic intracerebral hemorrhage (CMS/HCC)   • Nontraumatic subcortical hemorrhage of right cerebral hemisphere (CMS/HCC)   • Alcohol abuse   • Metabolic encephalopathy   • Vitamin K deficiency     History reviewed. No pertinent past medical history.  History reviewed. No pertinent surgical history.     SWALLOW EVALUATION (last 72 hours)      SLP Adult Swallow Evaluation     Row Name 20 1200 20 0915 20 1358             Rehab Evaluation    Document Type  therapy note (daily note)  -LT  evaluation  -AL  re-evaluation  -OC      Subjective Information  no complaints  -LT  no complaints  -AL  fatigue  -OC      Patient Observations  alert;cooperative;agree to therapy  -LT  alert  -AL  alert;agree to therapy;cooperative  -OC      Patient/Family Observations  --  --  up in chair after OT  -OC      Patient Effort  good  -LT  excellent  -AL  good  -OC      Symptoms Noted During/After Treatment  none  -LT  --  none  -OC         General Information    Patient Profile Reviewed  yes  -LT  yes  -AL  yes  -OC      Pertinent History Of Current Problem  --  Pt s/p emergent crani for evaluation of R frontal hematoma, intubated 8/3-20.  -AL  --      Current Method of Nutrition  --  pureed with some mashed;nectar/syrup-thick liquids  -AL  NPO  -OC      Precautions/Limitations, Vision  --  neglect, left  -AL  neglect, left  -OC      Precautions/Limitations, Hearing  --  WFL  -AL   WFL  -OC      Prior Level of Function-Communication  --  WFL  -AL  WFL  -OC      Prior Level of Function-Swallowing  --  no diet consistency restrictions  -AL  no diet consistency restrictions  -OC      Plans/Goals Discussed with  --  patient  -AL  patient;agreed upon  -OC      Barriers to Rehab  --  none identified  -AL  medically complex  -OC      Patient's Goals for Discharge  --  return to regular diet  -AL  return to PO diet  -OC      Family Goals for Discharge  --  --  patient able to return to PO diet  -OC         Pain Scale: Numbers Pre/Post-Treatment    Pain Scale: Numbers, Pretreatment  0/10 - no pain  -LT  0/10 - no pain  -AL  0/10 - no pain  -OC      Pain Scale: Numbers, Post-Treatment  0/10 - no pain  -LT  0/10 - no pain  -AL  0/10 - no pain  -OC         Oral Motor and Function    Dentition Assessment  natural, present and adequate  -LT  natural, present and adequate  -AL  natural, present and adequate  -OC      Secretion Management  WNL/WFL  -LT  WNL/WFL  -AL  WNL/WFL  -OC      Mucosal Quality  moist, healthy  -LT  moist, healthy  -AL  moist, healthy  -OC      Volitional Swallow  --  --  weak  -OC      Volitional Cough  --  --  non-productive  -OC         Oral Musculature and Cranial Nerve Assessment    Oral Motor General Assessment  oral labial or buccal impairment  -LT  --  --      Mandibular Impairment Detail, Cranial Nerve V (Trigeminal)  reduced strength on left  -LT  --  --      Oral Labial or Buccal Impairment, Detail, Cranial Nerve VII (Facial):  left labial droop  -LT  --  left labial droop  -OC      Oral Motor, Comment  --  Left labial weakness.  -AL  --         General Eating/Swallowing Observations    Respiratory Support Currently in Use  --  room air  -AL  --      Eating/Swallowing Skills  --  self-fed;fed by SLP  -AL  --      Positioning During Eating  --  upright 90 degree  -AL  --      Utensils Used  --  spoon;cup;straw  -AL  --      Consistencies Trialed  --  regular textures;soft  textures;pureed;thin liquids Mixed  -AL  --         Clinical Swallow Eval    Oral Prep Phase  --  --  impaired  -OC      Oral Transit  --  --  impaired  -OC      Oral Residue  --  --  impaired  -OC      Pharyngeal Phase  --  --  suspected pharyngeal impairment  -OC      Clinical Swallow Evaluation Summary  swallow reeval completed for possible diet upgrade. no overt s/s of aspiration with thin liquids by spoon/cup/straw, puree, mech soft solids. mastication adequate for mech soft. pt independently performs lingual sweep to clear L buccal cavity. REC: upgrade diet to mech soft/thin liquids, meds with thin or puree, safe swallow precautions. SLP to continue to follow.   -LT  --  Patient demonstrated L anterior loss of thin, puree, and mech soft this date. Patient demonstrated inadequate mastication of mechanical soft with significant L oral residue. Patient required moderate verbal cues to clear oral residue.    -OC         MBS/VFSS    Consistencies Trialed  --  regular textures;soft textures;pureed;thin liquids thins by spoon, cup and straw, mixed  -AL  --         MBS/VFSS Interpretation    Oral Prep Phase  --  impaired oral phase of swallowing  -AL  --      Oral Transit Phase  --  impaired  -AL  --      Oral Residue  --  impaired  -AL  --      VFSS Summary  --  Pt presented with mild-moderate oropharyngeal dysphagia. Mastication appeared milldy prolonged, with trace left buccal residue noted with solids. Pt able to clear with lingual sweep and liquid wash. Anterior loss on left observed with thins by straw. Impulsivity with liquids observed, with pt often taking consecutive, large sips. Discoordination led to trace, transient penetration with thins by cup/straw and mixed. Pt did not sensate transient penetration. No aspiration observed with any consistency. Mild valleculae residue observed with all consistencies. Esophageal dysmotility observed with puree; liquid wash assisted in clearing stasis. Recommend fork  mashed with thins; no straws. Trials of mechanical soft with thins with SLP only.  -AL  --      Oral Phase, Comment  --  Left anterior loss x1 with thins by straw, mild left buccal residue with mixed and regular, pt cleared with lingual sweep and liquid wash  -AL  --         Initiation of Pharyngeal Swallow    Pharyngeal Phase, Comment  --  Premature spillage to valleculae with thins by cup, mixed, puree and mechanical soft. Premature spillage to valleculae and occasionally pyriforms with thins via straw (during consecutive sips).   -AL  --         Esophageal Phase    Esophageal Phase  --  esophageal retention with retrograde flow below PES  -AL  --      Esophageal Phase, Comment  --  Reduced motility with puree, with stasis throughout esophagus and retrograde flow below UES: liquid wash of thins cleared stasis  -AL  --         SLP Communication to Radiology    Severity Level of Dysphagia  --  mild-moderate dysphagia;oral dysfunction;pharyngeal dysfunction  -AL  --      Consistencies Aspirated/Penetrated  --  penetrated;thin liquids;mixed consistency  -AL  --      Summary Statement  --  Pt exhibited mild-moderate oropharyngeal dysphagia. Pt exhibited trace, transient penetration with thins by cup, thins by straw and mixed. No aspiration observed with any consistency. No cough reponse observed to transient penetration. Esophageal dysmotility observed with puree. Stasis cleared with liquid wash of thins.  -AL  --         Clinical Impression    SLP Swallowing Diagnosis  mild-moderate;oral dysfunction  -LT  mild-moderate;oral dysfunction;pharyngeal dysfunction  -AL  oral dysfunction;suspected pharyngeal dysfunction  -OC      Functional Impact  risk of aspiration/pneumonia  -LT  risk of aspiration/pneumonia  -AL  risk of aspiration/pneumonia  -OC      Rehab Potential/Prognosis, Swallowing  good, to achieve stated therapy goals  -LT  good, to achieve stated therapy goals  -AL  good, to achieve stated therapy goals  -OC       Swallow Criteria for Skilled Therapeutic Interventions Met  demonstrates skilled criteria  -LT  demonstrates skilled criteria  -AL  demonstrates skilled criteria  -OC         Recommendations    Therapy Frequency (Swallow)  PRN  -LT  PRN  -AL  PRN  -OC      Predicted Duration Therapy Intervention (Days)  until discharge  -LT  until discharge  -AL  until discharge  -OC      SLP Diet Recommendation  mechanical soft with no mixed consistencies;thin liquids  -LT  puree with some mashed;thin liquids no straws; trials of mechanical soft with thins with SLP   -AL  puree with some mashed;nectar thick liquids;water between meals after oral care, with supervision  -OC      Recommended Diagnostics  reassess via clinical swallow evaluation  -LT  reassess via clinical swallow evaluation  -AL  reassess via VFSS (MBS)  -OC      Recommended Precautions and Strategies  upright posture during/after eating;small bites of food and sips of liquid  -LT  upright posture during/after eating;no straw;small bites of food and sips of liquid;alternate between small bites of food and sips of liquid  -AL  upright posture during/after eating;small bites of food and sips of liquid;no straw  -OC      SLP Rec. for Method of Medication Administration  meds whole;with thin liquids;with pudding or applesauce;as tolerated  -LT  meds whole;with pudding or applesauce  -AL  meds crushed;with pudding or applesauce  -OC      Monitor for Signs of Aspiration  yes;notify SLP if any concerns  -LT  yes;notify SLP if any concerns  -AL  yes;notify SLP if any concerns  -OC      Anticipated Dischage Disposition (SLP)  --  inpatient rehabilitation facility  -AL  inpatient rehabilitation facility  -OC         Swallow Goals (SLP)    Oral Nutrition/Hydration Goal Selection (SLP)  oral nutrition/hydration, SLP goal 1  -LT  --  --         Oral Nutrition/Hydration Goal 1 (SLP)    Oral Nutrition/Hydration Goal 1, SLP  Pt will safely tolerate the least restrictive diet with  no s/s of aspiration.  -LT  --  Pt will safely tolerate the least restrictive diet with no s/s of aspiration.  -OC      Time Frame (Oral Nutrition/Hydration Goal 1, SLP)  --  --  by discharge  -OC        User Key  (r) = Recorded By, (t) = Taken By, (c) = Cosigned By    Initials Name Effective Dates    OC Rhett Hoa, MA,[facility]-SLP 06/08/18 -     LT Darlyn Stout, MS CCC-SLP 06/08/18 -     Angie Lim, MS CCC-SLP 08/30/19 -           EDUCATION  The patient has been educated in the following areas:   Dysphagia (Swallowing Impairment).    SLP Recommendation and Plan  SLP Swallowing Diagnosis: mild-moderate, oral dysfunction  SLP Diet Recommendation: mechanical soft with no mixed consistencies, thin liquids  Recommended Precautions and Strategies: upright posture during/after eating, small bites of food and sips of liquid  SLP Rec. for Method of Medication Administration: meds whole, with thin liquids, with pudding or applesauce, as tolerated     Monitor for Signs of Aspiration: yes, notify SLP if any concerns  Recommended Diagnostics: reassess via clinical swallow evaluation  Swallow Criteria for Skilled Therapeutic Interventions Met: demonstrates skilled criteria     Rehab Potential/Prognosis, Swallowing: good, to achieve stated therapy goals  Therapy Frequency (Swallow): PRN  Predicted Duration Therapy Intervention (Days): until discharge       Plan of Care Reviewed With: patient     Patient was not wearing a face mask during this therapy encounter. Therapist used appropriate personal protective equipment including mask, eye protection and gloves.  Mask used was standard procedure mask. Appropriate PPE was worn during the entire therapy session. Hand hygiene was completed before and after therapy session. Patient is not in enhanced droplet precautions.         SLP GOALS     Row Name 08/14/20 1200 08/12/20 9172          Oral Nutrition/Hydration Goal 1 (SLP)    Oral Nutrition/Hydration Goal 1, SLP  Pt will  safely tolerate the least restrictive diet with no s/s of aspiration.  -LT  Pt will safely tolerate the least restrictive diet with no s/s of aspiration.  -OC     Time Frame (Oral Nutrition/Hydration Goal 1, SLP)  --  by discharge  -OC       User Key  (r) = Recorded By, (t) = Taken By, (c) = Cosigned By    Initials Name Provider Type    Hoa Beatty MA,CCC-SLP Speech and Language Pathologist    LT Darlyn Stout MS CCC-SLP Speech and Language Pathologist           SLP Outcome Measures (last 72 hours)      SLP Outcome Measures     Row Name 08/14/20 1200 08/13/20 1100 08/12/20 1414       SLP Outcome Measures    Outcome Measure Used?  Adult NOMS  -LT  Adult NOMS  -AL  Adult NOMS  -OC       Adult FCM Scores    FCM Chosen  Swallowing  -LT  Swallowing  -AL  Swallowing  -OC    Swallowing FCM Score  5  -LT  4  -AL  4  -OC      User Key  (r) = Recorded By, (t) = Taken By, (c) = Cosigned By    Initials Name Effective Dates    Hoa Beatty MA,CCC-SLP 06/08/18 -     LT Darlyn Stout MS CCC-SLP 06/08/18 -     Angie Lim MS CCC-SLP 08/30/19 -            Time Calculation:   Time Calculation- SLP     Row Name 08/14/20 1237             Time Calculation- SLP    SLP Received On  08/14/20  -LT        User Key  (r) = Recorded By, (t) = Taken By, (c) = Cosigned By    Initials Name Provider Type    LT Darlyn Stout, MS CCC-SLP Speech and Language Pathologist          Therapy Charges for Today     Code Description Service Date Service Provider Modifiers Qty    [id number]  ST TREATMENT SWALLOW 3 8/14/2020 Darlyn Stout, MS CCC-SLP GN 1               Darlyn Stout MS CCC-SLP  8/14/2020

## 2020-08-14 NOTE — PLAN OF CARE
Problem: Patient Care Overview  Goal: Plan of Care Review  Outcome: Ongoing (interventions implemented as appropriate)  Flowsheets (Taken 8/14/2020 1742)  Progress: improving  Plan of Care Reviewed With: patient     Pt has had no complaints of pain today. She has been up to the chair and eating well. His diet has been changed to mechanical soft thin liquids.  Pt will be discharged tomorrow to [facility] rehab. Pt is getting a x-ray of his left hip due to fall on 08/13/2020. VSS. Will continue to monitor.

## 2020-08-14 NOTE — PROGRESS NOTES
Continued Stay Note  [facility]     Patient Name: Avinash Everett  MRN: [id number]  Today's Date: 8/14/2020    Admit Date: 8/3/2020    Discharge Plan     Row Name 08/14/20 1633       Plan    Plan  Plan is [facility].  Precert received today.     Plan Comments  [facility]/ [facility] - precert received.  They can take pt today or tomorrow morning.  Message sent to MD.         Discharge Codes    No documentation.             Tennille Vazquez RN

## 2020-08-14 NOTE — PROGRESS NOTES
Plan for acute inpt rehab transfer today.  Full note to be done as H&P  He has decreased adduction /internal rotation left hip, which may be due to hypertonicity, but will check xray left hip.   Addendum: plan is now for transfer to rehab tomorrow.   Addendum: 20:52- No acute fracture, erosion, or dislocation is identified.

## 2020-08-15 ENCOUNTER — HOSPITAL ENCOUNTER (INPATIENT)
Facility: HOSPITAL | Age: 30
LOS: 44 days | Discharge: HOME HEALTH | End: 2020-09-28
Attending: PHYSICAL MEDICINE & REHABILITATION | Admitting: PHYSICAL MEDICINE & REHABILITATION

## 2020-08-15 VITALS
SYSTOLIC BLOOD PRESSURE: 110 MMHG | HEART RATE: 67 BPM | HEIGHT: 73 IN | TEMPERATURE: 98.6 F | BODY MASS INDEX: 24.66 KG/M2 | WEIGHT: 186.07 LBS | RESPIRATION RATE: 16 BRPM | OXYGEN SATURATION: 95 % | DIASTOLIC BLOOD PRESSURE: 66 MMHG

## 2020-08-15 DIAGNOSIS — I61.9 NONTRAUMATIC ACUTE CEREBRAL HEMORRHAGE (HCC): Primary | ICD-10-CM

## 2020-08-15 LAB
HAV IGM SERPL QL IA: NEGATIVE
HBV CORE IGM SERPL QL IA: NEGATIVE
HBV SURFACE AG SERPL QL IA: NEGATIVE

## 2020-08-15 PROCEDURE — 94799 UNLISTED PULMONARY SVC/PX: CPT

## 2020-08-15 PROCEDURE — 97112 NEUROMUSCULAR REEDUCATION: CPT

## 2020-08-15 PROCEDURE — 97110 THERAPEUTIC EXERCISES: CPT

## 2020-08-15 PROCEDURE — 97530 THERAPEUTIC ACTIVITIES: CPT

## 2020-08-15 RX ORDER — MAGNESIUM SULFATE HEPTAHYDRATE 40 MG/ML
4 INJECTION, SOLUTION INTRAVENOUS AS NEEDED
Status: CANCELLED | OUTPATIENT
Start: 2020-08-15

## 2020-08-15 RX ORDER — LEVETIRACETAM 500 MG/1
500 TABLET ORAL EVERY 12 HOURS SCHEDULED
Status: DISCONTINUED | OUTPATIENT
Start: 2020-08-15 | End: 2020-09-28 | Stop reason: HOSPADM

## 2020-08-15 RX ORDER — IPRATROPIUM BROMIDE AND ALBUTEROL SULFATE 2.5; .5 MG/3ML; MG/3ML
3 SOLUTION RESPIRATORY (INHALATION) EVERY 4 HOURS PRN
Status: DISCONTINUED | OUTPATIENT
Start: 2020-08-15 | End: 2020-09-28

## 2020-08-15 RX ORDER — ONDANSETRON 2 MG/ML
4 INJECTION INTRAMUSCULAR; INTRAVENOUS EVERY 6 HOURS PRN
Status: DISCONTINUED | OUTPATIENT
Start: 2020-08-15 | End: 2020-09-28

## 2020-08-15 RX ORDER — FOLIC ACID 1 MG/1
1 TABLET ORAL DAILY
Status: DISCONTINUED | OUTPATIENT
Start: 2020-08-16 | End: 2020-09-03

## 2020-08-15 RX ORDER — THIAMINE MONONITRATE (VIT B1) 100 MG
100 TABLET ORAL DAILY
Status: DISCONTINUED | OUTPATIENT
Start: 2020-08-16 | End: 2020-09-03

## 2020-08-15 RX ORDER — SODIUM CHLORIDE 0.9 % (FLUSH) 0.9 %
10 SYRINGE (ML) INJECTION EVERY 12 HOURS SCHEDULED
Status: CANCELLED | OUTPATIENT
Start: 2020-08-15

## 2020-08-15 RX ORDER — FOLIC ACID 1 MG/1
1 TABLET ORAL DAILY
Status: CANCELLED | OUTPATIENT
Start: 2020-08-15

## 2020-08-15 RX ORDER — DEXAMETHASONE 2 MG/1
2 TABLET ORAL EVERY 12 HOURS SCHEDULED
Status: DISCONTINUED | OUTPATIENT
Start: 2020-08-15 | End: 2020-08-25

## 2020-08-15 RX ORDER — OLANZAPINE 5 MG/1
5 TABLET ORAL
Status: DISCONTINUED | OUTPATIENT
Start: 2020-08-15 | End: 2020-08-27

## 2020-08-15 RX ORDER — OLANZAPINE 5 MG/1
5 TABLET ORAL
Status: CANCELLED | OUTPATIENT
Start: 2020-08-15

## 2020-08-15 RX ORDER — BISACODYL 10 MG
10 SUPPOSITORY, RECTAL RECTAL DAILY PRN
Status: CANCELLED | OUTPATIENT
Start: 2020-08-15

## 2020-08-15 RX ORDER — ACETAMINOPHEN 325 MG/1
650 TABLET ORAL EVERY 6 HOURS PRN
Status: DISCONTINUED | OUTPATIENT
Start: 2020-08-15 | End: 2020-08-17

## 2020-08-15 RX ORDER — MAGNESIUM SULFATE HEPTAHYDRATE 40 MG/ML
2 INJECTION, SOLUTION INTRAVENOUS AS NEEDED
Status: CANCELLED | OUTPATIENT
Start: 2020-08-15

## 2020-08-15 RX ORDER — ACETAMINOPHEN 325 MG/1
650 TABLET ORAL EVERY 6 HOURS PRN
Status: CANCELLED | OUTPATIENT
Start: 2020-08-15

## 2020-08-15 RX ORDER — BISACODYL 10 MG
10 SUPPOSITORY, RECTAL RECTAL DAILY PRN
Status: DISCONTINUED | OUTPATIENT
Start: 2020-08-15 | End: 2020-09-28

## 2020-08-15 RX ORDER — DEXAMETHASONE 2 MG/1
2 TABLET ORAL EVERY 12 HOURS SCHEDULED
Status: CANCELLED | OUTPATIENT
Start: 2020-08-15

## 2020-08-15 RX ORDER — POTASSIUM CHLORIDE 7.45 MG/ML
10 INJECTION INTRAVENOUS
Status: CANCELLED | OUTPATIENT
Start: 2020-08-15

## 2020-08-15 RX ORDER — IPRATROPIUM BROMIDE AND ALBUTEROL SULFATE 2.5; .5 MG/3ML; MG/3ML
3 SOLUTION RESPIRATORY (INHALATION) EVERY 4 HOURS PRN
Status: CANCELLED | OUTPATIENT
Start: 2020-08-15

## 2020-08-15 RX ORDER — PANTOPRAZOLE SODIUM 40 MG/1
40 TABLET, DELAYED RELEASE ORAL
Status: CANCELLED | OUTPATIENT
Start: 2020-08-16

## 2020-08-15 RX ORDER — ONDANSETRON 2 MG/ML
4 INJECTION INTRAMUSCULAR; INTRAVENOUS EVERY 6 HOURS PRN
Status: CANCELLED | OUTPATIENT
Start: 2020-08-15

## 2020-08-15 RX ORDER — OXYCODONE HYDROCHLORIDE AND ACETAMINOPHEN 5; 325 MG/1; MG/1
1 TABLET ORAL EVERY 4 HOURS PRN
Status: CANCELLED | OUTPATIENT
Start: 2020-08-15 | End: 2020-08-19

## 2020-08-15 RX ORDER — POTASSIUM CHLORIDE 1.5 G/1.77G
40 POWDER, FOR SOLUTION ORAL AS NEEDED
Status: CANCELLED | OUTPATIENT
Start: 2020-08-15

## 2020-08-15 RX ORDER — OXYCODONE HYDROCHLORIDE AND ACETAMINOPHEN 5; 325 MG/1; MG/1
1 TABLET ORAL EVERY 4 HOURS PRN
Status: DISCONTINUED | OUTPATIENT
Start: 2020-08-15 | End: 2020-08-17

## 2020-08-15 RX ORDER — POTASSIUM CHLORIDE 750 MG/1
40 CAPSULE, EXTENDED RELEASE ORAL AS NEEDED
Status: CANCELLED | OUTPATIENT
Start: 2020-08-15

## 2020-08-15 RX ORDER — PANTOPRAZOLE SODIUM 40 MG/1
40 TABLET, DELAYED RELEASE ORAL
Status: DISCONTINUED | OUTPATIENT
Start: 2020-08-16 | End: 2020-08-24

## 2020-08-15 RX ORDER — THIAMINE MONONITRATE (VIT B1) 100 MG
100 TABLET ORAL DAILY
Status: CANCELLED | OUTPATIENT
Start: 2020-08-15

## 2020-08-15 RX ORDER — LORAZEPAM 2 MG/ML
1 INJECTION INTRAMUSCULAR EVERY 4 HOURS PRN
Status: CANCELLED | OUTPATIENT
Start: 2020-08-15 | End: 2020-08-15

## 2020-08-15 RX ORDER — HALOPERIDOL 5 MG/ML
2 INJECTION INTRAMUSCULAR
Status: CANCELLED | OUTPATIENT
Start: 2020-08-15

## 2020-08-15 RX ORDER — LEVETIRACETAM 500 MG/1
500 TABLET ORAL EVERY 12 HOURS SCHEDULED
Status: CANCELLED | OUTPATIENT
Start: 2020-08-15

## 2020-08-15 RX ADMIN — LEVETIRACETAM 500 MG: 500 TABLET, FILM COATED ORAL at 19:50

## 2020-08-15 RX ADMIN — Medication 100 MG: at 09:59

## 2020-08-15 RX ADMIN — PANTOPRAZOLE SODIUM 40 MG: 40 TABLET, DELAYED RELEASE ORAL at 06:22

## 2020-08-15 RX ADMIN — OLANZAPINE 5 MG: 5 TABLET ORAL at 18:11

## 2020-08-15 RX ADMIN — LEVETIRACETAM 500 MG: 500 TABLET, FILM COATED ORAL at 09:59

## 2020-08-15 RX ADMIN — DEXAMETHASONE 2 MG: 2 TABLET ORAL at 19:50

## 2020-08-15 RX ADMIN — IPRATROPIUM BROMIDE AND ALBUTEROL SULFATE 3 ML: 2.5; .5 SOLUTION RESPIRATORY (INHALATION) at 02:37

## 2020-08-15 RX ADMIN — FOLIC ACID 1 MG: 1 TABLET ORAL at 09:59

## 2020-08-15 RX ADMIN — DEXAMETHASONE 2 MG: 2 TABLET ORAL at 09:59

## 2020-08-15 RX ADMIN — SODIUM CHLORIDE, PRESERVATIVE FREE 10 ML: 5 INJECTION INTRAVENOUS at 09:59

## 2020-08-15 RX ADMIN — ACETAMINOPHEN 650 MG: 325 TABLET, FILM COATED ORAL at 19:50

## 2020-08-15 NOTE — PLAN OF CARE
Problem: Patient Care Overview  Goal: Plan of Care Review  Outcome: Ongoing (interventions implemented as appropriate)  Flowsheets (Taken 8/15/2020 9392)  Progress: improving  Plan of Care Reviewed With: patient  Note:   Pt aox4 tonight. Denies pain. NIH 8. Is able to hold left lower extremity up for 5 seconds with slight drift, remains flaccid on left arm though did state that he can feel a decreased sensation when this nurse touched his arm. VSS. Sinus rhythm. Incontinence at times. Moving to inpatient rehab today. Continuing to monitor.

## 2020-08-15 NOTE — DISCHARGE SUMMARY
Patient Name: Avinash Everett  : 1990  MRN: [id number]    Date of Admission: 8/3/2020  Date of Discharge:  8/15/2020  Primary Care Physician: Provider, No Known      Chief Complaint:   Neuro Deficit(s)      Discharge Diagnoses     Active Hospital Problems    Diagnosis  POA   • **Nontraumatic subcortical hemorrhage of right cerebral hemisphere (CMS/HCC) [I61.0]  Yes   • Alcohol abuse [F10.10]  Yes   • Metabolic encephalopathy [G93.41]  Yes   • Vitamin K deficiency [E56.1]  Yes   • Right-sided nontraumatic intracerebral hemorrhage (CMS/HCC) [I61.9]  Yes      Resolved Hospital Problems   No resolved problems to display.        Hospital Course     Mr. Everett is a 30 y.o. male with a history of alcohol use.  Who presented to [facility] initially complaining of left-sided weakness and headache.  Please see the admitting history and physical for further details.  He was found to have large right intracerebral hemorrhage with midline shift, acute alcohol intoxication, alcohol hepatitis.  And was admitted to the hospital for further evaluation and treatment.  After CT findings neurosurgery was consulted and patient was placed on mechanical ventilation for airway protection.  Patient was emergently taken to the operating room by neurosurgical service patient when emergent right frontoparietal craniotomy for evacuation of intracerebral hemorrhage.  He did have a large approximately 6 to 7 cm right frontal temporal parietal intracerebral hemorrhage with mass-effect.  Patient was also noted to have coagulopathy with an elevated INR at 1.56 initially received 1 unit of FFP without significant change in INR going from 1.56-1.5.  Patient had an allergic reaction to FFP.  Blood bank was notified.  Patient was treated with Kcentra.  Patient underwent diagnostic cerebral angiogram there is no evidence of aneurysm, AVM, or dural AV fistula.  Patient was extubated on .  Patient was treated with intravenous  fluids, Keppra for seizure prophylaxis, well with thiamine and vitamin replacement.  Patient developed fever was felt to possibly be due to superficial thrombophlebitis he was treated empirically with Zosyn.  Was seen by physical and Occupational Therapy.  Hematology oncology was consulted patient's coagulopathy is and it was felt to be secondary to his of vitamin K deficiency.  Patient was transferred from the intensive care unit out to the medical floor on the 10th.  Patient continued physical and Occupational Therapy.  Patient was seen for admission to [facility] rehab by Dr. Diaz the 14th with plans for admission later today.  There are no new problems reported to me this morning patient's vital signs reveal a blood pressure 110/66 temperature is 98.6 heart rate 67 respiratory rate is 16.        Day of Discharge     8/15/2020    Physical Exam:  Temp:  [98.1 °F (36.7 °C)-98.6 °F (37 °C)] 98.6 °F (37 °C)  Heart Rate:  [64-75] 67  Resp:  [16-18] 16  BP: (101-120)/(58-76) 110/66  Body mass index is 24.55 kg/m².  Physical Exam  General awake alert in no acute distress  HEENT right craniotomy incision clean dry intact.  Letter is clear oropharynx is clear  Neck is supple without JVD or bruit  Lungs clear to auscultation with good air exchange  Cardiovascular regular rate and rhythm with normal S1 and S2  Abdomen is soft nontender nondistended without hepatosplenomegaly  Extremities warm well perfused without rash or edema  Neurologic: Right upper extremity and right lower extremity 5/5.  Left upper extremity 0/5, left lower extremity knee extension hip extension  Psychiatric awake alert pleasant mood  Consultants     Consult Orders (all) (From admission, onward)     Start     Ordered    08/12/20 1139  Inpatient Physical Medicine Rehab Consult  Once     Specialty:  Physical Medicine and Rehabilitation  Provider:  Prateek Diaz MD    08/12/20 1140    08/10/20 1341  Inpatient Neurology Consult General  Once      Specialty:  Neurology  Provider:  Chuckie Felipe MD    08/10/20 1341    08/10/20 1339  Inpatient Internal Medicine Consult  Once     Specialty:  Internal Medicine  Provider:  Prateek Domínguez MD    08/10/20 1339    08/08/20 1834  Inpatient Nutrition Consult  Once     Provider:  (Not yet assigned)    08/08/20 1833    08/07/20 0742  Inpatient Spiritual Care Consult  Once     Provider:  (Not yet assigned)    08/07/20 0741    08/04/20 2236  Hematology & Oncology Inpatient Consult  STAT     Specialty:  Hematology and Oncology  Provider:  Mya Tee MD    08/04/20 2236    08/03/20 2230  Inpatient Neurosurgery Consult  Once     Specialty:  Neurosurgery  Provider:  (Not yet assigned)    08/03/20 2231 08/03/20 2229  Notify Stroke Coordinator  Once     Provider:  (Not yet assigned)    08/03/20 2231    08/03/20 2229  Inpatient Rehab Admission Consult  Once     Provider:  (Not yet assigned)    08/03/20 2231    08/03/20 2229  Inpatient Case Management  Consult  Once     Provider:  (Not yet assigned)    08/03/20 2231    08/03/20 2229  Inpatient Diabetes Educator Consult  Once,   Status:  Canceled     Provider:  (Not yet assigned)    08/03/20 2231 08/03/20 2206  Pulmonology (on-call MD unless specified)  Once     Specialty:  Pulmonary Disease  Provider:  Nuno Witt MD    08/03/20 2205    08/03/20 2154  Neurosurgery (on-call MD unless specified)  Once     Specialty:  Neurosurgery  Provider:  Leobardo Rivera MD    08/03/20 2153 08/03/20 2143  Inpatient Neurology Consult Stroke  Once     Specialty:  Neurology  Provider:  Chuckie Felipe MD    08/03/20 2142    08/03/20 2143  Inpatient Neurology Consult Stroke  Once     Specialty:  Neurology  Provider:  (Not yet assigned)    08/03/20 2142              Procedures     Imaging Results (All)     Procedure Component Value Units Date/Time    XR Hip With or Without Pelvis 2 - 3 View Left [[id number]] Collected:  08/14/20 1917      Updated:  08/14/20 1921    Narrative:       XR HIP W OR WO PELVIS 2-3 VIEW LEFT-     INDICATIONS: Trauma     TECHNIQUE: Frontal view of the pelvis, 2 views of the left hip     COMPARISON: None available     FINDINGS:     No acute fracture, erosion, or dislocation is identified. The hip joint  spaces appear preserved. The pelvis is partly obscured by enteric  contrast material in the colon. Follow-up/further evaluation can be  obtained as indications persist.       Impression:          As described.           This report was finalized on 8/14/2020 7:18 PM by Dr. Rell Hernández M.D.       CT Head Without Contrast [[id number]] Collected:  08/13/20 1948     Updated:  08/13/20 2000    Narrative:       CT HEAD WO CONTRAST-     INDICATIONS: A new fall injury     TECHNIQUE: Radiation dose reduction techniques were utilized, including  automated exposure control and exposure modulation based on body size.  Noncontrast head CT     COMPARISON: 08/11/2020     FINDINGS:     Right parietal craniotomy and right frontoparietal hematoma evacuation  changes are redemonstrated. The evacuation cavity is slightly decreased,  2.8 cm, previously 3.1 cm. Several other foci of gas are seen around the  evacuation cavity and deep to the craniotomy flap. Areas of subacute  hemorrhage surrounding the evacuation cavity do not appear significantly  changed in size. For example, 4.4 cm focus of hemorrhage posterior to  the evacuation cavity on axial image 34 appears stable. Surrounding  edema is again present with mass effect, partial effacement of right  lateral ventricle, and leftward midline shift by 7 mm, stable the  ventricles otherwise appear stable. Subdural hematoma is again seen  along the left tentorium, and does not appear significantly changed.  Previous thin extension of subdural hematoma along the posterior lateral  aspect of the cerebrum is again apparent.                   Impression:          Evolving postsurgical changes  and residual areas of intracranial  hemorrhage, without interval increase in hemorrhage or new areas of  hemorrhage identified.     This report was finalized on 8/13/2020 7:57 PM by Dr. Rell Hernández M.D.       FL Video Swallow With Speech Single Contrast [[id number]] Collected:  08/13/20 1256     Updated:  08/13/20 1300    Narrative:       VIDEO SWALLOWING EXAMINATION BY SPEECH PATHOLOGY     Clinical: Dysphasia     Video swallowing examination performed under the direction of speech  pathology. Imaging reviewed by radiologist who concurs with the  findings.     Speech pathology summary:Pt exhibited mild-moderate oropharyngeal  dysphagia. Pt exhibited trace, transient penetration with thins by cup,  thins by straw and mixed. No aspiration observed with any consistency.  No cough response observed to transient penetration. Esophageal  dysmotility observed with puree. Stasis cleared with liquid wash of  thins     FLUOROSCOPY TIME: 2 minutes 59 seconds, 5084 images.     This report was finalized on 8/13/2020 12:57 PM by Dr. Haim Smith M.D.       US Liver [[id number]] Collected:  08/12/20 1912     Updated:  08/12/20 1920    Narrative:       US LIVER-     Clinical: Elevated liver enzymes     FINDINGS: The pancreas is satisfactory in size shape and echotexture.     The liver echotexture appears slightly greater than anticipated  suggesting the possibility of fatty infiltration. There is a vague  masslike area within the right lobe which is overall hyperechoic to the  surrounding parenchyma, measuring 2.9 x 2.4 x 2.8 cm. This may simply  represent a hepatic hemangioma. I would recommend dedicated liver CT as  follow-up.     No biliary duct dilatation CBD is 3 mm. There is sludge demonstrated  along the dependent border of the gallbladder. No pericholecystic fluid.     The right kidney is 13.7 cm in length and normal in appearance. No free  fluid within the right upper quadrant.     CONCLUSION:  1. Gallbladder  sludge.  2. Possible mild diffuse fatty infiltration of the liver.  3. Vague hyperechoic nodular area within the right lobe of the liver,  recommend dedicated liver CT as follow-up.     This report was finalized on 8/12/2020 7:17 PM by Dr. Haim Smith M.D.       CT Head Without Contrast [[id number]] Collected:  08/11/20 0933     Updated:  08/11/20 1002    Narrative:       CT SCAN OF THE HEAD WITHOUT CONTRAST     CLINICAL HISTORY: Syncope and fainting.     TECHNIQUE: CT scan of the head was obtained with 3 mm axial images. No  intravenous contrast was administered.     COMPARISON: Comparison is made to previous CT scan of head dated  08/09/2020.     FINDINGS:     The patient is status post a right lateral parietal craniotomy for the  purposes of evacuation of an intraparenchymal hematoma involving the  lateral aspect of the right frontal and parietal lobes. An evacuation  cavity measures up to approximately 3.1 x 2.0 cm in greatest axial  dimensions on the current exam. There are surrounding areas of subacute  hemorrhage which demonstrate no significant interval change when  compared to the previous examination. The focus of hemorrhage posterior  to the evacuation cavity again measures up to 4.4 x 1.8 cm in greatest  axial dimensions. This is the largest of these areas of hemorrhage.  Surrounding areas of hypodensity are noted within the right frontal and  parietal lobes compatible with vasogenic and to lesser extent cytotoxic  edema and these findings are also very similar in appearance compared to  the prior study. There is mass effect with midline shift to the left by  approximately 7 mm. The degree of mass effect and midline shift is  stable to slightly improved. There is a thin subdural hematoma layering  along the left tentorium cerebelli measuring up to 3-4 mm in diameter  which demonstrates no interval detrimental change. This subdural  hematoma is contiguous with a similar thin component along the  posterior  aspect of left parietal and occipital lobes, as well as lateral to the  left temporal and occipital lobes. An extra-axial and extradural  collection deep to the craniotomy flap measuring up to 9 mm in diameter  consisting of air and higher density fluid components also demonstrates  no interval detrimental change.       Impression:          Stable findings when compared to the previous CT scan of the head dated  08/09/2020. Again noted are postsurgical changes from a right lateral  parietal craniotomy for the purposes of evacuation of an  intraparenchymal hematoma within the lateral aspects of the right  frontal and parietal lobes. The areas of subacute hemorrhage  circumscribing the evacuation cavity demonstrate no interval change.  Also, there is no convincing interval detrimental change with regards to  the surrounding areas of hypodensity within the right frontal and  parietal lobes compatible with a combination of vasogenic and to a  lesser extent cytotoxic edema. Overall, the degree of mass effect with  midline shift to the left by approximately 7 mm is stable to slightly  improved.     Again noted are relatively thin subdural hematomas layering along the  left tentorium cerebelli and extending along the undersurface of the  left occipital lobe, as well as posterior and lateral to the left  parietal and occipital lobes and lateral to the left temporal and  occipital lobes. These demonstrate no interval detrimental change. An  extradural collection deep to the craniotomy flap also demonstrates no  significant interval change.     Radiation dose reduction techniques were utilized, including automated  exposure control and exposure modulation based on body size.     This report was finalized on 8/11/2020 9:58 AM by Dr. Michael Richmond M.D.       XR Abdomen KUB [[id number]] Collected:  08/11/20 0039     Updated:  08/11/20 0042    Narrative:       KUJUDIT     HISTORY: Feeding tube placement     COMPARISON:  08/10/2020     FINDINGS:  Weighted enteric feeding tube extends into the body of the stomach.  Visualized bowel gas pattern is unremarkable.     This report was finalized on 8/11/2020 12:39 AM by Dr. Annelise Long M.D.       XR Abdomen KUB [[id number]] Collected:  08/10/20 0549     Updated:  08/10/20 0553    Narrative:       KUB     HISTORY: Nasogastric tube placement     COMPARISON: 08/07/2020     FINDINGS:  Weighted enteric feeding tube has been removed, and nasogastric tube has  been placed. This terminates at the GE junction. I would suggest  advancement by at least 8 to 10 cm. Bowel gas pattern is unremarkable.     This report was finalized on 8/10/2020 5:50 AM by Dr. Annelise Long M.D.       XR Abdomen KUB [[id number]] Collected:  08/10/20 0442     Updated:  08/10/20 0446    Narrative:       KUB     HISTORY: Feeding tube placement     COMPARISON: None available.     FINDINGS:  Weighted enteric feeding tube appears to extend into the proximal body  of the stomach. Visualized bowel gas pattern is not frankly obstructive.     This report was finalized on 8/10/2020 4:43 AM by Dr. Annelise Long M.D.       CT Head Without Contrast [[id number]] Collected:  08/09/20 1039     Updated:  08/09/20 1057    Narrative:       CT SCAN OF THE BRAIN WITHOUT CONTRAST AT 10:18 AM     HISTORY: Surgical evacuation of right-sided intraparenchymal hemorrhage.  Headache.     FINDINGS: The CT scan was performed through the brain without contrast  and compared to the previous postoperative head CT scan performed 4 days  ago on 08/05/2020 and previous postsurgical MRI scan of the brain  performed 2 days ago on 08/07/2020. There is a large evaluation cavity  in the right frontal temporal region and extending into the parietal  region with low density packing material combined with surrounding mixed  density blood and blood products and vasogenic edema that involves an  area measuring up to 7.6 cm in AP diameter, 5.4 cm in  transverse  diameter, and 6.9 cm in height. This shows no significant change from  the MRI scan performed 2 days ago. However there is slight worsening of  mass effect with midline shift to the left measuring 8 mm compared to 5  mm on the MRI scan.     There is a small subdural hematoma extending along the left tentorium  and along the undersurface of the left temporal lobe that appears  unchanged. No new abnormality is seen.                 Radiation dose reduction techniques were utilized, including automated  exposure control and exposure modulation based on body size.     This report was finalized on 8/9/2020 10:54 AM by Dr. Peña Flynn M.D.       XR Chest 1 View [[id number]] Collected:  08/08/20 1859     Updated:  08/08/20 1904    Narrative:       Portable chest radiograph     HISTORY:Dobbhoff tube placement     TECHNIQUE: Single AP portable radiograph of the chest     COMPARISON:Chest radiograph 520       Impression:       FINDINGS AND IMPRESSION:  Orogastric tube courses in the stomach and the tip is collimated out of  the field-of-view.     The lungs are hypoinflated, as before. There is worsening bilateral  pulmonary opacification, primarily within the left base, concerning for  multifocal pneumonia versus worsening pulmonary edema in the appropriate  pedicle context and correlation with patient history is recommended. No  pneumothorax or pleural effusion is seen. Heart size accentuated by low  lung volumes.     This report was finalized on 8/8/2020 7:01 PM by Dr. Emiliano Flores M.D.       MRI Brain With & Without Contrast [[id number]] Collected:  08/07/20 1532     Updated:  08/07/20 1554    Narrative:       MRI OF THE BRAIN WITH AND WITHOUT CONTRAST     CLINICAL HISTORY: Status post evacuation of intraparenchymal hemorrhage.     TECHNIQUE: MRI of the brain was obtained with sagittal T1, axial pre and  postgadolinium T1, coronal postgadolinium T1, axial FLAIR, axial T2,  axial diffusion, and axial  susceptibility weighted images.     FINDINGS:     The patient is status post a right lateral parietal craniotomy for the  purposes of evacuation of an intraparenchymal hematoma involving the  lateral aspect the right frontal and parietal lobes. The evacuation  cavity measures up to approximately 6.3 x 3.5 cm in greatest axial  dimensions and is remarkable for central areas of susceptibility  artifact compatible with a combination of air, packing material, and  blood products. Again, this evacuation cavity traverses the right pre  and postcentral gyri. Along the peripheral margins of the evacuation  cavity, there are areas of T2 hypointense signal that measured maximum  approximately 1.6 x 0.9 cm in greatest axial dimensions compatible with  blood degradation products. Additionally, there are areas of hemorrhage  within the right frontal and parietal lobes or distant from the  evacuation cavity that measure up to approximately 3.0 x 1.3 cm  anteriorly and approximately 3.6 x 2.0 cm more posteriorly. It is  difficult to compare the hemorrhages to the prior head CT although the  mass effect with midline shift to the left does appear more prominent.  On the current study, there is approximately 5 mm of midline shift to  left as compared approximately 2 to 3 mm of midline shift on the prior  examination.     Note is made of subdural hemorrhage along the undersurface of the left  temporal and occipital lobes layering along the left tentorial leaf and  extending as anterior as the anterior aspect of the left middle cranial  fossa. This subdural hematoma also wraps around the posterior and  lateral aspect of left occipital lobe and to a lesser extent lateral  aspect of the left temporal lobe. This subdural hematoma measures up to  approximately 2 to 3 mm in diameter and there is no convincing interval  detrimental change when compared to the prior exam.     There is no convincing evidence to suggest an underlying area  of  abnormal contrast enhancement. There are relatively thin areas of  diffusion-weighted hyperintensity along the margins of the evacuation  cavity compatible with some cytotoxic edema adjacent to the evacuation  cavity. The major intracranial flow related signal voids are within  normal limits.     Incidental note is made of a tiny 3 mm focus of diffusion-weighted  hyperintensity within the white matter of the left frontal lobe  suggesting a tiny acute infarct.     Incidental note is made of bilateral mastoid effusions, right larger  than left.       Impression:          The patient is status post a right lateral parietal craniotomy for the  purposes of evacuation of an intraparenchymal hematoma involving the  lateral aspect of the right frontal and parietal lobes. This evacuation  cavity measures up to 6.3 x 3.5 cm in greatest axial dimensions and is  remarkable for areas of susceptibility artifact consistent with foci of  air, packing material, and blood products. Again, this evacuation cavity  traverses the right pre and postcentral gyri. Additionally, there are  some small areas of hemorrhage along the periphery of the evacuation  cavity as well as more discontiguous foci of hemorrhage within the right  frontal and parietal lobes. Surrounding vasogenic edema is seen.  Overall, it difficult to compare the extent of hemorrhage and edema to  the prior head CT, and given differences in modalities although there  does appear to be a greater degree of mass effect on the current exam  with midline shift to the left by approximately 5 mm on the current  study as compared to 2 to 3 mm on the prior exam. There is no convincing  evidence to suggest an underlying tumor on this examination although I  would recommend a delayed follow-up MRI of the brain for further  assessment.      Note is made of a subdural hematoma along the undersurface of the left  temporal and occipital lobes extending along the posterior and  lateral  margins of the left occipital lobe as well as to a lesser extent the  lateral aspect of the left temporal lobe.     Incidental is note is made of a 3 mm focus of restricted diffusion  within the left frontal lobe white matter compatible with an incidental  tiny acute infarct.     This report was finalized on 8/7/2020 3:50 PM by Dr. Michael Richmond M.D.       Arteriogram (Autofinalize) [[id number]] Resulted:  08/05/20 2038     Updated:  08/05/20 2038    Narrative:       This procedure was auto-finalized with no dictation required.    CT Head Without Contrast [[id number]] Collected:  08/05/20 0954     Updated:  08/05/20 1014    Narrative:       CT SCAN HEAD WITHOUT CONTRAST     CLINICAL HISTORY: Follow-up intracranial hemorrhage.     TECHNIQUE: CT scan of the head was obtained with 3 mm axial images. No  intravenous contrast was administered.     COMPARISON: Comparison is made to previous head CT dated 08/04/2020.     FINDINGS:     The patient is status post a right lateral parietal craniotomy for the  purposes of evacuation of an intraparenchymal hematoma involving the  lateral aspect of the right frontal and parietal lobes. The evacuation  cavity is again seen to traverse the expected locations of the right pre  and postcentral gyri. A thin rim of hemorrhage circumscribes the  evacuation cavity and the areas of hemorrhage immediately abutting the  evacuation cavity demonstrate no convincing interval change in size.  However, there are some discontiguous foci of hemorrhage appreciated  within the right posterior frontal and right parietal lobe that do  demonstrate slight interval increase in size.     Of note, previously identified focus of intraparenchymal hemorrhage  within the right frontal lobe measuring up to 2.8 x 1.1 cm in greatest  axial dimensions on prior exam now measures up to 3.1 x 1.1 cm in  greatest axial dimensions. The previously noted focus of acute  intraparenchymal hemorrhage within the right  superior parietal lobule  measuring up to 1.3 x 1.3 cm in greatest axial dimensions now measures  up to 1.7 x 1.5 cm in greatest axial dimensions. A thin acute subdural  hematoma lateral to the left frontal and parietal lobes measuring up to  3-4 mm in maximal diameter demonstrates no interval detrimental change.  However, there is a new thin subdural hematoma appreciated along the  left aspect of the anterior falx cerebri medial to left frontal lobe  measuring up to approximately 2 mm in diameter and a new area of  subdural hematoma is seen layering along the left tentorial leaf  measuring up to approximately 2-3 mm in diameter. This acute subdural  hematoma seen along the medial aspects of the left temporal and  occipital lobes extending to the level of the anterior aspect of the  left middle cranial fossa. There is mass effect with ventricular and  sulcal effacement. Additionally, there is midline shift to the left by  approximately 2-3 mm. Overall, the mass effect is slightly more  prominent when compared to the prior study.       Impression:          The patient is status post a right lateral parietal craniotomy for the  purposes of evacuation of an intraparenchymal hematoma involving the  lateral aspect of the right frontal and parietal lobes. Again, the  evacuation cavity is seen to traverse the expected location of the right  pre and post central gyri. A thin rim of hemorrhage circumscribes the  resection cavity and the areas of hemorrhage immediately abutting the  evacuation cavity demonstrate no convincing interval change in size.  However, more discontiguous foci of hemorrhage within the right frontal  and parietal lobe do demonstrate slight interval increase in size as  discussed in detail above. The previously identified acute subdural  hemorrhage lateral to the left frontal and parietal lobes demonstrates  no convincing interval change. However, there is a new area of subdural  hematoma seen along the  left aspect of the falx cerebri medial to left  frontal lobe and a new area of subdural hemorrhage is noted medial to  the left temporal and occipital lobes as well as layering along the left  tentorial leaf. Overall, there is slight increase in the extent of mass  effect. On the current exam demonstrates approximately 2-3 mm midline  shift to the left.     These findings were discussed with Dr. Tee on the morning of  08/05/2020 at approximately 9:15 AM.     Radiation dose reduction techniques were utilized, including automated  exposure control and exposure modulation based on body size.     This report was finalized on 8/5/2020 10:11 AM by Dr. Michael Richmond M.D.       XR Chest 1 View [[id number]] Collected:  08/05/20 0920     Updated:  08/05/20 0925    Narrative:       ONE VIEW PORTABLE CHEST AT 8:52 AM     HISTORY: Shortness of breath.     FINDINGS: The lungs remain moderately expanded with some minimal vague  vascular crowding and atelectasis at the left base similar to  yesterday's exam. The heart remains mildly enlarged. An ET tube ends in  good position approximately 3.4 cm above the uzair.     This report was finalized on 8/5/2020 9:22 AM by Dr. Peña Flynn M.D.       CT Angiogram Head [[id number]] Collected:  08/04/20 1349     Updated:  08/05/20 0602    Narrative:       CT ANGIOGRAM OF THE HEAD AND NECK WITH CONTRAST     CLINICAL HISTORY: Intracranial hemorrhage.     TECHNIQUE: CT angiogram of the head and neck was obtained with 1 mm  axial images following administration of IV contrast. Sagittal, coronal,  and 3-dimensional reconstructed images were obtained. Additionally, a CT  scan of the head was obtained with 3 mm axial images before and after  the administration of IV contrast.     COMPARISON: Comparison is made to previous postoperative head CT from  earlier today timed at approximately 3:44 AM.     FINDINGS:     CTA HEAD: The vertebral arteries, basilar artery, and posterior  cerebral  arteries are within normal limits. The internal carotid arteries, middle  cerebral arteries, and anterior cerebrals are within normal limits.  There is no convincing asymmetric vasculature to suggest an AVM although  a fistulous abnormality cannot be definitively excluded on the basis of  a CTA.     The patient is status post a right lateral parietal craniotomy for the  purposes of evacuation of an intraparenchymal hematoma involving the  lateral aspect of the right frontal and parietal lobes. There is an  evacuation cavity seen within the lateral aspect of the right frontal  and parietal lobes that traverses the expected locations of the right  pre and postcentral gyri. The evacuation cavity measures up to  approximately 5.9 x 2.8 cm in greatest parasagittal dimensions and  approximately 4 cm in greatest transverse dimensions. There is a thin  rim of circumscribing hemorrhage which is stable when compared to the  prior study. This hemorrhage measures up to approximately 11 x 18 mm in  maximal axial dimensions circumscribing the evacuation cavity. More  discontiguous foci of hemorrhage are noted within the right frontal and  parietal lobes with the largest such area measuring up to 2.8 x 1.1 cm  in greatest axial dimensions seen within the posterior and superior  aspect of the right frontal lobe. All of these areas of hemorrhage  demonstrate no convincing interval change when compared to the prior  head CT performed earlier today. There is a thin acute subdural hematoma  appreciated lateral to the left frontal and parietal lobes which  measures up to approximately 3-4 mm in diameter. Again, this finding  demonstrates no significant interval change. Postoperative  pneumocephalus is seen and postoperative changes are identified within  the overlying scalp. There is mild mass effect with sulcal and  ventricular effacement that is not significantly changed when compared  to the postoperative study performed  earlier today.     CTA NECK: The aortic arch has a classic configuration. No significant  NASCET stenosis is identified within either common carotid artery  bifurcation or proximal internal carotid. The vertebral arteries are  unremarkable in appearance.       Impression:          The patient is status post a right lateral parietal craniotomy for the  purposes of evacuation of an intraparenchymal hematoma involving the  lateral aspect of the right frontal and parietal lobes. An evacuation  cavity within the lateral aspect of the right frontal and parietal lobes  traverses the expected locations of the right pre and postcentral gyri.  Again, the evacuation cavity measures up to 5.9 x 2.8 x 4.0 cm in  greatest dimensions and there is a thin rim of circumscribing hemorrhage  which is stable when compared to the prior study. More discontiguous  foci of hemorrhage within the right frontal and parietal lobes are also  stable when compared to the head CT performed earlier today. A thin  acute subdural hematoma lateral to the left frontal and parietal lobes  measures 3-4 mm in diameter and is also stable.     Otherwise, the CT angiogram of the head and neck is unremarkable. No  asymmetric vasculature is appreciated to suggest an underlying AVM at  the site of the hemorrhage although an AVM cannot be excluded on the  basis of this study and would necessitate a catheter-based arteriogram  for definitive assessment.     These findings were discussed with Margarita Fuentes are 08/04/2020 at  approximately 12:41 PM.     Radiation dose reduction techniques were utilized, including automated  exposure control and exposure modulation based on body size.     This report was finalized on 8/5/2020 5:58 AM by Dr. Michael Richmond M.D.       CT Angiogram Neck [[id number]] Collected:  08/04/20 1349     Updated:  08/05/20 0602    Narrative:       CT ANGIOGRAM OF THE HEAD AND NECK WITH CONTRAST     CLINICAL HISTORY: Intracranial hemorrhage.      TECHNIQUE: CT angiogram of the head and neck was obtained with 1 mm  axial images following administration of IV contrast. Sagittal, coronal,  and 3-dimensional reconstructed images were obtained. Additionally, a CT  scan of the head was obtained with 3 mm axial images before and after  the administration of IV contrast.     COMPARISON: Comparison is made to previous postoperative head CT from  earlier today timed at approximately 3:44 AM.     FINDINGS:     CTA HEAD: The vertebral arteries, basilar artery, and posterior cerebral  arteries are within normal limits. The internal carotid arteries, middle  cerebral arteries, and anterior cerebrals are within normal limits.  There is no convincing asymmetric vasculature to suggest an AVM although  a fistulous abnormality cannot be definitively excluded on the basis of  a CTA.     The patient is status post a right lateral parietal craniotomy for the  purposes of evacuation of an intraparenchymal hematoma involving the  lateral aspect of the right frontal and parietal lobes. There is an  evacuation cavity seen within the lateral aspect of the right frontal  and parietal lobes that traverses the expected locations of the right  pre and postcentral gyri. The evacuation cavity measures up to  approximately 5.9 x 2.8 cm in greatest parasagittal dimensions and  approximately 4 cm in greatest transverse dimensions. There is a thin  rim of circumscribing hemorrhage which is stable when compared to the  prior study. This hemorrhage measures up to approximately 11 x 18 mm in  maximal axial dimensions circumscribing the evacuation cavity. More  discontiguous foci of hemorrhage are noted within the right frontal and  parietal lobes with the largest such area measuring up to 2.8 x 1.1 cm  in greatest axial dimensions seen within the posterior and superior  aspect of the right frontal lobe. All of these areas of hemorrhage  demonstrate no convincing interval change when compared to  the prior  head CT performed earlier today. There is a thin acute subdural hematoma  appreciated lateral to the left frontal and parietal lobes which  measures up to approximately 3-4 mm in diameter. Again, this finding  demonstrates no significant interval change. Postoperative  pneumocephalus is seen and postoperative changes are identified within  the overlying scalp. There is mild mass effect with sulcal and  ventricular effacement that is not significantly changed when compared  to the postoperative study performed earlier today.     CTA NECK: The aortic arch has a classic configuration. No significant  NASCET stenosis is identified within either common carotid artery  bifurcation or proximal internal carotid. The vertebral arteries are  unremarkable in appearance.       Impression:          The patient is status post a right lateral parietal craniotomy for the  purposes of evacuation of an intraparenchymal hematoma involving the  lateral aspect of the right frontal and parietal lobes. An evacuation  cavity within the lateral aspect of the right frontal and parietal lobes  traverses the expected locations of the right pre and postcentral gyri.  Again, the evacuation cavity measures up to 5.9 x 2.8 x 4.0 cm in  greatest dimensions and there is a thin rim of circumscribing hemorrhage  which is stable when compared to the prior study. More discontiguous  foci of hemorrhage within the right frontal and parietal lobes are also  stable when compared to the head CT performed earlier today. A thin  acute subdural hematoma lateral to the left frontal and parietal lobes  measures 3-4 mm in diameter and is also stable.     Otherwise, the CT angiogram of the head and neck is unremarkable. No  asymmetric vasculature is appreciated to suggest an underlying AVM at  the site of the hemorrhage although an AVM cannot be excluded on the  basis of this study and would necessitate a catheter-based arteriogram  for definitive  assessment.     These findings were discussed with Margarita Fuentes are 08/04/2020 at  approximately 12:41 PM.     Radiation dose reduction techniques were utilized, including automated  exposure control and exposure modulation based on body size.     This report was finalized on 8/5/2020 5:58 AM by Dr. Michael Richmond M.D.       XR Chest 1 View [[id number]] Collected:  08/04/20 1513     Updated:  08/04/20 1518    Narrative:       ONE VIEW PORTABLE CHEST AT 2:51 PM     HISTORY: Hypoxia.     FINDINGS: The lungs are moderately expanded with some minimal vague  atelectasis at the left base that is new since a study of 13 hours ago.  The heart remains borderline enlarged. An ET tube ends in good position  approximately 4 cm above the uzair.     This report was finalized on 8/4/2020 3:15 PM by Dr. Peña Flynn M.D.       CT Head Without Contrast [[id number]] Collected:  08/04/20 0414     Updated:  08/04/20 0421    Narrative:       CT HEAD WO CONTRAST-     CLINICAL HISTORY: Postop evacuation of intracerebral hematoma.     TECHNIQUE: Transverse 3 mm thick images were acquired from the base of  the skull to the vertex without IV contrast.     Radiation dose reduction techniques were utilized, including automated  exposure control and exposure modulation based on body size.     COMPARISON: Preop CT scan of the head dated 08/03/2020.     FINDINGS: There is a closed craniotomy defect in the right parietal bone  that is new since the preceding CT scan. The large intracerebral  hematoma within the right cerebral hemisphere has been completely  evacuated. Only minimal residual hemorrhage persists along the periphery  of the resection cavity. No new hemorrhage is identified. Mass effect  has diminished significantly. There is only very minimal residual  right-to-left shift of the midline structures.     This report was finalized on 8/4/2020 4:18 AM by Dr. Norbert Mcgee M.D.       XR Chest 1 View [[id number]] Collected:  08/04/20 0233      Updated:  08/04/20 0237    Narrative:       PORTABLE CHEST 08/04/2020 AT 0148 HOURS     CLINICAL HISTORY: Evaluate ET tube placement.     Compared to the previous chest dated 08/03/2020.     In the interval, an endotracheal tube has been inserted and appears in  satisfactory position with its tip a few centimeters above the uzair.  The lungs are fairly well-expanded and appear free of infiltrates. There  are no pleural effusions. The heart is normal in size.     This report was finalized on 8/4/2020 2:33 AM by Dr. Norbert Mcgee M.D.       XR Chest 1 View [[id number]] Collected:  08/03/20 2220     Updated:  08/03/20 2224    Narrative:       PORTABLE CHEST 08/03/2020 AT 9:57 PM     CLINICAL HISTORY: Acute CVA. Rule out aspiration.     The lungs are somewhat poorly inflated but appear free of acute  infiltrates. There are no pleural effusions. The cardiomediastinal  silhouette is unremarkable     IMPRESSIONS: No evidence of acute disease within the chest.     This report was finalized on 8/3/2020 10:21 PM by Dr. Norbert Mcgee M.D.       CT Head Without Contrast Stroke Protocol [[id number]] Collected:  08/03/20 2204     Updated:  08/03/20 2212    Narrative:       CT HEAD WO CONTRAST STROKE PROTOCOL-     CLINICAL HISTORY: CVA. Left-sided weakness.     TECHNIQUE: Transverse 3 mm thick images were acquired from the base of  the skull to vertex without IV contrast.     Radiation dose reduction techniques were utilized, including automated  exposure control and exposure modulation based on body size.     COMPARISON: None     FINDINGS: There is a large area of acute intracerebral hemorrhage within  the posterior aspect of the right frontal lobe extending into the right  parietal lobe that measures approximately 9.6 cm in greatest AP  dimension and 4.9 cm in greatest mediolateral dimension no subarachnoid  or intraventricular hemorrhage is identified. There is mild edema  surrounding the area of hemorrhage. There is  moderate mass effect with  right-to-left shift of midline structures by 1.2 cm. Effacement of  cortical sulci throughout the right cerebral hemisphere is also present.  There is no evidence of acute infarct.       Impression:       Large area of acute intracerebral hemorrhage in the right  cerebral hemisphere as described with moderate associated mass effect.  Otherwise unremarkable CT scan of the head.     Findings were communicated to the stroke neurologist 08/03/2020 at 9:56  PM     This report was finalized on 8/3/2020 10:09 PM by Dr. Norbert Mcgee M.D.             Pertinent Labs     Results from last 7 days   Lab Units 08/14/20  0510 08/12/20  0523 08/11/20  0440 08/10/20  0431   WBC 10*3/mm3 9.37 7.61 10.37 6.49   HEMOGLOBIN g/dL 12.2* 11.7* 11.8* 12.2*   PLATELETS 10*3/mm3 196 226 231 196     Results from last 7 days   Lab Units 08/14/20 0510 08/13/20 0550 08/12/20 0523 08/11/20 0440   SODIUM mmol/L 136 134* 136 138   POTASSIUM mmol/L 3.6 3.6 3.8 3.5   CHLORIDE mmol/L 105 102 103 105   CO2 mmol/L 22.0 22.5 23.0 20.9*   BUN mg/dL 8 13 16 11   CREATININE mg/dL 0.45* 0.50* 0.46* 0.43*   GLUCOSE mg/dL 132* 142* 129* 179*   Estimated Creatinine Clearance: 286.5 mL/min (A) (by C-G formula based on SCr of 0.45 mg/dL (L)).  Results from last 7 days   Lab Units 08/14/20 0510 08/13/20 0550 08/12/20 0523 08/11/20  0440   ALBUMIN g/dL 3.80 3.70 3.60 3.70   BILIRUBIN mg/dL 2.8* 2.8* 3.1* 3.1*   ALK PHOS U/L 99 93 93 101   AST (SGOT) U/L 123* 129* 82* 49*   ALT (SGPT) U/L 132* 106* 59* 35     Results from last 7 days   Lab Units 08/14/20 0510 08/13/20  0550 08/12/20 0523 08/11/20  0440  08/09/20  0656 08/08/20  1005   CALCIUM mg/dL 9.3 9.2 9.5 10.0   < > 8.6 8.2*   ALBUMIN g/dL 3.80 3.70 3.60 3.70   < > 3.20* 3.20*   MAGNESIUM mg/dL  --   --   --   --   --  1.6  --    PHOSPHORUS mg/dL  --   --   --   --   --  2.9 1.5*    < > = values in this interval not displayed.     Results from last 7 days   Lab Units  08/14/20  0510 08/13/20  0550   AMMONIA umol/L 61* 70*             Invalid input(s): LDLCALC        Test Results Pending at Discharge       Discharge Details        Discharge Medications      Patient Not Prescribed Medications Upon Discharge         Allergies   Allergen Reactions   • Citrus Hives     Hives in mouth     • Promethazine Hives         Discharge Disposition:  Rehab Facility or Unit (D[facility] - [facility])    Discharge Diet:  Diet Order   Procedures   • Diet Dysphagia; IV - Mechanical Soft No Mixed Consistencies; Thin; No Straws       Discharge Activity:       CODE STATUS:    Code Status and Medical Interventions:   Ordered at: 08/03/20 2231     Code Status:    CPR     Medical Interventions (Level of Support Prior to Arrest):    Full       No future appointments.  Follow-up Information     Provider, No Known .    Contact information:  [facility] [id number] [phone]                   Time Spent on Discharge:  Greater than 30 minutes      Zay Jean MD  [facility]  08/15/20  7:29 AM

## 2020-08-15 NOTE — PLAN OF CARE
"  Problem: Patient Care Overview  Goal: Plan of Care Review  Outcome: Ongoing (interventions implemented as appropriate)  Flowsheets (Taken 8/15/2020 1044)  Progress: improving  Plan of Care Reviewed With: patient  Outcome Summary: Pt tolerated treatment well this date. No reports of pain, though did state that his L LE felt more \"sore and tired\" today. Static sitting balance continues to improve, as pt was mostly able to hold self up w/ only occasional min A and frequent cues for posture. Pt seemed to do well maintaining good posture until new task was added, then would slouch and require more cues. Required min-mod A for bed moblity, then min-mod A x2 to stand 3x. After cues given for posture while standing, attempted weight shifting to each side. L LE would buckle unless knee was blocked. Cues were given to \"tighten knee\", though pt still very weak. After 3 stands, pt able to stand-pivot to chair w/ mod A x2, though good focus on technique for maximal control. Overall, pt making good progress towards goals and will be discharged to [facility] rehab today! Patient was intermittently wearing a face mask during this therapy encounter. Therapist used appropriate personal protective equipment including eye protection, mask, and gloves.  Mask used was standard procedure mask. Appropriate PPE was worn during the entire therapy session. Hand hygiene was completed before and after therapy session. Patient is not in enhanced droplet precautions. 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     "

## 2020-08-15 NOTE — THERAPY TREATMENT NOTE
Patient Name: Avinash Everett  : 1990    MRN: [id number]                              Today's Date: 8/15/2020       Admit Date: 8/3/2020    Visit Dx:     ICD-10-CM ICD-9-CM   1. Right-sided nontraumatic intracerebral hemorrhage, unspecified cerebral location (CMS/HCC) I61.9 431   2. Subdural hematoma (CMS/HCC) S06.5X9A 432.1   3. Nontraumatic subcortical hemorrhage of right cerebral hemisphere (CMS/HCC) I61.0 431     Patient Active Problem List   Diagnosis   • Right-sided nontraumatic intracerebral hemorrhage (CMS/HCC)   • Nontraumatic subcortical hemorrhage of right cerebral hemisphere (CMS/HCC)   • Alcohol abuse   • Metabolic encephalopathy   • Vitamin K deficiency     History reviewed. No pertinent past medical history.  History reviewed. No pertinent surgical history.  General Information     [facility] Name 08/15/20 1028          PT Evaluation Time/Intention    Document Type  therapy note (daily note)  -     Mode of Treatment  physical therapy  -[facility] Name 08/15/20 1028          General Information    Existing Precautions/Restrictions  fall  -SM     Row Name 08/15/20 1028          Cognitive Assessment/Intervention- PT/OT    Orientation Status (Cognition)  oriented x 3  -       User Key  (r) = Recorded By, (t) = Taken By, (c) = Cosigned By    Initials Name Provider Type     Lissa Finn PTA Physical Therapy Assistant        Mobility     Row Name 08/15/20 1028          Bed Mobility Assessment/Treatment    Bed Mobility Assessment/Treatment  supine-sit  -     Supine-Sit [place] (Bed Mobility)  minimum assist (75% patient effort);moderate assist (50% patient effort)  -     Assistive Device (Bed Mobility)  bed rails;head of bed elevated  -     Comment (Bed Mobility)  pt able to hook L LE w/ R by himself, needed assist for upper body to sit up  -     Row Name 08/15/20 1028          Transfer Assessment/Treatment    Comment (Transfers)  stood 3x, then stand-pivot transfer to chair  -      Row Name 08/15/20 1028          Bed-Chair Transfer    Bed-Chair [place] (Transfers)  moderate assist (50% patient effort);2 person assist  -     Assistive Device (Bed-Chair Transfers)  -- supported L UE while pt used chair arm rests w/ R UE  -[facility] Name 08/15/20 1028          Sit-Stand Transfer    Sit-Stand [place] (Transfers)  minimum assist (75% patient effort);moderate assist (50% patient effort);2 person assist  -     Assistive Device (Sit-Stand Transfers)  -- HHA, L UE supported  -[facility] Name 08/15/20 1028          Gait/Stairs Assessment/Training    Comment (Gait/Stairs)  not attempted d/t poor balance and knee buckling when weight shifting on L LE  -SM       User Key  (r) = Recorded By, (t) = Taken By, (c) = Cosigned By    Initials Name Provider Type    Lissa Arriola, PTA Physical Therapy Assistant        Obj/Interventions     [facility] Name 08/15/20 1033          Therapeutic Exercise    Lower Extremity (Therapeutic Exercise)  gluteal sets;LAQ (long arc quad), bilateral;quad sets, bilateral  -     Lower Extremity Range of Motion (Therapeutic Exercise)  hip abduction/adduction, bilateral;ankle dorsiflexion/plantar flexion, bilateral  -     Exercise Type (Therapeutic Exercise)  AROM (active range of motion);AAROM (active assistive range of motion)  -     Position (Therapeutic Exercise)  seated;supine  -SM     Sets/Reps (Therapeutic Exercise)  5-10 reps  -     Comment (Therapeutic Exercise)  L LE more sore today, unable to do as many reps w/ L  -SM     [facility] Name 08/15/20 1033          Static Sitting Balance    Level of [place] (Unsupported Sitting, Static Balance)  standby assist;minimal assist, 75% patient effort  -     Sitting Position (Unsupported Sitting, Static Balance)  sitting on edge of bed  -     Time Able to Maintain Position (Unsupported Sitting, Static Balance)  more than 5 minutes  -     Comment (Unsupported Sitting, Static Balance)  occasional cues for  posture, though better success; better success when focused on just sitting posture, though slouches once beginning a new task  -SM     Row Name 08/15/20 1033          Dynamic Sitting Balance    Level of [place], Reaches Outside Midline (Sitting, Dynamic Balance)  minimal assist, 75% patient effort;moderate assist, 50 to 74% patient effort  -     Sitting Position, Reaches Outside Midline (Sitting, Dynamic Balance)  sitting on edge of bed  -     Comment, Reaches Outside Midline (Sitting, Dynamic Balance)  while performing exercises  -SM     Row Name 08/15/20 1033          Static Standing Balance    Level of [place] (Supported Standing, Static Balance)  minimal assist, 75% patient effort;moderate assist, 50 to 74% patient effort;2 person assist  -     Time Able to Maintain Position (Supported Standing, Static Balance)  30 to 45 seconds each stand (completed 3x)  -     Assistive Device Utilized (Supported Standing, Static Balance)  -- HHA, L UE supported  -     Comment (Supported Standing, Static Balance)  cues for posture; then practiced weight shifting on each side, though L knee wanting to buckled if not blocked  -       User Key  (r) = Recorded By, (t) = Taken By, (c) = Cosigned By    Initials Name Provider Type    Lissa Arriola, PTA Physical Therapy Assistant        Goals/Plan    No documentation.       Clinical Impression     Row Name 08/15/20 1039          Pain Assessment    Additional Documentation  Pain Scale: Numbers Pre/Post-Treatment (Group)  -SM     Row Name 08/15/20 1039          Pain Scale: Numbers Pre/Post-Treatment    Pain Scale: Numbers, Pretreatment  0/10 - no pain  -     Pain Scale: Numbers, Post-Treatment  0/10 - no pain  -SM     Row Name 08/15/20 1039          Positioning and Restraints    Pre-Treatment Position  in bed  -     Post Treatment Position  chair  -     In Chair  reclined;call light within reach;encouraged to call for assist;exit alarm on;with  family/caregiver;notified nsg  -       User Key  (r) = Recorded By, (t) = Taken By, (c) = Cosigned By    Initials Name Provider Type    Lissa Arriola PTA Physical Therapy Assistant        Outcome Measures     Row Name 08/15/20 1042          How much help from another person do you currently need...    Turning from your back to your side while in flat bed without using bedrails?  3  -SM     Moving from lying on back to sitting on the side of a flat bed without bedrails?  2  -SM     Moving to and from a bed to a chair (including a wheelchair)?  2  -SM     Standing up from a chair using your arms (e.g., wheelchair, bedside chair)?  2  -SM     Climbing 3-5 steps with a railing?  1  -SM     To walk in hospital room?  1  -SM     AM-PAC 6 Clicks Score (PT)  11  -     Row Name 08/15/20 1042          Functional Assessment    Outcome Measure Options  AM-PAC 6 Clicks Basic Mobility (PT)  -       User Key  (r) = Recorded By, (t) = Taken By, (c) = Cosigned By    Initials Name Provider Type    Lissa Arriola PTA Physical Therapy Assistant        Physical Therapy Education                 Title: PT OT SLP Therapies (Done)     Topic: Physical Therapy (Done)     Point: Mobility training (Done)     Description:   Instruct learner(s) on safety and technique for assisting patient out of bed, chair or wheelchair.  Instruct in the proper use of assistive devices, such as walker, crutches, cane or brace.              Patient Friendly Description:   It's important to get you on your feet again, but we need to do so in a way that is safe for you. Falling has serious consequences, and your personal safety is the most important thing of all.        When it's time to get out of bed, one of us or a family member will sit next to you on the bed to give you support.     If your doctor or nurse tells you to use a walker, crutches, a cane, or a brace, be sure you use it every time you get out of bed, even if you think you  don't need it.    Learning Progress Summary           Patient Acceptance, E,TB,D, VU,NR by  at 8/15/2020 1043    Acceptance, E,TB,D, VU,NR by  at 8/14/2020 1057    Eager, E,D, VU,DU,NR by DJ at 8/13/2020 1350    Acceptance, E,TB,D, VU,NR by  at 8/12/2020 1337    Acceptance, E,D, NR by  at 8/10/2020 1154    Acceptance, E, VU,NR by  at 8/9/2020 0527    Acceptance, E, VU,NR by  at 8/8/2020 1530    Acceptance, E,D, NR by  at 8/7/2020 1204   Family Acceptance, E, VU,NR by MW at 8/8/2020 1530   Mother Eager, E,D, VU,DU,NR by DJ at 8/13/2020 1350                   Point: Home exercise program (Done)     Description:   Instruct learner(s) on appropriate technique for monitoring, assisting and/or progressing patient with therapeutic exercises and activities.              Learning Progress Summary           Patient Acceptance, E,TB,D, VU,NR by  at 8/15/2020 1043    Acceptance, E,TB,D, VU,NR by  at 8/14/2020 1057    Eager, E,D, VU,DU,NR by  at 8/13/2020 1350    Acceptance, E,TB,D, VU,NR by  at 8/12/2020 1337    Acceptance, E,D, NR by  at 8/10/2020 1154    Acceptance, E, VU,NR by  at 8/9/2020 0527    Acceptance, E,D, NR by  at 8/7/2020 1204   Mother Eager, E,D, VU,DU,NR by DJ at 8/13/2020 1350                   Point: Body mechanics (Done)     Description:   Instruct learner(s) on proper positioning and spine alignment for patient and/or caregiver during mobility tasks and/or exercises.              Learning Progress Summary           Patient Acceptance, E,TB,D, VU,NR by  at 8/15/2020 1043    Acceptance, E,TB,D, VU,NR by  at 8/14/2020 1057    Eager, E,D, VU,DU,NR by DJ at 8/13/2020 1350    Acceptance, E,TB,D, VU,NR by SM at 8/12/2020 1337    Acceptance, E,D, NR by SM at 8/10/2020 1154    Acceptance, E, VU,NR by TJ at 8/9/2020 0527    Acceptance, E, VU,NR by ERIS at 8/8/2020 1530    Acceptance, E,D, NR by COLLIN at 8/7/2020 1204   Family Acceptance, E, VU,NR by ERIS at 8/8/2020 1530   Mother Daryn, REINALDO LAM,  "VU,DU,NR by  at 8/13/2020 1350                   Point: Precautions (Done)     Description:   Instruct learner(s) on prescribed precautions during mobility and gait tasks              Learning Progress Summary           Patient Acceptance, E,TB,D, VU,NR by  at 8/15/2020 1043    Acceptance, E,TB,D, VU,NR by  at 8/14/2020 1057    Eager, E,D, VU,DU,NR by DJ at 8/13/2020 1350    Acceptance, E,TB,D, VU,NR by  at 8/12/2020 1337    Acceptance, E,D, NR by  at 8/10/2020 1154    Acceptance, E, VU,NR by  at 8/9/2020 0527    Acceptance, E, VU,NR by  at 8/8/2020 1530    Acceptance, E,D, NR by  at 8/7/2020 1204   Family Acceptance, E, VU,NR by  at 8/8/2020 1530   Mother Eager, E,D, VU,DU,NR by  at 8/13/2020 1350                               User Key     Initials Effective Dates Name Provider Type Discipline     02/05/19 -  Raysa Devine, PT Physical Therapist PT     03/07/18 -  Lissa Finn, PTA Physical Therapy Assistant PT     09/17/19 -  Geri Glez, PT Physical Therapist PT     10/25/19 -  Allyssa Edmondson, PT Physical Therapist PT     02/11/20 -  Mariana Bojorquez, RN Registered Nurse Nurse              PT Recommendation and Plan     Outcome Summary/Treatment Plan (PT)  Anticipated Discharge Disposition (PT): inpatient rehabilitation facility  Plan of Care Reviewed With: patient  Progress: improving  Outcome Summary: Pt tolerated treatment well this date. No reports of pain, though did state that his L LE felt more \"sore and tired\" today. Static sitting balance continues to improve, as pt was mostly able to hold self up w/ only occasional min A and frequent cues for posture. Pt seemed to do well maintaining good posture until new task was added, then would slouch and require more cues. Required min-mod A for bed moblity, then min-mod A x2 to stand 3x. After cues given for posture while standing, attempted weight shifting to each side. L LE would buckle unless knee was blocked. Cues " "were given to \"tighten knee\", though pt still very weak. After 3 stands, pt able to stand-pivot to chair w/ mod A x2, though good focus on technique for maximal control. Overall, pt making good progress towards goals and will be discharged to [facility] rehab today! Patient was intermittently wearing a face mask during this therapy encounter. Therapist used appropriate personal protective equipment including eye protection, mask, and gloves.  Mask used was standard procedure mask. Appropriate PPE was worn during the entire therapy session. Hand hygiene was completed before and after therapy session. Patient is not in enhanced droplet precautions. Also present: Radu PT tech.     Time Calculation:   PT Charges     Row Name 08/15/20 1052             Time Calculation    Start Time  0900  -      Stop Time  0938  -      Time Calculation (min)  38 min  -      PT Received On  08/15/20  -      PT - Next Appointment  08/16/20  -SM        User Key  (r) = Recorded By, (t) = Taken By, (c) = Cosigned By    Initials Name Provider Type     Lissa Finn Shi, BHUPENDRA Physical Therapy Assistant        Therapy Charges for Today     Code Description Service Date Service Provider Modifiers Qty    90968770790 HC PT THER PROC EA 15 MIN 8/14/2020 Lissa Finn, PTA GP 1    85438447487 HC PT THER SUPP EA 15 MIN 8/14/2020 Lissa Finn, PTA GP 2    16199750516 HC PT NEUROMUSC RE EDUCATION EA 15 MIN 8/14/2020 Lissa Finn, PTA GP 1    54182019313 HC PT THER PROC EA 15 MIN 8/15/2020 Lissa Finn, PTA GP 1    90813045316 HC PT THER SUPP EA 15 MIN 8/15/2020 Lissa Finn, PTA GP 3    44588870442 HC PT NEUROMUSC RE EDUCATION EA 15 MIN 8/15/2020 Lissa Finn, PTA GP 1    16618491787 HC PT THERAPEUTIC ACT EA 15 MIN 8/15/2020 Lissa Finn, PTA GP 1          PT G-Codes  Outcome Measure Options: AM-PAC 6 Clicks Basic Mobility (PT)  AM-PAC 6 Clicks Score (PT): 11  AM-PAC 6 Clicks Score (OT): " 13  Modified [place] Scale: 4 - Moderately severe disability.  Unable to walk without assistance, and unable to attend to own bodily needs without assistance.    Lissa Finn, PTA  8/15/2020

## 2020-08-16 PROCEDURE — 97162 PT EVAL MOD COMPLEX 30 MIN: CPT | Performed by: PHYSICAL THERAPIST

## 2020-08-16 PROCEDURE — 97530 THERAPEUTIC ACTIVITIES: CPT | Performed by: PHYSICAL THERAPIST

## 2020-08-16 PROCEDURE — 97116 GAIT TRAINING THERAPY: CPT | Performed by: PHYSICAL THERAPIST

## 2020-08-16 RX ADMIN — DEXAMETHASONE 2 MG: 2 TABLET ORAL at 20:21

## 2020-08-16 RX ADMIN — Medication 100 MG: at 08:01

## 2020-08-16 RX ADMIN — ACETAMINOPHEN 650 MG: 325 TABLET, FILM COATED ORAL at 08:00

## 2020-08-16 RX ADMIN — ACETAMINOPHEN 650 MG: 325 TABLET, FILM COATED ORAL at 20:21

## 2020-08-16 RX ADMIN — OLANZAPINE 5 MG: 5 TABLET ORAL at 17:24

## 2020-08-16 RX ADMIN — LEVETIRACETAM 500 MG: 500 TABLET, FILM COATED ORAL at 08:01

## 2020-08-16 RX ADMIN — PANTOPRAZOLE SODIUM 40 MG: 40 TABLET, DELAYED RELEASE ORAL at 05:27

## 2020-08-16 RX ADMIN — LEVETIRACETAM 500 MG: 500 TABLET, FILM COATED ORAL at 20:21

## 2020-08-16 RX ADMIN — FOLIC ACID 1 MG: 1 TABLET ORAL at 08:01

## 2020-08-16 RX ADMIN — DEXAMETHASONE 2 MG: 2 TABLET ORAL at 08:01

## 2020-08-17 PROBLEM — I61.9: Status: ACTIVE | Noted: 2020-08-17

## 2020-08-17 LAB
ALBUMIN SERPL-MCNC: 4 G/DL (ref 3.5–5.2)
ALBUMIN/GLOB SERPL: 1.1 G/DL
ALP SERPL-CCNC: 109 U/L (ref 39–117)
ALT SERPL W P-5'-P-CCNC: 151 U/L (ref 1–41)
ANION GAP SERPL CALCULATED.3IONS-SCNC: 10.1 MMOL/L (ref 5–15)
AST SERPL-CCNC: 102 U/L (ref 1–40)
BILIRUB SERPL-MCNC: 2.7 MG/DL (ref 0–1.2)
BUN SERPL-MCNC: 8 MG/DL (ref 6–20)
BUN/CREAT SERPL: 17.8 (ref 7–25)
CALCIUM SPEC-SCNC: 9.4 MG/DL (ref 8.6–10.5)
CHLORIDE SERPL-SCNC: 102 MMOL/L (ref 98–107)
CO2 SERPL-SCNC: 21.9 MMOL/L (ref 22–29)
CREAT SERPL-MCNC: 0.45 MG/DL (ref 0.76–1.27)
DEPRECATED RDW RBC AUTO: 41.8 FL (ref 37–54)
ERYTHROCYTE [DISTWIDTH] IN BLOOD BY AUTOMATED COUNT: 11.8 % (ref 12.3–15.4)
GFR SERPL CREATININE-BSD FRML MDRD: >150 ML/MIN/1.73
GLOBULIN UR ELPH-MCNC: 3.5 GM/DL
GLUCOSE SERPL-MCNC: 145 MG/DL (ref 65–99)
HCT VFR BLD AUTO: 34.9 % (ref 37.5–51)
HGB BLD-MCNC: 12.5 G/DL (ref 13–17.7)
MCH RBC QN AUTO: 35 PG (ref 26.6–33)
MCHC RBC AUTO-ENTMCNC: 35.8 G/DL (ref 31.5–35.7)
MCV RBC AUTO: 97.8 FL (ref 79–97)
PLATELET # BLD AUTO: 220 10*3/MM3 (ref 140–450)
PMV BLD AUTO: 10 FL (ref 6–12)
POTASSIUM SERPL-SCNC: 3.6 MMOL/L (ref 3.5–5.2)
PROT SERPL-MCNC: 7.5 G/DL (ref 6–8.5)
RBC # BLD AUTO: 3.57 10*6/MM3 (ref 4.14–5.8)
SODIUM SERPL-SCNC: 134 MMOL/L (ref 136–145)
WBC # BLD AUTO: 15.29 10*3/MM3 (ref 3.4–10.8)

## 2020-08-17 PROCEDURE — 97166 OT EVAL MOD COMPLEX 45 MIN: CPT

## 2020-08-17 PROCEDURE — 80053 COMPREHEN METABOLIC PANEL: CPT | Performed by: PHYSICAL MEDICINE & REHABILITATION

## 2020-08-17 PROCEDURE — 94799 UNLISTED PULMONARY SVC/PX: CPT

## 2020-08-17 PROCEDURE — 97112 NEUROMUSCULAR REEDUCATION: CPT | Performed by: PHYSICAL THERAPIST

## 2020-08-17 PROCEDURE — 85027 COMPLETE CBC AUTOMATED: CPT | Performed by: PHYSICAL MEDICINE & REHABILITATION

## 2020-08-17 PROCEDURE — 96125 COGNITIVE TEST BY HC PRO: CPT

## 2020-08-17 PROCEDURE — 97530 THERAPEUTIC ACTIVITIES: CPT | Performed by: PHYSICAL THERAPIST

## 2020-08-17 PROCEDURE — 97535 SELF CARE MNGMENT TRAINING: CPT

## 2020-08-17 RX ORDER — OXYCODONE HYDROCHLORIDE 5 MG/1
5 TABLET ORAL EVERY 4 HOURS PRN
Status: DISCONTINUED | OUTPATIENT
Start: 2020-08-17 | End: 2020-08-26

## 2020-08-17 RX ADMIN — LEVETIRACETAM 500 MG: 500 TABLET, FILM COATED ORAL at 21:08

## 2020-08-17 RX ADMIN — Medication 100 MG: at 07:46

## 2020-08-17 RX ADMIN — LEVETIRACETAM 500 MG: 500 TABLET, FILM COATED ORAL at 07:46

## 2020-08-17 RX ADMIN — OLANZAPINE 5 MG: 5 TABLET ORAL at 16:04

## 2020-08-17 RX ADMIN — FOLIC ACID 1 MG: 1 TABLET ORAL at 07:46

## 2020-08-17 RX ADMIN — PANTOPRAZOLE SODIUM 40 MG: 40 TABLET, DELAYED RELEASE ORAL at 05:33

## 2020-08-17 RX ADMIN — DEXAMETHASONE 2 MG: 2 TABLET ORAL at 21:08

## 2020-08-17 RX ADMIN — DEXAMETHASONE 2 MG: 2 TABLET ORAL at 07:46

## 2020-08-17 RX ADMIN — IPRATROPIUM BROMIDE AND ALBUTEROL SULFATE 3 ML: 2.5; .5 SOLUTION RESPIRATORY (INHALATION) at 20:51

## 2020-08-17 NOTE — PROGRESS NOTES
Case Management Discharge Note      Final Note: Pt dc'd to  [facility]         Destination - Selection Complete      Service Provider Request Status Selected Services Address Phone Number Fax Number    [facility] [address] [facility] [address] [phone] --      Durable Medical Equipment      No service has been selected for the patient.      Dialysis/Infusion      No service has been selected for the patient.      Home Medical Care      No service has been selected for the patient.      Therapy      No service has been selected for the patient.      Community Resources      No service has been selected for the patient.             Final Discharge Disposition Code: 62 - inpatient rehab facility

## 2020-08-18 PROCEDURE — 97112 NEUROMUSCULAR REEDUCATION: CPT

## 2020-08-18 PROCEDURE — 97129 THER IVNTJ 1ST 15 MIN: CPT

## 2020-08-18 PROCEDURE — 92610 EVALUATE SWALLOWING FUNCTION: CPT

## 2020-08-18 PROCEDURE — 97130 THER IVNTJ EA ADDL 15 MIN: CPT

## 2020-08-18 PROCEDURE — 97535 SELF CARE MNGMENT TRAINING: CPT

## 2020-08-18 PROCEDURE — 97530 THERAPEUTIC ACTIVITIES: CPT | Performed by: PHYSICAL THERAPIST

## 2020-08-18 PROCEDURE — 97116 GAIT TRAINING THERAPY: CPT | Performed by: PHYSICAL THERAPIST

## 2020-08-18 PROCEDURE — 97110 THERAPEUTIC EXERCISES: CPT | Performed by: PHYSICAL THERAPIST

## 2020-08-18 RX ADMIN — PANTOPRAZOLE SODIUM 40 MG: 40 TABLET, DELAYED RELEASE ORAL at 05:51

## 2020-08-18 RX ADMIN — LEVETIRACETAM 500 MG: 500 TABLET, FILM COATED ORAL at 07:29

## 2020-08-18 RX ADMIN — LEVETIRACETAM 500 MG: 500 TABLET, FILM COATED ORAL at 20:11

## 2020-08-18 RX ADMIN — FOLIC ACID 1 MG: 1 TABLET ORAL at 07:29

## 2020-08-18 RX ADMIN — Medication 100 MG: at 07:28

## 2020-08-18 RX ADMIN — DEXAMETHASONE 2 MG: 2 TABLET ORAL at 07:29

## 2020-08-18 RX ADMIN — DEXAMETHASONE 2 MG: 2 TABLET ORAL at 20:12

## 2020-08-18 RX ADMIN — OLANZAPINE 5 MG: 5 TABLET ORAL at 16:48

## 2020-08-19 LAB
ALBUMIN SERPL-MCNC: 3.8 G/DL (ref 3.5–5.2)
ALBUMIN/GLOB SERPL: 1 G/DL
ALP SERPL-CCNC: 121 U/L (ref 39–117)
ALT SERPL W P-5'-P-CCNC: 147 U/L (ref 1–41)
ANION GAP SERPL CALCULATED.3IONS-SCNC: 9.8 MMOL/L (ref 5–15)
AST SERPL-CCNC: 84 U/L (ref 1–40)
BASOPHILS # BLD AUTO: 0.01 10*3/MM3 (ref 0–0.2)
BASOPHILS NFR BLD AUTO: 0.1 % (ref 0–1.5)
BILIRUB SERPL-MCNC: 2.2 MG/DL (ref 0–1.2)
BUN SERPL-MCNC: 6 MG/DL (ref 6–20)
BUN/CREAT SERPL: 14.3 (ref 7–25)
CALCIUM SPEC-SCNC: 9.4 MG/DL (ref 8.6–10.5)
CHLORIDE SERPL-SCNC: 103 MMOL/L (ref 98–107)
CO2 SERPL-SCNC: 24.2 MMOL/L (ref 22–29)
CREAT SERPL-MCNC: 0.42 MG/DL (ref 0.76–1.27)
DEPRECATED RDW RBC AUTO: 42.7 FL (ref 37–54)
EOSINOPHIL # BLD AUTO: 0.04 10*3/MM3 (ref 0–0.4)
EOSINOPHIL NFR BLD AUTO: 0.3 % (ref 0.3–6.2)
ERYTHROCYTE [DISTWIDTH] IN BLOOD BY AUTOMATED COUNT: 11.8 % (ref 12.3–15.4)
GFR SERPL CREATININE-BSD FRML MDRD: >150 ML/MIN/1.73
GLOBULIN UR ELPH-MCNC: 3.7 GM/DL
GLUCOSE SERPL-MCNC: 146 MG/DL (ref 65–99)
HCT VFR BLD AUTO: 34.9 % (ref 37.5–51)
HGB BLD-MCNC: 12.2 G/DL (ref 13–17.7)
IMM GRANULOCYTES # BLD AUTO: 0.1 10*3/MM3 (ref 0–0.05)
IMM GRANULOCYTES NFR BLD AUTO: 0.7 % (ref 0–0.5)
LYMPHOCYTES # BLD AUTO: 0.81 10*3/MM3 (ref 0.7–3.1)
LYMPHOCYTES NFR BLD AUTO: 5.5 % (ref 19.6–45.3)
MCH RBC QN AUTO: 35.1 PG (ref 26.6–33)
MCHC RBC AUTO-ENTMCNC: 35 G/DL (ref 31.5–35.7)
MCV RBC AUTO: 100.3 FL (ref 79–97)
MONOCYTES # BLD AUTO: 0.88 10*3/MM3 (ref 0.1–0.9)
MONOCYTES NFR BLD AUTO: 6 % (ref 5–12)
NEUTROPHILS NFR BLD AUTO: 12.8 10*3/MM3 (ref 1.7–7)
NEUTROPHILS NFR BLD AUTO: 87.4 % (ref 42.7–76)
NRBC BLD AUTO-RTO: 0 /100 WBC (ref 0–0.2)
PLATELET # BLD AUTO: 217 10*3/MM3 (ref 140–450)
PMV BLD AUTO: 9.9 FL (ref 6–12)
POTASSIUM SERPL-SCNC: 3.6 MMOL/L (ref 3.5–5.2)
PROT SERPL-MCNC: 7.5 G/DL (ref 6–8.5)
RBC # BLD AUTO: 3.48 10*6/MM3 (ref 4.14–5.8)
SODIUM SERPL-SCNC: 137 MMOL/L (ref 136–145)
WBC # BLD AUTO: 14.64 10*3/MM3 (ref 3.4–10.8)

## 2020-08-19 PROCEDURE — 97110 THERAPEUTIC EXERCISES: CPT

## 2020-08-19 PROCEDURE — 97129 THER IVNTJ 1ST 15 MIN: CPT

## 2020-08-19 PROCEDURE — 97130 THER IVNTJ EA ADDL 15 MIN: CPT

## 2020-08-19 PROCEDURE — 85025 COMPLETE CBC W/AUTO DIFF WBC: CPT | Performed by: PHYSICAL MEDICINE & REHABILITATION

## 2020-08-19 PROCEDURE — 97535 SELF CARE MNGMENT TRAINING: CPT

## 2020-08-19 PROCEDURE — 94799 UNLISTED PULMONARY SVC/PX: CPT

## 2020-08-19 PROCEDURE — 80053 COMPREHEN METABOLIC PANEL: CPT | Performed by: PHYSICAL MEDICINE & REHABILITATION

## 2020-08-19 RX ADMIN — DEXAMETHASONE 2 MG: 2 TABLET ORAL at 08:21

## 2020-08-19 RX ADMIN — LEVETIRACETAM 500 MG: 500 TABLET, FILM COATED ORAL at 21:17

## 2020-08-19 RX ADMIN — PANTOPRAZOLE SODIUM 40 MG: 40 TABLET, DELAYED RELEASE ORAL at 05:51

## 2020-08-19 RX ADMIN — FOLIC ACID 1 MG: 1 TABLET ORAL at 08:21

## 2020-08-19 RX ADMIN — LEVETIRACETAM 500 MG: 500 TABLET, FILM COATED ORAL at 08:21

## 2020-08-19 RX ADMIN — IPRATROPIUM BROMIDE AND ALBUTEROL SULFATE 3 ML: 2.5; .5 SOLUTION RESPIRATORY (INHALATION) at 23:00

## 2020-08-19 RX ADMIN — DEXAMETHASONE 2 MG: 2 TABLET ORAL at 21:17

## 2020-08-19 RX ADMIN — Medication 100 MG: at 08:21

## 2020-08-19 RX ADMIN — OLANZAPINE 5 MG: 5 TABLET ORAL at 17:49

## 2020-08-20 ENCOUNTER — APPOINTMENT (OUTPATIENT)
Dept: CT IMAGING | Facility: HOSPITAL | Age: 30
End: 2020-08-20

## 2020-08-20 PROCEDURE — 97130 THER IVNTJ EA ADDL 15 MIN: CPT

## 2020-08-20 PROCEDURE — 97129 THER IVNTJ 1ST 15 MIN: CPT

## 2020-08-20 PROCEDURE — 92526 ORAL FUNCTION THERAPY: CPT

## 2020-08-20 PROCEDURE — 97116 GAIT TRAINING THERAPY: CPT | Performed by: PHYSICAL THERAPIST

## 2020-08-20 PROCEDURE — 73700 CT LOWER EXTREMITY W/O DYE: CPT

## 2020-08-20 PROCEDURE — 97112 NEUROMUSCULAR REEDUCATION: CPT | Performed by: PHYSICAL THERAPIST

## 2020-08-20 PROCEDURE — 97112 NEUROMUSCULAR REEDUCATION: CPT

## 2020-08-20 PROCEDURE — 97535 SELF CARE MNGMENT TRAINING: CPT

## 2020-08-20 RX ORDER — GABAPENTIN 300 MG/1
300 CAPSULE ORAL NIGHTLY
Status: DISCONTINUED | OUTPATIENT
Start: 2020-08-20 | End: 2020-09-28 | Stop reason: HOSPADM

## 2020-08-20 RX ADMIN — GABAPENTIN 300 MG: 300 CAPSULE ORAL at 22:39

## 2020-08-20 RX ADMIN — DEXAMETHASONE 2 MG: 2 TABLET ORAL at 07:41

## 2020-08-20 RX ADMIN — LEVETIRACETAM 500 MG: 500 TABLET, FILM COATED ORAL at 07:41

## 2020-08-20 RX ADMIN — Medication 100 MG: at 07:40

## 2020-08-20 RX ADMIN — LEVETIRACETAM 500 MG: 500 TABLET, FILM COATED ORAL at 22:39

## 2020-08-20 RX ADMIN — OLANZAPINE 5 MG: 5 TABLET ORAL at 17:17

## 2020-08-20 RX ADMIN — OXYCODONE HYDROCHLORIDE 5 MG: 5 TABLET ORAL at 07:41

## 2020-08-20 RX ADMIN — DEXAMETHASONE 2 MG: 2 TABLET ORAL at 22:39

## 2020-08-20 RX ADMIN — FOLIC ACID 1 MG: 1 TABLET ORAL at 07:41

## 2020-08-20 RX ADMIN — PANTOPRAZOLE SODIUM 40 MG: 40 TABLET, DELAYED RELEASE ORAL at 05:22

## 2020-08-21 LAB
ALBUMIN SERPL-MCNC: 3.6 G/DL (ref 3.5–5.2)
ALBUMIN/GLOB SERPL: 1 G/DL
ALP SERPL-CCNC: 137 U/L (ref 39–117)
ALT SERPL W P-5'-P-CCNC: 133 U/L (ref 1–41)
ANION GAP SERPL CALCULATED.3IONS-SCNC: 13.1 MMOL/L (ref 5–15)
AST SERPL-CCNC: 74 U/L (ref 1–40)
BASOPHILS # BLD AUTO: 0.01 10*3/MM3 (ref 0–0.2)
BASOPHILS NFR BLD AUTO: 0.1 % (ref 0–1.5)
BILIRUB SERPL-MCNC: 1.9 MG/DL (ref 0–1.2)
BUN SERPL-MCNC: 8 MG/DL (ref 6–20)
BUN/CREAT SERPL: 13.1 (ref 7–25)
CALCIUM SPEC-SCNC: 9.1 MG/DL (ref 8.6–10.5)
CHLORIDE SERPL-SCNC: 103 MMOL/L (ref 98–107)
CO2 SERPL-SCNC: 18.9 MMOL/L (ref 22–29)
CREAT BLDA-MCNC: 0.9 MG/DL (ref 0.6–1.3)
CREAT SERPL-MCNC: 0.61 MG/DL (ref 0.76–1.27)
DEPRECATED RDW RBC AUTO: 41.9 FL (ref 37–54)
EOSINOPHIL # BLD AUTO: 0.04 10*3/MM3 (ref 0–0.4)
EOSINOPHIL NFR BLD AUTO: 0.3 % (ref 0.3–6.2)
ERYTHROCYTE [DISTWIDTH] IN BLOOD BY AUTOMATED COUNT: 11.4 % (ref 12.3–15.4)
GFR SERPL CREATININE-BSD FRML MDRD: >150 ML/MIN/1.73
GLOBULIN UR ELPH-MCNC: 3.6 GM/DL
GLUCOSE SERPL-MCNC: 291 MG/DL (ref 65–99)
HCT VFR BLD AUTO: 35.4 % (ref 37.5–51)
HGB BLD-MCNC: 12.4 G/DL (ref 13–17.7)
IMM GRANULOCYTES # BLD AUTO: 0.08 10*3/MM3 (ref 0–0.05)
IMM GRANULOCYTES NFR BLD AUTO: 0.7 % (ref 0–0.5)
LYMPHOCYTES # BLD AUTO: 0.7 10*3/MM3 (ref 0.7–3.1)
LYMPHOCYTES NFR BLD AUTO: 5.7 % (ref 19.6–45.3)
MCH RBC QN AUTO: 35.5 PG (ref 26.6–33)
MCHC RBC AUTO-ENTMCNC: 35 G/DL (ref 31.5–35.7)
MCV RBC AUTO: 101.4 FL (ref 79–97)
MONOCYTES # BLD AUTO: 0.67 10*3/MM3 (ref 0.1–0.9)
MONOCYTES NFR BLD AUTO: 5.5 % (ref 5–12)
NEUTROPHILS NFR BLD AUTO: 10.72 10*3/MM3 (ref 1.7–7)
NEUTROPHILS NFR BLD AUTO: 87.7 % (ref 42.7–76)
NRBC BLD AUTO-RTO: 0 /100 WBC (ref 0–0.2)
PLATELET # BLD AUTO: 213 10*3/MM3 (ref 140–450)
PMV BLD AUTO: 10.1 FL (ref 6–12)
POTASSIUM SERPL-SCNC: 3.3 MMOL/L (ref 3.5–5.2)
PROT SERPL-MCNC: 7.2 G/DL (ref 6–8.5)
RBC # BLD AUTO: 3.49 10*6/MM3 (ref 4.14–5.8)
SODIUM SERPL-SCNC: 135 MMOL/L (ref 136–145)
WBC # BLD AUTO: 12.22 10*3/MM3 (ref 3.4–10.8)

## 2020-08-21 PROCEDURE — 97130 THER IVNTJ EA ADDL 15 MIN: CPT

## 2020-08-21 PROCEDURE — 97530 THERAPEUTIC ACTIVITIES: CPT

## 2020-08-21 PROCEDURE — 97112 NEUROMUSCULAR REEDUCATION: CPT

## 2020-08-21 PROCEDURE — 80053 COMPREHEN METABOLIC PANEL: CPT | Performed by: PHYSICAL MEDICINE & REHABILITATION

## 2020-08-21 PROCEDURE — 97110 THERAPEUTIC EXERCISES: CPT

## 2020-08-21 PROCEDURE — 85025 COMPLETE CBC W/AUTO DIFF WBC: CPT | Performed by: PHYSICAL MEDICINE & REHABILITATION

## 2020-08-21 PROCEDURE — 97535 SELF CARE MNGMENT TRAINING: CPT

## 2020-08-21 PROCEDURE — 97129 THER IVNTJ 1ST 15 MIN: CPT

## 2020-08-21 PROCEDURE — 97116 GAIT TRAINING THERAPY: CPT

## 2020-08-21 RX ADMIN — PANTOPRAZOLE SODIUM 40 MG: 40 TABLET, DELAYED RELEASE ORAL at 05:37

## 2020-08-21 RX ADMIN — Medication 100 MG: at 07:55

## 2020-08-21 RX ADMIN — FOLIC ACID 1 MG: 1 TABLET ORAL at 07:56

## 2020-08-21 RX ADMIN — LEVETIRACETAM 500 MG: 500 TABLET, FILM COATED ORAL at 07:56

## 2020-08-21 RX ADMIN — GABAPENTIN 300 MG: 300 CAPSULE ORAL at 21:30

## 2020-08-21 RX ADMIN — DEXAMETHASONE 2 MG: 2 TABLET ORAL at 21:30

## 2020-08-21 RX ADMIN — LEVETIRACETAM 500 MG: 500 TABLET, FILM COATED ORAL at 21:30

## 2020-08-21 RX ADMIN — OLANZAPINE 5 MG: 5 TABLET ORAL at 17:13

## 2020-08-21 RX ADMIN — DEXAMETHASONE 2 MG: 2 TABLET ORAL at 07:56

## 2020-08-22 PROCEDURE — 97110 THERAPEUTIC EXERCISES: CPT | Performed by: PHYSICAL THERAPIST

## 2020-08-22 PROCEDURE — 97116 GAIT TRAINING THERAPY: CPT | Performed by: PHYSICAL THERAPIST

## 2020-08-22 RX ADMIN — FOLIC ACID 1 MG: 1 TABLET ORAL at 08:38

## 2020-08-22 RX ADMIN — Medication 100 MG: at 08:38

## 2020-08-22 RX ADMIN — LEVETIRACETAM 500 MG: 500 TABLET, FILM COATED ORAL at 08:38

## 2020-08-22 RX ADMIN — LEVETIRACETAM 500 MG: 500 TABLET, FILM COATED ORAL at 21:13

## 2020-08-22 RX ADMIN — OLANZAPINE 5 MG: 5 TABLET ORAL at 16:53

## 2020-08-22 RX ADMIN — DEXAMETHASONE 2 MG: 2 TABLET ORAL at 08:38

## 2020-08-22 RX ADMIN — PANTOPRAZOLE SODIUM 40 MG: 40 TABLET, DELAYED RELEASE ORAL at 06:06

## 2020-08-22 RX ADMIN — DEXAMETHASONE 2 MG: 2 TABLET ORAL at 21:13

## 2020-08-22 RX ADMIN — GABAPENTIN 300 MG: 300 CAPSULE ORAL at 21:13

## 2020-08-23 RX ADMIN — LEVETIRACETAM 500 MG: 500 TABLET, FILM COATED ORAL at 20:50

## 2020-08-23 RX ADMIN — GABAPENTIN 300 MG: 300 CAPSULE ORAL at 20:50

## 2020-08-23 RX ADMIN — PANTOPRAZOLE SODIUM 40 MG: 40 TABLET, DELAYED RELEASE ORAL at 06:07

## 2020-08-23 RX ADMIN — LEVETIRACETAM 500 MG: 500 TABLET, FILM COATED ORAL at 08:26

## 2020-08-23 RX ADMIN — FOLIC ACID 1 MG: 1 TABLET ORAL at 08:26

## 2020-08-23 RX ADMIN — DEXAMETHASONE 2 MG: 2 TABLET ORAL at 08:26

## 2020-08-23 RX ADMIN — Medication 100 MG: at 08:26

## 2020-08-23 RX ADMIN — DEXAMETHASONE 2 MG: 2 TABLET ORAL at 20:50

## 2020-08-23 RX ADMIN — OLANZAPINE 5 MG: 5 TABLET ORAL at 16:35

## 2020-08-24 ENCOUNTER — APPOINTMENT (OUTPATIENT)
Dept: MRI IMAGING | Facility: HOSPITAL | Age: 30
End: 2020-08-24

## 2020-08-24 LAB
ALBUMIN SERPL-MCNC: 3.6 G/DL (ref 3.5–5.2)
ALBUMIN/GLOB SERPL: 1 G/DL
ALP SERPL-CCNC: 176 U/L (ref 39–117)
ALT SERPL W P-5'-P-CCNC: 106 U/L (ref 1–41)
ANION GAP SERPL CALCULATED.3IONS-SCNC: 4.6 MMOL/L (ref 5–15)
AST SERPL-CCNC: 48 U/L (ref 1–40)
BASOPHILS # BLD AUTO: 0.02 10*3/MM3 (ref 0–0.2)
BASOPHILS NFR BLD AUTO: 0.1 % (ref 0–1.5)
BILIRUB SERPL-MCNC: 1.6 MG/DL (ref 0–1.2)
BILIRUB UR QL STRIP: NEGATIVE
BUN SERPL-MCNC: 12 MG/DL (ref 6–20)
BUN/CREAT SERPL: 27.9 (ref 7–25)
CALCIUM SPEC-SCNC: 9.4 MG/DL (ref 8.6–10.5)
CHLORIDE SERPL-SCNC: 101 MMOL/L (ref 98–107)
CLARITY UR: CLEAR
CO2 SERPL-SCNC: 21.4 MMOL/L (ref 22–29)
COLOR UR: ABNORMAL
CREAT SERPL-MCNC: 0.43 MG/DL (ref 0.76–1.27)
CYTO UR: NORMAL
DEPRECATED RDW RBC AUTO: 42.3 FL (ref 37–54)
EOSINOPHIL # BLD AUTO: 0.02 10*3/MM3 (ref 0–0.4)
EOSINOPHIL NFR BLD AUTO: 0.1 % (ref 0.3–6.2)
ERYTHROCYTE [DISTWIDTH] IN BLOOD BY AUTOMATED COUNT: 11.3 % (ref 12.3–15.4)
GFR SERPL CREATININE-BSD FRML MDRD: >150 ML/MIN/1.73
GLOBULIN UR ELPH-MCNC: 3.6 GM/DL
GLUCOSE SERPL-MCNC: 252 MG/DL (ref 65–99)
GLUCOSE UR STRIP-MCNC: ABNORMAL MG/DL
HCT VFR BLD AUTO: 36.5 % (ref 37.5–51)
HGB BLD-MCNC: 12.6 G/DL (ref 13–17.7)
HGB UR QL STRIP.AUTO: NEGATIVE
IMM GRANULOCYTES # BLD AUTO: 0.15 10*3/MM3 (ref 0–0.05)
IMM GRANULOCYTES NFR BLD AUTO: 0.9 % (ref 0–0.5)
KETONES UR QL STRIP: NEGATIVE
LAB AP CASE REPORT: NORMAL
LEUKOCYTE ESTERASE UR QL STRIP.AUTO: NEGATIVE
LYMPHOCYTES # BLD AUTO: 0.65 10*3/MM3 (ref 0.7–3.1)
LYMPHOCYTES NFR BLD AUTO: 3.9 % (ref 19.6–45.3)
MCH RBC QN AUTO: 35.3 PG (ref 26.6–33)
MCHC RBC AUTO-ENTMCNC: 34.5 G/DL (ref 31.5–35.7)
MCV RBC AUTO: 102.2 FL (ref 79–97)
MONOCYTES # BLD AUTO: 0.72 10*3/MM3 (ref 0.1–0.9)
MONOCYTES NFR BLD AUTO: 4.3 % (ref 5–12)
NEUTROPHILS NFR BLD AUTO: 15.25 10*3/MM3 (ref 1.7–7)
NEUTROPHILS NFR BLD AUTO: 90.7 % (ref 42.7–76)
NITRITE UR QL STRIP: NEGATIVE
NRBC BLD AUTO-RTO: 0 /100 WBC (ref 0–0.2)
OSMOLALITY SERPL: 292 MOSM/KG (ref 275–300)
OSMOLALITY UR: 796 MOSM/KG
PATH REPORT.ADDENDUM SPEC: NORMAL
PATH REPORT.FINAL DX SPEC: NORMAL
PATH REPORT.GROSS SPEC: NORMAL
PH UR STRIP.AUTO: 6 [PH] (ref 5–8)
PLATELET # BLD AUTO: 192 10*3/MM3 (ref 140–450)
PMV BLD AUTO: 9.8 FL (ref 6–12)
POTASSIUM SERPL-SCNC: 3.5 MMOL/L (ref 3.5–5.2)
PROT SERPL-MCNC: 7.2 G/DL (ref 6–8.5)
PROT UR QL STRIP: NEGATIVE
RBC # BLD AUTO: 3.57 10*6/MM3 (ref 4.14–5.8)
SODIUM SERPL-SCNC: 127 MMOL/L (ref 136–145)
SODIUM UR-SCNC: <20 MMOL/L
SP GR UR STRIP: 1.02 (ref 1–1.03)
URATE SERPL-MCNC: 3.2 MG/DL (ref 3.4–7)
UROBILINOGEN UR QL STRIP: ABNORMAL
WBC # BLD AUTO: 16.81 10*3/MM3 (ref 3.4–10.8)

## 2020-08-24 PROCEDURE — 70553 MRI BRAIN STEM W/O & W/DYE: CPT

## 2020-08-24 PROCEDURE — 97530 THERAPEUTIC ACTIVITIES: CPT | Performed by: PHYSICAL THERAPIST

## 2020-08-24 PROCEDURE — 84550 ASSAY OF BLOOD/URIC ACID: CPT | Performed by: PHYSICAL MEDICINE & REHABILITATION

## 2020-08-24 PROCEDURE — 97130 THER IVNTJ EA ADDL 15 MIN: CPT

## 2020-08-24 PROCEDURE — 25010000002 GADOTERATE MEGLUMINE 10 MMOL/20ML SOLUTION: Performed by: PHYSICAL MEDICINE & REHABILITATION

## 2020-08-24 PROCEDURE — 97116 GAIT TRAINING THERAPY: CPT | Performed by: PHYSICAL THERAPIST

## 2020-08-24 PROCEDURE — 97535 SELF CARE MNGMENT TRAINING: CPT

## 2020-08-24 PROCEDURE — 99024 POSTOP FOLLOW-UP VISIT: CPT | Performed by: NURSE PRACTITIONER

## 2020-08-24 PROCEDURE — A9575 INJ GADOTERATE MEGLUMI 0.1ML: HCPCS | Performed by: PHYSICAL MEDICINE & REHABILITATION

## 2020-08-24 PROCEDURE — 84300 ASSAY OF URINE SODIUM: CPT | Performed by: PHYSICAL MEDICINE & REHABILITATION

## 2020-08-24 PROCEDURE — 83935 ASSAY OF URINE OSMOLALITY: CPT | Performed by: PHYSICAL MEDICINE & REHABILITATION

## 2020-08-24 PROCEDURE — 97112 NEUROMUSCULAR REEDUCATION: CPT | Performed by: PHYSICAL THERAPIST

## 2020-08-24 PROCEDURE — 80053 COMPREHEN METABOLIC PANEL: CPT | Performed by: PHYSICAL MEDICINE & REHABILITATION

## 2020-08-24 PROCEDURE — 81003 URINALYSIS AUTO W/O SCOPE: CPT | Performed by: PHYSICAL MEDICINE & REHABILITATION

## 2020-08-24 PROCEDURE — 85025 COMPLETE CBC W/AUTO DIFF WBC: CPT | Performed by: PHYSICAL MEDICINE & REHABILITATION

## 2020-08-24 PROCEDURE — 97129 THER IVNTJ 1ST 15 MIN: CPT

## 2020-08-24 PROCEDURE — 83930 ASSAY OF BLOOD OSMOLALITY: CPT | Performed by: PHYSICAL MEDICINE & REHABILITATION

## 2020-08-24 RX ORDER — GADOTERATE MEGLUMINE 376.9 MG/ML
20 INJECTION INTRAVENOUS
Status: COMPLETED | OUTPATIENT
Start: 2020-08-24 | End: 2020-08-24

## 2020-08-24 RX ORDER — FAMOTIDINE 20 MG/1
20 TABLET, FILM COATED ORAL
Status: DISCONTINUED | OUTPATIENT
Start: 2020-08-25 | End: 2020-09-28 | Stop reason: HOSPADM

## 2020-08-24 RX ADMIN — LEVETIRACETAM 500 MG: 500 TABLET, FILM COATED ORAL at 21:19

## 2020-08-24 RX ADMIN — Medication 100 MG: at 07:47

## 2020-08-24 RX ADMIN — OLANZAPINE 5 MG: 5 TABLET ORAL at 16:54

## 2020-08-24 RX ADMIN — GABAPENTIN 300 MG: 300 CAPSULE ORAL at 21:19

## 2020-08-24 RX ADMIN — DEXAMETHASONE 2 MG: 2 TABLET ORAL at 21:19

## 2020-08-24 RX ADMIN — FOLIC ACID 1 MG: 1 TABLET ORAL at 07:48

## 2020-08-24 RX ADMIN — LEVETIRACETAM 500 MG: 500 TABLET, FILM COATED ORAL at 07:47

## 2020-08-24 RX ADMIN — GADOTERATE MEGLUMINE 20 ML: 376.9 INJECTION, SOLUTION INTRAVENOUS at 20:24

## 2020-08-24 RX ADMIN — DEXAMETHASONE 2 MG: 2 TABLET ORAL at 07:47

## 2020-08-24 RX ADMIN — OXYCODONE HYDROCHLORIDE 5 MG: 5 TABLET ORAL at 21:19

## 2020-08-24 RX ADMIN — PANTOPRAZOLE SODIUM 40 MG: 40 TABLET, DELAYED RELEASE ORAL at 05:56

## 2020-08-25 LAB
ANION GAP SERPL CALCULATED.3IONS-SCNC: 10.8 MMOL/L (ref 5–15)
BUN SERPL-MCNC: 10 MG/DL (ref 6–20)
BUN/CREAT SERPL: 20 (ref 7–25)
CALCIUM SPEC-SCNC: 8.9 MG/DL (ref 8.6–10.5)
CHLORIDE SERPL-SCNC: 102 MMOL/L (ref 98–107)
CO2 SERPL-SCNC: 22.2 MMOL/L (ref 22–29)
CREAT SERPL-MCNC: 0.5 MG/DL (ref 0.76–1.27)
GFR SERPL CREATININE-BSD FRML MDRD: >150 ML/MIN/1.73
GLUCOSE SERPL-MCNC: 356 MG/DL (ref 65–99)
POTASSIUM SERPL-SCNC: 3.2 MMOL/L (ref 3.5–5.2)
SODIUM SERPL-SCNC: 135 MMOL/L (ref 136–145)

## 2020-08-25 PROCEDURE — 97130 THER IVNTJ EA ADDL 15 MIN: CPT

## 2020-08-25 PROCEDURE — 97535 SELF CARE MNGMENT TRAINING: CPT

## 2020-08-25 PROCEDURE — 97112 NEUROMUSCULAR REEDUCATION: CPT

## 2020-08-25 PROCEDURE — 97110 THERAPEUTIC EXERCISES: CPT

## 2020-08-25 PROCEDURE — 97116 GAIT TRAINING THERAPY: CPT

## 2020-08-25 PROCEDURE — 97129 THER IVNTJ 1ST 15 MIN: CPT

## 2020-08-25 PROCEDURE — 80048 BASIC METABOLIC PNL TOTAL CA: CPT | Performed by: PHYSICAL MEDICINE & REHABILITATION

## 2020-08-25 RX ORDER — DEXAMETHASONE 0.5 MG/1
0.5 TABLET ORAL
Status: COMPLETED | OUTPATIENT
Start: 2020-08-31 | End: 2020-09-02

## 2020-08-25 RX ORDER — DEXAMETHASONE 0.5 MG/1
1 TABLET ORAL EVERY 12 HOURS SCHEDULED
Status: COMPLETED | OUTPATIENT
Start: 2020-08-25 | End: 2020-08-27

## 2020-08-25 RX ORDER — DEXAMETHASONE 0.5 MG/1
1 TABLET ORAL
Status: COMPLETED | OUTPATIENT
Start: 2020-08-28 | End: 2020-08-30

## 2020-08-25 RX ORDER — POTASSIUM CHLORIDE 750 MG/1
20 CAPSULE, EXTENDED RELEASE ORAL
Status: COMPLETED | OUTPATIENT
Start: 2020-08-25 | End: 2020-08-26

## 2020-08-25 RX ADMIN — DEXAMETHASONE 2 MG: 2 TABLET ORAL at 07:59

## 2020-08-25 RX ADMIN — OXYCODONE HYDROCHLORIDE 5 MG: 5 TABLET ORAL at 16:58

## 2020-08-25 RX ADMIN — OLANZAPINE 5 MG: 5 TABLET ORAL at 16:58

## 2020-08-25 RX ADMIN — Medication 100 MG: at 08:00

## 2020-08-25 RX ADMIN — DEXAMETHASONE 1 MG: 0.5 TABLET ORAL at 20:43

## 2020-08-25 RX ADMIN — LEVETIRACETAM 500 MG: 500 TABLET, FILM COATED ORAL at 08:00

## 2020-08-25 RX ADMIN — LEVETIRACETAM 500 MG: 500 TABLET, FILM COATED ORAL at 20:43

## 2020-08-25 RX ADMIN — POTASSIUM CHLORIDE 20 MEQ: 10 CAPSULE, COATED, EXTENDED RELEASE ORAL at 20:43

## 2020-08-25 RX ADMIN — FOLIC ACID 1 MG: 1 TABLET ORAL at 08:00

## 2020-08-25 RX ADMIN — FAMOTIDINE 20 MG: 20 TABLET, FILM COATED ORAL at 07:59

## 2020-08-25 RX ADMIN — GABAPENTIN 300 MG: 300 CAPSULE ORAL at 20:43

## 2020-08-25 RX ADMIN — FAMOTIDINE 20 MG: 20 TABLET, FILM COATED ORAL at 16:58

## 2020-08-25 RX ADMIN — OXYCODONE HYDROCHLORIDE 5 MG: 5 TABLET ORAL at 07:59

## 2020-08-26 LAB
ALBUMIN SERPL-MCNC: 4.1 G/DL (ref 3.5–5.2)
ALBUMIN/GLOB SERPL: 1.2 G/DL
ALP SERPL-CCNC: 158 U/L (ref 39–117)
ALT SERPL W P-5'-P-CCNC: 119 U/L (ref 1–41)
ANION GAP SERPL CALCULATED.3IONS-SCNC: 13.7 MMOL/L (ref 5–15)
AST SERPL-CCNC: 51 U/L (ref 1–40)
BASOPHILS # BLD AUTO: 0.03 10*3/MM3 (ref 0–0.2)
BASOPHILS NFR BLD AUTO: 0.2 % (ref 0–1.5)
BILIRUB SERPL-MCNC: 1.7 MG/DL (ref 0–1.2)
BUN SERPL-MCNC: 9 MG/DL (ref 6–20)
BUN/CREAT SERPL: 19.6 (ref 7–25)
CALCIUM SPEC-SCNC: 9.8 MG/DL (ref 8.6–10.5)
CHLORIDE SERPL-SCNC: 101 MMOL/L (ref 98–107)
CO2 SERPL-SCNC: 22.3 MMOL/L (ref 22–29)
CREAT SERPL-MCNC: 0.46 MG/DL (ref 0.76–1.27)
DEPRECATED RDW RBC AUTO: 42.6 FL (ref 37–54)
EOSINOPHIL # BLD AUTO: 0.15 10*3/MM3 (ref 0–0.4)
EOSINOPHIL NFR BLD AUTO: 0.9 % (ref 0.3–6.2)
ERYTHROCYTE [DISTWIDTH] IN BLOOD BY AUTOMATED COUNT: 11.7 % (ref 12.3–15.4)
GFR SERPL CREATININE-BSD FRML MDRD: >150 ML/MIN/1.73
GLOBULIN UR ELPH-MCNC: 3.4 GM/DL
GLUCOSE SERPL-MCNC: 140 MG/DL (ref 65–99)
HCT VFR BLD AUTO: 37.7 % (ref 37.5–51)
HGB BLD-MCNC: 13.2 G/DL (ref 13–17.7)
IMM GRANULOCYTES # BLD AUTO: 0.15 10*3/MM3 (ref 0–0.05)
IMM GRANULOCYTES NFR BLD AUTO: 0.9 % (ref 0–0.5)
LYMPHOCYTES # BLD AUTO: 0.95 10*3/MM3 (ref 0.7–3.1)
LYMPHOCYTES NFR BLD AUTO: 5.9 % (ref 19.6–45.3)
MCH RBC QN AUTO: 34.8 PG (ref 26.6–33)
MCHC RBC AUTO-ENTMCNC: 35 G/DL (ref 31.5–35.7)
MCV RBC AUTO: 99.5 FL (ref 79–97)
MONOCYTES # BLD AUTO: 0.97 10*3/MM3 (ref 0.1–0.9)
MONOCYTES NFR BLD AUTO: 6 % (ref 5–12)
NEUTROPHILS NFR BLD AUTO: 13.8 10*3/MM3 (ref 1.7–7)
NEUTROPHILS NFR BLD AUTO: 86.1 % (ref 42.7–76)
NRBC BLD AUTO-RTO: 0 /100 WBC (ref 0–0.2)
PLATELET # BLD AUTO: 184 10*3/MM3 (ref 140–450)
PMV BLD AUTO: 9.5 FL (ref 6–12)
POTASSIUM SERPL-SCNC: 3.8 MMOL/L (ref 3.5–5.2)
PROT SERPL-MCNC: 7.5 G/DL (ref 6–8.5)
RBC # BLD AUTO: 3.79 10*6/MM3 (ref 4.14–5.8)
SODIUM SERPL-SCNC: 137 MMOL/L (ref 136–145)
WBC # BLD AUTO: 16.05 10*3/MM3 (ref 3.4–10.8)

## 2020-08-26 PROCEDURE — 97129 THER IVNTJ 1ST 15 MIN: CPT

## 2020-08-26 PROCEDURE — 97535 SELF CARE MNGMENT TRAINING: CPT

## 2020-08-26 PROCEDURE — 97116 GAIT TRAINING THERAPY: CPT

## 2020-08-26 PROCEDURE — 85025 COMPLETE CBC W/AUTO DIFF WBC: CPT | Performed by: PHYSICAL MEDICINE & REHABILITATION

## 2020-08-26 PROCEDURE — 97112 NEUROMUSCULAR REEDUCATION: CPT

## 2020-08-26 PROCEDURE — 97130 THER IVNTJ EA ADDL 15 MIN: CPT

## 2020-08-26 PROCEDURE — 80053 COMPREHEN METABOLIC PANEL: CPT | Performed by: PHYSICAL MEDICINE & REHABILITATION

## 2020-08-26 PROCEDURE — 97530 THERAPEUTIC ACTIVITIES: CPT

## 2020-08-26 RX ORDER — OXYCODONE HYDROCHLORIDE 5 MG/1
5 TABLET ORAL EVERY 6 HOURS PRN
Status: DISCONTINUED | OUTPATIENT
Start: 2020-08-26 | End: 2020-09-28

## 2020-08-26 RX ADMIN — LEVETIRACETAM 500 MG: 500 TABLET, FILM COATED ORAL at 20:42

## 2020-08-26 RX ADMIN — DEXAMETHASONE 1 MG: 0.5 TABLET ORAL at 20:42

## 2020-08-26 RX ADMIN — LEVETIRACETAM 500 MG: 500 TABLET, FILM COATED ORAL at 09:01

## 2020-08-26 RX ADMIN — GABAPENTIN 300 MG: 300 CAPSULE ORAL at 20:42

## 2020-08-26 RX ADMIN — OLANZAPINE 5 MG: 5 TABLET ORAL at 17:11

## 2020-08-26 RX ADMIN — FAMOTIDINE 20 MG: 20 TABLET, FILM COATED ORAL at 17:11

## 2020-08-26 RX ADMIN — Medication 100 MG: at 09:01

## 2020-08-26 RX ADMIN — FAMOTIDINE 20 MG: 20 TABLET, FILM COATED ORAL at 05:35

## 2020-08-26 RX ADMIN — DEXAMETHASONE 1 MG: 0.5 TABLET ORAL at 09:01

## 2020-08-26 RX ADMIN — POTASSIUM CHLORIDE 20 MEQ: 10 CAPSULE, COATED, EXTENDED RELEASE ORAL at 11:39

## 2020-08-26 RX ADMIN — FOLIC ACID 1 MG: 1 TABLET ORAL at 09:01

## 2020-08-26 RX ADMIN — POTASSIUM CHLORIDE 20 MEQ: 10 CAPSULE, COATED, EXTENDED RELEASE ORAL at 09:02

## 2020-08-27 ENCOUNTER — TELEPHONE (OUTPATIENT)
Dept: NEUROSURGERY | Facility: CLINIC | Age: 30
End: 2020-08-27

## 2020-08-27 DIAGNOSIS — I61.0 NONTRAUMATIC SUBCORTICAL HEMORRHAGE OF RIGHT CEREBRAL HEMISPHERE (HCC): Primary | ICD-10-CM

## 2020-08-27 LAB
ANION GAP SERPL CALCULATED.3IONS-SCNC: 14 MMOL/L (ref 5–15)
BUN SERPL-MCNC: 9 MG/DL (ref 6–20)
BUN/CREAT SERPL: 23.1 (ref 7–25)
CALCIUM SPEC-SCNC: 9.5 MG/DL (ref 8.6–10.5)
CHLORIDE SERPL-SCNC: 100 MMOL/L (ref 98–107)
CO2 SERPL-SCNC: 22 MMOL/L (ref 22–29)
CREAT SERPL-MCNC: 0.39 MG/DL (ref 0.76–1.27)
GFR SERPL CREATININE-BSD FRML MDRD: >150 ML/MIN/1.73
GLUCOSE SERPL-MCNC: 160 MG/DL (ref 65–99)
POTASSIUM SERPL-SCNC: 3.7 MMOL/L (ref 3.5–5.2)
SODIUM SERPL-SCNC: 136 MMOL/L (ref 136–145)

## 2020-08-27 PROCEDURE — 97129 THER IVNTJ 1ST 15 MIN: CPT

## 2020-08-27 PROCEDURE — 97535 SELF CARE MNGMENT TRAINING: CPT

## 2020-08-27 PROCEDURE — 99024 POSTOP FOLLOW-UP VISIT: CPT | Performed by: NURSE PRACTITIONER

## 2020-08-27 PROCEDURE — 97112 NEUROMUSCULAR REEDUCATION: CPT | Performed by: OCCUPATIONAL THERAPIST

## 2020-08-27 PROCEDURE — 80048 BASIC METABOLIC PNL TOTAL CA: CPT | Performed by: PHYSICAL MEDICINE & REHABILITATION

## 2020-08-27 PROCEDURE — 97130 THER IVNTJ EA ADDL 15 MIN: CPT

## 2020-08-27 PROCEDURE — 97116 GAIT TRAINING THERAPY: CPT

## 2020-08-27 RX ORDER — MIRTAZAPINE 15 MG/1
15 TABLET, FILM COATED ORAL NIGHTLY
Status: DISCONTINUED | OUTPATIENT
Start: 2020-09-04 | End: 2020-09-28 | Stop reason: HOSPADM

## 2020-08-27 RX ORDER — MIRTAZAPINE 15 MG/1
7.5 TABLET, FILM COATED ORAL NIGHTLY
Status: COMPLETED | OUTPATIENT
Start: 2020-08-28 | End: 2020-09-03

## 2020-08-27 RX ADMIN — Medication 100 MG: at 08:46

## 2020-08-27 RX ADMIN — DEXAMETHASONE 1 MG: 0.5 TABLET ORAL at 08:46

## 2020-08-27 RX ADMIN — GABAPENTIN 300 MG: 300 CAPSULE ORAL at 22:20

## 2020-08-27 RX ADMIN — LEVETIRACETAM 500 MG: 500 TABLET, FILM COATED ORAL at 08:46

## 2020-08-27 RX ADMIN — OLANZAPINE 5 MG: 5 TABLET ORAL at 17:38

## 2020-08-27 RX ADMIN — FOLIC ACID 1 MG: 1 TABLET ORAL at 08:46

## 2020-08-27 RX ADMIN — FAMOTIDINE 20 MG: 20 TABLET, FILM COATED ORAL at 07:16

## 2020-08-27 RX ADMIN — FAMOTIDINE 20 MG: 20 TABLET, FILM COATED ORAL at 17:38

## 2020-08-27 RX ADMIN — LEVETIRACETAM 500 MG: 500 TABLET, FILM COATED ORAL at 22:20

## 2020-08-27 NOTE — TELEPHONE ENCOUNTER
----- Message from NATHALY Velez sent at 8/27/2020  2:56 PM EDT -----  Regarding: f/u  Patient with intraparenchymal hemorrhage likely secondary to ruptured AVM.  Needs follow-up in 1 month with Dr. MAY  CT head no contrast for appointment.  Patient remains in [facility] for now.  Not sure how long he will be there.

## 2020-08-27 NOTE — TELEPHONE ENCOUNTER
----- Message from NATHALY Velez sent at 8/27/2020  2:56 PM EDT -----  Regarding: f/u  Patient with intraparenchymal hemorrhage likely secondary to ruptured AVM.  Needs follow-up in 1 month with Dr. MAY  CT head no contrast for appointment.  Patient remains in [facility] for now.  Not sure how long he will be there.

## 2020-08-27 NOTE — TELEPHONE ENCOUNTER
Martha, please put him on Dr CHILD's schedule for 9.29.20 at 1:30 pm please    Hospital fu with head ct same day

## 2020-08-28 LAB
ALBUMIN SERPL-MCNC: 3.4 G/DL (ref 3.5–5.2)
ALBUMIN/GLOB SERPL: 1 G/DL
ALP SERPL-CCNC: 129 U/L (ref 39–117)
ALT SERPL W P-5'-P-CCNC: 91 U/L (ref 1–41)
ANION GAP SERPL CALCULATED.3IONS-SCNC: 10.5 MMOL/L (ref 5–15)
AST SERPL-CCNC: 45 U/L (ref 1–40)
BASOPHILS # BLD AUTO: 0.03 10*3/MM3 (ref 0–0.2)
BASOPHILS NFR BLD AUTO: 0.2 % (ref 0–1.5)
BILIRUB SERPL-MCNC: 1.8 MG/DL (ref 0–1.2)
BUN SERPL-MCNC: 9 MG/DL (ref 6–20)
BUN/CREAT SERPL: 21.4 (ref 7–25)
CALCIUM SPEC-SCNC: 9.3 MG/DL (ref 8.6–10.5)
CHLORIDE SERPL-SCNC: 104 MMOL/L (ref 98–107)
CO2 SERPL-SCNC: 22.5 MMOL/L (ref 22–29)
CREAT SERPL-MCNC: 0.42 MG/DL (ref 0.76–1.27)
DEPRECATED RDW RBC AUTO: 42.2 FL (ref 37–54)
EOSINOPHIL # BLD AUTO: 0.36 10*3/MM3 (ref 0–0.4)
EOSINOPHIL NFR BLD AUTO: 3 % (ref 0.3–6.2)
ERYTHROCYTE [DISTWIDTH] IN BLOOD BY AUTOMATED COUNT: 11.5 % (ref 12.3–15.4)
GFR SERPL CREATININE-BSD FRML MDRD: >150 ML/MIN/1.73
GLOBULIN UR ELPH-MCNC: 3.5 GM/DL
GLUCOSE SERPL-MCNC: 163 MG/DL (ref 65–99)
HCT VFR BLD AUTO: 35.5 % (ref 37.5–51)
HGB BLD-MCNC: 12.3 G/DL (ref 13–17.7)
IMM GRANULOCYTES # BLD AUTO: 0.1 10*3/MM3 (ref 0–0.05)
IMM GRANULOCYTES NFR BLD AUTO: 0.8 % (ref 0–0.5)
LYMPHOCYTES # BLD AUTO: 1.28 10*3/MM3 (ref 0.7–3.1)
LYMPHOCYTES NFR BLD AUTO: 10.5 % (ref 19.6–45.3)
MCH RBC QN AUTO: 34.8 PG (ref 26.6–33)
MCHC RBC AUTO-ENTMCNC: 34.6 G/DL (ref 31.5–35.7)
MCV RBC AUTO: 100.6 FL (ref 79–97)
MONOCYTES # BLD AUTO: 0.81 10*3/MM3 (ref 0.1–0.9)
MONOCYTES NFR BLD AUTO: 6.7 % (ref 5–12)
NEUTROPHILS NFR BLD AUTO: 78.8 % (ref 42.7–76)
NEUTROPHILS NFR BLD AUTO: 9.58 10*3/MM3 (ref 1.7–7)
NRBC BLD AUTO-RTO: 0 /100 WBC (ref 0–0.2)
PLATELET # BLD AUTO: 132 10*3/MM3 (ref 140–450)
PMV BLD AUTO: 9.8 FL (ref 6–12)
POTASSIUM SERPL-SCNC: 3.3 MMOL/L (ref 3.5–5.2)
PROT SERPL-MCNC: 6.9 G/DL (ref 6–8.5)
RBC # BLD AUTO: 3.53 10*6/MM3 (ref 4.14–5.8)
SODIUM SERPL-SCNC: 137 MMOL/L (ref 136–145)
WBC # BLD AUTO: 12.16 10*3/MM3 (ref 3.4–10.8)

## 2020-08-28 PROCEDURE — 97129 THER IVNTJ 1ST 15 MIN: CPT

## 2020-08-28 PROCEDURE — 97130 THER IVNTJ EA ADDL 15 MIN: CPT

## 2020-08-28 PROCEDURE — 85025 COMPLETE CBC W/AUTO DIFF WBC: CPT | Performed by: PHYSICAL MEDICINE & REHABILITATION

## 2020-08-28 PROCEDURE — 80053 COMPREHEN METABOLIC PANEL: CPT | Performed by: PHYSICAL MEDICINE & REHABILITATION

## 2020-08-28 PROCEDURE — 97116 GAIT TRAINING THERAPY: CPT

## 2020-08-28 PROCEDURE — 97112 NEUROMUSCULAR REEDUCATION: CPT

## 2020-08-28 PROCEDURE — 97535 SELF CARE MNGMENT TRAINING: CPT

## 2020-08-28 RX ORDER — POTASSIUM CHLORIDE 750 MG/1
20 CAPSULE, EXTENDED RELEASE ORAL
Status: COMPLETED | OUTPATIENT
Start: 2020-08-28 | End: 2020-08-28

## 2020-08-28 RX ADMIN — GABAPENTIN 300 MG: 300 CAPSULE ORAL at 21:06

## 2020-08-28 RX ADMIN — POTASSIUM CHLORIDE 20 MEQ: 10 CAPSULE, COATED, EXTENDED RELEASE ORAL at 16:55

## 2020-08-28 RX ADMIN — Medication 100 MG: at 09:10

## 2020-08-28 RX ADMIN — DEXAMETHASONE 1 MG: 0.5 TABLET ORAL at 09:10

## 2020-08-28 RX ADMIN — FAMOTIDINE 20 MG: 20 TABLET, FILM COATED ORAL at 06:49

## 2020-08-28 RX ADMIN — FAMOTIDINE 20 MG: 20 TABLET, FILM COATED ORAL at 16:56

## 2020-08-28 RX ADMIN — OXYCODONE 5 MG: 5 TABLET ORAL at 21:10

## 2020-08-28 RX ADMIN — POTASSIUM CHLORIDE 20 MEQ: 10 CAPSULE, COATED, EXTENDED RELEASE ORAL at 11:42

## 2020-08-28 RX ADMIN — LEVETIRACETAM 500 MG: 500 TABLET, FILM COATED ORAL at 21:06

## 2020-08-28 RX ADMIN — LEVETIRACETAM 500 MG: 500 TABLET, FILM COATED ORAL at 09:10

## 2020-08-28 RX ADMIN — FOLIC ACID 1 MG: 1 TABLET ORAL at 09:10

## 2020-08-28 RX ADMIN — MIRTAZAPINE 7.5 MG: 15 TABLET, FILM COATED ORAL at 21:06

## 2020-08-29 PROCEDURE — 97112 NEUROMUSCULAR REEDUCATION: CPT

## 2020-08-29 PROCEDURE — 97116 GAIT TRAINING THERAPY: CPT

## 2020-08-29 RX ADMIN — GABAPENTIN 300 MG: 300 CAPSULE ORAL at 20:43

## 2020-08-29 RX ADMIN — MIRTAZAPINE 7.5 MG: 15 TABLET, FILM COATED ORAL at 20:43

## 2020-08-29 RX ADMIN — FAMOTIDINE 20 MG: 20 TABLET, FILM COATED ORAL at 05:23

## 2020-08-29 RX ADMIN — OXYCODONE 5 MG: 5 TABLET ORAL at 19:05

## 2020-08-29 RX ADMIN — Medication 100 MG: at 08:36

## 2020-08-29 RX ADMIN — LEVETIRACETAM 500 MG: 500 TABLET, FILM COATED ORAL at 20:43

## 2020-08-29 RX ADMIN — FAMOTIDINE 20 MG: 20 TABLET, FILM COATED ORAL at 17:12

## 2020-08-29 RX ADMIN — LEVETIRACETAM 500 MG: 500 TABLET, FILM COATED ORAL at 08:36

## 2020-08-29 RX ADMIN — FOLIC ACID 1 MG: 1 TABLET ORAL at 08:36

## 2020-08-29 RX ADMIN — DEXAMETHASONE 1 MG: 0.5 TABLET ORAL at 08:36

## 2020-08-30 RX ORDER — BUTALBITAL, ACETAMINOPHEN AND CAFFEINE 50; 325; 40 MG/1; MG/1; MG/1
1 TABLET ORAL EVERY 6 HOURS PRN
Status: DISCONTINUED | OUTPATIENT
Start: 2020-08-30 | End: 2020-08-30

## 2020-08-30 RX ADMIN — LEVETIRACETAM 500 MG: 500 TABLET, FILM COATED ORAL at 20:24

## 2020-08-30 RX ADMIN — MIRTAZAPINE 7.5 MG: 15 TABLET, FILM COATED ORAL at 20:24

## 2020-08-30 RX ADMIN — GABAPENTIN 300 MG: 300 CAPSULE ORAL at 20:24

## 2020-08-30 RX ADMIN — FAMOTIDINE 20 MG: 20 TABLET, FILM COATED ORAL at 17:04

## 2020-08-30 RX ADMIN — DEXAMETHASONE 1 MG: 0.5 TABLET ORAL at 08:36

## 2020-08-30 RX ADMIN — LEVETIRACETAM 500 MG: 500 TABLET, FILM COATED ORAL at 08:36

## 2020-08-30 RX ADMIN — OXYCODONE 5 MG: 5 TABLET ORAL at 11:47

## 2020-08-30 RX ADMIN — FOLIC ACID 1 MG: 1 TABLET ORAL at 08:36

## 2020-08-30 RX ADMIN — FAMOTIDINE 20 MG: 20 TABLET, FILM COATED ORAL at 05:46

## 2020-08-30 RX ADMIN — Medication 100 MG: at 08:36

## 2020-08-31 LAB
ALBUMIN SERPL-MCNC: 3.6 G/DL (ref 3.5–5.2)
ALBUMIN/GLOB SERPL: 1 G/DL
ALP SERPL-CCNC: 125 U/L (ref 39–117)
ALT SERPL W P-5'-P-CCNC: 82 U/L (ref 1–41)
ANION GAP SERPL CALCULATED.3IONS-SCNC: 10.1 MMOL/L (ref 5–15)
AST SERPL-CCNC: 51 U/L (ref 1–40)
BASOPHILS # BLD AUTO: 0.03 10*3/MM3 (ref 0–0.2)
BASOPHILS NFR BLD AUTO: 0.3 % (ref 0–1.5)
BILIRUB SERPL-MCNC: 2.2 MG/DL (ref 0–1.2)
BUN SERPL-MCNC: 9 MG/DL (ref 6–20)
BUN/CREAT SERPL: 22 (ref 7–25)
CALCIUM SPEC-SCNC: 9.6 MG/DL (ref 8.6–10.5)
CHLORIDE SERPL-SCNC: 105 MMOL/L (ref 98–107)
CO2 SERPL-SCNC: 20.9 MMOL/L (ref 22–29)
CREAT SERPL-MCNC: 0.41 MG/DL (ref 0.76–1.27)
DEPRECATED RDW RBC AUTO: 41.1 FL (ref 37–54)
EOSINOPHIL # BLD AUTO: 0.47 10*3/MM3 (ref 0–0.4)
EOSINOPHIL NFR BLD AUTO: 4.2 % (ref 0.3–6.2)
ERYTHROCYTE [DISTWIDTH] IN BLOOD BY AUTOMATED COUNT: 11.7 % (ref 12.3–15.4)
GFR SERPL CREATININE-BSD FRML MDRD: >150 ML/MIN/1.73
GLOBULIN UR ELPH-MCNC: 3.5 GM/DL
GLUCOSE SERPL-MCNC: 101 MG/DL (ref 65–99)
HCT VFR BLD AUTO: 36.2 % (ref 37.5–51)
HGB BLD-MCNC: 12.9 G/DL (ref 13–17.7)
IMM GRANULOCYTES # BLD AUTO: 0.08 10*3/MM3 (ref 0–0.05)
IMM GRANULOCYTES NFR BLD AUTO: 0.7 % (ref 0–0.5)
LYMPHOCYTES # BLD AUTO: 1.66 10*3/MM3 (ref 0.7–3.1)
LYMPHOCYTES NFR BLD AUTO: 14.8 % (ref 19.6–45.3)
MCH RBC QN AUTO: 34.5 PG (ref 26.6–33)
MCHC RBC AUTO-ENTMCNC: 35.6 G/DL (ref 31.5–35.7)
MCV RBC AUTO: 96.8 FL (ref 79–97)
MONOCYTES # BLD AUTO: 0.7 10*3/MM3 (ref 0.1–0.9)
MONOCYTES NFR BLD AUTO: 6.2 % (ref 5–12)
NEUTROPHILS NFR BLD AUTO: 73.8 % (ref 42.7–76)
NEUTROPHILS NFR BLD AUTO: 8.3 10*3/MM3 (ref 1.7–7)
NRBC BLD AUTO-RTO: 0 /100 WBC (ref 0–0.2)
PLATELET # BLD AUTO: 136 10*3/MM3 (ref 140–450)
PMV BLD AUTO: 9.4 FL (ref 6–12)
POTASSIUM SERPL-SCNC: 3.7 MMOL/L (ref 3.5–5.2)
PROT SERPL-MCNC: 7.1 G/DL (ref 6–8.5)
RBC # BLD AUTO: 3.74 10*6/MM3 (ref 4.14–5.8)
SODIUM SERPL-SCNC: 136 MMOL/L (ref 136–145)
WBC # BLD AUTO: 11.24 10*3/MM3 (ref 3.4–10.8)

## 2020-08-31 PROCEDURE — 97530 THERAPEUTIC ACTIVITIES: CPT

## 2020-08-31 PROCEDURE — 97112 NEUROMUSCULAR REEDUCATION: CPT

## 2020-08-31 PROCEDURE — 97535 SELF CARE MNGMENT TRAINING: CPT

## 2020-08-31 PROCEDURE — 97129 THER IVNTJ 1ST 15 MIN: CPT

## 2020-08-31 PROCEDURE — 97130 THER IVNTJ EA ADDL 15 MIN: CPT

## 2020-08-31 PROCEDURE — 97116 GAIT TRAINING THERAPY: CPT

## 2020-08-31 PROCEDURE — 80053 COMPREHEN METABOLIC PANEL: CPT | Performed by: PHYSICAL MEDICINE & REHABILITATION

## 2020-08-31 PROCEDURE — 85025 COMPLETE CBC W/AUTO DIFF WBC: CPT | Performed by: PHYSICAL MEDICINE & REHABILITATION

## 2020-08-31 RX ADMIN — OXYCODONE 5 MG: 5 TABLET ORAL at 07:20

## 2020-08-31 RX ADMIN — Medication 100 MG: at 07:21

## 2020-08-31 RX ADMIN — GABAPENTIN 300 MG: 300 CAPSULE ORAL at 20:52

## 2020-08-31 RX ADMIN — FAMOTIDINE 20 MG: 20 TABLET, FILM COATED ORAL at 06:15

## 2020-08-31 RX ADMIN — LEVETIRACETAM 500 MG: 500 TABLET, FILM COATED ORAL at 20:52

## 2020-08-31 RX ADMIN — MIRTAZAPINE 7.5 MG: 15 TABLET, FILM COATED ORAL at 20:52

## 2020-08-31 RX ADMIN — FAMOTIDINE 20 MG: 20 TABLET, FILM COATED ORAL at 16:36

## 2020-08-31 RX ADMIN — FOLIC ACID 1 MG: 1 TABLET ORAL at 07:20

## 2020-08-31 RX ADMIN — OXYCODONE 5 MG: 5 TABLET ORAL at 22:10

## 2020-08-31 RX ADMIN — LEVETIRACETAM 500 MG: 500 TABLET, FILM COATED ORAL at 07:21

## 2020-08-31 RX ADMIN — DEXAMETHASONE 0.5 MG: 0.5 TABLET ORAL at 07:20

## 2020-09-01 LAB — HEMOSIDERIN UR QL: NEGATIVE

## 2020-09-01 PROCEDURE — 97112 NEUROMUSCULAR REEDUCATION: CPT

## 2020-09-01 PROCEDURE — 97116 GAIT TRAINING THERAPY: CPT

## 2020-09-01 PROCEDURE — 97130 THER IVNTJ EA ADDL 15 MIN: CPT

## 2020-09-01 PROCEDURE — 97535 SELF CARE MNGMENT TRAINING: CPT

## 2020-09-01 PROCEDURE — 97129 THER IVNTJ 1ST 15 MIN: CPT

## 2020-09-01 RX ADMIN — DEXAMETHASONE 0.5 MG: 0.5 TABLET ORAL at 07:11

## 2020-09-01 RX ADMIN — LEVETIRACETAM 500 MG: 500 TABLET, FILM COATED ORAL at 20:47

## 2020-09-01 RX ADMIN — FAMOTIDINE 20 MG: 20 TABLET, FILM COATED ORAL at 17:05

## 2020-09-01 RX ADMIN — GABAPENTIN 300 MG: 300 CAPSULE ORAL at 20:48

## 2020-09-01 RX ADMIN — MIRTAZAPINE 7.5 MG: 15 TABLET, FILM COATED ORAL at 20:48

## 2020-09-01 RX ADMIN — Medication 100 MG: at 07:11

## 2020-09-01 RX ADMIN — FOLIC ACID 1 MG: 1 TABLET ORAL at 07:11

## 2020-09-01 RX ADMIN — OXYCODONE 5 MG: 5 TABLET ORAL at 18:32

## 2020-09-01 RX ADMIN — LEVETIRACETAM 500 MG: 500 TABLET, FILM COATED ORAL at 07:11

## 2020-09-01 RX ADMIN — FAMOTIDINE 20 MG: 20 TABLET, FILM COATED ORAL at 06:21

## 2020-09-02 LAB
ALBUMIN SERPL-MCNC: 3.7 G/DL (ref 3.5–5.2)
ALBUMIN/GLOB SERPL: 1.1 G/DL
ALP SERPL-CCNC: 115 U/L (ref 39–117)
ALT SERPL W P-5'-P-CCNC: 72 U/L (ref 1–41)
ANION GAP SERPL CALCULATED.3IONS-SCNC: 10.6 MMOL/L (ref 5–15)
AST SERPL-CCNC: 52 U/L (ref 1–40)
BASOPHILS # BLD AUTO: 0.03 10*3/MM3 (ref 0–0.2)
BASOPHILS NFR BLD AUTO: 0.3 % (ref 0–1.5)
BILIRUB SERPL-MCNC: 2.8 MG/DL (ref 0–1.2)
BUN SERPL-MCNC: 9 MG/DL (ref 6–20)
BUN/CREAT SERPL: 19.6 (ref 7–25)
CALCIUM SPEC-SCNC: 9.2 MG/DL (ref 8.6–10.5)
CHLORIDE SERPL-SCNC: 102 MMOL/L (ref 98–107)
CO2 SERPL-SCNC: 23.4 MMOL/L (ref 22–29)
CREAT SERPL-MCNC: 0.46 MG/DL (ref 0.76–1.27)
DEPRECATED RDW RBC AUTO: 41.1 FL (ref 37–54)
EOSINOPHIL # BLD AUTO: 0.51 10*3/MM3 (ref 0–0.4)
EOSINOPHIL NFR BLD AUTO: 5.5 % (ref 0.3–6.2)
ERYTHROCYTE [DISTWIDTH] IN BLOOD BY AUTOMATED COUNT: 11.6 % (ref 12.3–15.4)
GFR SERPL CREATININE-BSD FRML MDRD: >150 ML/MIN/1.73
GLOBULIN UR ELPH-MCNC: 3.5 GM/DL
GLUCOSE SERPL-MCNC: 157 MG/DL (ref 65–99)
HCT VFR BLD AUTO: 35.5 % (ref 37.5–51)
HGB BLD-MCNC: 12.8 G/DL (ref 13–17.7)
IMM GRANULOCYTES # BLD AUTO: 0.03 10*3/MM3 (ref 0–0.05)
IMM GRANULOCYTES NFR BLD AUTO: 0.3 % (ref 0–0.5)
LYMPHOCYTES # BLD AUTO: 1.41 10*3/MM3 (ref 0.7–3.1)
LYMPHOCYTES NFR BLD AUTO: 15.3 % (ref 19.6–45.3)
MCH RBC QN AUTO: 35.1 PG (ref 26.6–33)
MCHC RBC AUTO-ENTMCNC: 36.1 G/DL (ref 31.5–35.7)
MCV RBC AUTO: 97.3 FL (ref 79–97)
MONOCYTES # BLD AUTO: 0.68 10*3/MM3 (ref 0.1–0.9)
MONOCYTES NFR BLD AUTO: 7.4 % (ref 5–12)
NEUTROPHILS NFR BLD AUTO: 6.57 10*3/MM3 (ref 1.7–7)
NEUTROPHILS NFR BLD AUTO: 71.2 % (ref 42.7–76)
NRBC BLD AUTO-RTO: 0 /100 WBC (ref 0–0.2)
PLATELET # BLD AUTO: 125 10*3/MM3 (ref 140–450)
PMV BLD AUTO: 9.4 FL (ref 6–12)
POTASSIUM SERPL-SCNC: 3.1 MMOL/L (ref 3.5–5.2)
PROT SERPL-MCNC: 7.2 G/DL (ref 6–8.5)
RBC # BLD AUTO: 3.65 10*6/MM3 (ref 4.14–5.8)
SODIUM SERPL-SCNC: 136 MMOL/L (ref 136–145)
WBC # BLD AUTO: 9.23 10*3/MM3 (ref 3.4–10.8)

## 2020-09-02 PROCEDURE — 80053 COMPREHEN METABOLIC PANEL: CPT | Performed by: PHYSICAL MEDICINE & REHABILITATION

## 2020-09-02 PROCEDURE — 97112 NEUROMUSCULAR REEDUCATION: CPT

## 2020-09-02 PROCEDURE — 97535 SELF CARE MNGMENT TRAINING: CPT

## 2020-09-02 PROCEDURE — 97129 THER IVNTJ 1ST 15 MIN: CPT

## 2020-09-02 PROCEDURE — 92526 ORAL FUNCTION THERAPY: CPT

## 2020-09-02 PROCEDURE — 97130 THER IVNTJ EA ADDL 15 MIN: CPT

## 2020-09-02 PROCEDURE — 85025 COMPLETE CBC W/AUTO DIFF WBC: CPT | Performed by: PHYSICAL MEDICINE & REHABILITATION

## 2020-09-02 PROCEDURE — 97110 THERAPEUTIC EXERCISES: CPT

## 2020-09-02 PROCEDURE — 97116 GAIT TRAINING THERAPY: CPT

## 2020-09-02 PROCEDURE — 97530 THERAPEUTIC ACTIVITIES: CPT

## 2020-09-02 RX ORDER — POTASSIUM CHLORIDE 750 MG/1
40 CAPSULE, EXTENDED RELEASE ORAL AS NEEDED
Status: DISCONTINUED | OUTPATIENT
Start: 2020-09-02 | End: 2020-09-28 | Stop reason: HOSPADM

## 2020-09-02 RX ADMIN — DEXAMETHASONE 0.5 MG: 0.5 TABLET ORAL at 09:13

## 2020-09-02 RX ADMIN — FAMOTIDINE 20 MG: 20 TABLET, FILM COATED ORAL at 05:55

## 2020-09-02 RX ADMIN — POTASSIUM CHLORIDE 40 MEQ: 10 CAPSULE, COATED, EXTENDED RELEASE ORAL at 15:03

## 2020-09-02 RX ADMIN — FOLIC ACID 1 MG: 1 TABLET ORAL at 09:13

## 2020-09-02 RX ADMIN — POTASSIUM CHLORIDE 40 MEQ: 10 CAPSULE, COATED, EXTENDED RELEASE ORAL at 22:59

## 2020-09-02 RX ADMIN — LEVETIRACETAM 500 MG: 500 TABLET, FILM COATED ORAL at 20:26

## 2020-09-02 RX ADMIN — FAMOTIDINE 20 MG: 20 TABLET, FILM COATED ORAL at 17:20

## 2020-09-02 RX ADMIN — GABAPENTIN 300 MG: 300 CAPSULE ORAL at 20:26

## 2020-09-02 RX ADMIN — MIRTAZAPINE 7.5 MG: 15 TABLET, FILM COATED ORAL at 20:31

## 2020-09-02 RX ADMIN — OXYCODONE 5 MG: 5 TABLET ORAL at 00:24

## 2020-09-02 RX ADMIN — Medication 100 MG: at 09:13

## 2020-09-02 RX ADMIN — OXYCODONE 5 MG: 5 TABLET ORAL at 20:26

## 2020-09-02 RX ADMIN — LEVETIRACETAM 500 MG: 500 TABLET, FILM COATED ORAL at 09:13

## 2020-09-02 RX ADMIN — OXYCODONE 5 MG: 5 TABLET ORAL at 11:13

## 2020-09-02 RX ADMIN — POTASSIUM CHLORIDE 40 MEQ: 10 CAPSULE, COATED, EXTENDED RELEASE ORAL at 11:13

## 2020-09-03 LAB
INR PPP: 1.22 (ref 0.9–1.1)
MAGNESIUM SERPL-MCNC: 1.9 MG/DL (ref 1.6–2.6)
POTASSIUM SERPL-SCNC: 3.7 MMOL/L (ref 3.5–5.2)
PROTHROMBIN TIME: 15.2 SECONDS (ref 11.7–14.2)

## 2020-09-03 PROCEDURE — 97130 THER IVNTJ EA ADDL 15 MIN: CPT

## 2020-09-03 PROCEDURE — 84132 ASSAY OF SERUM POTASSIUM: CPT | Performed by: PHYSICAL MEDICINE & REHABILITATION

## 2020-09-03 PROCEDURE — 83735 ASSAY OF MAGNESIUM: CPT | Performed by: PHYSICAL MEDICINE & REHABILITATION

## 2020-09-03 PROCEDURE — 97535 SELF CARE MNGMENT TRAINING: CPT

## 2020-09-03 PROCEDURE — 85610 PROTHROMBIN TIME: CPT | Performed by: PHYSICAL MEDICINE & REHABILITATION

## 2020-09-03 PROCEDURE — 97112 NEUROMUSCULAR REEDUCATION: CPT

## 2020-09-03 PROCEDURE — 92526 ORAL FUNCTION THERAPY: CPT

## 2020-09-03 PROCEDURE — 97112 NEUROMUSCULAR REEDUCATION: CPT | Performed by: PHYSICAL THERAPIST

## 2020-09-03 PROCEDURE — 97116 GAIT TRAINING THERAPY: CPT | Performed by: PHYSICAL THERAPIST

## 2020-09-03 PROCEDURE — 97129 THER IVNTJ 1ST 15 MIN: CPT

## 2020-09-03 RX ORDER — THIAMINE MONONITRATE (VIT B1) 100 MG
100 TABLET ORAL DAILY
Status: DISCONTINUED | OUTPATIENT
Start: 2020-09-04 | End: 2020-09-28 | Stop reason: HOSPADM

## 2020-09-03 RX ORDER — POTASSIUM CHLORIDE 750 MG/1
10 CAPSULE, EXTENDED RELEASE ORAL 2 TIMES DAILY WITH MEALS
Status: DISCONTINUED | OUTPATIENT
Start: 2020-09-04 | End: 2020-09-14

## 2020-09-03 RX ORDER — FOLIC ACID 1 MG/1
1 TABLET ORAL DAILY
Status: DISCONTINUED | OUTPATIENT
Start: 2020-09-04 | End: 2020-09-28 | Stop reason: HOSPADM

## 2020-09-03 RX ADMIN — FAMOTIDINE 20 MG: 20 TABLET, FILM COATED ORAL at 17:04

## 2020-09-03 RX ADMIN — OXYCODONE 5 MG: 5 TABLET ORAL at 21:01

## 2020-09-03 RX ADMIN — Medication 100 MG: at 07:58

## 2020-09-03 RX ADMIN — LEVETIRACETAM 500 MG: 500 TABLET, FILM COATED ORAL at 07:58

## 2020-09-03 RX ADMIN — GABAPENTIN 300 MG: 300 CAPSULE ORAL at 21:01

## 2020-09-03 RX ADMIN — OXYCODONE 5 MG: 5 TABLET ORAL at 12:52

## 2020-09-03 RX ADMIN — FAMOTIDINE 20 MG: 20 TABLET, FILM COATED ORAL at 06:10

## 2020-09-03 RX ADMIN — MIRTAZAPINE 7.5 MG: 15 TABLET, FILM COATED ORAL at 21:01

## 2020-09-03 RX ADMIN — FOLIC ACID 1 MG: 1 TABLET ORAL at 07:58

## 2020-09-03 RX ADMIN — LEVETIRACETAM 500 MG: 500 TABLET, FILM COATED ORAL at 21:01

## 2020-09-03 RX ADMIN — OXYCODONE 5 MG: 5 TABLET ORAL at 03:05

## 2020-09-04 LAB
ALBUMIN SERPL-MCNC: 3.6 G/DL (ref 3.5–5.2)
ALBUMIN/GLOB SERPL: 1.1 G/DL
ALP SERPL-CCNC: 137 U/L (ref 39–117)
ALT SERPL W P-5'-P-CCNC: 59 U/L (ref 1–41)
ANION GAP SERPL CALCULATED.3IONS-SCNC: 10.5 MMOL/L (ref 5–15)
AST SERPL-CCNC: 49 U/L (ref 1–40)
BASOPHILS # BLD AUTO: 0.04 10*3/MM3 (ref 0–0.2)
BASOPHILS NFR BLD AUTO: 0.4 % (ref 0–1.5)
BILIRUB SERPL-MCNC: 2.5 MG/DL (ref 0–1.2)
BUN SERPL-MCNC: 8 MG/DL (ref 6–20)
BUN/CREAT SERPL: 19 (ref 7–25)
CALCIUM SPEC-SCNC: 9.1 MG/DL (ref 8.6–10.5)
CHLORIDE SERPL-SCNC: 104 MMOL/L (ref 98–107)
CO2 SERPL-SCNC: 21.5 MMOL/L (ref 22–29)
CREAT SERPL-MCNC: 0.42 MG/DL (ref 0.76–1.27)
DEPRECATED RDW RBC AUTO: 40.9 FL (ref 37–54)
EOSINOPHIL # BLD AUTO: 0.5 10*3/MM3 (ref 0–0.4)
EOSINOPHIL NFR BLD AUTO: 5.3 % (ref 0.3–6.2)
ERYTHROCYTE [DISTWIDTH] IN BLOOD BY AUTOMATED COUNT: 11.3 % (ref 12.3–15.4)
GFR SERPL CREATININE-BSD FRML MDRD: >150 ML/MIN/1.73
GLOBULIN UR ELPH-MCNC: 3.3 GM/DL
GLUCOSE SERPL-MCNC: 96 MG/DL (ref 65–99)
HCT VFR BLD AUTO: 36.2 % (ref 37.5–51)
HGB BLD-MCNC: 12.7 G/DL (ref 13–17.7)
IMM GRANULOCYTES # BLD AUTO: 0.04 10*3/MM3 (ref 0–0.05)
IMM GRANULOCYTES NFR BLD AUTO: 0.4 % (ref 0–0.5)
LYMPHOCYTES # BLD AUTO: 1.76 10*3/MM3 (ref 0.7–3.1)
LYMPHOCYTES NFR BLD AUTO: 18.5 % (ref 19.6–45.3)
MCH RBC QN AUTO: 34.6 PG (ref 26.6–33)
MCHC RBC AUTO-ENTMCNC: 35.1 G/DL (ref 31.5–35.7)
MCV RBC AUTO: 98.6 FL (ref 79–97)
MONOCYTES # BLD AUTO: 0.71 10*3/MM3 (ref 0.1–0.9)
MONOCYTES NFR BLD AUTO: 7.5 % (ref 5–12)
NEUTROPHILS NFR BLD AUTO: 6.47 10*3/MM3 (ref 1.7–7)
NEUTROPHILS NFR BLD AUTO: 67.9 % (ref 42.7–76)
NRBC BLD AUTO-RTO: 0 /100 WBC (ref 0–0.2)
PLATELET # BLD AUTO: 124 10*3/MM3 (ref 140–450)
PMV BLD AUTO: 8.8 FL (ref 6–12)
POTASSIUM SERPL-SCNC: 3.3 MMOL/L (ref 3.5–5.2)
PROT SERPL-MCNC: 6.9 G/DL (ref 6–8.5)
RBC # BLD AUTO: 3.67 10*6/MM3 (ref 4.14–5.8)
SODIUM SERPL-SCNC: 136 MMOL/L (ref 136–145)
WBC # BLD AUTO: 9.52 10*3/MM3 (ref 3.4–10.8)

## 2020-09-04 PROCEDURE — 80053 COMPREHEN METABOLIC PANEL: CPT | Performed by: PHYSICAL MEDICINE & REHABILITATION

## 2020-09-04 PROCEDURE — 97130 THER IVNTJ EA ADDL 15 MIN: CPT

## 2020-09-04 PROCEDURE — 97129 THER IVNTJ 1ST 15 MIN: CPT

## 2020-09-04 PROCEDURE — 97110 THERAPEUTIC EXERCISES: CPT

## 2020-09-04 PROCEDURE — 85025 COMPLETE CBC W/AUTO DIFF WBC: CPT | Performed by: PHYSICAL MEDICINE & REHABILITATION

## 2020-09-04 PROCEDURE — 97535 SELF CARE MNGMENT TRAINING: CPT

## 2020-09-04 PROCEDURE — 92526 ORAL FUNCTION THERAPY: CPT

## 2020-09-04 RX ADMIN — MIRTAZAPINE 15 MG: 15 TABLET, FILM COATED ORAL at 20:23

## 2020-09-04 RX ADMIN — OXYCODONE 5 MG: 5 TABLET ORAL at 23:23

## 2020-09-04 RX ADMIN — FOLIC ACID 1 MG: 1 TABLET ORAL at 07:12

## 2020-09-04 RX ADMIN — OXYCODONE 5 MG: 5 TABLET ORAL at 08:58

## 2020-09-04 RX ADMIN — LEVETIRACETAM 500 MG: 500 TABLET, FILM COATED ORAL at 20:23

## 2020-09-04 RX ADMIN — FAMOTIDINE 20 MG: 20 TABLET, FILM COATED ORAL at 05:22

## 2020-09-04 RX ADMIN — LEVETIRACETAM 500 MG: 500 TABLET, FILM COATED ORAL at 07:13

## 2020-09-04 RX ADMIN — Medication 100 MG: at 07:13

## 2020-09-04 RX ADMIN — OXYCODONE 5 MG: 5 TABLET ORAL at 15:42

## 2020-09-04 RX ADMIN — FAMOTIDINE 20 MG: 20 TABLET, FILM COATED ORAL at 17:17

## 2020-09-04 RX ADMIN — POTASSIUM CHLORIDE 10 MEQ: 10 CAPSULE, COATED, EXTENDED RELEASE ORAL at 07:13

## 2020-09-04 RX ADMIN — GABAPENTIN 300 MG: 300 CAPSULE ORAL at 20:23

## 2020-09-04 RX ADMIN — POTASSIUM CHLORIDE 10 MEQ: 10 CAPSULE, COATED, EXTENDED RELEASE ORAL at 17:17

## 2020-09-05 PROCEDURE — 97116 GAIT TRAINING THERAPY: CPT

## 2020-09-05 RX ADMIN — LEVETIRACETAM 500 MG: 500 TABLET, FILM COATED ORAL at 09:25

## 2020-09-05 RX ADMIN — FAMOTIDINE 20 MG: 20 TABLET, FILM COATED ORAL at 05:21

## 2020-09-05 RX ADMIN — FOLIC ACID 1 MG: 1 TABLET ORAL at 09:25

## 2020-09-05 RX ADMIN — MIRTAZAPINE 15 MG: 15 TABLET, FILM COATED ORAL at 21:24

## 2020-09-05 RX ADMIN — POTASSIUM CHLORIDE 10 MEQ: 10 CAPSULE, COATED, EXTENDED RELEASE ORAL at 09:25

## 2020-09-05 RX ADMIN — Medication 100 MG: at 09:25

## 2020-09-05 RX ADMIN — GABAPENTIN 300 MG: 300 CAPSULE ORAL at 21:24

## 2020-09-05 RX ADMIN — FAMOTIDINE 20 MG: 20 TABLET, FILM COATED ORAL at 17:12

## 2020-09-05 RX ADMIN — POTASSIUM CHLORIDE 10 MEQ: 10 CAPSULE, COATED, EXTENDED RELEASE ORAL at 17:12

## 2020-09-05 RX ADMIN — OXYCODONE 5 MG: 5 TABLET ORAL at 20:01

## 2020-09-05 RX ADMIN — LEVETIRACETAM 500 MG: 500 TABLET, FILM COATED ORAL at 21:24

## 2020-09-05 RX ADMIN — OXYCODONE 5 MG: 5 TABLET ORAL at 14:07

## 2020-09-06 RX ORDER — DIAPER,BRIEF,INFANT-TODD,DISP
EACH MISCELLANEOUS EVERY 12 HOURS SCHEDULED
Status: DISCONTINUED | OUTPATIENT
Start: 2020-09-06 | End: 2020-09-08

## 2020-09-06 RX ADMIN — POTASSIUM CHLORIDE 10 MEQ: 10 CAPSULE, COATED, EXTENDED RELEASE ORAL at 17:04

## 2020-09-06 RX ADMIN — LEVETIRACETAM 500 MG: 500 TABLET, FILM COATED ORAL at 08:04

## 2020-09-06 RX ADMIN — Medication 100 MG: at 08:04

## 2020-09-06 RX ADMIN — FAMOTIDINE 20 MG: 20 TABLET, FILM COATED ORAL at 05:45

## 2020-09-06 RX ADMIN — LEVETIRACETAM 500 MG: 500 TABLET, FILM COATED ORAL at 19:47

## 2020-09-06 RX ADMIN — POTASSIUM CHLORIDE 10 MEQ: 10 CAPSULE, COATED, EXTENDED RELEASE ORAL at 08:04

## 2020-09-06 RX ADMIN — FOLIC ACID 1 MG: 1 TABLET ORAL at 08:04

## 2020-09-06 RX ADMIN — MIRTAZAPINE 15 MG: 15 TABLET, FILM COATED ORAL at 19:47

## 2020-09-06 RX ADMIN — OXYCODONE 5 MG: 5 TABLET ORAL at 19:47

## 2020-09-06 RX ADMIN — GABAPENTIN 300 MG: 300 CAPSULE ORAL at 19:47

## 2020-09-06 RX ADMIN — HYDROCORTISONE: 1 CREAM TOPICAL at 21:57

## 2020-09-06 RX ADMIN — OXYCODONE 5 MG: 5 TABLET ORAL at 13:49

## 2020-09-06 RX ADMIN — FAMOTIDINE 20 MG: 20 TABLET, FILM COATED ORAL at 17:03

## 2020-09-07 LAB
ALBUMIN SERPL-MCNC: 3.6 G/DL (ref 3.5–5.2)
ALBUMIN/GLOB SERPL: 1 G/DL
ALP SERPL-CCNC: 137 U/L (ref 39–117)
ALT SERPL W P-5'-P-CCNC: 47 U/L (ref 1–41)
ANION GAP SERPL CALCULATED.3IONS-SCNC: 10.9 MMOL/L (ref 5–15)
AST SERPL-CCNC: 50 U/L (ref 1–40)
BASOPHILS # BLD AUTO: 0.02 10*3/MM3 (ref 0–0.2)
BASOPHILS NFR BLD AUTO: 0.3 % (ref 0–1.5)
BILIRUB SERPL-MCNC: 2.4 MG/DL (ref 0–1.2)
BUN SERPL-MCNC: 4 MG/DL (ref 6–20)
BUN/CREAT SERPL: 10.8 (ref 7–25)
CALCIUM SPEC-SCNC: 9.1 MG/DL (ref 8.6–10.5)
CHLORIDE SERPL-SCNC: 105 MMOL/L (ref 98–107)
CO2 SERPL-SCNC: 22.1 MMOL/L (ref 22–29)
CREAT SERPL-MCNC: 0.37 MG/DL (ref 0.76–1.27)
DEPRECATED RDW RBC AUTO: 40.3 FL (ref 37–54)
EOSINOPHIL # BLD AUTO: 0.37 10*3/MM3 (ref 0–0.4)
EOSINOPHIL NFR BLD AUTO: 5.3 % (ref 0.3–6.2)
ERYTHROCYTE [DISTWIDTH] IN BLOOD BY AUTOMATED COUNT: 11.5 % (ref 12.3–15.4)
GFR SERPL CREATININE-BSD FRML MDRD: >150 ML/MIN/1.73
GLOBULIN UR ELPH-MCNC: 3.5 GM/DL
GLUCOSE SERPL-MCNC: 103 MG/DL (ref 65–99)
HCT VFR BLD AUTO: 36.6 % (ref 37.5–51)
HGB BLD-MCNC: 12.6 G/DL (ref 13–17.7)
IMM GRANULOCYTES # BLD AUTO: 0.02 10*3/MM3 (ref 0–0.05)
IMM GRANULOCYTES NFR BLD AUTO: 0.3 % (ref 0–0.5)
LYMPHOCYTES # BLD AUTO: 1.4 10*3/MM3 (ref 0.7–3.1)
LYMPHOCYTES NFR BLD AUTO: 20.1 % (ref 19.6–45.3)
MCH RBC QN AUTO: 33.6 PG (ref 26.6–33)
MCHC RBC AUTO-ENTMCNC: 34.4 G/DL (ref 31.5–35.7)
MCV RBC AUTO: 97.6 FL (ref 79–97)
MONOCYTES # BLD AUTO: 0.51 10*3/MM3 (ref 0.1–0.9)
MONOCYTES NFR BLD AUTO: 7.3 % (ref 5–12)
NEUTROPHILS NFR BLD AUTO: 4.63 10*3/MM3 (ref 1.7–7)
NEUTROPHILS NFR BLD AUTO: 66.7 % (ref 42.7–76)
NRBC BLD AUTO-RTO: 0 /100 WBC (ref 0–0.2)
PLATELET # BLD AUTO: 138 10*3/MM3 (ref 140–450)
PMV BLD AUTO: 8.7 FL (ref 6–12)
POTASSIUM SERPL-SCNC: 3.6 MMOL/L (ref 3.5–5.2)
PROT SERPL-MCNC: 7.1 G/DL (ref 6–8.5)
RBC # BLD AUTO: 3.75 10*6/MM3 (ref 4.14–5.8)
SODIUM SERPL-SCNC: 138 MMOL/L (ref 136–145)
WBC # BLD AUTO: 6.95 10*3/MM3 (ref 3.4–10.8)

## 2020-09-07 PROCEDURE — 80053 COMPREHEN METABOLIC PANEL: CPT | Performed by: PHYSICAL MEDICINE & REHABILITATION

## 2020-09-07 PROCEDURE — 97130 THER IVNTJ EA ADDL 15 MIN: CPT

## 2020-09-07 PROCEDURE — 97112 NEUROMUSCULAR REEDUCATION: CPT

## 2020-09-07 PROCEDURE — 97129 THER IVNTJ 1ST 15 MIN: CPT

## 2020-09-07 PROCEDURE — 85025 COMPLETE CBC W/AUTO DIFF WBC: CPT | Performed by: PHYSICAL MEDICINE & REHABILITATION

## 2020-09-07 PROCEDURE — 97110 THERAPEUTIC EXERCISES: CPT

## 2020-09-07 RX ADMIN — LEVETIRACETAM 500 MG: 500 TABLET, FILM COATED ORAL at 20:23

## 2020-09-07 RX ADMIN — POTASSIUM CHLORIDE 10 MEQ: 10 CAPSULE, COATED, EXTENDED RELEASE ORAL at 08:23

## 2020-09-07 RX ADMIN — FAMOTIDINE 20 MG: 20 TABLET, FILM COATED ORAL at 16:54

## 2020-09-07 RX ADMIN — MIRTAZAPINE 15 MG: 15 TABLET, FILM COATED ORAL at 20:23

## 2020-09-07 RX ADMIN — FAMOTIDINE 20 MG: 20 TABLET, FILM COATED ORAL at 05:47

## 2020-09-07 RX ADMIN — OXYCODONE 5 MG: 5 TABLET ORAL at 02:16

## 2020-09-07 RX ADMIN — Medication 100 MG: at 08:23

## 2020-09-07 RX ADMIN — GABAPENTIN 300 MG: 300 CAPSULE ORAL at 20:23

## 2020-09-07 RX ADMIN — HYDROCORTISONE: 1 CREAM TOPICAL at 08:23

## 2020-09-07 RX ADMIN — OXYCODONE 5 MG: 5 TABLET ORAL at 10:37

## 2020-09-07 RX ADMIN — HYDROCORTISONE: 1 CREAM TOPICAL at 20:23

## 2020-09-07 RX ADMIN — FOLIC ACID 1 MG: 1 TABLET ORAL at 08:23

## 2020-09-07 RX ADMIN — LEVETIRACETAM 500 MG: 500 TABLET, FILM COATED ORAL at 08:23

## 2020-09-07 RX ADMIN — OXYCODONE 5 MG: 5 TABLET ORAL at 16:55

## 2020-09-07 RX ADMIN — POTASSIUM CHLORIDE 10 MEQ: 10 CAPSULE, COATED, EXTENDED RELEASE ORAL at 16:55

## 2020-09-08 PROCEDURE — 97130 THER IVNTJ EA ADDL 15 MIN: CPT

## 2020-09-08 PROCEDURE — 97535 SELF CARE MNGMENT TRAINING: CPT

## 2020-09-08 PROCEDURE — 97112 NEUROMUSCULAR REEDUCATION: CPT | Performed by: PHYSICAL THERAPIST

## 2020-09-08 PROCEDURE — 97112 NEUROMUSCULAR REEDUCATION: CPT

## 2020-09-08 PROCEDURE — 97129 THER IVNTJ 1ST 15 MIN: CPT

## 2020-09-08 PROCEDURE — 97116 GAIT TRAINING THERAPY: CPT | Performed by: PHYSICAL THERAPIST

## 2020-09-08 RX ADMIN — MIRTAZAPINE 15 MG: 15 TABLET, FILM COATED ORAL at 20:14

## 2020-09-08 RX ADMIN — POTASSIUM CHLORIDE 10 MEQ: 10 CAPSULE, COATED, EXTENDED RELEASE ORAL at 17:24

## 2020-09-08 RX ADMIN — FOLIC ACID 1 MG: 1 TABLET ORAL at 07:14

## 2020-09-08 RX ADMIN — HYDROCORTISONE: 1 CREAM TOPICAL at 07:15

## 2020-09-08 RX ADMIN — Medication 100 MG: at 07:14

## 2020-09-08 RX ADMIN — OXYCODONE 5 MG: 5 TABLET ORAL at 06:07

## 2020-09-08 RX ADMIN — GABAPENTIN 300 MG: 300 CAPSULE ORAL at 20:14

## 2020-09-08 RX ADMIN — OXYCODONE 5 MG: 5 TABLET ORAL at 12:04

## 2020-09-08 RX ADMIN — OXYCODONE 5 MG: 5 TABLET ORAL at 00:14

## 2020-09-08 RX ADMIN — OXYCODONE 5 MG: 5 TABLET ORAL at 18:36

## 2020-09-08 RX ADMIN — LEVETIRACETAM 500 MG: 500 TABLET, FILM COATED ORAL at 07:14

## 2020-09-08 RX ADMIN — FAMOTIDINE 20 MG: 20 TABLET, FILM COATED ORAL at 17:24

## 2020-09-08 RX ADMIN — POTASSIUM CHLORIDE 10 MEQ: 10 CAPSULE, COATED, EXTENDED RELEASE ORAL at 07:14

## 2020-09-08 RX ADMIN — LEVETIRACETAM 500 MG: 500 TABLET, FILM COATED ORAL at 20:14

## 2020-09-08 RX ADMIN — FAMOTIDINE 20 MG: 20 TABLET, FILM COATED ORAL at 05:39

## 2020-09-09 PROCEDURE — 97129 THER IVNTJ 1ST 15 MIN: CPT

## 2020-09-09 PROCEDURE — 97112 NEUROMUSCULAR REEDUCATION: CPT

## 2020-09-09 PROCEDURE — 97116 GAIT TRAINING THERAPY: CPT

## 2020-09-09 PROCEDURE — 97130 THER IVNTJ EA ADDL 15 MIN: CPT

## 2020-09-09 PROCEDURE — 97535 SELF CARE MNGMENT TRAINING: CPT

## 2020-09-09 RX ORDER — DESONIDE 0.5 MG/G
OINTMENT TOPICAL EVERY 12 HOURS SCHEDULED
Status: DISCONTINUED | OUTPATIENT
Start: 2020-09-09 | End: 2020-09-09 | Stop reason: CLARIF

## 2020-09-09 RX ORDER — VIT E ACET/GLY/DIMETH/WATER
LOTION (ML) TOPICAL EVERY 12 HOURS SCHEDULED
Status: DISCONTINUED | OUTPATIENT
Start: 2020-09-09 | End: 2020-09-28 | Stop reason: HOSPADM

## 2020-09-09 RX ADMIN — Medication 100 MG: at 07:51

## 2020-09-09 RX ADMIN — FAMOTIDINE 20 MG: 20 TABLET, FILM COATED ORAL at 17:12

## 2020-09-09 RX ADMIN — FOLIC ACID 1 MG: 1 TABLET ORAL at 07:50

## 2020-09-09 RX ADMIN — POTASSIUM CHLORIDE 10 MEQ: 10 CAPSULE, COATED, EXTENDED RELEASE ORAL at 07:51

## 2020-09-09 RX ADMIN — LEVETIRACETAM 500 MG: 500 TABLET, FILM COATED ORAL at 20:59

## 2020-09-09 RX ADMIN — POTASSIUM CHLORIDE 10 MEQ: 10 CAPSULE, COATED, EXTENDED RELEASE ORAL at 17:12

## 2020-09-09 RX ADMIN — GABAPENTIN 300 MG: 300 CAPSULE ORAL at 20:59

## 2020-09-09 RX ADMIN — LEVETIRACETAM 500 MG: 500 TABLET, FILM COATED ORAL at 07:50

## 2020-09-09 RX ADMIN — OXYCODONE 5 MG: 5 TABLET ORAL at 15:44

## 2020-09-09 RX ADMIN — MIRTAZAPINE 15 MG: 15 TABLET, FILM COATED ORAL at 20:59

## 2020-09-09 RX ADMIN — OXYCODONE 5 MG: 5 TABLET ORAL at 23:10

## 2020-09-09 RX ADMIN — FAMOTIDINE 20 MG: 20 TABLET, FILM COATED ORAL at 06:15

## 2020-09-10 PROCEDURE — 97130 THER IVNTJ EA ADDL 15 MIN: CPT

## 2020-09-10 PROCEDURE — 97530 THERAPEUTIC ACTIVITIES: CPT

## 2020-09-10 PROCEDURE — 97535 SELF CARE MNGMENT TRAINING: CPT

## 2020-09-10 PROCEDURE — 97112 NEUROMUSCULAR REEDUCATION: CPT

## 2020-09-10 PROCEDURE — 92526 ORAL FUNCTION THERAPY: CPT

## 2020-09-10 PROCEDURE — 97129 THER IVNTJ 1ST 15 MIN: CPT

## 2020-09-10 PROCEDURE — 97116 GAIT TRAINING THERAPY: CPT

## 2020-09-10 RX ORDER — CLOBETASOL PROPIONATE 0.05 G/100ML
SHAMPOO TOPICAL
Status: DISCONTINUED | OUTPATIENT
Start: 2020-09-10 | End: 2020-09-18

## 2020-09-10 RX ADMIN — POTASSIUM CHLORIDE 10 MEQ: 10 CAPSULE, COATED, EXTENDED RELEASE ORAL at 17:28

## 2020-09-10 RX ADMIN — POTASSIUM CHLORIDE 10 MEQ: 10 CAPSULE, COATED, EXTENDED RELEASE ORAL at 09:14

## 2020-09-10 RX ADMIN — MIRTAZAPINE 15 MG: 15 TABLET, FILM COATED ORAL at 20:38

## 2020-09-10 RX ADMIN — LEVETIRACETAM 500 MG: 500 TABLET, FILM COATED ORAL at 09:14

## 2020-09-10 RX ADMIN — FAMOTIDINE 20 MG: 20 TABLET, FILM COATED ORAL at 05:55

## 2020-09-10 RX ADMIN — FAMOTIDINE 20 MG: 20 TABLET, FILM COATED ORAL at 17:28

## 2020-09-10 RX ADMIN — Medication 100 MG: at 09:14

## 2020-09-10 RX ADMIN — LEVETIRACETAM 500 MG: 500 TABLET, FILM COATED ORAL at 20:38

## 2020-09-10 RX ADMIN — HYDROCORTISONE: 25 CREAM TOPICAL at 09:14

## 2020-09-10 RX ADMIN — OXYCODONE 5 MG: 5 TABLET ORAL at 22:10

## 2020-09-10 RX ADMIN — FOLIC ACID 1 MG: 1 TABLET ORAL at 09:14

## 2020-09-10 RX ADMIN — OXYCODONE 5 MG: 5 TABLET ORAL at 05:56

## 2020-09-10 RX ADMIN — EMOLLIENT - LOTION: LOTION at 09:33

## 2020-09-10 RX ADMIN — OXYCODONE 5 MG: 5 TABLET ORAL at 15:07

## 2020-09-10 RX ADMIN — GABAPENTIN 300 MG: 300 CAPSULE ORAL at 20:39

## 2020-09-10 RX ADMIN — HYDROCORTISONE: 25 CREAM TOPICAL at 20:42

## 2020-09-10 RX ADMIN — EMOLLIENT - LOTION: LOTION at 20:40

## 2020-09-11 LAB
ALBUMIN SERPL-MCNC: 3.7 G/DL (ref 3.5–5.2)
ALBUMIN/GLOB SERPL: 1.2 G/DL
ALP SERPL-CCNC: 119 U/L (ref 39–117)
ALT SERPL W P-5'-P-CCNC: 31 U/L (ref 1–41)
ANION GAP SERPL CALCULATED.3IONS-SCNC: 9.2 MMOL/L (ref 5–15)
AST SERPL-CCNC: 40 U/L (ref 1–40)
BASOPHILS # BLD AUTO: 0.03 10*3/MM3 (ref 0–0.2)
BASOPHILS NFR BLD AUTO: 0.5 % (ref 0–1.5)
BILIRUB SERPL-MCNC: 3.1 MG/DL (ref 0–1.2)
BUN SERPL-MCNC: 4 MG/DL (ref 6–20)
BUN/CREAT SERPL: 9.8 (ref 7–25)
CALCIUM SPEC-SCNC: 9.3 MG/DL (ref 8.6–10.5)
CHLORIDE SERPL-SCNC: 104 MMOL/L (ref 98–107)
CO2 SERPL-SCNC: 22.8 MMOL/L (ref 22–29)
CREAT SERPL-MCNC: 0.41 MG/DL (ref 0.76–1.27)
DEPRECATED RDW RBC AUTO: 38.6 FL (ref 37–54)
EOSINOPHIL # BLD AUTO: 0.29 10*3/MM3 (ref 0–0.4)
EOSINOPHIL NFR BLD AUTO: 5.3 % (ref 0.3–6.2)
ERYTHROCYTE [DISTWIDTH] IN BLOOD BY AUTOMATED COUNT: 11.3 % (ref 12.3–15.4)
GFR SERPL CREATININE-BSD FRML MDRD: >150 ML/MIN/1.73
GLOBULIN UR ELPH-MCNC: 3 GM/DL
GLUCOSE SERPL-MCNC: 86 MG/DL (ref 65–99)
HCT VFR BLD AUTO: 31.9 % (ref 37.5–51)
HGB BLD-MCNC: 11.6 G/DL (ref 13–17.7)
IMM GRANULOCYTES # BLD AUTO: 0.01 10*3/MM3 (ref 0–0.05)
IMM GRANULOCYTES NFR BLD AUTO: 0.2 % (ref 0–0.5)
LYMPHOCYTES # BLD AUTO: 1.3 10*3/MM3 (ref 0.7–3.1)
LYMPHOCYTES NFR BLD AUTO: 23.6 % (ref 19.6–45.3)
MCH RBC QN AUTO: 34.4 PG (ref 26.6–33)
MCHC RBC AUTO-ENTMCNC: 36.4 G/DL (ref 31.5–35.7)
MCV RBC AUTO: 94.7 FL (ref 79–97)
MONOCYTES # BLD AUTO: 0.45 10*3/MM3 (ref 0.1–0.9)
MONOCYTES NFR BLD AUTO: 8.2 % (ref 5–12)
NEUTROPHILS NFR BLD AUTO: 3.43 10*3/MM3 (ref 1.7–7)
NEUTROPHILS NFR BLD AUTO: 62.2 % (ref 42.7–76)
NRBC BLD AUTO-RTO: 0 /100 WBC (ref 0–0.2)
PLATELET # BLD AUTO: 141 10*3/MM3 (ref 140–450)
PMV BLD AUTO: 8.9 FL (ref 6–12)
POTASSIUM SERPL-SCNC: 3.4 MMOL/L (ref 3.5–5.2)
PROT SERPL-MCNC: 6.7 G/DL (ref 6–8.5)
RBC # BLD AUTO: 3.37 10*6/MM3 (ref 4.14–5.8)
SODIUM SERPL-SCNC: 136 MMOL/L (ref 136–145)
WBC # BLD AUTO: 5.51 10*3/MM3 (ref 3.4–10.8)

## 2020-09-11 PROCEDURE — 97116 GAIT TRAINING THERAPY: CPT

## 2020-09-11 PROCEDURE — 97112 NEUROMUSCULAR REEDUCATION: CPT

## 2020-09-11 PROCEDURE — 85025 COMPLETE CBC W/AUTO DIFF WBC: CPT | Performed by: PHYSICAL MEDICINE & REHABILITATION

## 2020-09-11 PROCEDURE — 97535 SELF CARE MNGMENT TRAINING: CPT

## 2020-09-11 PROCEDURE — 80053 COMPREHEN METABOLIC PANEL: CPT | Performed by: PHYSICAL MEDICINE & REHABILITATION

## 2020-09-11 PROCEDURE — 97130 THER IVNTJ EA ADDL 15 MIN: CPT

## 2020-09-11 PROCEDURE — 97129 THER IVNTJ 1ST 15 MIN: CPT

## 2020-09-11 RX ORDER — DIAPER,BRIEF,INFANT-TODD,DISP
EACH MISCELLANEOUS EVERY 12 HOURS SCHEDULED
Status: DISPENSED | OUTPATIENT
Start: 2020-09-11 | End: 2020-09-12

## 2020-09-11 RX ORDER — DESONIDE 0.5 MG/G
OINTMENT TOPICAL EVERY 12 HOURS SCHEDULED
Status: DISCONTINUED | OUTPATIENT
Start: 2020-09-12 | End: 2020-09-28 | Stop reason: HOSPADM

## 2020-09-11 RX ADMIN — FAMOTIDINE 20 MG: 20 TABLET, FILM COATED ORAL at 17:02

## 2020-09-11 RX ADMIN — FOLIC ACID 1 MG: 1 TABLET ORAL at 07:43

## 2020-09-11 RX ADMIN — EMOLLIENT - LOTION: LOTION at 07:46

## 2020-09-11 RX ADMIN — MIRTAZAPINE 15 MG: 15 TABLET, FILM COATED ORAL at 20:53

## 2020-09-11 RX ADMIN — OXYCODONE 5 MG: 5 TABLET ORAL at 14:34

## 2020-09-11 RX ADMIN — CLOBETASOL PROPIONATE: 0.05 SHAMPOO TOPICAL at 07:47

## 2020-09-11 RX ADMIN — EMOLLIENT - LOTION: LOTION at 20:00

## 2020-09-11 RX ADMIN — POTASSIUM CHLORIDE 10 MEQ: 10 CAPSULE, COATED, EXTENDED RELEASE ORAL at 07:43

## 2020-09-11 RX ADMIN — POTASSIUM CHLORIDE 10 MEQ: 10 CAPSULE, COATED, EXTENDED RELEASE ORAL at 17:02

## 2020-09-11 RX ADMIN — OXYCODONE 5 MG: 5 TABLET ORAL at 20:04

## 2020-09-11 RX ADMIN — HYDROCORTISONE: 1 OINTMENT TOPICAL at 20:00

## 2020-09-11 RX ADMIN — Medication 100 MG: at 07:43

## 2020-09-11 RX ADMIN — LEVETIRACETAM 500 MG: 500 TABLET, FILM COATED ORAL at 07:43

## 2020-09-11 RX ADMIN — GABAPENTIN 300 MG: 300 CAPSULE ORAL at 20:00

## 2020-09-11 RX ADMIN — FAMOTIDINE 20 MG: 20 TABLET, FILM COATED ORAL at 05:29

## 2020-09-11 RX ADMIN — LEVETIRACETAM 500 MG: 500 TABLET, FILM COATED ORAL at 20:00

## 2020-09-11 RX ADMIN — HYDROCORTISONE: 25 CREAM TOPICAL at 07:46

## 2020-09-12 PROCEDURE — 97116 GAIT TRAINING THERAPY: CPT | Performed by: PHYSICAL THERAPIST

## 2020-09-12 RX ORDER — ECHINACEA PURPUREA EXTRACT 125 MG
2 TABLET ORAL AS NEEDED
Status: DISCONTINUED | OUTPATIENT
Start: 2020-09-12 | End: 2020-09-28

## 2020-09-12 RX ADMIN — EMOLLIENT - LOTION: LOTION at 19:27

## 2020-09-12 RX ADMIN — LEVETIRACETAM 500 MG: 500 TABLET, FILM COATED ORAL at 19:26

## 2020-09-12 RX ADMIN — FAMOTIDINE 20 MG: 20 TABLET, FILM COATED ORAL at 05:33

## 2020-09-12 RX ADMIN — OXYCODONE 5 MG: 5 TABLET ORAL at 05:33

## 2020-09-12 RX ADMIN — FAMOTIDINE 20 MG: 20 TABLET, FILM COATED ORAL at 17:02

## 2020-09-12 RX ADMIN — HYDROCORTISONE: 1 OINTMENT TOPICAL at 10:14

## 2020-09-12 RX ADMIN — LEVETIRACETAM 500 MG: 500 TABLET, FILM COATED ORAL at 07:55

## 2020-09-12 RX ADMIN — POTASSIUM CHLORIDE 10 MEQ: 10 CAPSULE, COATED, EXTENDED RELEASE ORAL at 17:02

## 2020-09-12 RX ADMIN — GABAPENTIN 300 MG: 300 CAPSULE ORAL at 19:26

## 2020-09-12 RX ADMIN — POTASSIUM CHLORIDE 10 MEQ: 10 CAPSULE, COATED, EXTENDED RELEASE ORAL at 07:54

## 2020-09-12 RX ADMIN — MIRTAZAPINE 15 MG: 15 TABLET, FILM COATED ORAL at 19:26

## 2020-09-12 RX ADMIN — OXYCODONE 5 MG: 5 TABLET ORAL at 21:15

## 2020-09-12 RX ADMIN — EMOLLIENT - LOTION: LOTION at 10:14

## 2020-09-12 RX ADMIN — DESONIDE: 0.5 OINTMENT TOPICAL at 19:26

## 2020-09-12 RX ADMIN — Medication 100 MG: at 07:55

## 2020-09-12 RX ADMIN — FOLIC ACID 1 MG: 1 TABLET ORAL at 07:55

## 2020-09-13 RX ADMIN — DESONIDE: 0.5 OINTMENT TOPICAL at 21:35

## 2020-09-13 RX ADMIN — FOLIC ACID 1 MG: 1 TABLET ORAL at 08:40

## 2020-09-13 RX ADMIN — FAMOTIDINE 20 MG: 20 TABLET, FILM COATED ORAL at 16:42

## 2020-09-13 RX ADMIN — MIRTAZAPINE 15 MG: 15 TABLET, FILM COATED ORAL at 21:34

## 2020-09-13 RX ADMIN — EMOLLIENT - LOTION: LOTION at 21:35

## 2020-09-13 RX ADMIN — EMOLLIENT - LOTION: LOTION at 08:40

## 2020-09-13 RX ADMIN — OXYCODONE 5 MG: 5 TABLET ORAL at 18:01

## 2020-09-13 RX ADMIN — GABAPENTIN 300 MG: 300 CAPSULE ORAL at 21:34

## 2020-09-13 RX ADMIN — Medication 100 MG: at 08:40

## 2020-09-13 RX ADMIN — DESONIDE: 0.5 OINTMENT TOPICAL at 08:40

## 2020-09-13 RX ADMIN — FAMOTIDINE 20 MG: 20 TABLET, FILM COATED ORAL at 06:02

## 2020-09-13 RX ADMIN — LEVETIRACETAM 500 MG: 500 TABLET, FILM COATED ORAL at 08:40

## 2020-09-13 RX ADMIN — POTASSIUM CHLORIDE 10 MEQ: 10 CAPSULE, COATED, EXTENDED RELEASE ORAL at 16:42

## 2020-09-13 RX ADMIN — LEVETIRACETAM 500 MG: 500 TABLET, FILM COATED ORAL at 21:34

## 2020-09-13 RX ADMIN — POTASSIUM CHLORIDE 10 MEQ: 10 CAPSULE, COATED, EXTENDED RELEASE ORAL at 08:40

## 2020-09-14 LAB
ALBUMIN SERPL-MCNC: 3.6 G/DL (ref 3.5–5.2)
ALBUMIN/GLOB SERPL: 1.1 G/DL
ALP SERPL-CCNC: 116 U/L (ref 39–117)
ALT SERPL W P-5'-P-CCNC: 26 U/L (ref 1–41)
ANION GAP SERPL CALCULATED.3IONS-SCNC: 8.5 MMOL/L (ref 5–15)
AST SERPL-CCNC: 36 U/L (ref 1–40)
BASOPHILS # BLD AUTO: 0.04 10*3/MM3 (ref 0–0.2)
BASOPHILS NFR BLD AUTO: 0.8 % (ref 0–1.5)
BILIRUB SERPL-MCNC: 2.7 MG/DL (ref 0–1.2)
BUN SERPL-MCNC: 4 MG/DL (ref 6–20)
BUN/CREAT SERPL: 9.3 (ref 7–25)
CALCIUM SPEC-SCNC: 9.3 MG/DL (ref 8.6–10.5)
CHLORIDE SERPL-SCNC: 105 MMOL/L (ref 98–107)
CO2 SERPL-SCNC: 22.5 MMOL/L (ref 22–29)
CREAT SERPL-MCNC: 0.43 MG/DL (ref 0.76–1.27)
DEPRECATED RDW RBC AUTO: 39.7 FL (ref 37–54)
EOSINOPHIL # BLD AUTO: 0.14 10*3/MM3 (ref 0–0.4)
EOSINOPHIL NFR BLD AUTO: 2.7 % (ref 0.3–6.2)
ERYTHROCYTE [DISTWIDTH] IN BLOOD BY AUTOMATED COUNT: 11.1 % (ref 12.3–15.4)
GFR SERPL CREATININE-BSD FRML MDRD: >150 ML/MIN/1.73
GLOBULIN UR ELPH-MCNC: 3.4 GM/DL
GLUCOSE SERPL-MCNC: 95 MG/DL (ref 65–99)
HCT VFR BLD AUTO: 35 % (ref 37.5–51)
HGB BLD-MCNC: 12.2 G/DL (ref 13–17.7)
IMM GRANULOCYTES # BLD AUTO: 0 10*3/MM3 (ref 0–0.05)
IMM GRANULOCYTES NFR BLD AUTO: 0 % (ref 0–0.5)
LYMPHOCYTES # BLD AUTO: 1.35 10*3/MM3 (ref 0.7–3.1)
LYMPHOCYTES NFR BLD AUTO: 26.3 % (ref 19.6–45.3)
MCH RBC QN AUTO: 33.6 PG (ref 26.6–33)
MCHC RBC AUTO-ENTMCNC: 34.9 G/DL (ref 31.5–35.7)
MCV RBC AUTO: 96.4 FL (ref 79–97)
MONOCYTES # BLD AUTO: 0.34 10*3/MM3 (ref 0.1–0.9)
MONOCYTES NFR BLD AUTO: 6.6 % (ref 5–12)
NEUTROPHILS NFR BLD AUTO: 3.27 10*3/MM3 (ref 1.7–7)
NEUTROPHILS NFR BLD AUTO: 63.6 % (ref 42.7–76)
NRBC BLD AUTO-RTO: 0 /100 WBC (ref 0–0.2)
PLATELET # BLD AUTO: 179 10*3/MM3 (ref 140–450)
PMV BLD AUTO: 9 FL (ref 6–12)
POTASSIUM SERPL-SCNC: 3.3 MMOL/L (ref 3.5–5.2)
PROT SERPL-MCNC: 7 G/DL (ref 6–8.5)
RBC # BLD AUTO: 3.63 10*6/MM3 (ref 4.14–5.8)
SODIUM SERPL-SCNC: 136 MMOL/L (ref 136–145)
WBC # BLD AUTO: 5.14 10*3/MM3 (ref 3.4–10.8)

## 2020-09-14 PROCEDURE — 97112 NEUROMUSCULAR REEDUCATION: CPT

## 2020-09-14 PROCEDURE — 97535 SELF CARE MNGMENT TRAINING: CPT

## 2020-09-14 PROCEDURE — 80053 COMPREHEN METABOLIC PANEL: CPT | Performed by: PHYSICAL MEDICINE & REHABILITATION

## 2020-09-14 PROCEDURE — 97110 THERAPEUTIC EXERCISES: CPT

## 2020-09-14 PROCEDURE — 97116 GAIT TRAINING THERAPY: CPT

## 2020-09-14 PROCEDURE — 85025 COMPLETE CBC W/AUTO DIFF WBC: CPT | Performed by: PHYSICAL MEDICINE & REHABILITATION

## 2020-09-14 RX ORDER — POTASSIUM CHLORIDE 750 MG/1
10 CAPSULE, EXTENDED RELEASE ORAL 2 TIMES DAILY WITH MEALS
Status: DISCONTINUED | OUTPATIENT
Start: 2020-09-15 | End: 2020-09-28 | Stop reason: HOSPADM

## 2020-09-14 RX ADMIN — POTASSIUM CHLORIDE 10 MEQ: 10 CAPSULE, COATED, EXTENDED RELEASE ORAL at 07:42

## 2020-09-14 RX ADMIN — EMOLLIENT - LOTION: LOTION at 07:47

## 2020-09-14 RX ADMIN — FAMOTIDINE 20 MG: 20 TABLET, FILM COATED ORAL at 05:52

## 2020-09-14 RX ADMIN — Medication 100 MG: at 07:41

## 2020-09-14 RX ADMIN — OXYCODONE 5 MG: 5 TABLET ORAL at 16:53

## 2020-09-14 RX ADMIN — CLOBETASOL PROPIONATE: 0.05 SHAMPOO TOPICAL at 07:47

## 2020-09-14 RX ADMIN — FAMOTIDINE 20 MG: 20 TABLET, FILM COATED ORAL at 16:53

## 2020-09-14 RX ADMIN — POTASSIUM CHLORIDE 40 MEQ: 10 CAPSULE, COATED, EXTENDED RELEASE ORAL at 07:41

## 2020-09-14 RX ADMIN — FOLIC ACID 1 MG: 1 TABLET ORAL at 07:41

## 2020-09-14 RX ADMIN — EMOLLIENT - LOTION: LOTION at 22:07

## 2020-09-14 RX ADMIN — OXYCODONE 5 MG: 5 TABLET ORAL at 23:42

## 2020-09-14 RX ADMIN — DESONIDE: 0.5 OINTMENT TOPICAL at 07:47

## 2020-09-14 RX ADMIN — LEVETIRACETAM 500 MG: 500 TABLET, FILM COATED ORAL at 22:06

## 2020-09-14 RX ADMIN — MIRTAZAPINE 15 MG: 15 TABLET, FILM COATED ORAL at 22:06

## 2020-09-14 RX ADMIN — GABAPENTIN 300 MG: 300 CAPSULE ORAL at 22:06

## 2020-09-14 RX ADMIN — DESONIDE: 0.5 OINTMENT TOPICAL at 22:07

## 2020-09-14 RX ADMIN — LEVETIRACETAM 500 MG: 500 TABLET, FILM COATED ORAL at 07:41

## 2020-09-15 PROCEDURE — 97112 NEUROMUSCULAR REEDUCATION: CPT

## 2020-09-15 PROCEDURE — 87070 CULTURE OTHR SPECIMN AEROBIC: CPT | Performed by: NURSE PRACTITIONER

## 2020-09-15 PROCEDURE — 99024 POSTOP FOLLOW-UP VISIT: CPT | Performed by: NURSE PRACTITIONER

## 2020-09-15 PROCEDURE — 97116 GAIT TRAINING THERAPY: CPT

## 2020-09-15 PROCEDURE — 97535 SELF CARE MNGMENT TRAINING: CPT

## 2020-09-15 PROCEDURE — 87186 SC STD MICRODIL/AGAR DIL: CPT | Performed by: NURSE PRACTITIONER

## 2020-09-15 PROCEDURE — 97110 THERAPEUTIC EXERCISES: CPT

## 2020-09-15 PROCEDURE — 87147 CULTURE TYPE IMMUNOLOGIC: CPT | Performed by: NURSE PRACTITIONER

## 2020-09-15 PROCEDURE — 87205 SMEAR GRAM STAIN: CPT | Performed by: NURSE PRACTITIONER

## 2020-09-15 RX ORDER — CEPHALEXIN 500 MG/1
500 CAPSULE ORAL EVERY 8 HOURS SCHEDULED
Status: DISCONTINUED | OUTPATIENT
Start: 2020-09-15 | End: 2020-09-16

## 2020-09-15 RX ADMIN — FAMOTIDINE 20 MG: 20 TABLET, FILM COATED ORAL at 06:01

## 2020-09-15 RX ADMIN — OXYCODONE 5 MG: 5 TABLET ORAL at 15:48

## 2020-09-15 RX ADMIN — FOLIC ACID 1 MG: 1 TABLET ORAL at 07:30

## 2020-09-15 RX ADMIN — LEVETIRACETAM 500 MG: 500 TABLET, FILM COATED ORAL at 07:30

## 2020-09-15 RX ADMIN — EMOLLIENT - LOTION: LOTION at 07:32

## 2020-09-15 RX ADMIN — GABAPENTIN 300 MG: 300 CAPSULE ORAL at 22:03

## 2020-09-15 RX ADMIN — EMOLLIENT - LOTION: LOTION at 22:05

## 2020-09-15 RX ADMIN — CLOBETASOL PROPIONATE: 0.05 SHAMPOO TOPICAL at 07:32

## 2020-09-15 RX ADMIN — FAMOTIDINE 20 MG: 20 TABLET, FILM COATED ORAL at 17:00

## 2020-09-15 RX ADMIN — OXYCODONE 5 MG: 5 TABLET ORAL at 10:00

## 2020-09-15 RX ADMIN — DESONIDE: 0.5 OINTMENT TOPICAL at 22:06

## 2020-09-15 RX ADMIN — POTASSIUM CHLORIDE 10 MEQ: 750 CAPSULE, EXTENDED RELEASE ORAL at 17:00

## 2020-09-15 RX ADMIN — DESONIDE: 0.5 OINTMENT TOPICAL at 07:33

## 2020-09-15 RX ADMIN — OXYCODONE 5 MG: 5 TABLET ORAL at 22:03

## 2020-09-15 RX ADMIN — CEPHALEXIN 500 MG: 500 CAPSULE ORAL at 14:28

## 2020-09-15 RX ADMIN — POTASSIUM CHLORIDE 10 MEQ: 750 CAPSULE, EXTENDED RELEASE ORAL at 07:30

## 2020-09-15 RX ADMIN — LEVETIRACETAM 500 MG: 500 TABLET, FILM COATED ORAL at 22:03

## 2020-09-15 RX ADMIN — MIRTAZAPINE 15 MG: 15 TABLET, FILM COATED ORAL at 22:03

## 2020-09-15 RX ADMIN — Medication 100 MG: at 07:30

## 2020-09-15 RX ADMIN — CEPHALEXIN 500 MG: 500 CAPSULE ORAL at 22:03

## 2020-09-16 PROCEDURE — 97116 GAIT TRAINING THERAPY: CPT

## 2020-09-16 PROCEDURE — 97535 SELF CARE MNGMENT TRAINING: CPT

## 2020-09-16 PROCEDURE — 99253 IP/OBS CNSLTJ NEW/EST LOW 45: CPT | Performed by: INTERNAL MEDICINE

## 2020-09-16 PROCEDURE — 97110 THERAPEUTIC EXERCISES: CPT

## 2020-09-16 PROCEDURE — 97112 NEUROMUSCULAR REEDUCATION: CPT

## 2020-09-16 RX ORDER — SULFAMETHOXAZOLE AND TRIMETHOPRIM 800; 160 MG/1; MG/1
1 TABLET ORAL EVERY 12 HOURS SCHEDULED
Status: DISCONTINUED | OUTPATIENT
Start: 2020-09-16 | End: 2020-09-18

## 2020-09-16 RX ADMIN — FAMOTIDINE 20 MG: 20 TABLET, FILM COATED ORAL at 05:06

## 2020-09-16 RX ADMIN — FOLIC ACID 1 MG: 1 TABLET ORAL at 07:46

## 2020-09-16 RX ADMIN — OXYCODONE 5 MG: 5 TABLET ORAL at 14:52

## 2020-09-16 RX ADMIN — GABAPENTIN 300 MG: 300 CAPSULE ORAL at 21:51

## 2020-09-16 RX ADMIN — CEPHALEXIN 500 MG: 500 CAPSULE ORAL at 05:06

## 2020-09-16 RX ADMIN — POTASSIUM CHLORIDE 10 MEQ: 750 CAPSULE, EXTENDED RELEASE ORAL at 16:50

## 2020-09-16 RX ADMIN — LEVETIRACETAM 500 MG: 500 TABLET, FILM COATED ORAL at 21:51

## 2020-09-16 RX ADMIN — EMOLLIENT - LOTION: LOTION at 07:46

## 2020-09-16 RX ADMIN — SULFAMETHOXAZOLE AND TRIMETHOPRIM 160 MG: 800; 160 TABLET ORAL at 14:43

## 2020-09-16 RX ADMIN — OXYCODONE 5 MG: 5 TABLET ORAL at 21:52

## 2020-09-16 RX ADMIN — LEVETIRACETAM 500 MG: 500 TABLET, FILM COATED ORAL at 07:46

## 2020-09-16 RX ADMIN — DESONIDE: 0.5 OINTMENT TOPICAL at 07:46

## 2020-09-16 RX ADMIN — CLOBETASOL PROPIONATE: 0.05 SHAMPOO TOPICAL at 07:47

## 2020-09-16 RX ADMIN — FAMOTIDINE 20 MG: 20 TABLET, FILM COATED ORAL at 16:50

## 2020-09-16 RX ADMIN — POTASSIUM CHLORIDE 10 MEQ: 750 CAPSULE, EXTENDED RELEASE ORAL at 07:45

## 2020-09-16 RX ADMIN — EMOLLIENT - LOTION: LOTION at 21:53

## 2020-09-16 RX ADMIN — Medication 100 MG: at 07:46

## 2020-09-16 RX ADMIN — MIRTAZAPINE 15 MG: 15 TABLET, FILM COATED ORAL at 21:51

## 2020-09-16 RX ADMIN — DESONIDE: 0.5 OINTMENT TOPICAL at 21:53

## 2020-09-16 RX ADMIN — SULFAMETHOXAZOLE AND TRIMETHOPRIM 160 MG: 800; 160 TABLET ORAL at 21:52

## 2020-09-17 LAB
BACTERIA SPEC AEROBE CULT: ABNORMAL
GRAM STN SPEC: ABNORMAL
GRAM STN SPEC: ABNORMAL

## 2020-09-17 PROCEDURE — 97535 SELF CARE MNGMENT TRAINING: CPT

## 2020-09-17 PROCEDURE — 97530 THERAPEUTIC ACTIVITIES: CPT

## 2020-09-17 PROCEDURE — 97116 GAIT TRAINING THERAPY: CPT

## 2020-09-17 PROCEDURE — 97110 THERAPEUTIC EXERCISES: CPT

## 2020-09-17 PROCEDURE — 97112 NEUROMUSCULAR REEDUCATION: CPT

## 2020-09-17 RX ADMIN — CLOBETASOL PROPIONATE: 0.05 SHAMPOO TOPICAL at 09:27

## 2020-09-17 RX ADMIN — FAMOTIDINE 20 MG: 20 TABLET, FILM COATED ORAL at 05:25

## 2020-09-17 RX ADMIN — DESONIDE: 0.5 OINTMENT TOPICAL at 21:37

## 2020-09-17 RX ADMIN — LEVETIRACETAM 500 MG: 500 TABLET, FILM COATED ORAL at 21:36

## 2020-09-17 RX ADMIN — MIRTAZAPINE 15 MG: 15 TABLET, FILM COATED ORAL at 21:35

## 2020-09-17 RX ADMIN — SULFAMETHOXAZOLE AND TRIMETHOPRIM 160 MG: 800; 160 TABLET ORAL at 07:44

## 2020-09-17 RX ADMIN — GABAPENTIN 300 MG: 300 CAPSULE ORAL at 21:36

## 2020-09-17 RX ADMIN — FAMOTIDINE 20 MG: 20 TABLET, FILM COATED ORAL at 17:05

## 2020-09-17 RX ADMIN — FOLIC ACID 1 MG: 1 TABLET ORAL at 07:43

## 2020-09-17 RX ADMIN — Medication 100 MG: at 07:43

## 2020-09-17 RX ADMIN — OXYCODONE 5 MG: 5 TABLET ORAL at 14:36

## 2020-09-17 RX ADMIN — SULFAMETHOXAZOLE AND TRIMETHOPRIM 160 MG: 800; 160 TABLET ORAL at 21:36

## 2020-09-17 RX ADMIN — EMOLLIENT - LOTION: LOTION at 09:27

## 2020-09-17 RX ADMIN — POTASSIUM CHLORIDE 10 MEQ: 750 CAPSULE, EXTENDED RELEASE ORAL at 07:43

## 2020-09-17 RX ADMIN — OXYCODONE 5 MG: 5 TABLET ORAL at 21:45

## 2020-09-17 RX ADMIN — POTASSIUM CHLORIDE 10 MEQ: 750 CAPSULE, EXTENDED RELEASE ORAL at 17:05

## 2020-09-17 RX ADMIN — LEVETIRACETAM 500 MG: 500 TABLET, FILM COATED ORAL at 07:43

## 2020-09-17 RX ADMIN — EMOLLIENT - LOTION: LOTION at 21:36

## 2020-09-17 RX ADMIN — DESONIDE: 0.5 OINTMENT TOPICAL at 09:27

## 2020-09-18 LAB
ALBUMIN SERPL-MCNC: 3.7 G/DL (ref 3.5–5.2)
ALBUMIN/GLOB SERPL: 1.2 G/DL
ALP SERPL-CCNC: 108 U/L (ref 39–117)
ALT SERPL W P-5'-P-CCNC: 20 U/L (ref 1–41)
ANION GAP SERPL CALCULATED.3IONS-SCNC: 10.1 MMOL/L (ref 5–15)
AST SERPL-CCNC: 34 U/L (ref 1–40)
BASOPHILS # BLD AUTO: 0.04 10*3/MM3 (ref 0–0.2)
BASOPHILS NFR BLD AUTO: 0.8 % (ref 0–1.5)
BILIRUB SERPL-MCNC: 2.3 MG/DL (ref 0–1.2)
BUN SERPL-MCNC: 4 MG/DL (ref 6–20)
BUN/CREAT SERPL: 8.7 (ref 7–25)
CALCIUM SPEC-SCNC: 9.4 MG/DL (ref 8.6–10.5)
CHLORIDE SERPL-SCNC: 107 MMOL/L (ref 98–107)
CO2 SERPL-SCNC: 21.9 MMOL/L (ref 22–29)
CREAT SERPL-MCNC: 0.46 MG/DL (ref 0.76–1.27)
DEPRECATED RDW RBC AUTO: 38.4 FL (ref 37–54)
EOSINOPHIL # BLD AUTO: 0.11 10*3/MM3 (ref 0–0.4)
EOSINOPHIL NFR BLD AUTO: 2.2 % (ref 0.3–6.2)
ERYTHROCYTE [DISTWIDTH] IN BLOOD BY AUTOMATED COUNT: 11.3 % (ref 12.3–15.4)
GFR SERPL CREATININE-BSD FRML MDRD: >150 ML/MIN/1.73
GLOBULIN UR ELPH-MCNC: 3.1 GM/DL
GLUCOSE SERPL-MCNC: 132 MG/DL (ref 65–99)
HCT VFR BLD AUTO: 33.1 % (ref 37.5–51)
HGB BLD-MCNC: 12 G/DL (ref 13–17.7)
IMM GRANULOCYTES # BLD AUTO: 0.02 10*3/MM3 (ref 0–0.05)
IMM GRANULOCYTES NFR BLD AUTO: 0.4 % (ref 0–0.5)
LYMPHOCYTES # BLD AUTO: 1.2 10*3/MM3 (ref 0.7–3.1)
LYMPHOCYTES NFR BLD AUTO: 23.7 % (ref 19.6–45.3)
MCH RBC QN AUTO: 34.1 PG (ref 26.6–33)
MCHC RBC AUTO-ENTMCNC: 36.3 G/DL (ref 31.5–35.7)
MCV RBC AUTO: 94 FL (ref 79–97)
MONOCYTES # BLD AUTO: 0.38 10*3/MM3 (ref 0.1–0.9)
MONOCYTES NFR BLD AUTO: 7.5 % (ref 5–12)
NEUTROPHILS NFR BLD AUTO: 3.31 10*3/MM3 (ref 1.7–7)
NEUTROPHILS NFR BLD AUTO: 65.4 % (ref 42.7–76)
NRBC BLD AUTO-RTO: 0 /100 WBC (ref 0–0.2)
PLATELET # BLD AUTO: 177 10*3/MM3 (ref 140–450)
PMV BLD AUTO: 8.9 FL (ref 6–12)
POTASSIUM SERPL-SCNC: 3.5 MMOL/L (ref 3.5–5.2)
PROT SERPL-MCNC: 6.8 G/DL (ref 6–8.5)
RBC # BLD AUTO: 3.52 10*6/MM3 (ref 4.14–5.8)
SODIUM SERPL-SCNC: 139 MMOL/L (ref 136–145)
WBC # BLD AUTO: 5.06 10*3/MM3 (ref 3.4–10.8)

## 2020-09-18 PROCEDURE — 97112 NEUROMUSCULAR REEDUCATION: CPT

## 2020-09-18 PROCEDURE — 97535 SELF CARE MNGMENT TRAINING: CPT

## 2020-09-18 PROCEDURE — 99024 POSTOP FOLLOW-UP VISIT: CPT | Performed by: NURSE PRACTITIONER

## 2020-09-18 PROCEDURE — 80053 COMPREHEN METABOLIC PANEL: CPT | Performed by: PHYSICAL MEDICINE & REHABILITATION

## 2020-09-18 PROCEDURE — 25010000002 VANCOMYCIN 10 G RECONSTITUTED SOLUTION: Performed by: PHYSICAL MEDICINE & REHABILITATION

## 2020-09-18 PROCEDURE — 85025 COMPLETE CBC W/AUTO DIFF WBC: CPT | Performed by: PHYSICAL MEDICINE & REHABILITATION

## 2020-09-18 PROCEDURE — 25010000002 VANCOMYCIN PER 500 MG: Performed by: NURSE PRACTITIONER

## 2020-09-18 RX ORDER — SODIUM CHLORIDE 0.9 % (FLUSH) 0.9 %
10 SYRINGE (ML) INJECTION EVERY 12 HOURS SCHEDULED
Status: DISCONTINUED | OUTPATIENT
Start: 2020-09-18 | End: 2020-09-28

## 2020-09-18 RX ORDER — CLOBETASOL PROPIONATE 0.05 G/100ML
SHAMPOO TOPICAL 3 TIMES WEEKLY
Status: DISCONTINUED | OUTPATIENT
Start: 2020-09-21 | End: 2020-09-28 | Stop reason: HOSPADM

## 2020-09-18 RX ORDER — VANCOMYCIN HYDROCHLORIDE 1 G/200ML
12.5 INJECTION, SOLUTION INTRAVENOUS EVERY 8 HOURS
Status: DISCONTINUED | OUTPATIENT
Start: 2020-09-18 | End: 2020-09-19

## 2020-09-18 RX ORDER — SODIUM CHLORIDE 0.9 % (FLUSH) 0.9 %
10 SYRINGE (ML) INJECTION AS NEEDED
Status: DISCONTINUED | OUTPATIENT
Start: 2020-09-18 | End: 2020-09-28

## 2020-09-18 RX ADMIN — DESONIDE: 0.5 OINTMENT TOPICAL at 09:12

## 2020-09-18 RX ADMIN — VANCOMYCIN HYDROCHLORIDE 1000 MG: 1 INJECTION, SOLUTION INTRAVENOUS at 22:32

## 2020-09-18 RX ADMIN — EMOLLIENT - LOTION: LOTION at 09:12

## 2020-09-18 RX ADMIN — POTASSIUM CHLORIDE 10 MEQ: 750 CAPSULE, EXTENDED RELEASE ORAL at 09:17

## 2020-09-18 RX ADMIN — VANCOMYCIN HYDROCHLORIDE 1500 MG: 10 INJECTION, POWDER, LYOPHILIZED, FOR SOLUTION INTRAVENOUS at 16:25

## 2020-09-18 RX ADMIN — SODIUM CHLORIDE, PRESERVATIVE FREE 10 ML: 5 INJECTION INTRAVENOUS at 16:27

## 2020-09-18 RX ADMIN — EMOLLIENT - LOTION: LOTION at 21:32

## 2020-09-18 RX ADMIN — FAMOTIDINE 20 MG: 20 TABLET, FILM COATED ORAL at 17:22

## 2020-09-18 RX ADMIN — FOLIC ACID 1 MG: 1 TABLET ORAL at 09:13

## 2020-09-18 RX ADMIN — CLOBETASOL PROPIONATE: 0.05 SHAMPOO TOPICAL at 09:12

## 2020-09-18 RX ADMIN — POTASSIUM CHLORIDE 10 MEQ: 750 CAPSULE, EXTENDED RELEASE ORAL at 17:22

## 2020-09-18 RX ADMIN — SODIUM CHLORIDE, PRESERVATIVE FREE 10 ML: 5 INJECTION INTRAVENOUS at 21:33

## 2020-09-18 RX ADMIN — OXYCODONE 5 MG: 5 TABLET ORAL at 22:32

## 2020-09-18 RX ADMIN — LEVETIRACETAM 500 MG: 500 TABLET, FILM COATED ORAL at 21:32

## 2020-09-18 RX ADMIN — LEVETIRACETAM 500 MG: 500 TABLET, FILM COATED ORAL at 09:13

## 2020-09-18 RX ADMIN — Medication 100 MG: at 11:45

## 2020-09-18 RX ADMIN — SULFAMETHOXAZOLE AND TRIMETHOPRIM 160 MG: 800; 160 TABLET ORAL at 09:13

## 2020-09-18 RX ADMIN — OXYCODONE 5 MG: 5 TABLET ORAL at 16:41

## 2020-09-18 RX ADMIN — DESONIDE: 0.5 OINTMENT TOPICAL at 21:32

## 2020-09-18 RX ADMIN — MIRTAZAPINE 15 MG: 15 TABLET, FILM COATED ORAL at 22:32

## 2020-09-18 RX ADMIN — GABAPENTIN 300 MG: 300 CAPSULE ORAL at 21:32

## 2020-09-18 RX ADMIN — FAMOTIDINE 20 MG: 20 TABLET, FILM COATED ORAL at 05:55

## 2020-09-19 LAB
CREAT SERPL-MCNC: 0.57 MG/DL (ref 0.76–1.27)
GFR SERPL CREATININE-BSD FRML MDRD: >150 ML/MIN/1.73
VANCOMYCIN TROUGH SERPL-MCNC: 8.1 MCG/ML (ref 5–20)

## 2020-09-19 PROCEDURE — 97530 THERAPEUTIC ACTIVITIES: CPT

## 2020-09-19 PROCEDURE — 25010000002 VANCOMYCIN: Performed by: NURSE PRACTITIONER

## 2020-09-19 PROCEDURE — 80202 ASSAY OF VANCOMYCIN: CPT | Performed by: NURSE PRACTITIONER

## 2020-09-19 PROCEDURE — 82565 ASSAY OF CREATININE: CPT | Performed by: NURSE PRACTITIONER

## 2020-09-19 PROCEDURE — 25010000002 VANCOMYCIN PER 500 MG: Performed by: NURSE PRACTITIONER

## 2020-09-19 PROCEDURE — 97112 NEUROMUSCULAR REEDUCATION: CPT

## 2020-09-19 RX ADMIN — FOLIC ACID 1 MG: 1 TABLET ORAL at 08:25

## 2020-09-19 RX ADMIN — VANCOMYCIN HYDROCHLORIDE 1250 MG: 10 INJECTION, POWDER, LYOPHILIZED, FOR SOLUTION INTRAVENOUS at 22:48

## 2020-09-19 RX ADMIN — DESONIDE: 0.5 OINTMENT TOPICAL at 19:33

## 2020-09-19 RX ADMIN — SODIUM CHLORIDE, PRESERVATIVE FREE 10 ML: 5 INJECTION INTRAVENOUS at 08:26

## 2020-09-19 RX ADMIN — EMOLLIENT - LOTION: LOTION at 19:33

## 2020-09-19 RX ADMIN — VANCOMYCIN HYDROCHLORIDE 1000 MG: 1 INJECTION, SOLUTION INTRAVENOUS at 15:10

## 2020-09-19 RX ADMIN — SODIUM CHLORIDE, PRESERVATIVE FREE 10 ML: 5 INJECTION INTRAVENOUS at 19:36

## 2020-09-19 RX ADMIN — POTASSIUM CHLORIDE 10 MEQ: 750 CAPSULE, EXTENDED RELEASE ORAL at 17:09

## 2020-09-19 RX ADMIN — MIRTAZAPINE 15 MG: 15 TABLET, FILM COATED ORAL at 19:32

## 2020-09-19 RX ADMIN — DESONIDE: 0.5 OINTMENT TOPICAL at 08:25

## 2020-09-19 RX ADMIN — FAMOTIDINE 20 MG: 20 TABLET, FILM COATED ORAL at 17:09

## 2020-09-19 RX ADMIN — OXYCODONE 5 MG: 5 TABLET ORAL at 19:49

## 2020-09-19 RX ADMIN — Medication 100 MG: at 08:25

## 2020-09-19 RX ADMIN — GABAPENTIN 300 MG: 300 CAPSULE ORAL at 19:32

## 2020-09-19 RX ADMIN — VANCOMYCIN HYDROCHLORIDE 1000 MG: 1 INJECTION, SOLUTION INTRAVENOUS at 06:46

## 2020-09-19 RX ADMIN — LEVETIRACETAM 500 MG: 500 TABLET, FILM COATED ORAL at 08:25

## 2020-09-19 RX ADMIN — EMOLLIENT - LOTION: LOTION at 08:25

## 2020-09-19 RX ADMIN — LEVETIRACETAM 500 MG: 500 TABLET, FILM COATED ORAL at 19:32

## 2020-09-19 RX ADMIN — FAMOTIDINE 20 MG: 20 TABLET, FILM COATED ORAL at 06:46

## 2020-09-19 RX ADMIN — POTASSIUM CHLORIDE 10 MEQ: 750 CAPSULE, EXTENDED RELEASE ORAL at 08:25

## 2020-09-20 LAB
CREAT SERPL-MCNC: 0.47 MG/DL (ref 0.76–1.27)
GFR SERPL CREATININE-BSD FRML MDRD: >150 ML/MIN/1.73

## 2020-09-20 PROCEDURE — 82565 ASSAY OF CREATININE: CPT | Performed by: NURSE PRACTITIONER

## 2020-09-20 PROCEDURE — 99024 POSTOP FOLLOW-UP VISIT: CPT | Performed by: NEUROLOGICAL SURGERY

## 2020-09-20 PROCEDURE — 25010000002 VANCOMYCIN: Performed by: NURSE PRACTITIONER

## 2020-09-20 PROCEDURE — 25010000002 VANCOMYCIN 10 G RECONSTITUTED SOLUTION: Performed by: NURSE PRACTITIONER

## 2020-09-20 RX ADMIN — FOLIC ACID 1 MG: 1 TABLET ORAL at 08:18

## 2020-09-20 RX ADMIN — VANCOMYCIN HYDROCHLORIDE 1250 MG: 10 INJECTION, POWDER, LYOPHILIZED, FOR SOLUTION INTRAVENOUS at 05:56

## 2020-09-20 RX ADMIN — FAMOTIDINE 20 MG: 20 TABLET, FILM COATED ORAL at 05:58

## 2020-09-20 RX ADMIN — VANCOMYCIN HYDROCHLORIDE 1250 MG: 10 INJECTION, POWDER, LYOPHILIZED, FOR SOLUTION INTRAVENOUS at 21:27

## 2020-09-20 RX ADMIN — LEVETIRACETAM 500 MG: 500 TABLET, FILM COATED ORAL at 21:24

## 2020-09-20 RX ADMIN — FAMOTIDINE 20 MG: 20 TABLET, FILM COATED ORAL at 17:19

## 2020-09-20 RX ADMIN — SODIUM CHLORIDE, PRESERVATIVE FREE 10 ML: 5 INJECTION INTRAVENOUS at 21:25

## 2020-09-20 RX ADMIN — SODIUM CHLORIDE, PRESERVATIVE FREE 10 ML: 5 INJECTION INTRAVENOUS at 08:18

## 2020-09-20 RX ADMIN — DESONIDE: 0.5 OINTMENT TOPICAL at 08:18

## 2020-09-20 RX ADMIN — GABAPENTIN 300 MG: 300 CAPSULE ORAL at 21:24

## 2020-09-20 RX ADMIN — EMOLLIENT - LOTION: LOTION at 21:25

## 2020-09-20 RX ADMIN — MIRTAZAPINE 15 MG: 15 TABLET, FILM COATED ORAL at 21:24

## 2020-09-20 RX ADMIN — DESONIDE: 0.5 OINTMENT TOPICAL at 21:25

## 2020-09-20 RX ADMIN — Medication 100 MG: at 08:18

## 2020-09-20 RX ADMIN — POTASSIUM CHLORIDE 10 MEQ: 750 CAPSULE, EXTENDED RELEASE ORAL at 08:18

## 2020-09-20 RX ADMIN — POTASSIUM CHLORIDE 10 MEQ: 750 CAPSULE, EXTENDED RELEASE ORAL at 17:21

## 2020-09-20 RX ADMIN — VANCOMYCIN HYDROCHLORIDE 1250 MG: 10 INJECTION, POWDER, LYOPHILIZED, FOR SOLUTION INTRAVENOUS at 13:30

## 2020-09-20 RX ADMIN — EMOLLIENT - LOTION: LOTION at 08:18

## 2020-09-20 RX ADMIN — LEVETIRACETAM 500 MG: 500 TABLET, FILM COATED ORAL at 08:18

## 2020-09-21 LAB
ALBUMIN SERPL-MCNC: 3.7 G/DL (ref 3.5–5.2)
ALBUMIN/GLOB SERPL: 1.2 G/DL
ALP SERPL-CCNC: 105 U/L (ref 39–117)
ALT SERPL W P-5'-P-CCNC: 16 U/L (ref 1–41)
ANION GAP SERPL CALCULATED.3IONS-SCNC: 7.9 MMOL/L (ref 5–15)
AST SERPL-CCNC: 27 U/L (ref 1–40)
BASOPHILS # BLD AUTO: 0.03 10*3/MM3 (ref 0–0.2)
BASOPHILS NFR BLD AUTO: 0.6 % (ref 0–1.5)
BILIRUB SERPL-MCNC: 2.1 MG/DL (ref 0–1.2)
BUN SERPL-MCNC: 4 MG/DL (ref 6–20)
BUN/CREAT SERPL: 8.7 (ref 7–25)
CALCIUM SPEC-SCNC: 9.5 MG/DL (ref 8.6–10.5)
CHLORIDE SERPL-SCNC: 108 MMOL/L (ref 98–107)
CO2 SERPL-SCNC: 22.1 MMOL/L (ref 22–29)
CREAT SERPL-MCNC: 0.46 MG/DL (ref 0.76–1.27)
DEPRECATED RDW RBC AUTO: 40.4 FL (ref 37–54)
EOSINOPHIL # BLD AUTO: 0.13 10*3/MM3 (ref 0–0.4)
EOSINOPHIL NFR BLD AUTO: 2.6 % (ref 0.3–6.2)
ERYTHROCYTE [DISTWIDTH] IN BLOOD BY AUTOMATED COUNT: 11.5 % (ref 12.3–15.4)
GFR SERPL CREATININE-BSD FRML MDRD: >150 ML/MIN/1.73
GLOBULIN UR ELPH-MCNC: 3.1 GM/DL
GLUCOSE SERPL-MCNC: 88 MG/DL (ref 65–99)
HCT VFR BLD AUTO: 35.1 % (ref 37.5–51)
HGB BLD-MCNC: 12.1 G/DL (ref 13–17.7)
IMM GRANULOCYTES # BLD AUTO: 0.01 10*3/MM3 (ref 0–0.05)
IMM GRANULOCYTES NFR BLD AUTO: 0.2 % (ref 0–0.5)
LYMPHOCYTES # BLD AUTO: 1.46 10*3/MM3 (ref 0.7–3.1)
LYMPHOCYTES NFR BLD AUTO: 29.6 % (ref 19.6–45.3)
MCH RBC QN AUTO: 33.1 PG (ref 26.6–33)
MCHC RBC AUTO-ENTMCNC: 34.5 G/DL (ref 31.5–35.7)
MCV RBC AUTO: 95.9 FL (ref 79–97)
MONOCYTES # BLD AUTO: 0.35 10*3/MM3 (ref 0.1–0.9)
MONOCYTES NFR BLD AUTO: 7.1 % (ref 5–12)
NEUTROPHILS NFR BLD AUTO: 2.96 10*3/MM3 (ref 1.7–7)
NEUTROPHILS NFR BLD AUTO: 59.9 % (ref 42.7–76)
NRBC BLD AUTO-RTO: 0 /100 WBC (ref 0–0.2)
PLATELET # BLD AUTO: 194 10*3/MM3 (ref 140–450)
PMV BLD AUTO: 9.3 FL (ref 6–12)
POTASSIUM SERPL-SCNC: 3.6 MMOL/L (ref 3.5–5.2)
PROT SERPL-MCNC: 6.8 G/DL (ref 6–8.5)
RBC # BLD AUTO: 3.66 10*6/MM3 (ref 4.14–5.8)
SODIUM SERPL-SCNC: 138 MMOL/L (ref 136–145)
VANCOMYCIN TROUGH SERPL-MCNC: 12.7 MCG/ML (ref 5–20)
WBC # BLD AUTO: 4.94 10*3/MM3 (ref 3.4–10.8)

## 2020-09-21 PROCEDURE — 80202 ASSAY OF VANCOMYCIN: CPT | Performed by: NURSE PRACTITIONER

## 2020-09-21 PROCEDURE — 97110 THERAPEUTIC EXERCISES: CPT

## 2020-09-21 PROCEDURE — 97116 GAIT TRAINING THERAPY: CPT

## 2020-09-21 PROCEDURE — 25010000002 VANCOMYCIN: Performed by: NURSE PRACTITIONER

## 2020-09-21 PROCEDURE — 97112 NEUROMUSCULAR REEDUCATION: CPT

## 2020-09-21 PROCEDURE — 97535 SELF CARE MNGMENT TRAINING: CPT

## 2020-09-21 PROCEDURE — 80053 COMPREHEN METABOLIC PANEL: CPT | Performed by: PHYSICAL MEDICINE & REHABILITATION

## 2020-09-21 PROCEDURE — 85025 COMPLETE CBC W/AUTO DIFF WBC: CPT | Performed by: PHYSICAL MEDICINE & REHABILITATION

## 2020-09-21 RX ADMIN — EMOLLIENT - LOTION: LOTION at 07:36

## 2020-09-21 RX ADMIN — Medication 100 MG: at 07:35

## 2020-09-21 RX ADMIN — GABAPENTIN 300 MG: 300 CAPSULE ORAL at 21:24

## 2020-09-21 RX ADMIN — LEVETIRACETAM 500 MG: 500 TABLET, FILM COATED ORAL at 21:08

## 2020-09-21 RX ADMIN — VANCOMYCIN HYDROCHLORIDE 1250 MG: 10 INJECTION, POWDER, LYOPHILIZED, FOR SOLUTION INTRAVENOUS at 21:16

## 2020-09-21 RX ADMIN — POTASSIUM CHLORIDE 10 MEQ: 750 CAPSULE, EXTENDED RELEASE ORAL at 17:16

## 2020-09-21 RX ADMIN — VANCOMYCIN HYDROCHLORIDE 1250 MG: 10 INJECTION, POWDER, LYOPHILIZED, FOR SOLUTION INTRAVENOUS at 06:38

## 2020-09-21 RX ADMIN — EMOLLIENT - LOTION: LOTION at 21:09

## 2020-09-21 RX ADMIN — DESONIDE: 0.5 OINTMENT TOPICAL at 07:36

## 2020-09-21 RX ADMIN — SODIUM CHLORIDE, PRESERVATIVE FREE 10 ML: 5 INJECTION INTRAVENOUS at 21:21

## 2020-09-21 RX ADMIN — DESONIDE: 0.5 OINTMENT TOPICAL at 21:09

## 2020-09-21 RX ADMIN — LEVETIRACETAM 500 MG: 500 TABLET, FILM COATED ORAL at 07:35

## 2020-09-21 RX ADMIN — CLOBETASOL PROPIONATE: 0.05 SHAMPOO TOPICAL at 07:36

## 2020-09-21 RX ADMIN — FAMOTIDINE 20 MG: 20 TABLET, FILM COATED ORAL at 06:36

## 2020-09-21 RX ADMIN — SODIUM CHLORIDE, PRESERVATIVE FREE 10 ML: 5 INJECTION INTRAVENOUS at 07:36

## 2020-09-21 RX ADMIN — FOLIC ACID 1 MG: 1 TABLET ORAL at 07:35

## 2020-09-21 RX ADMIN — FAMOTIDINE 20 MG: 20 TABLET, FILM COATED ORAL at 17:16

## 2020-09-21 RX ADMIN — MIRTAZAPINE 15 MG: 15 TABLET, FILM COATED ORAL at 21:08

## 2020-09-21 RX ADMIN — POTASSIUM CHLORIDE 10 MEQ: 750 CAPSULE, EXTENDED RELEASE ORAL at 07:35

## 2020-09-22 ENCOUNTER — APPOINTMENT (OUTPATIENT)
Dept: MRI IMAGING | Facility: HOSPITAL | Age: 30
End: 2020-09-22

## 2020-09-22 PROCEDURE — 99024 POSTOP FOLLOW-UP VISIT: CPT | Performed by: NURSE PRACTITIONER

## 2020-09-22 PROCEDURE — 25010000002 VANCOMYCIN: Performed by: NURSE PRACTITIONER

## 2020-09-22 PROCEDURE — 0 GADOBENATE DIMEGLUMINE 529 MG/ML SOLUTION: Performed by: PHYSICAL MEDICINE & REHABILITATION

## 2020-09-22 PROCEDURE — 25010000002 VANCOMYCIN 10 G RECONSTITUTED SOLUTION: Performed by: NURSE PRACTITIONER

## 2020-09-22 PROCEDURE — 97112 NEUROMUSCULAR REEDUCATION: CPT

## 2020-09-22 PROCEDURE — A9577 INJ MULTIHANCE: HCPCS | Performed by: PHYSICAL MEDICINE & REHABILITATION

## 2020-09-22 PROCEDURE — 97110 THERAPEUTIC EXERCISES: CPT

## 2020-09-22 PROCEDURE — 97116 GAIT TRAINING THERAPY: CPT

## 2020-09-22 PROCEDURE — 70553 MRI BRAIN STEM W/O & W/DYE: CPT

## 2020-09-22 PROCEDURE — 97535 SELF CARE MNGMENT TRAINING: CPT

## 2020-09-22 RX ADMIN — VANCOMYCIN HYDROCHLORIDE 1250 MG: 10 INJECTION, POWDER, LYOPHILIZED, FOR SOLUTION INTRAVENOUS at 06:14

## 2020-09-22 RX ADMIN — Medication 100 MG: at 09:09

## 2020-09-22 RX ADMIN — LEVETIRACETAM 500 MG: 500 TABLET, FILM COATED ORAL at 22:21

## 2020-09-22 RX ADMIN — EMOLLIENT - LOTION: LOTION at 07:39

## 2020-09-22 RX ADMIN — MIRTAZAPINE 15 MG: 15 TABLET, FILM COATED ORAL at 22:21

## 2020-09-22 RX ADMIN — CLOBETASOL PROPIONATE: 0.05 SHAMPOO TOPICAL at 07:38

## 2020-09-22 RX ADMIN — OXYCODONE 5 MG: 5 TABLET ORAL at 17:23

## 2020-09-22 RX ADMIN — FAMOTIDINE 20 MG: 20 TABLET, FILM COATED ORAL at 17:22

## 2020-09-22 RX ADMIN — GADOBENATE DIMEGLUMINE 16 ML: 529 INJECTION, SOLUTION INTRAVENOUS at 19:30

## 2020-09-22 RX ADMIN — SODIUM CHLORIDE, PRESERVATIVE FREE 10 ML: 5 INJECTION INTRAVENOUS at 22:21

## 2020-09-22 RX ADMIN — POTASSIUM CHLORIDE 10 MEQ: 750 CAPSULE, EXTENDED RELEASE ORAL at 07:39

## 2020-09-22 RX ADMIN — DESONIDE: 0.5 OINTMENT TOPICAL at 07:39

## 2020-09-22 RX ADMIN — LEVETIRACETAM 500 MG: 500 TABLET, FILM COATED ORAL at 09:09

## 2020-09-22 RX ADMIN — VANCOMYCIN HYDROCHLORIDE 1250 MG: 10 INJECTION, POWDER, LYOPHILIZED, FOR SOLUTION INTRAVENOUS at 22:20

## 2020-09-22 RX ADMIN — FAMOTIDINE 20 MG: 20 TABLET, FILM COATED ORAL at 06:13

## 2020-09-22 RX ADMIN — POTASSIUM CHLORIDE 10 MEQ: 750 CAPSULE, EXTENDED RELEASE ORAL at 17:23

## 2020-09-22 RX ADMIN — DESONIDE 1 APPLICATION: 0.5 OINTMENT TOPICAL at 22:22

## 2020-09-22 RX ADMIN — GABAPENTIN 300 MG: 300 CAPSULE ORAL at 22:21

## 2020-09-22 RX ADMIN — VANCOMYCIN HYDROCHLORIDE 1250 MG: 10 INJECTION, POWDER, LYOPHILIZED, FOR SOLUTION INTRAVENOUS at 14:09

## 2020-09-22 RX ADMIN — SODIUM CHLORIDE, PRESERVATIVE FREE 10 ML: 5 INJECTION INTRAVENOUS at 07:39

## 2020-09-22 RX ADMIN — EMOLLIENT - LOTION 1 APPLICATION: LOTION at 22:22

## 2020-09-22 RX ADMIN — FOLIC ACID 1 MG: 1 TABLET ORAL at 09:09

## 2020-09-23 PROCEDURE — 97116 GAIT TRAINING THERAPY: CPT

## 2020-09-23 PROCEDURE — 25010000002 VANCOMYCIN 10 G RECONSTITUTED SOLUTION: Performed by: NURSE PRACTITIONER

## 2020-09-23 PROCEDURE — 97535 SELF CARE MNGMENT TRAINING: CPT

## 2020-09-23 PROCEDURE — 97110 THERAPEUTIC EXERCISES: CPT

## 2020-09-23 PROCEDURE — 99024 POSTOP FOLLOW-UP VISIT: CPT | Performed by: NEUROLOGICAL SURGERY

## 2020-09-23 PROCEDURE — 97112 NEUROMUSCULAR REEDUCATION: CPT

## 2020-09-23 RX ADMIN — LEVETIRACETAM 500 MG: 500 TABLET, FILM COATED ORAL at 08:12

## 2020-09-23 RX ADMIN — Medication 100 MG: at 08:12

## 2020-09-23 RX ADMIN — CLOBETASOL PROPIONATE: 0.05 SHAMPOO TOPICAL at 08:12

## 2020-09-23 RX ADMIN — LEVETIRACETAM 500 MG: 500 TABLET, FILM COATED ORAL at 21:54

## 2020-09-23 RX ADMIN — POTASSIUM CHLORIDE 10 MEQ: 750 CAPSULE, EXTENDED RELEASE ORAL at 16:45

## 2020-09-23 RX ADMIN — EMOLLIENT - LOTION: LOTION at 08:12

## 2020-09-23 RX ADMIN — EMOLLIENT - LOTION: LOTION at 21:54

## 2020-09-23 RX ADMIN — VANCOMYCIN HYDROCHLORIDE 1250 MG: 10 INJECTION, POWDER, LYOPHILIZED, FOR SOLUTION INTRAVENOUS at 23:05

## 2020-09-23 RX ADMIN — VANCOMYCIN HYDROCHLORIDE 1250 MG: 10 INJECTION, POWDER, LYOPHILIZED, FOR SOLUTION INTRAVENOUS at 05:56

## 2020-09-23 RX ADMIN — GABAPENTIN 300 MG: 300 CAPSULE ORAL at 21:54

## 2020-09-23 RX ADMIN — FAMOTIDINE 20 MG: 20 TABLET, FILM COATED ORAL at 16:45

## 2020-09-23 RX ADMIN — POTASSIUM CHLORIDE 10 MEQ: 750 CAPSULE, EXTENDED RELEASE ORAL at 08:12

## 2020-09-23 RX ADMIN — MIRTAZAPINE 15 MG: 15 TABLET, FILM COATED ORAL at 21:54

## 2020-09-23 RX ADMIN — SODIUM CHLORIDE, PRESERVATIVE FREE 10 ML: 5 INJECTION INTRAVENOUS at 21:56

## 2020-09-23 RX ADMIN — DESONIDE: 0.5 OINTMENT TOPICAL at 21:54

## 2020-09-23 RX ADMIN — FAMOTIDINE 20 MG: 20 TABLET, FILM COATED ORAL at 05:56

## 2020-09-23 RX ADMIN — DESONIDE: 0.5 OINTMENT TOPICAL at 08:12

## 2020-09-23 RX ADMIN — SODIUM CHLORIDE, PRESERVATIVE FREE 10 ML: 5 INJECTION INTRAVENOUS at 08:12

## 2020-09-23 RX ADMIN — FOLIC ACID 1 MG: 1 TABLET ORAL at 08:12

## 2020-09-23 RX ADMIN — VANCOMYCIN HYDROCHLORIDE 1250 MG: 10 INJECTION, POWDER, LYOPHILIZED, FOR SOLUTION INTRAVENOUS at 14:03

## 2020-09-24 LAB
CREAT SERPL-MCNC: 0.43 MG/DL (ref 0.76–1.27)
GFR SERPL CREATININE-BSD FRML MDRD: >150 ML/MIN/1.73
VANCOMYCIN TROUGH SERPL-MCNC: 13.1 MCG/ML (ref 5–20)

## 2020-09-24 PROCEDURE — 97110 THERAPEUTIC EXERCISES: CPT

## 2020-09-24 PROCEDURE — 80202 ASSAY OF VANCOMYCIN: CPT | Performed by: NURSE PRACTITIONER

## 2020-09-24 PROCEDURE — 97530 THERAPEUTIC ACTIVITIES: CPT

## 2020-09-24 PROCEDURE — 25010000002 VANCOMYCIN 10 G RECONSTITUTED SOLUTION: Performed by: NURSE PRACTITIONER

## 2020-09-24 PROCEDURE — 97535 SELF CARE MNGMENT TRAINING: CPT

## 2020-09-24 PROCEDURE — 82565 ASSAY OF CREATININE: CPT | Performed by: NURSE PRACTITIONER

## 2020-09-24 PROCEDURE — 97116 GAIT TRAINING THERAPY: CPT

## 2020-09-24 PROCEDURE — 97112 NEUROMUSCULAR REEDUCATION: CPT

## 2020-09-24 RX ADMIN — POTASSIUM CHLORIDE 10 MEQ: 750 CAPSULE, EXTENDED RELEASE ORAL at 17:21

## 2020-09-24 RX ADMIN — DESONIDE: 0.5 OINTMENT TOPICAL at 21:08

## 2020-09-24 RX ADMIN — LEVETIRACETAM 500 MG: 500 TABLET, FILM COATED ORAL at 07:58

## 2020-09-24 RX ADMIN — EMOLLIENT - LOTION: LOTION at 21:08

## 2020-09-24 RX ADMIN — VANCOMYCIN HYDROCHLORIDE 1250 MG: 10 INJECTION, POWDER, LYOPHILIZED, FOR SOLUTION INTRAVENOUS at 21:09

## 2020-09-24 RX ADMIN — FAMOTIDINE 20 MG: 20 TABLET, FILM COATED ORAL at 06:09

## 2020-09-24 RX ADMIN — DESONIDE: 0.5 OINTMENT TOPICAL at 08:01

## 2020-09-24 RX ADMIN — OXYCODONE 5 MG: 5 TABLET ORAL at 22:10

## 2020-09-24 RX ADMIN — EMOLLIENT - LOTION: LOTION at 08:01

## 2020-09-24 RX ADMIN — GABAPENTIN 300 MG: 300 CAPSULE ORAL at 21:08

## 2020-09-24 RX ADMIN — FOLIC ACID 1 MG: 1 TABLET ORAL at 07:58

## 2020-09-24 RX ADMIN — MIRTAZAPINE 15 MG: 15 TABLET, FILM COATED ORAL at 21:08

## 2020-09-24 RX ADMIN — POTASSIUM CHLORIDE 10 MEQ: 750 CAPSULE, EXTENDED RELEASE ORAL at 07:58

## 2020-09-24 RX ADMIN — Medication 100 MG: at 07:58

## 2020-09-24 RX ADMIN — VANCOMYCIN HYDROCHLORIDE 1250 MG: 10 INJECTION, POWDER, LYOPHILIZED, FOR SOLUTION INTRAVENOUS at 15:04

## 2020-09-24 RX ADMIN — VANCOMYCIN HYDROCHLORIDE 1250 MG: 10 INJECTION, POWDER, LYOPHILIZED, FOR SOLUTION INTRAVENOUS at 07:46

## 2020-09-24 RX ADMIN — SODIUM CHLORIDE, PRESERVATIVE FREE 10 ML: 5 INJECTION INTRAVENOUS at 07:59

## 2020-09-24 RX ADMIN — FAMOTIDINE 20 MG: 20 TABLET, FILM COATED ORAL at 17:21

## 2020-09-24 RX ADMIN — LEVETIRACETAM 500 MG: 500 TABLET, FILM COATED ORAL at 21:08

## 2020-09-25 LAB
ALBUMIN SERPL-MCNC: 3.4 G/DL (ref 3.5–5.2)
ALBUMIN/GLOB SERPL: 1.1 G/DL
ALP SERPL-CCNC: 94 U/L (ref 39–117)
ALT SERPL W P-5'-P-CCNC: 15 U/L (ref 1–41)
ANION GAP SERPL CALCULATED.3IONS-SCNC: 8.9 MMOL/L (ref 5–15)
AST SERPL-CCNC: 25 U/L (ref 1–40)
BASOPHILS # BLD AUTO: 0.05 10*3/MM3 (ref 0–0.2)
BASOPHILS NFR BLD AUTO: 1.2 % (ref 0–1.5)
BILIRUB SERPL-MCNC: 2.1 MG/DL (ref 0–1.2)
BUN SERPL-MCNC: 5 MG/DL (ref 6–20)
BUN/CREAT SERPL: 11.9 (ref 7–25)
CALCIUM SPEC-SCNC: 9.4 MG/DL (ref 8.6–10.5)
CHLORIDE SERPL-SCNC: 109 MMOL/L (ref 98–107)
CO2 SERPL-SCNC: 22.1 MMOL/L (ref 22–29)
CREAT SERPL-MCNC: 0.42 MG/DL (ref 0.76–1.27)
DEPRECATED RDW RBC AUTO: 39.7 FL (ref 37–54)
EOSINOPHIL # BLD AUTO: 0.14 10*3/MM3 (ref 0–0.4)
EOSINOPHIL NFR BLD AUTO: 3.3 % (ref 0.3–6.2)
ERYTHROCYTE [DISTWIDTH] IN BLOOD BY AUTOMATED COUNT: 11.6 % (ref 12.3–15.4)
GFR SERPL CREATININE-BSD FRML MDRD: >150 ML/MIN/1.73
GLOBULIN UR ELPH-MCNC: 3 GM/DL
GLUCOSE SERPL-MCNC: 85 MG/DL (ref 65–99)
HCT VFR BLD AUTO: 33.3 % (ref 37.5–51)
HGB BLD-MCNC: 11.9 G/DL (ref 13–17.7)
IMM GRANULOCYTES # BLD AUTO: 0.01 10*3/MM3 (ref 0–0.05)
IMM GRANULOCYTES NFR BLD AUTO: 0.2 % (ref 0–0.5)
LYMPHOCYTES # BLD AUTO: 1.35 10*3/MM3 (ref 0.7–3.1)
LYMPHOCYTES NFR BLD AUTO: 31.7 % (ref 19.6–45.3)
MCH RBC QN AUTO: 33.8 PG (ref 26.6–33)
MCHC RBC AUTO-ENTMCNC: 35.7 G/DL (ref 31.5–35.7)
MCV RBC AUTO: 94.6 FL (ref 79–97)
MONOCYTES # BLD AUTO: 0.37 10*3/MM3 (ref 0.1–0.9)
MONOCYTES NFR BLD AUTO: 8.7 % (ref 5–12)
NEUTROPHILS NFR BLD AUTO: 2.34 10*3/MM3 (ref 1.7–7)
NEUTROPHILS NFR BLD AUTO: 54.9 % (ref 42.7–76)
NRBC BLD AUTO-RTO: 0 /100 WBC (ref 0–0.2)
PLATELET # BLD AUTO: 173 10*3/MM3 (ref 140–450)
PMV BLD AUTO: 9.2 FL (ref 6–12)
POTASSIUM SERPL-SCNC: 3.5 MMOL/L (ref 3.5–5.2)
PROT SERPL-MCNC: 6.4 G/DL (ref 6–8.5)
RBC # BLD AUTO: 3.52 10*6/MM3 (ref 4.14–5.8)
SODIUM SERPL-SCNC: 140 MMOL/L (ref 136–145)
WBC # BLD AUTO: 4.26 10*3/MM3 (ref 3.4–10.8)

## 2020-09-25 PROCEDURE — 97535 SELF CARE MNGMENT TRAINING: CPT

## 2020-09-25 PROCEDURE — 85025 COMPLETE CBC W/AUTO DIFF WBC: CPT | Performed by: PHYSICAL MEDICINE & REHABILITATION

## 2020-09-25 PROCEDURE — 97116 GAIT TRAINING THERAPY: CPT

## 2020-09-25 PROCEDURE — U0004 COV-19 TEST NON-CDC HGH THRU: HCPCS | Performed by: STUDENT IN AN ORGANIZED HEALTH CARE EDUCATION/TRAINING PROGRAM

## 2020-09-25 PROCEDURE — 25010000002 VANCOMYCIN 10 G RECONSTITUTED SOLUTION: Performed by: NURSE PRACTITIONER

## 2020-09-25 PROCEDURE — 80053 COMPREHEN METABOLIC PANEL: CPT | Performed by: PHYSICAL MEDICINE & REHABILITATION

## 2020-09-25 PROCEDURE — 97110 THERAPEUTIC EXERCISES: CPT

## 2020-09-25 PROCEDURE — 97112 NEUROMUSCULAR REEDUCATION: CPT

## 2020-09-25 RX ORDER — ACETAMINOPHEN 325 MG/1
650 TABLET ORAL EVERY 6 HOURS PRN
Status: DISCONTINUED | OUTPATIENT
Start: 2020-09-25 | End: 2020-09-28 | Stop reason: HOSPADM

## 2020-09-25 RX ADMIN — SODIUM CHLORIDE, PRESERVATIVE FREE 10 ML: 5 INJECTION INTRAVENOUS at 22:37

## 2020-09-25 RX ADMIN — FAMOTIDINE 20 MG: 20 TABLET, FILM COATED ORAL at 05:15

## 2020-09-25 RX ADMIN — DESONIDE: 0.5 OINTMENT TOPICAL at 22:37

## 2020-09-25 RX ADMIN — MIRTAZAPINE 15 MG: 15 TABLET, FILM COATED ORAL at 22:36

## 2020-09-25 RX ADMIN — LEVETIRACETAM 500 MG: 500 TABLET, FILM COATED ORAL at 08:35

## 2020-09-25 RX ADMIN — VANCOMYCIN HYDROCHLORIDE 1250 MG: 10 INJECTION, POWDER, LYOPHILIZED, FOR SOLUTION INTRAVENOUS at 22:36

## 2020-09-25 RX ADMIN — VANCOMYCIN HYDROCHLORIDE 1250 MG: 10 INJECTION, POWDER, LYOPHILIZED, FOR SOLUTION INTRAVENOUS at 14:00

## 2020-09-25 RX ADMIN — DESONIDE: 0.5 OINTMENT TOPICAL at 08:41

## 2020-09-25 RX ADMIN — Medication 100 MG: at 08:35

## 2020-09-25 RX ADMIN — EMOLLIENT - LOTION: LOTION at 22:37

## 2020-09-25 RX ADMIN — LEVETIRACETAM 500 MG: 500 TABLET, FILM COATED ORAL at 22:36

## 2020-09-25 RX ADMIN — SODIUM CHLORIDE, PRESERVATIVE FREE 10 ML: 5 INJECTION INTRAVENOUS at 08:42

## 2020-09-25 RX ADMIN — POTASSIUM CHLORIDE 10 MEQ: 750 CAPSULE, EXTENDED RELEASE ORAL at 17:35

## 2020-09-25 RX ADMIN — OXYCODONE 5 MG: 5 TABLET ORAL at 17:39

## 2020-09-25 RX ADMIN — POTASSIUM CHLORIDE 10 MEQ: 750 CAPSULE, EXTENDED RELEASE ORAL at 08:35

## 2020-09-25 RX ADMIN — CLOBETASOL PROPIONATE: 0.05 SHAMPOO TOPICAL at 08:41

## 2020-09-25 RX ADMIN — GABAPENTIN 300 MG: 300 CAPSULE ORAL at 22:36

## 2020-09-25 RX ADMIN — ACETAMINOPHEN 650 MG: 325 TABLET, FILM COATED ORAL at 08:35

## 2020-09-25 RX ADMIN — VANCOMYCIN HYDROCHLORIDE 1250 MG: 10 INJECTION, POWDER, LYOPHILIZED, FOR SOLUTION INTRAVENOUS at 05:15

## 2020-09-25 RX ADMIN — FAMOTIDINE 20 MG: 20 TABLET, FILM COATED ORAL at 17:35

## 2020-09-25 RX ADMIN — FOLIC ACID 1 MG: 1 TABLET ORAL at 08:35

## 2020-09-25 RX ADMIN — EMOLLIENT - LOTION: LOTION at 08:41

## 2020-09-26 LAB — SARS-COV-2 RNA RESP QL NAA+PROBE: NOT DETECTED

## 2020-09-26 PROCEDURE — 25010000002 VANCOMYCIN 10 G RECONSTITUTED SOLUTION: Performed by: NURSE PRACTITIONER

## 2020-09-26 PROCEDURE — 97116 GAIT TRAINING THERAPY: CPT | Performed by: PHYSICAL THERAPIST

## 2020-09-26 RX ADMIN — Medication 100 MG: at 09:16

## 2020-09-26 RX ADMIN — DESONIDE: 0.5 OINTMENT TOPICAL at 21:45

## 2020-09-26 RX ADMIN — SODIUM CHLORIDE, PRESERVATIVE FREE 10 ML: 5 INJECTION INTRAVENOUS at 21:46

## 2020-09-26 RX ADMIN — MIRTAZAPINE 15 MG: 15 TABLET, FILM COATED ORAL at 21:45

## 2020-09-26 RX ADMIN — EMOLLIENT - LOTION: LOTION at 21:45

## 2020-09-26 RX ADMIN — VANCOMYCIN HYDROCHLORIDE 1250 MG: 10 INJECTION, POWDER, LYOPHILIZED, FOR SOLUTION INTRAVENOUS at 06:00

## 2020-09-26 RX ADMIN — POTASSIUM CHLORIDE 10 MEQ: 750 CAPSULE, EXTENDED RELEASE ORAL at 09:16

## 2020-09-26 RX ADMIN — VANCOMYCIN HYDROCHLORIDE 1250 MG: 10 INJECTION, POWDER, LYOPHILIZED, FOR SOLUTION INTRAVENOUS at 21:45

## 2020-09-26 RX ADMIN — EMOLLIENT - LOTION: LOTION at 09:15

## 2020-09-26 RX ADMIN — FOLIC ACID 1 MG: 1 TABLET ORAL at 09:16

## 2020-09-26 RX ADMIN — POTASSIUM CHLORIDE 10 MEQ: 750 CAPSULE, EXTENDED RELEASE ORAL at 17:11

## 2020-09-26 RX ADMIN — FAMOTIDINE 20 MG: 20 TABLET, FILM COATED ORAL at 06:00

## 2020-09-26 RX ADMIN — VANCOMYCIN HYDROCHLORIDE 1250 MG: 10 INJECTION, POWDER, LYOPHILIZED, FOR SOLUTION INTRAVENOUS at 14:16

## 2020-09-26 RX ADMIN — GABAPENTIN 300 MG: 300 CAPSULE ORAL at 21:45

## 2020-09-26 RX ADMIN — SODIUM CHLORIDE, PRESERVATIVE FREE 10 ML: 5 INJECTION INTRAVENOUS at 09:15

## 2020-09-26 RX ADMIN — OXYCODONE 5 MG: 5 TABLET ORAL at 22:03

## 2020-09-26 RX ADMIN — DESONIDE: 0.5 OINTMENT TOPICAL at 09:15

## 2020-09-26 RX ADMIN — LEVETIRACETAM 500 MG: 500 TABLET, FILM COATED ORAL at 09:16

## 2020-09-26 RX ADMIN — LEVETIRACETAM 500 MG: 500 TABLET, FILM COATED ORAL at 21:45

## 2020-09-26 RX ADMIN — FAMOTIDINE 20 MG: 20 TABLET, FILM COATED ORAL at 17:11

## 2020-09-27 PROCEDURE — 25010000002 VANCOMYCIN 10 G RECONSTITUTED SOLUTION: Performed by: NURSE PRACTITIONER

## 2020-09-27 RX ADMIN — FOLIC ACID 1 MG: 1 TABLET ORAL at 09:09

## 2020-09-27 RX ADMIN — DESONIDE: 0.5 OINTMENT TOPICAL at 09:10

## 2020-09-27 RX ADMIN — DESONIDE: 0.5 OINTMENT TOPICAL at 22:06

## 2020-09-27 RX ADMIN — Medication 100 MG: at 09:09

## 2020-09-27 RX ADMIN — SODIUM CHLORIDE, PRESERVATIVE FREE 10 ML: 5 INJECTION INTRAVENOUS at 09:08

## 2020-09-27 RX ADMIN — POTASSIUM CHLORIDE 10 MEQ: 750 CAPSULE, EXTENDED RELEASE ORAL at 17:39

## 2020-09-27 RX ADMIN — EMOLLIENT - LOTION: LOTION at 22:05

## 2020-09-27 RX ADMIN — FAMOTIDINE 20 MG: 20 TABLET, FILM COATED ORAL at 05:52

## 2020-09-27 RX ADMIN — LEVETIRACETAM 500 MG: 500 TABLET, FILM COATED ORAL at 09:09

## 2020-09-27 RX ADMIN — VANCOMYCIN HYDROCHLORIDE 1250 MG: 10 INJECTION, POWDER, LYOPHILIZED, FOR SOLUTION INTRAVENOUS at 05:52

## 2020-09-27 RX ADMIN — MIRTAZAPINE 15 MG: 15 TABLET, FILM COATED ORAL at 22:05

## 2020-09-27 RX ADMIN — VANCOMYCIN HYDROCHLORIDE 1250 MG: 10 INJECTION, POWDER, LYOPHILIZED, FOR SOLUTION INTRAVENOUS at 22:12

## 2020-09-27 RX ADMIN — VANCOMYCIN HYDROCHLORIDE 1250 MG: 10 INJECTION, POWDER, LYOPHILIZED, FOR SOLUTION INTRAVENOUS at 14:43

## 2020-09-27 RX ADMIN — SODIUM CHLORIDE, PRESERVATIVE FREE 10 ML: 5 INJECTION INTRAVENOUS at 22:10

## 2020-09-27 RX ADMIN — EMOLLIENT - LOTION: LOTION at 09:09

## 2020-09-27 RX ADMIN — FAMOTIDINE 20 MG: 20 TABLET, FILM COATED ORAL at 17:39

## 2020-09-27 RX ADMIN — LEVETIRACETAM 500 MG: 500 TABLET, FILM COATED ORAL at 22:05

## 2020-09-27 RX ADMIN — POTASSIUM CHLORIDE 10 MEQ: 750 CAPSULE, EXTENDED RELEASE ORAL at 09:09

## 2020-09-27 RX ADMIN — GABAPENTIN 300 MG: 300 CAPSULE ORAL at 22:06

## 2020-09-28 ENCOUNTER — PREP FOR SURGERY (OUTPATIENT)
Dept: OTHER | Facility: HOSPITAL | Age: 30
End: 2020-09-28

## 2020-09-28 VITALS
OXYGEN SATURATION: 98 % | SYSTOLIC BLOOD PRESSURE: 120 MMHG | RESPIRATION RATE: 18 BRPM | TEMPERATURE: 97.9 F | HEIGHT: 73 IN | BODY MASS INDEX: 22.97 KG/M2 | HEART RATE: 98 BPM | WEIGHT: 173.28 LBS | DIASTOLIC BLOOD PRESSURE: 75 MMHG

## 2020-09-28 DIAGNOSIS — T81.32XA: Primary | ICD-10-CM

## 2020-09-28 PROBLEM — T81.328A: Status: ACTIVE | Noted: 2020-09-28

## 2020-09-28 LAB
ALBUMIN SERPL-MCNC: 3.8 G/DL (ref 3.5–5.2)
ALBUMIN/GLOB SERPL: 1.3 G/DL
ALP SERPL-CCNC: 92 U/L (ref 39–117)
ALT SERPL W P-5'-P-CCNC: 14 U/L (ref 1–41)
ANION GAP SERPL CALCULATED.3IONS-SCNC: 8.9 MMOL/L (ref 5–15)
AST SERPL-CCNC: 26 U/L (ref 1–40)
BASOPHILS # BLD AUTO: 0.05 10*3/MM3 (ref 0–0.2)
BASOPHILS NFR BLD AUTO: 1 % (ref 0–1.5)
BILIRUB SERPL-MCNC: 2.5 MG/DL (ref 0–1.2)
BUN SERPL-MCNC: 4 MG/DL (ref 6–20)
BUN/CREAT SERPL: 9.1 (ref 7–25)
CALCIUM SPEC-SCNC: 9.5 MG/DL (ref 8.6–10.5)
CHLORIDE SERPL-SCNC: 107 MMOL/L (ref 98–107)
CO2 SERPL-SCNC: 23.1 MMOL/L (ref 22–29)
CREAT SERPL-MCNC: 0.44 MG/DL (ref 0.76–1.27)
DEPRECATED RDW RBC AUTO: 40.2 FL (ref 37–54)
EOSINOPHIL # BLD AUTO: 0.17 10*3/MM3 (ref 0–0.4)
EOSINOPHIL NFR BLD AUTO: 3.5 % (ref 0.3–6.2)
ERYTHROCYTE [DISTWIDTH] IN BLOOD BY AUTOMATED COUNT: 11.3 % (ref 12.3–15.4)
GFR SERPL CREATININE-BSD FRML MDRD: >150 ML/MIN/1.73
GLOBULIN UR ELPH-MCNC: 3 GM/DL
GLUCOSE SERPL-MCNC: 88 MG/DL (ref 65–99)
HCT VFR BLD AUTO: 35.2 % (ref 37.5–51)
HGB BLD-MCNC: 12.4 G/DL (ref 13–17.7)
IMM GRANULOCYTES # BLD AUTO: 0.01 10*3/MM3 (ref 0–0.05)
IMM GRANULOCYTES NFR BLD AUTO: 0.2 % (ref 0–0.5)
LYMPHOCYTES # BLD AUTO: 1.48 10*3/MM3 (ref 0.7–3.1)
LYMPHOCYTES NFR BLD AUTO: 30.8 % (ref 19.6–45.3)
MCH RBC QN AUTO: 33.8 PG (ref 26.6–33)
MCHC RBC AUTO-ENTMCNC: 35.2 G/DL (ref 31.5–35.7)
MCV RBC AUTO: 95.9 FL (ref 79–97)
MONOCYTES # BLD AUTO: 0.45 10*3/MM3 (ref 0.1–0.9)
MONOCYTES NFR BLD AUTO: 9.4 % (ref 5–12)
NEUTROPHILS NFR BLD AUTO: 2.65 10*3/MM3 (ref 1.7–7)
NEUTROPHILS NFR BLD AUTO: 55.1 % (ref 42.7–76)
NRBC BLD AUTO-RTO: 0 /100 WBC (ref 0–0.2)
PLATELET # BLD AUTO: 169 10*3/MM3 (ref 140–450)
PMV BLD AUTO: 9.2 FL (ref 6–12)
POTASSIUM SERPL-SCNC: 3.6 MMOL/L (ref 3.5–5.2)
PROT SERPL-MCNC: 6.8 G/DL (ref 6–8.5)
RBC # BLD AUTO: 3.67 10*6/MM3 (ref 4.14–5.8)
SODIUM SERPL-SCNC: 139 MMOL/L (ref 136–145)
WBC # BLD AUTO: 4.81 10*3/MM3 (ref 3.4–10.8)

## 2020-09-28 PROCEDURE — 85025 COMPLETE CBC W/AUTO DIFF WBC: CPT | Performed by: PHYSICAL MEDICINE & REHABILITATION

## 2020-09-28 PROCEDURE — 80053 COMPREHEN METABOLIC PANEL: CPT | Performed by: PHYSICAL MEDICINE & REHABILITATION

## 2020-09-28 PROCEDURE — 05HY33Z INSERTION OF INFUSION DEVICE INTO UPPER VEIN, PERCUTANEOUS APPROACH: ICD-10-PCS | Performed by: PHYSICAL MEDICINE & REHABILITATION

## 2020-09-28 PROCEDURE — 97110 THERAPEUTIC EXERCISES: CPT

## 2020-09-28 PROCEDURE — 97530 THERAPEUTIC ACTIVITIES: CPT

## 2020-09-28 PROCEDURE — 25010000002 VANCOMYCIN 10 G RECONSTITUTED SOLUTION: Performed by: NURSE PRACTITIONER

## 2020-09-28 PROCEDURE — 99024 POSTOP FOLLOW-UP VISIT: CPT | Performed by: NURSE PRACTITIONER

## 2020-09-28 PROCEDURE — C1751 CATH, INF, PER/CENT/MIDLINE: HCPCS

## 2020-09-28 PROCEDURE — 97535 SELF CARE MNGMENT TRAINING: CPT

## 2020-09-28 RX ORDER — ACETAMINOPHEN 325 MG/1
650 TABLET ORAL EVERY 6 HOURS PRN
Start: 2020-09-28

## 2020-09-28 RX ORDER — FAMOTIDINE 20 MG/1
20 TABLET, FILM COATED ORAL
Qty: 180 TABLET | Refills: 0 | Status: SHIPPED | OUTPATIENT
Start: 2020-09-28

## 2020-09-28 RX ORDER — MIRTAZAPINE 15 MG/1
15 TABLET, FILM COATED ORAL NIGHTLY
Qty: 90 TABLET | Refills: 0 | Status: SHIPPED | OUTPATIENT
Start: 2020-09-28

## 2020-09-28 RX ORDER — POTASSIUM CHLORIDE 750 MG/1
10 CAPSULE, EXTENDED RELEASE ORAL 2 TIMES DAILY WITH MEALS
Qty: 60 CAPSULE | Refills: 1 | Status: SHIPPED | OUTPATIENT
Start: 2020-09-28

## 2020-09-28 RX ORDER — SODIUM CHLORIDE 0.9 % (FLUSH) 0.9 %
20 SYRINGE (ML) INJECTION AS NEEDED
Status: DISCONTINUED | OUTPATIENT
Start: 2020-09-28 | End: 2020-09-28 | Stop reason: HOSPADM

## 2020-09-28 RX ORDER — DESONIDE 0.5 MG/G
OINTMENT TOPICAL EVERY 12 HOURS SCHEDULED
Qty: 60 G | Refills: 1 | Status: SHIPPED | OUTPATIENT
Start: 2020-09-28 | End: 2022-01-10

## 2020-09-28 RX ORDER — CLOBETASOL PROPIONATE 0.05 G/100ML
SHAMPOO TOPICAL 3 TIMES WEEKLY
Qty: 118 ML | Refills: 2 | Status: SHIPPED | OUTPATIENT
Start: 2020-09-30 | End: 2022-01-10

## 2020-09-28 RX ORDER — SODIUM CHLORIDE 0.9 % (FLUSH) 0.9 %
10 SYRINGE (ML) INJECTION EVERY 12 HOURS SCHEDULED
Status: DISCONTINUED | OUTPATIENT
Start: 2020-09-28 | End: 2020-09-28 | Stop reason: HOSPADM

## 2020-09-28 RX ORDER — FOLIC ACID 1 MG/1
1 TABLET ORAL DAILY
Qty: 90 TABLET | Refills: 0 | Status: SHIPPED | OUTPATIENT
Start: 2020-09-29

## 2020-09-28 RX ORDER — SODIUM CHLORIDE 0.9 % (FLUSH) 0.9 %
10 SYRINGE (ML) INJECTION AS NEEDED
Status: DISCONTINUED | OUTPATIENT
Start: 2020-09-28 | End: 2020-09-28 | Stop reason: HOSPADM

## 2020-09-28 RX ORDER — VIT E ACET/GLY/DIMETH/WATER
LOTION (ML) TOPICAL EVERY 12 HOURS SCHEDULED
Qty: 177 ML | Refills: 2 | Status: SHIPPED | OUTPATIENT
Start: 2020-09-28

## 2020-09-28 RX ORDER — LEVETIRACETAM 500 MG/1
500 TABLET ORAL EVERY 12 HOURS SCHEDULED
Qty: 180 TABLET | Refills: 0 | Status: SHIPPED | OUTPATIENT
Start: 2020-09-28 | End: 2021-08-02 | Stop reason: SDUPTHER

## 2020-09-28 RX ORDER — LANOLIN ALCOHOL/MO/W.PET/CERES
100 CREAM (GRAM) TOPICAL DAILY
Qty: 90 TABLET | Refills: 0 | Status: SHIPPED | OUTPATIENT
Start: 2020-09-29

## 2020-09-28 RX ORDER — GABAPENTIN 300 MG/1
300 CAPSULE ORAL NIGHTLY
Qty: 90 CAPSULE | Refills: 0 | Status: SHIPPED | OUTPATIENT
Start: 2020-09-28

## 2020-09-28 RX ADMIN — POTASSIUM CHLORIDE 10 MEQ: 750 CAPSULE, EXTENDED RELEASE ORAL at 18:12

## 2020-09-28 RX ADMIN — EMOLLIENT - LOTION: LOTION at 08:33

## 2020-09-28 RX ADMIN — POTASSIUM CHLORIDE 10 MEQ: 750 CAPSULE, EXTENDED RELEASE ORAL at 08:33

## 2020-09-28 RX ADMIN — FOLIC ACID 1 MG: 1 TABLET ORAL at 08:32

## 2020-09-28 RX ADMIN — VANCOMYCIN HYDROCHLORIDE 1250 MG: 10 INJECTION, POWDER, LYOPHILIZED, FOR SOLUTION INTRAVENOUS at 16:09

## 2020-09-28 RX ADMIN — FAMOTIDINE 20 MG: 20 TABLET, FILM COATED ORAL at 18:12

## 2020-09-28 RX ADMIN — LEVETIRACETAM 500 MG: 500 TABLET, FILM COATED ORAL at 08:32

## 2020-09-28 RX ADMIN — CLOBETASOL PROPIONATE: 0.05 SHAMPOO TOPICAL at 08:33

## 2020-09-28 RX ADMIN — DESONIDE: 0.5 OINTMENT TOPICAL at 08:33

## 2020-09-28 RX ADMIN — FAMOTIDINE 20 MG: 20 TABLET, FILM COATED ORAL at 06:05

## 2020-09-28 RX ADMIN — SODIUM CHLORIDE, PRESERVATIVE FREE 10 ML: 5 INJECTION INTRAVENOUS at 08:33

## 2020-09-28 RX ADMIN — VANCOMYCIN HYDROCHLORIDE 1250 MG: 10 INJECTION, POWDER, LYOPHILIZED, FOR SOLUTION INTRAVENOUS at 06:05

## 2020-09-28 RX ADMIN — Medication 100 MG: at 08:32

## 2020-09-29 ENCOUNTER — APPOINTMENT (OUTPATIENT)
Dept: CT IMAGING | Facility: HOSPITAL | Age: 30
End: 2020-09-29

## 2020-09-30 ENCOUNTER — HOSPITAL ENCOUNTER (OUTPATIENT)
Dept: GENERAL RADIOLOGY | Facility: HOSPITAL | Age: 30
Discharge: HOME | End: 2020-09-30

## 2020-09-30 ENCOUNTER — APPOINTMENT (OUTPATIENT)
Dept: PREADMISSION TESTING | Facility: HOSPITAL | Age: 30
End: 2020-09-30

## 2020-09-30 VITALS
BODY MASS INDEX: 26.11 KG/M2 | OXYGEN SATURATION: 99 % | HEART RATE: 98 BPM | TEMPERATURE: 99.6 F | DIASTOLIC BLOOD PRESSURE: 80 MMHG | SYSTOLIC BLOOD PRESSURE: 119 MMHG | HEIGHT: 73 IN | WEIGHT: 197 LBS | RESPIRATION RATE: 18 BRPM

## 2020-09-30 LAB
APTT PPP: 42.8 SECONDS (ref 22.7–35.4)
B PARAPERT DNA SPEC QL NAA+PROBE: NOT DETECTED
B PERT DNA SPEC QL NAA+PROBE: NOT DETECTED
BILIRUB UR QL STRIP: NEGATIVE
C PNEUM DNA NPH QL NAA+NON-PROBE: NOT DETECTED
CLARITY UR: ABNORMAL
COLOR UR: ABNORMAL
FLUAV H1 2009 PAND RNA NPH QL NAA+PROBE: NOT DETECTED
FLUAV H1 HA GENE NPH QL NAA+PROBE: NOT DETECTED
FLUAV H3 RNA NPH QL NAA+PROBE: NOT DETECTED
FLUAV SUBTYP SPEC NAA+PROBE: NOT DETECTED
FLUBV RNA ISLT QL NAA+PROBE: NOT DETECTED
GLUCOSE UR STRIP-MCNC: NEGATIVE MG/DL
HADV DNA SPEC NAA+PROBE: NOT DETECTED
HCOV 229E RNA SPEC QL NAA+PROBE: NOT DETECTED
HCOV HKU1 RNA SPEC QL NAA+PROBE: NOT DETECTED
HCOV NL63 RNA SPEC QL NAA+PROBE: NOT DETECTED
HCOV OC43 RNA SPEC QL NAA+PROBE: NOT DETECTED
HGB UR QL STRIP.AUTO: NEGATIVE
HMPV RNA NPH QL NAA+NON-PROBE: NOT DETECTED
HPIV1 RNA SPEC QL NAA+PROBE: NOT DETECTED
HPIV2 RNA SPEC QL NAA+PROBE: NOT DETECTED
HPIV3 RNA NPH QL NAA+PROBE: NOT DETECTED
HPIV4 P GENE NPH QL NAA+PROBE: NOT DETECTED
INR PPP: 1.46 (ref 0.9–1.1)
KETONES UR QL STRIP: NEGATIVE
LEUKOCYTE ESTERASE UR QL STRIP.AUTO: NEGATIVE
M PNEUMO IGG SER IA-ACNC: NOT DETECTED
NITRITE UR QL STRIP: NEGATIVE
PH UR STRIP.AUTO: <=5 [PH] (ref 5–8)
PROT UR QL STRIP: NEGATIVE
PROTHROMBIN TIME: 17.5 SECONDS (ref 11.7–14.2)
RHINOVIRUS RNA SPEC NAA+PROBE: NOT DETECTED
RSV RNA NPH QL NAA+NON-PROBE: NOT DETECTED
SARS-COV-2 RNA NPH QL NAA+NON-PROBE: NOT DETECTED
SP GR UR STRIP: 1.02 (ref 1–1.03)
UROBILINOGEN UR QL STRIP: ABNORMAL

## 2020-09-30 PROCEDURE — 85610 PROTHROMBIN TIME: CPT | Performed by: NEUROLOGICAL SURGERY

## 2020-09-30 PROCEDURE — C9803 HOPD COVID-19 SPEC COLLECT: HCPCS | Performed by: NURSE PRACTITIONER

## 2020-09-30 PROCEDURE — 85730 THROMBOPLASTIN TIME PARTIAL: CPT | Performed by: NEUROLOGICAL SURGERY

## 2020-09-30 PROCEDURE — 0202U NFCT DS 22 TRGT SARS-COV-2: CPT | Performed by: NURSE PRACTITIONER

## 2020-09-30 PROCEDURE — 81003 URINALYSIS AUTO W/O SCOPE: CPT | Performed by: NEUROLOGICAL SURGERY

## 2020-09-30 PROCEDURE — 71046 X-RAY EXAM CHEST 2 VIEWS: CPT

## 2020-09-30 PROCEDURE — 36415 COLL VENOUS BLD VENIPUNCTURE: CPT

## 2020-10-01 ENCOUNTER — ANESTHESIA (OUTPATIENT)
Dept: PERIOP | Facility: HOSPITAL | Age: 30
End: 2020-10-01

## 2020-10-01 ENCOUNTER — HOSPITAL ENCOUNTER (OUTPATIENT)
Facility: HOSPITAL | Age: 30
Discharge: HOME | End: 2020-10-02
Attending: NEUROLOGICAL SURGERY | Admitting: NEUROLOGICAL SURGERY

## 2020-10-01 ENCOUNTER — ANESTHESIA EVENT (OUTPATIENT)
Dept: PERIOP | Facility: HOSPITAL | Age: 30
End: 2020-10-01

## 2020-10-01 DIAGNOSIS — T81.32XA: Primary | ICD-10-CM

## 2020-10-01 PROBLEM — T81.30XA WOUND DEHISCENCE: Status: ACTIVE | Noted: 2020-10-01

## 2020-10-01 PROCEDURE — 25010000002 FENTANYL CITRATE (PF) 100 MCG/2ML SOLUTION: Performed by: NURSE ANESTHETIST, CERTIFIED REGISTERED

## 2020-10-01 PROCEDURE — 25010000002 ONDANSETRON PER 1 MG: Performed by: NURSE ANESTHETIST, CERTIFIED REGISTERED

## 2020-10-01 PROCEDURE — 25010000002 VANCOMYCIN PER 500 MG: Performed by: NEUROLOGICAL SURGERY

## 2020-10-01 PROCEDURE — C1713 ANCHOR/SCREW BN/BN,TIS/BN: HCPCS | Performed by: NEUROLOGICAL SURGERY

## 2020-10-01 PROCEDURE — 25010000003 CEFAZOLIN PER 500 MG: Performed by: NEUROLOGICAL SURGERY

## 2020-10-01 PROCEDURE — 25010000002 HYDROMORPHONE PER 4 MG: Performed by: NURSE ANESTHETIST, CERTIFIED REGISTERED

## 2020-10-01 PROCEDURE — 12020 TX SUPFC WND DEHSN SMPL CLSR: CPT | Performed by: NEUROLOGICAL SURGERY

## 2020-10-01 PROCEDURE — 25010000002 PROPOFOL 10 MG/ML EMULSION: Performed by: NURSE ANESTHETIST, CERTIFIED REGISTERED

## 2020-10-01 PROCEDURE — 25010000002 NEOSTIGMINE PER 0.5 MG: Performed by: NURSE ANESTHETIST, CERTIFIED REGISTERED

## 2020-10-01 PROCEDURE — 25010000002 HYDROMORPHONE PER 4 MG: Performed by: NEUROLOGICAL SURGERY

## 2020-10-01 PROCEDURE — 25010000002 DEXAMETHASONE PER 1 MG: Performed by: NURSE ANESTHETIST, CERTIFIED REGISTERED

## 2020-10-01 DEVICE — ABSORBABLE HEMOSTAT (OXIDIZED REGENERATED CELLULOSE, U.S.P.)
Type: IMPLANTABLE DEVICE | Status: FUNCTIONAL
Brand: SURGICEL

## 2020-10-01 DEVICE — FLOSEAL HEMOSTATIC MATRIX, 10ML
Type: IMPLANTABLE DEVICE | Status: FUNCTIONAL
Brand: FLOSEAL HEMOSTATIC MATRIX

## 2020-10-01 RX ORDER — HYDROMORPHONE HCL 110MG/55ML
PATIENT CONTROLLED ANALGESIA SYRINGE INTRAVENOUS AS NEEDED
Status: DISCONTINUED | OUTPATIENT
Start: 2020-10-01 | End: 2020-10-01 | Stop reason: SURG

## 2020-10-01 RX ORDER — BUPIVACAINE HYDROCHLORIDE AND EPINEPHRINE 2.5; 5 MG/ML; UG/ML
INJECTION, SOLUTION INFILTRATION; PERINEURAL AS NEEDED
Status: DISCONTINUED | OUTPATIENT
Start: 2020-10-01 | End: 2020-10-01 | Stop reason: HOSPADM

## 2020-10-01 RX ORDER — HYDROMORPHONE HYDROCHLORIDE 1 MG/ML
0.5 INJECTION, SOLUTION INTRAMUSCULAR; INTRAVENOUS; SUBCUTANEOUS
Status: DISCONTINUED | OUTPATIENT
Start: 2020-10-01 | End: 2020-10-01 | Stop reason: HOSPADM

## 2020-10-01 RX ORDER — FLUMAZENIL 0.1 MG/ML
0.2 INJECTION INTRAVENOUS AS NEEDED
Status: DISCONTINUED | OUTPATIENT
Start: 2020-10-01 | End: 2020-10-01 | Stop reason: HOSPADM

## 2020-10-01 RX ORDER — EPHEDRINE SULFATE 50 MG/ML
5 INJECTION, SOLUTION INTRAVENOUS ONCE AS NEEDED
Status: DISCONTINUED | OUTPATIENT
Start: 2020-10-01 | End: 2020-10-01 | Stop reason: HOSPADM

## 2020-10-01 RX ORDER — DEXAMETHASONE SODIUM PHOSPHATE 4 MG/ML
INJECTION, SOLUTION INTRA-ARTICULAR; INTRALESIONAL; INTRAMUSCULAR; INTRAVENOUS; SOFT TISSUE AS NEEDED
Status: DISCONTINUED | OUTPATIENT
Start: 2020-10-01 | End: 2020-10-01 | Stop reason: SURG

## 2020-10-01 RX ORDER — NALOXONE HCL 0.4 MG/ML
0.2 VIAL (ML) INJECTION AS NEEDED
Status: DISCONTINUED | OUTPATIENT
Start: 2020-10-01 | End: 2020-10-01 | Stop reason: HOSPADM

## 2020-10-01 RX ORDER — LEVETIRACETAM 500 MG/1
500 TABLET ORAL EVERY 12 HOURS SCHEDULED
Status: DISCONTINUED | OUTPATIENT
Start: 2020-10-01 | End: 2020-10-02 | Stop reason: HOSPADM

## 2020-10-01 RX ORDER — DIPHENHYDRAMINE HYDROCHLORIDE 50 MG/ML
12.5 INJECTION INTRAMUSCULAR; INTRAVENOUS
Status: DISCONTINUED | OUTPATIENT
Start: 2020-10-01 | End: 2020-10-01 | Stop reason: HOSPADM

## 2020-10-01 RX ORDER — FENTANYL CITRATE 50 UG/ML
50 INJECTION, SOLUTION INTRAMUSCULAR; INTRAVENOUS
Status: DISCONTINUED | OUTPATIENT
Start: 2020-10-01 | End: 2020-10-01 | Stop reason: HOSPADM

## 2020-10-01 RX ORDER — GABAPENTIN 300 MG/1
300 CAPSULE ORAL NIGHTLY
Status: DISCONTINUED | OUTPATIENT
Start: 2020-10-01 | End: 2020-10-02 | Stop reason: HOSPADM

## 2020-10-01 RX ORDER — ONDANSETRON 2 MG/ML
4 INJECTION INTRAMUSCULAR; INTRAVENOUS EVERY 6 HOURS PRN
Status: DISCONTINUED | OUTPATIENT
Start: 2020-10-01 | End: 2020-10-02

## 2020-10-01 RX ORDER — SODIUM CHLORIDE, SODIUM LACTATE, POTASSIUM CHLORIDE, CALCIUM CHLORIDE 600; 310; 30; 20 MG/100ML; MG/100ML; MG/100ML; MG/100ML
9 INJECTION, SOLUTION INTRAVENOUS CONTINUOUS
Status: DISCONTINUED | OUTPATIENT
Start: 2020-10-01 | End: 2020-10-01

## 2020-10-01 RX ORDER — POTASSIUM CHLORIDE 750 MG/1
10 CAPSULE, EXTENDED RELEASE ORAL 2 TIMES DAILY WITH MEALS
Status: DISCONTINUED | OUTPATIENT
Start: 2020-10-01 | End: 2020-10-02 | Stop reason: HOSPADM

## 2020-10-01 RX ORDER — GLYCOPYRROLATE 0.2 MG/ML
INJECTION INTRAMUSCULAR; INTRAVENOUS AS NEEDED
Status: DISCONTINUED | OUTPATIENT
Start: 2020-10-01 | End: 2020-10-01 | Stop reason: SURG

## 2020-10-01 RX ORDER — HYDRALAZINE HYDROCHLORIDE 20 MG/ML
5 INJECTION INTRAMUSCULAR; INTRAVENOUS
Status: DISCONTINUED | OUTPATIENT
Start: 2020-10-01 | End: 2020-10-01 | Stop reason: HOSPADM

## 2020-10-01 RX ORDER — HYDROCODONE BITARTRATE AND ACETAMINOPHEN 5; 325 MG/1; MG/1
1 TABLET ORAL EVERY 4 HOURS PRN
Status: DISCONTINUED | OUTPATIENT
Start: 2020-10-01 | End: 2020-10-02 | Stop reason: HOSPADM

## 2020-10-01 RX ORDER — NALOXONE HCL 0.4 MG/ML
0.4 VIAL (ML) INJECTION
Status: DISCONTINUED | OUTPATIENT
Start: 2020-10-01 | End: 2020-10-02

## 2020-10-01 RX ORDER — SODIUM CHLORIDE 0.9 % (FLUSH) 0.9 %
3-10 SYRINGE (ML) INJECTION AS NEEDED
Status: DISCONTINUED | OUTPATIENT
Start: 2020-10-01 | End: 2020-10-01 | Stop reason: HOSPADM

## 2020-10-01 RX ORDER — FAMOTIDINE 10 MG/ML
20 INJECTION, SOLUTION INTRAVENOUS ONCE
Status: DISCONTINUED | OUTPATIENT
Start: 2020-10-01 | End: 2020-10-01 | Stop reason: HOSPADM

## 2020-10-01 RX ORDER — ROCURONIUM BROMIDE 10 MG/ML
INJECTION, SOLUTION INTRAVENOUS AS NEEDED
Status: DISCONTINUED | OUTPATIENT
Start: 2020-10-01 | End: 2020-10-01 | Stop reason: SURG

## 2020-10-01 RX ORDER — SODIUM CHLORIDE 0.9 % (FLUSH) 0.9 %
10 SYRINGE (ML) INJECTION AS NEEDED
Status: DISCONTINUED | OUTPATIENT
Start: 2020-10-01 | End: 2020-10-02 | Stop reason: HOSPADM

## 2020-10-01 RX ORDER — DIPHENHYDRAMINE HCL 25 MG
25 CAPSULE ORAL
Status: DISCONTINUED | OUTPATIENT
Start: 2020-10-01 | End: 2020-10-01 | Stop reason: HOSPADM

## 2020-10-01 RX ORDER — SODIUM CHLORIDE 0.9 % (FLUSH) 0.9 %
3 SYRINGE (ML) INJECTION EVERY 12 HOURS SCHEDULED
Status: DISCONTINUED | OUTPATIENT
Start: 2020-10-01 | End: 2020-10-02 | Stop reason: HOSPADM

## 2020-10-01 RX ORDER — MIRTAZAPINE 15 MG/1
15 TABLET, FILM COATED ORAL NIGHTLY
Status: DISCONTINUED | OUTPATIENT
Start: 2020-10-01 | End: 2020-10-02 | Stop reason: HOSPADM

## 2020-10-01 RX ORDER — THIAMINE MONONITRATE (VIT B1) 100 MG
100 TABLET ORAL NIGHTLY
Status: DISCONTINUED | OUTPATIENT
Start: 2020-10-01 | End: 2020-10-02 | Stop reason: HOSPADM

## 2020-10-01 RX ORDER — ONDANSETRON 2 MG/ML
INJECTION INTRAMUSCULAR; INTRAVENOUS AS NEEDED
Status: DISCONTINUED | OUTPATIENT
Start: 2020-10-01 | End: 2020-10-01 | Stop reason: SURG

## 2020-10-01 RX ORDER — DOCUSATE SODIUM 100 MG/1
100 CAPSULE, LIQUID FILLED ORAL 2 TIMES DAILY PRN
Status: DISCONTINUED | OUTPATIENT
Start: 2020-10-01 | End: 2020-10-02 | Stop reason: HOSPADM

## 2020-10-01 RX ORDER — LIDOCAINE HYDROCHLORIDE 20 MG/ML
INJECTION, SOLUTION INFILTRATION; PERINEURAL AS NEEDED
Status: DISCONTINUED | OUTPATIENT
Start: 2020-10-01 | End: 2020-10-01 | Stop reason: SURG

## 2020-10-01 RX ORDER — SODIUM CHLORIDE, SODIUM LACTATE, POTASSIUM CHLORIDE, CALCIUM CHLORIDE 600; 310; 30; 20 MG/100ML; MG/100ML; MG/100ML; MG/100ML
100 INJECTION, SOLUTION INTRAVENOUS CONTINUOUS
Status: DISCONTINUED | OUTPATIENT
Start: 2020-10-01 | End: 2020-10-02

## 2020-10-01 RX ORDER — ONDANSETRON 2 MG/ML
4 INJECTION INTRAMUSCULAR; INTRAVENOUS ONCE AS NEEDED
Status: DISCONTINUED | OUTPATIENT
Start: 2020-10-01 | End: 2020-10-01 | Stop reason: HOSPADM

## 2020-10-01 RX ORDER — VANCOMYCIN HYDROCHLORIDE 1 G/200ML
1000 INJECTION, SOLUTION INTRAVENOUS ONCE
Status: COMPLETED | OUTPATIENT
Start: 2020-10-01 | End: 2020-10-01

## 2020-10-01 RX ORDER — OXYCODONE AND ACETAMINOPHEN 7.5; 325 MG/1; MG/1
1 TABLET ORAL ONCE AS NEEDED
Status: DISCONTINUED | OUTPATIENT
Start: 2020-10-01 | End: 2020-10-01 | Stop reason: HOSPADM

## 2020-10-01 RX ORDER — FOLIC ACID 1 MG/1
1 TABLET ORAL NIGHTLY
Status: DISCONTINUED | OUTPATIENT
Start: 2020-10-01 | End: 2020-10-02 | Stop reason: HOSPADM

## 2020-10-01 RX ORDER — HYDROCODONE BITARTRATE AND ACETAMINOPHEN 7.5; 325 MG/1; MG/1
1 TABLET ORAL ONCE AS NEEDED
Status: DISCONTINUED | OUTPATIENT
Start: 2020-10-01 | End: 2020-10-01 | Stop reason: HOSPADM

## 2020-10-01 RX ORDER — FENTANYL CITRATE 50 UG/ML
INJECTION, SOLUTION INTRAMUSCULAR; INTRAVENOUS AS NEEDED
Status: DISCONTINUED | OUTPATIENT
Start: 2020-10-01 | End: 2020-10-01 | Stop reason: SURG

## 2020-10-01 RX ORDER — VANCOMYCIN HYDROCHLORIDE 1 G/200ML
1000 INJECTION, SOLUTION INTRAVENOUS EVERY 12 HOURS
Status: DISCONTINUED | OUTPATIENT
Start: 2020-10-01 | End: 2020-10-02

## 2020-10-01 RX ORDER — PROPOFOL 10 MG/ML
VIAL (ML) INTRAVENOUS AS NEEDED
Status: DISCONTINUED | OUTPATIENT
Start: 2020-10-01 | End: 2020-10-01 | Stop reason: SURG

## 2020-10-01 RX ORDER — LABETALOL HYDROCHLORIDE 5 MG/ML
5 INJECTION, SOLUTION INTRAVENOUS
Status: DISCONTINUED | OUTPATIENT
Start: 2020-10-01 | End: 2020-10-01 | Stop reason: HOSPADM

## 2020-10-01 RX ORDER — AMOXICILLIN 250 MG
1 CAPSULE ORAL NIGHTLY PRN
Status: DISCONTINUED | OUTPATIENT
Start: 2020-10-01 | End: 2020-10-02 | Stop reason: HOSPADM

## 2020-10-01 RX ORDER — SODIUM CHLORIDE 0.9 % (FLUSH) 0.9 %
3 SYRINGE (ML) INJECTION EVERY 12 HOURS SCHEDULED
Status: DISCONTINUED | OUTPATIENT
Start: 2020-10-01 | End: 2020-10-01 | Stop reason: HOSPADM

## 2020-10-01 RX ORDER — LIDOCAINE HYDROCHLORIDE 10 MG/ML
0.5 INJECTION, SOLUTION EPIDURAL; INFILTRATION; INTRACAUDAL; PERINEURAL ONCE AS NEEDED
Status: DISCONTINUED | OUTPATIENT
Start: 2020-10-01 | End: 2020-10-01 | Stop reason: HOSPADM

## 2020-10-01 RX ORDER — ACETAMINOPHEN 325 MG/1
650 TABLET ORAL EVERY 4 HOURS PRN
Status: DISCONTINUED | OUTPATIENT
Start: 2020-10-01 | End: 2020-10-02 | Stop reason: HOSPADM

## 2020-10-01 RX ORDER — FAMOTIDINE 20 MG/1
20 TABLET, FILM COATED ORAL
Status: DISCONTINUED | OUTPATIENT
Start: 2020-10-01 | End: 2020-10-02 | Stop reason: HOSPADM

## 2020-10-01 RX ORDER — HYDROMORPHONE HYDROCHLORIDE 1 MG/ML
0.5 INJECTION, SOLUTION INTRAMUSCULAR; INTRAVENOUS; SUBCUTANEOUS
Status: DISCONTINUED | OUTPATIENT
Start: 2020-10-01 | End: 2020-10-02

## 2020-10-01 RX ORDER — ONDANSETRON 4 MG/1
4 TABLET, FILM COATED ORAL EVERY 6 HOURS PRN
Status: DISCONTINUED | OUTPATIENT
Start: 2020-10-01 | End: 2020-10-02

## 2020-10-01 RX ORDER — MIDAZOLAM HYDROCHLORIDE 1 MG/ML
1 INJECTION INTRAMUSCULAR; INTRAVENOUS
Status: DISCONTINUED | OUTPATIENT
Start: 2020-10-01 | End: 2020-10-01 | Stop reason: HOSPADM

## 2020-10-01 RX ADMIN — POTASSIUM CHLORIDE 10 MEQ: 10 CAPSULE, COATED, EXTENDED RELEASE ORAL at 18:01

## 2020-10-01 RX ADMIN — FENTANYL CITRATE 50 MCG: 50 INJECTION INTRAMUSCULAR; INTRAVENOUS at 14:34

## 2020-10-01 RX ADMIN — NEOSTIGMINE METHYLSULFATE 3 MG: 1 INJECTION INTRAMUSCULAR; INTRAVENOUS; SUBCUTANEOUS at 13:08

## 2020-10-01 RX ADMIN — MIRTAZAPINE 15 MG: 15 TABLET, FILM COATED ORAL at 20:31

## 2020-10-01 RX ADMIN — Medication 100 MG: at 20:30

## 2020-10-01 RX ADMIN — FENTANYL CITRATE 50 MCG: 50 INJECTION INTRAMUSCULAR; INTRAVENOUS at 12:53

## 2020-10-01 RX ADMIN — LIDOCAINE HYDROCHLORIDE 80 MG: 20 INJECTION, SOLUTION INFILTRATION; PERINEURAL at 12:13

## 2020-10-01 RX ADMIN — ONDANSETRON HYDROCHLORIDE 4 MG: 2 SOLUTION INTRAMUSCULAR; INTRAVENOUS at 13:05

## 2020-10-01 RX ADMIN — HYDROMORPHONE HYDROCHLORIDE 0.5 MG: 2 INJECTION, SOLUTION INTRAMUSCULAR; INTRAVENOUS; SUBCUTANEOUS at 13:08

## 2020-10-01 RX ADMIN — HYDROMORPHONE HYDROCHLORIDE 0.5 MG: 1 INJECTION, SOLUTION INTRAMUSCULAR; INTRAVENOUS; SUBCUTANEOUS at 19:06

## 2020-10-01 RX ADMIN — DEXAMETHASONE SODIUM PHOSPHATE 6 MG: 4 INJECTION INTRA-ARTICULAR; INTRALESIONAL; INTRAMUSCULAR; INTRAVENOUS; SOFT TISSUE at 12:38

## 2020-10-01 RX ADMIN — HYDROCODONE BITARTRATE AND ACETAMINOPHEN 1 TABLET: 5; 325 TABLET ORAL at 20:33

## 2020-10-01 RX ADMIN — GABAPENTIN 300 MG: 300 CAPSULE ORAL at 20:30

## 2020-10-01 RX ADMIN — SODIUM CHLORIDE, POTASSIUM CHLORIDE, SODIUM LACTATE AND CALCIUM CHLORIDE 100 ML/HR: 600; 310; 30; 20 INJECTION, SOLUTION INTRAVENOUS at 15:43

## 2020-10-01 RX ADMIN — PROPOFOL 30 MG: 10 INJECTION, EMULSION INTRAVENOUS at 13:19

## 2020-10-01 RX ADMIN — FENTANYL CITRATE 50 MCG: 50 INJECTION INTRAMUSCULAR; INTRAVENOUS at 12:13

## 2020-10-01 RX ADMIN — HYDROCODONE BITARTRATE AND ACETAMINOPHEN 1 TABLET: 5; 325 TABLET ORAL at 16:13

## 2020-10-01 RX ADMIN — FENTANYL CITRATE 50 MCG: 50 INJECTION INTRAMUSCULAR; INTRAVENOUS at 13:57

## 2020-10-01 RX ADMIN — FOLIC ACID 1 MG: 1 TABLET ORAL at 20:31

## 2020-10-01 RX ADMIN — VANCOMYCIN HYDROCHLORIDE 1000 MG: 1 INJECTION, SOLUTION INTRAVENOUS at 22:16

## 2020-10-01 RX ADMIN — SODIUM CHLORIDE, PRESERVATIVE FREE 3 ML: 5 INJECTION INTRAVENOUS at 20:37

## 2020-10-01 RX ADMIN — HYDROMORPHONE HYDROCHLORIDE 0.5 MG: 1 INJECTION, SOLUTION INTRAMUSCULAR; INTRAVENOUS; SUBCUTANEOUS at 14:27

## 2020-10-01 RX ADMIN — HYDROMORPHONE HYDROCHLORIDE 0.5 MG: 2 INJECTION, SOLUTION INTRAMUSCULAR; INTRAVENOUS; SUBCUTANEOUS at 13:19

## 2020-10-01 RX ADMIN — GLYCOPYRROLATE 0.2 MG: 0.2 INJECTION INTRAMUSCULAR; INTRAVENOUS at 13:08

## 2020-10-01 RX ADMIN — LEVETIRACETAM 500 MG: 500 TABLET, FILM COATED ORAL at 20:31

## 2020-10-01 RX ADMIN — HYDROMORPHONE HYDROCHLORIDE 0.5 MG: 1 INJECTION, SOLUTION INTRAMUSCULAR; INTRAVENOUS; SUBCUTANEOUS at 14:14

## 2020-10-01 RX ADMIN — VANCOMYCIN HYDROCHLORIDE 1000 MG: 1 INJECTION, SOLUTION INTRAVENOUS at 11:07

## 2020-10-01 RX ADMIN — SODIUM CHLORIDE, POTASSIUM CHLORIDE, SODIUM LACTATE AND CALCIUM CHLORIDE 9 ML/HR: 600; 310; 30; 20 INJECTION, SOLUTION INTRAVENOUS at 11:08

## 2020-10-01 RX ADMIN — PROPOFOL 300 MG: 10 INJECTION, EMULSION INTRAVENOUS at 12:13

## 2020-10-01 RX ADMIN — ROCURONIUM BROMIDE 50 MG: 10 INJECTION INTRAVENOUS at 12:14

## 2020-10-01 RX ADMIN — FAMOTIDINE 20 MG: 20 TABLET, FILM COATED ORAL at 18:01

## 2020-10-01 NOTE — ANESTHESIA PROCEDURE NOTES
Airway  Urgency: elective    Date/Time: 10/1/2020 12:22 PM    General Information and Staff    Patient location during procedure: OR  CRNA: Thaddeus Chatman CRNA    Indications and Patient Condition  Indications for airway management: airway protection    Preoxygenated: yes  MILS maintained throughout  Mask difficulty assessment: 2 - vent by mask + OA or adjuvant +/- NMBA    Final Airway Details  Final airway type: endotracheal airway      Successful airway: ETT  Cuffed: yes   Successful intubation technique: direct laryngoscopy  Facilitating devices/methods: intubating stylet and cricoid pressure  Endotracheal tube insertion site: oral  Blade: [place]achel[place]  Blade size: 3  ETT size (mm): 7.5  Cormack-Lehane Classification: grade I - full view of glottis  Placement verified by: chest auscultation   Cuff volume (mL): 6  Measured from: lips  ETT/EBT  to lips (cm): 22  Number of attempts at approach: 1  Assessment: lips, teeth, and gum same as pre-op and atraumatic intubation    Additional Comments  Intubated by JUAN Funk

## 2020-10-01 NOTE — ANESTHESIA POSTPROCEDURE EVALUATION
"Patient: Avinash Everett    Procedure Summary     Date: 10/01/20 Room / Location: [facility] OR [address] [place] [address] MAIN OR    Anesthesia Start: 1207 Anesthesia Stop: 1350    Procedure: INCISION AND DRAINAGE WOUND, of scalp incision with closure (Right Head) Diagnosis:       Disruption or dehiscence of closure of skull or craniotomy, initial encounter      (Disruption or dehiscence of closure of skull or craniotomy, initial encounter [T81.32XA])    Surgeon: Leobardo Rivera MD Provider: Lalita Medina MD    Anesthesia Type: general ASA Status: 3          Anesthesia Type: general    Vitals  Vitals Value Taken Time   /94 10/01/20 1505   Temp 36.8 °C (98.3 °F) 10/01/20 1457   Pulse 94 10/01/20 1505   Resp 16 10/01/20 1505   SpO2 99 % 10/01/20 1505           Post Anesthesia Care and Evaluation    Patient location during evaluation: bedside  Patient participation: complete - patient participated  Level of consciousness: awake  Pain score: 2  Pain management: adequate  Airway patency: patent  Anesthetic complications: No anesthetic complications  PONV Status: none  Cardiovascular status: acceptable  Respiratory status: acceptable  Hydration status: acceptable    Comments: /94   Pulse 94   Temp 36.8 °C (98.3 °F) (Oral)   Resp 16   Ht 185.4 cm (73\")   Wt 87.2 kg (192 lb 4 oz)   SpO2 99%   BMI 25.36 kg/m²         "

## 2020-10-01 NOTE — ANESTHESIA PREPROCEDURE EVALUATION
Anesthesia Evaluation     Patient summary reviewed and Nursing notes reviewed   NPO Solid Status: > 8 hours  NPO Liquid Status: > 2 hours           Airway   Mallampati: II  TM distance: >3 FB  Neck ROM: full  Dental - normal exam     Pulmonary - normal exam   (+) a smoker Former,   Cardiovascular - negative cardio ROS and normal exam    ECG reviewed        Neuro/Psych  (+) CVA, psychiatric history,       ROS Comment: Right-sided nontraumatic intracerebral hemorrhage (CMS/HCC  Metabolic encephalopathy  GI/Hepatic/Renal/Endo - negative ROS     Musculoskeletal (-) negative ROS    Abdominal    Substance History   (+) alcohol use,      OB/GYN negative ob/gyn ROS         Other                        Anesthesia Plan    ASA 3     general       Anesthetic plan, all risks, benefits, and alternatives have been provided, discussed and informed consent has been obtained with: patient.

## 2020-10-02 VITALS
DIASTOLIC BLOOD PRESSURE: 81 MMHG | WEIGHT: 192.25 LBS | SYSTOLIC BLOOD PRESSURE: 125 MMHG | HEART RATE: 104 BPM | HEIGHT: 73 IN | RESPIRATION RATE: 16 BRPM | TEMPERATURE: 98.3 F | BODY MASS INDEX: 25.48 KG/M2 | OXYGEN SATURATION: 96 %

## 2020-10-02 PROCEDURE — 25010000002 VANCOMYCIN PER 500 MG: Performed by: NEUROLOGICAL SURGERY

## 2020-10-02 PROCEDURE — 25010000002 HYDROMORPHONE PER 4 MG: Performed by: NEUROLOGICAL SURGERY

## 2020-10-02 RX ORDER — SULFAMETHOXAZOLE AND TRIMETHOPRIM 800; 160 MG/1; MG/1
1 TABLET ORAL EVERY 12 HOURS SCHEDULED
Qty: 20 TABLET | Refills: 0 | Status: SHIPPED | OUTPATIENT
Start: 2020-10-02 | End: 2020-10-12

## 2020-10-02 RX ORDER — HYDROCODONE BITARTRATE AND ACETAMINOPHEN 5; 325 MG/1; MG/1
1 TABLET ORAL EVERY 8 HOURS PRN
Qty: 21 TABLET | Refills: 0 | Status: SHIPPED | OUTPATIENT
Start: 2020-10-02 | End: 2022-01-10

## 2020-10-02 RX ORDER — SULFAMETHOXAZOLE AND TRIMETHOPRIM 800; 160 MG/1; MG/1
1 TABLET ORAL EVERY 12 HOURS SCHEDULED
Status: DISCONTINUED | OUTPATIENT
Start: 2020-10-02 | End: 2020-10-02 | Stop reason: HOSPADM

## 2020-10-02 RX ADMIN — POTASSIUM CHLORIDE 10 MEQ: 10 CAPSULE, COATED, EXTENDED RELEASE ORAL at 07:48

## 2020-10-02 RX ADMIN — HYDROCODONE BITARTRATE AND ACETAMINOPHEN 1 TABLET: 5; 325 TABLET ORAL at 07:48

## 2020-10-02 RX ADMIN — HYDROCODONE BITARTRATE AND ACETAMINOPHEN 1 TABLET: 5; 325 TABLET ORAL at 12:28

## 2020-10-02 RX ADMIN — HYDROCODONE BITARTRATE AND ACETAMINOPHEN 1 TABLET: 5; 325 TABLET ORAL at 16:28

## 2020-10-02 RX ADMIN — VANCOMYCIN HYDROCHLORIDE 1000 MG: 1 INJECTION, SOLUTION INTRAVENOUS at 12:28

## 2020-10-02 RX ADMIN — LEVETIRACETAM 500 MG: 500 TABLET, FILM COATED ORAL at 08:20

## 2020-10-02 RX ADMIN — FAMOTIDINE 20 MG: 20 TABLET, FILM COATED ORAL at 07:48

## 2020-10-02 RX ADMIN — SODIUM CHLORIDE, PRESERVATIVE FREE 3 ML: 5 INJECTION INTRAVENOUS at 08:21

## 2020-10-02 RX ADMIN — HYDROMORPHONE HYDROCHLORIDE 0.5 MG: 1 INJECTION, SOLUTION INTRAMUSCULAR; INTRAVENOUS; SUBCUTANEOUS at 14:26

## 2020-10-02 RX ADMIN — HYDROCODONE BITARTRATE AND ACETAMINOPHEN 1 TABLET: 5; 325 TABLET ORAL at 02:58

## 2020-10-05 NOTE — PROGRESS NOTES
Subjective   History of Present Illness: Avinash Everett is a 30 y.o. male is here today for post op appt.      Patient has had 3  Surgeries, 8.3.20 right frontoparietal crani for intracerebral hemorrhage,  8.5.20 with Dr Malcolm for right cerebral embolization.  He had surgery  10.1.20 with jeovanny Weinberg for wound revision.    Patient states that he has not been having any headaches recently. Patient states no visual disturbances, no neck pain or stiffness. Patient state that his left arm has N/T, and pain.    Patient's incision looks healthy, no redness, no drainage.    Patient takes Gabapentin PO at night for pain.    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  His pathology was consistent with that.  Before he left to be with his mother in [place] I did a wound revision.  The scalp was slightly dehisced.  But it looks better today.  The sutures were removed.  He is still plegic in the left hand and not quite antigravity in the left arm.  His strength in his left leg is improved considerably and he can walk with a quad cane.  He is right-hand dominant.  He is doing therapy in [place].  I have asked him to come and see me in 3 months.  His speech is normal.      Headache   The current episode started more than 1 month ago. Associated symptoms include numbness. Pertinent negatives include no blurred vision, dizziness, eye pain, fever, neck pain, photophobia, seizures, tingling, tinnitus, visual change or vomiting. Associated symptoms comments: Left arm numbness. He has tried oral narcotics for the symptoms. The treatment provided significant relief.   Arm Pain   The pain is present in the left fingers. The quality of the pain is described as aching. The pain radiates to the left hand. The pain is at a severity of 3/10. The pain is mild. The pain has  "been intermittent since the incident. Associated symptoms include muscle weakness and numbness. Pertinent negatives include no tingling. He has tried acetaminophen for the symptoms. The treatment provided mild relief.       The following portions of the patient's history were reviewed and updated as appropriate: allergies, current medications, past family history, past medical history, past social history, past surgical history and problem list.    Review of Systems   Constitutional: Negative for chills and fever.   HENT: Negative for congestion and tinnitus.    Eyes: Negative for blurred vision, photophobia, pain and visual disturbance.   Gastrointestinal: Negative for vomiting.   Musculoskeletal: Negative for neck pain and neck stiffness.   Neurological: Positive for numbness and headaches. Negative for dizziness, tingling and seizures.           Objective     Vitals:    10/15/20 1551   BP: 114/70   Cuff Size: Adult   Pulse: 87   Temp: 98.2 °F (36.8 °C)   Weight: 87.1 kg (192 lb)   Height: 185.4 cm (73\")     Body mass index is 25.33 kg/m².      Physical Exam  Constitutional:       Appearance: He is well-developed.   HENT:      Head: Normocephalic and atraumatic.   Eyes:      Extraocular Movements: EOM normal.      Conjunctiva/sclera: Conjunctivae normal.      Pupils: Pupils are equal, round, and reactive to light.      Funduscopic exam:     Right eye: No papilledema.         Left eye: No papilledema.   Neck:      Vascular: No carotid bruit.   Neurological:      Mental Status: He is oriented to person, place, and time.      Coordination: Finger-Nose-Finger Test and Heel to Shin Test normal.      Gait: Gait is intact.      Deep Tendon Reflexes:      Reflex Scores:       Tricep reflexes are 2+ on the right side and 2+ on the left side.       Bicep reflexes are 2+ on the right side and 2+ on the left side.       Brachioradialis reflexes are 2+ on the right side and 2+ on the left side.       Patellar reflexes are 2+ " on the right side and 2+ on the left side.       Achilles reflexes are 2+ on the right side and 2+ on the left side.  Psychiatric:         Speech: Speech normal.       Neurologic Exam     Mental Status   Oriented to person, place, and time.   Registration of memory: Good recent and remote memory.   Attention: normal. Concentration: normal.   Speech: speech is normal   Level of consciousness: alert  Knowledge: consistent with education.     Cranial Nerves     CN II   Visual fields full to confrontation.   Visual acuity: normal    CN III, IV, VI   Pupils are equal, round, and reactive to light.  Extraocular motions are normal.     CN V   Facial sensation intact.   Right corneal reflex: normal  Left corneal reflex: normal    CN VII   Facial expression full, symmetric.   Right facial weakness: none  Left facial weakness: none    CN VIII   Hearing: intact    CN IX, X   Palate: symmetric    CN XI   Right sternocleidomastoid strength: normal  Left sternocleidomastoid strength: normal    CN XII   Tongue: not atrophic  Tongue deviation: none    Motor Exam   Muscle bulk: normal  Right arm tone: normal  Left arm tone: normal  Right leg tone: normal  Left leg tone: normal    Strength   Strength 5/5 except as noted.   Left deltoid: 2/5  Left biceps: 2/5  Left triceps: 2/5  Left wrist flexion: 0/5  Left wrist extension: 0/5  Left interossei: 0/5  Left iliopsoas: 4/5  Left quadriceps: 4/5  Left hamstrin/5  Left glutei: 4/5  Left anterior tibial: 4/5  Left posterior tibial: 4/5  Left peroneal: 4/5  Left gastroc: 4/5    Sensory Exam   Light touch normal.     Gait, Coordination, and Reflexes     Gait  Gait: normal    Coordination   Finger to nose coordination: normal  Heel to shin coordination: normal    Reflexes   Right brachioradialis: 2+  Left brachioradialis: 2+  Right biceps: 2+  Left biceps: 2+  Right triceps: 2+  Left triceps: 2+  Right patellar: 2+  Left patellar: 2+  Right achilles: 2+  Left achilles: 2+  Right :  2+  Left : 2+          Assessment/Plan   Independent Review of Radiographic Studies:      I personally reviewed the images from the following studies.        Medical Decision Making:      The incision is healing well.  The sutures were removed.  He will be staying with his mother in [place].  Of asked him to come and see me in 3 months as a face-to-face visit.      Diagnoses and all orders for this visit:    1. Postoperative visit (Primary)      Return in about 3 months (around 1/15/2021) for Face-to-face visit.

## 2020-10-05 NOTE — PROGRESS NOTES
Per staff report.  SECTION GG    Mobility Performance:     Lying to Sitting on Side of Bed: [place] does less than half the effort. [place]  lifts, holds or supports trunk or limbs but provides less than half the effort.   Walk 10 Feet:   Not attempted due to medical or safety concerns.  1 Step Over Curb or Up/Down Stair:   Not attempted due to medical or safety  concerns.  Picking up an Object:   Not attempted due to medical or safety concerns.  Uses Wheelchair/Scooter: Yes  Wheel 50 Feet with Two Turns: [place] does less than half the effort. [place]  lifts, holds or supports trunk or limbs but provides less than half the effort.  Type of Wheelchair or Scooter Used: Manual  Wheel 150 Feet: Not attempted due to medical or safety concerns.  Type of Wheelchair/Scooter Used: Manual    Mobility Discharge Goals: [place]    Signed by: Chino Adler RN

## 2020-10-09 ENCOUNTER — TELEPHONE (OUTPATIENT)
Dept: NEUROSURGERY | Facility: CLINIC | Age: 30
End: 2020-10-09

## 2020-10-09 NOTE — TELEPHONE ENCOUNTER
PT WANTS TO CHANGE HIS POST OP APPT TO ANY DAY THE WEEK OF 10/19/20-10/23/20.  RAGINI ASKED ME TO SEND ENCOUNTER.   PT NUMBER [phone]    PLEASE ADVISE    THANK YOU

## 2020-10-15 ENCOUNTER — OFFICE VISIT (OUTPATIENT)
Dept: NEUROSURGERY | Facility: CLINIC | Age: 30
End: 2020-10-15

## 2020-10-15 VITALS
DIASTOLIC BLOOD PRESSURE: 70 MMHG | HEIGHT: 73 IN | TEMPERATURE: 98.2 F | HEART RATE: 87 BPM | WEIGHT: 192 LBS | BODY MASS INDEX: 25.45 KG/M2 | SYSTOLIC BLOOD PRESSURE: 114 MMHG

## 2020-10-15 DIAGNOSIS — Z48.89 POSTOPERATIVE VISIT: Primary | ICD-10-CM

## 2020-10-15 PROCEDURE — 99024 POSTOP FOLLOW-UP VISIT: CPT | Performed by: NEUROLOGICAL SURGERY

## 2021-01-12 NOTE — PROGRESS NOTES
Subjective   Patient ID: Avinash Everett is a 30 y.o. male is here today for follow-up.    He is 5 months out from emergent right frontoparietal crani for intracerebral hemorrhage and right cerebral emobolization by Dr. Malcolm. Wound revision 10/15/20.      Pt was last seen in office 10/15/20 for headaches, Left arm,n/t and pain. He was staying with his mother in [place] for recovery care.     He is still in [place] with his mother. He states he does still have HA's. He states they are not as bad as they were previously. He takes Ibuprofen or Tylenol prn for pain and HA's.  HE is going to PT 2-3 times a week. Mr. Everett takes Gabapentin 300 mg HS and Keppra 500 mg BID.     Its been about 5 months since he underwent an emergent right frontal parietal craniotomy for evacuation of a large intracerebral hematoma from an AVM.  He survived and was rendered plegic on the left side.  He is getting better with physical therapy in [place].  He remains on the Keppra and the gabapentin as described above.  He is living with his mother.  I asked him to come back and see me in about 7 or 8 months which is the year zakia from his surgery with another MRI.  He may need to stay on the Keppra and gabapentin for life frankly.        Headache   The problem has been gradually improving. Associated symptoms include weakness (L sided ). Pertinent negatives include no dizziness or fever.       The following portions of the patient's history were reviewed and updated as appropriate: allergies, current medications, past family history, past medical history, past social history, past surgical history and problem list.    Review of Systems   Constitutional: Negative for fever.   Eyes: Negative for visual disturbance.   Musculoskeletal: Positive for gait problem.   Neurological: Positive for weakness (L sided ) and headaches. Negative for dizziness.   All other systems reviewed and are negative.          Objective     Vitals:    01/13/21 1512    BP: 118/69   Pulse: 112   Temp: 99.7 °F (37.6 °C)   Weight: 87.1 kg (192 lb)     Body mass index is 25.33 kg/m².      Physical Exam  Constitutional:       Appearance: He is well-developed.   HENT:      Head: Normocephalic and atraumatic.   Eyes:      Extraocular Movements: EOM normal.      Conjunctiva/sclera: Conjunctivae normal.      Pupils: Pupils are equal, round, and reactive to light.   Neck:      Vascular: No carotid bruit.   Neurological:      Mental Status: He is oriented to person, place, and time.      Coordination: Finger-Nose-Finger Test and Heel to Shin Test normal.      Gait: Gait is intact.      Deep Tendon Reflexes:      Reflex Scores:       Tricep reflexes are 2+ on the right side and 2+ on the left side.       Bicep reflexes are 2+ on the right side and 2+ on the left side.       Brachioradialis reflexes are 2+ on the right side and 2+ on the left side.       Patellar reflexes are 2+ on the right side and 2+ on the left side.       Achilles reflexes are 2+ on the right side and 2+ on the left side.  Psychiatric:         Speech: Speech normal.       Neurologic Exam     Mental Status   Oriented to person, place, and time.   Registration of memory: Good recent and remote memory.   Attention: normal. Concentration: normal.   Speech: speech is normal   Level of consciousness: alert  Knowledge: consistent with education.     Cranial Nerves     CN II   Visual fields full to confrontation.   Visual acuity: normal    CN III, IV, VI   Pupils are equal, round, and reactive to light.  Extraocular motions are normal.     CN V   Facial sensation intact.   Right corneal reflex: normal  Left corneal reflex: normal    CN VII   Facial expression full, symmetric.   Right facial weakness: none  Left facial weakness: none    CN VIII   Hearing: intact    CN IX, X   Palate: symmetric    CN XI   Right sternocleidomastoid strength: normal  Left sternocleidomastoid strength: normal    CN XII   Tongue: not atrophic  Tongue  deviation: none    Motor Exam   Muscle bulk: normal  Right arm tone: normal  Left arm tone: normal  Right leg tone: normal  Left leg tone: normal    Strength   Strength 5/5 except as noted.   Left deltoid: 3/5  Left biceps: 3/5  Left triceps: 3/5  Left wrist flexion: 1/5  Left wrist extension: 1/5  Left interossei: 0/5  Left glutei: 4/5  Left anterior tibial: 4/5  Left posterior tibial: 4/5  Left peroneal: 4/5  Left gastroc: 4/5    Sensory Exam   Light touch normal.     Gait, Coordination, and Reflexes     Gait  Gait: normal    Coordination   Finger to nose coordination: normal  Heel to shin coordination: normal    Reflexes   Right brachioradialis: 2+  Left brachioradialis: 2+  Right biceps: 2+  Left biceps: 2+  Right triceps: 2+  Left triceps: 2+  Right patellar: 2+  Left patellar: 2+  Right achilles: 2+  Left achilles: 2+  Right : 2+  Left : 2+          Assessment/Plan   Independent Review of Radiographic Studies:      I personally reviewed the images from the following studies.    I reviewed the brain MRI done on 9/20/2020 which shows postoperative changes in the right frontoparietal region with evolving blood products.  Agree with the report.        Medical Decision Making:      We will get another MRI of the brain in about 8 months which is about the year zakia from his surgery.  He will continue on the Keppra at 500 mg twice daily and the gabapentin at night at 300 mg.      Diagnoses and all orders for this visit:    1. 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 History of brain surgery  -     MRI Brain With & Without Contrast; Future      Return in about 8 months (around 9/13/2021) for Face-to-face visit.

## 2021-01-13 ENCOUNTER — OFFICE VISIT (OUTPATIENT)
Dept: NEUROSURGERY | Facility: CLINIC | Age: 31
End: 2021-01-13

## 2021-01-13 VITALS
BODY MASS INDEX: 25.33 KG/M2 | TEMPERATURE: 99.7 F | SYSTOLIC BLOOD PRESSURE: 118 MMHG | WEIGHT: 192 LBS | HEART RATE: 112 BPM | DIASTOLIC BLOOD PRESSURE: 69 MMHG

## 2021-01-13 DIAGNOSIS — Z98.890 HISTORY OF BRAIN SURGERY: ICD-10-CM

## 2021-01-13 DIAGNOSIS — Z86.79 HISTORY OF SPONTANEOUS INTRAPARENCHYMAL INTRACRANIAL HEMORRHAGE DUE TO CEREBRAL AVM: Primary | ICD-10-CM

## 2021-01-13 PROCEDURE — 99213 OFFICE O/P EST LOW 20 MIN: CPT | Performed by: NEUROLOGICAL SURGERY

## 2021-08-02 ENCOUNTER — APPOINTMENT (OUTPATIENT)
Dept: CT IMAGING | Facility: HOSPITAL | Age: 31
End: 2021-08-02

## 2021-08-02 ENCOUNTER — HOSPITAL ENCOUNTER (EMERGENCY)
Facility: HOSPITAL | Age: 31
Discharge: HOME | End: 2021-08-02
Attending: EMERGENCY MEDICINE | Admitting: EMERGENCY MEDICINE

## 2021-08-02 ENCOUNTER — APPOINTMENT (OUTPATIENT)
Dept: GENERAL RADIOLOGY | Facility: HOSPITAL | Age: 31
End: 2021-08-02

## 2021-08-02 VITALS
BODY MASS INDEX: 24.39 KG/M2 | SYSTOLIC BLOOD PRESSURE: 108 MMHG | WEIGHT: 184 LBS | HEIGHT: 73 IN | DIASTOLIC BLOOD PRESSURE: 72 MMHG | OXYGEN SATURATION: 98 % | TEMPERATURE: 97.4 F | HEART RATE: 71 BPM | RESPIRATION RATE: 18 BRPM

## 2021-08-02 DIAGNOSIS — S00.83XA CONTUSION OF FACE, INITIAL ENCOUNTER: ICD-10-CM

## 2021-08-02 DIAGNOSIS — R56.9 SEIZURE (HCC): Primary | ICD-10-CM

## 2021-08-02 DIAGNOSIS — S09.90XA CLOSED HEAD INJURY, INITIAL ENCOUNTER: ICD-10-CM

## 2021-08-02 LAB
ALBUMIN SERPL-MCNC: 3.7 G/DL (ref 3.5–5.2)
ALBUMIN/GLOB SERPL: 1.2 G/DL
ALP SERPL-CCNC: 71 U/L (ref 39–117)
ALT SERPL W P-5'-P-CCNC: 21 U/L (ref 1–41)
ANION GAP SERPL CALCULATED.3IONS-SCNC: 10.4 MMOL/L (ref 5–15)
AST SERPL-CCNC: 36 U/L (ref 1–40)
BASOPHILS # BLD AUTO: 0.01 10*3/MM3 (ref 0–0.2)
BASOPHILS NFR BLD AUTO: 0.2 % (ref 0–1.5)
BILIRUB SERPL-MCNC: 1.2 MG/DL (ref 0–1.2)
BUN SERPL-MCNC: 4 MG/DL (ref 6–20)
BUN/CREAT SERPL: 6.8 (ref 7–25)
CALCIUM SPEC-SCNC: 8.9 MG/DL (ref 8.6–10.5)
CHLORIDE SERPL-SCNC: 103 MMOL/L (ref 98–107)
CO2 SERPL-SCNC: 23.6 MMOL/L (ref 22–29)
CREAT SERPL-MCNC: 0.59 MG/DL (ref 0.76–1.27)
DEPRECATED RDW RBC AUTO: 41.3 FL (ref 37–54)
EOSINOPHIL # BLD AUTO: 0.09 10*3/MM3 (ref 0–0.4)
EOSINOPHIL NFR BLD AUTO: 2.1 % (ref 0.3–6.2)
ERYTHROCYTE [DISTWIDTH] IN BLOOD BY AUTOMATED COUNT: 12.5 % (ref 12.3–15.4)
GFR SERPL CREATININE-BSD FRML MDRD: >150 ML/MIN/1.73
GLOBULIN UR ELPH-MCNC: 3 GM/DL
GLUCOSE SERPL-MCNC: 144 MG/DL (ref 65–99)
HCT VFR BLD AUTO: 32.7 % (ref 37.5–51)
HGB BLD-MCNC: 11.1 G/DL (ref 13–17.7)
IMM GRANULOCYTES # BLD AUTO: 0.01 10*3/MM3 (ref 0–0.05)
IMM GRANULOCYTES NFR BLD AUTO: 0.2 % (ref 0–0.5)
LYMPHOCYTES # BLD AUTO: 0.65 10*3/MM3 (ref 0.7–3.1)
LYMPHOCYTES NFR BLD AUTO: 15.1 % (ref 19.6–45.3)
MCH RBC QN AUTO: 30.8 PG (ref 26.6–33)
MCHC RBC AUTO-ENTMCNC: 33.9 G/DL (ref 31.5–35.7)
MCV RBC AUTO: 90.8 FL (ref 79–97)
MONOCYTES # BLD AUTO: 0.32 10*3/MM3 (ref 0.1–0.9)
MONOCYTES NFR BLD AUTO: 7.4 % (ref 5–12)
NEUTROPHILS NFR BLD AUTO: 3.22 10*3/MM3 (ref 1.7–7)
NEUTROPHILS NFR BLD AUTO: 75 % (ref 42.7–76)
NRBC BLD AUTO-RTO: 0 /100 WBC (ref 0–0.2)
PLATELET # BLD AUTO: 113 10*3/MM3 (ref 140–450)
PMV BLD AUTO: 10.9 FL (ref 6–12)
POTASSIUM SERPL-SCNC: 3.7 MMOL/L (ref 3.5–5.2)
PROT SERPL-MCNC: 6.7 G/DL (ref 6–8.5)
RBC # BLD AUTO: 3.6 10*6/MM3 (ref 4.14–5.8)
SODIUM SERPL-SCNC: 137 MMOL/L (ref 136–145)
WBC # BLD AUTO: 4.3 10*3/MM3 (ref 3.4–10.8)

## 2021-08-02 PROCEDURE — 25010000003 LEVETIRACETAM IN NACL 0.75% 1000 MG/100ML SOLUTION: Performed by: EMERGENCY MEDICINE

## 2021-08-02 PROCEDURE — 70486 CT MAXILLOFACIAL W/O DYE: CPT

## 2021-08-02 PROCEDURE — 96375 TX/PRO/DX INJ NEW DRUG ADDON: CPT

## 2021-08-02 PROCEDURE — 25010000002 MORPHINE PER 10 MG: Performed by: EMERGENCY MEDICINE

## 2021-08-02 PROCEDURE — 96374 THER/PROPH/DIAG INJ IV PUSH: CPT

## 2021-08-02 PROCEDURE — 96376 TX/PRO/DX INJ SAME DRUG ADON: CPT

## 2021-08-02 PROCEDURE — 85025 COMPLETE CBC W/AUTO DIFF WBC: CPT | Performed by: EMERGENCY MEDICINE

## 2021-08-02 PROCEDURE — 80053 COMPREHEN METABOLIC PANEL: CPT | Performed by: EMERGENCY MEDICINE

## 2021-08-02 PROCEDURE — 99284 EMERGENCY DEPT VISIT MOD MDM: CPT

## 2021-08-02 PROCEDURE — 70450 CT HEAD/BRAIN W/O DYE: CPT

## 2021-08-02 PROCEDURE — 25010000002 ONDANSETRON PER 1 MG: Performed by: EMERGENCY MEDICINE

## 2021-08-02 PROCEDURE — 73030 X-RAY EXAM OF SHOULDER: CPT

## 2021-08-02 RX ORDER — MORPHINE SULFATE 2 MG/ML
4 INJECTION, SOLUTION INTRAMUSCULAR; INTRAVENOUS ONCE
Status: COMPLETED | OUTPATIENT
Start: 2021-08-02 | End: 2021-08-02

## 2021-08-02 RX ORDER — SODIUM CHLORIDE 0.9 % (FLUSH) 0.9 %
10 SYRINGE (ML) INJECTION AS NEEDED
Status: DISCONTINUED | OUTPATIENT
Start: 2021-08-02 | End: 2021-08-02 | Stop reason: HOSPADM

## 2021-08-02 RX ORDER — OXYCODONE AND ACETAMINOPHEN 10; 325 MG/1; MG/1
1 TABLET ORAL EVERY 6 HOURS PRN
COMMUNITY
End: 2021-08-02

## 2021-08-02 RX ORDER — ONDANSETRON 2 MG/ML
4 INJECTION INTRAMUSCULAR; INTRAVENOUS ONCE
Status: COMPLETED | OUTPATIENT
Start: 2021-08-02 | End: 2021-08-02

## 2021-08-02 RX ORDER — LEVETIRACETAM 10 MG/ML
1000 INJECTION INTRAVASCULAR ONCE
Status: COMPLETED | OUTPATIENT
Start: 2021-08-02 | End: 2021-08-02

## 2021-08-02 RX ORDER — LEVETIRACETAM 1000 MG/1
1000 TABLET ORAL EVERY 12 HOURS SCHEDULED
Qty: 60 TABLET | Refills: 0 | Status: SHIPPED | OUTPATIENT
Start: 2021-08-02 | End: 2021-09-16 | Stop reason: HOSPADM

## 2021-08-02 RX ADMIN — MORPHINE SULFATE 4 MG: 2 INJECTION, SOLUTION INTRAMUSCULAR; INTRAVENOUS at 05:06

## 2021-08-02 RX ADMIN — ONDANSETRON 4 MG: 2 INJECTION INTRAMUSCULAR; INTRAVENOUS at 01:33

## 2021-08-02 RX ADMIN — LEVETIRACETAM 1000 MG: 1000 INJECTION, SOLUTION INTRAVENOUS at 01:37

## 2021-08-02 RX ADMIN — MORPHINE SULFATE 4 MG: 2 INJECTION, SOLUTION INTRAMUSCULAR; INTRAVENOUS at 01:34

## 2021-08-02 NOTE — ED PROVIDER NOTES
EMERGENCY DEPARTMENT ENCOUNTER    CHIEF COMPLAINT  Chief Complaint: Seizure/head trauma  History given by: Patient  History limited by: None  Room Number: 14/14  PMD: Provider, No Known      HPI:  Pt is a 31 y.o. male who presents complaining of head and facial pain following a seizure that occurred at home just prior to ED arrival.  Symptoms were sudden in onset and have been worsening since the initial traumatic event.  The patient does have a history of seizures and is currently on 1000 mg of Keppra twice daily to which he states he is compliant.  He states that he was on the porch when the seizure happened and he fell striking the left side of his face on the ground.  There was loss of consciousness and there was some postictal confusion following.  He now states that he has pain to the left side of the head and face described as a dull sensation that is nonradiating and moderate in intensity.  Touch and movement and do make the symptoms worse nothing is improve the discomfort thus far.  He denies any treatment rendered prior to ED arrival.  The patient denies neck pain, chest pain, nausea/vomiting, or back or abdominal pain.      PAST MEDICAL HISTORY  Active Ambulatory Problems     Diagnosis Date Noted   • Right-sided nontraumatic intracerebral hemorrhage (CMS/HCC) 08/03/2020   • Nontraumatic subcortical hemorrhage of right cerebral hemisphere (CMS/HCC) 08/03/2020   • Alcohol abuse 08/10/2020   • Metabolic encephalopathy 08/10/2020   • Vitamin K deficiency 08/10/2020   • Nontraumatic acute cerebral hemorrhage (CMS/HCC) 08/17/2020   • Disruption or dehiscence of closure of skull or craniotomy 09/28/2020   • Wound dehiscence 10/01/2020   • Postoperative visit 10/15/2020     Resolved Ambulatory Problems     Diagnosis Date Noted   • No Resolved Ambulatory Problems     Past Medical History:   Diagnosis Date   • Dehiscence of incision    • Intracerebral hemorrhage (CMS/HCC)    • MRSA (methicillin resistant  Staphylococcus aureus)    • Paralysis (CMS/HCC)        PAST SURGICAL HISTORY  Past Surgical History:   Procedure Laterality Date   • CRANIOTOMY Right 10/1/2020    Procedure: INCISION AND DRAINAGE WOUND, of scalp incision with closure;  Surgeon: Leobardo Rivera MD;  Location: [facility] OR;  Service: Neurosurgery;  Laterality: Right;   • CRANIOTOMY FOR SUBDURAL HEMATOMA Right 8/3/2020    Procedure: RIGHT FRONTOPARIETAL CRANIOTOMY FOR EVACUATION OF INTRACEREBRAL HEMORRHAGE;  Surgeon: Leobardo Rivera MD;  Location: [facility] OR;  Service: Neurosurgery;  Laterality: Right;   • EMBOLIZATION CEREBRAL N/A 8/5/2020    Procedure: diagnostic angiogram possible EMBOLIZATION CEREBRAL. right radial approach;  Surgeon: Alfonso Malcolm MD;  Location: [facility] OR 18/19;  Service: Neurosurgery;  Laterality: N/A;   • WRIST SURGERY      1st one 2006, 2nd 2010       FAMILY HISTORY  Family History   Problem Relation Age of Onset   • Depression Maternal Grandmother    • Depression Maternal Grandfather    • Depression Paternal Grandmother    • Depression Paternal Grandfather    • Malig Hyperthermia Neg Hx        SOCIAL HISTORY  Social History     Socioeconomic History   • Marital status:      Spouse name: Not on file   • Number of children: Not on file   • Years of education: Not on file   • Highest education level: Not on file   Tobacco Use   • Smoking status: Former Smoker     Packs/day: 0.50   • Smokeless tobacco: Never Used   • Tobacco comment: PT HAS NOT SMOKED SINCE 7/2020   Substance and Sexual Activity   • Alcohol use: Not Currently   • Drug use: Never   • Sexual activity: Not Currently       ALLERGIES  Citrus and Promethazine    REVIEW OF SYSTEMS  Review of Systems   Constitutional: Negative for activity change, appetite change and fever.   HENT: Positive for facial swelling. Negative for congestion and sore throat.    Eyes: Negative.    Respiratory: Negative for cough and shortness of breath.     Cardiovascular: Negative for chest pain and leg swelling.   Gastrointestinal: Negative for abdominal pain, diarrhea and vomiting.   Endocrine: Negative.    Genitourinary: Negative for decreased urine volume and dysuria.   Musculoskeletal: Negative for neck pain.   Skin: Negative for rash and wound.   Allergic/Immunologic: Negative.    Neurological: Positive for seizures and headaches. Negative for weakness and numbness.   Hematological: Negative.    Psychiatric/Behavioral: Negative.    All other systems reviewed and are negative.      PHYSICAL EXAM  ED Triage Vitals [08/02/21 0014]   Temp Heart Rate Resp BP SpO2   97.4 °F (36.3 °C) 106 16 108/70 100 %      Temp src Heart Rate Source Patient Position BP Location FiO2 (%)   Tympanic Monitor Sitting Right arm --       Physical Exam  Vitals and nursing note reviewed.   Constitutional:       General: He is not in acute distress.  HENT:      Head: Normocephalic and atraumatic.   Eyes:      Pupils: Pupils are equal, round, and reactive to light.   Cardiovascular:      Rate and Rhythm: Normal rate and regular rhythm.      Heart sounds: Normal heart sounds.   Pulmonary:      Effort: Pulmonary effort is normal. No respiratory distress.      Breath sounds: Normal breath sounds.   Abdominal:      Palpations: Abdomen is soft.      Tenderness: There is no abdominal tenderness. There is no guarding or rebound.   Musculoskeletal:         General: Normal range of motion.      Cervical back: Normal range of motion and neck supple.   Skin:     General: Skin is warm and dry.      Comments: Left-sided facial contusion/abrasion   Neurological:      Mental Status: He is alert.      Sensory: Sensation is intact.      Comments: Chronic left-sided hemiparesis   Psychiatric:         Mood and Affect: Mood and affect normal.         LAB RESULTS  Lab Results (last 24 hours)     Procedure Component Value Units Date/Time    CBC & Differential [[id number]]  (Abnormal) Collected: 08/02/21 0141     Specimen: Blood Updated: 08/02/21 0217    Narrative:      The following orders were created for panel order CBC & Differential.  Procedure                               Abnormality         Status                     ---------                               -----------         ------                     CBC Auto Differential[[id number]]        Abnormal            Final result                 Please view results for these tests on the individual orders.    Comprehensive Metabolic Panel [[id number]]  (Abnormal) Collected: 08/02/21 0141    Specimen: Blood Updated: 08/02/21 0209     Glucose 144 mg/dL      BUN 4 mg/dL      Creatinine 0.59 mg/dL      Sodium 137 mmol/L      Potassium 3.7 mmol/L      Comment: Slight hemolysis detected by analyzer. Results may be affected.        Chloride 103 mmol/L      CO2 23.6 mmol/L      Calcium 8.9 mg/dL      Total Protein 6.7 g/dL      Albumin 3.70 g/dL      ALT (SGPT) 21 U/L      AST (SGOT) 36 U/L      Comment: Slight hemolysis detected by analyzer. Results may be affected.        Alkaline Phosphatase 71 U/L      Total Bilirubin 1.2 mg/dL      eGFR Non African Amer >150 mL/min/1.73      Globulin 3.0 gm/dL      A/G Ratio 1.2 g/dL      BUN/Creatinine Ratio 6.8     Anion Gap 10.4 mmol/L     Narrative:      GFR Normal >60  Chronic Kidney Disease <60  Kidney Failure <15      CBC Auto Differential [[id number]]  (Abnormal) Collected: 08/02/21 0141    Specimen: Blood Updated: 08/02/21 0217     WBC 4.30 10*3/mm3      RBC 3.60 10*6/mm3      Hemoglobin 11.1 g/dL      Hematocrit 32.7 %      MCV 90.8 fL      MCH 30.8 pg      MCHC 33.9 g/dL      RDW 12.5 %      RDW-SD 41.3 fl      MPV 10.9 fL      Platelets 113 10*3/mm3      Neutrophil % 75.0 %      Lymphocyte % 15.1 %      Monocyte % 7.4 %      Eosinophil % 2.1 %      Basophil % 0.2 %      Immature Grans % 0.2 %      Neutrophils, Absolute 3.22 10*3/mm3      Lymphocytes, Absolute 0.65 10*3/mm3      Monocytes, Absolute 0.32 10*3/mm3      Eosinophils,  Absolute 0.09 10*3/mm3      Basophils, Absolute 0.01 10*3/mm3      Immature Grans, Absolute 0.01 10*3/mm3      nRBC 0.0 /100 WBC           I ordered the above labs and reviewed the results    RADIOLOGY  CT Head Without Contrast   Final Result         Electronically signed by Alaina Catherine MD on 08-02-21 at 0255      CT Facial Bones Without Contrast   Final Result         Electronically signed by Alaina Catherine MD on 08-02-21 at 0258      XR Shoulder 2+ View Left   Final Result         Electronically signed by Jean Paul Boone MD on 08-02-21 at 0207           I ordered the above noted radiological studies. Interpreted by radiologist. Reviewed by me in PACS.       PROCEDURES  Procedures      PROGRESS AND CONSULTS     The patient was wearing a facemask upon entrance into the room and remained in such throughout their visit.  I was wearing PPE including a facemask, eye protection, as well as gloves at any point entering the room and throughout the visit    0525  On reevaluation, the patient has remained seizure-free throughout the visit to the emergency room today.  Vital signs are stable.  I did inform the patient that his CT scans are unremarkable for any acute traumatic abnormality and his shoulder x-ray is negative.  He is asking for a prescription for his Keppra to be refilled which we will provide for him.  At this point, he is stable for discharge and all questions been answered.      MEDICAL DECISION MAKING  Results were reviewed/discussed with the patient and they were also made aware of online access. Pt also made aware that some labs, such as cultures, will not be resulted during ER visit and follow up with PMD is necessary.     MDM  Number of Diagnoses or Management Options     Amount and/or Complexity of Data Reviewed  Clinical lab tests: ordered and reviewed  Tests in the radiology section of CPT®: ordered and reviewed  Tests in the medicine section of CPT®: ordered and reviewed  Review and summarize  past medical records: yes (Upon medical records review, the patient was last seen and evaluated on 10/1/2020 secondary to a cranial wound dehiscence)  Independent visualization of images, tracings, or specimens: yes (Unremarkable CT scan of the head/face)           DIAGNOSIS  Final diagnoses:   Seizure (CMS/HCC)   Contusion of face, initial encounter   Closed head injury, initial encounter       DISPOSITION  DISCHARGE    Patient discharged in stable condition.    Reviewed implications of results, diagnosis, meds, responsibility to follow up, warning signs and symptoms of possible worsening, potential complications and reasons to return to ER.    Patient/Family voiced understanding of above instructions.    Discussed plan for discharge, as there is no emergent indication for admission. Patient referred to primary care provider for BP management due to today's BP. Pt/family is agreeable and understands need for follow up and repeat testing.  Pt is aware that discharge does not mean that nothing is wrong but it indicates no emergency is present that requires admission and they must continue care with follow-up as given below or physician of their choice.     FOLLOW-UP  [facility] PHYSICAN REFERRAL SERVICE  [place] [address]  [phone]  Schedule an appointment as soon as possible for a visit            Medication List      Changed    levETIRAcetam 1000 MG tablet  Commonly known as: KEPPRA  Take 1 tablet by mouth Every 12 (Twelve) Hours.  What changed:   · medication strength  · how much to take           Where to Get Your Medications      These medications were sent to [facility] [address] [place] [address] - [address] [facility] [phone]  - [phone]   [address] [facility] [address]    Phone: [phone]   · levETIRAcetam 1000 MG tablet           Latest Documented Vital Signs:  As of 06:09 EDT  BP- 110/70 HR- 71 Temp- 97.4 °F (36.3 °C) (Tympanic) O2 sat- 98%          Simon Finn MD  08/02/21 0609

## 2021-08-02 NOTE — ED NOTES
"Pt to triage from home with c/o \"grand mal seizure\" - states history of seizures, was post-ictal for @ 15 minutes per patient.  Pt has abrasion and edema to left cheek from falling onto porch during seizure. Pt concerned that he is feeling some discomfort to his left cheek - pt is paralyzed on the left side. Pt wearing mask in triage.  Triage personnel wore appropriate PPE       Arianne Verma RN  08/02/21 0019    "

## 2021-09-14 ENCOUNTER — HOSPITAL ENCOUNTER (INPATIENT)
Facility: HOSPITAL | Age: 31
LOS: 1 days | Discharge: HOME | End: 2021-09-16
Attending: EMERGENCY MEDICINE | Admitting: INTERNAL MEDICINE

## 2021-09-14 ENCOUNTER — APPOINTMENT (OUTPATIENT)
Dept: CT IMAGING | Facility: HOSPITAL | Age: 31
End: 2021-09-14

## 2021-09-14 DIAGNOSIS — G40.909 RECURRENT SEIZURES (HCC): Primary | ICD-10-CM

## 2021-09-14 LAB
BASOPHILS # BLD AUTO: 0.02 10*3/MM3 (ref 0–0.2)
BASOPHILS NFR BLD AUTO: 0.4 % (ref 0–1.5)
DEPRECATED RDW RBC AUTO: 40.5 FL (ref 37–54)
EOSINOPHIL # BLD AUTO: 0.07 10*3/MM3 (ref 0–0.4)
EOSINOPHIL NFR BLD AUTO: 1.5 % (ref 0.3–6.2)
ERYTHROCYTE [DISTWIDTH] IN BLOOD BY AUTOMATED COUNT: 12.8 % (ref 12.3–15.4)
HCT VFR BLD AUTO: 31.2 % (ref 37.5–51)
HGB BLD-MCNC: 10.5 G/DL (ref 13–17.7)
IMM GRANULOCYTES # BLD AUTO: 0.02 10*3/MM3 (ref 0–0.05)
IMM GRANULOCYTES NFR BLD AUTO: 0.4 % (ref 0–0.5)
LYMPHOCYTES # BLD AUTO: 0.53 10*3/MM3 (ref 0.7–3.1)
LYMPHOCYTES NFR BLD AUTO: 11.7 % (ref 19.6–45.3)
MCH RBC QN AUTO: 29.8 PG (ref 26.6–33)
MCHC RBC AUTO-ENTMCNC: 33.7 G/DL (ref 31.5–35.7)
MCV RBC AUTO: 88.6 FL (ref 79–97)
MONOCYTES # BLD AUTO: 0.25 10*3/MM3 (ref 0.1–0.9)
MONOCYTES NFR BLD AUTO: 5.5 % (ref 5–12)
NEUTROPHILS NFR BLD AUTO: 3.64 10*3/MM3 (ref 1.7–7)
NEUTROPHILS NFR BLD AUTO: 80.5 % (ref 42.7–76)
NRBC BLD AUTO-RTO: 0 /100 WBC (ref 0–0.2)
PLATELET # BLD AUTO: 101 10*3/MM3 (ref 140–450)
PMV BLD AUTO: 9.9 FL (ref 6–12)
RBC # BLD AUTO: 3.52 10*6/MM3 (ref 4.14–5.8)
WBC # BLD AUTO: 4.53 10*3/MM3 (ref 3.4–10.8)

## 2021-09-14 PROCEDURE — 99285 EMERGENCY DEPT VISIT HI MDM: CPT

## 2021-09-14 PROCEDURE — 83735 ASSAY OF MAGNESIUM: CPT | Performed by: EMERGENCY MEDICINE

## 2021-09-14 PROCEDURE — 25010000003 LEVETIRACETAM IN NACL 0.75% 1000 MG/100ML SOLUTION: Performed by: EMERGENCY MEDICINE

## 2021-09-14 PROCEDURE — 80053 COMPREHEN METABOLIC PANEL: CPT | Performed by: EMERGENCY MEDICINE

## 2021-09-14 PROCEDURE — 70450 CT HEAD/BRAIN W/O DYE: CPT

## 2021-09-14 PROCEDURE — 85025 COMPLETE CBC W/AUTO DIFF WBC: CPT | Performed by: EMERGENCY MEDICINE

## 2021-09-14 PROCEDURE — 84100 ASSAY OF PHOSPHORUS: CPT | Performed by: EMERGENCY MEDICINE

## 2021-09-14 RX ORDER — LEVETIRACETAM 10 MG/ML
1000 INJECTION INTRAVASCULAR ONCE
Status: COMPLETED | OUTPATIENT
Start: 2021-09-14 | End: 2021-09-14

## 2021-09-14 RX ORDER — SODIUM CHLORIDE 0.9 % (FLUSH) 0.9 %
10 SYRINGE (ML) INJECTION AS NEEDED
Status: DISCONTINUED | OUTPATIENT
Start: 2021-09-14 | End: 2021-09-16 | Stop reason: HOSPADM

## 2021-09-14 RX ADMIN — LEVETIRACETAM 1000 MG: 1000 INJECTION, SOLUTION INTRAVENOUS at 23:23

## 2021-09-15 ENCOUNTER — APPOINTMENT (OUTPATIENT)
Dept: MRI IMAGING | Facility: HOSPITAL | Age: 31
End: 2021-09-15

## 2021-09-15 ENCOUNTER — APPOINTMENT (OUTPATIENT)
Dept: NEUROLOGY | Facility: HOSPITAL | Age: 31
End: 2021-09-15

## 2021-09-15 PROBLEM — G40.909 RECURRENT SEIZURES: Status: ACTIVE | Noted: 2021-09-15

## 2021-09-15 LAB
ALBUMIN SERPL-MCNC: 3.7 G/DL (ref 3.5–5.2)
ALBUMIN/GLOB SERPL: 1.6 G/DL
ALP SERPL-CCNC: 87 U/L (ref 39–117)
ALT SERPL W P-5'-P-CCNC: 17 U/L (ref 1–41)
ANION GAP SERPL CALCULATED.3IONS-SCNC: 8.1 MMOL/L (ref 5–15)
ANION GAP SERPL CALCULATED.3IONS-SCNC: 8.5 MMOL/L (ref 5–15)
AST SERPL-CCNC: 27 U/L (ref 1–40)
BILIRUB SERPL-MCNC: 0.8 MG/DL (ref 0–1.2)
BUN SERPL-MCNC: 5 MG/DL (ref 6–20)
BUN SERPL-MCNC: 6 MG/DL (ref 6–20)
BUN/CREAT SERPL: 11.3 (ref 7–25)
BUN/CREAT SERPL: 11.6 (ref 7–25)
CALCIUM SPEC-SCNC: 8.2 MG/DL (ref 8.6–10.5)
CALCIUM SPEC-SCNC: 8.3 MG/DL (ref 8.6–10.5)
CHLORIDE SERPL-SCNC: 110 MMOL/L (ref 98–107)
CHLORIDE SERPL-SCNC: 111 MMOL/L (ref 98–107)
CK SERPL-CCNC: 81 U/L (ref 20–200)
CO2 SERPL-SCNC: 21.9 MMOL/L (ref 22–29)
CO2 SERPL-SCNC: 22.5 MMOL/L (ref 22–29)
CREAT SERPL-MCNC: 0.43 MG/DL (ref 0.76–1.27)
CREAT SERPL-MCNC: 0.53 MG/DL (ref 0.76–1.27)
DEPRECATED RDW RBC AUTO: 41.7 FL (ref 37–54)
ERYTHROCYTE [DISTWIDTH] IN BLOOD BY AUTOMATED COUNT: 12.9 % (ref 12.3–15.4)
GFR SERPL CREATININE-BSD FRML MDRD: >150 ML/MIN/1.73
GFR SERPL CREATININE-BSD FRML MDRD: >150 ML/MIN/1.73
GLOBULIN UR ELPH-MCNC: 2.3 GM/DL
GLUCOSE SERPL-MCNC: 101 MG/DL (ref 65–99)
GLUCOSE SERPL-MCNC: 92 MG/DL (ref 65–99)
HCT VFR BLD AUTO: 30.8 % (ref 37.5–51)
HGB BLD-MCNC: 10.5 G/DL (ref 13–17.7)
MAGNESIUM SERPL-MCNC: 1.6 MG/DL (ref 1.6–2.6)
MCH RBC QN AUTO: 30.1 PG (ref 26.6–33)
MCHC RBC AUTO-ENTMCNC: 34.1 G/DL (ref 31.5–35.7)
MCV RBC AUTO: 88.3 FL (ref 79–97)
PHOSPHATE SERPL-MCNC: 2.9 MG/DL (ref 2.5–4.5)
PLATELET # BLD AUTO: 96 10*3/MM3 (ref 140–450)
PMV BLD AUTO: 9.8 FL (ref 6–12)
POTASSIUM SERPL-SCNC: 3.8 MMOL/L (ref 3.5–5.2)
POTASSIUM SERPL-SCNC: 4 MMOL/L (ref 3.5–5.2)
PROT SERPL-MCNC: 6 G/DL (ref 6–8.5)
RBC # BLD AUTO: 3.49 10*6/MM3 (ref 4.14–5.8)
SARS-COV-2 ORF1AB RESP QL NAA+PROBE: NOT DETECTED
SODIUM SERPL-SCNC: 140 MMOL/L (ref 136–145)
SODIUM SERPL-SCNC: 142 MMOL/L (ref 136–145)
WBC # BLD AUTO: 3.9 10*3/MM3 (ref 3.4–10.8)

## 2021-09-15 PROCEDURE — 95816 EEG AWAKE AND DROWSY: CPT

## 2021-09-15 PROCEDURE — 25010000002 LORAZEPAM PER 2 MG: Performed by: HOSPITALIST

## 2021-09-15 PROCEDURE — G0378 HOSPITAL OBSERVATION PER HR: HCPCS

## 2021-09-15 PROCEDURE — 95816 EEG AWAKE AND DROWSY: CPT | Performed by: PSYCHIATRY & NEUROLOGY

## 2021-09-15 PROCEDURE — A9577 INJ MULTIHANCE: HCPCS | Performed by: HOSPITALIST

## 2021-09-15 PROCEDURE — 25010000002 KETOROLAC TROMETHAMINE PER 15 MG: Performed by: EMERGENCY MEDICINE

## 2021-09-15 PROCEDURE — 80048 BASIC METABOLIC PNL TOTAL CA: CPT | Performed by: NURSE PRACTITIONER

## 2021-09-15 PROCEDURE — 82550 ASSAY OF CK (CPK): CPT | Performed by: NURSE PRACTITIONER

## 2021-09-15 PROCEDURE — 0 GADOBENATE DIMEGLUMINE 529 MG/ML SOLUTION: Performed by: HOSPITALIST

## 2021-09-15 PROCEDURE — U0004 COV-19 TEST NON-CDC HGH THRU: HCPCS | Performed by: EMERGENCY MEDICINE

## 2021-09-15 PROCEDURE — 99222 1ST HOSP IP/OBS MODERATE 55: CPT | Performed by: PSYCHIATRY & NEUROLOGY

## 2021-09-15 PROCEDURE — 85027 COMPLETE CBC AUTOMATED: CPT | Performed by: NURSE PRACTITIONER

## 2021-09-15 PROCEDURE — 70553 MRI BRAIN STEM W/O & W/DYE: CPT

## 2021-09-15 RX ORDER — POTASSIUM CHLORIDE 750 MG/1
10 TABLET, FILM COATED, EXTENDED RELEASE ORAL 2 TIMES DAILY WITH MEALS
Status: DISCONTINUED | OUTPATIENT
Start: 2021-09-15 | End: 2021-09-16 | Stop reason: HOSPADM

## 2021-09-15 RX ORDER — LEVETIRACETAM 5 MG/ML
500 INJECTION INTRAVASCULAR EVERY 12 HOURS SCHEDULED
Status: DISCONTINUED | OUTPATIENT
Start: 2021-09-16 | End: 2021-09-15

## 2021-09-15 RX ORDER — ACETAMINOPHEN 325 MG/1
650 TABLET ORAL EVERY 4 HOURS PRN
Status: DISCONTINUED | OUTPATIENT
Start: 2021-09-15 | End: 2021-09-15

## 2021-09-15 RX ORDER — SODIUM CHLORIDE 0.9 % (FLUSH) 0.9 %
10 SYRINGE (ML) INJECTION EVERY 12 HOURS SCHEDULED
Status: DISCONTINUED | OUTPATIENT
Start: 2021-09-15 | End: 2021-09-16 | Stop reason: HOSPADM

## 2021-09-15 RX ORDER — SODIUM CHLORIDE 0.9 % (FLUSH) 0.9 %
10 SYRINGE (ML) INJECTION AS NEEDED
Status: DISCONTINUED | OUTPATIENT
Start: 2021-09-15 | End: 2021-09-16 | Stop reason: HOSPADM

## 2021-09-15 RX ORDER — ONDANSETRON 2 MG/ML
4 INJECTION INTRAMUSCULAR; INTRAVENOUS EVERY 6 HOURS PRN
Status: DISCONTINUED | OUTPATIENT
Start: 2021-09-15 | End: 2021-09-16 | Stop reason: HOSPADM

## 2021-09-15 RX ORDER — MIRTAZAPINE 15 MG/1
15 TABLET, FILM COATED ORAL NIGHTLY
Status: DISCONTINUED | OUTPATIENT
Start: 2021-09-15 | End: 2021-09-16 | Stop reason: HOSPADM

## 2021-09-15 RX ORDER — KETOROLAC TROMETHAMINE 15 MG/ML
15 INJECTION, SOLUTION INTRAMUSCULAR; INTRAVENOUS ONCE
Status: COMPLETED | OUTPATIENT
Start: 2021-09-15 | End: 2021-09-15

## 2021-09-15 RX ORDER — POTASSIUM CHLORIDE 750 MG/1
10 CAPSULE, EXTENDED RELEASE ORAL 2 TIMES DAILY WITH MEALS
Status: DISCONTINUED | OUTPATIENT
Start: 2021-09-15 | End: 2021-09-15

## 2021-09-15 RX ORDER — NITROGLYCERIN 0.4 MG/1
0.4 TABLET SUBLINGUAL
Status: DISCONTINUED | OUTPATIENT
Start: 2021-09-15 | End: 2021-09-16 | Stop reason: HOSPADM

## 2021-09-15 RX ORDER — HYDROCODONE BITARTRATE AND ACETAMINOPHEN 5; 325 MG/1; MG/1
1 TABLET ORAL EVERY 8 HOURS PRN
Status: DISCONTINUED | OUTPATIENT
Start: 2021-09-15 | End: 2021-09-16 | Stop reason: HOSPADM

## 2021-09-15 RX ORDER — FAMOTIDINE 20 MG/1
20 TABLET, FILM COATED ORAL
Status: DISCONTINUED | OUTPATIENT
Start: 2021-09-15 | End: 2021-09-16 | Stop reason: HOSPADM

## 2021-09-15 RX ORDER — LORAZEPAM 2 MG/ML
1 INJECTION INTRAMUSCULAR EVERY 4 HOURS PRN
Status: DISCONTINUED | OUTPATIENT
Start: 2021-09-15 | End: 2021-09-16 | Stop reason: HOSPADM

## 2021-09-15 RX ORDER — GABAPENTIN 300 MG/1
300 CAPSULE ORAL NIGHTLY
Status: DISCONTINUED | OUTPATIENT
Start: 2021-09-15 | End: 2021-09-16 | Stop reason: HOSPADM

## 2021-09-15 RX ORDER — FOLIC ACID 1 MG/1
1 TABLET ORAL DAILY
Status: DISCONTINUED | OUTPATIENT
Start: 2021-09-15 | End: 2021-09-16 | Stop reason: HOSPADM

## 2021-09-15 RX ORDER — ALUMINA, MAGNESIA, AND SIMETHICONE 2400; 2400; 240 MG/30ML; MG/30ML; MG/30ML
15 SUSPENSION ORAL EVERY 6 HOURS PRN
Status: DISCONTINUED | OUTPATIENT
Start: 2021-09-15 | End: 2021-09-16 | Stop reason: HOSPADM

## 2021-09-15 RX ORDER — ONDANSETRON 4 MG/1
4 TABLET, FILM COATED ORAL EVERY 6 HOURS PRN
Status: DISCONTINUED | OUTPATIENT
Start: 2021-09-15 | End: 2021-09-16 | Stop reason: HOSPADM

## 2021-09-15 RX ORDER — ACETAMINOPHEN 325 MG/1
650 TABLET ORAL EVERY 6 HOURS PRN
Status: DISCONTINUED | OUTPATIENT
Start: 2021-09-15 | End: 2021-09-16 | Stop reason: HOSPADM

## 2021-09-15 RX ORDER — LEVETIRACETAM 500 MG/1
1500 TABLET ORAL 2 TIMES DAILY
Status: DISCONTINUED | OUTPATIENT
Start: 2021-09-15 | End: 2021-09-16 | Stop reason: HOSPADM

## 2021-09-15 RX ADMIN — SODIUM CHLORIDE, PRESERVATIVE FREE 10 ML: 5 INJECTION INTRAVENOUS at 20:15

## 2021-09-15 RX ADMIN — LORAZEPAM 1 MG: 2 INJECTION INTRAMUSCULAR; INTRAVENOUS at 20:46

## 2021-09-15 RX ADMIN — LEVETIRACETAM 1500 MG: 500 TABLET, FILM COATED ORAL at 10:51

## 2021-09-15 RX ADMIN — SODIUM CHLORIDE, PRESERVATIVE FREE 10 ML: 5 INJECTION INTRAVENOUS at 18:44

## 2021-09-15 RX ADMIN — FOLIC ACID 1 MG: 1 TABLET ORAL at 18:44

## 2021-09-15 RX ADMIN — LEVETIRACETAM 1500 MG: 500 TABLET, FILM COATED ORAL at 21:21

## 2021-09-15 RX ADMIN — SODIUM CHLORIDE, PRESERVATIVE FREE 10 ML: 5 INJECTION INTRAVENOUS at 01:03

## 2021-09-15 RX ADMIN — GABAPENTIN 300 MG: 300 CAPSULE ORAL at 20:15

## 2021-09-15 RX ADMIN — KETOROLAC TROMETHAMINE 15 MG: 15 INJECTION, SOLUTION INTRAMUSCULAR; INTRAVENOUS at 01:29

## 2021-09-15 RX ADMIN — Medication 100 MG: at 21:21

## 2021-09-15 RX ADMIN — LORAZEPAM 1 MG: 2 INJECTION INTRAMUSCULAR; INTRAVENOUS at 16:59

## 2021-09-15 RX ADMIN — POTASSIUM CHLORIDE 10 MEQ: 750 TABLET, EXTENDED RELEASE ORAL at 18:44

## 2021-09-15 RX ADMIN — GADOBENATE DIMEGLUMINE 17 ML: 529 INJECTION, SOLUTION INTRAVENOUS at 14:13

## 2021-09-15 RX ADMIN — ACETAMINOPHEN 650 MG: 325 TABLET, FILM COATED ORAL at 08:15

## 2021-09-15 RX ADMIN — MIRTAZAPINE 15 MG: 15 TABLET, FILM COATED ORAL at 20:14

## 2021-09-15 NOTE — ED NOTES
Pt arrived by EMS from at home, family called witness seizure x2 seizures tonight pt is on Keppra and is compliant, pt is postictal, x2 seizures with EMS. Pt received 2.5 Versed with EMS.     This nurse wore face shield and mask during assessment, patient wore mask.        Iglesia Walls RN  09/14/21 4060

## 2021-09-15 NOTE — CONSULTS
DOS: 9/15/2021  NAME: Avinash Everett   : 1990  PCP: Provider, No Known  CC: Stroke  Referring MD: Adan Tracy MD    Neurological Problem and Interval History:  31 y.o. 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  Since then the patient has had some breakthrough seizures with one in August and one yesterday back-to-back which was not being relieved with the current medications.  EMT was called and they gave him Versed intravenous which was able to break his seizures.  He was then given a loading dose of intravenous Keppra 1500 mg.  Currently he is awake and alert and communicating well with his mother at the bedside.  Patient has a history of numerous head injuries in the past from basketball, football and hockey accidents along with concussions.  He is currently not working.  He used to work in the past with an [employer] before his seizures started.  Also he had been in [place] in the past but currently relocated to our facility.  He has not had any neurologist follow-up any time for his seizure disorder.    Past Medical/Surgical Hx:  Past Medical History:   Diagnosis Date   • Dehiscence of incision    • Intracerebral hemorrhage (CMS/HCC)    • Metabolic encephalopathy    • MRSA (methicillin resistant Staphylococcus aureus)     RIGHT INCISION   • Paralysis (CMS/HCC)     LEFT SIDE     Past Surgical History:   Procedure Laterality Date   • CRANIOTOMY Right 10/1/2020    Procedure: INCISION AND DRAINAGE WOUND, of scalp incision with closure;  Surgeon: Leobardo Rivera MD;  Location: [facility];  Service: Neurosurgery;  Laterality: Right;   • CRANIOTOMY FOR SUBDURAL HEMATOMA Right 8/3/2020    Procedure: RIGHT FRONTOPARIETAL CRANIOTOMY FOR EVACUATION OF INTRACEREBRAL HEMORRHAGE;  Surgeon: Leobardo Rivera MD;  Location: [facility];  Service:  Neurosurgery;  Laterality: Right;   • EMBOLIZATION CEREBRAL N/A 8/5/2020    Procedure: diagnostic angiogram possible EMBOLIZATION CEREBRAL. right radial approach;  Surgeon: Alfonso Malcolm MD;  Location: [facility] 18/19;  Service: Neurosurgery;  Laterality: N/A;   • WRIST SURGERY      1st one 2006, 2nd 2010       Review of Systems:    Constitutional: Pleasant gentleman with a fixed deficit in the left upper and lower extremities from the AVM.  Cardiovascular: Denies any chest pain or palpitation  Respiratory: Denies any shortness of breath.  Gastrointestinal: Denies any nausea or vomiting  Genitourinary: Denies any bladder incontinence.  Musculoskeletal: Has fixed deficit in the right upper and lower extremities more in the right upper compared to right lower.  Dermatological: No skin breakdown noted.  Neurological: Breakthrough seizures as discussed above  Psychiatric: Denies any major anxiety or depression.  Ophthalmological: Denies any vision changes.          Medications On Admission  Keppra 1000 mg twice daily with food    Allergies:  Allergies   Allergen Reactions   • Citrus Hives     Hives in mouth     • Promethazine Hives       Social Hx:  Social History     Socioeconomic History   • Marital status:      Spouse name: Not on file   • Number of children: Not on file   • Years of education: Not on file   • Highest education level: Not on file   Tobacco Use   • Smoking status: Former Smoker     Packs/day: 0.50   • Smokeless tobacco: Never Used   • Tobacco comment: PT HAS NOT SMOKED SINCE 7/2020   Substance and Sexual Activity   • Alcohol use: Not Currently   • Drug use: Never   • Sexual activity: Not Currently       Family Hx:  Family History   Problem Relation Age of Onset   • Depression Maternal Grandmother    • Depression Maternal Grandfather    • Depression Paternal Grandmother    • Depression Paternal Grandfather    • Malig Hyperthermia Neg Hx        Review of Imaging (Interpretation of  images not reports): Reviewed the imaging studies that showed:    FINDINGS:    Brain:  Stable encephalomalacia in the right frontal and parietal lobes   subjacent to the craniotomy.  No acute intracranial hemorrhage, mass-  effect, or edema.  Gray-white matter differentiation maintained.    Ventricles:  Unremarkable.  No hydrocephalus.    Bones joints:  Old right-sided craniotomy.  No skull fracture.    Soft tissues:  Unremarkable.    Sinuses:  Unremarkable as visualized.  No acute sinusitis.    Mastoid air cells:  Unremarkable as visualized.  No mastoid effusion.     IMPRESSION:         No acute intracranial process.    Additional Tests Performed: An MRI of the brain was performed with and without contrast on September 22, 2020 which was reviewed on the PACS as follows:    IMPRESSION:  1. Evidence of previous right frontoparietal craniotomy with blood  products within the resection cavity and subdural space. There is rim  enhancement of the resection cavity with no significant enhancement  within the resection cavity. There is a small amount of surrounding  edema. The findings may all be related to chronic blood products. It is  conceivable that infected fluid could be present.  2. The resection cavity does appear smaller than on the 08/24/2020  study.  3. There is some mild nodular appearing enhancement along the periphery  of the resection cavity particularly along its medial aspect. This is  more apparent on the 8/24/2020 study and the significance of this is  uncertain.  4. It is conceivable that there could be an underlying lesion including  tumor.  5. Additional short-term follow-up MRI of the brain in approximately 4  weeks to 6 weeks may be helpful.      Laboratory Results:   Lab Results   Component Value Date    GLUCOSE 101 (H) 09/15/2021    CALCIUM 8.3 (L) 09/15/2021     09/15/2021    K 3.8 09/15/2021    CO2 21.9 (L) 09/15/2021     (H) 09/15/2021    BUN 5 (L) 09/15/2021    CREATININE 0.43 (L)  "09/15/2021    EGFRIFNONA >150 09/15/2021    BCR 11.6 09/15/2021    ANIONGAP 8.1 09/15/2021     Lab Results   Component Value Date    WBC 3.90 09/15/2021    HGB 10.5 (L) 09/15/2021    HCT 30.8 (L) 09/15/2021    MCV 88.3 09/15/2021    PLT 96 (L) 09/15/2021     Lab Results   Component Value Date    CHOL 250 (H) 08/04/2020     Lab Results   Component Value Date    HDL 42 08/04/2020     Lab Results   Component Value Date     (H) 08/04/2020     Lab Results   Component Value Date    TRIG 159 (H) 08/06/2020    TRIG 231 (H) 08/05/2020    TRIG 194 (H) 08/04/2020     Lab Results   Component Value Date    HGBA1C 4.60 (L) 08/04/2020     Lab Results   Component Value Date    INR 1.46 (H) 09/30/2020    INR 1.22 (H) 09/03/2020    INR 1.47 (H) 08/14/2020    PROTIME 17.5 (H) 09/30/2020    PROTIME 15.2 (H) 09/03/2020    PROTIME 17.5 (H) 08/14/2020         Physical Examination:  /64   Pulse 89   Temp 97.5 °F (36.4 °C) (Temporal)   Resp 20   Ht 185.4 cm (73\")   Wt 82.6 kg (182 lb)   SpO2 92%   BMI 24.01 kg/m²   General Appearance:   Well developed, well nourished, well groomed, alert, and cooperative.  HEENT: Normocephalic.  Normal fundoscopic exam including normal retina, discs are flat with sharp margins, normal vasculature.  Neck and Spine: Normal range of motion.  Normal alignment. No mass or tenderness. No bruits.  Cardiac: Regular rate and rhythm. No murmurs.  Peripheral Vasculature: Radial and pedal pulses are equal and symmetric. No signs of distal embolization.  Extremities:    No edema or deformities. Normal joint ROM. CLYDE hoses and SCD's in place  Skin:    No rashes or birth marks.    Neurological examination:  Higher Integrative  Function: Oriented to time, place and person. Normal registration, recall, attention span and concentration. Normal language including comprehension, spontaneous speech, repetition, reading, writing, naming and vocabulary. No neglect with normal visual-spatial function and " construction. Normal fund of knowledge and higher integrative function.  CN II: Pupils are equal, round, and reactive to light. Normal visual acuity and visual fields.    CN III IV VI: Extraocular movements are full without nystagmus.   CN V: Normal facial sensation and strength of muscles of mastication.  CN VII: Facial movements are symmetric. No weakness.  CN VIII:   Auditory acuity is normal.  CN IX & X:   Symmetric palatal movement.  CN XI: Sternocleidomastoid and trapezius are normal.  No weakness.  CN XII:   The tongue is midline.  No atrophy or fasciculations.  Motor: Normal muscle strength, bulk and tone in the right upper and lower extremities.  No fasciculations, rigidity, spasticity, or abnormal movements.  There is presence of complete weakness of the left upper extremity with presence of movement in the left lower extremity with minimal restriction.  Reflexes: 2+ in the right upper and lower extremities. Plantar responses are flexor.  Sensation: Normal to light touch, pinprick, vibration, temperature, and proprioception in arms and legs. Normal graphesthesia and no extinction on DSS.  Station and Gait: Patient walks with minimal assistance at home..    Coordination: Finger to nose test shows no dysmetria.  Rapid alternating movements are normal.  Heel to shin normal.      Diagnoses / Discussion:  31 y.o. who presents with Sx of breakthrough seizures 1 after another which needed intravenous Versed to break his seizures.  Currently the patient is awake and alert and communicating well.    Plan:  MRI of the brain with and without contrast with seizure protocol has been requested.  EEG to evaluate right frontotemporal ictal focus.    Patient will be on seizure precautions.  The Keppra was increased from 1000 mg twice daily to 1500 mg twice daily.  We will asked the pharmacist to provide his mother with information on the nazalam intranasal Versed spray to break the seizure at home.   Patient will be  observed under the hospitalist care over the next 24 hours.  Patient has been requested not to drive or use any hazardous equipment or engage in any hazardous activity until seizure-free for the next 24 hours.  We will also get physical therapy and Occupational Therapy to evaluate him as well.  I have discussed the above with the patient and family.  He will be followed up by our neurology team.  Time spent with patient: 60min    Coding    Dictated using Dragon dictation.

## 2021-09-15 NOTE — PROGRESS NOTES
Clinical Pharmacy Services: Medication Counseling    I spoke with patient and his mother in his room in the emergency department regarding medication: Nayzilam (intranasal midazolam) for cluster seizures. Dr. Dennis is considering prescribing this for patient and he requested I provide information.    Patient and mother were provided a written medication guide and a step-by-step picture instruction guide for medication administration. Patient/mother given brief verbal medication counseling and provided opportunity to ask questions.     Abbey Garcia, PharmD, [facility]  Clinical Pharmacy Specialist, Emergency Medicine   Phone: [phone]

## 2021-09-15 NOTE — PLAN OF CARE
Goal Outcome Evaluation:  Plan of Care Reviewed With: patient        Progress: no change   Pt admitted to unit today after having multiple tonic-clonic seizures at home and in route to ER.  Pt has a hx of seizure following crani in 09/2020.  Pt reported having clustered focal seizure that last for a few seconds.  Ativan given, other vitals currently stable.  MRI unremarkable, and EEG abnormal.  Will continue to closely monitor patient.

## 2021-09-15 NOTE — ED PROVIDER NOTES
EMERGENCY DEPARTMENT ENCOUNTER    Room Number:  18/18  Date of encounter:  9/15/2021  PCP: Provider, No Known  Historian: Patient, EMS    I used full protective equipment while examining this patient.  This includes face mask, gloves and protective eyewear.  I washed my hands before entering the room and immediately upon leaving the room.  Patient was wearing a surgical mask.      HPI:  Chief Complaint: Seizures  A complete HPI/ROS/PMH/PSH/SH/FH are unobtainable due to: Patient is postictal    Context: Avinash Everett is a 31 y.o. male, with a history of seizures, who presents to the ED by EMS from home after having 2 witnessed seizures.  EMS reports that the patient was still seizing when they arrived on scene.  Seizure was generalized and tonic-clonic.  He then had a third seizure while in the ambulance.  He was given Versed 2.5 mg IV.  He stopped seizing and was then postictal.  Patient takes Keppra and is reportedly compliant.  Has a history of a right-sided craniotomy.  EMS reports that the patient was coughing.  History is limited as the patient is still postictal.      PAST MEDICAL HISTORY  Active Ambulatory Problems     Diagnosis Date Noted   • Right-sided nontraumatic intracerebral hemorrhage (CMS/HCC) 08/03/2020   • Nontraumatic subcortical hemorrhage of right cerebral hemisphere (CMS/HCC) 08/03/2020   • Alcohol abuse 08/10/2020   • Metabolic encephalopathy 08/10/2020   • Vitamin K deficiency 08/10/2020   • Nontraumatic acute cerebral hemorrhage (CMS/HCC) 08/17/2020   • Disruption or dehiscence of closure of skull or craniotomy 09/28/2020   • Wound dehiscence 10/01/2020   • Postoperative visit 10/15/2020     Resolved Ambulatory Problems     Diagnosis Date Noted   • No Resolved Ambulatory Problems     Past Medical History:   Diagnosis Date   • Dehiscence of incision    • Intracerebral hemorrhage (CMS/[facility])    • MRSA (methicillin resistant Staphylococcus aureus)    • Paralysis (CMS/[facility])          PAST SURGICAL  HISTORY  Past Surgical History:   Procedure Laterality Date   • CRANIOTOMY Right 10/1/2020    Procedure: INCISION AND DRAINAGE WOUND, of scalp incision with closure;  Surgeon: Leobardo Rivera MD;  Location: [facility] OR;  Service: Neurosurgery;  Laterality: Right;   • CRANIOTOMY FOR SUBDURAL HEMATOMA Right 8/3/2020    Procedure: RIGHT FRONTOPARIETAL CRANIOTOMY FOR EVACUATION OF INTRACEREBRAL HEMORRHAGE;  Surgeon: Leobardo Rivera MD;  Location: [facility] OR;  Service: Neurosurgery;  Laterality: Right;   • EMBOLIZATION CEREBRAL N/A 8/5/2020    Procedure: diagnostic angiogram possible EMBOLIZATION CEREBRAL. right radial approach;  Surgeon: Alfonso Malcolm MD;  Location: [facility] OR 18/19;  Service: Neurosurgery;  Laterality: N/A;   • WRIST SURGERY      1st one 2006, 2nd 2010         FAMILY HISTORY  Family History   Problem Relation Age of Onset   • Depression Maternal Grandmother    • Depression Maternal Grandfather    • Depression Paternal Grandmother    • Depression Paternal Grandfather    • Malig Hyperthermia Neg Hx          SOCIAL HISTORY  Social History     Socioeconomic History   • Marital status:      Spouse name: Not on file   • Number of children: Not on file   • Years of education: Not on file   • Highest education level: Not on file   Tobacco Use   • Smoking status: Former Smoker     Packs/day: 0.50   • Smokeless tobacco: Never Used   • Tobacco comment: PT HAS NOT SMOKED SINCE 7/2020   Substance and Sexual Activity   • Alcohol use: Not Currently   • Drug use: Never   • Sexual activity: Not Currently         ALLERGIES  Citrus and Promethazine       REVIEW OF SYSTEMS  Review of Systems   Unobtainable      PHYSICAL EXAM    I have reviewed the triage vital signs and nursing notes.    ED Triage Vitals   Temp Heart Rate Resp BP SpO2   09/14/21 2253 09/14/21 2252 -- 09/14/21 2252 09/14/21 2252   97.5 °F (36.4 °C) (!) 126  138/86 98 %      Temp src Heart Rate Source Patient Position BP  Location FiO2 (%)   09/14/21 2252 09/14/21 2252 09/14/21 2252 09/14/21 2252 --   Tympanic Monitor Lying Right arm        Physical Exam  GENERAL: Awake, oriented to self, does not answer questions  HENT: NCAT, nares patent, dry mucous membranes, no tongue injury NECK: supple  EYES: Extraocular muscles intact, no scleral icterus  CV: regular rhythm, tachycardic  RESPIRATORY: normal effort, clear to auscultation bilaterally  ABDOMEN: soft, nontender  MUSCULOSKELETAL: Extremities are nontender and without obvious deformity.    NEURO:  No facial droop.  Follows simple commands.  There is weakness in the left arm and left leg, which is reportedly chronic.  Normal strength in the right arm and right leg.  SKIN: warm, dry, no rash  PSYCH: Flat affect      LAB RESULTS  Recent Results (from the past 24 hour(s))   Comprehensive Metabolic Panel    Collection Time: 09/14/21 11:44 PM    Specimen: Blood   Result Value Ref Range    Glucose 92 65 - 99 mg/dL    BUN 6 6 - 20 mg/dL    Creatinine 0.53 (L) 0.76 - 1.27 mg/dL    Sodium 142 136 - 145 mmol/L    Potassium 4.0 3.5 - 5.2 mmol/L    Chloride 111 (H) 98 - 107 mmol/L    CO2 22.5 22.0 - 29.0 mmol/L    Calcium 8.2 (L) 8.6 - 10.5 mg/dL    Total Protein 6.0 6.0 - 8.5 g/dL    Albumin 3.70 3.50 - 5.20 g/dL    ALT (SGPT) 17 1 - 41 U/L    AST (SGOT) 27 1 - 40 U/L    Alkaline Phosphatase 87 39 - 117 U/L    Total Bilirubin 0.8 0.0 - 1.2 mg/dL    eGFR Non African Amer >150 >60 mL/min/1.73    Globulin 2.3 gm/dL    A/G Ratio 1.6 g/dL    BUN/Creatinine Ratio 11.3 7.0 - 25.0    Anion Gap 8.5 5.0 - 15.0 mmol/L   Magnesium    Collection Time: 09/14/21 11:44 PM    Specimen: Blood   Result Value Ref Range    Magnesium 1.6 1.6 - 2.6 mg/dL   Phosphorus    Collection Time: 09/14/21 11:44 PM    Specimen: Blood   Result Value Ref Range    Phosphorus 2.9 2.5 - 4.5 mg/dL   CBC Auto Differential    Collection Time: 09/14/21 11:44 PM    Specimen: Blood   Result Value Ref Range    WBC 4.53 3.40 - 10.80  10*3/mm3    RBC 3.52 (L) 4.14 - 5.80 10*6/mm3    Hemoglobin 10.5 (L) 13.0 - 17.7 g/dL    Hematocrit 31.2 (L) 37.5 - 51.0 %    MCV 88.6 79.0 - 97.0 fL    MCH 29.8 26.6 - 33.0 pg    MCHC 33.7 31.5 - 35.7 g/dL    RDW 12.8 12.3 - 15.4 %    RDW-SD 40.5 37.0 - 54.0 fl    MPV 9.9 6.0 - 12.0 fL    Platelets 101 (L) 140 - 450 10*3/mm3    Neutrophil % 80.5 (H) 42.7 - 76.0 %    Lymphocyte % 11.7 (L) 19.6 - 45.3 %    Monocyte % 5.5 5.0 - 12.0 %    Eosinophil % 1.5 0.3 - 6.2 %    Basophil % 0.4 0.0 - 1.5 %    Immature Grans % 0.4 0.0 - 0.5 %    Neutrophils, Absolute 3.64 1.70 - 7.00 10*3/mm3    Lymphocytes, Absolute 0.53 (L) 0.70 - 3.10 10*3/mm3    Monocytes, Absolute 0.25 0.10 - 0.90 10*3/mm3    Eosinophils, Absolute 0.07 0.00 - 0.40 10*3/mm3    Basophils, Absolute 0.02 0.00 - 0.20 10*3/mm3    Immature Grans, Absolute 0.02 0.00 - 0.05 10*3/mm3    nRBC 0.0 0.0 - 0.2 /100 WBC       Ordered the above labs and independently reviewed the results.      RADIOLOGY  CT Head Without Contrast    Result Date: 9/15/2021  Patient: HARSHAD SHOOK  Time Out: 00:08 Exam(s): CT HEAD Without Contrast EXAM:   CT Head Without Intravenous Contrast CLINICAL HISTORY:    Reason for exam: Recurrent seizures. TECHNIQUE:   Axial computed tomography images of the head brain without intravenous contrast.  CTDI is 54.8 mGy and DLP is 1089.5 mGy-cm.  This CT exam was performed according to the principle of ALARA (As Low As Reasonably Achievable) by using one or more of the following dose reduction techniques: automated exposure control, adjustment of the mA and or kV according to patient size, and or use of iterative reconstruction technique. COMPARISON:   CT head 8 2 21 FINDINGS:   Brain:  Stable encephalomalacia in the right frontal and parietal lobes subjacent to the craniotomy.  No acute intracranial hemorrhage, mass- effect, or edema.  Gray-white matter differentiation maintained.   Ventricles:  Unremarkable.  No hydrocephalus.   Bones joints:  Old  right-sided craniotomy.  No skull fracture.   Soft tissues:  Unremarkable.   Sinuses:  Unremarkable as visualized.  No acute sinusitis.   Mastoid air cells:  Unremarkable as visualized.  No mastoid effusion. IMPRESSION:       No acute intracranial process.     Electronically signed by Anisha Armendariz M.D. on 09-15-21 at 0008      I ordered the above noted radiological studies. Reviewed by me and discussed with radiologist.  See dictation for official radiology interpretation.      PROCEDURES  Procedures      MEDICATIONS GIVEN IN ER    Medications   sodium chloride 0.9 % flush 10 mL (has no administration in time range)   sodium chloride 0.9 % flush 10 mL (10 mL Intravenous Given 9/15/21 0103)   sodium chloride 0.9 % flush 10 mL (has no administration in time range)   nitroglycerin (NITROSTAT) SL tablet 0.4 mg (has no administration in time range)   acetaminophen (TYLENOL) tablet 650 mg (has no administration in time range)   ondansetron (ZOFRAN) tablet 4 mg (has no administration in time range)     Or   ondansetron (ZOFRAN) injection 4 mg (has no administration in time range)   aluminum-magnesium hydroxide-simethicone (MAALOX MAX) 400-400-40 MG/5ML suspension 15 mL (has no administration in time range)   levETIRAcetam in NaCl 0.82% (KEPPRA) IVPB 500 mg (has no administration in time range)   levETIRAcetam in NaCl 0.75% (KEPPRA) IVPB 1,000 mg (0 mg Intravenous Stopped 9/14/21 2343)         PROGRESS, DATA ANALYSIS, CONSULTS, AND MEDICAL DECISION MAKING    All labs have been independently reviewed by me.  All radiology studies have been reviewed by me and discussed with radiologist dictating the report.   EKG's independently viewed and interpreted by me.  I have reviewed the nurse's notes, vital signs, past medical history, and medication list.  Discussion below represents my analysis of pertinent findings related to patient's condition, differential diagnosis, treatment plan and final disposition.      ED Course as of  Sep 15 0109   Tue Sep 14, 2021   2303 Old records reviewed.  Patient was seen here in the ED last month after having a seizure.  Head CT was negative acute.  He was admitted here in August 2020 for nontraumatic subcortical hemorrhage of the right cerebral hemisphere.  Underwent emergent right frontoparietal craniotomy at that time.    [WH]   2327 Patient's mother is now bedside.  She states the patient had a seizure around 9 PM.  This lasted for approximately 5 minutes.  He was not confused following this.  Several minutes later, he had a 2nd seizure which lasted for at least 30 minutes.  Mom states she had mild generalized shaking and was unresponsive.  His last seizure was in early August.  He is compliant with Keppra.  He has never had multiple seizures over a short period of time before.  Seizures usually last a few minutes.  Mom states he has not had any recent illness.  He does have a chronic cough as she is a smoker.  She confirmed that the patient does have chronic left-sided weakness.    [WH]   Wed Sep 15, 2021   0022 Head CT interpreted by the radiologist.  Images independently viewed by me.  Head CT is negative acute.  There is stable encephalomalacia in the right frontal and parietal lobes.    [WH]   0041 Test results discussed with the patient and his mom.  Mom states that the patient is now back to his normal baseline.  He is able to answer questions.  Currently complains of a headache.  I also discussed with them my recommendation for admission and they were agreeable.    [WH]   0053 Case discussed with Lucien NDIAYE, and she agrees to admit the patient to Dr. Tracy.  Pertinent exam findings, test results, ED course, and diagnoses were discussed with her.  Patient will be admitted to a monitored bed.    [WH]   0106 Patient presented to the ED after having 3 generalized seizures over the course of about 1 hour.  He was postictal upon arrival to the ED.  He was given IV Keppra.  Head CT was stable.   Labs were unremarkable.  He gradually returned to his baseline.  Given his recurrent seizures and prolonged postictal phase, patient will be admitted to the hospitalist.    [WH]      ED Course User Index  [WH] Alfonso Collier MD       AS OF 01:09 EDT VITALS:    BP - 122/78  HR - 99  TEMP - 97.5 °F (36.4 °C) (Temporal)  O2 SATS - 96%      DIAGNOSIS  Final diagnoses:   Recurrent seizures (CMS/HCC)         DISPOSITION  Admission    ADMISSION    Discussed treatment plan and reason for admission with pt/family and admitting physician.  Pt/family voiced understanding of the plan for admission for further testing/treatment as needed.         Dictated utilizing Dragon dictation:  Much of this encounter note is an electronic transcription/translation of spoken language to printed text. The electronic translation of spoken language may permit erroneous, or at times, nonsensical words or phrases to be inadvertently transcribed; Although I have reviewed the note for such errors, some may still exist.     Alfonso Collier MD  09/15/21 0109

## 2021-09-15 NOTE — H&P
HISTORY AND PHYSICAL   [facility]        Patient Identification:  Name: Avinash Everett  Age: 31 y.o.  Sex: male  :  1990  MRN: [id number]                     Primary Care Physician: Provider, No Known    Chief Complaint: Seizures    History of Present Illness:        The patient is a 31 y.o. male, with a history of seizures, who presents to the ED by EMS from home after having 2 witnessed seizures.  EMS reports that the patient was still seizing when they arrived on scene.  Seizure was generalized and tonic-clonic.  He then had a third seizure while in the ambulance.  He was given Versed 2.5 mg IV.  He stopped seizing and was then postictal.  Patient takes Keppra and is reportedly compliant.  Has a history of a right-sided craniotomy.  EMS reports that the patient was coughing.  History is limited as the patient is still postictal.  The patient was admitted to the hospital for further evaluation treatment of recurrent seizures      Past Medical History:  Past Medical History:   Diagnosis Date   • Dehiscence of incision    • Intracerebral hemorrhage (CMS/HCC)    • Metabolic encephalopathy    • MRSA (methicillin resistant Staphylococcus aureus)     RIGHT INCISION   • Paralysis (CMS/HCC)     LEFT SIDE     Past Surgical History:  Past Surgical History:   Procedure Laterality Date   • CRANIOTOMY Right 10/1/2020    Procedure: INCISION AND DRAINAGE WOUND, of scalp incision with closure;  Surgeon: Leobardo Rivera MD;  Location: [facility];  Service: Neurosurgery;  Laterality: Right;   • CRANIOTOMY FOR SUBDURAL HEMATOMA Right 8/3/2020    Procedure: RIGHT FRONTOPARIETAL CRANIOTOMY FOR EVACUATION OF INTRACEREBRAL HEMORRHAGE;  Surgeon: Leobardo Rivera MD;  Location: [facility];  Service: Neurosurgery;  Laterality: Right;   • EMBOLIZATION CEREBRAL N/A 2020    Procedure: diagnostic angiogram possible EMBOLIZATION CEREBRAL. right radial approach;  Surgeon: Alfonso Malcolm MD;  Location:  [facility] OR 18/19;  Service: Neurosurgery;  Laterality: N/A;   • WRIST SURGERY      1st one 2006, 2nd 2010      Home Meds:  (Not in a hospital admission)    Current meds    Current Facility-Administered Medications:   •  acetaminophen (TYLENOL) tablet 650 mg, 650 mg, Oral, Q4H PRN, Lucien Jackson APRN, 650 mg at 09/15/21 0815  •  aluminum-magnesium hydroxide-simethicone (MAALOX MAX) 400-400-40 MG/5ML suspension 15 mL, 15 mL, Oral, Q6H PRN, Lucien Jackson APRN  •  levETIRAcetam (KEPPRA) tablet 1,500 mg, 1,500 mg, Oral, BID, Ascencion Dennis MD, 1,500 mg at 09/15/21 1051  •  nitroglycerin (NITROSTAT) SL tablet 0.4 mg, 0.4 mg, Sublingual, Q5 Min PRN, Lucien Jackson APRN  •  ondansetron (ZOFRAN) tablet 4 mg, 4 mg, Oral, Q6H PRN **OR** ondansetron (ZOFRAN) injection 4 mg, 4 mg, Intravenous, Q6H PRN, Lucien Jackson APRN  •  [COMPLETED] Insert peripheral IV, , , Once **AND** sodium chloride 0.9 % flush 10 mL, 10 mL, Intravenous, PRN, Alfonso Collier MD  •  sodium chloride 0.9 % flush 10 mL, 10 mL, Intravenous, Q12H, Lucien Jackson APRN, 10 mL at 09/15/21 0103  •  sodium chloride 0.9 % flush 10 mL, 10 mL, Intravenous, PRN, Lucien Jackson APRN    Current Outpatient Medications:   •  acetaminophen (TYLENOL) 325 MG tablet, Take 2 tablets by mouth Every 6 (Six) Hours As Needed for Mild Pain ., Disp:  , Rfl:   •  cetaphil (CETAPHIL) lotion, Apply  topically to the appropriate area as directed Every 12 (Twelve) Hours., Disp: 177 mL, Rfl: 2  •  clobetasol propionate (CLOBEX) 0.05 % shampoo, Apply  topically to the appropriate area as directed 3 (Three) Times a Week., Disp: 118 mL, Rfl: 2  •  desonide (DESOWEN) 0.05 % ointment, Apply  topically to the appropriate area as directed Every 12 (Twelve) Hours., Disp: 60 g, Rfl: 1  •  famotidine (PEPCID) 20 MG tablet, Take 1 tablet by mouth 2 (Two) Times a Day Before Meals., Disp: 180 tablet, Rfl: 0  •  folic acid (FOLVITE) 1 MG tablet,  Take 1 tablet by mouth Daily., Disp: 90 tablet, Rfl: 0  •  gabapentin (NEURONTIN) 300 MG capsule, Take 1 capsule by mouth Every Night., Disp: 90 capsule, Rfl: 0  •  HYDROcodone-acetaminophen (NORCO) 5-325 MG per tablet, Take 1 tablet by mouth Every 8 (Eight) Hours As Needed for Moderate Pain  or Severe Pain., Disp: 21 tablet, Rfl: 0  •  levETIRAcetam (KEPPRA) 1000 MG tablet, Take 1 tablet by mouth Every 12 (Twelve) Hours., Disp: 60 tablet, Rfl: 0  •  mirtazapine (REMERON) 15 MG tablet, Take 1 tablet by mouth Every Night., Disp: 90 tablet, Rfl: 0  •  potassium chloride (MICRO-K) 10 MEQ CR capsule, Take 1 capsule by mouth 2 (Two) Times a Day With Meals., Disp: 60 capsule, Rfl: 1  •  thiamine (VITAMIN B1) 100 MG tablet, Take 1 tablet by mouth Daily., Disp: 90 tablet, Rfl: 0  Allergies:  Allergies   Allergen Reactions   • Citrus Hives     Hives in mouth     • Promethazine Hives     Immunizations:    There is no immunization history on file for this patient.  Social History:   Social History     Social History Narrative   • Not on file     Social History     Socioeconomic History   • Marital status:      Spouse name: Not on file   • Number of children: Not on file   • Years of education: Not on file   • Highest education level: Not on file   Tobacco Use   • Smoking status: Former Smoker     Packs/day: 0.50   • Smokeless tobacco: Never Used   • Tobacco comment: PT HAS NOT SMOKED SINCE 7/2020   Substance and Sexual Activity   • Alcohol use: Not Currently   • Drug use: Never   • Sexual activity: Not Currently       Family History:  Family History   Problem Relation Age of Onset   • Depression Maternal Grandmother    • Depression Maternal Grandfather    • Depression Paternal Grandmother    • Depression Paternal Grandfather    • Malig Hyperthermia Neg Hx         Review of Systems  See history of present illness and past medical history.  Patient denies headache, dizziness,  falls, trauma, change in vision, change in  "hearing, change in taste, changes in weight, changes in appetite, focal weakness, numbness, or paresthesia.  Patient denies chest pain, palpitations, dyspnea, orthopnea, PND, cough, sinus pressure, rhinorrhea, epistaxis, hemoptysis, nausea, vomiting, hematemesis, diarrhea, constipation or hematchezia.  Denies cold or heat intolerance, polydipsia, polyuria, polyphagia. Denies hematuria, pyuria, dysuria, hesitancy, frequency or urgency.   Denies fever, chills, sweats, night sweats.  Denies missing any routine medications. Remainder of ROS is negative.    Objective:  tMax 24 hrs: Temp (24hrs), Av.5 °F (36.4 °C), Min:97.5 °F (36.4 °C), Max:97.5 °F (36.4 °C)    Vitals Ranges:   Temp:  [97.5 °F (36.4 °C)] 97.5 °F (36.4 °C)  Heart Rate:  [] 74  Resp:  [20] 20  BP: (108-138)/(64-86) 110/67      Exam:  /67   Pulse 74   Temp 97.5 °F (36.4 °C) (Temporal)   Resp 20   Ht 185.4 cm (73\")   Wt 82.6 kg (182 lb)   SpO2 95%   BMI 24.01 kg/m²     General Appearance:    Alert, cooperative, no distress, appears stated age   Head:    Normocephalic, without obvious abnormality, atraumatic   Eyes:    PERRL, conjunctivae/corneas clear, EOM's intact, both eyes   Ears:    Normal external ear canals, both ears   Nose:   Nares normal, septum midline, mucosa normal, no drainage    or sinus tenderness   Throat:   Lips, mucosa, and tongue normal   Neck:   Supple, symmetrical, trachea midline, no adenopathy;     thyroid:  no enlargement/tenderness/nodules; no carotid    bruit or JVD   Back:     Symmetric, no curvature, ROM normal, no CVA tenderness   Lungs:     Clear to auscultation bilaterally, respirations unlabored   Chest Wall:    No tenderness or deformity    Heart:    Regular rate and rhythm, S1 and S2 normal, no murmur, rub   or gallop   Abdomen:     Soft, nontender, bowel sounds active all four quadrants,     no masses, no hepatomegaly, no splenomegaly   Extremities:   Extremities normal, atraumatic, no cyanosis or " edema   Pulses:   2+ and symmetric all extremities   Skin:   Skin color, texture, turgor normal, no rashes or lesions   Lymph nodes:   Cervical, supraclavicular, and axillary nodes normal   Neurologic:  Left hemiparesis      .    Data Review:  Lab Results (last 72 hours)     Procedure Component Value Units Date/Time    COVID PRE-OP / PRE-PROCEDURE SCREENING ORDER (NO ISOLATION) - Swab, Nasopharynx [[id number]]  (Normal) Collected: 09/15/21 0106    Specimen: Swab from Nasopharynx Updated: 09/15/21 1242    Narrative:      The following orders were created for panel order COVID PRE-OP / PRE-PROCEDURE SCREENING ORDER (NO ISOLATION) - Swab, Nasopharynx.  Procedure                               Abnormality         Status                     ---------                               -----------         ------                     COVID-19,APTIMA PANTHER(...[[id number]]  Normal              Final result                 Please view results for these tests on the individual orders.    COVID-19,APTIMA PANTHER(AUDRA), PAULINA, NP/OP SWAB IN UTM/VTM/SALINE TRANSPORT MEDIA,24 HR TAT - Swab, Nasopharynx [[id number]]  (Normal) Collected: 09/15/21 0106    Specimen: Swab from Nasopharynx Updated: 09/15/21 1242     COVID19 Not Detected    Narrative:      Fact sheet for providers: https://www.fda.gov/media/987557/download     Fact sheet for patients: https://www.fda.gov/media/686947/download    Test performed by RT PCR.    CBC (No Diff) [[id number]]  (Abnormal) Collected: 09/15/21 0615    Specimen: Blood Updated: 09/15/21 0658     WBC 3.90 10*3/mm3      RBC 3.49 10*6/mm3      Hemoglobin 10.5 g/dL      Hematocrit 30.8 %      MCV 88.3 fL      MCH 30.1 pg      MCHC 34.1 g/dL      RDW 12.9 %      RDW-SD 41.7 fl      MPV 9.8 fL      Platelets 96 10*3/mm3     Basic Metabolic Panel [[id number]]  (Abnormal) Collected: 09/15/21 0615    Specimen: Blood Updated: 09/15/21 0655     Glucose 101 mg/dL      BUN 5 mg/dL      Creatinine 0.43 mg/dL      Sodium 140  mmol/L      Potassium 3.8 mmol/L      Chloride 110 mmol/L      CO2 21.9 mmol/L      Calcium 8.3 mg/dL      eGFR Non African Amer >150 mL/min/1.73      BUN/Creatinine Ratio 11.6     Anion Gap 8.1 mmol/L     Narrative:      GFR Normal >60  Chronic Kidney Disease <60  Kidney Failure <15      CK [[id number]]  (Normal) Collected: 09/15/21 0615    Specimen: Blood Updated: 09/15/21 0655     Creatine Kinase 81 U/L     Comprehensive Metabolic Panel [[id number]]  (Abnormal) Collected: 09/14/21 2344    Specimen: Blood Updated: 09/15/21 0017     Glucose 92 mg/dL      BUN 6 mg/dL      Creatinine 0.53 mg/dL      Sodium 142 mmol/L      Potassium 4.0 mmol/L      Comment: Slight hemolysis detected by analyzer. Results may be affected.        Chloride 111 mmol/L      CO2 22.5 mmol/L      Calcium 8.2 mg/dL      Total Protein 6.0 g/dL      Albumin 3.70 g/dL      ALT (SGPT) 17 U/L      AST (SGOT) 27 U/L      Alkaline Phosphatase 87 U/L      Total Bilirubin 0.8 mg/dL      eGFR Non African Amer >150 mL/min/1.73      Globulin 2.3 gm/dL      A/G Ratio 1.6 g/dL      BUN/Creatinine Ratio 11.3     Anion Gap 8.5 mmol/L     Narrative:      GFR Normal >60  Chronic Kidney Disease <60  Kidney Failure <15      Phosphorus [[id number]]  (Normal) Collected: 09/14/21 2344    Specimen: Blood Updated: 09/15/21 0017     Phosphorus 2.9 mg/dL     Magnesium [[id number]]  (Normal) Collected: 09/14/21 2344    Specimen: Blood Updated: 09/15/21 0017     Magnesium 1.6 mg/dL     CBC & Differential [[id number]]  (Abnormal) Collected: 09/14/21 2344    Specimen: Blood Updated: 09/14/21 2355    Narrative:      The following orders were created for panel order CBC & Differential.  Procedure                               Abnormality         Status                     ---------                               -----------         ------                     CBC Auto Differential[[id number]]        Abnormal            Final result                 Please view results for these tests  on the individual orders.    CBC Auto Differential [[id number]]  (Abnormal) Collected: 09/14/21 2344    Specimen: Blood Updated: 09/14/21 2355     WBC 4.53 10*3/mm3      RBC 3.52 10*6/mm3      Hemoglobin 10.5 g/dL      Hematocrit 31.2 %      MCV 88.6 fL      MCH 29.8 pg      MCHC 33.7 g/dL      RDW 12.8 %      RDW-SD 40.5 fl      MPV 9.9 fL      Platelets 101 10*3/mm3      Neutrophil % 80.5 %      Lymphocyte % 11.7 %      Monocyte % 5.5 %      Eosinophil % 1.5 %      Basophil % 0.4 %      Immature Grans % 0.4 %      Neutrophils, Absolute 3.64 10*3/mm3      Lymphocytes, Absolute 0.53 10*3/mm3      Monocytes, Absolute 0.25 10*3/mm3      Eosinophils, Absolute 0.07 10*3/mm3      Basophils, Absolute 0.02 10*3/mm3      Immature Grans, Absolute 0.02 10*3/mm3      nRBC 0.0 /100 WBC                    Imaging Results (All)     Procedure Component Value Units Date/Time    MRI Brain With & Without Contrast [[id number]] Resulted: 09/15/21 1336     Updated: 09/15/21 1427    CT Head Without Contrast [[id number]] Collected: 09/15/21 0009     Updated: 09/15/21 0009    Narrative:        Patient: HARSHAD SHOOK  Time Out: 00:08  Exam(s): CT HEAD Without Contrast     EXAM:    CT Head Without Intravenous Contrast    CLINICAL HISTORY:     Reason for exam: Recurrent seizures.    TECHNIQUE:    Axial computed tomography images of the head brain without intravenous   contrast.  CTDI is 54.8 mGy and DLP is 1089.5 mGy-cm.  This CT exam was   performed according to the principle of ALARA (As Low As Reasonably   Achievable) by using one or more of the following dose reduction   techniques: automated exposure control, adjustment of the mA and or kV   according to patient size, and or use of iterative reconstruction   technique.    COMPARISON:    CT head 8 2 21    FINDINGS:    Brain:  Stable encephalomalacia in the right frontal and parietal lobes   subjacent to the craniotomy.  No acute intracranial hemorrhage, mass-  effect, or edema.  Gray-white  matter differentiation maintained.    Ventricles:  Unremarkable.  No hydrocephalus.    Bones joints:  Old right-sided craniotomy.  No skull fracture.    Soft tissues:  Unremarkable.    Sinuses:  Unremarkable as visualized.  No acute sinusitis.    Mastoid air cells:  Unremarkable as visualized.  No mastoid effusion.    IMPRESSION:         No acute intracranial process.      Impression:          Electronically signed by Anisha Armendariz M.D. on 09-15-21 at 0008        Past Medical History:   Diagnosis Date   • Dehiscence of incision    • Intracerebral hemorrhage (CMS/HCC)    • Metabolic encephalopathy    • MRSA (methicillin resistant Staphylococcus aureus)     RIGHT INCISION   • Paralysis (CMS/HCC)     LEFT SIDE       Assessment:  Active Hospital Problems    Diagnosis  POA   • **Recurrent seizures (CMS/HCC) [G40.909]  Yes   • Right-sided nontraumatic intracerebral hemorrhage (CMS/HCC) [I61.9]  Yes      Resolved Hospital Problems   No resolved problems to display.       Plan:  The patient admitted to the hospital with neurology consultation medication for recurrent seizures.    Michael Cervantes MD  9/15/2021  15:08 EDT

## 2021-09-15 NOTE — ED NOTES
As patient was being changed into street clothes to hospital gown patient became unresponsive with L sided gaze for roughly 15 seconds. Made call out to admitting MD to notify.      Denita Ryan RN  09/15/21 8705

## 2021-09-16 VITALS
OXYGEN SATURATION: 95 % | SYSTOLIC BLOOD PRESSURE: 105 MMHG | BODY MASS INDEX: 24.13 KG/M2 | TEMPERATURE: 98.2 F | HEIGHT: 73 IN | RESPIRATION RATE: 18 BRPM | DIASTOLIC BLOOD PRESSURE: 62 MMHG | WEIGHT: 182.1 LBS | HEART RATE: 80 BPM

## 2021-09-16 LAB
ANION GAP SERPL CALCULATED.3IONS-SCNC: 10.3 MMOL/L (ref 5–15)
BASOPHILS # BLD AUTO: 0.02 10*3/MM3 (ref 0–0.2)
BASOPHILS NFR BLD AUTO: 0.7 % (ref 0–1.5)
BUN SERPL-MCNC: 6 MG/DL (ref 6–20)
BUN/CREAT SERPL: 11.3 (ref 7–25)
CALCIUM SPEC-SCNC: 8.8 MG/DL (ref 8.6–10.5)
CHLORIDE SERPL-SCNC: 111 MMOL/L (ref 98–107)
CO2 SERPL-SCNC: 21.7 MMOL/L (ref 22–29)
CREAT SERPL-MCNC: 0.53 MG/DL (ref 0.76–1.27)
DEPRECATED RDW RBC AUTO: 40 FL (ref 37–54)
EOSINOPHIL # BLD AUTO: 0.1 10*3/MM3 (ref 0–0.4)
EOSINOPHIL NFR BLD AUTO: 3.7 % (ref 0.3–6.2)
ERYTHROCYTE [DISTWIDTH] IN BLOOD BY AUTOMATED COUNT: 12.7 % (ref 12.3–15.4)
GFR SERPL CREATININE-BSD FRML MDRD: >150 ML/MIN/1.73
GLUCOSE SERPL-MCNC: 79 MG/DL (ref 65–99)
HCT VFR BLD AUTO: 32.3 % (ref 37.5–51)
HGB BLD-MCNC: 11.1 G/DL (ref 13–17.7)
IMM GRANULOCYTES # BLD AUTO: 0.01 10*3/MM3 (ref 0–0.05)
IMM GRANULOCYTES NFR BLD AUTO: 0.4 % (ref 0–0.5)
LYMPHOCYTES # BLD AUTO: 0.73 10*3/MM3 (ref 0.7–3.1)
LYMPHOCYTES NFR BLD AUTO: 26.8 % (ref 19.6–45.3)
MCH RBC QN AUTO: 30.2 PG (ref 26.6–33)
MCHC RBC AUTO-ENTMCNC: 34.4 G/DL (ref 31.5–35.7)
MCV RBC AUTO: 87.8 FL (ref 79–97)
MONOCYTES # BLD AUTO: 0.21 10*3/MM3 (ref 0.1–0.9)
MONOCYTES NFR BLD AUTO: 7.7 % (ref 5–12)
NEUTROPHILS NFR BLD AUTO: 1.65 10*3/MM3 (ref 1.7–7)
NEUTROPHILS NFR BLD AUTO: 60.7 % (ref 42.7–76)
NRBC BLD AUTO-RTO: 0 /100 WBC (ref 0–0.2)
PLATELET # BLD AUTO: 90 10*3/MM3 (ref 140–450)
PMV BLD AUTO: 10 FL (ref 6–12)
POTASSIUM SERPL-SCNC: 3.7 MMOL/L (ref 3.5–5.2)
RBC # BLD AUTO: 3.68 10*6/MM3 (ref 4.14–5.8)
SODIUM SERPL-SCNC: 143 MMOL/L (ref 136–145)
WBC # BLD AUTO: 2.72 10*3/MM3 (ref 3.4–10.8)

## 2021-09-16 PROCEDURE — 99232 SBSQ HOSP IP/OBS MODERATE 35: CPT | Performed by: NURSE PRACTITIONER

## 2021-09-16 PROCEDURE — 97116 GAIT TRAINING THERAPY: CPT

## 2021-09-16 PROCEDURE — 97110 THERAPEUTIC EXERCISES: CPT

## 2021-09-16 PROCEDURE — 97162 PT EVAL MOD COMPLEX 30 MIN: CPT

## 2021-09-16 PROCEDURE — 80048 BASIC METABOLIC PNL TOTAL CA: CPT | Performed by: HOSPITALIST

## 2021-09-16 PROCEDURE — 85025 COMPLETE CBC W/AUTO DIFF WBC: CPT | Performed by: HOSPITALIST

## 2021-09-16 PROCEDURE — 97165 OT EVAL LOW COMPLEX 30 MIN: CPT

## 2021-09-16 RX ORDER — LEVETIRACETAM 750 MG/1
1500 TABLET ORAL 2 TIMES DAILY
Qty: 120 TABLET | Refills: 0 | Status: SHIPPED | OUTPATIENT
Start: 2021-09-16 | End: 2021-10-13 | Stop reason: SDUPTHER

## 2021-09-16 RX ADMIN — SODIUM CHLORIDE, PRESERVATIVE FREE 10 ML: 5 INJECTION INTRAVENOUS at 08:20

## 2021-09-16 RX ADMIN — LEVETIRACETAM 1500 MG: 500 TABLET, FILM COATED ORAL at 08:18

## 2021-09-16 RX ADMIN — POTASSIUM CHLORIDE 10 MEQ: 750 TABLET, EXTENDED RELEASE ORAL at 08:20

## 2021-09-16 RX ADMIN — FOLIC ACID 1 MG: 1 TABLET ORAL at 08:18

## 2021-09-16 RX ADMIN — Medication 100 MG: at 08:18

## 2021-09-16 RX ADMIN — FAMOTIDINE 20 MG: 20 TABLET, FILM COATED ORAL at 06:52

## 2021-09-16 NOTE — PLAN OF CARE
Goal Outcome Evaluation:  Plan of Care Reviewed With: patient           Outcome Summary: Pt is a 31 y.o male admitted to [facility] with seizure activity. He is well known to OT here at [facility], discharged from [facility] following AVM surgery with R crani 09/2020. OT consulted for UE bracing/splinting needs for LUE weakness and tone. Pt is noted to have increased strength LUE as well as tone since dc from [facility]. He initally was flaccid and now with minimal AROM throughout and increased tone. L shoulder subluxation. He wears a anny sling during mobility. He would benefit from resting hand splint and OT provided today and went over ROM exercises for LUE to decrease tone. He is interested in OP OT again as he is showing new progress since last OT discharge, and would benefit from OT for neruomuscular reeducation, ther ex, ther activites, and modalites to increase LUE function for ADLs. Will f/u pt while an inpatient to assess splinting as needed. Plans possible home soon.    Appropriate PPE worn during encounter including gloves, mask, and eye protection. Hand hygiene completed. Pt wore a mask.

## 2021-09-16 NOTE — PROGRESS NOTES
"DAILY PROGRESS NOTE  [facility]    Patient Identification:  Name: Avinash Everett  Age: 31 y.o.  Sex: male  :  1990  MRN: [id number]         Primary Care Physician: Provider, No Known    Subjective:  Interval History:He feels better.  The patient wants to leave today.    Objective:    Scheduled Meds:famotidine, 20 mg, Oral, BID AC  folic acid, 1 mg, Oral, Daily  gabapentin, 300 mg, Oral, Nightly  levETIRAcetam, 1,500 mg, Oral, BID  mirtazapine, 15 mg, Oral, Nightly  potassium chloride, 10 mEq, Oral, BID With Meals  sodium chloride, 10 mL, Intravenous, Q12H  thiamine, 100 mg, Oral, Daily      Continuous Infusions:     Vital signs in last 24 hours:  Temp:  [98.2 °F (36.8 °C)-98.6 °F (37 °C)] 98.2 °F (36.8 °C)  Heart Rate:  [80-87] 80  Resp:  [18-22] 18  BP: (104-126)/(62-81) 105/62    Intake/Output:    Intake/Output Summary (Last 24 hours) at 2021 1445  Last data filed at 2021 0722  Gross per 24 hour   Intake --   Output 500 ml   Net -500 ml       Exam:  /62 (BP Location: Right arm, Patient Position: Lying)   Pulse 80   Temp 98.2 °F (36.8 °C) (Oral)   Resp 18   Ht 185.4 cm (73\")   Wt 82.6 kg (182 lb 1.6 oz)   SpO2 95%   BMI 24.03 kg/m²     General Appearance:    Alert, cooperative, no distress   Head:    Normocephalic, without obvious abnormality, atraumatic   Eyes:       Throat:   Lips, tongue, gums normal   Neck:   Supple, symmetrical, trachea midline, no JVD   Lungs:     Clear to auscultation bilaterally, respirations unlabored   Chest Wall:    No tenderness or deformity    Heart:    Regular rate and rhythm, S1 and S2 normal, no murmur,no  Rub or gallop   Abdomen:     Soft, nontender, bowel sounds active, no masses, no organomegaly    Extremities:   Extremities normal, atraumatic, no cyanosis or edema   Pulses:      Skin:   Skin is warm and dry,  no rashes or palpable lesions   Neurologic:   no focal deficits noted      Lab Results (last 72 hours)     Procedure " Component Value Units Date/Time    Basic Metabolic Panel [[id number]]  (Abnormal) Collected: 09/16/21 0611    Specimen: Blood Updated: 09/16/21 0715     Glucose 79 mg/dL      BUN 6 mg/dL      Creatinine 0.53 mg/dL      Sodium 143 mmol/L      Potassium 3.7 mmol/L      Chloride 111 mmol/L      CO2 21.7 mmol/L      Calcium 8.8 mg/dL      eGFR Non African Amer >150 mL/min/1.73      BUN/Creatinine Ratio 11.3     Anion Gap 10.3 mmol/L     Narrative:      GFR Normal >60  Chronic Kidney Disease <60  Kidney Failure <15      CBC & Differential [[id number]]  (Abnormal) Collected: 09/16/21 0611    Specimen: Blood Updated: 09/16/21 0658    Narrative:      The following orders were created for panel order CBC & Differential.  Procedure                               Abnormality         Status                     ---------                               -----------         ------                     CBC Auto Differential[[id number]]        Abnormal            Final result                 Please view results for these tests on the individual orders.    CBC Auto Differential [[id number]]  (Abnormal) Collected: 09/16/21 0611    Specimen: Blood Updated: 09/16/21 0657     WBC 2.72 10*3/mm3      RBC 3.68 10*6/mm3      Hemoglobin 11.1 g/dL      Hematocrit 32.3 %      MCV 87.8 fL      MCH 30.2 pg      MCHC 34.4 g/dL      RDW 12.7 %      RDW-SD 40.0 fl      MPV 10.0 fL      Platelets 90 10*3/mm3      Neutrophil % 60.7 %      Lymphocyte % 26.8 %      Monocyte % 7.7 %      Eosinophil % 3.7 %      Basophil % 0.7 %      Immature Grans % 0.4 %      Neutrophils, Absolute 1.65 10*3/mm3      Lymphocytes, Absolute 0.73 10*3/mm3      Monocytes, Absolute 0.21 10*3/mm3      Eosinophils, Absolute 0.10 10*3/mm3      Basophils, Absolute 0.02 10*3/mm3      Immature Grans, Absolute 0.01 10*3/mm3      nRBC 0.0 /100 WBC     COVID PRE-OP / PRE-PROCEDURE SCREENING ORDER (NO ISOLATION) - Swab, Nasopharynx [[id number]]  (Normal) Collected: 09/15/21 0106    Specimen:  Swab from Nasopharynx Updated: 09/15/21 1242    Narrative:      The following orders were created for panel order COVID PRE-OP / PRE-PROCEDURE SCREENING ORDER (NO ISOLATION) - Swab, Nasopharynx.  Procedure                               Abnormality         Status                     ---------                               -----------         ------                     COVID-19,APTIMA PANTHER(...[[id number]]  Normal              Final result                 Please view results for these tests on the individual orders.    COVID-19,APTIMA PANTHER(AUDRA),[facility], NP/OP SWAB IN UTM/VTM/[facility] TRANSPORT MEDIA,24 HR TAT - Swab, Nasopharynx [[id number]]  (Normal) Collected: 09/15/21 0106    Specimen: Swab from Nasopharynx Updated: 09/15/21 1242     COVID19 Not Detected    Narrative:      Fact sheet for providers: https://www.fda.gov/media/024885/download     Fact sheet for patients: https://www.fda.gov/media/723464/download    Test performed by RT PCR.    CBC (No Diff) [[id number]]  (Abnormal) Collected: 09/15/21 0615    Specimen: Blood Updated: 09/15/21 0658     WBC 3.90 10*3/mm3      RBC 3.49 10*6/mm3      Hemoglobin 10.5 g/dL      Hematocrit 30.8 %      MCV 88.3 fL      MCH 30.1 pg      MCHC 34.1 g/dL      RDW 12.9 %      RDW-SD 41.7 fl      MPV 9.8 fL      Platelets 96 10*3/mm3     Basic Metabolic Panel [[id number]]  (Abnormal) Collected: 09/15/21 0615    Specimen: Blood Updated: 09/15/21 0655     Glucose 101 mg/dL      BUN 5 mg/dL      Creatinine 0.43 mg/dL      Sodium 140 mmol/L      Potassium 3.8 mmol/L      Chloride 110 mmol/L      CO2 21.9 mmol/L      Calcium 8.3 mg/dL      eGFR Non African Amer >150 mL/min/1.73      BUN/Creatinine Ratio 11.6     Anion Gap 8.1 mmol/L     Narrative:      GFR Normal >60  Chronic Kidney Disease <60  Kidney Failure <15      CK [[id number]]  (Normal) Collected: 09/15/21 0615    Specimen: Blood Updated: 09/15/21 0655     Creatine Kinase 81 U/L     Comprehensive Metabolic Panel [[id number]]   (Abnormal) Collected: 09/14/21 2344    Specimen: Blood Updated: 09/15/21 0017     Glucose 92 mg/dL      BUN 6 mg/dL      Creatinine 0.53 mg/dL      Sodium 142 mmol/L      Potassium 4.0 mmol/L      Comment: Slight hemolysis detected by analyzer. Results may be affected.        Chloride 111 mmol/L      CO2 22.5 mmol/L      Calcium 8.2 mg/dL      Total Protein 6.0 g/dL      Albumin 3.70 g/dL      ALT (SGPT) 17 U/L      AST (SGOT) 27 U/L      Alkaline Phosphatase 87 U/L      Total Bilirubin 0.8 mg/dL      eGFR Non African Amer >150 mL/min/1.73      Globulin 2.3 gm/dL      A/G Ratio 1.6 g/dL      BUN/Creatinine Ratio 11.3     Anion Gap 8.5 mmol/L     Narrative:      GFR Normal >60  Chronic Kidney Disease <60  Kidney Failure <15      Phosphorus [[id number]]  (Normal) Collected: 09/14/21 2344    Specimen: Blood Updated: 09/15/21 0017     Phosphorus 2.9 mg/dL     Magnesium [[id number]]  (Normal) Collected: 09/14/21 2344    Specimen: Blood Updated: 09/15/21 0017     Magnesium 1.6 mg/dL     CBC & Differential [[id number]]  (Abnormal) Collected: 09/14/21 2344    Specimen: Blood Updated: 09/14/21 2355    Narrative:      The following orders were created for panel order CBC & Differential.  Procedure                               Abnormality         Status                     ---------                               -----------         ------                     CBC Auto Differential[[id number]]        Abnormal            Final result                 Please view results for these tests on the individual orders.    CBC Auto Differential [[id number]]  (Abnormal) Collected: 09/14/21 2344    Specimen: Blood Updated: 09/14/21 2355     WBC 4.53 10*3/mm3      RBC 3.52 10*6/mm3      Hemoglobin 10.5 g/dL      Hematocrit 31.2 %      MCV 88.6 fL      MCH 29.8 pg      MCHC 33.7 g/dL      RDW 12.8 %      RDW-SD 40.5 fl      MPV 9.9 fL      Platelets 101 10*3/mm3      Neutrophil % 80.5 %      Lymphocyte % 11.7 %      Monocyte % 5.5 %       Eosinophil % 1.5 %      Basophil % 0.4 %      Immature Grans % 0.4 %      Neutrophils, Absolute 3.64 10*3/mm3      Lymphocytes, Absolute 0.53 10*3/mm3      Monocytes, Absolute 0.25 10*3/mm3      Eosinophils, Absolute 0.07 10*3/mm3      Basophils, Absolute 0.02 10*3/mm3      Immature Grans, Absolute 0.02 10*3/mm3      nRBC 0.0 /100 WBC         Data Review:  Results from last 7 days   Lab Units 09/16/21  0611 09/15/21  0615 09/15/21  0615 09/14/21  2344 09/14/21  2344   SODIUM mmol/L 143  --  140  --  142   POTASSIUM mmol/L 3.7  --  3.8  --  4.0   CHLORIDE mmol/L 111*  --  110*  --  111*   CO2 mmol/L 21.7*  --  21.9*  --  22.5   BUN mg/dL 6  --  5*  --  6   CREATININE mg/dL 0.53*  --  0.43*  --  0.53*   GLUCOSE mg/dL 79   < > 101*   < > 92   CALCIUM mg/dL 8.8  --  8.3*  --  8.2*    < > = values in this interval not displayed.     Results from last 7 days   Lab Units 09/16/21  0611 09/15/21  0615 09/14/21  2344   WBC 10*3/mm3 2.72* 3.90 4.53   HEMOGLOBIN g/dL 11.1* 10.5* 10.5*   HEMATOCRIT % 32.3* 30.8* 31.2*   PLATELETS 10*3/mm3 90* 96* 101*             Lab Results   Lab Value Date/Time    TROPONINT <0.010 08/03/2020 2144         Results from last 7 days   Lab Units 09/14/21  2344   ALK PHOS U/L 87   BILIRUBIN mg/dL 0.8   ALT (SGPT) U/L 17   AST (SGOT) U/L 27             No results found for: POCGLU        Past Medical History:   Diagnosis Date   • Dehiscence of incision    • Intracerebral hemorrhage (CMS/HCC)    • Metabolic encephalopathy    • MRSA (methicillin resistant Staphylococcus aureus)     RIGHT INCISION   • Paralysis (CMS/HCC)     LEFT SIDE       Assessment:  Active Hospital Problems    Diagnosis  POA   • **Recurrent seizures (CMS/HCC) [G40.909]  Yes   • Right-sided nontraumatic intracerebral hemorrhage (CMS/HCC) [I61.9]  Yes      Resolved Hospital Problems   No resolved problems to display.       Plan:  He feels better. Continue seizure meds per neurology.    Michael Cervantes MD  9/16/2021  14:45 EDT

## 2021-09-16 NOTE — CASE MANAGEMENT/SOCIAL WORK
Discharge Planning Assessment  [facility]     Patient Name: Avinash Everett  MRN: [id number]  Today's Date: 9/16/2021    Admit Date: 9/14/2021    Discharge Needs Assessment     Row Name 09/16/21 1243       Living Environment    Lives With  alone    Current Living Arrangements  home/apartment/condo    Primary Care Provided by  self    Provides Primary Care For  no one    Family Caregiver if Needed  parent(s)    Family Caregiver Names  Sangeetha Tidwell    Able to Return to Prior Arrangements  yes       Resource/Environmental Concerns    Resource/Environmental Concerns  none       Transition Planning    Patient/Family Anticipates Transition to  home with family    Patient/Family Anticipated Services at Transition  outpatient care    Transportation Anticipated  family or friend will provide       Discharge Needs Assessment    Equipment Currently Used at Home  none;cane, quad;wheelchair    Concerns to be Addressed  denies needs/concerns at this time    Equipment Needed After Discharge  none        Discharge Plan     Row Name 09/16/21 1244       Plan    Plan  Home, family to transport    Provided Post Acute Provider List?  Refused    Refused Provider List Comment  Denied need    Provided Post Acute Provider Quality & Resource List?  Refused    Refused Quality and Resource List Comment  Denied need    Patient/Family in Agreement with Plan  yes    Plan Comments  CCP spoke to patient; explained role of CCP, verified facesheet, and discussed d/c planning needs. Plan is home with outpatient PT, family to transport. Per request of patient, CCP enrolled him in meds to beds. He uses no DME at home but has access to a quad cane and wheelchair if needed. He lives in a single story home with no steps to enter. He has never used HH before. PT recommends home w/ outpatient PT. CCP to follow for any other needs that arise pending treatment course. LETICIA Huynh        Continued Care and Services - Admitted Since 9/14/2021     Coordination has not been started for this encounter.         Demographic Summary     Row Name 09/16/21 1241       General Information    Admission Type  observation    Arrived From  home    Referral Source  admission list    Reason for Consult  discharge planning    Preferred Language  English     Used During This Interaction  no       Contact Information    Permission Granted to Share Info With  family/designee        Functional Status     Row Name 09/16/21 1242       Functional Status    Usual Activity Tolerance  moderate    Current Activity Tolerance  moderate       Functional Status, IADL    Medications  independent    Meal Preparation  independent    Housekeeping  independent    Laundry  independent    Shopping  independent       Mental Status    General Appearance WDL  WDL        Psychosocial    No documentation.       Abuse/Neglect    No documentation.       Legal    No documentation.       Substance Abuse    No documentation.       Patient Forms    No documentation.           LETICIA ALFREDO

## 2021-09-16 NOTE — DISCHARGE SUMMARY
PHYSICIAN DISCHARGE SUMMARY                                                                        [facility]    Patient Identification:  Name: Avinash Everett  Age: 31 y.o.  Sex: male  :  1990  MRN: [id number]  Primary Care Physician: Provider, No Known    Admit date: 2021  Discharge date and time:2021  Discharged Condition: good    Discharge Diagnoses:  Active Hospital Problems    Diagnosis  POA   • **Recurrent seizures (CMS/HCC) [G40.909]  Yes   • Right-sided nontraumatic intracerebral hemorrhage (CMS/HCC) [I61.9]  Yes      Resolved Hospital Problems   No resolved problems to display.          PMHX:   Past Medical History:   Diagnosis Date   • Dehiscence of incision    • Intracerebral hemorrhage (CMS/HCC)    • Metabolic encephalopathy    • MRSA (methicillin resistant Staphylococcus aureus)     RIGHT INCISION   • Paralysis (CMS/HCC)     LEFT SIDE     PSHX:   Past Surgical History:   Procedure Laterality Date   • CRANIOTOMY Right 10/1/2020    Procedure: INCISION AND DRAINAGE WOUND, of scalp incision with closure;  Surgeon: Leobardo Rivera MD;  Location: [facility];  Service: Neurosurgery;  Laterality: Right;   • CRANIOTOMY FOR SUBDURAL HEMATOMA Right 8/3/2020    Procedure: RIGHT FRONTOPARIETAL CRANIOTOMY FOR EVACUATION OF INTRACEREBRAL HEMORRHAGE;  Surgeon: Leobardo Rivera MD;  Location: [facility];  Service: Neurosurgery;  Laterality: Right;   • EMBOLIZATION CEREBRAL N/A 2020    Procedure: diagnostic angiogram possible EMBOLIZATION CEREBRAL. right radial approach;  Surgeon: Alfonso Malcolm MD;  Location: [facility] OR ;  Service: Neurosurgery;  Laterality: N/A;   • WRIST SURGERY      1st one 2006, 2010       Hospital Course: Avinash Everett  is a 31 y.o. male, with a history of seizures, who presents to the ED by EMS from home after having 2 witnessed seizures.  EMS reports that the  patient was still seizing when they arrived on scene.  Seizure was generalized and tonic-clonic.  He then had a third seizure while in the ambulance.  He was given Versed 2.5 mg IV.  He stopped seizing and was then postictal.  Patient takes Keppra and is reportedly compliant.  Has a history of a right-sided craniotomy.  EMS reports that the patient was coughing.  History is limited as the patient is still postictal.  The patient was admitted to the hospital for further evaluation treatment of recurrent seizures          The patient was admitted to the hospital and seen by neurology.  Patient had EEG which did exhibit some seizure activity.  The patient had increase in his Keppra dose to 1500 mg twice daily.  He did have some as needed doses of Versed and verapamil for some breakthrough seizures.  After being in the hospital for couple days he was feeling better and wanted to go home.  He seemed to do okay with physical therapy and neurology said it was okay to discharge.  The patient will follow up with his primary care in 1 week for ongoing care and also have follow-up with neurology in regard to his seizures.    Consults:     Consults     Date and Time Order Name Status Description    9/15/2021  1:01 AM Inpatient Neurology Consult General Completed     9/15/2021 12:43 AM [facility] (on-call MD unless specified) Details Completed         Results from last 7 days   Lab Units 09/16/21  0611   WBC 10*3/mm3 2.72*   HEMOGLOBIN g/dL 11.1*   HEMATOCRIT % 32.3*   PLATELETS 10*3/mm3 90*     Results from last 7 days   Lab Units 09/16/21  0611   SODIUM mmol/L 143   POTASSIUM mmol/L 3.7   CHLORIDE mmol/L 111*   CO2 mmol/L 21.7*   BUN mg/dL 6   CREATININE mg/dL 0.53*   GLUCOSE mg/dL 79   CALCIUM mg/dL 8.8     Significant Diagnostic Studies:   WBC   Date Value Ref Range Status   09/16/2021 2.72 (L) 3.40 - 10.80 10*3/mm3 Final     Hemoglobin   Date Value Ref Range Status   09/16/2021 11.1 (L) 13.0 - 17.7 g/dL Final     Hematocrit   Date  Value Ref Range Status   09/16/2021 32.3 (L) 37.5 - 51.0 % Final     Platelets   Date Value Ref Range Status   09/16/2021 90 (L) 140 - 450 10*3/mm3 Final     Sodium   Date Value Ref Range Status   09/16/2021 143 136 - 145 mmol/L Final     Potassium   Date Value Ref Range Status   09/16/2021 3.7 3.5 - 5.2 mmol/L Final     Chloride   Date Value Ref Range Status   09/16/2021 111 (H) 98 - 107 mmol/L Final     CO2   Date Value Ref Range Status   09/16/2021 21.7 (L) 22.0 - 29.0 mmol/L Final     BUN   Date Value Ref Range Status   09/16/2021 6 6 - 20 mg/dL Final     Creatinine   Date Value Ref Range Status   09/16/2021 0.53 (L) 0.76 - 1.27 mg/dL Final     Glucose   Date Value Ref Range Status   09/16/2021 79 65 - 99 mg/dL Final     Calcium   Date Value Ref Range Status   09/16/2021 8.8 8.6 - 10.5 mg/dL Final     Magnesium   Date Value Ref Range Status   09/14/2021 1.6 1.6 - 2.6 mg/dL Final     Phosphorus   Date Value Ref Range Status   09/14/2021 2.9 2.5 - 4.5 mg/dL Final     AST (SGOT)   Date Value Ref Range Status   09/14/2021 27 1 - 40 U/L Final     ALT (SGPT)   Date Value Ref Range Status   09/14/2021 17 1 - 41 U/L Final     Alkaline Phosphatase   Date Value Ref Range Status   09/14/2021 87 39 - 117 U/L Final     No results found for: APTT, INR  No results found for: COLORU, CLARITYU, SPECGRAV, PHUR, PROTEINUR, GLUCOSEU, KETONESU, BLOODU, NITRITE, LEUKOCYTESUR, BILIRUBINUR, UROBILINOGEN, RBCUA, WBCUA, BACTERIA, UACOMMENT  No results found for: TROPONINT, TROPONINI, BNP  No components found for: HGBA1C;2  No components found for: TSH;2  Imaging Results (All)     Procedure Component Value Units Date/Time    MRI Brain With & Without Contrast [[id number]] Collected: 09/15/21 1532     Updated: 09/15/21 1543    Narrative:      MR SCAN OF THE BRAIN WITHOUT AND WITH INTRAVENOUS CONTRAST     HISTORY: Recent seizures. Right facial pain and headache. Previous  surgery for intracranial hemorrhage and underlying AV malformation  in  temporal parietal region.     TECHNIQUE: The MR scan was performed with sagittal and axial and coronal  images and includes T1 images without and with intravenous contrast.      FINDINGS: The patient has had previous right sided craniotomy for  evacuation of large associated intracranial hemorrhage and there is  associated encephalomalacia and hemosiderin deposition in this region.  There is no evidence of recurrent hemorrhage but there is some somewhat  amorphous enhancement within the area of encephalomalacia. The previous  prominent enhancement of the wall of the resection cavity noted on  09/22/2020 shows marked improvement and there is currently some residual  enhancement of a smaller resection cavity on today's exam.     There are normal flow voids in the major vessels. The sinuses and  mastoid air cells are clear.     CONCLUSION: Previous resection of intraparenchymal hemorrhage in right  temporoparietal region with decrease in size of the resection cavity and  less associated enhancement compared to the MRI scan of 09/22/2020. The  remainder of the MRI scan is unremarkable.     This report was finalized on 9/15/2021 3:40 PM by Dr. Peña Flynn M.D.       CT Head Without Contrast [[id number]] Collected: 09/15/21 0009     Updated: 09/15/21 0009    Narrative:        Patient: HARSHAD SHOOK  Time Out: 00:08  Exam(s): CT HEAD Without Contrast     EXAM:    CT Head Without Intravenous Contrast    CLINICAL HISTORY:     Reason for exam: Recurrent seizures.    TECHNIQUE:    Axial computed tomography images of the head brain without intravenous   contrast.  CTDI is 54.8 mGy and DLP is 1089.5 mGy-cm.  This CT exam was   performed according to the principle of ALARA (As Low As Reasonably   Achievable) by using one or more of the following dose reduction   techniques: automated exposure control, adjustment of the mA and or kV   according to patient size, and or use of iterative reconstruction    technique.    COMPARISON:    CT head 8 2 21    FINDINGS:    Brain:  Stable encephalomalacia in the right frontal and parietal lobes   subjacent to the craniotomy.  No acute intracranial hemorrhage, mass-  effect, or edema.  Gray-white matter differentiation maintained.    Ventricles:  Unremarkable.  No hydrocephalus.    Bones joints:  Old right-sided craniotomy.  No skull fracture.    Soft tissues:  Unremarkable.    Sinuses:  Unremarkable as visualized.  No acute sinusitis.    Mastoid air cells:  Unremarkable as visualized.  No mastoid effusion.    IMPRESSION:         No acute intracranial process.      Impression:          Electronically signed by Anisha Armendariz M.D. on 09-15-21 at 0008        Lab Results (last 7 days)     Procedure Component Value Units Date/Time    Basic Metabolic Panel [[id number]]  (Abnormal) Collected: 09/16/21 0611    Specimen: Blood Updated: 09/16/21 0715     Glucose 79 mg/dL      BUN 6 mg/dL      Creatinine 0.53 mg/dL      Sodium 143 mmol/L      Potassium 3.7 mmol/L      Chloride 111 mmol/L      CO2 21.7 mmol/L      Calcium 8.8 mg/dL      eGFR Non African Amer >150 mL/min/1.73      BUN/Creatinine Ratio 11.3     Anion Gap 10.3 mmol/L     Narrative:      GFR Normal >60  Chronic Kidney Disease <60  Kidney Failure <15      CBC & Differential [[id number]]  (Abnormal) Collected: 09/16/21 0611    Specimen: Blood Updated: 09/16/21 0658    Narrative:      The following orders were created for panel order CBC & Differential.  Procedure                               Abnormality         Status                     ---------                               -----------         ------                     CBC Auto Differential[[id number]]        Abnormal            Final result                 Please view results for these tests on the individual orders.    CBC Auto Differential [[id number]]  (Abnormal) Collected: 09/16/21 0611    Specimen: Blood Updated: 09/16/21 0657     WBC 2.72 10*3/mm3      RBC 3.68  10*6/mm3      Hemoglobin 11.1 g/dL      Hematocrit 32.3 %      MCV 87.8 fL      MCH 30.2 pg      MCHC 34.4 g/dL      RDW 12.7 %      RDW-SD 40.0 fl      MPV 10.0 fL      Platelets 90 10*3/mm3      Neutrophil % 60.7 %      Lymphocyte % 26.8 %      Monocyte % 7.7 %      Eosinophil % 3.7 %      Basophil % 0.7 %      Immature Grans % 0.4 %      Neutrophils, Absolute 1.65 10*3/mm3      Lymphocytes, Absolute 0.73 10*3/mm3      Monocytes, Absolute 0.21 10*3/mm3      Eosinophils, Absolute 0.10 10*3/mm3      Basophils, Absolute 0.02 10*3/mm3      Immature Grans, Absolute 0.01 10*3/mm3      nRBC 0.0 /100 WBC     COVID PRE-OP / PRE-PROCEDURE SCREENING ORDER (NO ISOLATION) - Swab, Nasopharynx [[id number]]  (Normal) Collected: 09/15/21 0106    Specimen: Swab from Nasopharynx Updated: 09/15/21 1242    Narrative:      The following orders were created for panel order COVID PRE-OP / PRE-PROCEDURE SCREENING ORDER (NO ISOLATION) - Swab, Nasopharynx.  Procedure                               Abnormality         Status                     ---------                               -----------         ------                     COVID-19,APTIMA PANTHER(...[[id number]]  Normal              Final result                 Please view results for these tests on the individual orders.    COVID-19,APTIMA PANTHER(AUDRA), [facility], NP/OP SWAB IN UTM/VTM/SALINE TRANSPORT MEDIA,24 HR TAT - Swab, Nasopharynx [[id number]]  (Normal) Collected: 09/15/21 0106    Specimen: Swab from Nasopharynx Updated: 09/15/21 1242     COVID19 Not Detected    Narrative:      Fact sheet for providers: https://www.fda.gov/media/008493/download     Fact sheet for patients: https://www.fda.gov/media/364282/download    Test performed by RT PCR.    CBC (No Diff) [[id number]]  (Abnormal) Collected: 09/15/21 0615    Specimen: Blood Updated: 09/15/21 0658     WBC 3.90 10*3/mm3      RBC 3.49 10*6/mm3      Hemoglobin 10.5 g/dL      Hematocrit 30.8 %      MCV 88.3 fL      MCH 30.1 pg      MCHC  34.1 g/dL      RDW 12.9 %      RDW-SD 41.7 fl      MPV 9.8 fL      Platelets 96 10*3/mm3     Basic Metabolic Panel [[id number]]  (Abnormal) Collected: 09/15/21 0615    Specimen: Blood Updated: 09/15/21 0655     Glucose 101 mg/dL      BUN 5 mg/dL      Creatinine 0.43 mg/dL      Sodium 140 mmol/L      Potassium 3.8 mmol/L      Chloride 110 mmol/L      CO2 21.9 mmol/L      Calcium 8.3 mg/dL      eGFR Non African Amer >150 mL/min/1.73      BUN/Creatinine Ratio 11.6     Anion Gap 8.1 mmol/L     Narrative:      GFR Normal >60  Chronic Kidney Disease <60  Kidney Failure <15      CK [[id number]]  (Normal) Collected: 09/15/21 0615    Specimen: Blood Updated: 09/15/21 0655     Creatine Kinase 81 U/L     Comprehensive Metabolic Panel [[id number]]  (Abnormal) Collected: 09/14/21 2344    Specimen: Blood Updated: 09/15/21 0017     Glucose 92 mg/dL      BUN 6 mg/dL      Creatinine 0.53 mg/dL      Sodium 142 mmol/L      Potassium 4.0 mmol/L      Comment: Slight hemolysis detected by analyzer. Results may be affected.        Chloride 111 mmol/L      CO2 22.5 mmol/L      Calcium 8.2 mg/dL      Total Protein 6.0 g/dL      Albumin 3.70 g/dL      ALT (SGPT) 17 U/L      AST (SGOT) 27 U/L      Alkaline Phosphatase 87 U/L      Total Bilirubin 0.8 mg/dL      eGFR Non African Amer >150 mL/min/1.73      Globulin 2.3 gm/dL      A/G Ratio 1.6 g/dL      BUN/Creatinine Ratio 11.3     Anion Gap 8.5 mmol/L     Narrative:      GFR Normal >60  Chronic Kidney Disease <60  Kidney Failure <15      Phosphorus [[id number]]  (Normal) Collected: 09/14/21 2344    Specimen: Blood Updated: 09/15/21 0017     Phosphorus 2.9 mg/dL     Magnesium [[id number]]  (Normal) Collected: 09/14/21 2344    Specimen: Blood Updated: 09/15/21 0017     Magnesium 1.6 mg/dL     CBC & Differential [[id number]]  (Abnormal) Collected: 09/14/21 2344    Specimen: Blood Updated: 09/14/21 2355    Narrative:      The following orders were created for panel order CBC & Differential.  Procedure    "                            Abnormality         Status                     ---------                               -----------         ------                     CBC Auto Differential[[id number]]        Abnormal            Final result                 Please view results for these tests on the individual orders.    CBC Auto Differential [[id number]]  (Abnormal) Collected: 09/14/21 2346    Specimen: Blood Updated: 09/14/21 2353     WBC 4.53 10*3/mm3      RBC 3.52 10*6/mm3      Hemoglobin 10.5 g/dL      Hematocrit 31.2 %      MCV 88.6 fL      MCH 29.8 pg      MCHC 33.7 g/dL      RDW 12.8 %      RDW-SD 40.5 fl      MPV 9.9 fL      Platelets 101 10*3/mm3      Neutrophil % 80.5 %      Lymphocyte % 11.7 %      Monocyte % 5.5 %      Eosinophil % 1.5 %      Basophil % 0.4 %      Immature Grans % 0.4 %      Neutrophils, Absolute 3.64 10*3/mm3      Lymphocytes, Absolute 0.53 10*3/mm3      Monocytes, Absolute 0.25 10*3/mm3      Eosinophils, Absolute 0.07 10*3/mm3      Basophils, Absolute 0.02 10*3/mm3      Immature Grans, Absolute 0.02 10*3/mm3      nRBC 0.0 /100 WBC         /62 (BP Location: Right arm, Patient Position: Lying)   Pulse 80   Temp 98.2 °F (36.8 °C) (Oral)   Resp 18   Ht 185.4 cm (73\")   Wt 82.6 kg (182 lb 1.6 oz)   SpO2 95%   BMI 24.03 kg/m²     Discharge Exam:  General Appearance:    Alert, cooperative, no distress                          Head:    Normocephalic, without obvious abnormality, atraumatic                          Eyes:                            Throat:   Lips, tongue, gums normal                          Neck:   Supple, symmetrical, trachea midline, no JVD                        Lungs:     Clear to auscultation bilaterally, respirations unlabored                Chest Wall:    No tenderness or deformity                        Heart:    Regular rate and rhythm, S1 and S2 normal, no murmur,no  Rub or gallop                  Abdomen:     Soft, non-tender, bowel sounds active, no masses, no " organomegaly                  Extremities:   Extremities normal, atraumatic, no cyanosis or edema                             Skin:   Skin is warm and dry,  no rashes or palpable lesions                  Neurologic: Chronic left hemiparesis     Disposition:  Home    Patient Instructions:      Discharge Medications      Changes to Medications      Instructions Start Date   levETIRAcetam 750 MG tablet  Commonly known as: KEPPRA  What changed:   · medication strength  · how much to take  · when to take this   1,500 mg, Oral, 2 Times Daily         Continue These Medications      Instructions Start Date   acetaminophen 325 MG tablet  Commonly known as: TYLENOL   650 mg, Oral, Every 6 Hours PRN      cetaphil lotion   Topical, Every 12 Hours Scheduled      clobetasol propionate 0.05 % shampoo  Commonly known as: CLOBEX   Topical, 3 Times Weekly      desonide 0.05 % ointment  Commonly known as: DESOWEN   Topical, Every 12 Hours Scheduled      famotidine 20 MG tablet  Commonly known as: PEPCID   20 mg, Oral, 2 Times Daily Before Meals      folic acid 1 MG tablet  Commonly known as: FOLVITE   1 mg, Oral, Daily      gabapentin 300 MG capsule  Commonly known as: NEURONTIN   300 mg, Oral, Nightly      HYDROcodone-acetaminophen 5-325 MG per tablet  Commonly known as: NORCO   Take 1 tablet by mouth Every 8 (Eight) Hours As Needed for Moderate Pain  or Severe Pain.      mirtazapine 15 MG tablet  Commonly known as: REMERON   15 mg, Oral, Nightly      potassium chloride 10 MEQ CR capsule  Commonly known as: MICRO-K   10 mEq, Oral, 2 Times Daily With Meals      thiamine 100 MG tablet tablet  Commonly known as: VITAMIN B-1   100 mg, Oral, Daily           Future Appointments   Date Time Provider Department Center   9/27/2021 12:45 PM [facility] MRI 2 [facility]     Follow-up Information     Provider, No Known Follow up in 1 week(s).    Why: Follow-up with primary care in 1 week.  Also follow-up with neurology.  Contact  information:  [facility] [address]  [phone]                 Discharge Order (From admission, onward)     Start     Ordered    09/16/21 1644  Discharge patient  Once     Expected Discharge Date: 09/16/21    Discharge Disposition: Home or Self Care    Physician of Record for Attribution - Please select from Treatment Team: MICHAEL CERVANTES [[id number]]    Review needed by CMO to determine Physician of Record: No       Question Answer Comment   Physician of Record for Attribution - Please select from Treatment Team MICHAEL CERVANTES    Review needed by CMO to determine Physician of Record No        09/16/21 1643                Total time spent discharging patient including evaluation,post hospitalization follow up,  medication and post hospitalization instructions and education total time exceeds 30 minutes.    Signed:  Michael Cervantes MD  9/16/2021  16:44 EDT

## 2021-09-16 NOTE — NURSING NOTE
Pt was calling out for help and stated that he was having a seizure. Upon entering the room, pt was found to be alert and anxious. Pt stated that he is having a seizure and wants some ativan to prevent him from having tonic-clonic seizure. Ativan 1 mg was given PRN and pt was educated on seizures s/s. Pt was not satisfied with the ativan and called his mother complaining that we are not addressing his seizure. This nurse had a ten minutes conversation with the mother and informed her that her son was not having a tonic- clonic seizure and he was able to make his needs known. Pt's mother stated that her son get anxious and she understand that he can not have a seizure while talking.

## 2021-09-16 NOTE — NURSING NOTE
Discharge instructions presented and discussed with patient and mother. Patient verbalizes understanding. Discharged by wheelchair to private vehicle.

## 2021-09-16 NOTE — PLAN OF CARE
Goal Outcome Evaluation:  Plan of Care Reviewed With: patient        Progress: no change  Outcome Summary: See earlier note on pt c/o seizure. AOX4 jong VSS.

## 2021-09-16 NOTE — PROGRESS NOTES
"DOS: 2021  NAME: Avinash Everett   : 1990  PCP: Provider, No Known  Chief Complaint   Patient presents with   • Seizures   Neurology    Subjective: When I spoke with the patient during lunchtime and he stated that he had no seizure spells since yesterday evening when he called out due to a rising sensation of left-sided numbness for which he received Ativan. I later talked to the nurse to tell me the patient complained of seizure activity and there was some intermittent right-sided twitching. The patient wants to go home.Pt seen in follow up today, however the problem is new to the examiner.      Objective:  Vital signs: /62 (BP Location: Right arm, Patient Position: Lying)   Pulse 80   Temp 98.2 °F (36.8 °C) (Oral)   Resp 18   Ht 185.4 cm (73\")   Wt 82.6 kg (182 lb 1.6 oz)   SpO2 95%   BMI 24.03 kg/m²       HEENT: Normocephalic, atraumatic   COR: RRR  Resp: Even and unlabored clear to auscultation bilaterally  Extremities: Equal radial pulses, non distal embolization, no edema    Neurological:   MS: Alert and oriented x3, speech fluent, no dysarthria. No neglect.  CN: II-XII normal except for left facial droop  Motor: 5/5, normal tone on the right. Left arm 2 out of 5, left leg 4 out of 5.  Reflexes:  Sensory: Intact to light touch in arms and legs.  Coordination: Normal finger-to-nose on the right.    Scheduled Meds:famotidine, 20 mg, Oral, BID AC  folic acid, 1 mg, Oral, Daily  gabapentin, 300 mg, Oral, Nightly  levETIRAcetam, 1,500 mg, Oral, BID  mirtazapine, 15 mg, Oral, Nightly  potassium chloride, 10 mEq, Oral, BID With Meals  sodium chloride, 10 mL, Intravenous, Q12H  thiamine, 100 mg, Oral, Daily      Continuous Infusions:   PRN Meds:.•  acetaminophen  •  aluminum-magnesium hydroxide-simethicone  •  HYDROcodone-acetaminophen  •  LORazepam  •  nitroglycerin  •  ondansetron **OR** ondansetron  •  [COMPLETED] Insert peripheral IV **AND** sodium chloride  •  sodium chloride    Laboratory " results:  Lab Results   Component Value Date    GLUCOSE 79 09/16/2021    CALCIUM 8.8 09/16/2021     09/16/2021    K 3.7 09/16/2021    CO2 21.7 (L) 09/16/2021     (H) 09/16/2021    BUN 6 09/16/2021    CREATININE 0.53 (L) 09/16/2021    EGFRIFNONA >150 09/16/2021    BCR 11.3 09/16/2021    ANIONGAP 10.3 09/16/2021     Lab Results   Component Value Date    WBC 2.72 (L) 09/16/2021    HGB 11.1 (L) 09/16/2021    HCT 32.3 (L) 09/16/2021    MCV 87.8 09/16/2021    PLT 90 (L) 09/16/2021     Lab Results   Component Value Date    CHOL 250 (H) 08/04/2020     Lab Results   Component Value Date    HDL 42 08/04/2020     Lab Results   Component Value Date     (H) 08/04/2020     Lab Results   Component Value Date    TRIG 159 (H) 08/06/2020    TRIG 231 (H) 08/05/2020    TRIG 194 (H) 08/04/2020          Review and interpretation of imaging:  EEG    Result Date: 9/15/2021  Date of onset: September 15, 2021 at 10 AM Date of offset: September 15, 2021 at 10:26 AM Indication: Right AVM Technical description:  This is a 21 channel digital EEG recording that began on September 15, 2021 at 10 AM and ended on September 15, 2021 at 10:26 AM.  Malcom and seizure detection software programs were used. Background:  The EEG recording demonstrates mild diffuse background slowing with mainly theta frequencies.  7-8 Hz frequencies are present posteriorly on the left.  There is focal right hemispheric slowing with increase in delta activity.  The patient enters the drowsy state, but no well formed stage 2 sleep architecture is present.  There is no abnormal activation photic stimulation.  Hyperventilation was not performed.  There is a single right frontal interictal epileptiform discharge.  No electrographic seizures are present during this recording. The EKG monitor shows a heart rate that varies between 80 and 85 beats per minute. Clinical interpretation:  This routine EEG is abnormal due to the presence of: 1. Mild diffuse  background slowing.  The results of this study are nonspecific, but may indicate a mild encephalopathy, medication effect, or excessive drowsiness.  2.  Focal right hemispheric slowing which may indicate an underlying area of cerebral dysfunction.  3.  A single right frontal interictal epileptiform discharge.  This may indicate a predilection to focal onset seizures originating from the right frontal head region.  No electrographic seizures are present during this recording.  Careful clinical and radiographic correlation is advised.     CT Head Without Contrast    Result Date: 9/15/2021  Patient: HARSHAD SHOOK  Time Out: 00:08 Exam(s): CT HEAD Without Contrast EXAM:   CT Head Without Intravenous Contrast CLINICAL HISTORY:    Reason for exam: Recurrent seizures. TECHNIQUE:   Axial computed tomography images of the head brain without intravenous contrast.  CTDI is 54.8 mGy and DLP is 1089.5 mGy-cm.  This CT exam was performed according to the principle of ALARA (As Low As Reasonably Achievable) by using one or more of the following dose reduction techniques: automated exposure control, adjustment of the mA and or kV according to patient size, and or use of iterative reconstruction technique. COMPARISON:   CT head 8 2 21 FINDINGS:   Brain:  Stable encephalomalacia in the right frontal and parietal lobes subjacent to the craniotomy.  No acute intracranial hemorrhage, mass- effect, or edema.  Gray-white matter differentiation maintained.   Ventricles:  Unremarkable.  No hydrocephalus.   Bones joints:  Old right-sided craniotomy.  No skull fracture.   Soft tissues:  Unremarkable.   Sinuses:  Unremarkable as visualized.  No acute sinusitis.   Mastoid air cells:  Unremarkable as visualized.  No mastoid effusion. IMPRESSION:       No acute intracranial process.     Electronically signed by Anisha Armendariz M.D. on 09-15-21 at 0008    MRI Brain With & Without Contrast    Result Date: 9/15/2021  MR SCAN OF THE BRAIN WITHOUT AND  WITH INTRAVENOUS CONTRAST  HISTORY: Recent seizures. Right facial pain and headache. Previous surgery for intracranial hemorrhage and underlying AV malformation in temporal parietal region.  TECHNIQUE: The MR scan was performed with sagittal and axial and coronal images and includes T1 images without and with intravenous contrast.  FINDINGS: The patient has had previous right sided craniotomy for evacuation of large associated intracranial hemorrhage and there is associated encephalomalacia and hemosiderin deposition in this region. There is no evidence of recurrent hemorrhage but there is some somewhat amorphous enhancement within the area of encephalomalacia. The previous prominent enhancement of the wall of the resection cavity noted on 09/22/2020 shows marked improvement and there is currently some residual enhancement of a smaller resection cavity on today's exam.  There are normal flow voids in the major vessels. The sinuses and mastoid air cells are clear.  CONCLUSION: Previous resection of intraparenchymal hemorrhage in right temporoparietal region with decrease in size of the resection cavity and less associated enhancement compared to the MRI scan of 09/22/2020. The remainder of the MRI scan is unremarkable.  This report was finalized on 9/15/2021 3:40 PM by Dr. Peña Flynn M.D.        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  He had 1 breakthrough seizure in August and then the date of admission he had back-to-back seizures which were not relieved with current medications.  EMT was called to give Versed IV which I was able to stop his seizures.  In the ED he received a loading dose of Keppra 1500 mg. He was taking Keppra 1000 mg prior to arrival.  He denies any missed doses of  Keppra, alcohol use, or recent sleep deprivation.    Diagnoses:  History of AVM rupture status post embolization  Seizure disorder with breakthrough seizure    Work-up:  MRI brain with and without contrast: Previous resection of IPH in the right temporoparietal region again noted with decreased in size of the resection cavity and less enhancement compared to prior MRI from September 2020.  Remainder the MRI is unremarkable.  EEG: Single right frontal interictal epileptiform discharge    Plan:  Keppra increase to 1500 mg twice daily this admission  Seizure precautions x90 days, d/w patient  D.W Dr Rucker today. OK for d/c from neurology standpoint. Will sign off. I will arrange outpatient neurology follow up. Please call with questions.

## 2021-09-16 NOTE — THERAPY EVALUATION
Patient Name: Avinash Everett  : 1990    MRN: [id number]                              Today's Date: 2021       Admit Date: 2021    Visit Dx:     ICD-10-CM ICD-9-CM   1. Recurrent seizures (CMS/HCC)  G40.909 345.80     Patient Active Problem List   Diagnosis   • Right-sided nontraumatic intracerebral hemorrhage (CMS/HCC)   • Nontraumatic subcortical hemorrhage of right cerebral hemisphere (CMS/HCC)   • Alcohol abuse   • Metabolic encephalopathy   • Vitamin K deficiency   • Nontraumatic acute cerebral hemorrhage (CMS/HCC)   • Disruption or dehiscence of closure of skull or craniotomy   • Wound dehiscence   • Postoperative visit   • Recurrent seizures (CMS/HCC)     Past Medical History:   Diagnosis Date   • Dehiscence of incision    • Intracerebral hemorrhage (CMS/HCC)    • Metabolic encephalopathy    • MRSA (methicillin resistant Staphylococcus aureus)     RIGHT INCISION   • Paralysis (CMS/HCC)     LEFT SIDE     Past Surgical History:   Procedure Laterality Date   • CRANIOTOMY Right 10/1/2020    Procedure: INCISION AND DRAINAGE WOUND, of scalp incision with closure;  Surgeon: Leobardo Rivera MD;  Location: [facility];  Service: Neurosurgery;  Laterality: Right;   • CRANIOTOMY FOR SUBDURAL HEMATOMA Right 8/3/2020    Procedure: RIGHT FRONTOPARIETAL CRANIOTOMY FOR EVACUATION OF INTRACEREBRAL HEMORRHAGE;  Surgeon: Leobardo Rivera MD;  Location: [facility];  Service: Neurosurgery;  Laterality: Right;   • EMBOLIZATION CEREBRAL N/A 2020    Procedure: diagnostic angiogram possible EMBOLIZATION CEREBRAL. right radial approach;  Surgeon: Alfonso Malcolm MD;  Location: [facility] OR ;  Service: Neurosurgery;  Laterality: N/A;   • WRIST SURGERY      1st one 2006, 2010     General Information     Row Name 21 1531          OT Time and Intention    Document Type  evaluation  -SM     Mode of Treatment  occupational therapy;individual therapy  -     Row Name 21 1531           General Information    Patient Profile Reviewed  yes  -SM     Prior Level of Function  independent:;ADL's;all household mobility using SPC  -     Existing Precautions/Restrictions  fall  -     Barriers to Rehab  previous functional deficit  -[facility] Name 09/16/21 1531          Occupational Profile    Environmental Supports and Barriers (Occupational Profile)  Uses DME for bathing and toileting at baseline since discharge from acute rehab after R crani.  -     Row Name 09/16/21 1531          Living Environment    Lives With  grandparent(s);parent(s)  -[facility] Name 09/16/21 1531          Cognition    Orientation Status (Cognition)  oriented x 4  -     Row Name 09/16/21 1531          Safety Issues, Functional Mobility    Impairments Affecting Function (Mobility)  balance;motor control;motor planning;strength;pain;coordination;range of motion (ROM);sensation/sensory awareness;grasp  -       User Key  (r) = Recorded By, (t) = Taken By, (c) = Cosigned By    Initials Name Provider Type    Lissa Alfaro OT Occupational Therapist          Mobility/ADL's     Row Name 09/16/21 1533          Bed Mobility    Comment (Bed Mobility)  independent per PT who recently saw pt.  -[facility] Name 09/16/21 1533          Transfers    Comment (Transfers)  Pt and RN report able to mobilize to bathroom with SBA/SV.  -[facility] Name 09/16/21 1533          Activities of Daily Living    BADL Assessment/Intervention  -- ADLs not assess today as pt reports he is at baseline for all ADL, increased LUE strength and is able to complete tasks easier over past several months such as putting on a shirt.  -       User Key  (r) = Recorded By, (t) = Taken By, (c) = Cosigned By    Initials Name Provider Type    Lissa Alfaro OT Occupational Therapist        Obj/Interventions     Row Name 09/16/21 1534          Sensory Interventions    Comment, Sensory Intervention  Pt reports over past several months increased general  "sensation throughout LUE but also increased pain.  -SM     Row Name 09/16/21 1534          Range of Motion Comprehensive    Comment, General Range of Motion  LUE: digit flexion 3/4 AROM, digit extension 7/8 AROM, wrist flex 7/8, wrist extension no active ROM, supination 1/4, pronation 3/4, elbow flexion 1/4, elbow extension 1/2, shoulder flexion 7/8. Scapular elevation 1/2.  -SM     Row Name 09/16/21 1534          Strength Comprehensive (MMT)    Comment, General Manual Muscle Testing (MMT) Assessment  LUE grossly 1/5 to 2-/5, use of accessory muscles for shoulder movements and \"swinging\"  -SM     Row Name 09/16/21 1534          Wrist (Therapeutic Exercise)    Wrist (Therapeutic Exercise)  -- very limited AROM wrist due to muscle tone LUE as well as previous L wrist sports injury and surgery and likely would not benefit from tendoesis splint.  -SM     Row Name 09/16/21 1534          Motor Skills    Motor Skills  glenohumeral joint subluxation;muscle tone thumb width subluxation L glenohumeral joint.  -     Muscle Tone  hypertonia increased flexor tone throughout.  -SM     Row Name 09/16/21 1534          Balance    Balance Assessment  system impairments  -     System Impairments Contributing to Balance Disturbance  musculoskeletal L resting hand splint applied by OT and educated pt in wear schedule and purpose of splint to increase protection and positioning of hand to decrease tone and reduce contracture.  -SM     Row Name 09/16/21 1534          Therapeutic Exercise    Therapeutic Exercise  wrist went over PROM stretch to LUE 3 reps supine in bed with use of RUE assist.  -SM     Row Name 09/16/21 1534          Glenohumeral Joint Subluxation    Glenohumeral Joint Subluxation  1 finger width;left  -       User Key  (r) = Recorded By, (t) = Taken By, (c) = Cosigned By    Initials Name Provider Type    Lissa Alfaro OT Occupational Therapist      * Pt to wear L resting hand splint during nighttime use and " resting during day as needed to decrease tone.   Goals/Plan     Row Name 09/16/21 1548          Problem Specific Goal 1 (OT)    Problem Specific Goal 1 (OT)  Pt to be independent with wearing schedule for L resting hand splint as well as SROM exercises to reduce tone for ADL.  -     Time Frame (Problem Specific Goal 1, OT)  short term goal (STG);by discharge  -     Progress/Outcome (Problem Specific Goal 1, OT)  goal ongoing  -     Row Name 09/16/21 1548          Therapy Assessment/Plan (OT)    Planned Therapy Interventions (OT)  passive ROM/stretching;orthotic fabrication/fitting/training;ROM/therapeutic exercise;occupation/activity based interventions;strengthening exercise;patient/caregiver education/training;neuromuscular control/coordination retraining  -       User Key  (r) = Recorded By, (t) = Taken By, (c) = Cosigned By    Initials Name Provider Type    Lissa Alfaro OT Occupational Therapist        Clinical Impression     Row Name 09/16/21 1539          Pain Assessment    Additional Documentation  Pain Scale: FACES Pre/Post-Treatment (Group)  -     Row Name 09/16/21 1532          Pain Scale: Numbers Pre/Post-Treatment    Pretreatment Pain Rating  4/10  -     Pain Location - Side  Left  -     Pain Location  shoulder  -     Row Name 09/16/21 1538          Plan of Care Review    Plan of Care Reviewed With  patient  -     Outcome Summary  Pt is a 31 y.o male admitted to [facility] with seizure activity. He is well known to OT here at [facility], discharged from [facility] following AVM surgery with R crani 09/2020. OT consulted for UE bracing/splinting needs for LUE weakness and tone. Pt is noted to have increased strength LUE as well as tone since dc from [facility]. He initally was flaccid and now with minimal AROM throughout and increased tone. L shoulder subluxation. He wears a anny sling during mobility. He would benefit from resting hand splint and OT provided today and went over ROM exercises for LUE to  "decrease tone. He is interested in OP OT again as he is showing new progress since last OT discharge, and would benefit from OT for neruomuscular reeducation, ther ex, ther activites, and modalites to increase LUE function for ADLs. Will f/u pt while an inpatient to assess splinting as needed. Plans possible home soon.  -     Row Name 09/16/21 1539          Therapy Assessment/Plan (OT)    Patient/Family Therapy Goal Statement (OT)  \"I want to improve my FMC with my L hand\"  -     Rehab Potential (OT)  good, to achieve stated therapy goals  -     Criteria for Skilled Therapeutic Interventions Met (OT)  yes  -     Therapy Frequency (OT)  2 times/wk while admitted to adjust splint as appropriate.  -     Row Name 09/16/21 1539          Therapy Plan Review/Discharge Plan (OT)    Anticipated Discharge Disposition (OT)  home with assist  -     Row Name 09/16/21 1539          Positioning and Restraints    Pre-Treatment Position  in bed  -SM     Post Treatment Position  bed  -SM     In Bed  fowlers;call light within reach;encouraged to call for assist;patient within staff view;notified ns  -       User Key  (r) = Recorded By, (t) = Taken By, (c) = Cosigned By    Initials Name Provider Type    Lissa Alfaro, OT Occupational Therapist        Outcome Measures     Row Name 09/16/21 1300          How much help from another is currently needed...    Putting on and taking off regular lower body clothing?  3  -SM     Bathing (including washing, rinsing, and drying)  3  -SM     Toileting (which includes using toilet bed pan or urinal)  3  -SM     Putting on and taking off regular upper body clothing  3  -SM     Taking care of personal grooming (such as brushing teeth)  3  -SM     Eating meals  3  -SM     AM-PAC 6 Clicks Score (OT)  18  -SM     Row Name 09/16/21 1158          How much help from another person do you currently need...    Turning from your back to your side while in flat bed without using bedrails? "  3  -AE     Moving from lying on back to sitting on the side of a flat bed without bedrails?  3  -AE     Moving to and from a bed to a chair (including a wheelchair)?  3  -AE     Standing up from a chair using your arms (e.g., wheelchair, bedside chair)?  3  -AE     Climbing 3-5 steps with a railing?  3  -AE     To walk in hospital room?  3  -AE     AM-PAC 6 Clicks Score (PT)  18  -AE     Row Name 09/16/21 1550 09/16/21 1158       Functional Assessment    Outcome Measure Options  AM-PAC 6 Clicks Daily Activity (OT)  -  AM-PAC 6 Clicks Basic Mobility (PT)  -AE      User Key  (r) = Recorded By, (t) = Taken By, (c) = Cosigned By    Initials Name Provider Type    Selena Cavanaugh, PT Physical Therapist    Lissa Alfaro, OT Occupational Therapist          Occupational Therapy Education                 Title: PT OT SLP Therapies (In Progress)     Topic: Occupational Therapy (In Progress)     Point: ADL training (Not Started)     Description:   Instruct learner(s) on proper safety adaptation and remediation techniques during self care or transfers.   Instruct in proper use of assistive devices.              Learner Progress:  Not documented in this visit.          Point: Home exercise program (Not Started)     Description:   Instruct learner(s) on appropriate technique for monitoring, assisting and/or progressing therapeutic exercises/activities.              Learner Progress:  Not documented in this visit.          Point: Precautions (Not Started)     Description:   Instruct learner(s) on prescribed precautions during self-care and functional transfers.              Learner Progress:  Not documented in this visit.          Point: Body mechanics (Done)     Description:   Instruct learner(s) on proper positioning and spine alignment during self-care, functional mobility activities and/or exercises.              Learning Progress Summary           Patient Acceptance, E, VU by  at 9/16/2021 [id number]    Comment:  wearing schedule, cleaning of LUE splint, purpose of splint to decrease tone, SROM LUE                               User Key     Initials Effective Dates Name Provider Type Discipline     04/02/20 -  Lissa Corley OT Occupational Therapist OT              OT Recommendation and Plan  Planned Therapy Interventions (OT): passive ROM/stretching, orthotic fabrication/fitting/training, ROM/therapeutic exercise, occupation/activity based interventions, strengthening exercise, patient/caregiver education/training, neuromuscular control/coordination retraining  Therapy Frequency (OT): 2 times/wk (while admitted to adjust splint as appropriate.)  Plan of Care Review  Plan of Care Reviewed With: patient  Outcome Summary: Pt is a 31 y.o male admitted to [facility] with seizure activity. He is well known to OT here at [facility], discharged from [facility] following AVM surgery with R crani 09/2020. OT consulted for UE bracing/splinting needs for LUE weakness and tone. Pt is noted to have increased strength LUE as well as tone since dc from [facility]. He initally was flaccid and now with minimal AROM throughout and increased tone. L shoulder subluxation. He wears a anny sling during mobility. He would benefit from resting hand splint and OT provided today and went over ROM exercises for LUE to decrease tone. He is interested in OP OT again as he is showing new progress since last OT discharge, and would benefit from OT for neruomuscular reeducation, ther ex, ther activites, and modalites to increase LUE function for ADLs. Will f/u pt while an inpatient to assess splinting as needed. Plans possible home soon.     Time Calculation:   Time Calculation- OT     Row Name 09/16/21 1559             Time Calculation- OT    OT Start Time  1349  -      OT Stop Time  1415  -      OT Time Calculation (min)  26 min  -      Total Timed Code Minutes- OT  10 minute(s)  -      OT Received On  09/16/21  -      OT - Next Appointment  09/17/21  -      OT  Goal Re-Cert Due Date  09/30/21  -SM         Timed Charges    83092 - OT Therapeutic Exercise Minutes  10  -SM         Untimed Charges    OT Eval/Re-eval Minutes  16  -SM         Total Minutes    Timed Charges Total Minutes  10  -SM      Untimed Charges Total Minutes  16  -SM       Total Minutes  26  -SM        User Key  (r) = Recorded By, (t) = Taken By, (c) = Cosigned By    Initials Name Provider Type     Lissa Corley, ERIC Occupational Therapist        Therapy Charges for Today     Code Description Service Date Service Provider Modifiers Qty    [id number]  OT EVAL LOW COMPLEXITY 2 9/16/2021 Lissa Corley OT GO 1    [id number]  OT THER PROC EA 15 MIN 9/16/2021 Lissa Corley OT GO 1               Lissa Corley OT  9/16/2021

## 2021-09-16 NOTE — THERAPY EVALUATION
Patient Name: Avinash Everett  : 1990    MRN: [id number]                              Today's Date: 2021       Admit Date: 2021    Visit Dx:     ICD-10-CM ICD-9-CM   1. Recurrent seizures (CMS/HCC)  G40.909 345.80     Patient Active Problem List   Diagnosis   • Right-sided nontraumatic intracerebral hemorrhage (CMS/HCC)   • Nontraumatic subcortical hemorrhage of right cerebral hemisphere (CMS/HCC)   • Alcohol abuse   • Metabolic encephalopathy   • Vitamin K deficiency   • Nontraumatic acute cerebral hemorrhage (CMS/HCC)   • Disruption or dehiscence of closure of skull or craniotomy   • Wound dehiscence   • Postoperative visit   • Recurrent seizures (CMS/HCC)     Past Medical History:   Diagnosis Date   • Dehiscence of incision    • Intracerebral hemorrhage (CMS/HCC)    • Metabolic encephalopathy    • MRSA (methicillin resistant Staphylococcus aureus)     RIGHT INCISION   • Paralysis (CMS/HCC)     LEFT SIDE     Past Surgical History:   Procedure Laterality Date   • CRANIOTOMY Right 10/1/2020    Procedure: INCISION AND DRAINAGE WOUND, of scalp incision with closure;  Surgeon: Leobardo Rivera MD;  Location: [facility];  Service: Neurosurgery;  Laterality: Right;   • CRANIOTOMY FOR SUBDURAL HEMATOMA Right 8/3/2020    Procedure: RIGHT FRONTOPARIETAL CRANIOTOMY FOR EVACUATION OF INTRACEREBRAL HEMORRHAGE;  Surgeon: Leobardo Rivera MD;  Location: [facility];  Service: Neurosurgery;  Laterality: Right;   • EMBOLIZATION CEREBRAL N/A 2020    Procedure: diagnostic angiogram possible EMBOLIZATION CEREBRAL. right radial approach;  Surgeon: Alfonso Malcolm MD;  Location: [facility] ;  Service: Neurosurgery;  Laterality: N/A;   • WRIST SURGERY      1st one 2006, 2010     General Information     Row Name 21 1155          Physical Therapy Time and Intention    Document Type  evaluation  -AE     Mode of Treatment  individual therapy;physical therapy  -AE     Row Name 21  1155          General Information    Patient Profile Reviewed  yes  -AE     Prior Level of Function  independent:  -AE     Existing Precautions/Restrictions  fall  -AE     Row Name 09/16/21 1155          Living Environment    Lives With  alone  -AE     Row Name 09/16/21 1155          Home Main Entrance    Number of Stairs, Main Entrance  none  -AE     Row Name 09/16/21 1155          Stairs Within Home, Primary    Number of Stairs, Within Home, Primary  none  -AE     Row Name 09/16/21 1155          Cognition    Orientation Status (Cognition)  oriented x 4  -AE     Row Name 09/16/21 1155          Safety Issues, Functional Mobility    Impairments Affecting Function (Mobility)  balance;motor control;motor planning;strength;pain  -AE       User Key  (r) = Recorded By, (t) = Taken By, (c) = Cosigned By    Initials Name Provider Type    AE Selena Cagle PT Physical Therapist        Mobility     Row Name 09/16/21 1156          Bed Mobility    Bed Mobility  bed mobility (all) activities  -AE     All Activities, [place] (Bed Mobility)  modified independence  -AE     Row Name 09/16/21 1156          Transfers    Comment (Transfers)  CGA all transfers with handheld assist due to not having cane in room  -AE     Row Name 09/16/21 1156          Bed-Chair Transfer    Bed-Chair [place] (Transfers)  contact guard  -AE     Row Name 09/16/21 1156          Sit-Stand Transfer    Sit-Stand [place] (Transfers)  contact guard  -AE     Row Name 09/16/21 1156          Gait/Stairs (Locomotion)    [place] Level (Gait)  contact guard  -AE     Distance in Feet (Gait)  furniture surfing 80ft holding onto hand rails in hallways with RUE  -AE     Deviations/Abnormal Patterns (Gait)  mariya decreased;base of support, wide;steppage  -AE     Bilateral Gait Deviations  forward flexed posture;heel strike decreased  -AE     Left Sided Gait Deviations  foot drop/toe drag;hip hiking;leans right  -AE       User Key  (r) = Recorded  By, (t) = Taken By, (c) = Cosigned By    Initials Name Provider Type    AE Selena Cagle, RAO Physical Therapist        Obj/Interventions     Row Name 09/16/21 1157          Strength Comprehensive (MMT)    Comment, General Manual Muscle Testing (MMT) Assessment  LLE residual weakness from AVM surgery, LUE no contractions  -AE       User Key  (r) = Recorded By, (t) = Taken By, (c) = Cosigned By    Initials Name Provider Type    AE Selena Cagle, RAO Physical Therapist        Goals/Plan    No documentation.       Clinical Impression     Row Name 09/16/21 1157          Pain    Additional Documentation  Pain Scale: Numbers Pre/Post-Treatment (Group)  -AE     Row Name 09/16/21 1157          Pain Scale: Numbers Pre/Post-Treatment    Pretreatment Pain Rating  4/10  -AE     Posttreatment Pain Rating  4/10  -AE     Pain Location - Side  Left  -AE     Pain Location - Orientation  proximal  -AE     Pain Location  hip  -AE     Pain Intervention(s)  Repositioned;Ambulation/increased activity;Rest  -AE     Row Name 09/16/21 1157          Plan of Care Review    Plan of Care Reviewed With  patient;mother  -AE     Row Name 09/16/21 1157          Therapy Assessment/Plan (PT)    Patient/Family Therapy Goals Statement (PT)  plans to DC home  -AE     Criteria for Skilled Interventions Met (PT)  no;does not meet criteria for skilled intervention  -AE     Row Name 09/16/21 1157          Positioning and Restraints    Pre-Treatment Position  in bed  -AE     Post Treatment Position  chair  -AE     In Chair  sitting;call light within reach;encouraged to call for assist;exit alarm on;notified nsg  -AE       User Key  (r) = Recorded By, (t) = Taken By, (c) = Cosigned By    Initials Name Provider Type    AE Selena Cagle, RAO Physical Therapist        Outcome Measures     Row Name 09/16/21 1158          How much help from another person do you currently need...    Turning from your back to your side while in flat bed without using  bedrails?  3  -AE     Moving from lying on back to sitting on the side of a flat bed without bedrails?  3  -AE     Moving to and from a bed to a chair (including a wheelchair)?  3  -AE     Standing up from a chair using your arms (e.g., wheelchair, bedside chair)?  3  -AE     Climbing 3-5 steps with a railing?  3  -AE     To walk in hospital room?  3  -AE     AM-PAC 6 Clicks Score (PT)  18  -AE     Row Name 09/16/21 1158          Functional Assessment    Outcome Measure Options  AM-PAC 6 Clicks Basic Mobility (PT)  -AE       User Key  (r) = Recorded By, (t) = Taken By, (c) = Cosigned By    Initials Name Provider Type    AE Selena Cagle PT Physical Therapist                       Physical Therapy Education                 Title: PT OT SLP Therapies (Done)     Topic: Physical Therapy (Done)     Point: Mobility training (Done)     Learning Progress Summary           Patient Acceptance, E,TB, VU,NR by AE at 9/16/2021 1158   Mother Acceptance, E,TB, VU,NR by AE at 9/16/2021 1158                   Point: Home exercise program (Done)     Learning Progress Summary           Patient Acceptance, E,TB, VU,NR by AE at 9/16/2021 1158   Mother Acceptance, E,TB, VU,NR by AE at 9/16/2021 1158                   Point: Body mechanics (Done)     Learning Progress Summary           Patient Acceptance, E,TB, VU,NR by AE at 9/16/2021 1158   Mother Acceptance, E,TB, VU,NR by AE at 9/16/2021 1158                   Point: Precautions (Done)     Learning Progress Summary           Patient Acceptance, E,TB, VU,NR by AE at 9/16/2021 1158   Mother Acceptance, E,TB, VU,NR by AE at 9/16/2021 1158                               User Key     Initials Effective Dates Name Provider Type Discipline    AE 06/16/21 -  Selena Cagle PT Physical Therapist PT              PT Recommendation and Plan     Plan of Care Reviewed With: patient, mother     Time Calculation:   PT Charges     Row Name 09/16/21 1204 09/16/21 1150          Time Calculation     Start Time  1130  -AE  --     Stop Time  1200  -AE  --     Time Calculation (min)  30 min  -AE  --     PT Received On  --  09/16/21  -AE        Time Calculation- PT    Total Timed Code Minutes- PT  20 minute(s)  -AE  --       User Key  (r) = Recorded By, (t) = Taken By, (c) = Cosigned By    Initials Name Provider Type    AE Selena Cagle, PT Physical Therapist        Therapy Charges for Today     Code Description Service Date Service Provider Modifiers Qty    86678333866 HC PT EVAL MOD COMPLEXITY 2 9/16/2021 Selena Cagle, PT GP 1    63772226020 HC GAIT TRAINING EA 15 MIN 9/16/2021 Selena Cagle, PT GP 1          PT G-Codes  Outcome Measure Options: AM-PAC 6 Clicks Basic Mobility (PT)  AM-PAC 6 Clicks Score (PT): 18    Selena Cagle PT  9/16/2021

## 2021-09-16 NOTE — PLAN OF CARE
Pt is a 31 y.o. 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  Since then the patient has had some breakthrough seizures with one in August and one yesterday back-to-back which was not being relieved with the current medications. Pt lives in [facility] with no steps to enter. Pt has significant L sided residual weakness from previous AVM last year. LUE flaccid, uses K-tape and sling intermittently for subluxed shoulder. Pt has L AFO he uses at baseline, intermittent use of quad cane for community distances. Pt did require handheld assist throughout due to cane not being available. Complaining of increased numbness in LLE demonstrated by decreased proprioception with gait. Ambulated 80ft via furniture surfing and holding onto hand rails. Patient and mother present and confirm being at functional baseline. No need for acute PT. Requesting outpatient PT referral. No additional DME needs. Safe to DC home with assist whenever medically stable. PT to sign off.      Problem: Adult Inpatient Plan of Care  Goal: Plan of Care Review  Outcome: Met  Flowsheets (Taken 9/16/2021 1157)  Plan of Care Reviewed With:   patient   mother   Goal Outcome Evaluation:  Plan of Care Reviewed With: patient, mother

## 2021-09-17 ENCOUNTER — READMISSION MANAGEMENT (OUTPATIENT)
Dept: CALL CENTER | Facility: HOSPITAL | Age: 31
End: 2021-09-17

## 2021-09-17 ENCOUNTER — TELEPHONE (OUTPATIENT)
Dept: NEUROSURGERY | Facility: CLINIC | Age: 31
End: 2021-09-17

## 2021-09-17 NOTE — PAYOR COMM NOTE
"Avinash Everett (31 y.o. Male)     PLEASE SEE ATTACHED CLINICAL REVIEW.     REF#[id number]    PLEASE CALL   OR  [phone]  OR  [phone]      THANK YOU    DORA BARBOZA LPN         Date of Birth Social Security Number Address Home Phone MRN    1990  [facility] [place] [address] [phone] [id number]    Zoroastrianism Marital Status          None        Admission Date Admission Type Admitting Provider Attending Provider Department, Room/Bed    21 Emergency Adan Tracy MD  [address], [address]/1    Discharge Date Discharge Disposition Discharge Destination        2021 Home or Self Care              Attending Provider: (none)   Allergies: Citrus, Promethazine    Isolation: None   Infection: MRSA/History Only (09/15/21)   Code Status: Prior    Ht: 185.4 cm (73\")   Wt: 82.6 kg (182 lb 1.6 oz)    Admission Cmt: None   Principal Problem: Recurrent seizures (CMS/HCC) [G40.909]                 Active Insurance as of 2021     Primary Coverage     Payor Plan Insurance Group Employer/Plan Group    [facility], [employer] BLUE [facility] [facility] [employer] EMPL[employer]EE [id number]     Payor Plan Address Payor Plan Phone Number Payor Plan Fax Number Effective Dates    [address] [phone]  2020 - None Entered    [place] [address]       Subscriber Name Subscriber Birth Date Member ID       JACOB EVERETT [birth year]1989 [id number]                 Emergency Contacts      (Rel.) Home Phone Work Phone Mobile Phone    Alea Everett (Spouse) [phone] -- [phone]    DiannSangeetha wayne (Mother) -- -- [phone]               History & Physical      Michael Cervantes MD at 09/15/21 1508          HISTORY AND PHYSICAL   [facility]        Patient Identification:  Name: Avinash Everett  Age: 31 y.o.  Sex: male  :  1990  MRN: [id number]                     Primary Care Physician: Provider, No Known    Chief Complaint: " Seizures    History of Present Illness:        The patient is a 31 y.o. male, with a history of seizures, who presents to the ED by EMS from home after having 2 witnessed seizures.  EMS reports that the patient was still seizing when they arrived on scene.  Seizure was generalized and tonic-clonic.  He then had a third seizure while in the ambulance.  He was given Versed 2.5 mg IV.  He stopped seizing and was then postictal.  Patient takes Keppra and is reportedly compliant.  Has a history of a right-sided craniotomy.  EMS reports that the patient was coughing.  History is limited as the patient is still postictal.  The patient was admitted to the hospital for further evaluation treatment of recurrent seizures      Past Medical History:  Past Medical History:   Diagnosis Date   • Dehiscence of incision    • Intracerebral hemorrhage (CMS/HCC)    • Metabolic encephalopathy    • MRSA (methicillin resistant Staphylococcus aureus)     RIGHT INCISION   • Paralysis (CMS/HCC)     LEFT SIDE     Past Surgical History:  Past Surgical History:   Procedure Laterality Date   • CRANIOTOMY Right 10/1/2020    Procedure: INCISION AND DRAINAGE WOUND, of scalp incision with closure;  Surgeon: Leobardo Rivera MD;  Location: [facility];  Service: Neurosurgery;  Laterality: Right;   • CRANIOTOMY FOR SUBDURAL HEMATOMA Right 8/3/2020    Procedure: RIGHT FRONTOPARIETAL CRANIOTOMY FOR EVACUATION OF INTRACEREBRAL HEMORRHAGE;  Surgeon: Leobardo Rivera MD;  Location: [facility] OR;  Service: Neurosurgery;  Laterality: Right;   • EMBOLIZATION CEREBRAL N/A 8/5/2020    Procedure: diagnostic angiogram possible EMBOLIZATION CEREBRAL. right radial approach;  Surgeon: Alfonso Malcolm MD;  Location: [facility] OR 18/19;  Service: Neurosurgery;  Laterality: N/A;   • WRIST SURGERY      1st one 2006, 2nd 2010      Home Meds:  (Not in a hospital admission)    Current meds    Current Facility-Administered Medications:   •  acetaminophen  (TYLENOL) tablet 650 mg, 650 mg, Oral, Q4H PRN, Lucien Jackson APRN, 650 mg at 09/15/21 0815  •  aluminum-magnesium hydroxide-simethicone (MAALOX MAX) 400-400-40 MG/5ML suspension 15 mL, 15 mL, Oral, Q6H PRN, Lucien Jackson APRN  •  levETIRAcetam (KEPPRA) tablet 1,500 mg, 1,500 mg, Oral, BID, Ascencion Dennis MD, 1,500 mg at 09/15/21 1051  •  nitroglycerin (NITROSTAT) SL tablet 0.4 mg, 0.4 mg, Sublingual, Q5 Min PRN, Lucien Jackson APRN  •  ondansetron (ZOFRAN) tablet 4 mg, 4 mg, Oral, Q6H PRN **OR** ondansetron (ZOFRAN) injection 4 mg, 4 mg, Intravenous, Q6H PRN, Lucien Jackson APRN  •  [COMPLETED] Insert peripheral IV, , , Once **AND** sodium chloride 0.9 % flush 10 mL, 10 mL, Intravenous, PRN, Alfonso Collier MD  •  sodium chloride 0.9 % flush 10 mL, 10 mL, Intravenous, Q12H, Lucien Jackson APRN, 10 mL at 09/15/21 0103  •  sodium chloride 0.9 % flush 10 mL, 10 mL, Intravenous, PRN, Lucien Jackson APRN    Current Outpatient Medications:   •  acetaminophen (TYLENOL) 325 MG tablet, Take 2 tablets by mouth Every 6 (Six) Hours As Needed for Mild Pain ., Disp:  , Rfl:   •  cetaphil (CETAPHIL) lotion, Apply  topically to the appropriate area as directed Every 12 (Twelve) Hours., Disp: 177 mL, Rfl: 2  •  clobetasol propionate (CLOBEX) 0.05 % shampoo, Apply  topically to the appropriate area as directed 3 (Three) Times a Week., Disp: 118 mL, Rfl: 2  •  desonide (DESOWEN) 0.05 % ointment, Apply  topically to the appropriate area as directed Every 12 (Twelve) Hours., Disp: 60 g, Rfl: 1  •  famotidine (PEPCID) 20 MG tablet, Take 1 tablet by mouth 2 (Two) Times a Day Before Meals., Disp: 180 tablet, Rfl: 0  •  folic acid (FOLVITE) 1 MG tablet, Take 1 tablet by mouth Daily., Disp: 90 tablet, Rfl: 0  •  gabapentin (NEURONTIN) 300 MG capsule, Take 1 capsule by mouth Every Night., Disp: 90 capsule, Rfl: 0  •  HYDROcodone-acetaminophen (NORCO) 5-325 MG per tablet, Take 1 tablet by mouth  Every 8 (Eight) Hours As Needed for Moderate Pain  or Severe Pain., Disp: 21 tablet, Rfl: 0  •  levETIRAcetam (KEPPRA) 1000 MG tablet, Take 1 tablet by mouth Every 12 (Twelve) Hours., Disp: 60 tablet, Rfl: 0  •  mirtazapine (REMERON) 15 MG tablet, Take 1 tablet by mouth Every Night., Disp: 90 tablet, Rfl: 0  •  potassium chloride (MICRO-K) 10 MEQ CR capsule, Take 1 capsule by mouth 2 (Two) Times a Day With Meals., Disp: 60 capsule, Rfl: 1  •  thiamine (VITAMIN B1) 100 MG tablet, Take 1 tablet by mouth Daily., Disp: 90 tablet, Rfl: 0  Allergies:  Allergies   Allergen Reactions   • Citrus Hives     Hives in mouth     • Promethazine Hives     Immunizations:    There is no immunization history on file for this patient.  Social History:   Social History     Social History Narrative   • Not on file     Social History     Socioeconomic History   • Marital status:      Spouse name: Not on file   • Number of children: Not on file   • Years of education: Not on file   • Highest education level: Not on file   Tobacco Use   • Smoking status: Former Smoker     Packs/day: 0.50   • Smokeless tobacco: Never Used   • Tobacco comment: PT HAS NOT SMOKED SINCE 7/2020   Substance and Sexual Activity   • Alcohol use: Not Currently   • Drug use: Never   • Sexual activity: Not Currently       Family History:  Family History   Problem Relation Age of Onset   • Depression Maternal Grandmother    • Depression Maternal Grandfather    • Depression Paternal Grandmother    • Depression Paternal Grandfather    • Malig Hyperthermia Neg Hx         Review of Systems  See history of present illness and past medical history.  Patient denies headache, dizziness,  falls, trauma, change in vision, change in hearing, change in taste, changes in weight, changes in appetite, focal weakness, numbness, or paresthesia.  Patient denies chest pain, palpitations, dyspnea, orthopnea, PND, cough, sinus pressure, rhinorrhea, epistaxis, hemoptysis, nausea,  "vomiting, hematemesis, diarrhea, constipation or hematchezia.  Denies cold or heat intolerance, polydipsia, polyuria, polyphagia. Denies hematuria, pyuria, dysuria, hesitancy, frequency or urgency.   Denies fever, chills, sweats, night sweats.  Denies missing any routine medications. Remainder of ROS is negative.    Objective:  tMax 24 hrs: Temp (24hrs), Av.5 °F (36.4 °C), Min:97.5 °F (36.4 °C), Max:97.5 °F (36.4 °C)    Vitals Ranges:   Temp:  [97.5 °F (36.4 °C)] 97.5 °F (36.4 °C)  Heart Rate:  [] 74  Resp:  [20] 20  BP: (108-138)/(64-86) 110/67      Exam:  /67   Pulse 74   Temp 97.5 °F (36.4 °C) (Temporal)   Resp 20   Ht 185.4 cm (73\")   Wt 82.6 kg (182 lb)   SpO2 95%   BMI 24.01 kg/m²     General Appearance:    Alert, cooperative, no distress, appears stated age   Head:    Normocephalic, without obvious abnormality, atraumatic   Eyes:    PERRL, conjunctivae/corneas clear, EOM's intact, both eyes   Ears:    Normal external ear canals, both ears   Nose:   Nares normal, septum midline, mucosa normal, no drainage    or sinus tenderness   Throat:   Lips, mucosa, and tongue normal   Neck:   Supple, symmetrical, trachea midline, no adenopathy;     thyroid:  no enlargement/tenderness/nodules; no carotid    bruit or JVD   Back:     Symmetric, no curvature, ROM normal, no CVA tenderness   Lungs:     Clear to auscultation bilaterally, respirations unlabored   Chest Wall:    No tenderness or deformity    Heart:    Regular rate and rhythm, S1 and S2 normal, no murmur, rub   or gallop   Abdomen:     Soft, nontender, bowel sounds active all four quadrants,     no masses, no hepatomegaly, no splenomegaly   Extremities:   Extremities normal, atraumatic, no cyanosis or edema   Pulses:   2+ and symmetric all extremities   Skin:   Skin color, texture, turgor normal, no rashes or lesions   Lymph nodes:   Cervical, supraclavicular, and axillary nodes normal   Neurologic:  Left hemiparesis      .    Data " Review:  Lab Results (last 72 hours)     Procedure Component Value Units Date/Time    COVID PRE-OP / PRE-PROCEDURE SCREENING ORDER (NO ISOLATION) - Swab, Nasopharynx [[id number]]  (Normal) Collected: 09/15/21 0106    Specimen: Swab from Nasopharynx Updated: 09/15/21 1242    Narrative:      The following orders were created for panel order COVID PRE-OP / PRE-PROCEDURE SCREENING ORDER (NO ISOLATION) - Swab, Nasopharynx.  Procedure                               Abnormality         Status                     ---------                               -----------         ------                     COVID-19,APTIMA PANTHER(...[[id number]]  Normal              Final result                 Please view results for these tests on the individual orders.    COVID-19,APTIMA PANTHER(AUDRA),[facility], NP/OP SWAB IN UTM/VTM/SALINE TRANSPORT MEDIA,24 HR TAT - Swab, Nasopharynx [[id number]]  (Normal) Collected: 09/15/21 0106    Specimen: Swab from Nasopharynx Updated: 09/15/21 1242     COVID19 Not Detected    Narrative:      Fact sheet for providers: https://www.fda.gov/media/954225/download     Fact sheet for patients: https://www.fda.gov/media/783556/download    Test performed by RT PCR.    CBC (No Diff) [[id number]]  (Abnormal) Collected: 09/15/21 0615    Specimen: Blood Updated: 09/15/21 0658     WBC 3.90 10*3/mm3      RBC 3.49 10*6/mm3      Hemoglobin 10.5 g/dL      Hematocrit 30.8 %      MCV 88.3 fL      MCH 30.1 pg      MCHC 34.1 g/dL      RDW 12.9 %      RDW-SD 41.7 fl      MPV 9.8 fL      Platelets 96 10*3/mm3     Basic Metabolic Panel [[id number]]  (Abnormal) Collected: 09/15/21 0615    Specimen: Blood Updated: 09/15/21 0655     Glucose 101 mg/dL      BUN 5 mg/dL      Creatinine 0.43 mg/dL      Sodium 140 mmol/L      Potassium 3.8 mmol/L      Chloride 110 mmol/L      CO2 21.9 mmol/L      Calcium 8.3 mg/dL      eGFR Non African Amer >150 mL/min/1.73      BUN/Creatinine Ratio 11.6     Anion Gap 8.1 mmol/L     Narrative:      GFR Normal  >60  Chronic Kidney Disease <60  Kidney Failure <15      CK [[id number]]  (Normal) Collected: 09/15/21 0615    Specimen: Blood Updated: 09/15/21 0655     Creatine Kinase 81 U/L     Comprehensive Metabolic Panel [[id number]]  (Abnormal) Collected: 09/14/21 2344    Specimen: Blood Updated: 09/15/21 0017     Glucose 92 mg/dL      BUN 6 mg/dL      Creatinine 0.53 mg/dL      Sodium 142 mmol/L      Potassium 4.0 mmol/L      Comment: Slight hemolysis detected by analyzer. Results may be affected.        Chloride 111 mmol/L      CO2 22.5 mmol/L      Calcium 8.2 mg/dL      Total Protein 6.0 g/dL      Albumin 3.70 g/dL      ALT (SGPT) 17 U/L      AST (SGOT) 27 U/L      Alkaline Phosphatase 87 U/L      Total Bilirubin 0.8 mg/dL      eGFR Non African Amer >150 mL/min/1.73      Globulin 2.3 gm/dL      A/G Ratio 1.6 g/dL      BUN/Creatinine Ratio 11.3     Anion Gap 8.5 mmol/L     Narrative:      GFR Normal >60  Chronic Kidney Disease <60  Kidney Failure <15      Phosphorus [[id number]]  (Normal) Collected: 09/14/21 2344    Specimen: Blood Updated: 09/15/21 0017     Phosphorus 2.9 mg/dL     Magnesium [[id number]]  (Normal) Collected: 09/14/21 2344    Specimen: Blood Updated: 09/15/21 0017     Magnesium 1.6 mg/dL     CBC & Differential [[id number]]  (Abnormal) Collected: 09/14/21 2344    Specimen: Blood Updated: 09/14/21 2355    Narrative:      The following orders were created for panel order CBC & Differential.  Procedure                               Abnormality         Status                     ---------                               -----------         ------                     CBC Auto Differential[[id number]]        Abnormal            Final result                 Please view results for these tests on the individual orders.    CBC Auto Differential [[id number]]  (Abnormal) Collected: 09/14/21 2344    Specimen: Blood Updated: 09/14/21 2355     WBC 4.53 10*3/mm3      RBC 3.52 10*6/mm3      Hemoglobin 10.5 g/dL      Hematocrit 31.2  %      MCV 88.6 fL      MCH 29.8 pg      MCHC 33.7 g/dL      RDW 12.8 %      RDW-SD 40.5 fl      MPV 9.9 fL      Platelets 101 10*3/mm3      Neutrophil % 80.5 %      Lymphocyte % 11.7 %      Monocyte % 5.5 %      Eosinophil % 1.5 %      Basophil % 0.4 %      Immature Grans % 0.4 %      Neutrophils, Absolute 3.64 10*3/mm3      Lymphocytes, Absolute 0.53 10*3/mm3      Monocytes, Absolute 0.25 10*3/mm3      Eosinophils, Absolute 0.07 10*3/mm3      Basophils, Absolute 0.02 10*3/mm3      Immature Grans, Absolute 0.02 10*3/mm3      nRBC 0.0 /100 WBC                    Imaging Results (All)     Procedure Component Value Units Date/Time    MRI Brain With & Without Contrast [[id number]] Resulted: 09/15/21 1336     Updated: 09/15/21 1427    CT Head Without Contrast [[id number]] Collected: 09/15/21 0009     Updated: 09/15/21 0009    Narrative:        Patient: HARSHAD SHOOK  Time Out: 00:08  Exam(s): CT HEAD Without Contrast     EXAM:    CT Head Without Intravenous Contrast    CLINICAL HISTORY:     Reason for exam: Recurrent seizures.    TECHNIQUE:    Axial computed tomography images of the head brain without intravenous   contrast.  CTDI is 54.8 mGy and DLP is 1089.5 mGy-cm.  This CT exam was   performed according to the principle of ALARA (As Low As Reasonably   Achievable) by using one or more of the following dose reduction   techniques: automated exposure control, adjustment of the mA and or kV   according to patient size, and or use of iterative reconstruction   technique.    COMPARISON:    CT head 8 2 21    FINDINGS:    Brain:  Stable encephalomalacia in the right frontal and parietal lobes   subjacent to the craniotomy.  No acute intracranial hemorrhage, mass-  effect, or edema.  Gray-white matter differentiation maintained.    Ventricles:  Unremarkable.  No hydrocephalus.    Bones joints:  Old right-sided craniotomy.  No skull fracture.    Soft tissues:  Unremarkable.    Sinuses:  Unremarkable as visualized.  No acute  sinusitis.    Mastoid air cells:  Unremarkable as visualized.  No mastoid effusion.    IMPRESSION:         No acute intracranial process.      Impression:          Electronically signed by Anisha Armendariz M.D. on 09-15-21 at 0008        Past Medical History:   Diagnosis Date   • Dehiscence of incision    • Intracerebral hemorrhage (CMS/HCC)    • Metabolic encephalopathy    • MRSA (methicillin resistant Staphylococcus aureus)     RIGHT INCISION   • Paralysis (CMS/HCC)     LEFT SIDE       Assessment:  Active Hospital Problems    Diagnosis  POA   • **Recurrent seizures (CMS/HCC) [G40.909]  Yes   • Right-sided nontraumatic intracerebral hemorrhage (CMS/HCC) [I61.9]  Yes      Resolved Hospital Problems   No resolved problems to display.       Plan:  The patient admitted to the hospital with neurology consultation medication for recurrent seizures.    Michael Cervantes MD  9/15/2021  15:08 EDT    Electronically signed by Michael Cervantes MD at 09/15/21 1646          Emergency Department Notes      Iglesia Walls RN at 09/14/21 2250        Pt arrived by EMS from at home, family called witness seizure x2 seizures tonight pt is on Keppra and is compliant, pt is postictal, x2 seizures with EMS. Pt received 2.5 Versed with EMS.     This nurse wore face shield and mask during assessment, patient wore mask.        Iglesia Walls RN  09/14/21 2251      Electronically signed by Iglesia Walls RN at 09/14/21 2251     Alfonso Collier MD at 09/14/21 2307           EMERGENCY DEPARTMENT ENCOUNTER    Room Number:  18/18  Date of encounter:  9/15/2021  PCP: Provider, No Known  Historian: Patient, EMS    I used full protective equipment while examining this patient.  This includes face mask, gloves and protective eyewear.  I washed my hands before entering the room and immediately upon leaving the room.  Patient was wearing a surgical mask.      HPI:  Chief Complaint: Seizures  A complete HPI/ROS/PMH/PSH/SH/FH are  unobtainable due to: Patient is postictal    Context: Avinash Everett is a 31 y.o. male, with a history of seizures, who presents to the ED by EMS from home after having 2 witnessed seizures.  EMS reports that the patient was still seizing when they arrived on scene.  Seizure was generalized and tonic-clonic.  He then had a third seizure while in the ambulance.  He was given Versed 2.5 mg IV.  He stopped seizing and was then postictal.  Patient takes Keppra and is reportedly compliant.  Has a history of a right-sided craniotomy.  EMS reports that the patient was coughing.  History is limited as the patient is still postictal.      PAST MEDICAL HISTORY  Active Ambulatory Problems     Diagnosis Date Noted   • Right-sided nontraumatic intracerebral hemorrhage (CMS/HCC) 08/03/2020   • Nontraumatic subcortical hemorrhage of right cerebral hemisphere (CMS/HCC) 08/03/2020   • Alcohol abuse 08/10/2020   • Metabolic encephalopathy 08/10/2020   • Vitamin K deficiency 08/10/2020   • Nontraumatic acute cerebral hemorrhage (CMS/HCC) 08/17/2020   • Disruption or dehiscence of closure of skull or craniotomy 09/28/2020   • Wound dehiscence 10/01/2020   • Postoperative visit 10/15/2020     Resolved Ambulatory Problems     Diagnosis Date Noted   • No Resolved Ambulatory Problems     Past Medical History:   Diagnosis Date   • Dehiscence of incision    • Intracerebral hemorrhage (CMS/HCC)    • MRSA (methicillin resistant Staphylococcus aureus)    • Paralysis (CMS/[facility])          PAST SURGICAL HISTORY  Past Surgical History:   Procedure Laterality Date   • CRANIOTOMY Right 10/1/2020    Procedure: INCISION AND DRAINAGE WOUND, of scalp incision with closure;  Surgeon: Leobardo Rivera MD;  Location: [facility] OR;  Service: Neurosurgery;  Laterality: Right;   • CRANIOTOMY FOR SUBDURAL HEMATOMA Right 8/3/2020    Procedure: RIGHT FRONTOPARIETAL CRANIOTOMY FOR EVACUATION OF INTRACEREBRAL HEMORRHAGE;  Surgeon: Leobardo Rivera MD;  Location:  [facility] MAIN OR;  Service: Neurosurgery;  Laterality: Right;   • EMBOLIZATION CEREBRAL N/A 8/5/2020    Procedure: diagnostic angiogram possible EMBOLIZATION CEREBRAL. right radial approach;  Surgeon: Alfonso Malcolm MD;  Location: [facility] OR 18/19;  Service: Neurosurgery;  Laterality: N/A;   • WRIST SURGERY      1st one 2006, 2nd 2010         FAMILY HISTORY  Family History   Problem Relation Age of Onset   • Depression Maternal Grandmother    • Depression Maternal Grandfather    • Depression Paternal Grandmother    • Depression Paternal Grandfather    • Malig Hyperthermia Neg Hx          SOCIAL HISTORY  Social History     Socioeconomic History   • Marital status:      Spouse name: Not on file   • Number of children: Not on file   • Years of education: Not on file   • Highest education level: Not on file   Tobacco Use   • Smoking status: Former Smoker     Packs/day: 0.50   • Smokeless tobacco: Never Used   • Tobacco comment: PT HAS NOT SMOKED SINCE 7/2020   Substance and Sexual Activity   • Alcohol use: Not Currently   • Drug use: Never   • Sexual activity: Not Currently         ALLERGIES  Citrus and Promethazine       REVIEW OF SYSTEMS  Review of Systems   Unobtainable      PHYSICAL EXAM    I have reviewed the triage vital signs and nursing notes.    ED Triage Vitals   Temp Heart Rate Resp BP SpO2   09/14/21 2253 09/14/21 2252 -- 09/14/21 2252 09/14/21 2252   97.5 °F (36.4 °C) (!) 126  138/86 98 %      Temp src Heart Rate Source Patient Position BP Location FiO2 (%)   09/14/21 2252 09/14/21 2252 09/14/21 2252 09/14/21 2252 --   Tympanic Monitor Lying Right arm        Physical Exam  GENERAL: Awake, oriented to self, does not answer questions  HENT: NCAT, nares patent, dry mucous membranes, no tongue injury NECK: supple  EYES: Extraocular muscles intact, no scleral icterus  CV: regular rhythm, tachycardic  RESPIRATORY: normal effort, clear to auscultation bilaterally  ABDOMEN: soft,  nontender  MUSCULOSKELETAL: Extremities are nontender and without obvious deformity.    NEURO:  No facial droop.  Follows simple commands.  There is weakness in the left arm and left leg, which is reportedly chronic.  Normal strength in the right arm and right leg.  SKIN: warm, dry, no rash  PSYCH: Flat affect      LAB RESULTS  Recent Results (from the past 24 hour(s))   Comprehensive Metabolic Panel    Collection Time: 09/14/21 11:44 PM    Specimen: Blood   Result Value Ref Range    Glucose 92 65 - 99 mg/dL    BUN 6 6 - 20 mg/dL    Creatinine 0.53 (L) 0.76 - 1.27 mg/dL    Sodium 142 136 - 145 mmol/L    Potassium 4.0 3.5 - 5.2 mmol/L    Chloride 111 (H) 98 - 107 mmol/L    CO2 22.5 22.0 - 29.0 mmol/L    Calcium 8.2 (L) 8.6 - 10.5 mg/dL    Total Protein 6.0 6.0 - 8.5 g/dL    Albumin 3.70 3.50 - 5.20 g/dL    ALT (SGPT) 17 1 - 41 U/L    AST (SGOT) 27 1 - 40 U/L    Alkaline Phosphatase 87 39 - 117 U/L    Total Bilirubin 0.8 0.0 - 1.2 mg/dL    eGFR Non African Amer >150 >60 mL/min/1.73    Globulin 2.3 gm/dL    A/G Ratio 1.6 g/dL    BUN/Creatinine Ratio 11.3 7.0 - 25.0    Anion Gap 8.5 5.0 - 15.0 mmol/L   Magnesium    Collection Time: 09/14/21 11:44 PM    Specimen: Blood   Result Value Ref Range    Magnesium 1.6 1.6 - 2.6 mg/dL   Phosphorus    Collection Time: 09/14/21 11:44 PM    Specimen: Blood   Result Value Ref Range    Phosphorus 2.9 2.5 - 4.5 mg/dL   CBC Auto Differential    Collection Time: 09/14/21 11:44 PM    Specimen: Blood   Result Value Ref Range    WBC 4.53 3.40 - 10.80 10*3/mm3    RBC 3.52 (L) 4.14 - 5.80 10*6/mm3    Hemoglobin 10.5 (L) 13.0 - 17.7 g/dL    Hematocrit 31.2 (L) 37.5 - 51.0 %    MCV 88.6 79.0 - 97.0 fL    MCH 29.8 26.6 - 33.0 pg    MCHC 33.7 31.5 - 35.7 g/dL    RDW 12.8 12.3 - 15.4 %    RDW-SD 40.5 37.0 - 54.0 fl    MPV 9.9 6.0 - 12.0 fL    Platelets 101 (L) 140 - 450 10*3/mm3    Neutrophil % 80.5 (H) 42.7 - 76.0 %    Lymphocyte % 11.7 (L) 19.6 - 45.3 %    Monocyte % 5.5 5.0 - 12.0 %     Eosinophil % 1.5 0.3 - 6.2 %    Basophil % 0.4 0.0 - 1.5 %    Immature Grans % 0.4 0.0 - 0.5 %    Neutrophils, Absolute 3.64 1.70 - 7.00 10*3/mm3    Lymphocytes, Absolute 0.53 (L) 0.70 - 3.10 10*3/mm3    Monocytes, Absolute 0.25 0.10 - 0.90 10*3/mm3    Eosinophils, Absolute 0.07 0.00 - 0.40 10*3/mm3    Basophils, Absolute 0.02 0.00 - 0.20 10*3/mm3    Immature Grans, Absolute 0.02 0.00 - 0.05 10*3/mm3    nRBC 0.0 0.0 - 0.2 /100 WBC       Ordered the above labs and independently reviewed the results.      RADIOLOGY  CT Head Without Contrast    Result Date: 9/15/2021  Patient: HARSHAD SHOOK  Time Out: 00:08 Exam(s): CT HEAD Without Contrast EXAM:   CT Head Without Intravenous Contrast CLINICAL HISTORY:    Reason for exam: Recurrent seizures. TECHNIQUE:   Axial computed tomography images of the head brain without intravenous contrast.  CTDI is 54.8 mGy and DLP is 1089.5 mGy-cm.  This CT exam was performed according to the principle of ALARA (As Low As Reasonably Achievable) by using one or more of the following dose reduction techniques: automated exposure control, adjustment of the mA and or kV according to patient size, and or use of iterative reconstruction technique. COMPARISON:   CT head 8 2 21 FINDINGS:   Brain:  Stable encephalomalacia in the right frontal and parietal lobes subjacent to the craniotomy.  No acute intracranial hemorrhage, mass- effect, or edema.  Gray-white matter differentiation maintained.   Ventricles:  Unremarkable.  No hydrocephalus.   Bones joints:  Old right-sided craniotomy.  No skull fracture.   Soft tissues:  Unremarkable.   Sinuses:  Unremarkable as visualized.  No acute sinusitis.   Mastoid air cells:  Unremarkable as visualized.  No mastoid effusion. IMPRESSION:       No acute intracranial process.     Electronically signed by Anisha Armendariz M.D. on 09-15-21 at 0008      I ordered the above noted radiological studies. Reviewed by me and discussed with radiologist.  See dictation for  official radiology interpretation.      PROCEDURES  Procedures      MEDICATIONS GIVEN IN ER    Medications   sodium chloride 0.9 % flush 10 mL (has no administration in time range)   sodium chloride 0.9 % flush 10 mL (10 mL Intravenous Given 9/15/21 0103)   sodium chloride 0.9 % flush 10 mL (has no administration in time range)   nitroglycerin (NITROSTAT) SL tablet 0.4 mg (has no administration in time range)   acetaminophen (TYLENOL) tablet 650 mg (has no administration in time range)   ondansetron (ZOFRAN) tablet 4 mg (has no administration in time range)     Or   ondansetron (ZOFRAN) injection 4 mg (has no administration in time range)   aluminum-magnesium hydroxide-simethicone (MAALOX MAX) 400-400-40 MG/5ML suspension 15 mL (has no administration in time range)   levETIRAcetam in NaCl 0.82% (KEPPRA) IVPB 500 mg (has no administration in time range)   levETIRAcetam in NaCl 0.75% (KEPPRA) IVPB 1,000 mg (0 mg Intravenous Stopped 9/14/21 2343)         PROGRESS, DATA ANALYSIS, CONSULTS, AND MEDICAL DECISION MAKING    All labs have been independently reviewed by me.  All radiology studies have been reviewed by me and discussed with radiologist dictating the report.   EKG's independently viewed and interpreted by me.  I have reviewed the nurse's notes, vital signs, past medical history, and medication list.  Discussion below represents my analysis of pertinent findings related to patient's condition, differential diagnosis, treatment plan and final disposition.      ED Course as of Sep 15 0109   Tue Sep 14, 2021   2303 Old records reviewed.  Patient was seen here in the ED last month after having a seizure.  Head CT was negative acute.  He was admitted here in August 2020 for nontraumatic subcortical hemorrhage of the right cerebral hemisphere.  Underwent emergent right frontoparietal craniotomy at that time.    [WH]   2327 Patient's mother is now bedside.  She states the patient had a seizure around 9 PM.  This lasted  for approximately 5 minutes.  He was not confused following this.  Several minutes later, he had a 2nd seizure which lasted for at least 30 minutes.  Mom states she had mild generalized shaking and was unresponsive.  His last seizure was in early August.  He is compliant with Keppra.  He has never had multiple seizures over a short period of time before.  Seizures usually last a few minutes.  Mom states he has not had any recent illness.  He does have a chronic cough as she is a smoker.  She confirmed that the patient does have chronic left-sided weakness.    []   Wed Sep 15, 2021   0022 Head CT interpreted by the radiologist.  Images independently viewed by me.  Head CT is negative acute.  There is stable encephalomalacia in the right frontal and parietal lobes.    [WH]   0041 Test results discussed with the patient and his mom.  Mom states that the patient is now back to his normal baseline.  He is able to answer questions.  Currently complains of a headache.  I also discussed with them my recommendation for admission and they were agreeable.    [WH]   0053 Case discussed with Lucien NDIAYE, and she agrees to admit the patient to Dr. Tracy.  Pertinent exam findings, test results, ED course, and diagnoses were discussed with her.  Patient will be admitted to a monitored bed.    [WH]   0106 Patient presented to the ED after having 3 generalized seizures over the course of about 1 hour.  He was postictal upon arrival to the ED.  He was given IV Keppra.  Head CT was stable.  Labs were unremarkable.  He gradually returned to his baseline.  Given his recurrent seizures and prolonged postictal phase, patient will be admitted to the hospitalist.    [WH]      ED Course User Index  [WH] Alfonso Collier MD       AS OF 01:09 EDT VITALS:    BP - 122/78  HR - 99  TEMP - 97.5 °F (36.4 °C) (Temporal)  O2 SATS - 96%      DIAGNOSIS  Final diagnoses:   Recurrent seizures (CMS/HCC)          DISPOSITION  Admission    ADMISSION    Discussed treatment plan and reason for admission with pt/family and admitting physician.  Pt/family voiced understanding of the plan for admission for further testing/treatment as needed.         Dictated utilizing Dragon dictation:  Much of this encounter note is an electronic transcription/translation of spoken language to printed text. The electronic translation of spoken language may permit erroneous, or at times, nonsensical words or phrases to be inadvertently transcribed; Although I have reviewed the note for such errors, some may still exist.     Alfonso Collier MD  09/15/21 0109      Electronically signed by Alfonso Collier MD at 09/15/21 0109     Denita Ryan, RN at 09/15/21 1121        As patient was being changed into street clothes to hospital gown patient became unresponsive with L sided gaze for roughly 15 seconds. Made call out to admitting MD to notify.      Denita Ryan, RN  09/15/21 1123      Electronically signed by Denita Ryan RN at 09/15/21 1123       Oxygen Therapy (last 3 days) before discharge     Date/Time   SpO2   Device (Oxygen Therapy)   Flow (L/min)   Oxygen Concentration (%)   ETCO2 (mmHg)    09/16/21 0820   --   room air   --   --   --    09/16/21 0722   --   room air   --   --   --    09/16/21 0155   --   room air   --   --   --    09/15/21 2250   95   room air   --   --   --    09/15/21 1930   96   room air   --   --   --    09/15/21 1605   --   room air   --   --   --    09/15/21 1257   95   --   --   --   --    09/15/21 1122   98   --   --   --   --    09/15/21 0806   92   --   --   --   --    09/15/21 0418   96   room air   --   --   --    09/15/21 0257   94   --   --   --   --    09/15/21 0102   96   --   --   --   --    09/14/21 2252   98   nasal cannula   2   --   --            Intake & Output (last 3 days)       09/14 0701 - 09/15 0700 09/15 0701 - 09/16 0700 09/16 0701 - 09/17 0700 09/17 0701 - 09/18 0700     IV Piggyback 1000       Total Intake(mL/kg) 1000 (12.1)       Urine (mL/kg/hr)  300 (0.2) 200 (0.1)     Total Output  300 200     Net +1000 -300 -200                  Orders (all)      Start     Ordered    09/17/21 0600  Basic Metabolic Panel  Morning Draw,   Status:  Canceled      09/16/21 1447    09/17/21 0600  CBC & Differential  Morning Draw,   Status:  Canceled      09/16/21 1447    09/17/21 0600  CBC Auto Differential  PROCEDURE ONCE,   Status:  Canceled      09/16/21 2210    09/16/21 1644  Discharge patient  Once      09/16/21 1643    09/16/21 1533  Inpatient Admission  Once      09/16/21 1533    09/16/21 0900  levETIRAcetam in NaCl 0.82% (KEPPRA) IVPB 500 mg  Every 12 Hours Scheduled,   Status:  Discontinued      09/15/21 0101    09/16/21 0600  Basic Metabolic Panel  Morning Draw      09/15/21 1620    09/16/21 0600  CBC & Differential  Morning Draw      09/15/21 1620    09/16/21 0600  CBC Auto Differential  PROCEDURE ONCE      09/15/21 2209    09/16/21 0000  levETIRAcetam (KEPPRA) 750 MG tablet  2 Times Daily      09/16/21 1643    09/15/21 2100  gabapentin (NEURONTIN) capsule 300 mg  Nightly,   Status:  Discontinued      09/15/21 1616    09/15/21 2100  mirtazapine (REMERON) tablet 15 mg  Nightly,   Status:  Discontinued      09/15/21 1616    09/15/21 1800  folic acid (FOLVITE) tablet 1 mg  Daily,   Status:  Discontinued      09/15/21 1616    09/15/21 1800  thiamine (VITAMIN B-1) tablet 100 mg  Daily,   Status:  Discontinued      09/15/21 1616    09/15/21 1800  potassium chloride (MICRO-K) CR capsule 10 mEq  2 Times Daily With Meals,   Status:  Discontinued      09/15/21 1616    09/15/21 1800  potassium chloride (K-DUR,KLOR-CON) ER tablet 10 mEq  2 Times Daily With Meals,   Status:  Discontinued      09/15/21 1629    09/15/21 1730  famotidine (PEPCID) tablet 20 mg  2 Times Daily Before Meals,   Status:  Discontinued      09/15/21 1616    09/15/21 1617  LORazepam (ATIVAN) injection 1 mg  Every 4 Hours PRN,    Status:  Discontinued      09/15/21 1617    09/15/21 1616  HYDROcodone-acetaminophen (NORCO) 5-325 MG per tablet 1 tablet  Every 8 Hours PRN,   Status:  Discontinued      09/15/21 1616    09/15/21 1616  acetaminophen (TYLENOL) tablet 650 mg  Every 6 Hours PRN,   Status:  Discontinued      09/15/21 1616    09/15/21 1411  gadobenate dimeglumine (MULTIHANCE) injection 17 mL  Once in Imaging      09/15/21 1409    09/15/21 1308  Initiate Observation Status  Once      09/15/21 1308    09/15/21 0938  PT Consult: Eval & Treat Functional Mobility Below Baseline  Once     Comments: Patient had right-sided AVM removal last year and has some deficits    09/15/21 0938    09/15/21 0938  OT Consult: Eval & Treat  Once     Comments: Patient has minimal movement of the left upper extremity following AVM surgery last year.  To see if he needs special braces that could help him    09/15/21 0938    09/15/21 0900  levETIRAcetam (KEPPRA) tablet 1,500 mg  2 Times Daily,   Status:  Discontinued      09/15/21 0846    09/15/21 0850  EEG  Once     Comments: Dr Dennis to read the study    09/15/21 0850    09/15/21 0849  MRI Brain With & Without Contrast  1 Time Imaging      09/15/21 0848    09/15/21 0600  Incentive Spirometry  Every 4 Hours While Awake,   Status:  Canceled      09/15/21 0100    09/15/21 0600  Basic Metabolic Panel  Morning Draw      09/15/21 0101    09/15/21 0600  CBC (No Diff)  Morning Draw      09/15/21 0101    09/15/21 0600  CK  Morning Draw      09/15/21 0101    09/15/21 0400  Vital Signs  Every 4 Hours,   Status:  Canceled      09/15/21 0100    09/15/21 0120  ketorolac (TORADOL) injection 15 mg  Once      09/15/21 0118    09/15/21 0102  sodium chloride 0.9 % flush 10 mL  Every 12 Hours Scheduled,   Status:  Discontinued      09/15/21 0100    09/15/21 0102  Inpatient Neurology Consult General  Once     Specialty:  Neurology  Provider:  Chuckie Felipe MD    09/15/21 0101    09/15/21 0101  Seizure Precautions   Continuous,   Status:  Canceled      09/15/21 0100    09/15/21 0101  Diet Regular  Diet Effective Now,   Status:  Canceled      09/15/21 0100    09/15/21 0100  aluminum-magnesium hydroxide-simethicone (MAALOX MAX) 400-400-40 MG/5ML suspension 15 mL  Every 6 Hours PRN,   Status:  Discontinued      09/15/21 0100    09/15/21 0100  ondansetron (ZOFRAN) tablet 4 mg  Every 6 Hours PRN,   Status:  Discontinued      09/15/21 0100    09/15/21 0100  ondansetron (ZOFRAN) injection 4 mg  Every 6 Hours PRN,   Status:  Discontinued      09/15/21 0100    09/15/21 0100  acetaminophen (TYLENOL) tablet 650 mg  Every 4 Hours PRN,   Status:  Discontinued      09/15/21 0100    09/15/21 0100  Intake & Output  Every Shift,   Status:  Canceled      09/15/21 0100    09/15/21 0100  Weigh Patient  Once      09/15/21 0100    09/15/21 0100  Insert Peripheral IV  Once,   Status:  Canceled      09/15/21 0100    09/15/21 0100  Saline Lock & Maintain IV Access  Continuous,   Status:  Canceled      09/15/21 0100    09/15/21 0100  Code Status and Medical Interventions:  Continuous,   Status:  Canceled      09/15/21 0100    09/15/21 0100  Place Sequential Compression Device  Once      09/15/21 0100    09/15/21 0100  Maintain Sequential Compression Device  Continuous,   Status:  Canceled      09/15/21 0100    09/15/21 0100  Telemetry - Maintain IV Access  Continuous      09/15/21 0100    09/15/21 0100  Continuous Cardiac Monitoring  Continuous,   Status:  Canceled      09/15/21 0100    09/15/21 0100  May Be Off Telemetry for Tests  Continuous      09/15/21 0100    09/15/21 0100  ACLS Protocol For Life Threatening Dysrhythmias (Unless Code Status Indicates Otherwise)  Continuous      09/15/21 0100    09/15/21 0100  Notify Provider if ACLS Protocol Activated  Until Discontinued      09/15/21 0100    09/15/21 0059  ECG 12 Lead  As Needed,   Status:  Canceled     Comments: Nurse to Release if Patient Expericences Acute Chest Pain or Dysrhythmias     09/15/21 0100    09/15/21 0059  Potassium  As Needed,   Status:  Canceled     Comments: For Ventricular Arrhythmias      09/15/21 0100    09/15/21 0059  Magnesium  As Needed,   Status:  Canceled     Comments: For Ventricular Arrhythmias      09/15/21 0100    09/15/21 0059  Troponin  As Needed,   Status:  Canceled     Comments: For Chest Pain      09/15/21 0100    09/15/21 0059  Blood Gas, Arterial -  As Needed,   Status:  Canceled     Comments: Per O2 PolicyNotify Physician      09/15/21 0100    09/15/21 0059  Telemetry - Pulse Oximetry  Continuous PRN,   Status:  Canceled     Comments: If Patient Develops Unresponsiveness, Acute Dyspnea, Cyanosis or Suspected Hypoxemia Start Continuous Pulse Ox Monitoring, Apply Oxygen & Notify Provider    09/15/21 0100    09/15/21 0059  Oxygen Therapy- Nasal Cannula; Titrate for SPO2: 90% - 95%  Continuous PRN,   Status:  Canceled     Comments: If Patient Develops Unresponsiveness, Acute Dyspnea, Cyanosis or Suspected Hypoxemia Start Continuous Pulse Ox Monitoring, Apply Oxygen & Notify Provider    09/15/21 0100    09/15/21 0059  nitroglycerin (NITROSTAT) SL tablet 0.4 mg  Every 5 Minutes PRN,   Status:  Discontinued      09/15/21 0100    09/15/21 0059  sodium chloride 0.9 % flush 10 mL  As Needed,   Status:  Discontinued      09/15/21 0100    09/15/21 0054  Inpatient Admission  Once      09/15/21 0053    09/15/21 0044  COVID PRE-OP / PRE-PROCEDURE SCREENING ORDER (NO ISOLATION) - Swab, Nasopharynx  Once      09/15/21 0043    09/15/21 0044  COVID-19,APTIMA PANTHER(AUDRA),[facility], NP/OP SWAB IN UTM/VTM/SALINE TRANSPORT MEDIA,24 HR TAT - Swab, Nasopharynx  PROCEDURE ONCE      09/15/21 0043    09/15/21 0043  [facility] (on-call MD unless specified) Details  Once     Specialty:  Hospitalist  Provider:  (Not yet assigned)    09/15/21 0043    09/14/21 2315  levETIRAcetam in NaCl 0.75% (KEPPRA) IVPB 1,000 mg  Once      09/14/21 2313 09/14/21 2313  CBC & Differential  Once      09/14/21 1172     21  Comprehensive Metabolic Panel  Once      21  Magnesium  Once      21  Phosphorus  Once      21  Insert peripheral IV  Once,   Status:  Canceled      21  Monitor Blood Pressure  Per Hospital Policy      21  Cardiac Monitoring  Once,   Status:  Canceled      21  Seizure Precautions  Continuous,   Status:  Canceled      21  CT Head Without Contrast  1 Time Imaging      21  CBC Auto Differential  PROCEDURE ONCE      21  sodium chloride 0.9 % flush 10 mL  As Needed,   Status:  Discontinued      21                Operative/Procedure Notes (all)    No notes of this type exist for this encounter.            Physician Progress Notes (all)      Michael Cervantes MD at 21 1444          DAILY PROGRESS NOTE  [facility]    Patient Identification:  Name: Avinash Everett  Age: 31 y.o.  Sex: male  :  1990  MRN: [id number]         Primary Care Physician: Provider, No Known    Subjective:  Interval History:He feels better.  The patient wants to leave today.    Objective:    Scheduled Meds:famotidine, 20 mg, Oral, BID AC  folic acid, 1 mg, Oral, Daily  gabapentin, 300 mg, Oral, Nightly  levETIRAcetam, 1,500 mg, Oral, BID  mirtazapine, 15 mg, Oral, Nightly  potassium chloride, 10 mEq, Oral, BID With Meals  sodium chloride, 10 mL, Intravenous, Q12H  thiamine, 100 mg, Oral, Daily      Continuous Infusions:     Vital signs in last 24 hours:  Temp:  [98.2 °F (36.8 °C)-98.6 °F (37 °C)] 98.2 °F (36.8 °C)  Heart Rate:  [80-87] 80  Resp:  [18-22] 18  BP: (104-126)/(62-81) 105/62    Intake/Output:    Intake/Output Summary (Last 24 hours) at 2021 1445  Last data filed at 2021 0722  Gross per 24 hour   Intake --   Output 500 ml   Net -500  "ml       Exam:  /62 (BP Location: Right arm, Patient Position: Lying)   Pulse 80   Temp 98.2 °F (36.8 °C) (Oral)   Resp 18   Ht 185.4 cm (73\")   Wt 82.6 kg (182 lb 1.6 oz)   SpO2 95%   BMI 24.03 kg/m²     General Appearance:    Alert, cooperative, no distress   Head:    Normocephalic, without obvious abnormality, atraumatic   Eyes:       Throat:   Lips, tongue, gums normal   Neck:   Supple, symmetrical, trachea midline, no JVD   Lungs:     Clear to auscultation bilaterally, respirations unlabored   Chest Wall:    No tenderness or deformity    Heart:    Regular rate and rhythm, S1 and S2 normal, no murmur,no  Rub or gallop   Abdomen:     Soft, nontender, bowel sounds active, no masses, no organomegaly    Extremities:   Extremities normal, atraumatic, no cyanosis or edema   Pulses:      Skin:   Skin is warm and dry,  no rashes or palpable lesions   Neurologic:   no focal deficits noted      Lab Results (last 72 hours)     Procedure Component Value Units Date/Time    Basic Metabolic Panel [[id number]]  (Abnormal) Collected: 09/16/21 0611    Specimen: Blood Updated: 09/16/21 0715     Glucose 79 mg/dL      BUN 6 mg/dL      Creatinine 0.53 mg/dL      Sodium 143 mmol/L      Potassium 3.7 mmol/L      Chloride 111 mmol/L      CO2 21.7 mmol/L      Calcium 8.8 mg/dL      eGFR Non African Amer >150 mL/min/1.73      BUN/Creatinine Ratio 11.3     Anion Gap 10.3 mmol/L     Narrative:      GFR Normal >60  Chronic Kidney Disease <60  Kidney Failure <15      CBC & Differential [[id number]]  (Abnormal) Collected: 09/16/21 0611    Specimen: Blood Updated: 09/16/21 0658    Narrative:      The following orders were created for panel order CBC & Differential.  Procedure                               Abnormality         Status                     ---------                               -----------         ------                     CBC Auto Differential[[id number]]        Abnormal            Final result                 Please " view results for these tests on the individual orders.    CBC Auto Differential [[id number]]  (Abnormal) Collected: 09/16/21 0611    Specimen: Blood Updated: 09/16/21 0657     WBC 2.72 10*3/mm3      RBC 3.68 10*6/mm3      Hemoglobin 11.1 g/dL      Hematocrit 32.3 %      MCV 87.8 fL      MCH 30.2 pg      MCHC 34.4 g/dL      RDW 12.7 %      RDW-SD 40.0 fl      MPV 10.0 fL      Platelets 90 10*3/mm3      Neutrophil % 60.7 %      Lymphocyte % 26.8 %      Monocyte % 7.7 %      Eosinophil % 3.7 %      Basophil % 0.7 %      Immature Grans % 0.4 %      Neutrophils, Absolute 1.65 10*3/mm3      Lymphocytes, Absolute 0.73 10*3/mm3      Monocytes, Absolute 0.21 10*3/mm3      Eosinophils, Absolute 0.10 10*3/mm3      Basophils, Absolute 0.02 10*3/mm3      Immature Grans, Absolute 0.01 10*3/mm3      nRBC 0.0 /100 WBC     COVID PRE-OP / PRE-PROCEDURE SCREENING ORDER (NO ISOLATION) - Swab, Nasopharynx [[id number]]  (Normal) Collected: 09/15/21 0106    Specimen: Swab from Nasopharynx Updated: 09/15/21 1242    Narrative:      The following orders were created for panel order COVID PRE-OP / PRE-PROCEDURE SCREENING ORDER (NO ISOLATION) - Swab, Nasopharynx.  Procedure                               Abnormality         Status                     ---------                               -----------         ------                     COVID-19,APTIMA PANTHER(...[[id number]]  Normal              Final result                 Please view results for these tests on the individual orders.    COVID-19,APTIMA PANTHER(AUDRA),[facility], NP/OP SWAB IN UTM/VTM/SALINE TRANSPORT MEDIA,24 HR TAT - Swab, Nasopharynx [[id number]]  (Normal) Collected: 09/15/21 0106    Specimen: Swab from Nasopharynx Updated: 09/15/21 1242     COVID19 Not Detected    Narrative:      Fact sheet for providers: https://www.fda.gov/media/165466/download     Fact sheet for patients: https://www.fda.gov/media/053127/download    Test performed by RT PCR.    CBC (No Diff) [[id number]]   (Abnormal) Collected: 09/15/21 0615    Specimen: Blood Updated: 09/15/21 0658     WBC 3.90 10*3/mm3      RBC 3.49 10*6/mm3      Hemoglobin 10.5 g/dL      Hematocrit 30.8 %      MCV 88.3 fL      MCH 30.1 pg      MCHC 34.1 g/dL      RDW 12.9 %      RDW-SD 41.7 fl      MPV 9.8 fL      Platelets 96 10*3/mm3     Basic Metabolic Panel [[id number]]  (Abnormal) Collected: 09/15/21 0615    Specimen: Blood Updated: 09/15/21 0655     Glucose 101 mg/dL      BUN 5 mg/dL      Creatinine 0.43 mg/dL      Sodium 140 mmol/L      Potassium 3.8 mmol/L      Chloride 110 mmol/L      CO2 21.9 mmol/L      Calcium 8.3 mg/dL      eGFR Non African Amer >150 mL/min/1.73      BUN/Creatinine Ratio 11.6     Anion Gap 8.1 mmol/L     Narrative:      GFR Normal >60  Chronic Kidney Disease <60  Kidney Failure <15      CK [[id number]]  (Normal) Collected: 09/15/21 0615    Specimen: Blood Updated: 09/15/21 0655     Creatine Kinase 81 U/L     Comprehensive Metabolic Panel [[id number]]  (Abnormal) Collected: 09/14/21 2344    Specimen: Blood Updated: 09/15/21 0017     Glucose 92 mg/dL      BUN 6 mg/dL      Creatinine 0.53 mg/dL      Sodium 142 mmol/L      Potassium 4.0 mmol/L      Comment: Slight hemolysis detected by analyzer. Results may be affected.        Chloride 111 mmol/L      CO2 22.5 mmol/L      Calcium 8.2 mg/dL      Total Protein 6.0 g/dL      Albumin 3.70 g/dL      ALT (SGPT) 17 U/L      AST (SGOT) 27 U/L      Alkaline Phosphatase 87 U/L      Total Bilirubin 0.8 mg/dL      eGFR Non African Amer >150 mL/min/1.73      Globulin 2.3 gm/dL      A/G Ratio 1.6 g/dL      BUN/Creatinine Ratio 11.3     Anion Gap 8.5 mmol/L     Narrative:      GFR Normal >60  Chronic Kidney Disease <60  Kidney Failure <15      Phosphorus [[id number]]  (Normal) Collected: 09/14/21 2344    Specimen: Blood Updated: 09/15/21 0017     Phosphorus 2.9 mg/dL     Magnesium [[id number]]  (Normal) Collected: 09/14/21 2344    Specimen: Blood Updated: 09/15/21 0017     Magnesium 1.6 mg/dL      CBC & Differential [[id number]]  (Abnormal) Collected: 09/14/21 2344    Specimen: Blood Updated: 09/14/21 2355    Narrative:      The following orders were created for panel order CBC & Differential.  Procedure                               Abnormality         Status                     ---------                               -----------         ------                     CBC Auto Differential[[id number]]        Abnormal            Final result                 Please view results for these tests on the individual orders.    CBC Auto Differential [[id number]]  (Abnormal) Collected: 09/14/21 2344    Specimen: Blood Updated: 09/14/21 2355     WBC 4.53 10*3/mm3      RBC 3.52 10*6/mm3      Hemoglobin 10.5 g/dL      Hematocrit 31.2 %      MCV 88.6 fL      MCH 29.8 pg      MCHC 33.7 g/dL      RDW 12.8 %      RDW-SD 40.5 fl      MPV 9.9 fL      Platelets 101 10*3/mm3      Neutrophil % 80.5 %      Lymphocyte % 11.7 %      Monocyte % 5.5 %      Eosinophil % 1.5 %      Basophil % 0.4 %      Immature Grans % 0.4 %      Neutrophils, Absolute 3.64 10*3/mm3      Lymphocytes, Absolute 0.53 10*3/mm3      Monocytes, Absolute 0.25 10*3/mm3      Eosinophils, Absolute 0.07 10*3/mm3      Basophils, Absolute 0.02 10*3/mm3      Immature Grans, Absolute 0.02 10*3/mm3      nRBC 0.0 /100 WBC         Data Review:  Results from last 7 days   Lab Units 09/16/21  0611 09/15/21  0615 09/15/21  0615 09/14/21  2344 09/14/21  2344   SODIUM mmol/L 143  --  140  --  142   POTASSIUM mmol/L 3.7  --  3.8  --  4.0   CHLORIDE mmol/L 111*  --  110*  --  111*   CO2 mmol/L 21.7*  --  21.9*  --  22.5   BUN mg/dL 6  --  5*  --  6   CREATININE mg/dL 0.53*  --  0.43*  --  0.53*   GLUCOSE mg/dL 79   < > 101*   < > 92   CALCIUM mg/dL 8.8  --  8.3*  --  8.2*    < > = values in this interval not displayed.     Results from last 7 days   Lab Units 09/16/21  0611 09/15/21  0615 09/14/21  2344   WBC 10*3/mm3 2.72* 3.90 4.53   HEMOGLOBIN g/dL 11.1* 10.5* 10.5*  "  HEMATOCRIT % 32.3* 30.8* 31.2*   PLATELETS 10*3/mm3 90* 96* 101*             Lab Results   Lab Value Date/Time    TROPONINT <0.010 2020 2144         Results from last 7 days   Lab Units 21  2344   ALK PHOS U/L 87   BILIRUBIN mg/dL 0.8   ALT (SGPT) U/L 17   AST (SGOT) U/L 27             No results found for: POCGLU        Past Medical History:   Diagnosis Date   • Dehiscence of incision    • Intracerebral hemorrhage (CMS/HCC)    • Metabolic encephalopathy    • MRSA (methicillin resistant Staphylococcus aureus)     RIGHT INCISION   • Paralysis (CMS/HCC)     LEFT SIDE       Assessment:  Active Hospital Problems    Diagnosis  POA   • **Recurrent seizures (CMS/HCC) [G40.909]  Yes   • Right-sided nontraumatic intracerebral hemorrhage (CMS/HCC) [I61.9]  Yes      Resolved Hospital Problems   No resolved problems to display.       Plan:  He feels better. Continue seizure meds per neurology.    Michael Cervantes MD  2021  14:45 EDT    Electronically signed by Michael Cervantes MD at 21 1638     Elke Villatoro APRN at 21 1116          DOS: 2021  NAME: Avinash Everett   : 1990  PCP: Provider, No Known  Chief Complaint   Patient presents with   • Seizures   Neurology    Subjective: When I spoke with the patient during lunchtime and he stated that he had no seizure spells since yesterday evening when he called out due to a rising sensation of left-sided numbness for which he received Ativan. I later talked to the nurse to tell me the patient complained of seizure activity and there was some intermittent right-sided twitching. The patient wants to go home.Pt seen in follow up today, however the problem is new to the examiner.      Objective:  Vital signs: /62 (BP Location: Right arm, Patient Position: Lying)   Pulse 80   Temp 98.2 °F (36.8 °C) (Oral)   Resp 18   Ht 185.4 cm (73\")   Wt 82.6 kg (182 lb 1.6 oz)   SpO2 95%   BMI 24.03 kg/m²       HEENT: Normocephalic, atraumatic "   COR: RRR  Resp: Even and unlabored clear to auscultation bilaterally  Extremities: Equal radial pulses, non distal embolization, no edema    Neurological:   MS: Alert and oriented x3, speech fluent, no dysarthria. No neglect.  CN: II-XII normal except for left facial droop  Motor: 5/5, normal tone on the right. Left arm 2 out of 5, left leg 4 out of 5.  Reflexes:  Sensory: Intact to light touch in arms and legs.  Coordination: Normal finger-to-nose on the right.    Scheduled Meds:famotidine, 20 mg, Oral, BID AC  folic acid, 1 mg, Oral, Daily  gabapentin, 300 mg, Oral, Nightly  levETIRAcetam, 1,500 mg, Oral, BID  mirtazapine, 15 mg, Oral, Nightly  potassium chloride, 10 mEq, Oral, BID With Meals  sodium chloride, 10 mL, Intravenous, Q12H  thiamine, 100 mg, Oral, Daily      Continuous Infusions:   PRN Meds:.•  acetaminophen  •  aluminum-magnesium hydroxide-simethicone  •  HYDROcodone-acetaminophen  •  LORazepam  •  nitroglycerin  •  ondansetron **OR** ondansetron  •  [COMPLETED] Insert peripheral IV **AND** sodium chloride  •  sodium chloride    Laboratory results:  Lab Results   Component Value Date    GLUCOSE 79 09/16/2021    CALCIUM 8.8 09/16/2021     09/16/2021    K 3.7 09/16/2021    CO2 21.7 (L) 09/16/2021     (H) 09/16/2021    BUN 6 09/16/2021    CREATININE 0.53 (L) 09/16/2021    EGFRIFNONA >150 09/16/2021    BCR 11.3 09/16/2021    ANIONGAP 10.3 09/16/2021     Lab Results   Component Value Date    WBC 2.72 (L) 09/16/2021    HGB 11.1 (L) 09/16/2021    HCT 32.3 (L) 09/16/2021    MCV 87.8 09/16/2021    PLT 90 (L) 09/16/2021     Lab Results   Component Value Date    CHOL 250 (H) 08/04/2020     Lab Results   Component Value Date    HDL 42 08/04/2020     Lab Results   Component Value Date     (H) 08/04/2020     Lab Results   Component Value Date    TRIG 159 (H) 08/06/2020    TRIG 231 (H) 08/05/2020    TRIG 194 (H) 08/04/2020          Review and interpretation of imaging:  EEG    Result Date:  9/15/2021  Date of onset: September 15, 2021 at 10 AM Date of offset: September 15, 2021 at 10:26 AM Indication: Right AVM Technical description:  This is a 21 channel digital EEG recording that began on September 15, 2021 at 10 AM and ended on September 15, 2021 at 10:26 AM.  Malcom and seizure detection software programs were used. Background:  The EEG recording demonstrates mild diffuse background slowing with mainly theta frequencies.  7-8 Hz frequencies are present posteriorly on the left.  There is focal right hemispheric slowing with increase in delta activity.  The patient enters the drowsy state, but no well formed stage 2 sleep architecture is present.  There is no abnormal activation photic stimulation.  Hyperventilation was not performed.  There is a single right frontal interictal epileptiform discharge.  No electrographic seizures are present during this recording. The EKG monitor shows a heart rate that varies between 80 and 85 beats per minute. Clinical interpretation:  This routine EEG is abnormal due to the presence of: 1. Mild diffuse background slowing.  The results of this study are nonspecific, but may indicate a mild encephalopathy, medication effect, or excessive drowsiness.  2.  Focal right hemispheric slowing which may indicate an underlying area of cerebral dysfunction.  3.  A single right frontal interictal epileptiform discharge.  This may indicate a predilection to focal onset seizures originating from the right frontal head region.  No electrographic seizures are present during this recording.  Careful clinical and radiographic correlation is advised.     CT Head Without Contrast    Result Date: 9/15/2021  Patient: HARSHAD SHOOK  Time Out: 00:08 Exam(s): CT HEAD Without Contrast EXAM:   CT Head Without Intravenous Contrast CLINICAL HISTORY:    Reason for exam: Recurrent seizures. TECHNIQUE:   Axial computed tomography images of the head brain without intravenous contrast.  CTDI is 54.8  mGy and DLP is 1089.5 mGy-cm.  This CT exam was performed according to the principle of ALARA (As Low As Reasonably Achievable) by using one or more of the following dose reduction techniques: automated exposure control, adjustment of the mA and or kV according to patient size, and or use of iterative reconstruction technique. COMPARISON:   CT head 8 2 21 FINDINGS:   Brain:  Stable encephalomalacia in the right frontal and parietal lobes subjacent to the craniotomy.  No acute intracranial hemorrhage, mass- effect, or edema.  Gray-white matter differentiation maintained.   Ventricles:  Unremarkable.  No hydrocephalus.   Bones joints:  Old right-sided craniotomy.  No skull fracture.   Soft tissues:  Unremarkable.   Sinuses:  Unremarkable as visualized.  No acute sinusitis.   Mastoid air cells:  Unremarkable as visualized.  No mastoid effusion. IMPRESSION:       No acute intracranial process.     Electronically signed by Anisha Armendariz M.D. on 09-15-21 at 0008    MRI Brain With & Without Contrast    Result Date: 9/15/2021  MR SCAN OF THE BRAIN WITHOUT AND WITH INTRAVENOUS CONTRAST  HISTORY: Recent seizures. Right facial pain and headache. Previous surgery for intracranial hemorrhage and underlying AV malformation in temporal parietal region.  TECHNIQUE: The MR scan was performed with sagittal and axial and coronal images and includes T1 images without and with intravenous contrast.  FINDINGS: The patient has had previous right sided craniotomy for evacuation of large associated intracranial hemorrhage and there is associated encephalomalacia and hemosiderin deposition in this region. There is no evidence of recurrent hemorrhage but there is some somewhat amorphous enhancement within the area of encephalomalacia. The previous prominent enhancement of the wall of the resection cavity noted on 09/22/2020 shows marked improvement and there is currently some residual enhancement of a smaller resection cavity on today's exam.   There are normal flow voids in the major vessels. The sinuses and mastoid air cells are clear.  CONCLUSION: Previous resection of intraparenchymal hemorrhage in right temporoparietal region with decrease in size of the resection cavity and less associated enhancement compared to the MRI scan of 09/22/2020. The remainder of the MRI scan is unremarkable.  This report was finalized on 9/15/2021 3:40 PM by Dr. Peña Flynn M.D.        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  He had 1 breakthrough seizure in August and then the date of admission he had back-to-back seizures which were not relieved with current medications.  EMT was called to give Versed IV which I was able to stop his seizures.  In the ED he received a loading dose of Keppra 1500 mg. He was taking Keppra 1000 mg prior to arrival.  He denies any missed doses of Keppra, alcohol use, or recent sleep deprivation.    Diagnoses:  History of AVM rupture status post embolization  Seizure disorder with breakthrough seizure    Work-up:  MRI brain with and without contrast: Previous resection of IPH in the right temporoparietal region again noted with decreased in size of the resection cavity and less enhancement compared to prior MRI from September 2020.  Remainder the MRI is unremarkable.  EEG: Single right frontal interictal epileptiform discharge    Plan:  Keppra increase to 1500 mg twice daily this admission  Seizure precautions x90 days, d/w patient  D.W Dr Rucker today. OK for d/c from neurology standpoint. Will sign off. I will arrange outpatient neurology follow up. Please call with questions.      Electronically signed by Elke Villatoro APRN at 09/16/21 8416          Consult Notes (all)      Ascencion Dennis MD at  09/15/21 0927      Consult Orders    1. Inpatient Neurology Consult General [[id number]] ordered by Lucien Jackson APRN at 09/15/21 0101               DOS: 9/15/2021  NAME: Avinash Everett   : 1990  PCP: Provider, No Known  CC: Stroke  Referring MD: Adan Tracy MD    Neurological Problem and Interval History:  31 y.o. 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  Since then the patient has had some breakthrough seizures with one in August and one yesterday back-to-back which was not being relieved with the current medications.  EMT was called and they gave him Versed intravenous which was able to break his seizures.  He was then given a loading dose of intravenous Keppra 1500 mg.  Currently he is awake and alert and communicating well with his mother at the bedside.  Patient has a history of numerous head injuries in the past from basketball, football and hockey accidents along with concussions.  He is currently not working.  He used to work in the past with an [employer] before his seizures started.  Also he had been in [place] in the past but currently relocated to our facility.  He has not had any neurologist follow-up any time for his seizure disorder.    Past Medical/Surgical Hx:  Past Medical History:   Diagnosis Date   • Dehiscence of incision    • Intracerebral hemorrhage (CMS/HCC)    • Metabolic encephalopathy    • MRSA (methicillin resistant Staphylococcus aureus)     RIGHT INCISION   • Paralysis (CMS/HCC)     LEFT SIDE     Past Surgical History:   Procedure Laterality Date   • CRANIOTOMY Right 10/1/2020    Procedure: INCISION AND DRAINAGE WOUND, of scalp incision with closure;  Surgeon: Leobardo Rivera MD;  [facility];  Service: Neurosurgery;  Laterality: Right;   • CRANIOTOMY FOR SUBDURAL HEMATOMA Right 8/3/2020     Procedure: RIGHT FRONTOPARIETAL CRANIOTOMY FOR EVACUATION OF INTRACEREBRAL HEMORRHAGE;  Surgeon: Leobardo Rivera MD;  Location: [facility] OR;  Service: Neurosurgery;  Laterality: Right;   • EMBOLIZATION CEREBRAL N/A 8/5/2020    Procedure: diagnostic angiogram possible EMBOLIZATION CEREBRAL. right radial approach;  Surgeon: Alfonso Malcolm MD;  Location: [facility] OR 18/19;  Service: Neurosurgery;  Laterality: N/A;   • WRIST SURGERY      1st one 2006, 2nd 2010       Review of Systems:    Constitutional: Pleasant gentleman with a fixed deficit in the left upper and lower extremities from the AVM.  Cardiovascular: Denies any chest pain or palpitation  Respiratory: Denies any shortness of breath.  Gastrointestinal: Denies any nausea or vomiting  Genitourinary: Denies any bladder incontinence.  Musculoskeletal: Has fixed deficit in the right upper and lower extremities more in the right upper compared to right lower.  Dermatological: No skin breakdown noted.  Neurological: Breakthrough seizures as discussed above  Psychiatric: Denies any major anxiety or depression.  Ophthalmological: Denies any vision changes.          Medications On Admission  Keppra 1000 mg twice daily with food    Allergies:  Allergies   Allergen Reactions   • Citrus Hives     Hives in mouth     • Promethazine Hives       Social Hx:  Social History     Socioeconomic History   • Marital status:      Spouse name: Not on file   • Number of children: Not on file   • Years of education: Not on file   • Highest education level: Not on file   Tobacco Use   • Smoking status: Former Smoker     Packs/day: 0.50   • Smokeless tobacco: Never Used   • Tobacco comment: PT HAS NOT SMOKED SINCE 7/2020   Substance and Sexual Activity   • Alcohol use: Not Currently   • Drug use: Never   • Sexual activity: Not Currently       Family Hx:  Family History   Problem Relation Age of Onset   • Depression Maternal Grandmother    • Depression Maternal  Grandfather    • Depression Paternal Grandmother    • Depression Paternal Grandfather    • Malig Hyperthermia Neg Hx        Review of Imaging (Interpretation of images not reports): Reviewed the imaging studies that showed:    FINDINGS:    Brain:  Stable encephalomalacia in the right frontal and parietal lobes   subjacent to the craniotomy.  No acute intracranial hemorrhage, mass-  effect, or edema.  Gray-white matter differentiation maintained.    Ventricles:  Unremarkable.  No hydrocephalus.    Bones joints:  Old right-sided craniotomy.  No skull fracture.    Soft tissues:  Unremarkable.    Sinuses:  Unremarkable as visualized.  No acute sinusitis.    Mastoid air cells:  Unremarkable as visualized.  No mastoid effusion.     IMPRESSION:         No acute intracranial process.    Additional Tests Performed: An MRI of the brain was performed with and without contrast on September 22, 2020 which was reviewed on the PACS as follows:    IMPRESSION:  1. Evidence of previous right frontoparietal craniotomy with blood  products within the resection cavity and subdural space. There is rim  enhancement of the resection cavity with no significant enhancement  within the resection cavity. There is a small amount of surrounding  edema. The findings may all be related to chronic blood products. It is  conceivable that infected fluid could be present.  2. The resection cavity does appear smaller than on the 08/24/2020  study.  3. There is some mild nodular appearing enhancement along the periphery  of the resection cavity particularly along its medial aspect. This is  more apparent on the 8/24/2020 study and the significance of this is  uncertain.  4. It is conceivable that there could be an underlying lesion including  tumor.  5. Additional short-term follow-up MRI of the brain in approximately 4  weeks to 6 weeks may be helpful.      Laboratory Results:   Lab Results   Component Value Date    GLUCOSE 101 (H) 09/15/2021     "CALCIUM 8.3 (L) 09/15/2021     09/15/2021    K 3.8 09/15/2021    CO2 21.9 (L) 09/15/2021     (H) 09/15/2021    BUN 5 (L) 09/15/2021    CREATININE 0.43 (L) 09/15/2021    EGFRIFNONA >150 09/15/2021    BCR 11.6 09/15/2021    ANIONGAP 8.1 09/15/2021     Lab Results   Component Value Date    WBC 3.90 09/15/2021    HGB 10.5 (L) 09/15/2021    HCT 30.8 (L) 09/15/2021    MCV 88.3 09/15/2021    PLT 96 (L) 09/15/2021     Lab Results   Component Value Date    CHOL 250 (H) 08/04/2020     Lab Results   Component Value Date    HDL 42 08/04/2020     Lab Results   Component Value Date     (H) 08/04/2020     Lab Results   Component Value Date    TRIG 159 (H) 08/06/2020    TRIG 231 (H) 08/05/2020    TRIG 194 (H) 08/04/2020     Lab Results   Component Value Date    HGBA1C 4.60 (L) 08/04/2020     Lab Results   Component Value Date    INR 1.46 (H) 09/30/2020    INR 1.22 (H) 09/03/2020    INR 1.47 (H) 08/14/2020    PROTIME 17.5 (H) 09/30/2020    PROTIME 15.2 (H) 09/03/2020    PROTIME 17.5 (H) 08/14/2020         Physical Examination:  /64   Pulse 89   Temp 97.5 °F (36.4 °C) (Temporal)   Resp 20   Ht 185.4 cm (73\")   Wt 82.6 kg (182 lb)   SpO2 92%   BMI 24.01 kg/m²   General Appearance:   Well developed, well nourished, well groomed, alert, and cooperative.  HEENT: Normocephalic.  Normal fundoscopic exam including normal retina, discs are flat with sharp margins, normal vasculature.  Neck and Spine: Normal range of motion.  Normal alignment. No mass or tenderness. No bruits.  Cardiac: Regular rate and rhythm. No murmurs.  Peripheral Vasculature: Radial and pedal pulses are equal and symmetric. No signs of distal embolization.  Extremities:    No edema or deformities. Normal joint ROM. CLYDE hoses and SCD's in place  Skin:    No rashes or birth marks.    Neurological examination:  Higher Integrative  Function: Oriented to time, place and person. Normal registration, recall, attention span and concentration. " Normal language including comprehension, spontaneous speech, repetition, reading, writing, naming and vocabulary. No neglect with normal visual-spatial function and construction. Normal fund of knowledge and higher integrative function.  CN II: Pupils are equal, round, and reactive to light. Normal visual acuity and visual fields.    CN III IV VI: Extraocular movements are full without nystagmus.   CN V: Normal facial sensation and strength of muscles of mastication.  CN VII: Facial movements are symmetric. No weakness.  CN VIII:   Auditory acuity is normal.  CN IX & X:   Symmetric palatal movement.  CN XI: Sternocleidomastoid and trapezius are normal.  No weakness.  CN XII:   The tongue is midline.  No atrophy or fasciculations.  Motor: Normal muscle strength, bulk and tone in the right upper and lower extremities.  No fasciculations, rigidity, spasticity, or abnormal movements.  There is presence of complete weakness of the left upper extremity with presence of movement in the left lower extremity with minimal restriction.  Reflexes: 2+ in the right upper and lower extremities. Plantar responses are flexor.  Sensation: Normal to light touch, pinprick, vibration, temperature, and proprioception in arms and legs. Normal graphesthesia and no extinction on DSS.  Station and Gait: Patient walks with minimal assistance at home..    Coordination: Finger to nose test shows no dysmetria.  Rapid alternating movements are normal.  Heel to shin normal.      Diagnoses / Discussion:  31 y.o. who presents with Sx of breakthrough seizures 1 after another which needed intravenous Versed to break his seizures.  Currently the patient is awake and alert and communicating well.    Plan:  MRI of the brain with and without contrast with seizure protocol has been requested.  EEG to evaluate right frontotemporal ictal focus.    Patient will be on seizure precautions.  The Keppra was increased from 1000 mg twice daily to 1500 mg twice  daily.  We will asked the pharmacist to provide his mother with information on the nazalam intranasal Versed spray to break the seizure at home.   Patient will be observed under the hospitalist care over the next 24 hours.  Patient has been requested not to drive or use any hazardous equipment or engage in any hazardous activity until seizure-free for the next 24 hours.  We will also get physical therapy and Occupational Therapy to evaluate him as well.  I have discussed the above with the patient and family.  He will be followed up by our neurology team.  Time spent with patient: 60min    Coding    Dictated using Dragon dictation.              Electronically signed by Ascencion Dennis MD at 09/15/21 0925          Discharge Summary      Michael Cervantes MD at 21 1644                                                                             PHYSICIAN DISCHARGE SUMMARY                                                                        [facility]    Patient Identification:  Name: Avinash Everett  Age: 31 y.o.  Sex: male  :  1990  MRN: [id number]  Primary Care Physician: Provider, No Known    Admit date: 2021  Discharge date and time:2021  Discharged Condition: good    Discharge Diagnoses:  Active Hospital Problems    Diagnosis  POA   • **Recurrent seizures (CMS/HCC) [G40.909]  Yes   • Right-sided nontraumatic intracerebral hemorrhage (CMS/HCC) [I61.9]  Yes      Resolved Hospital Problems   No resolved problems to display.          PMHX:   Past Medical History:   Diagnosis Date   • Dehiscence of incision    • Intracerebral hemorrhage (CMS/HCC)    • Metabolic encephalopathy    • MRSA (methicillin resistant Staphylococcus aureus)     RIGHT INCISION   • Paralysis (CMS/HCC)     LEFT SIDE     PSHX:   Past Surgical History:   Procedure Laterality Date   • CRANIOTOMY Right 10/1/2020    Procedure: INCISION AND DRAINAGE WOUND, of scalp incision with closure;  Surgeon: Miguel  MD Leobardo;  Location: [facility] OR;  Service: Neurosurgery;  Laterality: Right;   • CRANIOTOMY FOR SUBDURAL HEMATOMA Right 8/3/2020    Procedure: RIGHT FRONTOPARIETAL CRANIOTOMY FOR EVACUATION OF INTRACEREBRAL HEMORRHAGE;  Surgeon: Leobardo Rivera MD;  Location: [facility] OR;  Service: Neurosurgery;  Laterality: Right;   • EMBOLIZATION CEREBRAL N/A 8/5/2020    Procedure: diagnostic angiogram possible EMBOLIZATION CEREBRAL. right radial approach;  Surgeon: Alfonso Malcolm MD;  Location: [facility] OR 18/19;  Service: Neurosurgery;  Laterality: N/A;   • WRIST SURGERY      1st one 2006, 2nd 2010       Hospital Course: Avinash Everett  is a 31 y.o. male, with a history of seizures, who presents to the ED by EMS from home after having 2 witnessed seizures.  EMS reports that the patient was still seizing when they arrived on scene.  Seizure was generalized and tonic-clonic.  He then had a third seizure while in the ambulance.  He was given Versed 2.5 mg IV.  He stopped seizing and was then postictal.  Patient takes Keppra and is reportedly compliant.  Has a history of a right-sided craniotomy.  EMS reports that the patient was coughing.  History is limited as the patient is still postictal.  The patient was admitted to the hospital for further evaluation treatment of recurrent seizures          The patient was admitted to the hospital and seen by neurology.  Patient had EEG which did exhibit some seizure activity.  The patient had increase in his Keppra dose to 1500 mg twice daily.  He did have some as needed doses of Versed and verapamil for some breakthrough seizures.  After being in the hospital for couple days he was feeling better and wanted to go home.  He seemed to do okay with physical therapy and neurology said it was okay to discharge.  The patient will follow up with his primary care in 1 week for ongoing care and also have follow-up with neurology in regard to his seizures.    Consults:     Consults      Date and Time Order Name Status Description    9/15/2021  1:01 AM Inpatient Neurology Consult General Completed     9/15/2021 12:43 AM [facility] (on-call MD unless specified) Details Completed         Results from last 7 days   Lab Units 09/16/21  0611   WBC 10*3/mm3 2.72*   HEMOGLOBIN g/dL 11.1*   HEMATOCRIT % 32.3*   PLATELETS 10*3/mm3 90*     Results from last 7 days   Lab Units 09/16/21  0611   SODIUM mmol/L 143   POTASSIUM mmol/L 3.7   CHLORIDE mmol/L 111*   CO2 mmol/L 21.7*   BUN mg/dL 6   CREATININE mg/dL 0.53*   GLUCOSE mg/dL 79   CALCIUM mg/dL 8.8     Significant Diagnostic Studies:   WBC   Date Value Ref Range Status   09/16/2021 2.72 (L) 3.40 - 10.80 10*3/mm3 Final     Hemoglobin   Date Value Ref Range Status   09/16/2021 11.1 (L) 13.0 - 17.7 g/dL Final     Hematocrit   Date Value Ref Range Status   09/16/2021 32.3 (L) 37.5 - 51.0 % Final     Platelets   Date Value Ref Range Status   09/16/2021 90 (L) 140 - 450 10*3/mm3 Final     Sodium   Date Value Ref Range Status   09/16/2021 143 136 - 145 mmol/L Final     Potassium   Date Value Ref Range Status   09/16/2021 3.7 3.5 - 5.2 mmol/L Final     Chloride   Date Value Ref Range Status   09/16/2021 111 (H) 98 - 107 mmol/L Final     CO2   Date Value Ref Range Status   09/16/2021 21.7 (L) 22.0 - 29.0 mmol/L Final     BUN   Date Value Ref Range Status   09/16/2021 6 6 - 20 mg/dL Final     Creatinine   Date Value Ref Range Status   09/16/2021 0.53 (L) 0.76 - 1.27 mg/dL Final     Glucose   Date Value Ref Range Status   09/16/2021 79 65 - 99 mg/dL Final     Calcium   Date Value Ref Range Status   09/16/2021 8.8 8.6 - 10.5 mg/dL Final     Magnesium   Date Value Ref Range Status   09/14/2021 1.6 1.6 - 2.6 mg/dL Final     Phosphorus   Date Value Ref Range Status   09/14/2021 2.9 2.5 - 4.5 mg/dL Final     AST (SGOT)   Date Value Ref Range Status   09/14/2021 27 1 - 40 U/L Final     ALT (SGPT)   Date Value Ref Range Status   09/14/2021 17 1 - 41 U/L Final     Alkaline  Phosphatase   Date Value Ref Range Status   09/14/2021 87 39 - 117 U/L Final     No results found for: APTT, INR  No results found for: COLORU, CLARITYU, SPECGRAV, PHUR, PROTEINUR, GLUCOSEU, KETONESU, BLOODU, NITRITE, LEUKOCYTESUR, BILIRUBINUR, UROBILINOGEN, RBCUA, WBCUA, BACTERIA, UACOMMENT  No results found for: TROPONINT, TROPONINI, BNP  No components found for: HGBA1C;2  No components found for: TSH;2  Imaging Results (All)     Procedure Component Value Units Date/Time    MRI Brain With & Without Contrast [[id number]] Collected: 09/15/21 1532     Updated: 09/15/21 1543    Narrative:      MR SCAN OF THE BRAIN WITHOUT AND WITH INTRAVENOUS CONTRAST     HISTORY: Recent seizures. Right facial pain and headache. Previous  surgery for intracranial hemorrhage and underlying AV malformation in  temporal parietal region.     TECHNIQUE: The MR scan was performed with sagittal and axial and coronal  images and includes T1 images without and with intravenous contrast.      FINDINGS: The patient has had previous right sided craniotomy for  evacuation of large associated intracranial hemorrhage and there is  associated encephalomalacia and hemosiderin deposition in this region.  There is no evidence of recurrent hemorrhage but there is some somewhat  amorphous enhancement within the area of encephalomalacia. The previous  prominent enhancement of the wall of the resection cavity noted on  09/22/2020 shows marked improvement and there is currently some residual  enhancement of a smaller resection cavity on today's exam.     There are normal flow voids in the major vessels. The sinuses and  mastoid air cells are clear.     CONCLUSION: Previous resection of intraparenchymal hemorrhage in right  temporoparietal region with decrease in size of the resection cavity and  less associated enhancement compared to the MRI scan of 09/22/2020. The  remainder of the MRI scan is unremarkable.     This report was finalized on 9/15/2021 3:40 PM  by Dr. Peña Flynn M.D.       CT Head Without Contrast [[id number]] Collected: 09/15/21 0009     Updated: 09/15/21 0009    Narrative:        Patient: HARSHAD SHOOK  Time Out: 00:08  Exam(s): CT HEAD Without Contrast     EXAM:    CT Head Without Intravenous Contrast    CLINICAL HISTORY:     Reason for exam: Recurrent seizures.    TECHNIQUE:    Axial computed tomography images of the head brain without intravenous   contrast.  CTDI is 54.8 mGy and DLP is 1089.5 mGy-cm.  This CT exam was   performed according to the principle of ALARA (As Low As Reasonably   Achievable) by using one or more of the following dose reduction   techniques: automated exposure control, adjustment of the mA and or kV   according to patient size, and or use of iterative reconstruction   technique.    COMPARISON:    CT head 8 2 21    FINDINGS:    Brain:  Stable encephalomalacia in the right frontal and parietal lobes   subjacent to the craniotomy.  No acute intracranial hemorrhage, mass-  effect, or edema.  Gray-white matter differentiation maintained.    Ventricles:  Unremarkable.  No hydrocephalus.    Bones joints:  Old right-sided craniotomy.  No skull fracture.    Soft tissues:  Unremarkable.    Sinuses:  Unremarkable as visualized.  No acute sinusitis.    Mastoid air cells:  Unremarkable as visualized.  No mastoid effusion.    IMPRESSION:         No acute intracranial process.      Impression:          Electronically signed by Anisha Armendariz M.D. on 09-15-21 at 0008        Lab Results (last 7 days)     Procedure Component Value Units Date/Time    Basic Metabolic Panel [[id number]]  (Abnormal) Collected: 09/16/21 0611    Specimen: Blood Updated: 09/16/21 0715     Glucose 79 mg/dL      BUN 6 mg/dL      Creatinine 0.53 mg/dL      Sodium 143 mmol/L      Potassium 3.7 mmol/L      Chloride 111 mmol/L      CO2 21.7 mmol/L      Calcium 8.8 mg/dL      eGFR Non African Amer >150 mL/min/1.73      BUN/Creatinine Ratio 11.3     Anion Gap 10.3 mmol/L      Narrative:      GFR Normal >60  Chronic Kidney Disease <60  Kidney Failure <15      CBC & Differential [[id number]]  (Abnormal) Collected: 09/16/21 0611    Specimen: Blood Updated: 09/16/21 0658    Narrative:      The following orders were created for panel order CBC & Differential.  Procedure                               Abnormality         Status                     ---------                               -----------         ------                     CBC Auto Differential[[id number]]        Abnormal            Final result                 Please view results for these tests on the individual orders.    CBC Auto Differential [[id number]]  (Abnormal) Collected: 09/16/21 0611    Specimen: Blood Updated: 09/16/21 0657     WBC 2.72 10*3/mm3      RBC 3.68 10*6/mm3      Hemoglobin 11.1 g/dL      Hematocrit 32.3 %      MCV 87.8 fL      MCH 30.2 pg      MCHC 34.4 g/dL      RDW 12.7 %      RDW-SD 40.0 fl      MPV 10.0 fL      Platelets 90 10*3/mm3      Neutrophil % 60.7 %      Lymphocyte % 26.8 %      Monocyte % 7.7 %      Eosinophil % 3.7 %      Basophil % 0.7 %      Immature Grans % 0.4 %      Neutrophils, Absolute 1.65 10*3/mm3      Lymphocytes, Absolute 0.73 10*3/mm3      Monocytes, Absolute 0.21 10*3/mm3      Eosinophils, Absolute 0.10 10*3/mm3      Basophils, Absolute 0.02 10*3/mm3      Immature Grans, Absolute 0.01 10*3/mm3      nRBC 0.0 /100 WBC     COVID PRE-OP / PRE-PROCEDURE SCREENING ORDER (NO ISOLATION) - Swab, Nasopharynx [[id number]]  (Normal) Collected: 09/15/21 0106    Specimen: Swab from Nasopharynx Updated: 09/15/21 1242    Narrative:      The following orders were created for panel order COVID PRE-OP / PRE-PROCEDURE SCREENING ORDER (NO ISOLATION) - Swab, Nasopharynx.  Procedure                               Abnormality         Status                     ---------                               -----------         ------                     COVID-19,APTIMA PANTHER(...[[id number]]  Normal              Final  result                 Please view results for these tests on the individual orders.    COVID-19,APTIMA PANTHER(AUDRA),[facility], NP/OP SWAB IN UTM/VTM/[facility] TRANSPORT MEDIA,24 HR TAT - Swab, Nasopharynx [[id number]]  (Normal) Collected: 09/15/21 0106    Specimen: Swab from Nasopharynx Updated: 09/15/21 1242     COVID19 Not Detected    Narrative:      Fact sheet for providers: https://www.fda.gov/media/722877/download     Fact sheet for patients: https://www.fda.gov/media/053722/download    Test performed by RT PCR.    CBC (No Diff) [[id number]]  (Abnormal) Collected: 09/15/21 0615    Specimen: Blood Updated: 09/15/21 0658     WBC 3.90 10*3/mm3      RBC 3.49 10*6/mm3      Hemoglobin 10.5 g/dL      Hematocrit 30.8 %      MCV 88.3 fL      MCH 30.1 pg      MCHC 34.1 g/dL      RDW 12.9 %      RDW-SD 41.7 fl      MPV 9.8 fL      Platelets 96 10*3/mm3     Basic Metabolic Panel [[id number]]  (Abnormal) Collected: 09/15/21 0615    Specimen: Blood Updated: 09/15/21 0655     Glucose 101 mg/dL      BUN 5 mg/dL      Creatinine 0.43 mg/dL      Sodium 140 mmol/L      Potassium 3.8 mmol/L      Chloride 110 mmol/L      CO2 21.9 mmol/L      Calcium 8.3 mg/dL      eGFR Non African Amer >150 mL/min/1.73      BUN/Creatinine Ratio 11.6     Anion Gap 8.1 mmol/L     Narrative:      GFR Normal >60  Chronic Kidney Disease <60  Kidney Failure <15      CK [[id number]]  (Normal) Collected: 09/15/21 0615    Specimen: Blood Updated: 09/15/21 0655     Creatine Kinase 81 U/L     Comprehensive Metabolic Panel [[id number]]  (Abnormal) Collected: 09/14/21 2344    Specimen: Blood Updated: 09/15/21 0017     Glucose 92 mg/dL      BUN 6 mg/dL      Creatinine 0.53 mg/dL      Sodium 142 mmol/L      Potassium 4.0 mmol/L      Comment: Slight hemolysis detected by analyzer. Results may be affected.        Chloride 111 mmol/L      CO2 22.5 mmol/L      Calcium 8.2 mg/dL      Total Protein 6.0 g/dL      Albumin 3.70 g/dL      ALT (SGPT) 17 U/L      AST (SGOT) 27 U/L       Alkaline Phosphatase 87 U/L      Total Bilirubin 0.8 mg/dL      eGFR Non African Amer >150 mL/min/1.73      Globulin 2.3 gm/dL      A/G Ratio 1.6 g/dL      BUN/Creatinine Ratio 11.3     Anion Gap 8.5 mmol/L     Narrative:      GFR Normal >60  Chronic Kidney Disease <60  Kidney Failure <15      Phosphorus [[id number]]  (Normal) Collected: 09/14/21 2344    Specimen: Blood Updated: 09/15/21 0017     Phosphorus 2.9 mg/dL     Magnesium [[id number]]  (Normal) Collected: 09/14/21 2344    Specimen: Blood Updated: 09/15/21 0017     Magnesium 1.6 mg/dL     CBC & Differential [[id number]]  (Abnormal) Collected: 09/14/21 2344    Specimen: Blood Updated: 09/14/21 2355    Narrative:      The following orders were created for panel order CBC & Differential.  Procedure                               Abnormality         Status                     ---------                               -----------         ------                     CBC Auto Differential[[id number]]        Abnormal            Final result                 Please view results for these tests on the individual orders.    CBC Auto Differential [[id number]]  (Abnormal) Collected: 09/14/21 2344    Specimen: Blood Updated: 09/14/21 2355     WBC 4.53 10*3/mm3      RBC 3.52 10*6/mm3      Hemoglobin 10.5 g/dL      Hematocrit 31.2 %      MCV 88.6 fL      MCH 29.8 pg      MCHC 33.7 g/dL      RDW 12.8 %      RDW-SD 40.5 fl      MPV 9.9 fL      Platelets 101 10*3/mm3      Neutrophil % 80.5 %      Lymphocyte % 11.7 %      Monocyte % 5.5 %      Eosinophil % 1.5 %      Basophil % 0.4 %      Immature Grans % 0.4 %      Neutrophils, Absolute 3.64 10*3/mm3      Lymphocytes, Absolute 0.53 10*3/mm3      Monocytes, Absolute 0.25 10*3/mm3      Eosinophils, Absolute 0.07 10*3/mm3      Basophils, Absolute 0.02 10*3/mm3      Immature Grans, Absolute 0.02 10*3/mm3      nRBC 0.0 /100 WBC         /62 (BP Location: Right arm, Patient Position: Lying)   Pulse 80   Temp 98.2 °F (36.8 °C) (Oral)    "Resp 18   Ht 185.4 cm (73\")   Wt 82.6 kg (182 lb 1.6 oz)   SpO2 95%   BMI 24.03 kg/m²     Discharge Exam:  General Appearance:    Alert, cooperative, no distress                          Head:    Normocephalic, without obvious abnormality, atraumatic                          Eyes:                            Throat:   Lips, tongue, gums normal                          Neck:   Supple, symmetrical, trachea midline, no JVD                        Lungs:     Clear to auscultation bilaterally, respirations unlabored                Chest Wall:    No tenderness or deformity                        Heart:    Regular rate and rhythm, S1 and S2 normal, no murmur,no  Rub or gallop                  Abdomen:     Soft, non-tender, bowel sounds active, no masses, no organomegaly                  Extremities:   Extremities normal, atraumatic, no cyanosis or edema                             Skin:   Skin is warm and dry,  no rashes or palpable lesions                  Neurologic: Chronic left hemiparesis     Disposition:  Home    Patient Instructions:      Discharge Medications      Changes to Medications      Instructions Start Date   levETIRAcetam 750 MG tablet  Commonly known as: KEPPRA  What changed:   · medication strength  · how much to take  · when to take this   1,500 mg, Oral, 2 Times Daily         Continue These Medications      Instructions Start Date   acetaminophen 325 MG tablet  Commonly known as: TYLENOL   650 mg, Oral, Every 6 Hours PRN      cetaphil lotion   Topical, Every 12 Hours Scheduled      clobetasol propionate 0.05 % shampoo  Commonly known as: CLOBEX   Topical, 3 Times Weekly      desonide 0.05 % ointment  Commonly known as: DESOWEN   Topical, Every 12 Hours Scheduled      famotidine 20 MG tablet  Commonly known as: PEPCID   20 mg, Oral, 2 Times Daily Before Meals      folic acid 1 MG tablet  Commonly known as: FOLVITE   1 mg, Oral, Daily      gabapentin 300 MG capsule  Commonly known as: NEURONTIN   300 " mg, Oral, Nightly      HYDROcodone-acetaminophen 5-325 MG per tablet  Commonly known as: NORCO   Take 1 tablet by mouth Every 8 (Eight) Hours As Needed for Moderate Pain  or Severe Pain.      mirtazapine 15 MG tablet  Commonly known as: REMERON   15 mg, Oral, Nightly      potassium chloride 10 MEQ CR capsule  Commonly known as: MICRO-K   10 mEq, Oral, 2 Times Daily With Meals      thiamine 100 MG tablet tablet  Commonly known as: VITAMIN B-1   100 mg, Oral, Daily           Future Appointments   Date Time Provider Department Center   9/27/2021 12:45 PM [facility]  [facility]     Follow-up Information     Provider, No Known Follow up in 1 week(s).    Why: Follow-up with primary care in 1 week.  Also follow-up with neurology.  Contact information:  [place] [id number] [phone]                 Discharge Order (From admission, onward)     Start     Ordered    09/16/21 1640  Discharge patient  Once     Expected Discharge Date: 09/16/21    Discharge Disposition: Home or Self Care    Physician of Record for Attribution - Please select from Treatment Team: MICHAEL CERVANTES [[id number]]    Review needed by CMO to determine Physician of Record: No       Question Answer Comment   Physician of Record for Attribution - Please select from Treatment Team MICHAEL CERVANTES    Review needed by CMO to determine Physician of Record No        09/16/21 8478                Total time spent discharging patient including evaluation,post hospitalization follow up,  medication and post hospitalization instructions and education total time exceeds 30 minutes.    Signed:  Michael Cervantes MD  9/16/2021  16:44 EDT    Electronically signed by Michael Cervantes MD at 09/16/21 9873

## 2021-09-17 NOTE — TELEPHONE ENCOUNTER
PATIENT HAD SURGERY LAST YEAR WITH DR CHILD.  HE HAD A SEIZURE ON 09/14.  HE IS CALLING TO SEE IF HE CAN BE EVAL BY DR CHILD.  PLEASE ADVISE    [phone]

## 2021-09-17 NOTE — CASE MANAGEMENT/SOCIAL WORK
Case Management Discharge Note      Final Note: Home; self-care. Millie Jeffers CSW    Provided Post Acute Provider List?: Refused  Refused Provider List Comment: Denied need  Provided Post Acute Provider Quality & Resource List?: Refused  Refused Quality and Resource List Comment: Denied need    Selected Continued Care - Discharged on 9/16/2021 Admission date: 9/14/2021 - Discharge disposition: Home or Self Care    Destination    No services have been selected for the patient.              Durable Medical Equipment    No services have been selected for the patient.              Dialysis/Infusion    No services have been selected for the patient.              Home Medical Care    No services have been selected for the patient.              Therapy    No services have been selected for the patient.              Community Resources    No services have been selected for the patient.              Community & DME    No services have been selected for the patient.                       Final Discharge Disposition Code: 01 - home or self-care

## 2021-09-17 NOTE — OUTREACH NOTE
Prep Survey      Responses   [facility] facility patient discharged from?  [facility]   Is LACE score < 7 ?  Yes   Emergency Room discharge w/ pulse ox?  No   Eligibility  Readm Mgmt   Discharge diagnosis  Recurrent seizures   Does the patient have one of the following disease processes/diagnoses(primary or secondary)?  Other   Does the patient have Home health ordered?  No   Is there a DME ordered?  No   Prep survey completed?  Yes          LEONCIO Roland RN

## 2021-10-13 ENCOUNTER — OFFICE VISIT (OUTPATIENT)
Dept: NEUROLOGY | Facility: CLINIC | Age: 31
End: 2021-10-13

## 2021-10-13 VITALS
HEART RATE: 78 BPM | SYSTOLIC BLOOD PRESSURE: 126 MMHG | BODY MASS INDEX: 24.09 KG/M2 | WEIGHT: 181.8 LBS | HEIGHT: 73 IN | DIASTOLIC BLOOD PRESSURE: 74 MMHG | OXYGEN SATURATION: 98 %

## 2021-10-13 DIAGNOSIS — G40.209 PARTIAL SYMPTOMATIC EPILEPSY WITH COMPLEX PARTIAL SEIZURES, NOT INTRACTABLE, WITHOUT STATUS EPILEPTICUS (HCC): Primary | ICD-10-CM

## 2021-10-13 DIAGNOSIS — R51.9 NONINTRACTABLE HEADACHE, UNSPECIFIED CHRONICITY PATTERN, UNSPECIFIED HEADACHE TYPE: ICD-10-CM

## 2021-10-13 PROCEDURE — 99215 OFFICE O/P EST HI 40 MIN: CPT | Performed by: PSYCHIATRY & NEUROLOGY

## 2021-10-13 PROCEDURE — 96372 THER/PROPH/DIAG INJ SC/IM: CPT | Performed by: PSYCHIATRY & NEUROLOGY

## 2021-10-13 RX ORDER — LEVETIRACETAM 750 MG/1
1500 TABLET ORAL 2 TIMES DAILY
Qty: 360 TABLET | Refills: 3 | Status: SHIPPED | OUTPATIENT
Start: 2021-10-13 | End: 2022-01-11 | Stop reason: HOSPADM

## 2021-10-13 RX ORDER — OMEPRAZOLE 20 MG/1
CAPSULE, DELAYED RELEASE ORAL
COMMUNITY
Start: 2021-08-19 | End: 2022-01-10

## 2021-10-13 RX ORDER — ONDANSETRON 8 MG/1
8 TABLET, ORALLY DISINTEGRATING ORAL AS NEEDED
COMMUNITY
Start: 2021-07-11

## 2021-10-13 NOTE — PATIENT INSTRUCTIONS
[facility]  Holly Baker MD  Neurology clinic  [phone]    With anti-seizure medications, you may initially notice side effects of fatigue, drowsiness, unsteadiness, and dizziness.  Other possible side effects include nausea, abdominal pain, headache, blurry or double vision, slurred speech and mood changes.  Generally, patients will noticed these symptoms when the medication is first started or with higher doses and will go away with time.    It is import to consistently take your medication every day.  Missing just one dose may put you at risk for a breakthrough seizure.  Consider using reminders on your phone or a pill box.    If you develop a rash, please call the neurology clinic immediately or notify another healthcare professional, as this may be potentially life-threatening.  If you are unable to reach a healthcare professional, go to the emergency room immediately for further evaluation.    If you develop thoughts of wanting to hurt yourself or others, please call the neurology clinic immediately to notify another healthcare professional.  If you are unable to reach a healthcare professional, go to the emergency room immediately for further evaluation.    It is the [place] state law that you cannot drive within 90 days of a seizure.    You should avoid certain activities that if you were to have a seizure, you could harm yourself or others. In general, it is recommended that you avoid operating heavy machinery or power tools, swimming or taking baths by yourself (showers are ok), don't stand over open flames, don't get on high ladders or the roof.  I also recommend to avoid sleeping on your stomach.    For further information on epilepsy and resources available to patients and their families, please visit the [facility] at www.efky.org or call [phone].    **Check out the [facility]'s monthly Art Group Gathering.  They are  located at [facility], [address] [place] [address].  Call Jeri Dawson at [phone] or email her at [email or web address] for the dates of future gatherings.**      **If you have having memory problems, consider HOBSCOTCH (Home-Based Self-management and Cognitive Training Changes lives).  It is an 8 week self-management program for adults with epilepsy and memory problems.  The program is free at the [facility].  Contact Ailin Araya at [phone] or [email or web address].**

## 2021-10-13 NOTE — PROGRESS NOTES
Subjective:     Patient ID: Avinash Everett is a 31 y.o. male.    Mr. Everett is a 31-year-old right-handed male with a history of an AVM status post surgery x2, and epilepsy who presents to the neurology clinic today as a new patient to me for the evaluation of seizures.  The patient was seen by our inpatient service last on September 15 of 2021.  The patient reports that on August 3 of 2020 his AVM bled and he had 2 surgeries.  After that he had left-sided weakness and he was started on levetiracetam.  He then had his first seizure in February of this year and it was increased to 1000 mg twice a day.  He then had a seizure in August where he broke his hand but his medication was not changed.  His last seizure was on September 15 and they increase his Keppra to 1500 mg twice a day.  He reports that in February he had had 2 generalized tonic-clonic seizures in 1 day.  In September he had 1 generalized tonic-clonic seizure and then for 4-1/2 hours had continuous seizure activity on and off.  He thinks that his seizures are triggered by pain and stress.  They tend to be around dinnertime between 4 and 7 PM.  On September 16, 2021 he had a normal GFR.  He also reports that he has been experiencing a month-long headache around his incision.  The patient does not drive and does not work.  He is accompanied by his wife Alea today.  He had an EEG on September 15, 2021 that showed mild diffuse slowing, right hemispheric slowing and a single right frontal interictal epileptiform discharge.  He had a brain MRI on September 15 that showed his previous resection of an intracranial hemorrhage in the right temporoparietal region with a decrease in size in the resection cavity and less associate enhancement compared to his MRI a year prior.    Risk factors:  Childhood/febrile seizures? no  Head trauma/pathology? AVM  CNS infections? no  Family history of seizures? no        The following portions of the patient's history were  reviewed and updated as appropriate: allergies, current medications, past family history, past medical history, past social history, past surgical history and problem list.    Review of Systems     Objective:    Neurologic Exam    Physical Exam   **The patient is wearing a mask**  Constitutional:  Vital signs reviewed.  No apparent distress.  Well groomed.  Eyes:  No injection, no icterus.    Respiratory:  Normal effort.  Clear to auscultation bilaterally.  Cardiovascular:  Regular rate and rhythm.  No murmurs.  No carotid bruits. Symmetric radial pulses.  Musculoskeletal: The patient has some increased tone in the left upper extremity.  He has left upper extremity greater than left lower extremity weakness.  Skin:  No rashes.  Warm, dry, and intact.  Psychiatric:  Good mood.  Normal affect.    Neurologic:  Mental status-  The patient is alert and oriented to person, place and time. Attention/concentration is within normal limits.  Speech is fluent without dysarthria.  The patient is able to name, repeat and follow complex commands without difficulty.  Immediate memory and delayed recall intact (3/3 words immediate and after 4 minutes).  Fund of knowledge normal.  Cranial nerves- Pupils equally round and reactive to light with intact accomodation.  Visual fields intact.  Extraocular movements intact.  Facial sensation decreased on the left.   Hearing intact to finger-rub bilaterally.  SCM and trapezius are 5/5 bilaterally.    Motor-  See musculoskeletal above.  No tremor.  Reflexes-he has increased reflexes on the left.  Sensation-decreased to pinprick in the left arm.  Coordination- Intact to finger tapping and heel knee shin bilaterally.   Gait- See musculoskeletal exam above.       Assessment/Plan:    The patient is a 31-year-old right-handed male with a history of AVM status post surgery x2 and epilepsy who presents today for follow-up.    1.  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  Fortunately he has been seizure-free without side effects to levetiracetam monotherapy for the past month.  I recommend continue on the current dose with no changes.  We will call in Nayzilam as a rescue.  Due to his prolonged headache I would like for him to discuss it with his neurosurgeon.  We did give him a Toradol injection this afternoon for symptomatic relief.  No further testing is required at this time.  We reviewed routine seizure precautions including but not limited to not driving within 90 days of seizure.  We will see the patient back in 3 months time or sooner if needed.    A total of 40 minutes of time was spent on this encounter today.  This includes reviewing patient's records, face-to-face time, documentation.       Problems Addressed this Visit     None      Visit Diagnoses     Partial symptomatic epilepsy with complex partial seizures, not intractable, without status epilepticus (HCC)    -  Primary    Relevant Medications    levETIRAcetam (KEPPRA) 750 MG tablet      Diagnoses       Codes Comments    Partial symptomatic epilepsy with complex partial seizures, not intractable, without status epilepticus (HCC)    -  Primary ICD-10-CM: G40.209  ICD-9-CM: 345.40

## 2021-10-14 RX ORDER — KETOROLAC TROMETHAMINE 30 MG/ML
30 INJECTION, SOLUTION INTRAMUSCULAR; INTRAVENOUS ONCE
Status: COMPLETED | OUTPATIENT
Start: 2021-10-14 | End: 2021-10-14

## 2021-10-14 RX ADMIN — KETOROLAC TROMETHAMINE 30 MG: 30 INJECTION, SOLUTION INTRAMUSCULAR; INTRAVENOUS at 08:31

## 2021-11-01 ENCOUNTER — TELEPHONE (OUTPATIENT)
Dept: NEUROLOGY | Facility: CLINIC | Age: 31
End: 2021-11-01

## 2021-11-01 NOTE — TELEPHONE ENCOUNTER
Pt called after hours over the weekend. Pt reported to answering service that he is experiencing some memory issues: losing track of time, confusion and mild disorientation. Pt is requesting a call back.

## 2021-11-01 NOTE — TELEPHONE ENCOUNTER
Lm again for patient and emergency contact.  Just an FYI, I have tried multiple times to reach him via telephone and [employer] message without response    Just an FYI

## 2021-11-01 NOTE — TELEPHONE ENCOUNTER
LM FOR PATIENT AGAIN, SPOKE WITH EMERGENCY CONTACT RUMA AND SHE SAID THAT HE WILL NOT ANSWER HIS PHONE IF HE DOES NOT RECOGNIZE THE NUMBER- SHE WILL CONTACT HIM AND HAVE HIM CALL

## 2022-01-10 ENCOUNTER — HOSPITAL ENCOUNTER (OUTPATIENT)
Facility: HOSPITAL | Age: 32
Setting detail: OBSERVATION
Discharge: HOME | End: 2022-01-11
Attending: EMERGENCY MEDICINE | Admitting: EMERGENCY MEDICINE

## 2022-01-10 ENCOUNTER — APPOINTMENT (OUTPATIENT)
Dept: GENERAL RADIOLOGY | Facility: HOSPITAL | Age: 32
End: 2022-01-10

## 2022-01-10 ENCOUNTER — APPOINTMENT (OUTPATIENT)
Dept: CT IMAGING | Facility: HOSPITAL | Age: 32
End: 2022-01-10

## 2022-01-10 DIAGNOSIS — Q28.2 CEREBRAL ARTERIOVENOUS MALFORMATION (AVM): ICD-10-CM

## 2022-01-10 DIAGNOSIS — S00.81XA ABRASION OF FACE, INITIAL ENCOUNTER: ICD-10-CM

## 2022-01-10 DIAGNOSIS — G40.919 BREAKTHROUGH SEIZURE: ICD-10-CM

## 2022-01-10 DIAGNOSIS — R56.9 SEIZURE: Primary | ICD-10-CM

## 2022-01-10 DIAGNOSIS — S09.90XA CLOSED HEAD INJURY, INITIAL ENCOUNTER: ICD-10-CM

## 2022-01-10 PROBLEM — E83.42 HYPOMAGNESEMIA: Status: ACTIVE | Noted: 2022-01-10

## 2022-01-10 PROBLEM — E87.6 HYPOKALEMIA: Status: ACTIVE | Noted: 2022-01-10

## 2022-01-10 LAB
ALBUMIN SERPL-MCNC: 4.1 G/DL (ref 3.5–5.2)
ALBUMIN/GLOB SERPL: 1.7 G/DL
ALP SERPL-CCNC: 207 U/L (ref 39–117)
ALT SERPL W P-5'-P-CCNC: 37 U/L (ref 1–41)
ANION GAP SERPL CALCULATED.3IONS-SCNC: 9.5 MMOL/L (ref 5–15)
AST SERPL-CCNC: 45 U/L (ref 1–40)
BASOPHILS # BLD AUTO: 0.02 10*3/MM3 (ref 0–0.2)
BASOPHILS NFR BLD AUTO: 0.4 % (ref 0–1.5)
BILIRUB SERPL-MCNC: 1.4 MG/DL (ref 0–1.2)
BUN SERPL-MCNC: 4 MG/DL (ref 6–20)
BUN/CREAT SERPL: 7.1 (ref 7–25)
CALCIUM SPEC-SCNC: 8.4 MG/DL (ref 8.6–10.5)
CHLORIDE SERPL-SCNC: 102 MMOL/L (ref 98–107)
CK SERPL-CCNC: 120 U/L (ref 20–200)
CO2 SERPL-SCNC: 28.5 MMOL/L (ref 22–29)
CREAT SERPL-MCNC: 0.56 MG/DL (ref 0.76–1.27)
DEPRECATED RDW RBC AUTO: 43.4 FL (ref 37–54)
EOSINOPHIL # BLD AUTO: 0.03 10*3/MM3 (ref 0–0.4)
EOSINOPHIL NFR BLD AUTO: 0.6 % (ref 0.3–6.2)
ERYTHROCYTE [DISTWIDTH] IN BLOOD BY AUTOMATED COUNT: 13.1 % (ref 12.3–15.4)
ETHANOL BLD-MCNC: <10 MG/DL (ref 0–10)
ETHANOL UR QL: <0.01 %
GFR SERPL CREATININE-BSD FRML MDRD: >150 ML/MIN/1.73
GLOBULIN UR ELPH-MCNC: 2.4 GM/DL
GLUCOSE SERPL-MCNC: 148 MG/DL (ref 65–99)
HCT VFR BLD AUTO: 39.1 % (ref 37.5–51)
HGB BLD-MCNC: 13.1 G/DL (ref 13–17.7)
LYMPHOCYTES # BLD AUTO: 0.28 10*3/MM3 (ref 0.7–3.1)
LYMPHOCYTES NFR BLD AUTO: 5.5 % (ref 19.6–45.3)
MAGNESIUM SERPL-MCNC: 1.3 MG/DL (ref 1.6–2.6)
MCH RBC QN AUTO: 30.3 PG (ref 26.6–33)
MCHC RBC AUTO-ENTMCNC: 33.5 G/DL (ref 31.5–35.7)
MCV RBC AUTO: 90.5 FL (ref 79–97)
MONOCYTES # BLD AUTO: 0.35 10*3/MM3 (ref 0.1–0.9)
MONOCYTES NFR BLD AUTO: 6.9 % (ref 5–12)
NEUTROPHILS NFR BLD AUTO: 4.41 10*3/MM3 (ref 1.7–7)
NEUTROPHILS NFR BLD AUTO: 86.4 % (ref 42.7–76)
PLATELET # BLD AUTO: 109 10*3/MM3 (ref 140–450)
PMV BLD AUTO: 9 FL (ref 6–12)
POTASSIUM SERPL-SCNC: 3 MMOL/L (ref 3.5–5.2)
PROT SERPL-MCNC: 6.5 G/DL (ref 6–8.5)
RBC # BLD AUTO: 4.32 10*6/MM3 (ref 4.14–5.8)
SARS-COV-2 RNA RESP QL NAA+PROBE: NOT DETECTED
SODIUM SERPL-SCNC: 140 MMOL/L (ref 136–145)
WBC NRBC COR # BLD: 5.1 10*3/MM3 (ref 3.4–10.8)

## 2022-01-10 PROCEDURE — 82550 ASSAY OF CK (CPK): CPT | Performed by: NURSE PRACTITIONER

## 2022-01-10 PROCEDURE — G0378 HOSPITAL OBSERVATION PER HR: HCPCS

## 2022-01-10 PROCEDURE — 96365 THER/PROPH/DIAG IV INF INIT: CPT

## 2022-01-10 PROCEDURE — 85025 COMPLETE CBC W/AUTO DIFF WBC: CPT | Performed by: EMERGENCY MEDICINE

## 2022-01-10 PROCEDURE — 96375 TX/PRO/DX INJ NEW DRUG ADDON: CPT

## 2022-01-10 PROCEDURE — 99284 EMERGENCY DEPT VISIT MOD MDM: CPT

## 2022-01-10 PROCEDURE — 73610 X-RAY EXAM OF ANKLE: CPT

## 2022-01-10 PROCEDURE — C9803 HOPD COVID-19 SPEC COLLECT: HCPCS

## 2022-01-10 PROCEDURE — 96374 THER/PROPH/DIAG INJ IV PUSH: CPT

## 2022-01-10 PROCEDURE — 71101 X-RAY EXAM UNILAT RIBS/CHEST: CPT

## 2022-01-10 PROCEDURE — 82077 ASSAY SPEC XCP UR&BREATH IA: CPT | Performed by: EMERGENCY MEDICINE

## 2022-01-10 PROCEDURE — 80307 DRUG TEST PRSMV CHEM ANLYZR: CPT | Performed by: EMERGENCY MEDICINE

## 2022-01-10 PROCEDURE — 90715 TDAP VACCINE 7 YRS/> IM: CPT | Performed by: EMERGENCY MEDICINE

## 2022-01-10 PROCEDURE — 0 LEVETIRACETAM IN NACL 0.75% 1000 MG/100ML SOLUTION: Performed by: EMERGENCY MEDICINE

## 2022-01-10 PROCEDURE — 83735 ASSAY OF MAGNESIUM: CPT | Performed by: NURSE PRACTITIONER

## 2022-01-10 PROCEDURE — 90471 IMMUNIZATION ADMIN: CPT | Performed by: EMERGENCY MEDICINE

## 2022-01-10 PROCEDURE — 25010000002 TETANUS-DIPHTH-ACELL PERTUSSIS 5-2.5-18.5 LF-MCG/0.5 SUSPENSION PREFILLED SYRINGE: Performed by: EMERGENCY MEDICINE

## 2022-01-10 PROCEDURE — 96366 THER/PROPH/DIAG IV INF ADDON: CPT

## 2022-01-10 PROCEDURE — U0003 INFECTIOUS AGENT DETECTION BY NUCLEIC ACID (DNA OR RNA); SEVERE ACUTE RESPIRATORY SYNDROME CORONAVIRUS 2 (SARS-COV-2) (CORONAVIRUS DISEASE [COVID-19]), AMPLIFIED PROBE TECHNIQUE, MAKING USE OF HIGH THROUGHPUT TECHNOLOGIES AS DESCRIBED BY CMS-2020-01-R: HCPCS | Performed by: NURSE PRACTITIONER

## 2022-01-10 PROCEDURE — 73110 X-RAY EXAM OF WRIST: CPT

## 2022-01-10 PROCEDURE — 25010000002 MAGNESIUM SULFATE 2 GM/50ML SOLUTION: Performed by: NURSE PRACTITIONER

## 2022-01-10 PROCEDURE — 80053 COMPREHEN METABOLIC PANEL: CPT | Performed by: EMERGENCY MEDICINE

## 2022-01-10 PROCEDURE — 70450 CT HEAD/BRAIN W/O DYE: CPT

## 2022-01-10 RX ORDER — ACETAMINOPHEN 160 MG/5ML
650 SOLUTION ORAL EVERY 4 HOURS PRN
Status: DISCONTINUED | OUTPATIENT
Start: 2022-01-10 | End: 2022-01-11 | Stop reason: HOSPADM

## 2022-01-10 RX ORDER — POTASSIUM CHLORIDE 750 MG/1
40 TABLET, FILM COATED, EXTENDED RELEASE ORAL AS NEEDED
Status: DISCONTINUED | OUTPATIENT
Start: 2022-01-10 | End: 2022-01-11 | Stop reason: HOSPADM

## 2022-01-10 RX ORDER — ACETAMINOPHEN 325 MG/1
650 TABLET ORAL EVERY 4 HOURS PRN
Status: DISCONTINUED | OUTPATIENT
Start: 2022-01-10 | End: 2022-01-11 | Stop reason: HOSPADM

## 2022-01-10 RX ORDER — ACETAMINOPHEN 650 MG/1
650 SUPPOSITORY RECTAL EVERY 4 HOURS PRN
Status: DISCONTINUED | OUTPATIENT
Start: 2022-01-10 | End: 2022-01-11 | Stop reason: HOSPADM

## 2022-01-10 RX ORDER — NITROGLYCERIN 0.4 MG/1
0.4 TABLET SUBLINGUAL
Status: DISCONTINUED | OUTPATIENT
Start: 2022-01-10 | End: 2022-01-11 | Stop reason: HOSPADM

## 2022-01-10 RX ORDER — MAGNESIUM SULFATE HEPTAHYDRATE 40 MG/ML
2 INJECTION, SOLUTION INTRAVENOUS ONCE
Status: DISCONTINUED | OUTPATIENT
Start: 2022-01-10 | End: 2022-01-11 | Stop reason: HOSPADM

## 2022-01-10 RX ORDER — ONDANSETRON 2 MG/ML
4 INJECTION INTRAMUSCULAR; INTRAVENOUS EVERY 6 HOURS PRN
Status: DISCONTINUED | OUTPATIENT
Start: 2022-01-10 | End: 2022-01-11 | Stop reason: HOSPADM

## 2022-01-10 RX ORDER — MAGNESIUM SULFATE HEPTAHYDRATE 40 MG/ML
2 INJECTION, SOLUTION INTRAVENOUS AS NEEDED
Status: DISCONTINUED | OUTPATIENT
Start: 2022-01-10 | End: 2022-01-11 | Stop reason: HOSPADM

## 2022-01-10 RX ORDER — POTASSIUM CHLORIDE 7.45 MG/ML
10 INJECTION INTRAVENOUS
Status: DISCONTINUED | OUTPATIENT
Start: 2022-01-10 | End: 2022-01-11 | Stop reason: HOSPADM

## 2022-01-10 RX ORDER — SODIUM CHLORIDE 0.9 % (FLUSH) 0.9 %
10 SYRINGE (ML) INJECTION EVERY 12 HOURS SCHEDULED
Status: DISCONTINUED | OUTPATIENT
Start: 2022-01-10 | End: 2022-01-11 | Stop reason: HOSPADM

## 2022-01-10 RX ORDER — POTASSIUM CHLORIDE 750 MG/1
40 TABLET, FILM COATED, EXTENDED RELEASE ORAL ONCE
Status: DISCONTINUED | OUTPATIENT
Start: 2022-01-10 | End: 2022-01-11 | Stop reason: HOSPADM

## 2022-01-10 RX ORDER — POTASSIUM CHLORIDE 1.5 G/1.77G
40 POWDER, FOR SOLUTION ORAL AS NEEDED
Status: DISCONTINUED | OUTPATIENT
Start: 2022-01-10 | End: 2022-01-11 | Stop reason: HOSPADM

## 2022-01-10 RX ORDER — MAGNESIUM SULFATE HEPTAHYDRATE 40 MG/ML
4 INJECTION, SOLUTION INTRAVENOUS AS NEEDED
Status: DISCONTINUED | OUTPATIENT
Start: 2022-01-10 | End: 2022-01-11 | Stop reason: HOSPADM

## 2022-01-10 RX ORDER — SODIUM CHLORIDE 0.9 % (FLUSH) 0.9 %
10 SYRINGE (ML) INJECTION AS NEEDED
Status: DISCONTINUED | OUTPATIENT
Start: 2022-01-10 | End: 2022-01-11 | Stop reason: HOSPADM

## 2022-01-10 RX ORDER — LEVETIRACETAM 10 MG/ML
1000 INJECTION INTRAVASCULAR ONCE
Status: COMPLETED | OUTPATIENT
Start: 2022-01-10 | End: 2022-01-10

## 2022-01-10 RX ORDER — GABAPENTIN 300 MG/1
300 CAPSULE ORAL NIGHTLY
Status: DISCONTINUED | OUTPATIENT
Start: 2022-01-10 | End: 2022-01-11 | Stop reason: HOSPADM

## 2022-01-10 RX ORDER — FOLIC ACID 1 MG/1
1 TABLET ORAL DAILY
Status: DISCONTINUED | OUTPATIENT
Start: 2022-01-10 | End: 2022-01-11 | Stop reason: HOSPADM

## 2022-01-10 RX ORDER — MIRTAZAPINE 15 MG/1
15 TABLET, FILM COATED ORAL NIGHTLY
Status: DISCONTINUED | OUTPATIENT
Start: 2022-01-10 | End: 2022-01-11 | Stop reason: HOSPADM

## 2022-01-10 RX ORDER — LEVETIRACETAM 10 MG/ML
1000 INJECTION INTRAVASCULAR EVERY 12 HOURS SCHEDULED
Status: DISCONTINUED | OUTPATIENT
Start: 2022-01-11 | End: 2022-01-11

## 2022-01-10 RX ORDER — FAMOTIDINE 20 MG/1
20 TABLET, FILM COATED ORAL
Status: DISCONTINUED | OUTPATIENT
Start: 2022-01-10 | End: 2022-01-11 | Stop reason: HOSPADM

## 2022-01-10 RX ORDER — LORAZEPAM 2 MG/ML
1 INJECTION INTRAMUSCULAR EVERY 4 HOURS PRN
Status: DISCONTINUED | OUTPATIENT
Start: 2022-01-10 | End: 2022-01-11 | Stop reason: HOSPADM

## 2022-01-10 RX ADMIN — FAMOTIDINE 20 MG: 20 TABLET, FILM COATED ORAL at 19:59

## 2022-01-10 RX ADMIN — POTASSIUM CHLORIDE 40 MEQ: 750 TABLET, EXTENDED RELEASE ORAL at 17:24

## 2022-01-10 RX ADMIN — MAGNESIUM SULFATE HEPTAHYDRATE 2 G: 2 INJECTION, SOLUTION INTRAVENOUS at 20:56

## 2022-01-10 RX ADMIN — GABAPENTIN 300 MG: 300 CAPSULE ORAL at 20:01

## 2022-01-10 RX ADMIN — MAGNESIUM SULFATE HEPTAHYDRATE 2 G: 2 INJECTION, SOLUTION INTRAVENOUS at 19:08

## 2022-01-10 RX ADMIN — MAGNESIUM SULFATE HEPTAHYDRATE 2 G: 2 INJECTION, SOLUTION INTRAVENOUS at 17:24

## 2022-01-10 RX ADMIN — MIRTAZAPINE 15 MG: 15 TABLET, FILM COATED ORAL at 20:00

## 2022-01-10 RX ADMIN — LEVETIRACETAM 1000 MG: 1000 INJECTION, SOLUTION INTRAVENOUS at 13:17

## 2022-01-10 RX ADMIN — SODIUM CHLORIDE 1000 ML: 9 INJECTION, SOLUTION INTRAVENOUS at 13:18

## 2022-01-10 RX ADMIN — Medication 100 MG: at 19:59

## 2022-01-10 RX ADMIN — FOLIC ACID 1 MG: 1 TABLET ORAL at 19:59

## 2022-01-10 RX ADMIN — SODIUM CHLORIDE, PRESERVATIVE FREE 10 ML: 5 INJECTION INTRAVENOUS at 20:00

## 2022-01-10 RX ADMIN — IBUPROFEN 600 MG: 200 TABLET, FILM COATED ORAL at 19:59

## 2022-01-10 RX ADMIN — TETANUS TOXOID, REDUCED DIPHTHERIA TOXOID AND ACELLULAR PERTUSSIS VACCINE, ADSORBED 0.5 ML: 5; 2.5; 8; 8; 2.5 SUSPENSION INTRAMUSCULAR at 21:05

## 2022-01-10 RX ADMIN — ACETAMINOPHEN 650 MG: 325 TABLET, FILM COATED ORAL at 16:55

## 2022-01-10 RX ADMIN — POTASSIUM CHLORIDE 40 MEQ: 750 TABLET, EXTENDED RELEASE ORAL at 20:54

## 2022-01-10 NOTE — ED PROVIDER NOTES
Please see the CAROLINE note for full evaluation, I will attach an attestation         Jose Bueno MD  01/10/22 2142

## 2022-01-10 NOTE — H&P
Patient Name:  Avinash Everett  YOB: 1990  MRN:  [id number]  Admit Date:  1/10/2022  Patient Care Team:  Provider, No Known as PCP - General      Chief Complaint   Patient presents with   • Seizures       Subjective     Mr. Everett is a 31 y.o. male with a history of seizures that presents to [facility] complaining of witnessed seizure. Patient has history of seizures and reports that he has breakthrough seizures nearly once every 6 months. Today, his mother found him seizing at home and eventually called EMS to bring him here. This seizure apparently lasted longer than usual, though he denies recent medication changes and reports that he has been taking his medication as prescribed. He reports that he usually feels when a seizure is coming on and is able to sit down in a chair or bed, but apparently today he was unable to get to a chair and ended up falling to the floor, hitting his face and side on the side of the bed. He does have an abrasion to his forehead and bridge of nose and reports pain to his left ribs since the fall. He was given Versed per EMS en route and has had no seizure activity since arriving in the ED. Patient denies fever, chest pain, shortness of breath, abdominal pain, nausea or vomiting. Labs done in ED showed hypokalemia and hypomagnesemia, replacement was ordered. CT head showed nothing acute. Patient was given IV dose of Keppra prior to transfer to the observation unit.     History of Present Illness    Past Medical History:   Diagnosis Date   • Dehiscence of incision    • GERD (gastroesophageal reflux disease)    • History of transfusion    • Intracerebral hemorrhage (HCC)    • Metabolic encephalopathy    • MRSA (methicillin resistant Staphylococcus aureus)     RIGHT INCISION   • Paralysis (HCC)     LEFT SIDE   • Stroke (HCC)      Past Surgical History:   Procedure Laterality Date   • CRANIOTOMY Right 10/1/2020    Procedure: INCISION AND DRAINAGE WOUND, of  scalp incision with closure;  Surgeon: Leobardo Rivera MD;  Location: [facility] MAIN OR;  Service: Neurosurgery;  Laterality: Right;   • CRANIOTOMY FOR SUBDURAL HEMATOMA Right 8/3/2020    Procedure: RIGHT FRONTOPARIETAL CRANIOTOMY FOR EVACUATION OF INTRACEREBRAL HEMORRHAGE;  Surgeon: Leobardo Rivera MD;  Location: [facility] MAIN OR;  Service: Neurosurgery;  Laterality: Right;   • EMBOLIZATION CEREBRAL N/A 8/5/2020    Procedure: diagnostic angiogram possible EMBOLIZATION CEREBRAL. right radial approach;  Surgeon: Alfonso Malcolm MD;  Location: [facility] HYBRID OR 18/19;  Service: Neurosurgery;  Laterality: N/A;   • WRIST SURGERY      1st one 2006, 2nd 2010     Family History   Problem Relation Age of Onset   • Depression Maternal Grandmother    • Depression Maternal Grandfather    • Depression Paternal Grandmother    • Depression Paternal Grandfather    • Malig Hyperthermia Neg Hx      Social History     Tobacco Use   • Smoking status: Heavy Tobacco Smoker     Packs/day: 1.00   • Smokeless tobacco: Never Used   • Tobacco comment: PT HAS NOT SMOKED SINCE 7/2020   Vaping Use   • Vaping Use: Former   Substance Use Topics   • Alcohol use: Not Currently   • Drug use: Yes     Types: Marijuana     Medications Prior to Admission   Medication Sig Dispense Refill Last Dose   • acetaminophen (TYLENOL) 325 MG tablet Take 2 tablets by mouth Every 6 (Six) Hours As Needed for Mild Pain .   Past Week at Unknown time   • cetaphil (CETAPHIL) lotion Apply  topically to the appropriate area as directed Every 12 (Twelve) Hours. (Patient taking differently: Apply 1 application topically to the appropriate area as directed Every 12 (Twelve) Hours.) 177 mL 2 Past Week at Unknown time   • famotidine (PEPCID) 20 MG tablet Take 1 tablet by mouth 2 (Two) Times a Day Before Meals. 180 tablet 0 Past Month at Unknown time   • folic acid (FOLVITE) 1 MG tablet Take 1 tablet by mouth Daily. 90 tablet 0 Past Week at Unknown time   • gabapentin  (NEURONTIN) 300 MG capsule Take 1 capsule by mouth Every Night. 90 capsule 0 1/9/2022 at Unknown time   • levETIRAcetam (KEPPRA) 750 MG tablet Take 2 tablets by mouth 2 (Two) Times a Day for 30 days. 360 tablet 3 1/10/2022 at Unknown time   • mirtazapine (REMERON) 15 MG tablet Take 1 tablet by mouth Every Night. 90 tablet 0 1/9/2022 at Unknown time   • ondansetron ODT (ZOFRAN-ODT) 8 MG disintegrating tablet Place 8 mg on the tongue As Needed.   Past Month at Unknown time   • potassium chloride (MICRO-K) 10 MEQ CR capsule Take 1 capsule by mouth 2 (Two) Times a Day With Meals. 60 capsule 1 1/9/2022 at Unknown time   • thiamine (VITAMIN B1) 100 MG tablet Take 1 tablet by mouth Daily. 90 tablet 0 1/9/2022 at Unknown time     Allergies:    Allergies   Allergen Reactions   • Citrus Hives     Hives in mouth     • Promethazine Hives       Review of Systems   Neurological: Positive for headaches.   All other systems reviewed and are negative.       Objective    Vital Signs  Temp:  [99 °F (37.2 °C)-99.6 °F (37.6 °C)] 99 °F (37.2 °C)  Heart Rate:  [] 82  Resp:  [16] 16  BP: (102-140)/(66-87) 102/67  SpO2:  [96 %-98 %] 96 %  on  Flow (L/min):  [4] 4;   Device (Oxygen Therapy): room air  Body mass index is 26.02 kg/m².    Physical Exam  Vitals and nursing note reviewed.   Constitutional:       General: He is not in acute distress.     Appearance: Normal appearance. He is normal weight.   HENT:      Head: Normocephalic.      Comments: Abrasion to bridge of nose and forehead     Nose: Nose normal.      Mouth/Throat:      Mouth: Mucous membranes are moist.      Comments: Dried blood to mouth  Eyes:      Extraocular Movements: Extraocular movements intact.   Cardiovascular:      Rate and Rhythm: Normal rate and regular rhythm.      Pulses: Normal pulses.      Heart sounds: Murmur heard.       Pulmonary:      Effort: Pulmonary effort is normal. No respiratory distress.      Breath sounds: Normal breath sounds.   Abdominal:       General: Abdomen is flat. Bowel sounds are normal.      Palpations: Abdomen is soft.   Musculoskeletal:         General: Normal range of motion.      Cervical back: Normal range of motion and neck supple.      Comments: Left arm paralysis at baseline   Skin:     General: Skin is warm and dry.   Neurological:      General: No focal deficit present.      Mental Status: He is alert and oriented to person, place, and time. Mental status is at baseline.   Psychiatric:         Mood and Affect: Mood normal.         Behavior: Behavior normal.         Thought Content: Thought content normal.         Judgment: Judgment normal.         Results Review:   I reviewed the patient's new clinical results including all labs and xrays.    Lab Results (last 24 hours)     Procedure Component Value Units Date/Time    CBC & Differential [[id number]]  (Abnormal) Collected: 01/10/22 1315    Specimen: Blood Updated: 01/10/22 1417    Narrative:      The following orders were created for panel order CBC & Differential.  Procedure                               Abnormality         Status                     ---------                               -----------         ------                     CBC Auto Differential[[id number]]        Abnormal            Final result                 Please view results for these tests on the individual orders.    Comprehensive Metabolic Panel [[id number]]  (Abnormal) Collected: 01/10/22 1315    Specimen: Blood Updated: 01/10/22 1400     Glucose 148 mg/dL      BUN 4 mg/dL      Creatinine 0.56 mg/dL      Sodium 140 mmol/L      Potassium 3.0 mmol/L      Chloride 102 mmol/L      CO2 28.5 mmol/L      Calcium 8.4 mg/dL      Total Protein 6.5 g/dL      Albumin 4.10 g/dL      ALT (SGPT) 37 U/L      AST (SGOT) 45 U/L      Alkaline Phosphatase 207 U/L      Total Bilirubin 1.4 mg/dL      eGFR Non African Amer >150 mL/min/1.73      Globulin 2.4 gm/dL      A/G Ratio 1.7 g/dL      BUN/Creatinine Ratio 7.1     Anion Gap 9.5 mmol/L      Narrative:      GFR Normal >60  Chronic Kidney Disease <60  Kidney Failure <15      Ethanol [[id number]] Collected: 01/10/22 1315    Specimen: Blood Updated: 01/10/22 1409     Ethanol <10 mg/dL      Ethanol % <0.010 %     CBC Auto Differential [[id number]]  (Abnormal) Collected: 01/10/22 1315    Specimen: Blood Updated: 01/10/22 1337     WBC 5.10 10*3/mm3      RBC 4.32 10*6/mm3      Hemoglobin 13.1 g/dL      Hematocrit 39.1 %      MCV 90.5 fL      MCH 30.3 pg      MCHC 33.5 g/dL      RDW 13.1 %      RDW-SD 43.4 fl      MPV 9.0 fL      Platelets 109 10*3/mm3      Neutrophil % 86.4 %      Lymphocyte % 5.5 %      Monocyte % 6.9 %      Eosinophil % 0.6 %      Basophil % 0.4 %      Neutrophils, Absolute 4.41 10*3/mm3      Lymphocytes, Absolute 0.28 10*3/mm3      Monocytes, Absolute 0.35 10*3/mm3      Eosinophils, Absolute 0.03 10*3/mm3      Basophils, Absolute 0.02 10*3/mm3     CK [[id number]]  (Normal) Collected: 01/10/22 1315    Specimen: Blood Updated: 01/10/22 1512     Creatine Kinase 120 U/L     Magnesium [[id number]]  (Abnormal) Collected: 01/10/22 1315    Specimen: Blood Updated: 01/10/22 1512     Magnesium 1.3 mg/dL     COVID PRE-OP / PRE-PROCEDURE SCREENING ORDER (NO ISOLATION) - Swab, Nasopharynx [[id number]]  (Normal) Collected: 01/10/22 1324    Specimen: Swab from Nasopharynx Updated: 01/10/22 1410    Narrative:      The following orders were created for panel order COVID PRE-OP / PRE-PROCEDURE SCREENING ORDER (NO ISOLATION) - Swab, Nasopharynx.  Procedure                               Abnormality         Status                     ---------                               -----------         ------                     COVID-19,[facility] IN-HOUSE...[[id number]]  Normal              Final result                 Please view results for these tests on the individual orders.    COVID-19,[facility] IN-HOUSE CEPHEID/DAREK NP SWAB IN TRANSPORT MEDIA 8-12 HR TAT - Swab, Nasopharynx [[id number]]  (Normal) Collected: 01/10/22 4905     Specimen: Swab from Nasopharynx Updated: 01/10/22 1410     COVID19 Not Detected    Narrative:      Fact sheet for providers: https://www.fda.gov/media/148708/download     Fact sheet for patients: https://www.fda.gov/media/595869/download          CT Head Without Contrast   Final Result   Moderate-sized area of focal encephalomalacia in the right   posterior frontal-parietal region showing no change since 09/14/2021. No   acute intracranial abnormality is identified.       This report was finalized on 1/10/2022 2:17 PM by Dr. Norbert Mcgee M.D.            Assessment/Plan      Active Hospital Problems    Diagnosis  POA   • **Seizure (HCC) [R56.9]  Yes   • Hypokalemia [E87.6]  Yes   • Hypomagnesemia [E83.42]  Yes      Resolved Hospital Problems   No resolved problems to display.     Seizure  -neuro consult  -seizure precautions  -given IV Keppra in ED, will continue Q12 hrs for now  -PRN Ativan  -recheck labs in AM    HypoK/Hypomag  -electrolyte replacement ordered, will recheck labs as directed    VTE Ppx  -SCDs    CODE status  -full    I discussed the patients findings and my recommendations with patient.    NATHALY Rodriguez  [facility]  01/10/22  4:41 PM EST

## 2022-01-10 NOTE — CASE MANAGEMENT/SOCIAL WORK
Discharge Planning Assessment  [facility]     Patient Name: Avinash Everett  MRN: [id number]  Today's Date: 1/10/2022    Admit Date: 1/10/2022     Discharge Needs Assessment     Row Name 01/10/22 1743       Living Environment    Lives With spouse    Name(s) of Who Lives With Patient Alea Everett [phone]    Current Living Arrangements home/apartment/condo    Primary Care Provided by self    Provides Primary Care For no one    Family Caregiver if Needed spouse; parent(s)    Family Caregiver Names Alea Everett and mother Sangeetha Tidwell [phone]    Quality of Family Relationships helpful; involved; supportive    Able to Return to Prior Arrangements yes       Resource/Environmental Concerns    Resource/Environmental Concerns none    Transportation Concerns car, none       Transition Planning    Patient/Family Anticipates Transition to home; home with family    Patient/Family Anticipated Services at Transition none    Transportation Anticipated family or friend will provide       Discharge Needs Assessment    Readmission Within the Last 30 Days no previous admission in last 30 days    Equipment Currently Used at Home none    Concerns to be Addressed no discharge needs identified; denies needs/concerns at this time    Anticipated Changes Related to Illness none    Equipment Needed After Discharge none    Provided Post Acute Provider List? N/A    Provided Post Acute Provider Quality & Resource List? N/A               Discharge Plan     Row Name 01/10/22 1744       Plan    Plan Pt intends to return home upon discharge    Provided Post Acute Provider List? N/A    Provided Post Acute Provider Quality & Resource List? N/A    Plan Comments Face sheet verified, role of CCP explained. Pt independent in ADL's and does not require any assistive devices. Pt denies any difficulty paying for medications or taking his seizure medications              Continued Care and Services - Admitted Since 1/10/2022    Coordination has  not been started for this encounter.          Demographic Summary    No documentation.                Functional Status    No documentation.                Psychosocial    No documentation.                Abuse/Neglect    No documentation.                Legal    No documentation.                Substance Abuse    No documentation.                Patient Forms    No documentation.                   Tennille Boland RN

## 2022-01-10 NOTE — PROGRESS NOTES
Clinical Pharmacy Services: Medication History    Avinash Everett is a 31 y.o. male presenting to [facility] for   Chief Complaint   Patient presents with   • Seizures       He  has a past medical history of Dehiscence of incision, Intracerebral hemorrhage (HCC), Metabolic encephalopathy, MRSA (methicillin resistant Staphylococcus aureus), and Paralysis (HCC).    Allergies as of 01/10/2022 - Reviewed 01/10/2022   Allergen Reaction Noted   • Flagler Hives 08/08/2020   • Promethazine Hives 08/09/2020       Medication information was obtained from: Patient   Pharmacy and Phone Number:     Prior to Admission Medications     Prescriptions Last Dose Informant Patient Reported? Taking?    acetaminophen (TYLENOL) 325 MG tablet Past Week Self No Yes    Take 2 tablets by mouth Every 6 (Six) Hours As Needed for Mild Pain .    cetaphil (CETAPHIL) lotion Past Week Self No Yes    Apply  topically to the appropriate area as directed Every 12 (Twelve) Hours.    Patient taking differently:  Apply 1 application topically to the appropriate area as directed Every 12 (Twelve) Hours.    famotidine (PEPCID) 20 MG tablet Past Month Self No Yes    Take 1 tablet by mouth 2 (Two) Times a Day Before Meals.    folic acid (FOLVITE) 1 MG tablet Past Week Self No Yes    Take 1 tablet by mouth Daily.    gabapentin (NEURONTIN) 300 MG capsule 1/9/2022 Self No Yes    Take 1 capsule by mouth Every Night.    levETIRAcetam (KEPPRA) 750 MG tablet 1/10/2022 Self No Yes    Take 2 tablets by mouth 2 (Two) Times a Day for 30 days.    mirtazapine (REMERON) 15 MG tablet 1/9/2022 Self No Yes    Take 1 tablet by mouth Every Night.    ondansetron ODT (ZOFRAN-ODT) 8 MG disintegrating tablet Past Month Self Yes Yes    Place 8 mg on the tongue As Needed.    potassium chloride (MICRO-K) 10 MEQ CR capsule 1/9/2022 Self No Yes    Take 1 capsule by mouth 2 (Two) Times a Day With Meals.    thiamine (VITAMIN B1) 100 MG tablet 1/9/2022 Self No Yes    Take 1  tablet by mouth Daily.            Medication notes:     This medication list is complete to the best of my knowledge as of 1/10/2022    Please call if questions.    Chino Boone  Medication History Technician  [phone]    1/10/2022 15:52 EST

## 2022-01-10 NOTE — ED NOTES
Patient from home, has history of seizures. Patients family called due to patient having a seizure. They state he might of had a seizure for about an hour now on and off.     Patient had versed 5mg. Patient also takes keppra daily.    Patient has on mask, nurse has on proper ppe. Patient resting now after medications.      Brittney Villatoro, RN  01/10/22 0779

## 2022-01-10 NOTE — ED PROVIDER NOTES
EMERGENCY DEPARTMENT ENCOUNTER    Room Number:  12/12  Date of encounter:  1/10/2022  PCP: Provider, No Known  Historian: patient, ems   Full history not obtainable due to: post ictal, AMS     HPI:  Chief Complaint: Seizure     Context: Avinash Everett is a 31 y.o. male with a hx of intracerebral hemorrhage and seizure do who presents to the ED c/o seizures. The pt lives in a trailer behind his family's home and they found him today actively seizing on the bathroom floor according to EMS. The pt had reportedly been seizing for hours. He takes Keppra, and has not taken the medication today. EMS gave him 5mg of versed which controlled the seizing. They state he has since been awake and calm but not answering questions or following commands. The pt does localize pain initially on my exam and then towards the edi of the evaluation he does respond to his name and verbally told him his birthdate but had to be redirected multiple times and continued to fidget in the bed.     EMS states the left sided flaccidity is chronic.     No family is present at the bedside at this time.        PAST MEDICAL HISTORY    Active Ambulatory Problems     Diagnosis Date Noted   • Right-sided nontraumatic intracerebral hemorrhage (HCC) 08/03/2020   • Nontraumatic subcortical hemorrhage of right cerebral hemisphere (HCC) 08/03/2020   • Alcohol abuse 08/10/2020   • Metabolic encephalopathy 08/10/2020   • Vitamin K deficiency 08/10/2020   • Nontraumatic acute cerebral hemorrhage (HCC) 08/17/2020   • Disruption or dehiscence of closure of skull or craniotomy 09/28/2020   • Wound dehiscence 10/01/2020   • Postoperative visit 10/15/2020   • Recurrent seizures (HCC) 09/15/2021     Resolved Ambulatory Problems     Diagnosis Date Noted   • No Resolved Ambulatory Problems     Past Medical History:   Diagnosis Date   • Dehiscence of incision    • Intracerebral hemorrhage (HCC)    • MRSA (methicillin resistant Staphylococcus aureus)    • Paralysis (HCC)           PAST SURGICAL HISTORY  Past Surgical History:   Procedure Laterality Date   • CRANIOTOMY Right 10/1/2020    Procedure: INCISION AND DRAINAGE WOUND, of scalp incision with closure;  Surgeon: Leobardo Rivera MD;  Location: [facility] OR;  Service: Neurosurgery;  Laterality: Right;   • CRANIOTOMY FOR SUBDURAL HEMATOMA Right 8/3/2020    Procedure: RIGHT FRONTOPARIETAL CRANIOTOMY FOR EVACUATION OF INTRACEREBRAL HEMORRHAGE;  Surgeon: Leobardo Rviera MD;  Location: [facility] OR;  Service: Neurosurgery;  Laterality: Right;   • EMBOLIZATION CEREBRAL N/A 8/5/2020    Procedure: diagnostic angiogram possible EMBOLIZATION CEREBRAL. right radial approach;  Surgeon: Alfonso Malcolm MD;  Location: [facility] 18/19;  Service: Neurosurgery;  Laterality: N/A;   • WRIST SURGERY      1st one 2006, 2nd 2010         FAMILY HISTORY  Family History   Problem Relation Age of Onset   • Depression Maternal Grandmother    • Depression Maternal Grandfather    • Depression Paternal Grandmother    • Depression Paternal Grandfather    • Malig Hyperthermia Neg Hx          SOCIAL HISTORY  Social History     Socioeconomic History   • Marital status:    Tobacco Use   • Smoking status: Former Smoker     Packs/day: 0.50   • Smokeless tobacco: Never Used   • Tobacco comment: PT HAS NOT SMOKED SINCE 7/2020   Vaping Use   • Vaping Use: Never used   Substance and Sexual Activity   • Alcohol use: Not Currently   • Drug use: Never   • Sexual activity: Not Currently         ALLERGIES  Citrus and Promethazine        REVIEW OF SYSTEMS  Review of Systems   All systems reviewed and marked as negative except as listed in HPI       PHYSICAL EXAM    I have reviewed the triage vital signs and nursing notes.    ED Triage Vitals [01/10/22 1250]   Temp Heart Rate Resp BP SpO2   -- (!) 140 16 140/87 98 %      Temp src Heart Rate Source Patient Position BP Location FiO2 (%)   -- -- -- -- --       GENERAL: awake but drowsy , well developed,  well nourished in no distress  HENT: NC, multiple abrasions to face and laceration of the left side of the tongue which is not actively bleeding on my examination. Dried blood scattered throughout the lips. Neck supple, trachea midline  EYES: no scleral icterus, PERRL but sluggish, normal conjunctivae  CV: regular rhythm, regular rate, no murmur  RESPIRATORY: unlabored effort, CTAB  ABDOMEN: soft, nontender, nondistended, bowel sounds present  MUSCULOSKELETAL: no gross deformity  NEURO: awake. Left UE minimal movement. No movement of LLE observed. Answers questions. Attempts to follow commands with redirection.  SKIN: warm, dry, no rash  PSYCH:  Appropriate mood and affect    Vital signs and nursing notes reviewed.          LAB RESULTS  Labs Reviewed   COMPREHENSIVE METABOLIC PANEL - Abnormal; Notable for the following components:       Result Value    Glucose 148 (*)     BUN 4 (*)     Creatinine 0.56 (*)     Potassium 3.0 (*)     Calcium 8.4 (*)     AST (SGOT) 45 (*)     Alkaline Phosphatase 207 (*)     Total Bilirubin 1.4 (*)     All other components within normal limits    Narrative:     GFR Normal >60  Chronic Kidney Disease <60  Kidney Failure <15     URINE DRUG SCREEN - Abnormal; Notable for the following components:    Benzodiazepine Screen, Urine Positive (*)     THC, Screen, Urine Positive (*)     All other components within normal limits    Narrative:     Negative Thresholds Per Drugs Screened:    Amphetamines                 500 ng/ml  Barbiturates                 200 ng/ml  Benzodiazepines              100 ng/ml  Cocaine                      300 ng/ml  Methadone                    300 ng/ml  Opiates                      300 ng/ml  Oxycodone                    100 ng/ml  THC                           50 ng/ml    The Normal Value for all drugs tested is negative. This report includes final unconfirmed screening results to be used for medical treatment purposes only. Unconfirmed results must not be used for  non-medical purposes such as employment or legal testing. Clinical consideration should be applied to any drug of abuse test, particularly when unconfirmed results are used.           CBC WITH AUTO DIFFERENTIAL - Abnormal; Notable for the following components:    Platelets 109 (*)     Neutrophil % 86.4 (*)     Lymphocyte % 5.5 (*)     Lymphocytes, Absolute 0.28 (*)     All other components within normal limits   MAGNESIUM - Abnormal; Notable for the following components:    Magnesium 1.3 (*)     All other components within normal limits   BASIC METABOLIC PANEL - Abnormal; Notable for the following components:    BUN 5 (*)     Creatinine 0.53 (*)     Chloride 108 (*)     CO2 21.0 (*)     All other components within normal limits    Narrative:     GFR Normal >60  Chronic Kidney Disease <60  Kidney Failure <15     CBC (NO DIFF) - Abnormal; Notable for the following components:    WBC 3.19 (*)     RBC 4.11 (*)     Hemoglobin 12.5 (*)     Hematocrit 36.5 (*)     Platelets 90 (*)     All other components within normal limits   COVID-19[facility] IN-HOUSE CEPHEID/DAREK, NP SWAB IN TRANSPORT MEDIA 8-12 HR TAT - Normal    Narrative:     Fact sheet for providers: https://www.fda.gov/media/646496/download     Fact sheet for patients: https://www.fda.gov/media/397840/download   CK - Normal   COVID PRE-OP / PRE-PROCEDURE SCREENING ORDER (NO ISOLATION)    Narrative:     The following orders were created for panel order COVID PRE-OP / PRE-PROCEDURE SCREENING ORDER (NO ISOLATION) - Swab, Nasopharynx.  Procedure                               Abnormality         Status                     ---------                               -----------         ------                     COVID-[facility] IN-HOUSE...[[id number]]  Normal              Final result                 Please view results for these tests on the individual orders.   ETHANOL   MAGNESIUM   CBC AND DIFFERENTIAL    Narrative:     The following orders were created for panel order CBC  & Differential.  Procedure                               Abnormality         Status                     ---------                               -----------         ------                     CBC Auto Differential[[id number]]        Abnormal            Final result                 Please view results for these tests on the individual orders.       Ordered the above labs and independently reviewed the results.        RADIOLOGY    CT HEAD WO CONTRAST-     CLINICAL HISTORY: Seizures. Confusion.     TECHNIQUE: Transverse 3 mm thick images were obtained from the base of  the skull to the vertex without IV contrast.     Radiation dose reduction techniques were utilized, including automated  exposure control and exposure modulation based on body size.     COMPARISON: MRI of the brain dated 09/15/2021. CT imaging of the head  dated 09/14/2021.     FINDINGS: A closed craniotomy defect is evident within the right  frontoparietal region. Beneath the craniotomy defect there is a  moderate-sized area of encephalomalacia measuring approximately 4.5 x  4.2 cm in diameter. This involves the posterior aspect of the right  frontal lobe and portions of the right parietal lobe. This is unchanged.  This is the site of a previously evacuated large area of intracerebral  hemorrhage. There is no evidence of acute infarct or hemorrhage. There  is no mass effect. There is no evidence of skull fracture. The paranasal  sinuses and the mastoid air cells and middle ear cavities are well  aerated.     IMPRESSION:  Moderate-sized area of focal encephalomalacia in the right  posterior frontal-parietal region showing no change since 09/14/2021. No  acute intracranial abnormality is identified.     This report was finalized on 1/10/2022 2:17 PM by Dr. Norbert Mcgee M.D.       I ordered the above noted radiological studies. Independently reviewed by me and discussed with radiologist.  See dictation above for official radiology  interpretation.      PROCEDURES    Procedures        MEDICATIONS GIVEN IN ER    Medications   levETIRAcetam in NaCl 0.75% (KEPPRA) IVPB 1,000 mg (has no administration in time range)   sodium chloride 0.9 % bolus 1,000 mL (has no administration in time range)         PROGRESS, DATA ANALYSIS, CONSULTS, AND MEDICAL DECISION MAKING    All labs have been independently reviewed by me.  All radiology studies have been reviewed by me.   EKG's independently reviewed by me.  Discussion below represents my analysis of pertinent findings related to patient's condition, differential diagnosis, treatment plan and final disposition.    DIFFERENTIAL DIAGNOSIS INCLUDE BUT NOT LIMITED TO: Breakthrough seizure, pseudo seizure, arrhythmia, sepsis, rigors, tremor, electrolyte disturbance,  substance abuse. Medical non compliance    ED Course    Mon Mauro 10, 2022   1402 WBC: 5.10 [JS]   1402 Platelets(!): 109 [JS]   1455 Potassium(!): 3.0 [JS]   1456 Alkaline Phosphatase(!): 207 [JS]   1501 His mother is present at the bedside.  She reports she was talking to her son on the phone and then he stopped talking and she thought maybe he dropped the phone.  She did not hear him fall.  She drove over to his house to check on him and states he was seizing on the floor but it did not last very long and the seizure stopped.  She then helped him to the bathroom and states that when he sat down on the toilet he appeared to have a seizure again and the seizure lasted for about an hour until EMS arrived.  She did not call EMS right away as he normally comes out of his seizures without any intervention.  She estimates he seized for about an hour.  No recent dosage changes to his Keppra.  She believes he has been compliant with his medications.  Affirms Dr. Baker is his neurologist. [JS]   1525 Pt is still drowsy. Awakens to verbal stimuli but usually his name must be repeated multiple times for him to respond. He is not back to his baseline. I do not  appreciate any further seizure activity and the pt did receive Keppra prophylactically. Will plan for admission.  [JS]   1528 Magnesium(!): 1.3 [JS]   1532 I discussed the patient with Dinah ELIZABETH who agrees to meet the patient to Dr. Cooney [JS]      ED Course User Index  [JS] Lila Melgar APRN         AS OF 13:07 EST VITALS:        BP - 140/87  HR - (!) 140  TEMP -    O2 SATS - 98%    DIAGNOSIS  Diagnoses that have been ruled out:   None   Diagnoses that are still under consideration:   None   Final diagnoses:   Seizure (HCC)   Abrasion of face, initial encounter   Closed head injury, initial encounter       DISPOSITION  Admission     Pt masked in first look. I wore appropriate PPE throughout my encounters with the pt. I performed hand hygiene on entry into the pt room and upon exit.     Dictated utilizing Dragon dictation:  Much of this encounter note is an electronic transcription/translation of spoken language to printed text. The electronic translation of spoken language may permit erroneous, or at times, nonsensical words or phrases to be inadvertently transcribed; Although I have reviewed the note for such errors, some may still exist.     Lila Melgar, NATHALY  01/11/22 0636

## 2022-01-11 VITALS
SYSTOLIC BLOOD PRESSURE: 110 MMHG | RESPIRATION RATE: 17 BRPM | OXYGEN SATURATION: 93 % | TEMPERATURE: 98.1 F | HEIGHT: 73 IN | HEART RATE: 68 BPM | DIASTOLIC BLOOD PRESSURE: 70 MMHG | WEIGHT: 197.2 LBS | BODY MASS INDEX: 26.14 KG/M2

## 2022-01-11 PROBLEM — G40.919 BREAKTHROUGH SEIZURE: Status: ACTIVE | Noted: 2022-01-10

## 2022-01-11 PROBLEM — Q28.2 CEREBRAL ARTERIOVENOUS MALFORMATION (AVM): Status: ACTIVE | Noted: 2022-01-11

## 2022-01-11 LAB
AMPHET+METHAMPHET UR QL: NEGATIVE
ANION GAP SERPL CALCULATED.3IONS-SCNC: 11 MMOL/L (ref 5–15)
BARBITURATES UR QL SCN: NEGATIVE
BENZODIAZ UR QL SCN: POSITIVE
BUN SERPL-MCNC: 5 MG/DL (ref 6–20)
BUN/CREAT SERPL: 9.4 (ref 7–25)
CALCIUM SPEC-SCNC: 8.8 MG/DL (ref 8.6–10.5)
CANNABINOIDS SERPL QL: POSITIVE
CHLORIDE SERPL-SCNC: 108 MMOL/L (ref 98–107)
CO2 SERPL-SCNC: 21 MMOL/L (ref 22–29)
COCAINE UR QL: NEGATIVE
CREAT SERPL-MCNC: 0.53 MG/DL (ref 0.76–1.27)
DEPRECATED RDW RBC AUTO: 42.7 FL (ref 37–54)
ERYTHROCYTE [DISTWIDTH] IN BLOOD BY AUTOMATED COUNT: 13.2 % (ref 12.3–15.4)
GFR SERPL CREATININE-BSD FRML MDRD: >150 ML/MIN/1.73
GLUCOSE SERPL-MCNC: 78 MG/DL (ref 65–99)
HCT VFR BLD AUTO: 36.5 % (ref 37.5–51)
HGB BLD-MCNC: 12.5 G/DL (ref 13–17.7)
MAGNESIUM SERPL-MCNC: 2.1 MG/DL (ref 1.6–2.6)
MCH RBC QN AUTO: 30.4 PG (ref 26.6–33)
MCHC RBC AUTO-ENTMCNC: 34.2 G/DL (ref 31.5–35.7)
MCV RBC AUTO: 88.8 FL (ref 79–97)
METHADONE UR QL SCN: NEGATIVE
OPIATES UR QL: NEGATIVE
OXYCODONE UR QL SCN: NEGATIVE
PLATELET # BLD AUTO: 90 10*3/MM3 (ref 140–450)
PMV BLD AUTO: 9.5 FL (ref 6–12)
POTASSIUM SERPL-SCNC: 3.8 MMOL/L (ref 3.5–5.2)
RBC # BLD AUTO: 4.11 10*6/MM3 (ref 4.14–5.8)
SODIUM SERPL-SCNC: 140 MMOL/L (ref 136–145)
WBC NRBC COR # BLD: 3.19 10*3/MM3 (ref 3.4–10.8)

## 2022-01-11 PROCEDURE — 99214 OFFICE O/P EST MOD 30 MIN: CPT | Performed by: PSYCHIATRY & NEUROLOGY

## 2022-01-11 PROCEDURE — 0 LEVETIRACETAM IN NACL 0.75% 1000 MG/100ML SOLUTION: Performed by: NURSE PRACTITIONER

## 2022-01-11 PROCEDURE — 80048 BASIC METABOLIC PNL TOTAL CA: CPT | Performed by: NURSE PRACTITIONER

## 2022-01-11 PROCEDURE — 83735 ASSAY OF MAGNESIUM: CPT | Performed by: EMERGENCY MEDICINE

## 2022-01-11 PROCEDURE — 96376 TX/PRO/DX INJ SAME DRUG ADON: CPT

## 2022-01-11 PROCEDURE — 85027 COMPLETE CBC AUTOMATED: CPT | Performed by: NURSE PRACTITIONER

## 2022-01-11 PROCEDURE — G0378 HOSPITAL OBSERVATION PER HR: HCPCS

## 2022-01-11 RX ORDER — LEVETIRACETAM 1000 MG/1
2000 TABLET ORAL 2 TIMES DAILY
Qty: 120 TABLET | Refills: 1 | Status: SHIPPED | OUTPATIENT
Start: 2022-01-11 | End: 2022-03-03 | Stop reason: SDUPTHER

## 2022-01-11 RX ORDER — PHENYTOIN SODIUM 100 MG/1
300 CAPSULE, EXTENDED RELEASE ORAL NIGHTLY
Qty: 90 CAPSULE | Refills: 1 | Status: SHIPPED | OUTPATIENT
Start: 2022-01-11 | End: 2022-02-17

## 2022-01-11 RX ORDER — LACOSAMIDE 100 MG/1
100 TABLET ORAL 2 TIMES DAILY
Qty: 60 TABLET | Refills: 1 | Status: SHIPPED | OUTPATIENT
Start: 2022-01-11 | End: 2022-01-11 | Stop reason: ALTCHOICE

## 2022-01-11 RX ORDER — LEVETIRACETAM 500 MG/1
2000 TABLET ORAL 2 TIMES DAILY
Status: DISCONTINUED | OUTPATIENT
Start: 2022-01-11 | End: 2022-01-11 | Stop reason: HOSPADM

## 2022-01-11 RX ORDER — LEVETIRACETAM 1000 MG/1
2000 TABLET ORAL 2 TIMES DAILY
Qty: 120 TABLET | Refills: 1 | Status: SHIPPED | OUTPATIENT
Start: 2022-01-11 | End: 2022-01-13 | Stop reason: SDUPTHER

## 2022-01-11 RX ORDER — LACOSAMIDE 100 MG/1
100 TABLET ORAL 2 TIMES DAILY
Status: DISCONTINUED | OUTPATIENT
Start: 2022-01-11 | End: 2022-01-11

## 2022-01-11 RX ORDER — PHENYTOIN SODIUM 100 MG/1
300 CAPSULE, EXTENDED RELEASE ORAL NIGHTLY
Status: DISCONTINUED | OUTPATIENT
Start: 2022-01-11 | End: 2022-01-11 | Stop reason: HOSPADM

## 2022-01-11 RX ADMIN — IBUPROFEN 600 MG: 200 TABLET, FILM COATED ORAL at 10:06

## 2022-01-11 RX ADMIN — LEVETIRACETAM 1000 MG: 1000 INJECTION, SOLUTION INTRAVENOUS at 00:01

## 2022-01-11 RX ADMIN — FOLIC ACID 1 MG: 1 TABLET ORAL at 08:31

## 2022-01-11 RX ADMIN — Medication 100 MG: at 08:31

## 2022-01-11 RX ADMIN — PHENYTOIN SODIUM 300 MG: 100 CAPSULE, EXTENDED RELEASE ORAL at 16:13

## 2022-01-11 RX ADMIN — LEVETIRACETAM 1000 MG: 1000 INJECTION, SOLUTION INTRAVENOUS at 08:31

## 2022-01-11 RX ADMIN — POTASSIUM CHLORIDE 40 MEQ: 750 TABLET, EXTENDED RELEASE ORAL at 00:02

## 2022-01-11 RX ADMIN — ACETAMINOPHEN 650 MG: 325 TABLET, FILM COATED ORAL at 06:31

## 2022-01-11 RX ADMIN — FAMOTIDINE 20 MG: 20 TABLET, FILM COATED ORAL at 06:31

## 2022-01-11 RX ADMIN — SODIUM CHLORIDE, PRESERVATIVE FREE 10 ML: 5 INJECTION INTRAVENOUS at 08:32

## 2022-01-11 NOTE — PLAN OF CARE
Alert but drowsy. Follows commands. Vitals stable. K and Mg replaced. Tolerating oral medication well. IV saline locked. Headache treated with PRN medication. Urinal at bedside. Plans to go home with mother for assistance.     Problem: Adult Inpatient Plan of Care  Goal: Absence of Hospital-Acquired Illness or Injury  Intervention: Identify and Manage Fall Risk  Recent Flowsheet Documentation  Taken 1/11/2022 0155 by Alea Mcgregor RN  Safety Promotion/Fall Prevention: safety round/check completed  Taken 1/11/2022 0002 by Alea Mcgregor RN  Safety Promotion/Fall Prevention: safety round/check completed  Taken 1/10/2022 2208 by Alea Mcgregor RN  Safety Promotion/Fall Prevention: safety round/check completed  Taken 1/10/2022 1910 by Alea Mcgregor RN  Safety Promotion/Fall Prevention: safety round/check completed   Goal Outcome Evaluation:  Plan of Care Reviewed With: patient        Progress: no change

## 2022-01-11 NOTE — PROGRESS NOTES
ED OBSERVATION PROGRESS/DISCHARGE SUMMARY    Date of Admission: 1/10/2022   LOS: 0 days   PCP: Provider, No Known    Interim History: Patient stable.  No acute events overnight.  States that he does have a history of breakthrough seizures since having an AVM operated on in August 2020.  States that his seizure yesterday was a typical breakthrough seizure for him.  He states that he felt the aura as though he had a seizure coming on and was able to call his mom before the seizure event occurred.  The patient does admit to drinking alcohol the night before his seizure occurred.  The patient complains of persistent pain in the left shoulder and left rib region.  The patient states that he did have a fall several days ago and had pain in the left clavicle region from this fall, but that his pain worsened after his seizure yesterday.    1400: The patient has been seen by Dr. Villafuerte, neurologist. He has recommended increasing the patient's Keppra dose to 2 g twice daily and adding Vimpat to 100 mg twice daily. He is cleared the patient for discharge home and recommended that he follow-up with his neurologist, Dr. Baker, within the next 2 to 3 weeks.    Final Diagnosis: seizure    Patient seen at:   Subjective     OBS Unit Summary: The patient presented to the emergency department yesterday after having a breakthrough seizure at home.  He was noted to have a prolonged postictal period and was admitted to the observation unit for further evaluation.  He was loaded with IV Keppra and was monitored overnight with no further seizure activity.  He has been recommended to increase his Keppra dose and add Vimpat.  He has been cleared for discharge home and will follow-up outpatient with his neurologist.    ROS:  General: no fevers, chills  Respiratory: no cough, dyspnea  Cardiovascular: no chest pain, palpitations  Abdomen: No abdominal pain, nausea, vomiting, or diarrhea  Neurologic: No focal weakness    Objective   Physical  Exam:  I have reviewed the vital signs.  Temp:  [97.9 °F (36.6 °C)-99 °F (37.2 °C)] 98.1 °F (36.7 °C)  Heart Rate:  [68-96] 68  Resp:  [15-18] 17  BP: (102-126)/(62-82) 110/70  General Appearance:    Alert, cooperative, no distress  Head:  Multiple abrasions noted across the patient's forehead and bridge of nose  Eyes:    Sclerae anicteric  Neck:   Supple, no mass  Lungs: Clear to auscultation bilaterally, respirations unlabored  Heart: Regular rate and rhythm, S1 and S2 normal, no murmur, rub or gallop  Abdomen:  Soft, non-tender, bowel sounds active, nondistended  Extremities: No clubbing, cyanosis, or edema to lower extremities  Pulses:  2+ and symmetric in distal lower extremities  Skin: No rashes   Neurologic: Oriented x3, Normal strength to extremities    Results Review:    I have reviewed the labs, radiology results and diagnostic studies.    Results from last 7 days   Lab Units 01/11/22  0440   WBC 10*3/mm3 3.19*   HEMOGLOBIN g/dL 12.5*   HEMATOCRIT % 36.5*   PLATELETS 10*3/mm3 90*     Results from last 7 days   Lab Units 01/11/22  0440 01/10/22  1315   SODIUM mmol/L 140 140   POTASSIUM mmol/L 3.8 3.0*   CHLORIDE mmol/L 108* 102   CO2 mmol/L 21.0* 28.5   BUN mg/dL 5* 4*   CREATININE mg/dL 0.53* 0.56*   CALCIUM mg/dL 8.8 8.4*   BILIRUBIN mg/dL  --  1.4*   ALK PHOS U/L  --  207*   ALT (SGPT) U/L  --  37   AST (SGOT) U/L  --  45*   GLUCOSE mg/dL 78 148*     Imaging Results (Last 24 Hours)     Procedure Component Value Units Date/Time    XR Ankle 3+ View Left [[id number]] Collected: 01/10/22 2122     Updated: 01/10/22 2127    Narrative:      3 VIEWS LEFT ANKLE     HISTORY: Trauma     COMPARISON: None available.     FINDINGS:  No acute fracture or subluxation of the left ankle is seen. Patient  appears relatively osteopenic for a 31-year-old.       Impression:      No acute fracture or subluxation identified.     This report was finalized on 1/10/2022 9:24 PM by Dr. Annelise Long M.D.       XR Wrist 3+ View  Left [[id number]] Collected: 01/10/22 2120     Updated: 01/10/22 2125    Narrative:      2 VIEWS LEFT WRIST     HISTORY: Trauma     COMPARISON: None available.     FINDINGS:  Patient has relative osteopenia for a 31-year-old patient. No acute  fracture or subluxation is identified. There may be some soft tissue  swelling about the wrist.       Impression:      No acute fracture or subluxation identified.     This report was finalized on 1/10/2022 9:22 PM by Dr. Annelise Long M.D.       XR Ribs Left With PA Chest [[id number]] Collected: 01/10/22 2114     Updated: 01/10/22 2123    Narrative:      PA CHEST RADIOGRAPH PLUS 4 VIEWS LEFT RIBS     HISTORY: Trauma     COMPARISON: 09/30/2020     FINDINGS:  Heart size is within normal limits. No pneumothorax, pleural effusion,  or acute infiltrate is seen. Patient does have a lateral left clavicle  fracture. There is a suggestion of some adjacent callus formation, and  it may actually be subacute. No displaced rib fractures are seen.       Impression:         1. No displaced rib fractures identified.  2. Left clavicle fracture. This may actually be subacute.     This report was finalized on 1/10/2022 9:20 PM by Dr. Annelise Long M.D.       CT Head Without Contrast [[id number]] Collected: 01/10/22 1406     Updated: 01/10/22 1420    Narrative:      CT HEAD WO CONTRAST-     CLINICAL HISTORY: Seizures. Confusion.     TECHNIQUE: Transverse 3 mm thick images were obtained from the base of  the skull to the vertex without IV contrast.     Radiation dose reduction techniques were utilized, including automated  exposure control and exposure modulation based on body size.     COMPARISON: MRI of the brain dated 09/15/2021. CT imaging of the head  dated 09/14/2021.     FINDINGS: A closed craniotomy defect is evident within the right  frontoparietal region. Beneath the craniotomy defect there is a  moderate-sized area of encephalomalacia measuring approximately 4.5 x  4.2 cm in  diameter. This involves the posterior aspect of the right  frontal lobe and portions of the right parietal lobe. This is unchanged.  This is the site of a previously evacuated large area of intracerebral  hemorrhage. There is no evidence of acute infarct or hemorrhage. There  is no mass effect. There is no evidence of skull fracture. The paranasal  sinuses and the mastoid air cells and middle ear cavities are well  aerated.       Impression:      Moderate-sized area of focal encephalomalacia in the right  posterior frontal-parietal region showing no change since 09/14/2021. No  acute intracranial abnormality is identified.     This report was finalized on 1/10/2022 2:17 PM by Dr. Norbert Mcgee M.D.           I have reviewed the medications.  ---------------------------------------------------------------------------------------------  Assessment/Plan   Assessment/Problem List    Breakthrough seizure (HCC)    Hypokalemia    Hypomagnesemia    Cerebral arteriovenous malformation (AVM)    Plan: Increase antiepileptic medications and follow-up outpatient with neurologist    Disposition: discharge home    Follow-up after Discharge: Dr. Baker (neurologist)    This note will serve as discharge summary    DEAN Stokes 01/11/22 14:08 EST

## 2022-01-11 NOTE — PLAN OF CARE
Goal Outcome Evaluation:              Outcome Summary: patient left facility via private vehicle with family member, all belonging taken home, discharge summary provided and new medication explained, patient had no questions about medication at the time of discharge.

## 2022-01-11 NOTE — CONSULTS
"Adult Nutrition  Assessment/PES    Patient Name:  Avinash Everett  YOB: 1990  MRN: [id number]  Admit Date:  1/10/2022    Assessment Date:  1/11/2022    Comments:  Nutrition consult from nursing admission screen. Pt admitted with seizure. Reports he has lost about 180lbs in the past year and a half. C/o appetite diminished with Keppra. Occassionally drinks ensure but doesn't like it very much. Encouraged to try other brands and discouraged further weight loss. Pt appreciative of visit. Will follow as needed.     Reason for Assessment     Row Name 01/11/22 1333          Reason for Assessment    Reason For Assessment identified at risk by screening criteria     Diagnosis neurologic conditions  Seizure, hypokalemia, hypomagnes     Identified At Risk by Screening Criteria MST SCORE 2+                Nutrition/Diet History     Row Name 01/11/22 1429          Nutrition/Diet History    Typical Food/Fluid Intake c/o keppra decreases his appetite     Supplemental Drinks/Foods/Additives occassionally drinks ensure but doens't like it that much                Anthropometrics     Row Name 01/11/22 1335          Anthropometrics    Height 185.4 cm (73\")            Admit Weight    Admit Weight 89.5 kg (197 lb 5 oz)            Ideal Body Weight (IBW)    Ideal Body Weight (IBW) (kg) 84.86     % of Ideal Body Weight Assessment --  105% IBW            Usual Body Weight (UBW)    Usual Body Weight 127 kg (280 lb)     Weight Loss unintentional     Weight Loss Time Frame 1.5 years ago            Body Mass Index (BMI)    BMI Assessment BMI 25-29.9: overweight  BMi 26                Labs/Tests/Procedures/Meds     Row Name 01/11/22 9052          Labs/Procedures/Meds    Lab Results Reviewed reviewed, pertinent     Lab Results Comments K+, Mg, BUN, Cr, + Tox urine screen            Diagnostic Tests/Procedures    Diagnostic Test/Procedure Reviewed reviewed, pertinent     Diagnostic Test/Procedures Comments xrays            " Medications    Pertinent Medications Reviewed reviewed, pertinent     Pertinent Medications Comments pepcid, folic acid, neurontin, keppra, remeron, kcl, thiamine                Physical Findings     Row Name 01/11/22 1330          Physical Findings    Skin other (see comments)  abrasions                  Nutrition Prescription Ordered     Row Name 01/11/22 1337          Nutrition Prescription PO    Current PO Diet Regular                Evaluation of Received Nutrient/Fluid Intake     Row Name 01/11/22 1337          PO Evaluation    Number of Days PO Intake Evaluated Insufficient Data     Number of Meals 1     % PO Intake 75%               Problem/Interventions:   Problem 1     Row Name 01/11/22 133          Nutrition Diagnoses Problem 1    Problem 1 Nutrition Appropriate for Condition at this Time                Intervention Goal     Row Name 01/11/22 7727          Intervention Goal    General Maintain nutrition; Disease management/therapy; Improved nutrition related lab(s); Reduce/improve symptoms; Palliative Care     PO Tolerate PO; PO intake (%)     PO Intake % 100 %     Weight Maintain weight                Nutrition Intervention     Row Name 01/11/22 4679          Nutrition Intervention    RD/Tech Action Follow Tx progress; Care plan reviewd; Encourage intake                  Education/Evaluation     Row Name 01/11/22 7648          Education    Education Will Instruct as appropriate            Monitor/Evaluation    Monitor Skin status; Per protocol; I&O; Symptoms; PO intake; Pertinent labs; Weight                 Electronically signed by:  Milka Howell RD  01/11/22 14:30 EST

## 2022-01-11 NOTE — PROGRESS NOTES
.  Hospitalist Daily Progress Note    Date of Admission: 1/10/2022   LOS: 0 days   PCP: Provider, No Known    Chief Complaint: Seizure  Patient seen at: Observation  Subjective   HPI:  Patient has been resting comfortably throughout the night and in no acute distress.  Patient has had no seizures throughout the night.  ROS:  General: no fevers, chills  Respiratory: no cough, dyspnea  Cardiovascular: no chest pain, palpitations  Abdomen: No abdominal pain, nausea, vomiting, or diarrhea  Neurologic: No focal weakness    Objective   Physical Exam:  I have reviewed the vital signs.  Temp:  [98.2 °F (36.8 °C)-99.6 °F (37.6 °C)] 98.2 °F (36.8 °C)  Heart Rate:  [] 94  Resp:  [15-16] 15  BP: (102-140)/(62-87) 126/72  General Appearance:    Alert, cooperative, no distress  Head:    Normocephalic, atraumatic  Eyes:    Sclerae anicteric  Neck:   Supple, no mass  Lungs: Clear to auscultation bilaterally, respirations unlabored  Heart: Regular rate and rhythm, S1 and S2 normal, no murmur, rub or gallop  Abdomen:  Soft, non-tender, bowel sounds active, nondistended  Extremities: No clubbing, cyanosis, or edema to lower extremities  Pulses:  2+ and symmetric in distal lower extremities  Skin: No rashes   Neurologic: Oriented x3, Normal strength to extremities    Results Review:    I have reviewed the labs, radiology results and diagnostic studies.    Results from last 7 days   Lab Units 01/10/22  1315   WBC 10*3/mm3 5.10   HEMOGLOBIN g/dL 13.1   HEMATOCRIT % 39.1   PLATELETS 10*3/mm3 109*     Results from last 7 days   Lab Units 01/10/22  1315   SODIUM mmol/L 140   POTASSIUM mmol/L 3.0*   CHLORIDE mmol/L 102   CO2 mmol/L 28.5   BUN mg/dL 4*   CREATININE mg/dL 0.56*   CALCIUM mg/dL 8.4*   BILIRUBIN mg/dL 1.4*   ALK PHOS U/L 207*   ALT (SGPT) U/L 37   AST (SGOT) U/L 45*   GLUCOSE mg/dL 148*     Imaging Results (Last 24 Hours)     Procedure Component Value Units Date/Time    XR Ankle 3+ View Left [[id number]] Collected: 01/10/22  2122     Updated: 01/10/22 2127    Narrative:      3 VIEWS LEFT ANKLE     HISTORY: Trauma     COMPARISON: None available.     FINDINGS:  No acute fracture or subluxation of the left ankle is seen. Patient  appears relatively osteopenic for a 31-year-old.       Impression:      No acute fracture or subluxation identified.     This report was finalized on 1/10/2022 9:24 PM by Dr. Annelise Long M.D.       XR Wrist 3+ View Left [[id number]] Collected: 01/10/22 2120     Updated: 01/10/22 2125    Narrative:      2 VIEWS LEFT WRIST     HISTORY: Trauma     COMPARISON: None available.     FINDINGS:  Patient has relative osteopenia for a 31-year-old patient. No acute  fracture or subluxation is identified. There may be some soft tissue  swelling about the wrist.       Impression:      No acute fracture or subluxation identified.     This report was finalized on 1/10/2022 9:22 PM by Dr. Annelise Long M.D.       XR Ribs Left With PA Chest [[id number]] Collected: 01/10/22 2114     Updated: 01/10/22 2123    Narrative:      PA CHEST RADIOGRAPH PLUS 4 VIEWS LEFT RIBS     HISTORY: Trauma     COMPARISON: 09/30/2020     FINDINGS:  Heart size is within normal limits. No pneumothorax, pleural effusion,  or acute infiltrate is seen. Patient does have a lateral left clavicle  fracture. There is a suggestion of some adjacent callus formation, and  it may actually be subacute. No displaced rib fractures are seen.       Impression:         1. No displaced rib fractures identified.  2. Left clavicle fracture. This may actually be subacute.     This report was finalized on 1/10/2022 9:20 PM by Dr. Annelise Long M.D.       CT Head Without Contrast [[id number]] Collected: 01/10/22 1406     Updated: 01/10/22 1420    Narrative:      CT HEAD WO CONTRAST-     CLINICAL HISTORY: Seizures. Confusion.     TECHNIQUE: Transverse 3 mm thick images were obtained from the base of  the skull to the vertex without IV contrast.     Radiation dose  reduction techniques were utilized, including automated  exposure control and exposure modulation based on body size.     COMPARISON: MRI of the brain dated 09/15/2021. CT imaging of the head  dated 09/14/2021.     FINDINGS: A closed craniotomy defect is evident within the right  frontoparietal region. Beneath the craniotomy defect there is a  moderate-sized area of encephalomalacia measuring approximately 4.5 x  4.2 cm in diameter. This involves the posterior aspect of the right  frontal lobe and portions of the right parietal lobe. This is unchanged.  This is the site of a previously evacuated large area of intracerebral  hemorrhage. There is no evidence of acute infarct or hemorrhage. There  is no mass effect. There is no evidence of skull fracture. The paranasal  sinuses and the mastoid air cells and middle ear cavities are well  aerated.       Impression:      Moderate-sized area of focal encephalomalacia in the right  posterior frontal-parietal region showing no change since 09/14/2021. No  acute intracranial abnormality is identified.     This report was finalized on 1/10/2022 2:17 PM by Dr. Norbert Mcgee M.D.             I have reviewed the medications.  ---------------------------------------------------------------------------------------------  Assessment/Plan   Assessment/Problem List    Seizure (HCC)    Hypokalemia    Hypomagnesemia       Plan  Seizure  -neuro consult  -seizure precautions  -given IV Keppra in ED, will continue Q12 hrs for now  -PRN Ativan  -recheck labs in AM  -UDS positive for benzo and THC  -CT head negative for any acute findings  -   HypoK/Hypomag  -electrolyte replacement ordered, will recheck labs as directed     Fall  -X-ray demonstrates left clavicle fracture  -Place sling to the left lower extremity    VTE Ppx  -SCDs     CODE status  -full      DVT prophylaxis:  Discharge Planning: I expect patient to be discharged to home in a.m.    Fadi Goel, NATHALY 01/11/22 02:40  EST

## 2022-01-11 NOTE — CONSULTS
Patient Identification:  NAME:  Avinash Everett  Age:  31 y.o.   Sex:  male   :  1990   MRN:  [id number]       Chief complaint: I had a seizure, reason for consult breakthrough seizure    History of present illness: Patient is a 31-year-old right-handed white male with a history of intracerebral hemorrhage and AVM surgery right hemisphere and  he has been left with weakness on his left side and a seizure disorder he comes in now after having a tonic-clonic seizure duration not known quality tonic-clonic associated symptoms bruised up his face he fell a couple days ago and hurt his left shoulder region and it is worse since then and he has a broken left clavicle as context modifying factors he is on Keppra has been taking 1500 mg p.o. twice daily and states he has been compliant quality was a generalized convulsion he did have significant postictal confusion after this    CT of the head by my independent eyeball review shows encephalomalacia in the right hemisphere  Past medical history:  Past Medical History:   Diagnosis Date   • Dehiscence of incision    • GERD (gastroesophageal reflux disease)    • History of transfusion    • Intracerebral hemorrhage (HCC)    • Metabolic encephalopathy    • MRSA (methicillin resistant Staphylococcus aureus)     RIGHT INCISION   • Paralysis (HCC)     LEFT SIDE   • Stroke (HCC)        Past surgical history:  Past Surgical History:   Procedure Laterality Date   • CRANIOTOMY Right 10/1/2020    Procedure: INCISION AND DRAINAGE WOUND, of scalp incision with closure;  Surgeon: Leobardo Rivera MD;  Location: [facility];  Service: Neurosurgery;  Laterality: Right;   • CRANIOTOMY FOR SUBDURAL HEMATOMA Right 8/3/2020    Procedure: RIGHT FRONTOPARIETAL CRANIOTOMY FOR EVACUATION OF INTRACEREBRAL HEMORRHAGE;  Surgeon: Leobardo Rivera MD;  Location: [facility];  Service: Neurosurgery;  Laterality: Right;   • EMBOLIZATION CEREBRAL N/A 2020    Procedure: diagnostic  angiogram possible EMBOLIZATION CEREBRAL. right radial approach;  Surgeon: Alfosno Malcolm MD;  [facility];  Service: Neurosurgery;  Laterality: N/A;   • WRIST SURGERY      1st one 2006, 2nd 2010       Allergies:  Citrus and Promethazine    Home medications:  Medications Prior to Admission   Medication Sig Dispense Refill Last Dose   • acetaminophen (TYLENOL) 325 MG tablet Take 2 tablets by mouth Every 6 (Six) Hours As Needed for Mild Pain .   Past Week at Unknown time   • cetaphil (CETAPHIL) lotion Apply  topically to the appropriate area as directed Every 12 (Twelve) Hours. (Patient taking differently: Apply 1 application topically to the appropriate area as directed Every 12 (Twelve) Hours.) 177 mL 2 Past Week at Unknown time   • famotidine (PEPCID) 20 MG tablet Take 1 tablet by mouth 2 (Two) Times a Day Before Meals. 180 tablet 0 Past Month at Unknown time   • folic acid (FOLVITE) 1 MG tablet Take 1 tablet by mouth Daily. 90 tablet 0 Past Week at Unknown time   • gabapentin (NEURONTIN) 300 MG capsule Take 1 capsule by mouth Every Night. 90 capsule 0 1/9/2022 at Unknown time   • levETIRAcetam (KEPPRA) 750 MG tablet Take 2 tablets by mouth 2 (Two) Times a Day for 30 days. 360 tablet 3 1/10/2022 at Unknown time   • mirtazapine (REMERON) 15 MG tablet Take 1 tablet by mouth Every Night. 90 tablet 0 1/9/2022 at Unknown time   • ondansetron ODT (ZOFRAN-ODT) 8 MG disintegrating tablet Place 8 mg on the tongue As Needed.   Past Month at Unknown time   • potassium chloride (MICRO-K) 10 MEQ CR capsule Take 1 capsule by mouth 2 (Two) Times a Day With Meals. 60 capsule 1 1/9/2022 at Unknown time   • thiamine (VITAMIN B1) 100 MG tablet Take 1 tablet by mouth Daily. 90 tablet 0 1/9/2022 at Unknown time        Hospital medications:  famotidine, 20 mg, Oral, BID AC  folic acid, 1 mg, Oral, Daily  gabapentin, 300 mg, Oral, Nightly  lacosamide, 100 mg, Oral, BID  levETIRAcetam, 2,000 mg, Oral,  BID  magnesium sulfate, 2 g, Intravenous, Once  mirtazapine, 15 mg, Oral, Nightly  potassium chloride, 40 mEq, Oral, Once  sodium chloride, 10 mL, Intravenous, Q12H  thiamine, 100 mg, Oral, Daily         •  acetaminophen **OR** acetaminophen **OR** acetaminophen  •  influenza vaccine  •  LORazepam  •  magnesium sulfate **OR** magnesium sulfate **OR** magnesium sulfate  •  nitroglycerin  •  ondansetron  •  potassium chloride **OR** potassium chloride **OR** potassium chloride  •  sodium chloride    Family history:  Family History   Problem Relation Age of Onset   • Depression Maternal Grandmother    • Depression Maternal Grandfather    • Depression Paternal Grandmother    • Depression Paternal Grandfather    • Malig Hyperthermia Neg Hx        Social history:  Social History     Tobacco Use   • Smoking status: Heavy Tobacco Smoker     Packs/day: 1.00   • Smokeless tobacco: Never Used   • Tobacco comment: PT HAS NOT SMOKED SINCE 7/2020   Vaping Use   • Vaping Use: Former   Substance Use Topics   • Alcohol use: Not Currently   • Drug use: Yes     Types: Marijuana       Review of systems:    He is sore in his face where he bruised it up he tells me about the AVM operation in August 2020 his last generalized seizure was in September of this year no hair eyes ears nose throat skin  lymphatic hematologic oncologic complaints no neck pain chest pain abdominal pain bowel bladder incontinence fever chills or rash he did not bite his tongue but he did bruise up his face he has a broken left clavicle when he fell prior to his seizure yesterday    Objective:  Vitals Ranges:   Temp:  [97.9 °F (36.6 °C)-99.6 °F (37.6 °C)] 98.1 °F (36.7 °C)  Heart Rate:  [68-96] 68  Resp:  [15-18] 17  BP: (102-126)/(62-82) 110/70      Physical Exam: Patient is awake alert and oriented x3 he is scarred up and bruised up in the face and has a broken left clavicle with his left arm in a sling fund of knowledge good attention span concentration good  recent remote memory good language function normal well-developed well-nourished in no distress pupils 3-1/2 constricting to 2-1/2 not able to visualize disc retinas extraocular movements full without nystagmus nasolabial folds palate tongue symmetrical normal hearing head turning. Shoulder shrug couldn't be performed he has a broken clavicle good  strength on the right not so good on the left and toe wiggle is good bilaterally reflexes trace throughout symmetrical toes downgoing bilaterally no atrophy or fasciculations sensation normal light touch face both arms both legs coordination normal in the right upper extremity Station and gait was impossible he is with a laying on the left arm and was in some discomfort. I was afraid he would fall heart is regular without murmur neck supple without bruits extremities no clubbing cyanosis or edema visual acuity normal at 3 feet    Results review:   I reviewed the patient's new clinical results.    Data review:  Lab Results (last 24 hours)     Procedure Component Value Units Date/Time    Magnesium [[id number]]  (Normal) Collected: 01/11/22 0440    Specimen: Blood Updated: 01/11/22 0734     Magnesium 2.1 mg/dL     Basic Metabolic Panel [[id number]]  (Abnormal) Collected: 01/11/22 0440    Specimen: Blood Updated: 01/11/22 0622     Glucose 78 mg/dL      BUN 5 mg/dL      Creatinine 0.53 mg/dL      Sodium 140 mmol/L      Potassium 3.8 mmol/L      Chloride 108 mmol/L      CO2 21.0 mmol/L      Calcium 8.8 mg/dL      eGFR Non African Amer >150 mL/min/1.73      BUN/Creatinine Ratio 9.4     Anion Gap 11.0 mmol/L     Narrative:      GFR Normal >60  Chronic Kidney Disease <60  Kidney Failure <15      CBC (No Diff) [[id number]]  (Abnormal) Collected: 01/11/22 0440    Specimen: Blood Updated: 01/11/22 0533     WBC 3.19 10*3/mm3      RBC 4.11 10*6/mm3      Hemoglobin 12.5 g/dL      Hematocrit 36.5 %      MCV 88.8 fL      MCH 30.4 pg      MCHC 34.2 g/dL      RDW 13.2 %      RDW-SD 42.7 fl       MPV 9.5 fL      Platelets 90 10*3/mm3     Urine Drug Screen - Urine, Clean Catch [[id number]]  (Abnormal) Collected: 01/10/22 2236    Specimen: Urine, Clean Catch Updated: 01/11/22 0018     Amphet/Methamphet, Screen Negative     Barbiturates Screen, Urine Negative     Benzodiazepine Screen, Urine Positive     Cocaine Screen, Urine Negative     Opiate Screen Negative     THC, Screen, Urine Positive     Methadone Screen, Urine Negative     Oxycodone Screen, Urine Negative    Narrative:      Negative Thresholds Per Drugs Screened:    Amphetamines                 500 ng/ml  Barbiturates                 200 ng/ml  Benzodiazepines              100 ng/ml  Cocaine                      300 ng/ml  Methadone                    300 ng/ml  Opiates                      300 ng/ml  Oxycodone                    100 ng/ml  THC                           50 ng/ml    The Normal Value for all drugs tested is negative. This report includes final unconfirmed screening results to be used for medical treatment purposes only. Unconfirmed results must not be used for non-medical purposes such as employment or legal testing. Clinical consideration should be applied to any drug of abuse test, particularly when unconfirmed results are used.            CK [[id number]]  (Normal) Collected: 01/10/22 1315    Specimen: Blood Updated: 01/10/22 1512     Creatine Kinase 120 U/L     Magnesium [[id number]]  (Abnormal) Collected: 01/10/22 1315    Specimen: Blood Updated: 01/10/22 1512     Magnesium 1.3 mg/dL     COVID PRE-OP / PRE-PROCEDURE SCREENING ORDER (NO ISOLATION) - Swab, Nasopharynx [[id number]]  (Normal) Collected: 01/10/22 1324    Specimen: Swab from Nasopharynx Updated: 01/10/22 1410    Narrative:      The following orders were created for panel order COVID PRE-OP / PRE-PROCEDURE SCREENING ORDER (NO ISOLATION) - Swab, Nasopharynx.  Procedure                               Abnormality         Status                     ---------                                -----------         ------                     COVID-19,[facility]...[[id number]]  Normal              Final result                 Please view results for these tests on the individual orders.    COVID-19,[facility] CEPHEID/DAREK NP SWAB IN TRANSPORT MEDIA 8-12 HR TAT - Swab, Nasopharynx [[id number]]  (Normal) Collected: 01/10/22 1324    Specimen: Swab from Nasopharynx Updated: 01/10/22 1410     COVID19 Not Detected    Narrative:      Fact sheet for providers: https://www.fda.gov/media/674247/download     Fact sheet for patients: https://www.fda.gov/media/022055/download    Comprehensive Metabolic Panel [[id number]]  (Abnormal) Collected: 01/10/22 1315    Specimen: Blood Updated: 01/10/22 1409     Glucose 148 mg/dL      BUN 4 mg/dL      Creatinine 0.56 mg/dL      Sodium 140 mmol/L      Potassium 3.0 mmol/L      Chloride 102 mmol/L      CO2 28.5 mmol/L      Calcium 8.4 mg/dL      Total Protein 6.5 g/dL      Albumin 4.10 g/dL      ALT (SGPT) 37 U/L      AST (SGOT) 45 U/L      Alkaline Phosphatase 207 U/L      Total Bilirubin 1.4 mg/dL      eGFR Non African Amer >150 mL/min/1.73      Globulin 2.4 gm/dL      A/G Ratio 1.7 g/dL      BUN/Creatinine Ratio 7.1     Anion Gap 9.5 mmol/L     Narrative:      GFR Normal >60  Chronic Kidney Disease <60  Kidney Failure <15      Ethanol [[id number]] Collected: 01/10/22 1315    Specimen: Blood Updated: 01/10/22 1409     Ethanol <10 mg/dL      Ethanol % <0.010 %            Imaging:  Imaging Results (Last 24 Hours)     Procedure Component Value Units Date/Time    XR Ankle 3+ View Left [[id number]] Collected: 01/10/22 2122     Updated: 01/10/22 2127    Narrative:      3 VIEWS LEFT ANKLE     HISTORY: Trauma     COMPARISON: None available.     FINDINGS:  No acute fracture or subluxation of the left ankle is seen. Patient  appears relatively osteopenic for a 31-year-old.       Impression:      No acute fracture or subluxation identified.     This report was finalized on  1/10/2022 9:24 PM by Dr. Annelise Long M.D.       XR Wrist 3+ View Left [[id number]] Collected: 01/10/22 2120     Updated: 01/10/22 2125    Narrative:      2 VIEWS LEFT WRIST     HISTORY: Trauma     COMPARISON: None available.     FINDINGS:  Patient has relative osteopenia for a 31-year-old patient. No acute  fracture or subluxation is identified. There may be some soft tissue  swelling about the wrist.       Impression:      No acute fracture or subluxation identified.     This report was finalized on 1/10/2022 9:22 PM by Dr. Annelise Long M.D.       XR Ribs Left With PA Chest [[id number]] Collected: 01/10/22 2114     Updated: 01/10/22 2123    Narrative:      PA CHEST RADIOGRAPH PLUS 4 VIEWS LEFT RIBS     HISTORY: Trauma     COMPARISON: 09/30/2020     FINDINGS:  Heart size is within normal limits. No pneumothorax, pleural effusion,  or acute infiltrate is seen. Patient does have a lateral left clavicle  fracture. There is a suggestion of some adjacent callus formation, and  it may actually be subacute. No displaced rib fractures are seen.       Impression:         1. No displaced rib fractures identified.  2. Left clavicle fracture. This may actually be subacute.     This report was finalized on 1/10/2022 9:20 PM by Dr. Annelise Long M.D.       CT Head Without Contrast [[id number]] Collected: 01/10/22 1406     Updated: 01/10/22 1420    Narrative:      CT HEAD WO CONTRAST-     CLINICAL HISTORY: Seizures. Confusion.     TECHNIQUE: Transverse 3 mm thick images were obtained from the base of  the skull to the vertex without IV contrast.     Radiation dose reduction techniques were utilized, including automated  exposure control and exposure modulation based on body size.     COMPARISON: MRI of the brain dated 09/15/2021. CT imaging of the head  dated 09/14/2021.     FINDINGS: A closed craniotomy defect is evident within the right  frontoparietal region. Beneath the craniotomy defect there is  a  moderate-sized area of encephalomalacia measuring approximately 4.5 x  4.2 cm in diameter. This involves the posterior aspect of the right  frontal lobe and portions of the right parietal lobe. This is unchanged.  This is the site of a previously evacuated large area of intracerebral  hemorrhage. There is no evidence of acute infarct or hemorrhage. There  is no mass effect. There is no evidence of skull fracture. The paranasal  sinuses and the mastoid air cells and middle ear cavities are well  aerated.       Impression:      Moderate-sized area of focal encephalomalacia in the right  posterior frontal-parietal region showing no change since 09/14/2021. No  acute intracranial abnormality is identified.     This report was finalized on 1/10/2022 2:17 PM by Dr. Norbert Mcgee M.D.         PPE worn at all times washed before washed up afterwards disposed of everything properly is not within 6 feet of him for more than few minutes during my exam no aerosols used at any point      Assessment and Plan:     This patient suffered a breakthrough seizures and has a longstanding history of seizure disorder status post AVM surgery in August 2020. He has been on Keppra 1500 mg p.o. twice daily and has been compliant  I'm can increase the Keppra to 2000 mg p.o. twice daily and I have considered adding Vimpat 100 mg p.o. twice daily but it takes preauthorization and at this point he might be better off just with the addition of Dilantin 300 mg p.o. nightly.  He states he has not been on that previously  So, in the end, he is now going to be on Keppra 2000 mg p.o. twice daily and Dilantin 300 mg p.o. nightly.  He will follow-up with Dr. Baker accordingly.      Prateek Villafuerte MD  01/11/22  13:47 EST

## 2022-01-11 NOTE — CASE MANAGEMENT/SOCIAL WORK
Discharge Planning Assessment  [facility]     Patient Name: Avinash Everett  MRN: [id number]  Today's Date: 1/11/2022    Admit Date: 1/10/2022     Discharge Needs Assessment    No documentation.                Discharge Plan     Row Name 01/11/22 1620       Plan    Final Discharge Disposition Code 01 - home or self-care              Continued Care and Services - Admitted Since 1/10/2022    Coordination has not been started for this encounter.       Expected Discharge Date and Time     Expected Discharge Date Expected Discharge Time    Jan 11, 2022          Demographic Summary    No documentation.                Functional Status    No documentation.                Psychosocial    No documentation.                Abuse/Neglect    No documentation.                Legal    No documentation.                Substance Abuse    No documentation.                Patient Forms    No documentation.                   Tennille Boland RN

## 2022-01-11 NOTE — PROGRESS NOTES
09:28 EST  MD ATTESTATION NOTE    The CAROLINE and I have discussed this patient's history, physical exam, and treatment plan.  I have reviewed the documentation and personally had a face to face interaction with the patient. I affirm the documentation and agree with the treatment and plan.  The attached note describes my personal findings.      I provided a substantive portion of the care of the patient.  I personally performed the physical exam in its entirety, and below are my findings.  For this patient encounter, the patient wore surgical mask, I wore full protective PPE including N95 and eye protection    Briefly patient admitted for breakthrough seizure.  Has had long history of seizures and is on Keppra.  Patient states he had typical seizure yesterday.  States he did not miss any doses of his Keppra.  In the ER patient prolonged postictal state and was admitted for observation and neurology consult.  Throughout the night patient had no seizures.    General : Patient is awake alert and oriented  HEENT: Patient with some abrasions to face.  CV: Heart is regular with no murmurs  Respiratory: CTA bilaterally  Abd: Soft and nontender  Ext: No acute abnormalities patient does have left shoulder and sling  Skin: No rash  Neuro: Patient with baseline weakness.  Patient speech is clear and he is alert and oriented  Psych: Normal mood and affect      Plan is to continue his Keppra and awaiting neurology evaluation

## 2022-01-13 ENCOUNTER — TELEPHONE (OUTPATIENT)
Dept: NEUROLOGY | Facility: CLINIC | Age: 32
End: 2022-01-13

## 2022-01-13 ENCOUNTER — OFFICE VISIT (OUTPATIENT)
Dept: NEUROLOGY | Facility: CLINIC | Age: 32
End: 2022-01-13

## 2022-01-13 VITALS
HEIGHT: 73 IN | DIASTOLIC BLOOD PRESSURE: 70 MMHG | SYSTOLIC BLOOD PRESSURE: 120 MMHG | HEART RATE: 70 BPM | BODY MASS INDEX: 25.58 KG/M2 | OXYGEN SATURATION: 99 % | WEIGHT: 193 LBS

## 2022-01-13 DIAGNOSIS — G40.209 PARTIAL SYMPTOMATIC EPILEPSY WITH COMPLEX PARTIAL SEIZURES, NOT INTRACTABLE, WITHOUT STATUS EPILEPTICUS: Primary | ICD-10-CM

## 2022-01-13 PROCEDURE — 99215 OFFICE O/P EST HI 40 MIN: CPT | Performed by: PSYCHIATRY & NEUROLOGY

## 2022-01-13 RX ORDER — LACOSAMIDE 50 MG/1
50 TABLET ORAL EVERY 12 HOURS SCHEDULED
Qty: 60 TABLET | Refills: 5 | Status: SHIPPED | OUTPATIENT
Start: 2022-01-13 | End: 2022-02-17

## 2022-01-13 NOTE — TELEPHONE ENCOUNTER
Caller: HARSHAD SHOOK    Relationship: SELF    Best call back number: [phone]    Requested Prescriptions: lacosamide (Vimpat) 50 MG tablet tablet  Requested Prescriptions     Signed Prescriptions Disp Refills   • Midazolam 5 MG/0.1ML solution 2 each 5     Si spray into the nostril(s) as directed by provider As Needed (FOR SEIZURE CLUSTERS OR SEZIURE LASTING LONGER THAN 5 MINUTES).     Authorizing Provider: ANH BURCIAGA ANDDEV        Pharmacy where request should be sent:  [facility]    Additional details provided by patient: PT CALLING STATING THAT PER THE VIMPAT NEEDS A PA  AND PT IS TOTALLY OUT.    Does the patient have less than a 3 day supply:  [x] Yes  [x] No    Olga Hickman Rep   22 13:54 EST

## 2022-01-13 NOTE — TELEPHONE ENCOUNTER
----- Message from Holly Baker MD sent at 1/13/2022 11:39 AM EST -----  We should have called in Nayzila for this patient, but his pharmacy never got it.  Can we send again?

## 2022-01-13 NOTE — TELEPHONE ENCOUNTER
Patient was just prescribed Vimpat today and was given coupon card while here- PA submitted to [facility]- waiting on response

## 2022-01-13 NOTE — TELEPHONE ENCOUNTER
PA submitted for Vimpat to [facility]- it was just prescribed today, patient has not started taking it yet    Per Dr Baker she told him today in the office it may take a couple of days before he can start it due to having to get PA

## 2022-01-13 NOTE — PROGRESS NOTES
Subjective:     Patient ID: Avinash Everett is a 31 y.o. male.    The patient is a 31-year-old right-handed male with history of an AVM status post surgery, and epilepsy who presents to the neurology clinic today as an established patient for follow-up for seizures.  The patient was last seen as a new patient October 13, 2021.  The patient reports that on August 3, 2020 his AVM bled and he had 2 surgeries.  He was started on levetiracetam prophylactically.  He had 2 seizures back to back in February 2021 and therefore his medication was increased to 1000 g twice a day.  He had a another seizure in August and his medications were not changed.  He then went into status on September 15 and his Keppra was increased to 1500 mg twice a day.  He had not had any further seizures at that visit but reports today that on January 11 he had a 1-1/2-hour long seizure that ended up with a broken clavicle.  They increase his levetiracetam to 2000 mg twice a day and added on Dilantin at night.  He denies any triggers to this seizure.  He denies any missed medications reports that his pills did not change shape or color.  He is only been on the new medicines for 2 days.  He is tired like when he first started Keppra.  The patient is currently not driving and is on disability for his brain bleed.  He reports that he never got the Nayzilam prescription.  He reports that he also has focal seizures with head turning to the right and staring for 2 minutes.  These have occurred a handful of times.  He has a 4-year-old son and is currently  from his wife.    The following portions of the patient's history were reviewed and updated as appropriate: allergies, current medications, past family history, past medical history, past social history, past surgical history and problem list.    Review of Systems     Objective:    Neurologic Exam    Physical Exam    Assessment/Plan:    The patient is a 31-year-old right-handed male with a history  of an AVM status post surgery and epilepsy who presents today for follow-up.    1.  Structural epilepsy-unfortunately the patient had another episode of status recently.  We will make sure that the Nayzilam prescription gets to his pharmacy.  I recommend continuing on levetiracetam 2000 mg twice a day however I would like to switch Dilantin to Vimpat.  The patient was given a $20 coupon card and he reports that that would be affordable for him.  We had a long conversation regarding routine seizure precautions including but not limited to not driving within 90 days of seizure as well as prognosis and treatment options.  I will see the patient back in 6 weeks or sooner if needed.    A total of 40 minutes of time was spent on this encounter today.  This includes reviewing the patient's records, face-to-face time, documentation.       Problems Addressed this Visit     None      Visit Diagnoses     Partial symptomatic epilepsy with complex partial seizures, not intractable, without status epilepticus (HCC)    -  Primary    Relevant Medications    lacosamide (Vimpat) 50 MG tablet tablet      Diagnoses       Codes Comments    Partial symptomatic epilepsy with complex partial seizures, not intractable, without status epilepticus (HCC)    -  Primary ICD-10-CM: G40.209  ICD-9-CM: 345.40

## 2022-01-13 NOTE — PATIENT INSTRUCTIONS
[facility]  Holly Baker MD  Neurology clinic  [phone]    With anti-seizure medications, you may initially notice side effects of fatigue, drowsiness, unsteadiness, and dizziness.  Other possible side effects include nausea, abdominal pain, headache, blurry or double vision, slurred speech and mood changes.  Generally, patients will noticed these symptoms when the medication is first started or with higher doses and will go away with time.    It is import to consistently take your medication every day.  Missing just one dose may put you at risk for a breakthrough seizure.  Consider using reminders on your phone or a pill box.    If you develop a rash, please call the neurology clinic immediately or notify another healthcare professional, as this may be potentially life-threatening.  If you are unable to reach a healthcare professional, go to the emergency room immediately for further evaluation.    If you develop thoughts of wanting to hurt yourself or others, please call the neurology clinic immediately to notify another healthcare professional.  If you are unable to reach a healthcare professional, go to the emergency room immediately for further evaluation.    It is the [place] state law that you cannot drive within 90 days of a seizure.    You should avoid certain activities that if you were to have a seizure, you could harm yourself or others. In general, it is recommended that you avoid operating heavy machinery or power tools, swimming or taking baths by yourself (showers are ok), don't stand over open flames, don't get on high ladders or the roof.  I also recommend to avoid sleeping on your stomach.    For further information on epilepsy and resources available to patients and their families, please visit the Epilepsy Foundation of [facility] at www.[email or web address] or call [phone].    **Check out the Epilepsy Foundation of [facility]'s monthly Art Group Gathering.  They are  located at [facility], [address] [place] [address].  Call Jeri Dawson at [phone] or email her at [email or web address] for the dates of future gatherings.**      **If you have having memory problems, consider HOBSCOTCH (Home-Based Self-management and Cognitive Training Changes lives).  It is an 8 week self-management program for adults with epilepsy and memory problems.  The program is free at the [facility].  Contact Ailin Araya at [phone] or [email or web address].**

## 2022-01-14 NOTE — TELEPHONE ENCOUNTER
Caller: Avinash Everett    Relationship: Self    Best call back number: [phone]    What is the best time to reach you: ANY    Who are you requesting to speak with (clinical staff, provider,  specific staff member): CLINICAL    What was the call regarding: PT IS CALLING IN REGARDS TO THE VIMPAT.  PT IS CONCERNED ABOUT HAVING MORE SEIZURES WHILE WAITING ON THE PA.  PT IS CALLING TO DISCUSS.    Do you require a callback: YES

## 2022-01-14 NOTE — TELEPHONE ENCOUNTER
PATIENT WAS NAYZILAM SENT TO A DIFFERENT PHARM- I HAVE CALLED IT IN TO [facility] IN [place]    HE IS ANXIOUS TO START THE VIMPAT 50 MG SO HE IS SENDING SOMEONE IN HIS FAMILY TO COME TO THE [facility] OFFICE TO GET SAMPLES WHILE WAITING ON PA    YOU STILL NEED TO SIGN OFF ON SCRIPT EVEN THOUGH CALLED IN

## 2022-01-17 ENCOUNTER — TELEPHONE (OUTPATIENT)
Dept: NEUROLOGY | Facility: CLINIC | Age: 32
End: 2022-01-17

## 2022-01-17 NOTE — TELEPHONE ENCOUNTER
PA DENIED- DISCUSSED WITH DR BURCIAGA, PER DR BURCIAGA SHE LM FOR PATIENT LETTING HIM KNOW THAT HE CAN EITHER USE THE COUPON SHE GAVE HIM OR THEY HAVE TO CHANGE THE MEDICATION

## 2022-01-19 NOTE — TELEPHONE ENCOUNTER
PT HAS TWO RX THAT SEEM TO BE DENIED OR NEED PRY. AUTH . VIMPAT AND NAYZILAM HE STATES WITH VIMPAT COUPON  IT WAS  $700.00 DOLLARS AND NAYZILAM IS ALMOST $1400.00 HE CAN'T AFFORD THIS.      PLEASE CALL PT AND  ADVISE AS HE ONLY HAS TWO DAYS OF SAMPLES LEFT /STATES HE CAN COME TO [place] TO GET MORE IF NEEDED    [phone]

## 2022-01-19 NOTE — TELEPHONE ENCOUNTER
I would recommend more vimpat samples until we can schedule him for another visit to discuss other options.

## 2022-01-19 NOTE — TELEPHONE ENCOUNTER
Caller: Avinash Everett    Relationship: Self    Best call back number: [phone]    What was the call regarding: PT CALLED ABOUT THE DENIAL FOR VIMPAT AND NAYZILAM     PT IS SCHEDULED FOR F/U ON 2-28-22    PT ONLY HAS TWO DAYS OF THE RX LEFT.    Do you require a callback: YES, PLEASE.     sensation is normal and strength is normal.

## 2022-01-19 NOTE — TELEPHONE ENCOUNTER
SPOKE WITH PATIENT AND HE WILL HAVE SOMEONE IN HIS FAMILY  HIS SAMPLES FROM [facility] TOMORROW    APPT MADE FOR 1.24.22 WITH DR BURCIAGA TO DISCUSS OTHER TX OPTIONS

## 2022-01-27 ENCOUNTER — TELEPHONE (OUTPATIENT)
Dept: NEUROLOGY | Facility: CLINIC | Age: 32
End: 2022-01-27

## 2022-01-27 NOTE — TELEPHONE ENCOUNTER
Lm for patient to call me back- he was scheduled for video visit with Dr Baker at 1:30 pm today and has not signed in yet

## 2022-02-08 ENCOUNTER — TELEPHONE (OUTPATIENT)
Dept: NEUROLOGY | Facility: CLINIC | Age: 32
End: 2022-02-08

## 2022-02-08 NOTE — TELEPHONE ENCOUNTER
Provider: DR. BURCIAGA   Caller: HARSHAD   Relationship to Patient: PT   Phone Number: [phone]  Reason for Call: PT STATES THAT DR. BURCIAGA PRESCRIBED VIMPAT AND HE STARTED ON THE SAMPLES AS HIS INSURANCE WILL NOT PAY FOR IT WITHOUT PA. PT IS DOWN TO 3 TABLETS AND WOULD LIKE TO KNOW IF HE CAN COME IN TO OFFICE AND GET SAMPLES TO LAST TILL NEXT WEEK.

## 2022-02-08 NOTE — TELEPHONE ENCOUNTER
LM FOR PATIENT TO CALL ME BACK-  HIS INSURANCE DENIED THE PA THAT IS WHY DR BURCIAGA WANTED HIM TO COME TO OFFICE TO DISCUSS OTHER TREATMENT OPTIONS    HE WENT TO WRONG OFFICE ONE APPT AND NO SHOWED THE NEXT APPT    LM FOR PATIENT LETTING HIM KNOW THAT WE CAN MOVE UP HIS 2.17.22 APPT TO TOMORROW OR THURS AT [facility], JUST NEEDING HIM TO CALL ME BACK SO WE CAN DISCUSS A TIME

## 2022-02-09 NOTE — PROGRESS NOTES
Subjective:     Patient ID: Avinash Everett is a 31 y.o. male.    The patient is a 31-year-old right-handed male with a history of an AVM status post surgery and epilepsy who presents to the neurology clinic today as established patient for follow-up for seizures. The patient was last seen on 1/13/2022. He was new patient 10/13/2021. For details regarding his history, please refer to my last note. His last seizure was on 1/11/2022. He had an hour and a half long episode of status. After this episode his levetiracetam was increased from 1500 mg twice a day to 2000 mg twice a day. He was also started on Dilantin. We decided to switch him to Vimpat. He was on 50 twice a day for about 3 weeks. He has not had any seizures since his last visit and denies side effects of the Vimpat however he had to stop it 3 to 4 days ago because he could not afford the drug. He started on carbamazepine 200 mg twice a day. He reports that about an hour and a half after he takes his morning dose he gets nauseous. He is also experiencing some diarrhea and fatigue. He denies any rash and is only costing him $10 a month.    The following portions of the patient's history were reviewed and updated as appropriate: allergies, current medications, past family history, past medical history, past social history, past surgical history and problem list.    Review of Systems     Objective:    Neurologic Exam    Physical Exam    Assessment/Plan:    The patient is a 31-year-old right-handed male who presents today for follow-up.    #1. Structural epilepsy-fortunately the patient has not had any further seizures since January. He is taking carbamazepine now however is having some side effects. We discussed possibly decreasing the dose to 100 mg twice a day or maybe switching it to something else. The patient would like to leave it at his current dose for now. At his next follow-up we may want to check some blood work for drug monitoring. We discussed side  effects including rash, leukopenia, hyponatremia, transaminitis, thyroid issues, and osteoporosis.    A total of 20 minutes of time was spent on this encounter today. This includes reviewing the patient's records, face-to-face time, documentation.       Problems Addressed this Visit     None      Visit Diagnoses     Partial symptomatic epilepsy with complex partial seizures, not intractable, without status epilepticus (HCC)    -  Primary      Diagnoses       Codes Comments    Partial symptomatic epilepsy with complex partial seizures, not intractable, without status epilepticus (HCC)    -  Primary ICD-10-CM: G40.209  ICD-9-CM: 345.40

## 2022-02-09 NOTE — TELEPHONE ENCOUNTER
Lm for patient again to call me back- we can get him in today or tomorrow for an in person visit or video visit to discuss - asked for return call

## 2022-02-10 NOTE — TELEPHONE ENCOUNTER
Patient just called back today about appt for today, he said that he cannot come in today,he has an appt on 2.17.22.      We have been providing him with Vimpat samples, however, his insurance will not pay for it, that is why he is coming in, to discuss other treatment options.     He wants to know if he should continue with the Vimpat till next week ( we will have to provide samples) , he is still taking Keppra, but states that his Dilantin was discontinued at his last visit

## 2022-02-11 RX ORDER — CARBAMAZEPINE 200 MG/1
200 CAPSULE, EXTENDED RELEASE ORAL 2 TIMES DAILY
Qty: 60 CAPSULE | Refills: 11 | Status: SHIPPED | OUTPATIENT
Start: 2022-02-11 | End: 2022-02-11 | Stop reason: SDUPTHER

## 2022-02-11 RX ORDER — CARBAMAZEPINE 200 MG/1
200 CAPSULE, EXTENDED RELEASE ORAL 2 TIMES DAILY
Qty: 60 CAPSULE | Refills: 11 | Status: SHIPPED | OUTPATIENT
Start: 2022-02-11 | End: 2022-02-17

## 2022-02-11 NOTE — TELEPHONE ENCOUNTER
Caller: Avinash Everett    Relationship: Self    Best call back number: [phone]    What was the call regarding: PT CALLED STATING HE HAS CALLED MULTIPLE PHARMACY, AND HAS BEEN INFORMED THAT ALL OF THE PHARMACIES HE HAS CALLED ARE COMPLETELY OUT OF CARBAZEPINE ER MEDICATION.    PT COMPLETELY OUT OF SEIZURE MEDICATION ON Friday AFTERNOON. CALL WARM-TRANSFERRED PER [facility] [employer].    Call warm-transferred to: [facility]RIG[facility] PER [facility] [employer].

## 2022-02-11 NOTE — TELEPHONE ENCOUNTER
Instead of vimpat, we can try carbamazepine  mg BID.  This can be rarely associated with a rash and we will need to follow blood work to routinely monitor the drug.

## 2022-02-11 NOTE — TELEPHONE ENCOUNTER
SPOKE WITH PATIENT AND INFORMED HIM OF MEDICATION CHANGE- STRESSED TO HIM DR BURCIAGA WANTS HIM TO KEEP HIS 2.17.22 APPT AT [facility]    HE VOICED UNDERSTANDING OF APPT DATE, TIME AND LOCATION

## 2022-02-11 NOTE — TELEPHONE ENCOUNTER
DR BURCIAGA HAS LEFT FOR THE DAY , CONTACTED DR BURCIAGA VIA PHONE, SHE SAID TO CHANGE IT TO REGULAR CARBAMAZEPINE 200 MG BID.  SPOKE TO PHARMACIST AT [facility] IN [place] , HE WILL CHANGE SCRIPT AND PATIENT IS ALREADY THERE TO

## 2022-02-17 ENCOUNTER — OFFICE VISIT (OUTPATIENT)
Dept: NEUROLOGY | Facility: CLINIC | Age: 32
End: 2022-02-17

## 2022-02-17 VITALS
OXYGEN SATURATION: 97 % | HEIGHT: 73 IN | DIASTOLIC BLOOD PRESSURE: 84 MMHG | HEART RATE: 102 BPM | SYSTOLIC BLOOD PRESSURE: 130 MMHG | WEIGHT: 210 LBS | BODY MASS INDEX: 27.83 KG/M2

## 2022-02-17 DIAGNOSIS — G40.209 PARTIAL SYMPTOMATIC EPILEPSY WITH COMPLEX PARTIAL SEIZURES, NOT INTRACTABLE, WITHOUT STATUS EPILEPTICUS: Primary | ICD-10-CM

## 2022-02-17 PROCEDURE — 99213 OFFICE O/P EST LOW 20 MIN: CPT | Performed by: PSYCHIATRY & NEUROLOGY

## 2022-02-17 NOTE — PATIENT INSTRUCTIONS
[facility]  Holly Baker MD  Neurology clinic  [phone]    With anti-seizure medications, you may initially notice side effects of fatigue, drowsiness, unsteadiness, and dizziness.  Other possible side effects include nausea, abdominal pain, headache, blurry or double vision, slurred speech and mood changes.  Generally, patients will noticed these symptoms when the medication is first started or with higher doses and will go away with time.    It is import to consistently take your medication every day.  Missing just one dose may put you at risk for a breakthrough seizure.  Consider using reminders on your phone or a pill box.    If you develop a rash, please call the neurology clinic immediately or notify another healthcare professional, as this may be potentially life-threatening.  If you are unable to reach a healthcare professional, go to the emergency room immediately for further evaluation.    If you develop thoughts of wanting to hurt yourself or others, please call the neurology clinic immediately to notify another healthcare professional.  If you are unable to reach a healthcare professional, go to the emergency room immediately for further evaluation.    It is the [place] state law that you cannot drive within 90 days of a seizure.    You should avoid certain activities that if you were to have a seizure, you could harm yourself or others. In general, it is recommended that you avoid operating heavy machinery or power tools, swimming or taking baths by yourself (showers are ok), don't stand over open flames, don't get on high ladders or the roof.  I also recommend to avoid sleeping on your stomach.    For further information on epilepsy and resources available to patients and their families, please visit the Epilepsy [facility] of [facility] at www.efky.org or call [phone].    **Check out the Epile[facility]'s monthly Art Group Gathering.  They are  located at [facility], [address] [place] [address].  Call Jeri Dawson at [phone] or email her at [email or web address] for the dates of future gatherings.**      **If you have having memory problems, consider HOBSCOTCH (Home-Based Self-management and Cognitive Training Changes lives).  It is an 8 week self-management program for adults with epilepsy and memory problems.  The program is free at the [facility].  Contact Ailin Araya at [phone] or [email or web address].**

## 2022-02-23 ENCOUNTER — TELEPHONE (OUTPATIENT)
Dept: NEUROLOGY | Facility: CLINIC | Age: 32
End: 2022-02-23

## 2022-02-23 DIAGNOSIS — G40.209 PARTIAL SYMPTOMATIC EPILEPSY WITH COMPLEX PARTIAL SEIZURES, NOT INTRACTABLE, WITHOUT STATUS EPILEPTICUS: Primary | ICD-10-CM

## 2022-02-23 NOTE — TELEPHONE ENCOUNTER
----- Message from Holly Baker MD sent at 2/22/2022  2:51 PM EST -----  Can you let the patient know and ask if he wants the diastat called into his pharmacy?  ----- Message -----  From: Kellie Foley MA  Sent: 2/22/2022   1:11 PM EST  To: Holly Baker MD    I only see diazepam rectal gel. It sts he would have to fail this in order to be reconsidered.     Thank you  ----- Message -----  From: Holly Baker MD  Sent: 2/22/2022  12:34 PM EST  To: Kellie Foley MA    Do we know what the alternatives are?  I don't see them listed in his denial letter.  ----- Message -----  From: Kellie Foley MA  Sent: 2/17/2022   4:25 PM EST  To: Holly Baker MD    PA for Nayzilam was denied by insurance. Insurance wants pt to use formulary alternatives. There is a letter from the insurance in pts chart in media.       Thank you  ----- Message -----  From: Holly Baker MD  Sent: 2/17/2022   2:42 PM EST  To: Kellie Foley MA    We have sent several prescriptions to this patient's pharmacy for Nayzilam, but he still doesn't have it.  Can we look into this for him?

## 2022-03-03 RX ORDER — LEVETIRACETAM 1000 MG/1
2000 TABLET ORAL 2 TIMES DAILY
Qty: 360 TABLET | Refills: 3 | Status: SHIPPED | OUTPATIENT
Start: 2022-03-03 | End: 2023-02-15 | Stop reason: SDUPTHER

## 2022-03-03 RX ORDER — LEVETIRACETAM 1000 MG/1
2000 TABLET ORAL 2 TIMES DAILY
Qty: 120 TABLET | Refills: 1 | Status: SHIPPED | OUTPATIENT
Start: 2022-03-03 | End: 2022-03-03 | Stop reason: SDUPTHER

## 2022-03-03 NOTE — TELEPHONE ENCOUNTER
Caller: Avinash Eveertt    Relationship: Self    Best call back number: [phone] (H)    Requested Prescriptions:   Requested Prescriptions     Pending Prescriptions Disp Refills   • levETIRAcetam (KEPPRA) 1000 MG tablet 120 tablet 1     Sig: Take 2 tablets by mouth 2 (Two) Times a Day.        Pharmacy where request should be sent:  [facility] LISTED     Additional details provided by patient:  PT STATES HE HAS ONE DOSE LEFT     Does the patient have less than a 3 day supply:  [x] Yes  [] No    Olga Mann Rep   03/03/22 13:33 EST

## 2022-03-07 RX ORDER — DIAZEPAM 20 MG/4ML
GEL RECTAL
Qty: 1 EACH | Refills: 5 | Status: SHIPPED | OUTPATIENT
Start: 2022-03-07 | End: 2022-06-15 | Stop reason: SDUPTHER

## 2022-03-14 ENCOUNTER — TELEPHONE (OUTPATIENT)
Dept: NEUROLOGY | Facility: CLINIC | Age: 32
End: 2022-03-14

## 2022-03-14 NOTE — TELEPHONE ENCOUNTER
Left message appt on 03/31/22 with Dr Baker has been r/s for 04/19/22 at 3:30. Please call if any issues with this date or time. An appt reminder will be mailed, as well.

## 2022-04-01 ENCOUNTER — TELEPHONE (OUTPATIENT)
Dept: NEUROLOGY | Facility: CLINIC | Age: 32
End: 2022-04-01

## 2022-04-19 ENCOUNTER — TELEPHONE (OUTPATIENT)
Dept: NEUROLOGY | Facility: CLINIC | Age: 32
End: 2022-04-19

## 2022-04-19 ENCOUNTER — OFFICE VISIT (OUTPATIENT)
Dept: NEUROLOGY | Facility: CLINIC | Age: 32
End: 2022-04-19

## 2022-04-19 ENCOUNTER — LAB (OUTPATIENT)
Dept: LAB | Facility: HOSPITAL | Age: 32
End: 2022-04-19

## 2022-04-19 VITALS
BODY MASS INDEX: 30.27 KG/M2 | SYSTOLIC BLOOD PRESSURE: 120 MMHG | WEIGHT: 228.4 LBS | HEIGHT: 73 IN | HEART RATE: 82 BPM | DIASTOLIC BLOOD PRESSURE: 74 MMHG | OXYGEN SATURATION: 99 %

## 2022-04-19 DIAGNOSIS — G40.209 PARTIAL SYMPTOMATIC EPILEPSY WITH COMPLEX PARTIAL SEIZURES, NOT INTRACTABLE, WITHOUT STATUS EPILEPTICUS: Primary | ICD-10-CM

## 2022-04-19 DIAGNOSIS — Z79.899 HIGH RISK MEDICATION USE: ICD-10-CM

## 2022-04-19 LAB
DEPRECATED RDW RBC AUTO: 50 FL (ref 37–54)
ERYTHROCYTE [DISTWIDTH] IN BLOOD BY AUTOMATED COUNT: 13.9 % (ref 12.3–15.4)
HCT VFR BLD AUTO: 38.1 % (ref 37.5–51)
HGB BLD-MCNC: 12.5 G/DL (ref 13–17.7)
MCH RBC QN AUTO: 32.1 PG (ref 26.6–33)
MCHC RBC AUTO-ENTMCNC: 32.8 G/DL (ref 31.5–35.7)
MCV RBC AUTO: 97.9 FL (ref 79–97)
PLATELET # BLD AUTO: 121 10*3/MM3 (ref 140–450)
PMV BLD AUTO: 8.7 FL (ref 6–12)
RBC # BLD AUTO: 3.89 10*6/MM3 (ref 4.14–5.8)
WBC NRBC COR # BLD: 3.94 10*3/MM3 (ref 3.4–10.8)

## 2022-04-19 PROCEDURE — 36415 COLL VENOUS BLD VENIPUNCTURE: CPT | Performed by: PSYCHIATRY & NEUROLOGY

## 2022-04-19 PROCEDURE — 99213 OFFICE O/P EST LOW 20 MIN: CPT | Performed by: PSYCHIATRY & NEUROLOGY

## 2022-04-19 PROCEDURE — 85027 COMPLETE CBC AUTOMATED: CPT | Performed by: PSYCHIATRY & NEUROLOGY

## 2022-04-19 RX ORDER — CARBAMAZEPINE 200 MG/1
200 TABLET ORAL 2 TIMES DAILY
COMMUNITY
Start: 2022-04-09 | End: 2023-01-03

## 2022-04-19 NOTE — TELEPHONE ENCOUNTER
LM FOR PATIENT ASKING IF HE COULD COME IN AT 3 PM TODAY- ASKED THAT HE CALL ME BACK AND LET ME KNOW

## 2022-04-19 NOTE — PATIENT INSTRUCTIONS
[facility]  Holly Baker MD  Neurology clinic  [phone]    With anti-seizure medications, you may initially notice side effects of fatigue, drowsiness, unsteadiness, and dizziness.  Other possible side effects include nausea, abdominal pain, headache, blurry or double vision, slurred speech and mood changes.  Generally, patients will noticed these symptoms when the medication is first started or with higher doses and will go away with time.    It is import to consistently take your medication every day.  Missing just one dose may put you at risk for a breakthrough seizure.  Consider using reminders on your phone or a pill box.    If you develop a rash, please call the neurology clinic immediately or notify another healthcare professional, as this may be potentially life-threatening.  If you are unable to reach a healthcare professional, go to the emergency room immediately for further evaluation.    If you develop thoughts of wanting to hurt yourself or others, please call the neurology clinic immediately to notify another healthcare professional.  If you are unable to reach a healthcare professional, go to the emergency room immediately for further evaluation.    It is the [place] state law that you cannot drive within 90 days of a seizure.    You should avoid certain activities that if you were to have a seizure, you could harm yourself or others. In general, it is recommended that you avoid operating heavy machinery or power tools, swimming or taking baths by yourself (showers are ok), don't stand over open flames, don't get on high ladders or the roof.  I also recommend to avoid sleeping on your stomach.    For further information on epilepsy and resources available to patients and their families, please visit the Epilepsy Foundation of [facility] at www.efky.org or call [phone].    **Check out the [facility]'s monthly Art Group Gathering.  They are  located at [facility], [address] [place] [address].  Call Jeri Dawson at [phone] or email her at [email or web address] for the dates of future gatherings.**      **If you have having memory problems, consider HOBSCOTCH (Home-Based Self-management and Cognitive Training Changes lives).  It is an 8 week self-management program for adults with epilepsy and memory problems.  The program is free at the [facility].  Contact Ailin Araya at [phone] or [email or web address].**

## 2022-04-19 NOTE — PROGRESS NOTES
Subjective:     Patient ID: Avinash Everett is a 31 y.o. male.    The patient is a 31-year-old right-handed male with a history of an AVM status post surgery, psoriasis, and epilepsy who presents to the neurology clinic today as an established patient for follow-up for seizures.  The patient was last seen on February 17, 2022.  He was new patient back on October 13, 2021.  For details regarding his history, please refer to that note.  The patient's last seizure was on January 11, 2022 and involved an episode of status epilepticus.  After this episode his levetiracetam was increased to 2000 mg twice a day.  He was also started on Dilantin.  We tried to switch it to Vimpat but due to cost we then switched him to carbamazepine.  He is currently on 200 mg twice a day.  At his last visit he reported some side effects but reported that those have since resolved.  He denies any side effects to his medications and reports that his medications are affordable.  He did have blood work last month.  His CBC did show a low white blood cell count and low platelets.  His CMP was essentially normal.  Denies any seizures since his last visit.  In the past, seizures were in the daytime.  Has had nightmares that he's had seizures.  These started Feb of 2021.  Has happened 4-5 times.  Twice since his last visit.  Son sleeps in the bed with him (4 years old).  Hasn't woken up on the floor.  In the dream, he is going to reach for something and his arm catches.  Feels like he is trying to move and he can't.  Feel paralyzed.  No convulsions.  These are different from his daytime seizures.  In one of his dreams, he was wearing shoes.  Will wake up in the morning fine.  No tongue bite, urinary incontinence, no myalgias.      The following portions of the patient's history were reviewed and updated as appropriate: allergies, current medications, past family history, past medical history, past social history, past surgical history and problem  list.    Review of Systems     Objective:    Neurologic Exam    Physical Exam    Assessment/Plan:    The patient is a 31-year-old right-handed male who presents today for follow-up.    1.  Structural epilepsy-fortunately the patient has not had any further seizures since January on levetiracetam and carbamazepine polytherapy.  He denies any current side effects.  I recommend continuing on the current dose with no changes.  I would like to check a repeat CBC given his abnormalities last month.  We discussed no driving for 90 days after a seizure.  I will see the patient back in 6 months or sooner if needed.    A total of 20 minutes of time was spent on this encounter today.  This includes reviewing the patient's records, face-to-face time, documentation.       Problems Addressed this Visit    None     Visit Diagnoses     Partial symptomatic epilepsy with complex partial seizures, not intractable, without status epilepticus (HCC)    -  Primary    Relevant Medications    carBAMazepine (TEGretol) 200 MG tablet    Other Relevant Orders    CBC (No Diff)    High risk medication use        Relevant Orders    CBC (No Diff)      Diagnoses       Codes Comments    Partial symptomatic epilepsy with complex partial seizures, not intractable, without status epilepticus (HCC)    -  Primary ICD-10-CM: G40.209  ICD-9-CM: 345.40     High risk medication use     ICD-10-CM: Z79.899  ICD-9-CM: V58.69

## 2022-04-26 ENCOUNTER — TELEPHONE (OUTPATIENT)
Dept: NEUROLOGY | Facility: CLINIC | Age: 32
End: 2022-04-26

## 2022-04-26 NOTE — TELEPHONE ENCOUNTER
ATTEMPTED TO GET PA FOR KEPPRA VIA SURESCRIPTS- PER SURESCRIPTS SITE    PATIENT HAS ACTIVE PA OM FILE THAT EXPIRES 3.31.23 AND HAS 11 REFILLS

## 2022-11-01 NOTE — PROGRESS NOTES
NEUROSURGERY POST OPERATIVE PROGRESS NOTE     LOS: 11 days   Patient Care Team:  Provider, No Known as PCP - General      Subjective     Interval History:     Sent for head CT last pm due to fall out of chair. Patient dropped eye glasses and spontaneously reached down and subsequently fell out of chair. No injury. Denied hitting head. Headaches are intermittent; not debilitating.     History taken from: patient chart    Objective      Vital Signs  Temp:  [97.5 °F (36.4 °C)-98.1 °F (36.7 °C)] 97.5 °F (36.4 °C)  Heart Rate:  [63-84] 63  Resp:  [16-18] 16  BP: (105-128)/(51-81) 114/70  Body mass index is 24.55 kg/m².      Physical Exam    AA& O x 3. Converses appropriately.   Tolerating diet well.   R cranial incision well approximated. No redness, swelling or drainage.   Left arm remains flaccid.  Still able to kick out left leg.  Follows commands and purposeful with right arm and right leg.    Results Review:     I reviewed the patient's new clinical results.    Labs:    Lab Results (last 24 hours)     Procedure Component Value Units Date/Time    Hepatitis Diagnostic Profile [[id number]] Updated:  08/14/20 0808    Specimen:  Blood     Ammonia [[id number]]  (Abnormal) Collected:  08/14/20 0510    Specimen:  Blood Updated:  08/14/20 0549     Ammonia 61 umol/L     Comprehensive Metabolic Panel [[id number]]  (Abnormal) Collected:  08/14/20 0510    Specimen:  Blood Updated:  08/14/20 0611     Glucose 132 mg/dL      BUN 8 mg/dL      Creatinine 0.45 mg/dL      Sodium 136 mmol/L      Potassium 3.6 mmol/L      Chloride 105 mmol/L      CO2 22.0 mmol/L      Calcium 9.3 mg/dL      Total Protein 7.4 g/dL      Albumin 3.80 g/dL      ALT (SGPT) 132 U/L      AST (SGOT) 123 U/L      Alkaline Phosphatase 99 U/L      Total Bilirubin 2.8 mg/dL      eGFR Non African Amer >150 mL/min/1.73      Globulin 3.6 gm/dL      A/G Ratio 1.1 g/dL      BUN/Creatinine Ratio 17.8     Anion Gap 9.0 mmol/L     Narrative:       GFR Normal >60  Chronic Kidney  Disease <60  Kidney Failure <15      CBC & Differential [[id number]] Collected:  08/14/20 0510    Specimen:  Blood Updated:  08/14/20 0542    Narrative:       The following orders were created for panel order CBC & Differential.  Procedure                               Abnormality         Status                     ---------                               -----------         ------                     CBC Auto Differential[[id number]]        Abnormal            Final result                 Please view results for these tests on the individual orders.    Protime-INR [[id number]]  (Abnormal) Collected:  08/14/20 0510    Specimen:  Blood from Arm, Left Updated:  08/14/20 0556     Protime 17.5 Seconds      INR 1.47    Reticulocytes [[id number]]  (Abnormal) Collected:  08/14/20 0510    Specimen:  Blood Updated:  08/14/20 0611     Reticulocyte % 2.11 %      Reticulocyte Absolute 0.0730 10*6/mm3     CBC Auto Differential [[id number]]  (Abnormal) Collected:  08/14/20 0510    Specimen:  Blood Updated:  08/14/20 0542     WBC 9.37 10*3/mm3      RBC 3.46 10*6/mm3      Hemoglobin 12.2 g/dL      Hematocrit 35.0 %      .2 fL      MCH 35.3 pg      MCHC 34.9 g/dL      RDW 12.0 %      RDW-SD 44.3 fl      MPV 9.9 fL      Platelets 196 10*3/mm3      Neutrophil % 82.1 %      Lymphocyte % 10.0 %      Monocyte % 5.3 %      Eosinophil % 0.9 %      Basophil % 0.2 %      Immature Grans % 1.5 %      Neutrophils, Absolute 7.69 10*3/mm3      Lymphocytes, Absolute 0.94 10*3/mm3      Monocytes, Absolute 0.50 10*3/mm3      Eosinophils, Absolute 0.08 10*3/mm3      Basophils, Absolute 0.02 10*3/mm3      Immature Grans, Absolute 0.14 10*3/mm3      nRBC 0.0 /100 WBC           Imaging:    CT HEAD WO CONTRAST 8/13/20    Postsurgical changes continue to evolve.  The areas of intracranial hemorrhage that were seen postoperatively are unchanged.  Cerebral edema also unchanged with approximately 7 mm of right to left midline shift. No new areas of  hemorrhage noted.        Current Medications:   Scheduled Meds:  dexamethasone 2 mg Oral Q12H   folic acid 1 mg Nasogastric Daily   levETIRAcetam 500 mg Oral Q12H   OLANZapine 5 mg Nasogastric Daily Before Supper   pantoprazole 40 mg Oral Q AM   sodium chloride 10 mL Intravenous Q12H   thiamine 100 mg Nasogastric Daily     Continuous Infusions:     Assessment/Plan       Nontraumatic subcortical hemorrhage of right cerebral hemisphere (CMS/HCC)    Right-sided nontraumatic intracerebral hemorrhage (CMS/HCC)    Alcohol abuse    Metabolic encephalopathy    Vitamin K deficiency      Plan:         Ready for acute rehab anytime.     Patient will need a new MRI of the brain with and without contrast in the next 7 to 10 days.     Await pathology second opinion from [place].     Continue Keppra     Continue Decadron 2 mg p.o. every 12 hours indefinitely     Continue Lovenox         Katya Centeno, APRN  08/14/20  12:15     Detail Level: Zone

## 2023-01-03 RX ORDER — CARBAMAZEPINE 200 MG/1
TABLET ORAL
Qty: 60 TABLET | Refills: 0 | Status: SHIPPED | OUTPATIENT
Start: 2023-01-03 | End: 2023-01-30

## 2023-01-30 RX ORDER — CARBAMAZEPINE 200 MG/1
TABLET ORAL
Qty: 60 TABLET | Refills: 0 | OUTPATIENT
Start: 2023-01-30

## 2023-01-30 RX ORDER — CARBAMAZEPINE 200 MG/1
TABLET ORAL
Qty: 60 TABLET | Refills: 11 | Status: SHIPPED | OUTPATIENT
Start: 2023-01-30

## 2023-01-30 NOTE — TELEPHONE ENCOUNTER
Looks like I saw him 9 months ago.  I'm ok with refills as long as it has been <12 months ago, unless there was a special circumstance.

## 2023-01-30 NOTE — TELEPHONE ENCOUNTER
HE DOES NOT HAVE A FU APPT SCHEDULED-  I LEFT HIM A MESSAGE EARLIER THIS MONTH AND ALSO SENT MYCHART MESSAGE WHICH HE READ TELLING HIM HE NEEDS TO SCHEDULE FU APPT TO GET MORE REFILLS AND HE HAS NOT    PLEASE ADVISE

## 2023-02-15 ENCOUNTER — TELEPHONE (OUTPATIENT)
Dept: NEUROLOGY | Facility: CLINIC | Age: 33
End: 2023-02-15
Payer: MEDICAID

## 2023-02-15 RX ORDER — LEVETIRACETAM 1000 MG/1
2000 TABLET ORAL 2 TIMES DAILY
Qty: 360 TABLET | Refills: 0 | Status: SHIPPED | OUTPATIENT
Start: 2023-02-15

## 2023-02-15 NOTE — TELEPHONE ENCOUNTER
REC'D SHIRLEY REFILL REQUEST- HE HAS NOT RESPONDED TO MESSAGES THAT HE NEEDS TO MAKE FU APPT TO GET FURTHER REFILLS    PLEASE ADVISE

## 2023-05-19 RX ORDER — CARBAMAZEPINE 200 MG/1
200 CAPSULE, EXTENDED RELEASE ORAL 2 TIMES DAILY
Qty: 60 CAPSULE | Refills: 0 | Status: SHIPPED | OUTPATIENT
Start: 2023-05-19

## 2023-05-19 RX ORDER — LEVETIRACETAM 1000 MG/1
TABLET ORAL
Qty: 120 TABLET | Refills: 0 | Status: SHIPPED | OUTPATIENT
Start: 2023-05-19

## 2023-06-13 RX ORDER — LEVETIRACETAM 1000 MG/1
TABLET ORAL
Qty: 120 TABLET | Refills: 0 | OUTPATIENT
Start: 2023-06-13

## 2023-06-13 RX ORDER — CARBAMAZEPINE 200 MG/1
200 CAPSULE, EXTENDED RELEASE ORAL 2 TIMES DAILY
Qty: 60 CAPSULE | Refills: 0 | OUTPATIENT
Start: 2023-06-13

## 2023-06-16 RX ORDER — LEVETIRACETAM 1000 MG/1
2000 TABLET ORAL 2 TIMES DAILY
Qty: 120 TABLET | Refills: 0 | Status: CANCELLED | OUTPATIENT
Start: 2023-06-16

## 2023-06-16 RX ORDER — LEVETIRACETAM 1000 MG/1
2000 TABLET ORAL 2 TIMES DAILY
Qty: 120 TABLET | Refills: 0 | Status: SHIPPED | OUTPATIENT
Start: 2023-06-16

## 2023-06-16 NOTE — TELEPHONE ENCOUNTER
Caller: Avinash Everett    Relationship: Self    Best call back number: [phone]    Requested Prescriptions:   Requested Prescriptions     Pending Prescriptions Disp Refills    levETIRAcetam (KEPPRA) 1000 MG tablet 120 tablet 0     Sig: Take 2 tablets by mouth 2 (Two) Times a Day.        Pharmacy where request should be sent: [facility] [address] [place] [address] - [phone]  - [phone] FX     Last office visit with prescribing clinician: 4/19/2022   Last telemedicine visit with prescribing clinician: Visit date not found   Next office visit with prescribing clinician: 11/7/2023     Additional details provided by patient: PATIENT IS COMPLETELY OUT OF MEDICATION, TOOK LAST DOSE THIS MORNING    Does the patient have less than a 3 day supply:  [x] Yes  [] No    Would you like a call back once the refill request has been completed: [] Yes [x] No    If the office needs to give you a call back, can they leave a voicemail: [] Yes [x] No    Olga Hough Rep   06/16/23 10:14 EDT

## 2023-06-16 NOTE — TELEPHONE ENCOUNTER
LM FOR PATIENT LETTING HIM KNOW THAT IT WAS DENIED BECAUSE HE IS WAY OVER DUE FOR FU APPT    WE CAN REFILL FOR 30 DAYS ONLY- NEEDS TO MAKE FU APPT   Sleep Hygiene Tips - Research & Treatments  From American Sleep Association    Getting good sleep is important in maintaining health. There are several things that you can do to promote good sleep and sleep hygiene, and ultimately get better sleep.  What is sleep hygiene?  Sleep hygiene is defined as behaviors that one can do to help promote good sleep using behavioral interventions.    TIPS:     Maintain a regular sleep routine  · Go to bed at the same time. Wake up at the same time. Ideally, your schedule will remain the same (+/- 20 minutes) every night of the week.  Avoid naps if possible  Naps decrease the ‘Sleep Debt’ that is so necessary for easy sleep onset.  Each of us needs a certain amount of sleep per 24-hour period. We need that amount, and we don’t need more than that.When we take naps, it decreases the amount of sleep that we need the next night - which may cause sleep fragmentation and difficulty initiating sleep, and may lead to insomnia.  Don’t stay in bed awake for more than 5-10 minutes.  If you find your mind racing, or worrying about not being able to sleep during the middle of the night, get out of bed, and sit in a chair in the dark. Do your mind racing in the chair until you are sleepy, then return to bed. No TV or internet during these periods! That will just stimulate you more than desired.            If this happens several times during the night, that is OK. Just maintain your           regular wake time, and try to avoid naps.  Don’t watch TV or read in bed.  When you watch TV or read in bed, you associate the bed with wakefulness.  Drink caffeinated drinks with caution   · The effects of caffeine may last for several hours after ingestion. Caffeine can fragment sleep, and cause difficulty initiating sleep. If you drink caffeine, use it only before noon.   Remember that soda and tea contain caffeine as well.  Avoid inappropriate substances that interfere with sleep       Cigarettes,  alcohol, and over-the-counter medications may cause fragmented sleep.  Exercise regularly  Exercise before 2 pm every day. Exercise promotes continuous sleep.  · Avoid rigorous exercise before bedtime. Rigorous exercise circulates endorphins into the body which may cause difficulty initiating sleep.   Have a quiet, comfortable bedroom      Set your bedroom thermostat at a comfortable temperature. Generally, a little cooler        is better than a little warmer. Turn off the TV and other extraneous noise that may disrupt sleep. Background ‘white noise’ like a fan is Ok. If your pets awaken you, keep them outside the bedroom. Your bedroom should be dark. Turn off bright lights.  Have a comfortable mattress.  If you are a ‘clock watcher’ at night, hide the clock.  Have a comfortable pre-bedtime routine  A warm bath, shower  Meditation, or quiet time  Some who are struggling with sleep regularly find it helpful to print out these recommendations and read them regularly. If you accidentally miss some of recommendations, or have a bad night, do not fret. By following these sleep hygiene recommendations, you will help yourself to get into a routine that promotes good sleep opportunities.

## 2023-08-09 RX ORDER — LEVETIRACETAM 1000 MG/1
TABLET ORAL
Qty: 120 TABLET | Refills: 3 | Status: SHIPPED | OUTPATIENT
Start: 2023-08-09

## 2023-11-07 ENCOUNTER — TELEPHONE (OUTPATIENT)
Dept: NEUROLOGY | Facility: CLINIC | Age: 33
End: 2023-11-07

## 2023-11-07 ENCOUNTER — OFFICE VISIT (OUTPATIENT)
Dept: NEUROLOGY | Facility: CLINIC | Age: 33
End: 2023-11-07
Payer: MEDICAID

## 2023-11-07 VITALS
HEIGHT: 73 IN | SYSTOLIC BLOOD PRESSURE: 128 MMHG | DIASTOLIC BLOOD PRESSURE: 78 MMHG | OXYGEN SATURATION: 98 % | BODY MASS INDEX: 35.86 KG/M2 | HEART RATE: 100 BPM | WEIGHT: 270.6 LBS

## 2023-11-07 DIAGNOSIS — G40.209 PARTIAL SYMPTOMATIC EPILEPSY WITH COMPLEX PARTIAL SEIZURES, NOT INTRACTABLE, WITHOUT STATUS EPILEPTICUS: Primary | ICD-10-CM

## 2023-11-07 PROCEDURE — 99213 OFFICE O/P EST LOW 20 MIN: CPT | Performed by: PSYCHIATRY & NEUROLOGY

## 2023-11-07 PROCEDURE — 1160F RVW MEDS BY RX/DR IN RCRD: CPT | Performed by: PSYCHIATRY & NEUROLOGY

## 2023-11-07 PROCEDURE — 1159F MED LIST DOCD IN RCRD: CPT | Performed by: PSYCHIATRY & NEUROLOGY

## 2023-11-07 RX ORDER — DIAZEPAM 10 MG/100UL
1 SPRAY NASAL AS NEEDED
Qty: 1 EACH | Refills: 2 | OUTPATIENT
Start: 2023-11-07

## 2023-11-07 RX ORDER — CARBAMAZEPINE 100 MG/1
100 CAPSULE, EXTENDED RELEASE ORAL EVERY MORNING
Qty: 90 CAPSULE | Refills: 3 | Status: SHIPPED | OUTPATIENT
Start: 2023-11-07

## 2023-11-07 RX ORDER — CARBAMAZEPINE 200 MG/1
200 CAPSULE, EXTENDED RELEASE ORAL 2 TIMES DAILY
Qty: 180 CAPSULE | Refills: 3 | Status: SHIPPED | OUTPATIENT
Start: 2023-11-07

## 2023-11-07 NOTE — TELEPHONE ENCOUNTER
----- Message from Holly Baker MD sent at 2023 10:51 AM EST -----  Can you call Valtoco into this patient's pharmacy?    For >76 k mg intranasally x1 given as 1 spray in each nostril (use two1 0 mg devices), then may repeat dose x1 after at least 4 hours; max 2 doses per single episode, 1 episode every 5 days up to 5 episodes per month.      Thanks!

## 2023-11-07 NOTE — LETTER
November 7, 2023     Sada De Oliveira MD  [address] [place] [address] Drive  [address]  [facility] [address]    Patient: Avinash Everett   YOB: 1990   Date of Visit: 11/7/2023     Dear Sada De Oliveira MD:       Thank you for referring Avinash Everett to me for evaluation. Below are the relevant portions of my assessment and plan of care.    If you have questions, please do not hesitate to call me. I look forward to following Avinash along with you.         Sincerely,        Holly Baker MD        CC: No Recipients    Holly Baker MD  11/07/23 1102  Sign when Signing Visit  Subjective:     Patient ID: Avinash Everett is a 33 y.o. male.    History of Present Illness  The patient is a 33-year-old right-handed male with a history of an AVM status post surgery, psoriasis, and epilepsy who presents to the neurology clinic today as established patient for follow-up for seizures.  The patient was last seen on April 19, 2022.  He was a new patient to me back in October 2021.  In 2020 his AVM bled and he had 2 surgeries.  After that he had left-sided weakness and he was started on levetiracetam.  He had his first seizure in February 2021.  He typically has generalized tonic-clonic seizures.  His EEG in 2021 showed mild diffuse slowing and right hemispheric slowing as well as a single right frontal interictal epileptiform discharge.  His brain MRI in 2021 showed his previous resection of an intracranial hemorrhage in the right temporal parietal region.  At his last visit the patient reported that his last seizure was January 2022 and involved an episode of status epilepticus.  He is currently on levetiracetam 2000 mg twice a day as well as carbamazepine 200 mg twice a day.  He takes his medications at 10 AM and 10 PM.  Since I last saw him he had 1 seizure in April of this year around 5 PM.  He was driving his car.  He felt the seizure, on and fortunately was able to pull over and the car was parked.  He had a  generalized tonic-clonic seizure lasting 30 to 45 seconds.  He was taken to a hospital in [place].  He reports that it may have been brought on by stress.  He also had COVID about 2 weeks before the seizure.  He denies any missed doses of his medications and reports that they have not changed shape or color.  On September 19, 2023 his white blood cell count, sodium level and TSH were all within normal limits.  He denies any side effects to his medications and reports that his medications are affordable.    The following portions of the patient's history were reviewed and updated as appropriate: allergies, current medications, past family history, past medical history, past social history, past surgical history, and problem list.    Review of Systems     Objective:    Neurological Exam    Physical Exam    Assessment/Plan:    The patient is a 33-year-old right-handed male with a history of an AVM status post surgery, psoriasis, and epilepsy who presents today for follow-up.    1.  Structural epilepsy-the patient went about a year without a seizure but then had one last April.  I recommend increasing his morning dose of carbamazepine from 200 mg to 300 mg.  New prescriptions were sent to his pharmacy.  We will keep his levetiracetam at the same dose with no changes.  We will have the patient follow-up with Dr. Yang in 6 months or sooner if needed.    A total of 20 minutes of time was spent on this encounter today.  This includes reviewing patient's records, face-to-face time, documentation.       Problems Addressed this Visit    None  Visit Diagnoses       Partial symptomatic epilepsy with complex partial seizures, not intractable, without status epilepticus    -  Primary    Relevant Medications    carBAMazepine (CARBATROL) 100 MG 12 hr capsule    carBAMazepine (CARBATROL) 200 MG 12 hr capsule          Diagnoses         Codes Comments    Partial symptomatic epilepsy with complex partial seizures, not intractable,  without status epilepticus    -  Primary ICD-10-CM: G40.209  ICD-9-CM: 345.40

## 2023-11-07 NOTE — PATIENT INSTRUCTIONS
[facility]  Holly Baker MD  Neurology clinic  [phone]    With anti-seizure medications, you may initially notice side effects of fatigue, drowsiness, unsteadiness, and dizziness.  Other possible side effects include nausea, abdominal pain, headache, blurry or double vision, slurred speech and mood changes.  Generally, patients will noticed these symptoms when the medication is first started or with higher doses and will go away with time.    It is import to consistently take your medication every day.  Missing just one dose may put you at risk for a breakthrough seizure.  Consider using reminders on your phone or a pill box.    If you develop a rash, please call the neurology clinic immediately or notify another healthcare professional, as this may be potentially life-threatening.  If you are unable to reach a healthcare professional, go to the emergency room immediately for further evaluation.    If you develop thoughts of wanting to hurt yourself or others, please call the neurology clinic immediately to notify another healthcare professional.  If you are unable to reach a healthcare professional, go to the emergency room immediately for further evaluation.    It is the [place] state law that you cannot drive within 90 days of a seizure.    You should avoid certain activities that if you were to have a seizure, you could harm yourself or others. In general, it is recommended that you avoid operating heavy machinery or power tools, swimming or taking baths by yourself (showers are ok), don't stand over open flames, don't get on high ladders or the roof.  I also recommend to avoid sleeping on your stomach.    For further information on epilepsy and resources available to patients and their families, please visit the [facility] at www.efky.org or call [phone].    **Check out the [facility]'s monthly Art Group Gathering.  They are  located at [facility], [address] [place] [address].  Call Jeri Dawson at [phone] or email her at [email or web address] for the dates of future gatherings.**      **If you have having memory problems, consider HOBSCOTCH (Home-Based Self-management and Cognitive Training Changes lives).  It is an 8 week self-management program for adults with epilepsy and memory problems.  The program is free at the [facility].  Contact Ailin Araya at [phone] or [email or web address].**          **Check out their seizure first aid training and certification courses.      **Consider calling the Epilepsy Foundation's 24/7 helpline if you have questions:  [phone]    **If you are interested in the Ketogenic Diet, check out [email or web address].      **Consider seizure monitoring wrist-worn wearable devices.  You can download apps to your Apple Watch like Gremln or purchase the ezzai - how to arabia device.

## 2023-11-07 NOTE — PROGRESS NOTES
Subjective:     Patient ID: Avinash Everett is a 33 y.o. male.    History of Present Illness  The patient is a 33-year-old right-handed male with a history of an AVM status post surgery, psoriasis, and epilepsy who presents to the neurology clinic today as established patient for follow-up for seizures.  The patient was last seen on April 19, 2022.  He was a new patient to me back in October 2021.  In 2020 his AVM bled and he had 2 surgeries.  After that he had left-sided weakness and he was started on levetiracetam.  He had his first seizure in February 2021.  He typically has generalized tonic-clonic seizures.  His EEG in 2021 showed mild diffuse slowing and right hemispheric slowing as well as a single right frontal interictal epileptiform discharge.  His brain MRI in 2021 showed his previous resection of an intracranial hemorrhage in the right temporal parietal region.  At his last visit the patient reported that his last seizure was January 2022 and involved an episode of status epilepticus.  He is currently on levetiracetam 2000 mg twice a day as well as carbamazepine 200 mg twice a day.  He takes his medications at 10 AM and 10 PM.  Since I last saw him he had 1 seizure in April of this year around 5 PM.  He was driving his car.  He felt the seizure, on and fortunately was able to pull over and the car was parked.  He had a generalized tonic-clonic seizure lasting 30 to 45 seconds.  He was taken to a hospital in [place].  He reports that it may have been brought on by stress.  He also had COVID about 2 weeks before the seizure.  He denies any missed doses of his medications and reports that they have not changed shape or color.  On September 19, 2023 his white blood cell count, sodium level and TSH were all within normal limits.  He denies any side effects to his medications and reports that his medications are affordable.    The following portions of the patient's history were reviewed and updated as  appropriate: allergies, current medications, past family history, past medical history, past social history, past surgical history, and problem list.    Review of Systems     Objective:    Neurological Exam    Physical Exam    Assessment/Plan:    The patient is a 33-year-old right-handed male with a history of an AVM status post surgery, psoriasis, and epilepsy who presents today for follow-up.    1.  Structural epilepsy-the patient went about a year without a seizure but then had one last April.  I recommend increasing his morning dose of carbamazepine from 200 mg to 300 mg.  New prescriptions were sent to his pharmacy.  We will keep his levetiracetam at the same dose with no changes.  We will have the patient follow-up with Dr. Yang in 6 months or sooner if needed.    A total of 20 minutes of time was spent on this encounter today.  This includes reviewing patient's records, face-to-face time, documentation.       Problems Addressed this Visit    None  Visit Diagnoses       Partial symptomatic epilepsy with complex partial seizures, not intractable, without status epilepticus    -  Primary    Relevant Medications    carBAMazepine (CARBATROL) 100 MG 12 hr capsule    carBAMazepine (CARBATROL) 200 MG 12 hr capsule          Diagnoses         Codes Comments    Partial symptomatic epilepsy with complex partial seizures, not intractable, without status epilepticus    -  Primary ICD-10-CM: G40.209  ICD-9-CM: 345.40

## 2023-11-08 ENCOUNTER — TELEPHONE (OUTPATIENT)
Dept: NEUROLOGY | Facility: CLINIC | Age: 33
End: 2023-11-08
Payer: MEDICAID

## 2023-12-11 RX ORDER — LEVETIRACETAM 1000 MG/1
TABLET ORAL
Qty: 120 TABLET | Refills: 6 | Status: SHIPPED | OUTPATIENT
Start: 2023-12-11

## 2024-05-24 RX ORDER — LEVETIRACETAM 1000 MG/1
2000 TABLET ORAL 2 TIMES DAILY
Qty: 120 TABLET | Refills: 2 | Status: SHIPPED | OUTPATIENT
Start: 2024-05-24

## 2024-05-24 NOTE — TELEPHONE ENCOUNTER
Caller: Avinash Everett    Relationship: Self    Best call back number: [phone]    Requested Prescriptions:   Requested Prescriptions     Pending Prescriptions Disp Refills    levETIRAcetam (KEPPRA) 1000 MG tablet 120 tablet 6     Sig: Take 2 tablets by mouth 2 (Two) Times a Day.        Pharmacy where request should be sent: [facility] [address] [place] [address] - [phone]  - [phone] FX     Last office visit with prescribing clinician: Visit date not found   Last telemedicine visit with prescribing clinician: Visit date not found   Next office visit with prescribing clinician: Visit date not found     Additional details provided by patient: N/A    Does the patient have less than a 3 day supply:  [x] Yes  [] No    Would you like a call back once the refill request has been completed: [] Yes [x] No    If the office needs to give you a call back, can they leave a voicemail: [] Yes [x] No    Olga English Rep   05/24/24 12:59 EDT

## 2024-06-27 ENCOUNTER — HOSPITAL ENCOUNTER (INPATIENT)
Facility: HOSPITAL | Age: 34
LOS: 4 days | Discharge: HOME | End: 2024-07-01
Attending: EMERGENCY MEDICINE | Admitting: STUDENT IN AN ORGANIZED HEALTH CARE EDUCATION/TRAINING PROGRAM
Payer: MEDICARE

## 2024-06-27 ENCOUNTER — APPOINTMENT (OUTPATIENT)
Dept: CT IMAGING | Facility: HOSPITAL | Age: 34
End: 2024-06-27
Payer: MEDICARE

## 2024-06-27 DIAGNOSIS — R65.20 SEPSIS WITH ENCEPHALOPATHY WITHOUT SEPTIC SHOCK, DUE TO UNSPECIFIED ORGANISM: ICD-10-CM

## 2024-06-27 DIAGNOSIS — F10.939 ALCOHOL WITHDRAWAL SYNDROME WITH COMPLICATION: ICD-10-CM

## 2024-06-27 DIAGNOSIS — G93.41 METABOLIC ENCEPHALOPATHY: ICD-10-CM

## 2024-06-27 DIAGNOSIS — A41.9 SEPSIS WITH ENCEPHALOPATHY WITHOUT SEPTIC SHOCK, DUE TO UNSPECIFIED ORGANISM: ICD-10-CM

## 2024-06-27 DIAGNOSIS — G93.41 SEPSIS WITH ENCEPHALOPATHY WITHOUT SEPTIC SHOCK, DUE TO UNSPECIFIED ORGANISM: ICD-10-CM

## 2024-06-27 DIAGNOSIS — L03.116 LEFT LEG CELLULITIS: Primary | ICD-10-CM

## 2024-06-27 DIAGNOSIS — F10.10 ALCOHOL ABUSE: ICD-10-CM

## 2024-06-27 PROBLEM — Z86.73 HISTORY OF CVA (CEREBROVASCULAR ACCIDENT): Status: ACTIVE | Noted: 2024-06-27

## 2024-06-27 PROBLEM — R73.9 HYPERGLYCEMIA: Status: ACTIVE | Noted: 2024-06-27

## 2024-06-27 PROBLEM — Z87.898 HISTORY OF SEIZURE: Status: ACTIVE | Noted: 2024-06-27

## 2024-06-27 PROBLEM — L40.9 PSORIASIS: Status: ACTIVE | Noted: 2024-06-27

## 2024-06-27 PROBLEM — Z72.0 TOBACCO ABUSE: Status: ACTIVE | Noted: 2024-06-27

## 2024-06-27 LAB
ALBUMIN SERPL-MCNC: 3.4 G/DL (ref 3.5–5.2)
ALBUMIN/GLOB SERPL: 0.7 G/DL
ALP SERPL-CCNC: 199 U/L (ref 39–117)
ALT SERPL W P-5'-P-CCNC: 31 U/L (ref 1–41)
ANION GAP SERPL CALCULATED.3IONS-SCNC: 15 MMOL/L (ref 5–15)
AST SERPL-CCNC: 71 U/L (ref 1–40)
BASOPHILS # BLD AUTO: 0.04 10*3/MM3 (ref 0–0.2)
BASOPHILS NFR BLD AUTO: 0.7 % (ref 0–1.5)
BILIRUB SERPL-MCNC: 2.3 MG/DL (ref 0–1.2)
BUN SERPL-MCNC: 2 MG/DL (ref 6–20)
BUN/CREAT SERPL: 4.9 (ref 7–25)
CALCIUM SPEC-SCNC: 8.5 MG/DL (ref 8.6–10.5)
CHLORIDE SERPL-SCNC: 95 MMOL/L (ref 98–107)
CO2 SERPL-SCNC: 23 MMOL/L (ref 22–29)
CREAT SERPL-MCNC: 0.41 MG/DL (ref 0.76–1.27)
D-LACTATE SERPL-SCNC: 2.5 MMOL/L (ref 0.5–2)
D-LACTATE SERPL-SCNC: 2.7 MMOL/L (ref 0.5–2)
D-LACTATE SERPL-SCNC: 4.9 MMOL/L (ref 0.5–2)
DEPRECATED RDW RBC AUTO: 51.4 FL (ref 37–54)
EGFRCR SERPLBLD CKD-EPI 2021: 146.6 ML/MIN/1.73
EOSINOPHIL # BLD AUTO: 0.15 10*3/MM3 (ref 0–0.4)
EOSINOPHIL NFR BLD AUTO: 2.4 % (ref 0.3–6.2)
ERYTHROCYTE [DISTWIDTH] IN BLOOD BY AUTOMATED COUNT: 15.5 % (ref 12.3–15.4)
GLOBULIN UR ELPH-MCNC: 4.6 GM/DL
GLUCOSE SERPL-MCNC: 163 MG/DL (ref 65–99)
HCT VFR BLD AUTO: 37.1 % (ref 37.5–51)
HGB BLD-MCNC: 12.9 G/DL (ref 13–17.7)
IMM GRANULOCYTES # BLD AUTO: 0.13 10*3/MM3 (ref 0–0.05)
IMM GRANULOCYTES NFR BLD AUTO: 2.1 % (ref 0–0.5)
LYMPHOCYTES # BLD AUTO: 1.07 10*3/MM3 (ref 0.7–3.1)
LYMPHOCYTES NFR BLD AUTO: 17.4 % (ref 19.6–45.3)
MCH RBC QN AUTO: 32 PG (ref 26.6–33)
MCHC RBC AUTO-ENTMCNC: 34.8 G/DL (ref 31.5–35.7)
MCV RBC AUTO: 92.1 FL (ref 79–97)
MONOCYTES # BLD AUTO: 0.66 10*3/MM3 (ref 0.1–0.9)
MONOCYTES NFR BLD AUTO: 10.7 % (ref 5–12)
NEUTROPHILS NFR BLD AUTO: 4.1 10*3/MM3 (ref 1.7–7)
NEUTROPHILS NFR BLD AUTO: 66.7 % (ref 42.7–76)
NRBC BLD AUTO-RTO: 0.5 /100 WBC (ref 0–0.2)
PLATELET # BLD AUTO: 89 10*3/MM3 (ref 140–450)
PMV BLD AUTO: 10.1 FL (ref 6–12)
POTASSIUM SERPL-SCNC: 3.4 MMOL/L (ref 3.5–5.2)
PROCALCITONIN SERPL-MCNC: 0.1 NG/ML (ref 0–0.25)
PROT SERPL-MCNC: 8 G/DL (ref 6–8.5)
RBC # BLD AUTO: 4.03 10*6/MM3 (ref 4.14–5.8)
SODIUM SERPL-SCNC: 133 MMOL/L (ref 136–145)
WBC NRBC COR # BLD AUTO: 6.15 10*3/MM3 (ref 3.4–10.8)

## 2024-06-27 PROCEDURE — 84145 PROCALCITONIN (PCT): CPT | Performed by: NURSE PRACTITIONER

## 2024-06-27 PROCEDURE — 70450 CT HEAD/BRAIN W/O DYE: CPT

## 2024-06-27 PROCEDURE — 25010000002 CEFTRIAXONE PER 250 MG: Performed by: STUDENT IN AN ORGANIZED HEALTH CARE EDUCATION/TRAINING PROGRAM

## 2024-06-27 PROCEDURE — 25010000002 VANCOMYCIN 10 G RECONSTITUTED SOLUTION: Performed by: EMERGENCY MEDICINE

## 2024-06-27 PROCEDURE — 25010000002 THIAMINE HCL 200 MG/2ML SOLUTION: Performed by: STUDENT IN AN ORGANIZED HEALTH CARE EDUCATION/TRAINING PROGRAM

## 2024-06-27 PROCEDURE — 85025 COMPLETE CBC W/AUTO DIFF WBC: CPT | Performed by: EMERGENCY MEDICINE

## 2024-06-27 PROCEDURE — 25810000003 SEPSIS FLUID NS 0.9 % SOLUTION: Performed by: EMERGENCY MEDICINE

## 2024-06-27 PROCEDURE — 99285 EMERGENCY DEPT VISIT HI MDM: CPT

## 2024-06-27 PROCEDURE — 25010000002 ENOXAPARIN PER 10 MG: Performed by: STUDENT IN AN ORGANIZED HEALTH CARE EDUCATION/TRAINING PROGRAM

## 2024-06-27 PROCEDURE — 87040 BLOOD CULTURE FOR BACTERIA: CPT | Performed by: EMERGENCY MEDICINE

## 2024-06-27 PROCEDURE — 25810000003 SODIUM CHLORIDE 0.9 % SOLUTION: Performed by: EMERGENCY MEDICINE

## 2024-06-27 PROCEDURE — 80053 COMPREHEN METABOLIC PANEL: CPT | Performed by: EMERGENCY MEDICINE

## 2024-06-27 PROCEDURE — 83605 ASSAY OF LACTIC ACID: CPT | Performed by: EMERGENCY MEDICINE

## 2024-06-27 PROCEDURE — 25010000002 ONDANSETRON PER 1 MG: Performed by: EMERGENCY MEDICINE

## 2024-06-27 PROCEDURE — 36415 COLL VENOUS BLD VENIPUNCTURE: CPT | Performed by: EMERGENCY MEDICINE

## 2024-06-27 RX ORDER — ALUMINA, MAGNESIA, AND SIMETHICONE 2400; 2400; 240 MG/30ML; MG/30ML; MG/30ML
15 SUSPENSION ORAL EVERY 6 HOURS PRN
Status: DISCONTINUED | OUTPATIENT
Start: 2024-06-27 | End: 2024-06-28

## 2024-06-27 RX ORDER — ACETAMINOPHEN 160 MG/5ML
650 SOLUTION ORAL EVERY 4 HOURS PRN
Status: DISCONTINUED | OUTPATIENT
Start: 2024-06-27 | End: 2024-07-01 | Stop reason: HOSPADM

## 2024-06-27 RX ORDER — MIDAZOLAM HYDROCHLORIDE 1 MG/ML
2 INJECTION INTRAMUSCULAR; INTRAVENOUS
Status: DISCONTINUED | OUTPATIENT
Start: 2024-06-27 | End: 2024-07-01 | Stop reason: HOSPADM

## 2024-06-27 RX ORDER — LORAZEPAM 1 MG/1
1 TABLET ORAL
Status: DISCONTINUED | OUTPATIENT
Start: 2024-06-27 | End: 2024-07-01 | Stop reason: HOSPADM

## 2024-06-27 RX ORDER — ONDANSETRON 2 MG/ML
4 INJECTION INTRAMUSCULAR; INTRAVENOUS ONCE
Status: COMPLETED | OUTPATIENT
Start: 2024-06-27 | End: 2024-06-27

## 2024-06-27 RX ORDER — NITROGLYCERIN 0.4 MG/1
0.4 TABLET SUBLINGUAL
Status: DISCONTINUED | OUTPATIENT
Start: 2024-06-27 | End: 2024-07-01 | Stop reason: HOSPADM

## 2024-06-27 RX ORDER — SODIUM CHLORIDE 9 MG/ML
100 INJECTION, SOLUTION INTRAVENOUS CONTINUOUS
Status: DISCONTINUED | OUTPATIENT
Start: 2024-06-27 | End: 2024-06-29

## 2024-06-27 RX ORDER — LORAZEPAM 1 MG/1
2 TABLET ORAL
Status: DISCONTINUED | OUTPATIENT
Start: 2024-06-27 | End: 2024-07-01 | Stop reason: HOSPADM

## 2024-06-27 RX ORDER — GABAPENTIN 300 MG/1
300 CAPSULE ORAL NIGHTLY
Status: DISCONTINUED | OUTPATIENT
Start: 2024-06-27 | End: 2024-07-01 | Stop reason: HOSPADM

## 2024-06-27 RX ORDER — ENOXAPARIN SODIUM 100 MG/ML
40 INJECTION SUBCUTANEOUS NIGHTLY
Status: DISCONTINUED | OUTPATIENT
Start: 2024-06-27 | End: 2024-07-01 | Stop reason: HOSPADM

## 2024-06-27 RX ORDER — ONDANSETRON 2 MG/ML
4 INJECTION INTRAMUSCULAR; INTRAVENOUS EVERY 6 HOURS PRN
Status: DISCONTINUED | OUTPATIENT
Start: 2024-06-27 | End: 2024-07-01 | Stop reason: HOSPADM

## 2024-06-27 RX ORDER — POTASSIUM CHLORIDE 750 MG/1
40 TABLET, FILM COATED, EXTENDED RELEASE ORAL ONCE
Status: COMPLETED | OUTPATIENT
Start: 2024-06-27 | End: 2024-06-27

## 2024-06-27 RX ORDER — DIPHENOXYLATE HYDROCHLORIDE AND ATROPINE SULFATE 2.5; .025 MG/1; MG/1
1 TABLET ORAL DAILY
Status: DISCONTINUED | OUTPATIENT
Start: 2024-06-28 | End: 2024-07-01 | Stop reason: HOSPADM

## 2024-06-27 RX ORDER — MIDAZOLAM HYDROCHLORIDE 1 MG/ML
4 INJECTION INTRAMUSCULAR; INTRAVENOUS
Status: DISCONTINUED | OUTPATIENT
Start: 2024-06-27 | End: 2024-07-01 | Stop reason: HOSPADM

## 2024-06-27 RX ORDER — MIRTAZAPINE 15 MG/1
15 TABLET, FILM COATED ORAL NIGHTLY
Status: DISCONTINUED | OUTPATIENT
Start: 2024-06-27 | End: 2024-07-01 | Stop reason: HOSPADM

## 2024-06-27 RX ORDER — FOLIC ACID 1 MG/1
1 TABLET ORAL DAILY
Status: DISCONTINUED | OUTPATIENT
Start: 2024-06-27 | End: 2024-07-01 | Stop reason: HOSPADM

## 2024-06-27 RX ORDER — ACETAMINOPHEN 650 MG/1
650 SUPPOSITORY RECTAL EVERY 4 HOURS PRN
Status: DISCONTINUED | OUTPATIENT
Start: 2024-06-27 | End: 2024-07-01 | Stop reason: HOSPADM

## 2024-06-27 RX ORDER — ONDANSETRON 4 MG/1
4 TABLET, ORALLY DISINTEGRATING ORAL EVERY 6 HOURS PRN
Status: DISCONTINUED | OUTPATIENT
Start: 2024-06-27 | End: 2024-07-01 | Stop reason: HOSPADM

## 2024-06-27 RX ORDER — SODIUM CHLORIDE 0.9 % (FLUSH) 0.9 %
10 SYRINGE (ML) INJECTION AS NEEDED
Status: DISCONTINUED | OUTPATIENT
Start: 2024-06-27 | End: 2024-07-01 | Stop reason: HOSPADM

## 2024-06-27 RX ORDER — AMOXICILLIN 250 MG
2 CAPSULE ORAL 2 TIMES DAILY PRN
Status: DISCONTINUED | OUTPATIENT
Start: 2024-06-27 | End: 2024-07-01 | Stop reason: HOSPADM

## 2024-06-27 RX ORDER — ACETAMINOPHEN 325 MG/1
650 TABLET ORAL EVERY 4 HOURS PRN
Status: DISCONTINUED | OUTPATIENT
Start: 2024-06-27 | End: 2024-07-01 | Stop reason: HOSPADM

## 2024-06-27 RX ORDER — MIDAZOLAM HYDROCHLORIDE 5 MG/ML
4 INJECTION INTRAMUSCULAR; INTRAVENOUS
Status: DISCONTINUED | OUTPATIENT
Start: 2024-06-27 | End: 2024-07-01 | Stop reason: HOSPADM

## 2024-06-27 RX ORDER — POLYETHYLENE GLYCOL 3350 17 G/17G
17 POWDER, FOR SOLUTION ORAL DAILY PRN
Status: DISCONTINUED | OUTPATIENT
Start: 2024-06-27 | End: 2024-07-01 | Stop reason: HOSPADM

## 2024-06-27 RX ORDER — THIAMINE HYDROCHLORIDE 100 MG/ML
200 INJECTION, SOLUTION INTRAMUSCULAR; INTRAVENOUS EVERY 8 HOURS SCHEDULED
Status: DISCONTINUED | OUTPATIENT
Start: 2024-06-27 | End: 2024-07-01 | Stop reason: HOSPADM

## 2024-06-27 RX ORDER — CARBAMAZEPINE 100 MG/1
100 CAPSULE, EXTENDED RELEASE ORAL EVERY MORNING
Status: DISCONTINUED | OUTPATIENT
Start: 2024-06-28 | End: 2024-06-27 | Stop reason: SDUPTHER

## 2024-06-27 RX ORDER — ACETAMINOPHEN 500 MG
1000 TABLET ORAL ONCE
Status: COMPLETED | OUTPATIENT
Start: 2024-06-27 | End: 2024-06-27

## 2024-06-27 RX ORDER — CARBAMAZEPINE 200 MG/1
200 CAPSULE, EXTENDED RELEASE ORAL 2 TIMES DAILY
Status: DISCONTINUED | OUTPATIENT
Start: 2024-06-27 | End: 2024-07-01 | Stop reason: HOSPADM

## 2024-06-27 RX ORDER — BISACODYL 10 MG
10 SUPPOSITORY, RECTAL RECTAL DAILY PRN
Status: DISCONTINUED | OUTPATIENT
Start: 2024-06-27 | End: 2024-07-01 | Stop reason: HOSPADM

## 2024-06-27 RX ORDER — LEVETIRACETAM 500 MG/1
2000 TABLET ORAL 2 TIMES DAILY
Status: DISCONTINUED | OUTPATIENT
Start: 2024-06-27 | End: 2024-07-01 | Stop reason: HOSPADM

## 2024-06-27 RX ORDER — BISACODYL 5 MG/1
5 TABLET, DELAYED RELEASE ORAL DAILY PRN
Status: DISCONTINUED | OUTPATIENT
Start: 2024-06-27 | End: 2024-07-01 | Stop reason: HOSPADM

## 2024-06-27 RX ADMIN — FOLIC ACID 1 MG: 1 TABLET ORAL at 18:44

## 2024-06-27 RX ADMIN — LEVETIRACETAM 2000 MG: 500 TABLET, FILM COATED ORAL at 23:14

## 2024-06-27 RX ADMIN — ONDANSETRON 4 MG: 2 INJECTION INTRAMUSCULAR; INTRAVENOUS at 17:07

## 2024-06-27 RX ADMIN — SODIUM CHLORIDE 2397 ML: 9 INJECTION, SOLUTION INTRAVENOUS at 17:40

## 2024-06-27 RX ADMIN — POTASSIUM CHLORIDE 40 MEQ: 750 TABLET, EXTENDED RELEASE ORAL at 23:14

## 2024-06-27 RX ADMIN — VANCOMYCIN HYDROCHLORIDE 2500 MG: 10 INJECTION, POWDER, LYOPHILIZED, FOR SOLUTION INTRAVENOUS at 17:44

## 2024-06-27 RX ADMIN — SODIUM CHLORIDE 1000 ML: 9 INJECTION, SOLUTION INTRAVENOUS at 16:11

## 2024-06-27 RX ADMIN — CARBAMAZEPINE 200 MG: 200 CAPSULE, EXTENDED RELEASE ORAL at 23:14

## 2024-06-27 RX ADMIN — GABAPENTIN 300 MG: 300 CAPSULE ORAL at 23:14

## 2024-06-27 RX ADMIN — MIRTAZAPINE 15 MG: 15 TABLET, FILM COATED ORAL at 23:14

## 2024-06-27 RX ADMIN — THIAMINE HYDROCHLORIDE 200 MG: 100 INJECTION, SOLUTION INTRAMUSCULAR; INTRAVENOUS at 23:36

## 2024-06-27 RX ADMIN — CEFTRIAXONE 2000 MG: 2 INJECTION, POWDER, FOR SOLUTION INTRAMUSCULAR; INTRAVENOUS at 23:15

## 2024-06-27 RX ADMIN — ENOXAPARIN SODIUM 40 MG: 100 INJECTION SUBCUTANEOUS at 23:15

## 2024-06-27 RX ADMIN — ACETAMINOPHEN 1000 MG: 500 TABLET ORAL at 19:14

## 2024-06-27 NOTE — ED NOTES
Patient sent to ED from home for left calf wound and LUE wound check. This has been going on for awhile. He wears a brace after having a CVA in 2020.

## 2024-06-27 NOTE — ED NOTES
Nursing report ED to floor  Avinash Everett  33 y.o.  male    HPI :  HPI (Adult)  Stated Reason for Visit: Wound check  History Obtained From: patient, family, EMS  Onset of Symptoms: gradual    Chief Complaint  Chief Complaint   Patient presents with    Wound Check       Admitting doctor:   Kush Munson MD    Admitting diagnosis:   The primary encounter diagnosis was Left leg cellulitis. Diagnoses of Metabolic encephalopathy, Sepsis with encephalopathy without septic shock, due to unspecified organism, Alcohol withdrawal syndrome with complication, and Alcohol abuse were also pertinent to this visit.    Code status:   Current Code Status       Date Active Code Status Order ID Comments User Context       Prior            Allergies:   Citrus and Promethazine    Isolation:   No active isolations    Intake and Output    Intake/Output Summary (Last 24 hours) at 6/27/2024 1831  Last data filed at 6/27/2024 1738  Gross per 24 hour   Intake 1000 ml   Output --   Net 1000 ml       Weight:       06/27/24  1702   Weight: 115 kg (253 lb 11.2 oz)       Most recent vitals:   Vitals:    06/27/24 1507 06/27/24 1550 06/27/24 1631 06/27/24 1702   BP: 150/82 140/81 146/80    Patient Position:  Lying     Pulse: 110 115 113    Resp: 18      Temp: 97.9 °F (36.6 °C)      TempSrc: Tympanic      SpO2: 100% 95% 92%    Weight:   125 kg (275 lb 9.2 oz) 115 kg (253 lb 11.2 oz)       Active LDAs/IV Access:   Lines, Drains & Airways       Active LDAs       Name Placement date Placement time Site Days    Peripheral IV 08/03/20 2242 Left Forearm 08/03/20  2242  Forearm  1423    Peripheral IV 08/13/20 1636 Right Forearm 08/13/20  1636  Forearm  1414    Peripheral IV 06/27/24 1530 Right Antecubital 06/27/24  1530  Antecubital  less than 1                    Labs (abnormal labs have a star):   Labs Reviewed   COMPREHENSIVE METABOLIC PANEL - Abnormal; Notable for the following components:       Result Value    Glucose 163 (*)     BUN 2 (*)      Creatinine 0.41 (*)     Sodium 133 (*)     Potassium 3.4 (*)     Chloride 95 (*)     Calcium 8.5 (*)     Albumin 3.4 (*)     AST (SGOT) 71 (*)     Alkaline Phosphatase 199 (*)     Total Bilirubin 2.3 (*)     BUN/Creatinine Ratio 4.9 (*)     All other components within normal limits    Narrative:     GFR Normal >60  Chronic Kidney Disease <60  Kidney Failure <15     LACTIC ACID, PLASMA - Abnormal; Notable for the following components:    Lactate 4.9 (*)     All other components within normal limits   CBC WITH AUTO DIFFERENTIAL - Abnormal; Notable for the following components:    RBC 4.03 (*)     Hemoglobin 12.9 (*)     Hematocrit 37.1 (*)     RDW 15.5 (*)     Platelets 89 (*)     Lymphocyte % 17.4 (*)     Immature Grans % 2.1 (*)     Immature Grans, Absolute 0.13 (*)     nRBC 0.5 (*)     All other components within normal limits   BLOOD CULTURE   BLOOD CULTURE   LACTIC ACID, REFLEX   CBC AND DIFFERENTIAL    Narrative:     The following orders were created for panel order CBC & Differential.  Procedure                               Abnormality         Status                     ---------                               -----------         ------                     CBC Auto Differential[[id number]]        Abnormal            Final result                 Please view results for these tests on the individual orders.       EKG:   No orders to display       Meds given in ED:   Medications   sodium chloride 0.9 % flush 10 mL (has no administration in time range)   vancomycin 2500 mg/500 mL 0.9% NS IVPB ([facility]) (2,500 mg Intravenous New Bag 6/27/24 0924)   sepsis fluid NS 0.9 % bolus 2,397 mL (has no administration in time range)   Magnesium Standard Dose Replacement - Follow Nurse / BPA Driven Protocol (has no administration in time range)   thiamine (B-1) injection 200 mg (has no administration in time range)     Followed by   thiamine (VITAMIN B-1) tablet 100 mg (has no administration in time range)   folic acid (FOLVITE)  tablet 1 mg (has no administration in time range)   LORazepam (ATIVAN) tablet 1 mg (has no administration in time range)     Or   midazolam (VERSED) injection 2 mg (has no administration in time range)     Or   LORazepam (ATIVAN) tablet 2 mg (has no administration in time range)     Or   midazolam (VERSED) injection 4 mg (has no administration in time range)     Or   midazolam (VERSED) injection 4 mg (has no administration in time range)     Or   midazolam (VERSED) injection 4 mg (has no administration in time range)   sodium chloride 0.9 % bolus 1,000 mL (0 mL Intravenous Stopped 6/27/24 1738)   ondansetron (ZOFRAN) injection 4 mg (4 mg Intravenous Given 6/27/24 1707)       Imaging results:  CT Head Without Contrast    Result Date: 6/27/2024   No acute intracranial hemorrhage or hydrocephalus. Chronic changes of the brain. If there is further clinical concern, MRI could be considered for further evaluation.  This report was finalized on 6/27/2024 5:50 PM by Dr. Rell Hernández M.D on Workstation: [email or web address]         Social issues:   Social History     Socioeconomic History    Marital status:    Tobacco Use    Smoking status: Heavy Smoker     Current packs/day: 0.50     Types: Cigarettes    Smokeless tobacco: Never   Vaping Use    Vaping status: Former   Substance and Sexual Activity    Alcohol use: Not Currently    Drug use: Yes     Types: Marijuana    Sexual activity: Not Currently       Peripheral Neurovascular  Peripheral Neurovascular (Adult)  Peripheral Neurovascular WDL: .WDL except, neurovascular assessment lower  Pulse Assessment: dorsalis pedis  LLE Neurovascular Assessment  Temperature LLE: warm  Color LLE: red (redness to left calf)  Sensation LLE: numbness present, no tenderness (L side is paralyzed and numb at baseline)  RLE Neurovascular Assessment  Temperature RLE: warm  Color RLE: no discoloration  Sensation RLE: no numbness, no tenderness    Neuro Cognitive  Neuro Cognitive  (Adult)  Cognitive/Neuro/Behavioral WDL: WDL (Pt is alert and oriented x4 at this time, but mother reports altered mental status yesterday. She reports patient was disoriented to time and situation)    Learning  Learning Assessment (Adult)  Learning Readiness and Ability: no barriers identified  Education Provided  Person Taught: patient, parent  Teaching Method: verbal instruction  Teaching Focus: symptom/problem overview, diagnostic test  Education Outcome Evaluation: verbalizes understanding    Respiratory  Respiratory WDL  Respiratory WDL: WDL    Abdominal Pain       Pain Assessments  Pain (Adult)  (0-10) Pain Rating: Rest: 0  (0-10) Pain Rating: Activity: 0    NIH Stroke Scale       Clarice Rivera RN  06/27/24 18:31 EDT

## 2024-06-27 NOTE — ED PROVIDER NOTES
EMERGENCY DEPARTMENT ENCOUNTER  Room Number:  N540/1  PCP: Sada De Oliveira MD  Independent Historians: Patient and EMS      HPI:  Chief Complaint: had concerns including Wound Check.     A complete HPI/ROS/PMH/PSH/SH/FH are unobtainable due to: Poor historian    Chronic or social conditions impacting patient care (Social Determinants of Health): None      Context: Avinash Everett is a 33 y.o. male with a medical history of stroke, paralysis, MRSA who presents to the ED c/o acute left calf wound.  EMS reports patient had a stroke in 2020.  They report that he wears a brace on his left leg and has developed a wound on his left leg.  They report that it has been red and swollen.  They report for the last few days he has had some confusion.  They report with EMS transport he is not confused.  He has a history of metabolic encephalopathy.  He is not able to provide much history.      Review of prior external notes (non-ED) -and- Review of prior external test results outside of this encounter:  Laboratory evaluation 4/19/2022 shows normal CBC    Prescription drug monitoring program review:         PAST MEDICAL HISTORY  Active Ambulatory Problems     Diagnosis Date Noted    Right-sided nontraumatic intracerebral hemorrhage 08/03/2020    Nontraumatic subcortical hemorrhage of right cerebral hemisphere 08/03/2020    Alcohol abuse 08/10/2020    Metabolic encephalopathy 08/10/2020    Vitamin K deficiency 08/10/2020    Nontraumatic acute cerebral hemorrhage 08/17/2020    Disruption or dehiscence of closure of skull or craniotomy 09/28/2020    Wound dehiscence 10/01/2020    Postoperative visit 10/15/2020    Recurrent seizures 09/15/2021    Seizure disorder 01/10/2022    Hypokalemia 01/10/2022    Hypomagnesemia 01/10/2022    Cerebral arteriovenous malformation (AVM) 01/11/2022     Resolved Ambulatory Problems     Diagnosis Date Noted    No Resolved Ambulatory Problems     Past Medical History:   Diagnosis Date    Dehiscence of  incision     GERD (gastroesophageal reflux disease)     History of transfusion     Intracerebral hemorrhage     MRSA (methicillin resistant Staphylococcus aureus)     Paralysis     Stroke          PAST SURGICAL HISTORY  Past Surgical History:   Procedure Laterality Date    CRANIOTOMY Right 10/1/2020    Procedure: INCISION AND DRAINAGE WOUND, of scalp incision with closure;  Surgeon: Leobardo Rivera MD;  Location: [facility];  Service: Neurosurgery;  Laterality: Right;    CRANIOTOMY FOR SUBDURAL HEMATOMA Right 8/3/2020    Procedure: RIGHT FRONTOPARIETAL CRANIOTOMY FOR EVACUATION OF INTRACEREBRAL HEMORRHAGE;  Surgeon: Leobardo Rivera MD;  Location: [facility] OR;  Service: Neurosurgery;  Laterality: Right;    EMBOLIZATION CEREBRAL N/A 8/5/2020    Procedure: diagnostic angiogram possible EMBOLIZATION CEREBRAL. right radial approach;  Surgeon: Alfonso Malcolm MD;  Location: [facility] OR 18/19;  Service: Neurosurgery;  Laterality: N/A;    WRIST SURGERY      1st one 2006, 2nd 2010         FAMILY HISTORY  Family History   Problem Relation Age of Onset    Depression Maternal Grandmother     Depression Maternal Grandfather     Depression Paternal Grandmother     Depression Paternal Grandfather     Malig Hyperthermia Neg Hx          SOCIAL HISTORY  Social History     Socioeconomic History    Marital status:    Tobacco Use    Smoking status: Former     Current packs/day: 0.50     Types: Cigarettes    Smokeless tobacco: Former     Types: Snuff     Quit date: 2015   Vaping Use    Vaping status: Some Days    Substances: Nicotine, THC, Flavoring    Devices: Disposable, Pre-filled or refillable cartridge, Refillable tank, Pre-filled pod   Substance and Sexual Activity    Alcohol use: Yes     Alcohol/week: 36.0 standard drinks of alcohol     Types: 18 Cans of beer, 18 Shots of liquor per week    Drug use: Yes     Frequency: 3.0 times per week     Types: Marijuana    Sexual activity: Not Currently          ALLERGIES  Citrus and Promethazine      REVIEW OF SYSTEMS  Review of Systems  Included in HPI  All systems reviewed and negative except for those discussed in HPI.      PHYSICAL EXAM    I have reviewed the triage vital signs and nursing notes.    ED Triage Vitals   Temp Heart Rate Resp BP SpO2   06/27/24 1507 06/27/24 1507 06/27/24 1507 06/27/24 1507 06/27/24 1507   97.9 °F (36.6 °C) 110 18 150/82 100 %      Temp src Heart Rate Source Patient Position BP Location FiO2 (%)   06/27/24 1507 06/27/24 1507 06/27/24 1550 -- --   Tympanic Monitor Lying         Physical Exam  GENERAL: Awake, alert, no acute distress  SKIN: Warm, dry  HENT: Normocephalic, atraumatic  EYES: no scleral icterus  CV: regular rhythm, regular rate  RESPIRATORY: normal effort, lungs clear  ABDOMEN: soft, nontender, nondistended  MUSCULOSKELETAL: no deformity.  Left medial calf with focal area of erythema and warmth consistent with cellulitis.  No drainage.  NEURO: alert, moves all extremities, follows commands            LAB RESULTS  Recent Results (from the past 24 hour(s))   STAT Lactic Acid, Reflex    Collection Time: 06/27/24 11:06 PM    Specimen: Blood   Result Value Ref Range    Lactate 2.5 (C) 0.5 - 2.0 mmol/L   Vancomycin, Trough    Collection Time: 06/28/24  5:00 AM    Specimen: Blood   Result Value Ref Range    Vancomycin Trough 5.80 5.00 - 20.00 mcg/mL   Magnesium    Collection Time: 06/28/24  5:00 AM    Specimen: Blood   Result Value Ref Range    Magnesium 1.6 1.6 - 2.6 mg/dL   Phosphorus    Collection Time: 06/28/24  5:00 AM    Specimen: Blood   Result Value Ref Range    Phosphorus 2.8 2.5 - 4.5 mg/dL   Comprehensive Metabolic Panel    Collection Time: 06/28/24  5:00 AM    Specimen: Blood   Result Value Ref Range    Glucose 140 (H) 65 - 99 mg/dL    BUN 3 (L) 6 - 20 mg/dL    Creatinine 0.39 (L) 0.76 - 1.27 mg/dL    Sodium 137 136 - 145 mmol/L    Potassium 3.1 (L) 3.5 - 5.2 mmol/L    Chloride 103 98 - 107 mmol/L    CO2 25.1 22.0  - 29.0 mmol/L    Calcium 8.1 (L) 8.6 - 10.5 mg/dL    Total Protein 6.6 6.0 - 8.5 g/dL    Albumin 2.9 (L) 3.5 - 5.2 g/dL    ALT (SGPT) 25 1 - 41 U/L    AST (SGOT) 60 (H) 1 - 40 U/L    Alkaline Phosphatase 174 (H) 39 - 117 U/L    Total Bilirubin 1.9 (H) 0.0 - 1.2 mg/dL    Globulin 3.7 gm/dL    A/G Ratio 0.8 g/dL    BUN/Creatinine Ratio 7.7 7.0 - 25.0    Anion Gap 8.9 5.0 - 15.0 mmol/L    eGFR 148.9 >60.0 mL/min/1.73   CBC (No Diff)    Collection Time: 06/28/24  5:00 AM    Specimen: Blood   Result Value Ref Range    WBC 4.15 3.40 - 10.80 10*3/mm3    RBC 3.70 (L) 4.14 - 5.80 10*6/mm3    Hemoglobin 11.7 (L) 13.0 - 17.7 g/dL    Hematocrit 34.2 (L) 37.5 - 51.0 %    MCV 92.4 79.0 - 97.0 fL    MCH 31.6 26.6 - 33.0 pg    MCHC 34.2 31.5 - 35.7 g/dL    RDW 15.4 12.3 - 15.4 %    RDW-SD 51.5 37.0 - 54.0 fl    MPV 9.9 6.0 - 12.0 fL    Platelets 69 (L) 140 - 450 10*3/mm3   Hemoglobin A1c    Collection Time: 06/28/24  5:00 AM    Specimen: Blood   Result Value Ref Range    Hemoglobin A1C 7.80 (H) 4.80 - 5.60 %   Wound Culture - Wound, Leg, Left    Collection Time: 06/28/24  9:34 AM    Specimen: Leg, Left; Wound   Result Value Ref Range    Gram Stain Few (2+) WBCs seen     Gram Stain       Rare (1+) Gram positive cocci in pairs and clusters   Potassium    Collection Time: 06/28/24  9:19 PM    Specimen: Blood   Result Value Ref Range    Potassium 4.2 3.5 - 5.2 mmol/L         RADIOLOGY  No Radiology Exams Resulted Within Past 24 Hours      MEDICATIONS GIVEN IN ER  Medications   sodium chloride 0.9 % flush 10 mL (has no administration in time range)   thiamine (B-1) injection 200 mg (200 mg Intravenous Given 6/28/24 6264)     Followed by   thiamine (VITAMIN B-1) tablet 100 mg (has no administration in time range)   folic acid (FOLVITE) tablet 1 mg (1 mg Oral Given 6/28/24 8202)   LORazepam (ATIVAN) tablet 1 mg (has no administration in time range)     Or   midazolam (VERSED) injection 2 mg (has no administration in time range)     Or    LORazepam (ATIVAN) tablet 2 mg (has no administration in time range)     Or   midazolam (VERSED) injection 4 mg (has no administration in time range)     Or   midazolam (VERSED) injection 4 mg (has no administration in time range)     Or   midazolam (VERSED) injection 4 mg (has no administration in time range)   sodium chloride 0.9 % flush 10 mL (has no administration in time range)   nitroglycerin (NITROSTAT) SL tablet 0.4 mg (has no administration in time range)   acetaminophen (TYLENOL) tablet 650 mg (has no administration in time range)     Or   acetaminophen (TYLENOL) 160 MG/5ML oral solution 650 mg (has no administration in time range)     Or   acetaminophen (TYLENOL) suppository 650 mg (has no administration in time range)   sennosides-docusate (PERICOLACE) 8.6-50 MG per tablet 2 tablet (has no administration in time range)     And   polyethylene glycol (MIRALAX) packet 17 g (has no administration in time range)     And   bisacodyl (DULCOLAX) EC tablet 5 mg (has no administration in time range)     And   bisacodyl (DULCOLAX) suppository 10 mg (has no administration in time range)   ondansetron ODT (ZOFRAN-ODT) disintegrating tablet 4 mg (has no administration in time range)     Or   ondansetron (ZOFRAN) injection 4 mg (has no administration in time range)   Pharmacy to dose vancomycin (has no administration in time range)   carBAMazepine (CARBATROL) 12 hr capsule 200 mg (200 mg Oral Given 6/28/24 2019)   gabapentin (NEURONTIN) capsule 300 mg (300 mg Oral Given 6/28/24 2019)   levETIRAcetam (KEPPRA) tablet 2,000 mg (2,000 mg Oral Given 6/28/24 2019)   mirtazapine (REMERON) tablet 15 mg (15 mg Oral Given 6/28/24 2019)   multivitamin (THERAGRAN) tablet 1 tablet (1 tablet Oral Given 6/28/24 9952)   Enoxaparin Sodium (LOVENOX) syringe 40 mg (40 mg Subcutaneous Given 6/28/24 2019)   sodium chloride 0.9 % infusion (100 mL/hr Intravenous New Bag 6/28/24 1951)   Vancomycin HCl 1,250 mg in sodium chloride 0.9 % 250  mL VTB (1,250 mg Intravenous New Bag 6/28/24 1852)   Potassium Replacement - Follow Nurse / BPA Driven Protocol (has no administration in time range)   Magnesium Standard Dose Replacement - Follow Nurse / BPA Driven Protocol (has no administration in time range)   Phosphorus Replacement - Follow Nurse / BPA Driven Protocol (has no administration in time range)   Calcium Replacement - Follow Nurse / BPA Driven Protocol (has no administration in time range)   LORazepam (ATIVAN) tablet 0.5 mg (0.5 mg Oral Given 6/28/24 2020)   doxycycline (VIBRAMYCIN) 100 mg in sodium chloride 0.9 % 100 mL MBP (100 mg Intravenous New Bag 6/28/24 2241)   sodium chloride 0.9 % bolus 1,000 mL (0 mL Intravenous Stopped 6/27/24 1738)   vancomycin 2500 mg/500 mL 0.9% NS IVPB ([facility]) (2,500 mg Intravenous New Bag 6/27/24 1744)   ondansetron (ZOFRAN) injection 4 mg (4 mg Intravenous Given 6/27/24 1707)   sepsis fluid NS 0.9 % bolus 2,397 mL (2,397 mL Intravenous New Bag 6/27/24 1740)   acetaminophen (TYLENOL) tablet 1,000 mg (1,000 mg Oral Given 6/27/24 1914)   potassium chloride (K-DUR,KLOR-CON) ER tablet 40 mEq (40 mEq Oral Given 6/27/24 2314)   potassium chloride (K-DUR,KLOR-CON) ER tablet 40 mEq (40 mEq Oral Given 6/28/24 1852)         ORDERS PLACED DURING THIS VISIT:  Orders Placed This Encounter   Procedures    Blood Culture - Blood,    Blood Culture - Blood,    Wound Culture - Wound, Leg, Left    CT Head Without Contrast    Comprehensive Metabolic Panel    Lactic Acid, Plasma    CBC Auto Differential    STAT Lactic Acid, Reflex    Magnesium    Phosphorus    Comprehensive Metabolic Panel    CBC (No Diff)    Procalcitonin    Hemoglobin A1c    Vancomycin, Trough    STAT Lactic Acid, Reflex    STAT Lactic Acid, Reflex    CBC (No Diff)    Basic Metabolic Panel    Potassium    Vancomycin, Trough Please make sure trough is drawn before administration of the next vancomycin dose. Thank you!    Protime-INR    Ammonia    Hepatic Function Panel     Diet: Regular/House; Fluid Consistency: Thin (IDDSI 0)    Vital Signs    Continuous Pulse Oximetry    Obtain Baseline Clinical [place] Withdrawal Assessment - Ar (CIWA-Ar), Sedation Scale & Vital Signs    Clinical [place] Withdrawal Assessment (CIWA-Ar)    If CIWA-Ar Score Less Than 8 For 3 Consecutive Assessments, Monitor Every 4 Hours & Discontinue Assessment When CIWA-Ar Less Than 8 for 24 Hours    Obtain Pre & Post Sedation Scores With Every Sedative Dose - Hold For POSS Greater Than 2 or RASS of -3 or Less    Notify Provider - Withdrawal    Notify Provider of Abnormal Lab Results    Notify Provider - Vitals    Vital Signs    Intake & Output    Oral Care    Maintain IV Access    Telemetry - Place Orders & Notify Provider of Results When Patient Experiences Acute Chest Pain, Dysrhythmia or Respiratory Distress    May Be Off Telemetry for Tests    Up With Assistance    Wound Care    Turn Patient    Head of Bed 30 Degrees or Less (Unless Contraindicated)    Elevate Heels Off of Bed    Use Seat Cushion When Up In Chair    Wound Care    Do NOT Rub or Massage Any Bony Prominence    Use Repositioning Wedge to Position Patient    Apply Heel Protector Boot Until Discontinued    Please order pt a CRISTOPHER mattress from [employer] and bed frame from [employer]  Nursing Communication    Code Status and Medical Interventions:    [facility] (on-call MD unless specified) Details    Inpatient Access Center Consult    Inpatient Infectious Diseases Consult    Inpatient Access Center Consult    Inpatient Consult to Advance Care Planning    PT Consult: Eval & Treat Functional Mobility Below Baseline    Incentive Spirometry    Wound Ostomy Eval & Treat    Insert Peripheral IV    Inpatient Admission    Seizure Precautions    Safety Precautions    CBC & Differential         OUTPATIENT MEDICATION MANAGEMENT:  Current Facility-Administered Medications Ordered in Epic   Medication Dose Route Frequency Provider Last Rate Last Admin    acetaminophen  (TYLENOL) tablet 650 mg  650 mg Oral Q4H PRN Lucien Jackson APRN        Or    acetaminophen (TYLENOL) 160 MG/5ML oral solution 650 mg  650 mg Oral Q4H PRN Lucien Jackson APRN        Or    acetaminophen (TYLENOL) suppository 650 mg  650 mg Rectal Q4H PRN Lucien Jackson APRN        sennosides-docusate (PERICOLACE) 8.6-50 MG per tablet 2 tablet  2 tablet Oral BID PRN Lucien Jackson APRN        And    polyethylene glycol (MIRALAX) packet 17 g  17 g Oral Daily PRN Lucien Jackson APRN        And    bisacodyl (DULCOLAX) EC tablet 5 mg  5 mg Oral Daily PRN Lucien Jackson APRN        And    bisacodyl (DULCOLAX) suppository 10 mg  10 mg Rectal Daily PRN Lucien Jackson APRN        Calcium Replacement - Follow Nurse / BPA Driven Protocol   Does not apply PRN Brain Carlos MD        carBAMazepine (CARBATROL) 12 hr capsule 200 mg  200 mg Oral BID Lucien Jackson APRN   200 mg at 06/28/24 2019    doxycycline (VIBRAMYCIN) 100 mg in sodium chloride 0.9 % 100 mL MBP  100 mg Intravenous Q12H Brain Carlos MD   100 mg at 06/28/24 2241    Enoxaparin Sodium (LOVENOX) syringe 40 mg  40 mg Subcutaneous Nightly Kush Munson MD   40 mg at 06/28/24 2019    folic acid (FOLVITE) tablet 1 mg  1 mg Oral Daily Kush Munson MD   1 mg at 06/28/24 0845    gabapentin (NEURONTIN) capsule 300 mg  300 mg Oral Nightly Lucien Jackson APRN   300 mg at 06/28/24 2019    levETIRAcetam (KEPPRA) tablet 2,000 mg  2,000 mg Oral BID Lucien Jackson APRN   2,000 mg at 06/28/24 2019    LORazepam (ATIVAN) tablet 0.5 mg  0.5 mg Oral BID Brain Carlos MD   0.5 mg at 06/28/24 2020    LORazepam (ATIVAN) tablet 1 mg  1 mg Oral Q1H PRN Kush Munson MD        Or    midazolam (VERSED) injection 2 mg  2 mg Intravenous Q1H PRN Kush Munson MD        Or    LORazepam (ATIVAN) tablet 2 mg  2 mg Oral Q1H PRN Kush Munson MD        Or    midazolam  (VERSED) injection 4 mg  4 mg Intravenous Q1H PRN Kush Munson MD        Or    midazolam (VERSED) injection 4 mg  4 mg Intravenous Q15 Min PRN Kush Munson MD        Or    midazolam (VERSED) injection 4 mg  4 mg Intramuscular Q15 Min PRN Kush Munson MD        Magnesium Standard Dose Replacement - Follow Nurse / BPA Driven Protocol   Does not apply PRN Brain Carlos MD        mirtazapine (REMERON) tablet 15 mg  15 mg Oral Nightly Lucien Jackson APRN   15 mg at 06/28/24 2019    multivitamin (THERAGRAN) tablet 1 tablet  1 tablet Oral Daily Lucien Jackson APRN   1 tablet at 06/28/24 0846    nitroglycerin (NITROSTAT) SL tablet 0.4 mg  0.4 mg Sublingual Q5 Min PRN Lucien Jackson APRN        ondansetron ODT (ZOFRAN-ODT) disintegrating tablet 4 mg  4 mg Oral Q6H PRN Lucien Jackson APRN        Or    ondansetron (ZOFRAN) injection 4 mg  4 mg Intravenous Q6H PRN Lucien Jackson APRN        Pharmacy to dose vancomycin   Does not apply Continuous PRN Lucien Jackson APRN        Phosphorus Replacement - Follow Nurse / BPA Driven Protocol   Does not apply PRN Brain Carlos MD        Potassium Replacement - Follow Nurse / BPA Driven Protocol   Does not apply PRN Brain Carlos MD        sodium chloride 0.9 % flush 10 mL  10 mL Intravenous PRN Eleno Ty MD        sodium chloride 0.9 % flush 10 mL  10 mL Intravenous PRN Lucien Jackson APRN        sodium chloride 0.9 % infusion  100 mL/hr Intravenous Continuous Kush Munson  mL/hr at 06/28/24 1951 100 mL/hr at 06/28/24 1951    thiamine (B-1) injection 200 mg  200 mg Intravenous Q8H Kush Munson MD   200 mg at 06/28/24 2209    Followed by    [START ON 7/3/2024] thiamine (VITAMIN B-1) tablet 100 mg  100 mg Oral Daily Kush Munson MD        Vancomycin HCl 1,250 mg in sodium chloride 0.9 % 250 mL VTB  1,250 mg Intravenous Q12H Lucien Jackson, APRN 200  mL/hr at 06/28/24 1852 1,250 mg at 06/28/24 1852     No current [facility]-ordered outpatient medications on file.         PROCEDURES  Procedures      Critical care provider statement:    Critical care time (minutes): 50.   Critical care time was exclusive of:  Separately billable procedures and treating other patients   Critical care was necessary to treat or prevent imminent or life-threatening deterioration of the following conditions:  Sepsis   Critical care was time spent personally by me on the following activities:  Development of treatment plan with patient or surrogate, discussions with consultants, evaluation of patient's response to treatment, examination of patient, obtaining history from patient or surrogate, ordering and performing treatments and interventions, ordering and review of laboratory studies, ordering and review of radiographic studies, pulse oximetry, re-evaluation of patient's condition and review of old charts. Critical Care indicators:        PROGRESS, DATA ANALYSIS, CONSULTS, AND MEDICAL DECISION MAKING  All labs have been independently interpreted by me.  All radiology studies have been reviewed by me. All EKG's have been independently viewed and interpreted by me.  Discussion below represents my analysis of pertinent findings related to patient's condition, differential diagnosis, treatment plan and final disposition.    Differential diagnosis includes but is not limited to sepsis, abscess, cellulitis, metabolic encephalopathy, intracranial hemorrhage.    Clinical Scores:                   ED Course as of 06/28/24 2255   Thu Jun 27, 2024   1711 Care transferred to Linda Powers pending CT interpretation and admission to the hospitalist. [TR]   1755 This is the patient, he is resting, remains tachycardic, vital signs otherwise unremarkable.  Mom at the bedside said he was complaining of a headache earlier today, states he still has a mild headache, will order dose of Tylenol.  Counseled him on  all of his lab and imaging results and recommendations for admission and they are agreeable. [KA]   1812 I discussed the patient with Dr. Silveira, hospitalist including history presentation and labs and imaging and he agrees to admit for further evaluation treatment. [KA]   1814 Glucose(!): 163 [KA]   1814 Creatinine(!): 0.41 [KA]   1814 Potassium(!): 3.4 [KA]   1814 AST (SGOT)(!): 71 [KA]   1814 Alkaline Phosphatase(!): 199 [KA]   1815 Total Bilirubin(!): 2.3 [KA]   1815 Anion Gap: 15.0 [KA]   1815 CO2: 23.0 [KA]   1815 Patient's mother pulled his nurses side and states that he drinks 1/5 of liquor and several beers daily, last drink last night, has been doing that for at least 1 year.  Some alcohol withdrawal may  be contributing to his tachycardia.  Will give a dose of Ativan and update the hospitalist. [KA]      ED Course User Index  [KA] Migdalia Powers PA-C  [TR] Eleno Ty MD             AS OF 22:55 EDT VITALS:    BP - 128/73  HR - 115  TEMP - 98.6 °F (37 °C) (Oral)  O2 SATS - 95%    COMPLEXITY OF CARE  The patient requires admission.      DIAGNOSIS  Final diagnoses:   Left leg cellulitis   Metabolic encephalopathy   Sepsis with encephalopathy without septic shock, due to unspecified organism   Alcohol withdrawal syndrome with complication   Alcohol abuse         DISPOSITION  ED Disposition       ED Disposition   Decision to Admit    Condition   --    Comment   Level of Care: Telemetry [5]   Diagnosis: Cellulitis of left lower extremity [043343]   Admitting Physician: CECILIA SILVEIRA [[id number]]   Attending Physician: CECILIA SILVEIRA [[id number]]   Certification: I Certify That Inpatient Hospital Services Are Medically Necessary For Greater Than 2 Midnights                  Please note that portions of this document were completed with a voice recognition program.    Note Disclaimer: At [facility], we believe that sharing information builds trust and better relationships. You are receiving this  note because you recently visited [facility]. It is possible you will see health information before a provider has talked with you about it. This kind of information can be easy to misunderstand. To help you fully understand what it means for your health, we urge you to discuss this note with your provider.         Eleno Ty MD  06/27/24 1658       Eleno Ty MD  06/27/24 1712       Eleno Ty MD  06/27/24 1713       Eleno Ty MD  06/28/24 9971

## 2024-06-27 NOTE — ED PROVIDER NOTES
EMERGENCY DEPARTMENT ENCOUNTER    Room number:  34/34  Date Seen:  6/27/2024  Time of transfer:1500  PCP:  Sada De Oliveira MD      33-year-old male with a history of a right-sided nontraumatic intracerebral hemorrhage presents for evaluation of pain and redness to his left calf where his PFO rubs.  Mom states that he has also had some intermittent confusion last couple days, that he tends to become confused when he has an infection.  No confusion has been noted in the ER.  He is found to have a cellulitis to the left lower extremity with a lactic acidosis of 4.9, is receiving vancomycin due to history of MRSA as well as a 30 mL/kg fluid bolus.  CT head pending due to reported confusion at home.      Laboratory Results:  Recent Results (from the past 24 hour(s))   Comprehensive Metabolic Panel    Collection Time: 06/27/24  4:05 PM    Specimen: Blood   Result Value Ref Range    Glucose 163 (H) 65 - 99 mg/dL    BUN 2 (L) 6 - 20 mg/dL    Creatinine 0.41 (L) 0.76 - 1.27 mg/dL    Sodium 133 (L) 136 - 145 mmol/L    Potassium 3.4 (L) 3.5 - 5.2 mmol/L    Chloride 95 (L) 98 - 107 mmol/L    CO2 23.0 22.0 - 29.0 mmol/L    Calcium 8.5 (L) 8.6 - 10.5 mg/dL    Total Protein 8.0 6.0 - 8.5 g/dL    Albumin 3.4 (L) 3.5 - 5.2 g/dL    ALT (SGPT) 31 1 - 41 U/L    AST (SGOT) 71 (H) 1 - 40 U/L    Alkaline Phosphatase 199 (H) 39 - 117 U/L    Total Bilirubin 2.3 (H) 0.0 - 1.2 mg/dL    Globulin 4.6 gm/dL    A/G Ratio 0.7 g/dL    BUN/Creatinine Ratio 4.9 (L) 7.0 - 25.0    Anion Gap 15.0 5.0 - 15.0 mmol/L    eGFR 146.6 >60.0 mL/min/1.73   Lactic Acid, Plasma    Collection Time: 06/27/24  4:05 PM    Specimen: Blood   Result Value Ref Range    Lactate 4.9 (C) 0.5 - 2.0 mmol/L   CBC Auto Differential    Collection Time: 06/27/24  4:05 PM    Specimen: Blood   Result Value Ref Range    WBC 6.15 3.40 - 10.80 10*3/mm3    RBC 4.03 (L) 4.14 - 5.80 10*6/mm3    Hemoglobin 12.9 (L) 13.0 - 17.7 g/dL    Hematocrit 37.1 (L) 37.5 - 51.0 %    MCV 92.1 79.0 -  97.0 fL    MCH 32.0 26.6 - 33.0 pg    MCHC 34.8 31.5 - 35.7 g/dL    RDW 15.5 (H) 12.3 - 15.4 %    RDW-SD 51.4 37.0 - 54.0 fl    MPV 10.1 6.0 - 12.0 fL    Platelets 89 (L) 140 - 450 10*3/mm3    Neutrophil % 66.7 42.7 - 76.0 %    Lymphocyte % 17.4 (L) 19.6 - 45.3 %    Monocyte % 10.7 5.0 - 12.0 %    Eosinophil % 2.4 0.3 - 6.2 %    Basophil % 0.7 0.0 - 1.5 %    Immature Grans % 2.1 (H) 0.0 - 0.5 %    Neutrophils, Absolute 4.10 1.70 - 7.00 10*3/mm3    Lymphocytes, Absolute 1.07 0.70 - 3.10 10*3/mm3    Monocytes, Absolute 0.66 0.10 - 0.90 10*3/mm3    Eosinophils, Absolute 0.15 0.00 - 0.40 10*3/mm3    Basophils, Absolute 0.04 0.00 - 0.20 10*3/mm3    Immature Grans, Absolute 0.13 (H) 0.00 - 0.05 10*3/mm3    nRBC 0.5 (H) 0.0 - 0.2 /100 WBC     I reviewed the above results.    Radiology:  CT Head Without Contrast    Result Date: 6/27/2024  CT HEAD WO CONTRAST-  INDICATIONS: Confusion  TECHNIQUE: Radiation dose reduction techniques were utilized, including automated exposure control and exposure modulation based on body size. Noncontrast head CT  COMPARISON: 1/10/2022  FINDINGS:    No acute intracranial hemorrhage, midline shift or mass effect. No acute territorial infarct is identified. Right frontal parietal encephalomalacia is noted, with overlying craniotomy change.   Ventricles, cisterns, cerebral sulci are unremarkable for patient age.  The visualized paranasal sinuses, orbits, mastoid air cells are unremarkable.           No acute intracranial hemorrhage or hydrocephalus. Chronic changes of the brain. If there is further clinical concern, MRI could be considered for further evaluation.  This report was finalized on 6/27/2024 5:50 PM by Dr. Rell Hernández M.D on Workstation: [id number]     I reviewed the above results    Medications ordered in ED:  Medications   sodium chloride 0.9 % flush 10 mL (has no administration in time range)   vancomycin 2500 mg/500 mL 0.9% NS IVPB ([facility]) (2,500 mg Intravenous New Bag 6/27/24  1744)   sepsis fluid NS 0.9 % bolus 2,397 mL (has no administration in time range)   Magnesium Standard Dose Replacement - Follow Nurse / BPA Driven Protocol (has no administration in time range)   thiamine (B-1) injection 200 mg (has no administration in time range)     Followed by   thiamine (VITAMIN B-1) tablet 100 mg (has no administration in time range)   folic acid (FOLVITE) tablet 1 mg (1 mg Oral Given 6/27/24 1844)   LORazepam (ATIVAN) tablet 1 mg (has no administration in time range)     Or   midazolam (VERSED) injection 2 mg (has no administration in time range)     Or   LORazepam (ATIVAN) tablet 2 mg (has no administration in time range)     Or   midazolam (VERSED) injection 4 mg (has no administration in time range)     Or   midazolam (VERSED) injection 4 mg (has no administration in time range)     Or   midazolam (VERSED) injection 4 mg (has no administration in time range)   acetaminophen (TYLENOL) tablet 1,000 mg (has no administration in time range)   sodium chloride 0.9 % bolus 1,000 mL (0 mL Intravenous Stopped 6/27/24 1738)   ondansetron (ZOFRAN) injection 4 mg (4 mg Intravenous Given 6/27/24 1707)       Progress and Consult Notes:  ED Course as of 06/27/24 1909   Thu Jun 27, 2024 1711 Care transferred to Linda Powers pending CT interpretation and admission to the hospitalist. [TR]   1755 This is the patient, he is resting, remains tachycardic, vital signs otherwise unremarkable.  Mom at the bedside said he was complaining of a headache earlier today, states he still has a mild headache, will order dose of Tylenol.  Counseled him on all of his lab and imaging results and recommendations for admission and they are agreeable. [KA]   1812 I discussed the patient with Dr. Munson, hospitalist including history presentation and labs and imaging and he agrees to admit for further evaluation treatment. [KA]   1814 Glucose(!): 163 [KA]   1814 Creatinine(!): 0.41 [KA]   1814 Potassium(!): 3.4 [KA]   1814  AST (SGOT)(!): 71 [KA]   1814 Alkaline Phosphatase(!): 199 [KA]   1815 Total Bilirubin(!): 2.3 [KA]   1815 Anion Gap: 15.0 [KA]   1815 CO2: 23.0 [KA]   1815 Patient's mother pulled his nurses side and states that he drinks 1/5 of liquor and several beers daily, last drink last night, has been doing that for at least 1 year.  Some alcohol withdrawal may  be contributing to his tachycardia.  Will give a dose of Ativan and update the hospitalist. [KA]      ED Course User Index  [KA] Migdalia Powers PA-C  [TR] Eleno Ty MD       Diagnosis:  Final diagnoses:   Left leg cellulitis   Metabolic encephalopathy   Sepsis with encephalopathy without septic shock, due to unspecified organism   Alcohol withdrawal syndrome with complication   Alcohol abuse           Provider attestation:  I personally reviewed the past medical history, past surgical history, social history, family history, current medications, and allergies as they appear in the chart.             Migdalia Powers PA-C  06/27/24 1908       Migdalia Powers PA-C  06/27/24 1909

## 2024-06-27 NOTE — H&P
Patient Name:  Avinash Everett  YOB: 1990  MRN:  [id number]  Admit Date:  6/27/2024  Patient Care Team:  Sada De Oliveira MD as PCP - General (Internal Medicine)      Subjective   History Present Illness     Chief Complaint   Patient presents with    Wound Check     History of Present Illness  Mr. Everett is a 33 y.o. 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 complaining of worsening left lower leg and right upper extremity wound.  Patient and family at bedside reports that this wound has been ongoing for quite some time.  He wears a brace to his left leg in which his mother has noticed drainage on the brace when removed.  The wound has been red, swollen, and draining.  He also has a wound on his right upper extremity under his armpit which ruptured on it's own a couple of days ago.  He denies any fever or chills.  He also denies any nausea, vomiting, or diarrhea.  Due to his wounds, he came to the ED to be evaluated.  Labs obtained showed a lactate of 4.9 and a white count of 6.  He was treated for left lower extremity cellulitis with vancomycin in the ED.  We were asked admit for further treatment.    Review of Systems   Constitutional:  Negative for chills and fever.   HENT:  Negative for congestion and rhinorrhea.    Eyes:  Negative for photophobia and visual disturbance.   Respiratory:  Negative for cough and shortness of breath.    Cardiovascular:  Negative for chest pain and palpitations.   Gastrointestinal:  Negative for constipation, diarrhea, nausea and vomiting.   Endocrine: Negative for cold intolerance and heat intolerance.   Genitourinary:  Negative for difficulty urinating and dysuria.   Musculoskeletal:  Negative for gait problem and joint swelling.   Skin:  Positive for wound. Negative for rash.    Neurological:  Negative for dizziness, light-headedness and headaches.   Psychiatric/Behavioral:  Negative for sleep disturbance and suicidal ideas.         Personal History     Past Medical History:   Diagnosis Date    Dehiscence of incision     GERD (gastroesophageal reflux disease)     History of transfusion     Intracerebral hemorrhage     Metabolic encephalopathy     MRSA (methicillin resistant Staphylococcus aureus)     RIGHT INCISION    Paralysis     LEFT SIDE    Stroke      Past Surgical History:   Procedure Laterality Date    CRANIOTOMY Right 10/1/2020    Procedure: INCISION AND DRAINAGE WOUND, of scalp incision with closure;  Surgeon: Leobardo Rivera MD;  Location: [facility];  Service: Neurosurgery;  Laterality: Right;    CRANIOTOMY FOR SUBDURAL HEMATOMA Right 8/3/2020    Procedure: RIGHT FRONTOPARIETAL CRANIOTOMY FOR EVACUATION OF INTRACEREBRAL HEMORRHAGE;  Surgeon: Leobardo Rivera MD;  Location: [facility] OR;  Service: Neurosurgery;  Laterality: Right;    EMBOLIZATION CEREBRAL N/A 8/5/2020    Procedure: diagnostic angiogram possible EMBOLIZATION CEREBRAL. right radial approach;  Surgeon: Alfonso Malcolm MD;  Location: [facility] OR 18/19;  Service: Neurosurgery;  Laterality: N/A;    WRIST SURGERY      1st one 2006, 2nd 2010     Family History   Problem Relation Age of Onset    Depression Maternal Grandmother     Depression Maternal Grandfather     Depression Paternal Grandmother     Depression Paternal Grandfather     Malig Hyperthermia Neg Hx      Social History     Tobacco Use    Smoking status: Heavy Smoker     Current packs/day: 0.50     Types: Cigarettes    Smokeless tobacco: Never   Vaping Use    Vaping status: Former   Substance Use Topics    Alcohol use: Not Currently    Drug use: Yes     Types: Marijuana     No current facility-administered medications on file prior to encounter.     Current Outpatient Medications on File Prior to Encounter   Medication Sig Dispense Refill     carBAMazepine (CARBATROL) 100 MG 12 hr capsule Take 1 capsule by mouth Every Morning. 90 capsule 3    carBAMazepine (CARBATROL) 200 MG 12 hr capsule Take 1 capsule by mouth 2 (Two) Times a Day. 180 capsule 3    gabapentin (NEURONTIN) 300 MG capsule Take 1 capsule by mouth Every Night. 90 capsule 0    levETIRAcetam (KEPPRA) 1000 MG tablet Take 2 tablets by mouth 2 (Two) Times a Day. 120 tablet 2    mirtazapine (REMERON) 15 MG tablet Take 1 tablet by mouth Every Night. 90 tablet 0    acetaminophen (TYLENOL) 325 MG tablet Take 2 tablets by mouth Every 6 (Six) Hours As Needed for Mild Pain .      cetaphil (CETAPHIL) lotion Apply  topically to the appropriate area as directed Every 12 (Twelve) Hours. (Patient taking differently: Apply 1 application  topically to the appropriate area as directed Every 12 (Twelve) Hours.) 177 mL 2    diazePAM, 20 MG Dose, (Valtoco 20 MG Dose) 2 x 10 MG/0.1ML liquid therapy pack 1 spray into the nostril(s) as directed by provider As Needed (1 spray in each nostril as needed for seizure, may repeat one dose after at least 4 hours, no more than 2doses in one episode). 1 each 2    famotidine (PEPCID) 20 MG tablet Take 1 tablet by mouth 2 (Two) Times a Day Before Meals. (Patient not taking: Reported on 11/7/2023) 180 tablet 0    folic acid (FOLVITE) 1 MG tablet Take 1 tablet by mouth Daily. 90 tablet 0    Omega-3 Fatty Acids (FISH OIL PO) Take  by mouth.      potassium chloride (MICRO-K) 10 MEQ CR capsule Take 1 capsule by mouth 2 (Two) Times a Day With Meals. (Patient not taking: Reported on 11/7/2023) 60 capsule 1    thiamine (VITAMIN B1) 100 MG tablet Take 1 tablet by mouth Daily. 90 tablet 0     Allergies   Allergen Reactions    Citrus Hives     Hives in mouth      Promethazine Hives       Objective    Objective     Vital Signs  Temp:  [97.9 °F (36.6 °C)] 97.9 °F (36.6 °C)  Heart Rate:  [110-115] 111  Resp:  [18] 18  BP: (140-152)/(80-84) 152/84  SpO2:  [92 %-100 %] 95 %  on   ;   Device  (Oxygen Therapy): room air  Body mass index is 33.48 kg/m².    Physical Exam  Vitals and nursing note reviewed.   Constitutional:       General: He is not in acute distress.     Appearance: He is well-developed. He is obese. He is not toxic-appearing.      Comments: Chronically ill-appearing   HENT:      Head: Normocephalic and atraumatic.      Mouth/Throat:      Dentition: Dental caries present.      Comments: Poor dentition  Eyes:      General: No scleral icterus.        Right eye: No discharge.         Left eye: No discharge.      Conjunctiva/sclera: Conjunctivae normal.   Neck:      Vascular: No JVD.   Cardiovascular:      Rate and Rhythm: Normal rate and regular rhythm.      Heart sounds: Normal heart sounds. No murmur heard.     No friction rub. No gallop.   Pulmonary:      Effort: Pulmonary effort is normal. No respiratory distress.      Breath sounds: Normal breath sounds. No wheezing or rales.   Abdominal:      General: Bowel sounds are normal. There is no distension.      Palpations: Abdomen is soft.      Tenderness: There is no abdominal tenderness. There is no guarding.   Musculoskeletal:         General: No tenderness or deformity.      Cervical back: Normal range of motion and neck supple.      Comments: Left-sided hemiparesis   Skin:     General: Skin is warm and dry.      Capillary Refill: Capillary refill takes less than 2 seconds.      Comments: Flakiness and rash to face consistent with psoriasis  Wound noted to his left calf that is erythematous and warm.  Dry drainage noted.  Wound to right upper arm that appears to be a bruise and/or hematoma that has ruptured   Neurological:      Mental Status: He is alert and oriented to person, place, and time.   Psychiatric:         Behavior: Behavior normal.     Results Review:  I reviewed the patient's new clinical results.    Lab Results (last 24 hours)       Procedure Component Value Units Date/Time    CBC & Differential [[id number]]  (Abnormal)  Collected: 06/27/24 1605    Specimen: Blood Updated: 06/27/24 1622    Narrative:      The following orders were created for panel order CBC & Differential.  Procedure                               Abnormality         Status                     ---------                               -----------         ------                     CBC Auto Differential[[id number]]        Abnormal            Final result                 Please view results for these tests on the individual orders.    Comprehensive Metabolic Panel [[id number]]  (Abnormal) Collected: 06/27/24 1605    Specimen: Blood Updated: 06/27/24 1640     Glucose 163 mg/dL      BUN 2 mg/dL      Creatinine 0.41 mg/dL      Sodium 133 mmol/L      Potassium 3.4 mmol/L      Comment: Slight hemolysis detected by analyzer. Result may be falsely elevated.        Chloride 95 mmol/L      CO2 23.0 mmol/L      Calcium 8.5 mg/dL      Total Protein 8.0 g/dL      Albumin 3.4 g/dL      ALT (SGPT) 31 U/L      AST (SGOT) 71 U/L      Comment: Slight hemolysis detected by analyzer. Result may be falsely elevated.        Alkaline Phosphatase 199 U/L      Total Bilirubin 2.3 mg/dL      Globulin 4.6 gm/dL      A/G Ratio 0.7 g/dL      BUN/Creatinine Ratio 4.9     Anion Gap 15.0 mmol/L      eGFR 146.6 mL/min/1.73     Narrative:      GFR Normal >60  Chronic Kidney Disease <60  Kidney Failure <15      Lactic Acid, Plasma [[id number]]  (Abnormal) Collected: 06/27/24 1605    Specimen: Blood Updated: 06/27/24 1641     Lactate 4.9 mmol/L     CBC Auto Differential [[id number]]  (Abnormal) Collected: 06/27/24 1605    Specimen: Blood Updated: 06/27/24 1622     WBC 6.15 10*3/mm3      RBC 4.03 10*6/mm3      Hemoglobin 12.9 g/dL      Hematocrit 37.1 %      MCV 92.1 fL      MCH 32.0 pg      MCHC 34.8 g/dL      RDW 15.5 %      RDW-SD 51.4 fl      MPV 10.1 fL      Platelets 89 10*3/mm3      Neutrophil % 66.7 %      Lymphocyte % 17.4 %      Monocyte % 10.7 %      Eosinophil % 2.4 %      Basophil % 0.7 %       Immature Grans % 2.1 %      Neutrophils, Absolute 4.10 10*3/mm3      Lymphocytes, Absolute 1.07 10*3/mm3      Monocytes, Absolute 0.66 10*3/mm3      Eosinophils, Absolute 0.15 10*3/mm3      Basophils, Absolute 0.04 10*3/mm3      Immature Grans, Absolute 0.13 10*3/mm3      nRBC 0.5 /100 WBC     Blood Culture - Blood, Arm, Right [[id number]] Collected: 06/27/24 1716    Specimen: Blood from Arm, Right Updated: 06/27/24 1720    Blood Culture - Blood, Arm, Right [[id number]] Collected: 06/27/24 1716    Specimen: Blood from Arm, Right Updated: 06/27/24 1720            Imaging Results (Last 24 Hours)       Procedure Component Value Units Date/Time    CT Head Without Contrast [[id number]] Collected: 06/27/24 1748     Updated: 06/27/24 1753    Narrative:      CT HEAD WO CONTRAST-     INDICATIONS: Confusion     TECHNIQUE: Radiation dose reduction techniques were utilized, including  automated exposure control and exposure modulation based on body size.  Noncontrast head CT     COMPARISON: 1/10/2022     FINDINGS:           No acute intracranial hemorrhage, midline shift or mass effect. No acute  territorial infarct is identified. Right frontal parietal  encephalomalacia is noted, with overlying craniotomy change.        Ventricles, cisterns, cerebral sulci are unremarkable for patient age.     The visualized paranasal sinuses, orbits, mastoid air cells are  unremarkable.                   Impression:         No acute intracranial hemorrhage or hydrocephalus. Chronic changes of  the brain. If there is further clinical concern, MRI could be considered  for further evaluation.     This report was finalized on 6/27/2024 5:50 PM by Dr. Rell Hernández M.D on Workstation: [id number]               Results for orders placed during the hospital encounter of 08/03/20    Adult Transthoracic Echo Complete W/ Cont if Necessary Per Protocol (With Agitated Saline)    Interpretation Summary  · Left ventricular systolic function is normal.  Calculated EF = 70%. Estimated EF was in agreement with the calculated EF. Estimated EF = 70%. Normal left ventricular cavity size and wall thickness noted. All left ventricular wall segments contract normally. Left ventricular diastolic dysfunction is noted (grade I) consistent with impaired relaxation.  · Mild mitral valve regurgitation is present.  · Trace tricuspid valve regurgitation is present. Estimated right ventricular systolic pressure from tricuspid regurgitation is mildly elevated (35-45 mmHg). Calculated right ventricular systolic pressure from tricuspid regurgitation is 38 mmHg.  · Normal left atrial volume noted. Saline test results are negative.  · Resting sinus tachycardia is present during the study      No orders to display        Assessment/Plan     Active Hospital Problems    Diagnosis  POA    **Cellulitis of left lower extremity [L03.116]  Yes    History of CVA (cerebrovascular accident) [Z86.73]  Not Applicable    History of seizure [Z87.898]  Yes    Tobacco abuse [Z72.0]  Yes    Hypokalemia [E87.6]  Yes    Alcohol abuse [F10.10]  Yes      Resolved Hospital Problems   No resolved problems to display.       Mr. Everett is a 33 y.o. smoker with a history of CVA, paralysis, MRSA, alcohol abuse, seizures who presents with cellulitis of left lower extremity.    Cellulitis of left lower extremity  -Continue IV vancomycin, pharmacy to dose  -Supportive care with IV fluids  -Blood cultures currently pending  -Repeat labs in a.m.    History of AVM rupture s/p CVA  Epilepsy  -Status post surgery with left-sided hemiparesis   -Continue carbamazepine and Keppra  -Pt to treat and eval  -Seizure precautions    Alcohol abuse  -Initiate CIWA per hospital protocol  -Access to treat and eval  -Daily MV, folate, and thiamine    Tobacco abuse  -Nicotine patch daily    Psoriasis  -Follows dermatology.  Continues ceaphil    Hyperglycemia  -Check hemoglobin A1c      I discussed the patient's findings and my  recommendations with patient.    VTE Prophylaxis -Lovenox.  Code Status - Full code.       NATHALY Aguiar  [facility]  06/27/24  19:30 EDT

## 2024-06-27 NOTE — LETTER
[facility]  [phone]    ACCESS CENTER STATEMENT OF DISPOSITION    I, Avinash Everett, was assessed in the [facility] at [facility] on 6/28/2024.  I understand the recommendations below and what follow-up action is expected of me.     *See attached handout    [place]: [phone]; or [phone]   Vika Wheeler [place], [place], [place], [place], [place]     [address] [address] [phone]    [facility]  [address] [place] [id number] [phone]    [place] [facility] :      [id number]    AA Meetings:   https://www.aa.org/find-aa    SMART Recovery support groups:       [email or web address].smartrecovery.org/local      ________________________________  Patient/Parent/Guardian/POA Signature    ________________________________  Clinician Signature    6/28/2024  14:23 EDT

## 2024-06-28 PROBLEM — E11.65 TYPE 2 DIABETES MELLITUS WITH HYPERGLYCEMIA: Status: ACTIVE | Noted: 2024-06-28

## 2024-06-28 PROBLEM — E87.20 LACTIC ACIDOSIS: Status: ACTIVE | Noted: 2024-06-28

## 2024-06-28 PROBLEM — I69.352: Status: ACTIVE | Noted: 2024-06-28

## 2024-06-28 PROBLEM — G40.909 SEIZURE DISORDER: Status: ACTIVE | Noted: 2022-01-10

## 2024-06-28 PROBLEM — Z22.322 MRSA (METHICILLIN RESISTANT STAPHYLOCOCCUS AUREUS) COLONIZATION: Status: ACTIVE | Noted: 2024-06-28

## 2024-06-28 LAB
ALBUMIN SERPL-MCNC: 2.9 G/DL (ref 3.5–5.2)
ALBUMIN/GLOB SERPL: 0.8 G/DL
ALP SERPL-CCNC: 174 U/L (ref 39–117)
ALT SERPL W P-5'-P-CCNC: 25 U/L (ref 1–41)
ANION GAP SERPL CALCULATED.3IONS-SCNC: 8.9 MMOL/L (ref 5–15)
AST SERPL-CCNC: 60 U/L (ref 1–40)
BILIRUB SERPL-MCNC: 1.9 MG/DL (ref 0–1.2)
BUN SERPL-MCNC: 3 MG/DL (ref 6–20)
BUN/CREAT SERPL: 7.7 (ref 7–25)
CALCIUM SPEC-SCNC: 8.1 MG/DL (ref 8.6–10.5)
CHLORIDE SERPL-SCNC: 103 MMOL/L (ref 98–107)
CO2 SERPL-SCNC: 25.1 MMOL/L (ref 22–29)
CREAT SERPL-MCNC: 0.39 MG/DL (ref 0.76–1.27)
DEPRECATED RDW RBC AUTO: 51.5 FL (ref 37–54)
EGFRCR SERPLBLD CKD-EPI 2021: 148.9 ML/MIN/1.73
ERYTHROCYTE [DISTWIDTH] IN BLOOD BY AUTOMATED COUNT: 15.4 % (ref 12.3–15.4)
GLOBULIN UR ELPH-MCNC: 3.7 GM/DL
GLUCOSE SERPL-MCNC: 140 MG/DL (ref 65–99)
HBA1C MFR BLD: 7.8 % (ref 4.8–5.6)
HCT VFR BLD AUTO: 34.2 % (ref 37.5–51)
HGB BLD-MCNC: 11.7 G/DL (ref 13–17.7)
MAGNESIUM SERPL-MCNC: 1.6 MG/DL (ref 1.6–2.6)
MCH RBC QN AUTO: 31.6 PG (ref 26.6–33)
MCHC RBC AUTO-ENTMCNC: 34.2 G/DL (ref 31.5–35.7)
MCV RBC AUTO: 92.4 FL (ref 79–97)
PHOSPHATE SERPL-MCNC: 2.8 MG/DL (ref 2.5–4.5)
PLATELET # BLD AUTO: 69 10*3/MM3 (ref 140–450)
PMV BLD AUTO: 9.9 FL (ref 6–12)
POTASSIUM SERPL-SCNC: 3.1 MMOL/L (ref 3.5–5.2)
POTASSIUM SERPL-SCNC: 4.2 MMOL/L (ref 3.5–5.2)
PROT SERPL-MCNC: 6.6 G/DL (ref 6–8.5)
RBC # BLD AUTO: 3.7 10*6/MM3 (ref 4.14–5.8)
SODIUM SERPL-SCNC: 137 MMOL/L (ref 136–145)
VANCOMYCIN TROUGH SERPL-MCNC: 5.8 MCG/ML (ref 5–20)
WBC NRBC COR # BLD AUTO: 4.15 10*3/MM3 (ref 3.4–10.8)

## 2024-06-28 PROCEDURE — 87186 SC STD MICRODIL/AGAR DIL: CPT | Performed by: INTERNAL MEDICINE

## 2024-06-28 PROCEDURE — 83036 HEMOGLOBIN GLYCOSYLATED A1C: CPT | Performed by: NURSE PRACTITIONER

## 2024-06-28 PROCEDURE — 80202 ASSAY OF VANCOMYCIN: CPT | Performed by: NURSE PRACTITIONER

## 2024-06-28 PROCEDURE — 87070 CULTURE OTHR SPECIMN AEROBIC: CPT | Performed by: INTERNAL MEDICINE

## 2024-06-28 PROCEDURE — 25810000003 SODIUM CHLORIDE 0.9 % SOLUTION 250 ML FLEX CONT: Performed by: NURSE PRACTITIONER

## 2024-06-28 PROCEDURE — 25010000002 ENOXAPARIN PER 10 MG: Performed by: STUDENT IN AN ORGANIZED HEALTH CARE EDUCATION/TRAINING PROGRAM

## 2024-06-28 PROCEDURE — 83735 ASSAY OF MAGNESIUM: CPT | Performed by: NURSE PRACTITIONER

## 2024-06-28 PROCEDURE — 80053 COMPREHEN METABOLIC PANEL: CPT | Performed by: NURSE PRACTITIONER

## 2024-06-28 PROCEDURE — 25810000003 SODIUM CHLORIDE 0.9 % SOLUTION: Performed by: STUDENT IN AN ORGANIZED HEALTH CARE EDUCATION/TRAINING PROGRAM

## 2024-06-28 PROCEDURE — 90791 PSYCH DIAGNOSTIC EVALUATION: CPT

## 2024-06-28 PROCEDURE — 85027 COMPLETE CBC AUTOMATED: CPT | Performed by: NURSE PRACTITIONER

## 2024-06-28 PROCEDURE — 84132 ASSAY OF SERUM POTASSIUM: CPT | Performed by: INTERNAL MEDICINE

## 2024-06-28 PROCEDURE — 25010000002 THIAMINE HCL 200 MG/2ML SOLUTION: Performed by: STUDENT IN AN ORGANIZED HEALTH CARE EDUCATION/TRAINING PROGRAM

## 2024-06-28 PROCEDURE — 87077 CULTURE AEROBIC IDENTIFY: CPT | Performed by: INTERNAL MEDICINE

## 2024-06-28 PROCEDURE — 87205 SMEAR GRAM STAIN: CPT | Performed by: INTERNAL MEDICINE

## 2024-06-28 PROCEDURE — 99222 1ST HOSP IP/OBS MODERATE 55: CPT | Performed by: INTERNAL MEDICINE

## 2024-06-28 PROCEDURE — 87076 CULTURE ANAEROBE IDENT EACH: CPT | Performed by: INTERNAL MEDICINE

## 2024-06-28 PROCEDURE — 97162 PT EVAL MOD COMPLEX 30 MIN: CPT

## 2024-06-28 PROCEDURE — 25010000002 VANCOMYCIN HCL 1.25 G RECONSTITUTED SOLUTION 1 EACH VIAL: Performed by: NURSE PRACTITIONER

## 2024-06-28 PROCEDURE — 84100 ASSAY OF PHOSPHORUS: CPT | Performed by: NURSE PRACTITIONER

## 2024-06-28 PROCEDURE — 83605 ASSAY OF LACTIC ACID: CPT

## 2024-06-28 RX ORDER — LORAZEPAM 0.5 MG/1
0.5 TABLET ORAL 2 TIMES DAILY
Status: DISCONTINUED | OUTPATIENT
Start: 2024-06-28 | End: 2024-06-30

## 2024-06-28 RX ORDER — POTASSIUM CHLORIDE 750 MG/1
40 TABLET, FILM COATED, EXTENDED RELEASE ORAL EVERY 4 HOURS
Status: COMPLETED | OUTPATIENT
Start: 2024-06-28 | End: 2024-06-28

## 2024-06-28 RX ADMIN — LEVETIRACETAM 2000 MG: 500 TABLET, FILM COATED ORAL at 20:19

## 2024-06-28 RX ADMIN — THIAMINE HYDROCHLORIDE 200 MG: 100 INJECTION, SOLUTION INTRAMUSCULAR; INTRAVENOUS at 15:20

## 2024-06-28 RX ADMIN — VANCOMYCIN HYDROCHLORIDE 1250 MG: 1.25 INJECTION, POWDER, LYOPHILIZED, FOR SOLUTION INTRAVENOUS at 05:25

## 2024-06-28 RX ADMIN — CARBAMAZEPINE 200 MG: 200 CAPSULE, EXTENDED RELEASE ORAL at 12:51

## 2024-06-28 RX ADMIN — LORAZEPAM 0.5 MG: 0.5 TABLET ORAL at 12:50

## 2024-06-28 RX ADMIN — THIAMINE HYDROCHLORIDE 200 MG: 100 INJECTION, SOLUTION INTRAMUSCULAR; INTRAVENOUS at 05:16

## 2024-06-28 RX ADMIN — MIRTAZAPINE 15 MG: 15 TABLET, FILM COATED ORAL at 20:19

## 2024-06-28 RX ADMIN — GABAPENTIN 300 MG: 300 CAPSULE ORAL at 20:19

## 2024-06-28 RX ADMIN — POTASSIUM CHLORIDE 40 MEQ: 750 TABLET, EXTENDED RELEASE ORAL at 12:50

## 2024-06-28 RX ADMIN — SODIUM CHLORIDE 100 ML/HR: 9 INJECTION, SOLUTION INTRAVENOUS at 19:51

## 2024-06-28 RX ADMIN — POTASSIUM CHLORIDE 40 MEQ: 750 TABLET, EXTENDED RELEASE ORAL at 18:52

## 2024-06-28 RX ADMIN — CARBAMAZEPINE 200 MG: 200 CAPSULE, EXTENDED RELEASE ORAL at 20:19

## 2024-06-28 RX ADMIN — FOLIC ACID 1 MG: 1 TABLET ORAL at 08:45

## 2024-06-28 RX ADMIN — DOXYCYCLINE 100 MG: 100 INJECTION, POWDER, LYOPHILIZED, FOR SOLUTION INTRAVENOUS at 22:41

## 2024-06-28 RX ADMIN — ENOXAPARIN SODIUM 40 MG: 100 INJECTION SUBCUTANEOUS at 20:19

## 2024-06-28 RX ADMIN — SODIUM CHLORIDE 100 ML/HR: 9 INJECTION, SOLUTION INTRAVENOUS at 07:04

## 2024-06-28 RX ADMIN — Medication 1 TABLET: at 08:46

## 2024-06-28 RX ADMIN — POTASSIUM CHLORIDE 40 MEQ: 750 TABLET, EXTENDED RELEASE ORAL at 08:45

## 2024-06-28 RX ADMIN — THIAMINE HYDROCHLORIDE 200 MG: 100 INJECTION, SOLUTION INTRAMUSCULAR; INTRAVENOUS at 22:09

## 2024-06-28 RX ADMIN — VANCOMYCIN HYDROCHLORIDE 1250 MG: 1.25 INJECTION, POWDER, LYOPHILIZED, FOR SOLUTION INTRAVENOUS at 18:52

## 2024-06-28 RX ADMIN — LEVETIRACETAM 2000 MG: 500 TABLET, FILM COATED ORAL at 08:45

## 2024-06-28 RX ADMIN — LORAZEPAM 0.5 MG: 0.5 TABLET ORAL at 20:20

## 2024-06-28 NOTE — PROGRESS NOTES
[facility] Clinical Pharmacy Services: Vancomycin Level Monitoring Note    Avinash Everett is a 33 y.o. male who is on day 2 of pharmacy to dose vancomycin for Skin and Soft Tissue.    Estimated Creatinine Clearance: 357.8 mL/min (A) (by C-G formula based on SCr of 0.39 mg/dL (L)).    Current Vanc Dose: 1250 mg q12h    Results from last 7 days   Lab Units 06/28/24  0500   VANCOMYCIN TR mcg/mL 5.80     Predicted AUC at current dose:423 mg/L.hr    Creatinine stable; will schedule next vanc trough for 7/1 @ 0530 which can be adjusted for any changes in renal function.     No changes at this time. Pharmacy is continuing to monitor and will adjust as needed.    Nasir Oquendo [facility]  Clinical Pharmacist

## 2024-06-28 NOTE — PLAN OF CARE
Pt. VS WNL, no c/o pain. Pt. A&O x4. Pt. Bedrest. Pt. Receiving IVFs and IV abx. Pt. With multiple wounds, see , flowsheets. Pt. With adequate UOP. Pt. Received complete bath with Hibiclens. Pt. To start Lovenox for DVT. Pt. Resting comfortably at present, will continue to monitor closely for the remainder of this RN's shift.      Problem: Adult Inpatient Plan of Care  Goal: Plan of Care Review  Outcome: Ongoing, Progressing  Flowsheets (Taken 6/28/2024 1504)  Progress: improving  Plan of Care Reviewed With: patient

## 2024-06-28 NOTE — CONSULTS
Chp met with Pt to go over an Advance Directive. RN confirmed Pt decisional. Chp went through the form, filled it out, and answered Pt questions. The form was completed and notarized. Pt got original copy, copy was placed in chart, and a copy was faxed to HIM.

## 2024-06-28 NOTE — THERAPY EVALUATION
Patient Name: Avinash Everett  : 1990    MRN: [id number]                              Today's Date: 2024       Admit Date: 2024    Visit Dx:     ICD-10-CM ICD-9-CM   1. Left leg cellulitis  L03.116 682.6   2. Metabolic encephalopathy  G93.41 348.31   3. Sepsis with encephalopathy without septic shock, due to unspecified organism  A41.9 038.9    R65.20 995.92    G93.41 348.31   4. Alcohol withdrawal syndrome with complication  F10.939 291.81   5. Alcohol abuse  F10.10 305.00     Patient Active Problem List   Diagnosis    Right-sided nontraumatic intracerebral hemorrhage    Nontraumatic subcortical hemorrhage of right cerebral hemisphere    Alcohol abuse    Metabolic encephalopathy    Vitamin K deficiency    Nontraumatic acute cerebral hemorrhage    Disruption or dehiscence of closure of skull or craniotomy    Wound dehiscence    Postoperative visit    Recurrent seizures    Seizure disorder    Hypokalemia    Hypomagnesemia    Cerebral arteriovenous malformation (AVM)    Cellulitis of left lower extremity    History of CVA (cerebrovascular accident)    History of seizure    Tobacco abuse    Psoriasis    Hyperglycemia    MRSA (methicillin resistant Staphylococcus aureus) colonization    Hemiplegia of left side as late effect of cerebral infarction    Type 2 diabetes mellitus with hyperglycemia    Lactic acidosis     Past Medical History:   Diagnosis Date    Dehiscence of incision     GERD (gastroesophageal reflux disease)     History of transfusion     Intracerebral hemorrhage     Metabolic encephalopathy     MRSA (methicillin resistant Staphylococcus aureus)     RIGHT INCISION    Paralysis     LEFT SIDE    Stroke      Past Surgical History:   Procedure Laterality Date    CRANIOTOMY Right 10/1/2020    Procedure: INCISION AND DRAINAGE WOUND, of scalp incision with closure;  Surgeon: Leobardo Rivera MD;  Location: [facility];  Service: Neurosurgery;  Laterality: Right;    CRANIOTOMY FOR SUBDURAL  HEMATOMA Right 8/3/2020    Procedure: RIGHT FRONTOPARIETAL CRANIOTOMY FOR EVACUATION OF INTRACEREBRAL HEMORRHAGE;  Surgeon: Leobardo Rivera MD;  Location: [facility] MAIN OR;  Service: Neurosurgery;  Laterality: Right;    EMBOLIZATION CEREBRAL N/A 8/5/2020    Procedure: diagnostic angiogram possible EMBOLIZATION CEREBRAL. right radial approach;  Surgeon: Alfonso Malcolm MD;  Location: [facility] HYBRID OR 18/19;  Service: Neurosurgery;  Laterality: N/A;    WRIST SURGERY      1st one 2006, 2nd 2010      General Information       Row Name 06/28/24 1444          Physical Therapy Time and Intention    Document Type evaluation  -     Mode of Treatment individual therapy;physical therapy  -       Row Name 06/28/24 1444          General Information    Patient Profile Reviewed yes  -     Prior Level of Function independent:  -     Existing Precautions/Restrictions fall  -[facility] Name 06/28/24 1444          Living Environment    People in Home child(essence), dependent  -[facility] Name 06/28/24 1444          Home Main Entrance    Number of Stairs, Main Entrance four  -[facility] Name 06/28/24 1444          Cognition    Orientation Status (Cognition) oriented x 4  -[facility] Name 06/28/24 1444          Safety Issues, Functional Mobility    Impairments Affecting Function (Mobility) balance;endurance/activity tolerance;strength  -               User Key  (r) = Recorded By, (t) = Taken By, (c) = Cosigned By      Initials Name Provider Type     Anya Celestin, RAO Physical Therapist                   Mobility       [facility] Name 06/28/24 1445          Bed Mobility    Bed Mobility supine-sit;sit-supine  -     Supine-Sit [place] (Bed Mobility) standby assist  -     Sit-Supine [place] (Bed Mobility) standby assist  -     Assistive Device (Bed Mobility) bed rails;head of bed elevated  -[facility] Name 06/28/24 1447          Sit-Stand Transfer    Sit-Stand [place] (Transfers) contact guard  -      Assistive Device (Sit-Stand Transfers) --  no AD  -[facility] Name 06/28/24 1445          Gait/Stairs (Locomotion)    [place] Level (Gait) standby assist;contact guard  -     Distance in Feet (Gait) 3  MIP, side steps at EOB  -               User Key  (r) = Recorded By, (t) = Taken By, (c) = Cosigned By      Initials Name Provider Type     Anya Celestin PT Physical Therapist                   Obj/Interventions       Row Name 06/28/24 1445          Range of Motion Comprehensive    General Range of Motion bilateral lower extremity ROM WFL  -[facility] Name 06/28/24 1445          Strength Comprehensive (MMT)    General Manual Muscle Testing (MMT) Assessment lower extremity strength deficits identified  -     Comment, General Manual Muscle Testing (MMT) Assessment Hx of CVA with L foot drop. AFO at BL for mobility  -[facility] Name 06/28/24 1445          Balance    Balance Assessment sitting static balance;sit to stand dynamic balance;sitting dynamic balance;standing static balance;standing dynamic balance  -     Static Sitting Balance independent  -     Dynamic Sitting Balance modified independence  -     Position, Sitting Balance sitting edge of bed  -     Sit to Stand Dynamic Balance contact guard;verbal cues  -     Static Standing Balance standby assist  -     Position/Device Used, Standing Balance supported  holding onto bed rail  -     Balance Interventions sitting;sit to stand;standing;supported;static;dynamic  -               User Key  (r) = Recorded By, (t) = Taken By, (c) = Cosigned By      Initials Name Provider Type     Anya Celestin PT Physical Therapist                   Goals/Plan       [facility] Name 06/28/24 1447          Bed Mobility Goal 1 (PT)    Activity/Assistive Device (Bed Mobility Goal 1, PT) bed mobility activities, all  -     [place] Level/Cues Needed (Bed Mobility Goal 1, PT) independent  -     Time Frame (Bed Mobility Goal 1, PT) 1 week   -SM       Row Name 06/28/24 1449          Transfer Goal 1 (PT)    Activity/Assistive Device (Transfer Goal 1, PT) sit-to-stand/stand-to-sit;bed-to-chair/chair-to-bed  -SM     [place] Level/Cues Needed (Transfer Goal 1, PT) independent  -SM     Time Frame (Transfer Goal 1, PT) 1 week  -SM       Row Name 06/28/24 1449          Gait Training Goal 1 (PT)    Activity/Assistive Device (Gait Training Goal 1, PT) gait (walking locomotion)  -SM     [place] Level (Gait Training Goal 1, PT) supervision required  -SM     Distance (Gait Training Goal 1, PT) 100ft  -SM     Time Frame (Gait Training Goal 1, PT) 1 week  -SM               User Key  (r) = Recorded By, (t) = Taken By, (c) = Cosigned By      Initials Name Provider Type     Ayna Celestin, PT Physical Therapist                   Clinical Impression       Row Name 06/28/24 1446          Pain    Pretreatment Pain Rating 3/10  -SM     Posttreatment Pain Rating 3/10  -SM     Pain Location - Side/Orientation Left  -SM     Pain Location lower  -SM     Pain Location - extremity  -SM     Pain Intervention(s) Rest;Repositioned;Ambulation/increased activity  -SM       Row Name 06/28/24 1446          Plan of Care Review    Plan of Care Reviewed With patient  -SM     Outcome Evaluation Patient is a 33 y.o male who presented to [facility] with L calf wound. Patient AOx4 supine in bed upon arrival. Patient with hx of CVA in 2020 with L residual deficits. Patient reports he has an AFO for L LE though not currently at hospital. Patient reports he [place] alone [place] where many other relatives live very close. Patient reports he is independent at baseline and does no use an AD. Patient has 4 PAYTON. Patient completed all bed mobility with  this date. Patient performed STS from EOB with CGA. Patient performing MIP and took a few side steps at EOB with CGA. Patient reports he does not ambulate far without his AFO which is not currently present. Patient appears  close to his baseline at this time. PT will continue to monitor peripherally for mobility needs. Anticpate return home at d/c.  -       Row Name 06/28/24 1446          Therapy Assessment/Plan (PT)    Rehab Potential (PT) good, to achieve stated therapy goals  -     Criteria for Skilled Interventions Met (PT) yes  -SM     Therapy Frequency (PT) 3 times/wk  -       Row Name 06/28/24 1446          Vital Signs    Pre Patient Position Supine  -SM     Intra Patient Position Standing  -SM     Post Patient Position Supine  -SM       Row Name 06/28/24 1446          Positioning and Restraints    Pre-Treatment Position in bed  -SM     Post Treatment Position bed  -SM     In Bed notified nsg;call light within reach;encouraged to call for assist;exit alarm on;fowlers  -               User Key  (r) = Recorded By, (t) = Taken By, (c) = Cosigned By      Initials Name Provider Type    SM Anya Celestin, PT Physical Therapist                   Outcome Measures       Row Name 06/28/24 1451 06/28/24 0843       How much help from another person do you currently need...    Turning from your back to your side while in flat bed without using bedrails? 4  -SM 4  -CW    Moving from lying on back to sitting on the side of a flat bed without bedrails? 4  -SM 4  -CW    Moving to and from a bed to a chair (including a wheelchair)? 3  -SM 3  -CW    Standing up from a chair using your arms (e.g., wheelchair, bedside chair)? 3  -SM 3  -CW    Climbing 3-5 steps with a railing? 2  -SM 3  -CW    To walk in hospital room? 3  -SM 2  -CW    AM-PAC 6 Clicks Score (PT) 19  -SM 19  -CW    Highest Level of Mobility Goal 6 --> Walk 10 steps or more  -SM 6 --> Walk 10 steps or more  -CW      Row Name 06/28/24 1451          Functional Assessment    Outcome Measure Options AM-PAC 6 Clicks Basic Mobility (PT)  -               User Key  (r) = Recorded By, (t) = Taken By, (c) = Cosigned By      Initials Name Provider Type    Gaby Valenzuela RN  Registered Nurse     Anya Celestin PT Physical Therapist                                 Physical Therapy Education       Title: PT OT SLP Therapies (Done)       Topic: Physical Therapy (Done)       Point: Mobility training (Done)       Learning Progress Summary             Patient Acceptance, E, VU by  at 6/28/2024 1452                         Point: Home exercise program (Done)       Learning Progress Summary             Patient Acceptance, E, VU by  at 6/28/2024 1452                         Point: Body mechanics (Done)       Learning Progress Summary             Patient Acceptance, E, VU by  at 6/28/2024 1452                         Point: Precautions (Done)       Learning Progress Summary             Patient Acceptance, E, VU by  at 6/28/2024 1452                                         User Key       Initials Effective Dates Name Provider Type Discipline     05/02/22 -  Anya Celestin PT Physical Therapist PT                  PT Recommendation and Plan     Plan of Care Reviewed With: patient  Outcome Evaluation: Patient is a 33 y.o male who presented to [facility] with L calf wound. Patient AOx4 supine in bed upon arrival. Patient with hx of CVA in 2020 with L residual deficits. Patient reports he has an AFO for L LE though not currently at hospital. Patient reports he lives alone [place] where many other relatives live very close. Patient reports he is independent at baseline and does no use an AD. Patient has 4 PAYTON. Patient completed all bed mobility with  this date. Patient performed STS from EOB with CGA. Patient performing MIP and took a few side steps at EOB with CGA. Patient reports he does not ambulate far without his AFO which is not currently present. Patient appears close to his baseline at this time. PT will continue to monitor peripherally for mobility needs. Anticpate return home at d/c.     Time Calculation:         PT Charges       Row Name 06/28/24 145              Time Calculation    Start Time 0957  -      Stop Time 1009  -      Time Calculation (min) 12 min  -      PT Received On 06/28/24  -      PT - Next Appointment 07/01/24  -      PT Goal Re-Cert Due Date 07/05/24  -                User Key  (r) = Recorded By, (t) = Taken By, (c) = Cosigned By      Initials Name Provider Type     Anya Celestin, RAO Physical Therapist                  Therapy Charges for Today       Code Description Service Date Service Provider Modifiers Qty    82014987803 HC PT EVAL MOD COMPLEXITY 3 6/28/2024 Anya Celestin, RAO GP 1            PT G-Codes  Outcome Measure Options: AM-PAC 6 Clicks Basic Mobility (PT)  AM-PAC 6 Clicks Score (PT): 19  PT Discharge Summary  Anticipated Discharge Disposition (PT): home with assist    Anya Celestin PT  6/28/2024

## 2024-06-28 NOTE — PLAN OF CARE
Goal Outcome Evaluation:  Plan of Care Reviewed With: patient           Outcome Evaluation: Patient is a 33 y.o male who presented to [facility] with L calf wound. Patient AOx4 supine in bed upon arrival. Patient with hx of CVA in 2020 with L residual deficits. Patient reports he has an AFO for L LE though not currently at hospital. Patient reports he lives alone but [place] his [place] where many other relatives live very close. Patient reports he is independent at baseline and does no use an AD. Patient has 4 PAYTON. Patient completed all bed mobility with  this date. Patient performed STS from EOB with CGA. Patient performing MIP and took a few side steps at EOB with CGA. Patient reports he does not ambulate far without his AFO which is not currently present. Patient appears close to his baseline at this time. PT will continue to monitor peripherally for mobility needs. Anticpate return home at d/c.      Anticipated Discharge Disposition (PT): home with assist

## 2024-06-28 NOTE — CONSULTS
Referring Provider: Sukhjinder Garcia MD      Subjective   History of present illness: 33-year-old with a history of CVA, MRSA infection alcohol abuse and seizures admitted with draining left lower extremity wound.  Patient says that he wears a brace over that leg and developed an abrasion which is draining bloody purulent material.  He denies any constitutional symptoms of infection such as fever, chills, night sweats.    Past Medical History:   Diagnosis Date    Dehiscence of incision     GERD (gastroesophageal reflux disease)     History of transfusion     Intracerebral hemorrhage     Metabolic encephalopathy     MRSA (methicillin resistant Staphylococcus aureus)     RIGHT INCISION    Paralysis     LEFT SIDE    Stroke        Past Surgical History:   Procedure Laterality Date    CRANIOTOMY Right 10/1/2020    Procedure: INCISION AND DRAINAGE WOUND, of scalp incision with closure;  Surgeon: Leobardo Rivera MD;  Location: [facility];  Service: Neurosurgery;  Laterality: Right;    CRANIOTOMY FOR SUBDURAL HEMATOMA Right 8/3/2020    Procedure: RIGHT FRONTOPARIETAL CRANIOTOMY FOR EVACUATION OF INTRACEREBRAL HEMORRHAGE;  Surgeon: Leobardo Rivera MD;  Location: [facility] OR;  Service: Neurosurgery;  Laterality: Right;    EMBOLIZATION CEREBRAL N/A 8/5/2020    Procedure: diagnostic angiogram possible EMBOLIZATION CEREBRAL. right radial approach;  Surgeon: Alfonso Malcolm MD;  Location: [facility] OR 18/19;  Service: Neurosurgery;  Laterality: N/A;    WRIST SURGERY      1st one 2006, 2nd 2010           Allergies   Allergen Reactions    Citrus Hives     Hives in mouth      Promethazine Hives       Medication:  Antibiotics:  Anti-Infectives (From admission, onward)      Ordered     Dose/Rate Route Frequency Start Stop    06/27/24 2140  Vancomycin HCl 1,250 mg in sodium chloride 0.9 % 250 mL VTB        Ordering Provider: Lucien Jackson APRN    1,250 mg  200 mL/hr over 75 Minutes Intravenous Every 12 Hours  06/28/24 0600 07/03/24 0559    06/27/24 2147  cefTRIAXone (ROCEPHIN) 2,000 mg in sodium chloride 0.9 % 100 mL MBP        Ordering Provider: Kush Munson MD    2,000 mg  200 mL/hr over 30 Minutes Intravenous Every 24 Hours 06/27/24 2245 07/04/24 2244 06/27/24 2113  Pharmacy to dose vancomycin        Ordering Provider: Lucien Jackson APRN     Does not apply Continuous PRN 06/27/24 2113 07/02/24 2112 06/27/24 1623  vancomycin 2500 mg/500 mL 0.9% NS IVPB ([facility])        Ordering Provider: Eleno Ty MD    20 mg/kg × 125 kg  over 150 Minutes Intravenous Once 06/27/24 1639 06/27/24 2014                Physical Exam:   Vital Signs   Temp:  [97.9 °F (36.6 °C)-98.8 °F (37.1 °C)] 98.4 °F (36.9 °C)  Heart Rate:  [108-115] 108  Resp:  [18] 18  BP: (139-152)/() 149/88    GENERAL: Awake and alert, in no acute distress.   HEENT: Oropharynx is clear. Hearing is grossly normal.   EYES: . No conjunctival injection. No lid lag.   LUNGS:normal respiratory effort.   SKIN: Fluctuant area over the left lower extremity appears improved with denuded skin.  There is scant purulent drainage and some bloody drainage as well.  This is tender.  Right upper extremity with probable hematoma with ecchymotic lesion  PSYCHIATRIC: Appropriate mood, affect, insight, and judgment.     Results Review:  White count 4.15  Lactate 5 on admission of 2.5  Creatinine 0.39    A/p  1.  Left lower extremity cellulitis, History of MRSA: This is probably a hematoma that became secondarily infected it is spontaneously draining.  I was able to express some blood and purulence.  Since it spontaneously draining, I do not know the there will be a role for surgery and the patient would like to try oral therapy.  I was initially thinking Bactrim, but with his thrombocytopenia, doxycycline 100 mg p.o. every 12 hours might be the better choice.  We can give this for 6 more days through July 4, 2024.  Otherwise continue vancomycin.  Discussed  with Dr. Carlos.           Thank you for this consult.  We will be available should infectious concerns evolve but sign off for now

## 2024-06-28 NOTE — PROGRESS NOTES
[facility] Clinical Pharmacy Services: Vancomycin Pharmacokinetic Initial Consult Note    Avinash Everett is a 33 y.o. male who is on day 1 of pharmacy to dose vancomycin.    Indication: Skin and Soft Tissue  Consulting Provider: Lucien ANDERSEN  Planned Duration of Therapy: 5 days  Loading Dose Ordered or Given: 2500 mg on 6/27 at 1744  MRSA PCR performed: no  Culture/Source:   6/27 BloodCx - pending x2  Target: -600 mg/L.hr   Pertinent Vanc Dosing History: n/a  Other Antimicrobials: n/a    Vitals/Labs  Ht:  ; Wt: 115 kg (253 lb 11.2 oz)  Temp Readings from Last 1 Encounters:   06/27/24 97.9 °F (36.6 °C) (Tympanic)    Estimated Creatinine Clearance: 340.4 mL/min (A) (by C-G formula based on SCr of 0.41 mg/dL (L)).        Results from last 7 days   Lab Units 06/27/24  1605   CREATININE mg/dL 0.41*   WBC 10*3/mm3 6.15     Assessment/Plan:    Vancomycin Dose:   1250 mg IV every  12  hours  Predictive AUC level for the dose ordered is 461 mg/L.hr, which is within the target of 400-600 mg/L.hr  Vanc Trough has been ordered for 6/28 at 0500     Pharmacy will follow patient's kidney function and will adjust doses and obtain levels as necessary. Thank you for involving pharmacy in this patient's care. Please contact pharmacy with any questions or concerns.                           Cintia Castillo [facility]  Clinical Pharmacist

## 2024-06-28 NOTE — NURSING NOTE
CWON note: pt seen for evaluation of LLE and right axilla abscesses/ cysts that were POA. RN was completing the dressing changes and cleaning the pt up on my arrival. Pt has been seen by infectious disease with appropriate antibiotics having been ordered. LLE has an area of fluctuance with redness, appears to have some drainage underneath the skin, some of which  is draining spontaneously. Right axilla has several erythematous nodules/ cysts noted as well. They are draining seropurulent drainage. Surgical I&D may be beneficial. In the meantime, can begin Xeroform guaze and ABD pad or secure with roll guaze. Discussed all with RN, Gaby.     Pt is alert with left sided paralysis, he will need a CRISTOPHER mattress from [employer] due to high risk for skin breakdown. 5N  to order the mattress and bed frame. Wound care and prevention standing orders have been placed into [facility]. Please re-consult for nay additional needs.

## 2024-06-28 NOTE — PROGRESS NOTES
[facility] Clinical Pharmacy Services: Enoxaparin Consult    Avinash Everett has a pharmacy consult to dose prophylactic enoxaparin per Lucien ANDERSEN's request.     Indication: VTE Prophylaxis  Home Anticoagulation: none     Relevant clinical data and objective history reviewed:  33 y.o. male   115 kg (253 lb 11.2 oz)   Body mass index is 33.48 kg/m².   Results from last 7 days   Lab Units 06/27/24  1605   PLATELETS 10*3/mm3 89*     Estimated Creatinine Clearance: 340.4 mL/min (A) (by C-G formula based on SCr of 0.41 mg/dL (L)).    Assessment/Plan    Will start patient on 40mg subcutaneous every 24 hours, adjusted for renal function. Consult order will be discontinued but pharmacy will continue to follow.     Cintia Castillo [facility]  Clinical Pharmacist

## 2024-06-28 NOTE — PLAN OF CARE
"  Problem: Adult Inpatient Plan of Care  Goal: Plan of Care Review  Outcome: Ongoing, Progressing  Flowsheets (Taken 6/28/2024 0601)  Progress: no change  Plan of Care Reviewed With: patient  Goal: Patient-Specific Goal (Individualized)  Outcome: Ongoing, Progressing  Goal: Absence of Hospital-Acquired Illness or Injury  Outcome: Ongoing, Progressing  Intervention: Prevent Skin Injury  Recent Flowsheet Documentation  Taken 6/27/2024 2000 by Yesi Aguilar RN  Skin Protection:   adhesive use limited   transparent dressing maintained  Goal: Optimal Comfort and Wellbeing  Outcome: Ongoing, Progressing  Intervention: Provide Person-Centered Care  Recent Flowsheet Documentation  Taken 6/27/2024 2000 by Yesi Aguilar RN  Trust Relationship/Rapport:   care explained   choices provided   emotional support provided   empathic listening provided   questions answered   questions encouraged   thoughts/feelings acknowledged   reassurance provided  Goal: Readiness for Transition of Care  Outcome: Ongoing, Progressing   Goal Outcome Evaluation:  Plan of Care Reviewed With: patient        Progress: no change     VSS. A&Ox4. CIWA score is \"1\". No acute changes during shift. Will continue to monitor  throughout the remainder of the shift.                             "

## 2024-06-28 NOTE — CONSULTS
"REASON FOR ACCESS CONSULT:     ETOH    HPI:  Pt presented to ED with complaints of a wound on his left lower extremity and was admitted for further management. Per chart review, the pt has epilepsy and had CVA in 2020 with paralysis.    Pt was found RIB. He was agreeable to assessment.     He denied s/s ETOH withdrawal. He rated his current craving for a drink of ETOH 4/10 (10 being the worst) - \"I'd like a drink to relax\". CIWA scores have ranged from 0 to 1. Pt stated his last drink was 6/23/24.    Pt appears unkempt and unclean. Wound to left LE noted. Facial skin issues. He is A&O. Mood melancholic. Pt shed a few tears during assessment. He denied SI/HI/AVH now or in recent past. He rated his current level of anxiety and depression both 7/10 (10 being the worst). He denied issues with sleep or appetite. Current stressors include being hospitalized and also \"my whole left side is paralyzed, I'm epileptic, I can't do what I used to do.. or play basketball with my son\".     SUBSTANCE USE HX:  Pt took his first drink at age 16. Long hx of alcohol use - pt stated he was treated for VANE in 2008 during a psych hospitalization where he was suicidal.     In 2020 he had his CVA and due to depression, inability to work and boredom his drinking increased to \"every day, every other day on average... at least a 12-pk beer or one half of a fifth\" when drinking.   He stated before 2020 he was a \"weekend and occasionally during the week\" drinker. He would drink to inebriation.     Since 2020 his longest dry perioed was \"2-3 months last January\" when he quit cold turkey. AA meetings in 2008.    He has occasional blackouts. He denied hx of problematic withdrawal. He denied other detoxes or hospitalizations related to ETOH. He has hx of one DUI.    Pt stated he uses \"medical marijuana.. the equivalent of a joint at night\" for the past year. He also stated before this he was a THC user daily to frequent use \"since college\".     Pt " "is a \"less than 1/2 pk\" daily cig smoker. He denied other illicit substances other than \"mushrooms at parties but a long time ago\".    MENTAL HEALTH HX:  Per his report, he does have anxiety and depression. Home prescription of Remeron prescribed by PCP since 2020. No hx of therapy or psychiatry. Pt attempted suicide x 2 - once in high school when he attempted to hang himself, but \"bar snapped\", and in 2011 when he made a \"superficial cut\" to his wrist and he was then psych hospitalized. Denied familial psych hx. Denied hx of other trauma/abuse.    SOCIAL HX:  Pt is . He has a 7-yr-old son with whom he has 50/50 custody. He stated he doesn't drink when his son is with him. [place] in [place]. He stated he felt safe in his environments. He had two years of college. He is disabled due to his medical issues, used to work with heavy equipment. He denied current legalities. In his leisure he is \"heavy into [employer] and [employer].. I run a group.. and I try to go into town once a day\". He is able to walk with his leg brace and does drive.    PLAN:  Education provided regards VANE and continued detrimental effects on body/brain/mood if he continues to drink. Education given regards the biological components contributing to strong cravings/urges. He feels is use is \"borderline\" problematic. Motivational interviewing done to help pt see how life may improve physically/mentally if he were to abstain. Discussed pt's drinking and reasons for doing same. He is willing to accept  resources to get some support for his depression and grief due to his CVA and resulting loss of function. Pt acknowledges need to quit for his health and relationship with son. He is willing to accept VANE resources. Access will follow. Updated primary RN.  "

## 2024-06-28 NOTE — PROGRESS NOTES
[facility] Medicine Services  PROGRESS NOTE    Patient Name: Avinash Everett  : 1990  MRN: [id number]    Date of Admission: 2024  Primary Care Physician: Sada De Oliveira MD    Subjective   Subjective     CC:  Leg pain and wound    Subjective:  Patient still having significant redness and pain and purulent drainage in his left lower extremity.  He has seen only minimal improvement today but notes is continuing to drain.  He feels little better than yesterday but still quite weak.    Review of Systems  No current shortness of breath or cough  No current nausea, vomiting, or diarrhea  No current chest pain or palpitations       Objective   Objective     Vital Signs:   Temp:  [97.9 °F (36.6 °C)-98.8 °F (37.1 °C)] 98.4 °F (36.9 °C)  Heart Rate:  [108-115] 108  Resp:  [18] 18  BP: (139-152)/() 149/88        Physical Exam:  Constitutional:Awake, alert, no acute distress  HENT: NCAT, mucous membranes moist, neck supple  Respiratory: No cough or wheezes, normal respirations, nonlabored breathing   Cardiovascular: Pulse rate is normal, palpable radial pulses  Gastrointestinal:  soft, nontender, nondistended  Musculoskeletal: Normal musculature for age, no lower extremity edema, BMI 33  Psychiatric: Appropriate affect, cooperative, conversational  Neurologic: No slurred speech or facial droop, follows commands  Skin: No rashes or jaundice, warm      Results Reviewed:  Results from last 7 days   Lab Units 24  0500 24  2132 24  1605   WBC 10*3/mm3 4.15  --  6.15   HEMOGLOBIN g/dL 11.7*  --  12.9*   HEMATOCRIT % 34.2*  --  37.1*   PLATELETS 10*3/mm3 69*  --  89*   PROCALCITONIN ng/mL  --  0.10  --      Results from last 7 days   Lab Units 24  0500 24  1605   SODIUM mmol/L 137 133*   POTASSIUM mmol/L 3.1* 3.4*   CHLORIDE mmol/L 103 95*   CO2 mmol/L 25.1 23.0   BUN mg/dL 3* 2*   CREATININE mg/dL 0.39* 0.41*   GLUCOSE mg/dL 140* 163*   CALCIUM mg/dL 8.1* 8.5*   ALK PHOS U/L 174*  199*   ALT (SGPT) U/L 25 31   AST (SGOT) U/L 60* 71*     Estimated Creatinine Clearance: 357.8 mL/min (A) (by C-G formula based on SCr of 0.39 mg/dL (L)).          Imaging Results (Last 24 Hours)       Procedure Component Value Units Date/Time    CT Head Without Contrast [[id number]] Collected: 06/27/24 1748     Updated: 06/27/24 1753    Narrative:      CT HEAD WO CONTRAST-     INDICATIONS: Confusion     TECHNIQUE: Radiation dose reduction techniques were utilized, including  automated exposure control and exposure modulation based on body size.  Noncontrast head CT     COMPARISON: 1/10/2022     FINDINGS:           No acute intracranial hemorrhage, midline shift or mass effect. No acute  territorial infarct is identified. Right frontal parietal  encephalomalacia is noted, with overlying craniotomy change.        Ventricles, cisterns, cerebral sulci are unremarkable for patient age.     The visualized paranasal sinuses, orbits, mastoid air cells are  unremarkable.                   Impression:         No acute intracranial hemorrhage or hydrocephalus. Chronic changes of  the brain. If there is further clinical concern, MRI could be considered  for further evaluation.     This report was finalized on 6/27/2024 5:50 PM by Dr. Rell Hernández M.D on Workstation: [id number]               Results for orders placed during the hospital encounter of 08/03/20    Adult Transthoracic Echo Complete W/ Cont if Necessary Per Protocol (With Agitated Saline)    Interpretation Summary  · Left ventricular systolic function is normal. Calculated EF = 70%. Estimated EF was in agreement with the calculated EF. Estimated EF = 70%. Normal left ventricular cavity size and wall thickness noted. All left ventricular wall segments contract normally. Left ventricular diastolic dysfunction is noted (grade I) consistent with impaired relaxation.  · Mild mitral valve regurgitation is present.  · Trace tricuspid valve regurgitation is present.  Estimated right ventricular systolic pressure from tricuspid regurgitation is mildly elevated (35-45 mmHg). Calculated right ventricular systolic pressure from tricuspid regurgitation is 38 mmHg.  · Normal left atrial volume noted. Saline test results are negative.  · Resting sinus tachycardia is present during the study      I have reviewed the medications:  Scheduled Meds:carBAMazepine, 200 mg, Oral, BID  enoxaparin, 40 mg, Subcutaneous, Nightly  folic acid, 1 mg, Oral, Daily  gabapentin, 300 mg, Oral, Nightly  levETIRAcetam, 2,000 mg, Oral, BID  LORazepam, 0.5 mg, Oral, BID  mirtazapine, 15 mg, Oral, Nightly  multivitamin, 1 tablet, Oral, Daily  potassium chloride ER, 40 mEq, Oral, Q4H  thiamine (B-1) IV, 200 mg, Intravenous, Q8H   Followed by  [START ON 7/3/2024] thiamine, 100 mg, Oral, Daily  vancomycin, 1,250 mg, Intravenous, Q12H      Continuous Infusions:Pharmacy to Dose enoxaparin (LOVENOX),   Pharmacy to dose vancomycin,   sodium chloride, 100 mL/hr, Last Rate: 100 mL/hr (06/28/24 0704)      PRN Meds:.  acetaminophen **OR** acetaminophen **OR** acetaminophen    aluminum-magnesium hydroxide-simethicone    senna-docusate sodium **AND** polyethylene glycol **AND** bisacodyl **AND** bisacodyl    Calcium Replacement - Follow Nurse / BPA Driven Protocol    LORazepam **OR** midazolam **OR** LORazepam **OR** midazolam **OR** midazolam **OR** midazolam    Magnesium Standard Dose Replacement - Follow Nurse / BPA Driven Protocol    nitroglycerin    ondansetron ODT **OR** ondansetron    Pharmacy to Dose enoxaparin (LOVENOX)    Pharmacy to dose vancomycin    Phosphorus Replacement - Follow Nurse / BPA Driven Protocol    Potassium Replacement - Follow Nurse / BPA Driven Protocol    [COMPLETED] Insert Peripheral IV **AND** sodium chloride    sodium chloride    Assessment & Plan   Assessment & Plan     Active Hospital Problems    Diagnosis  POA    **Cellulitis of left lower extremity [L03.116]  Yes    MRSA (methicillin  resistant Staphylococcus aureus) colonization [Z22.322]  Not Applicable    Hemiplegia of left side as late effect of cerebral infarction [I69.352]  Not Applicable    Type 2 diabetes mellitus with hyperglycemia [E11.65]  Yes    Lactic acidosis [E87.20]  Yes    History of CVA (cerebrovascular accident) [Z86.73]  Not Applicable    History of seizure [Z87.898]  Yes    Tobacco abuse [Z72.0]  Yes    Psoriasis [L40.9]  Yes    Hyperglycemia [R73.9]  Yes    Hypokalemia [E87.6]  Yes    Seizure disorder [G40.909]  Yes    Alcohol abuse [F10.10]  Yes      Resolved Hospital Problems   No resolved problems to display.        Brief Hospital Course to date:  Avinash Everett is a 33 y.o. male with past medical history of MRSA presents to the hospital with confusion and left lower extremity cellulitis and draining abscess with lactic acidosis and tachycardia.    Discussion/plan for today:  Case discussed at length with infectious disease physician.  Plan is to continue vancomycin while in the hospital but plan to discharge on doxycycline.  Patient does have an abscess that does not appear to be actively draining at this time.  Confusion appears resolved which was likely metabolic encephalopathy secondary to lactic acidosis.  Lactic acidosis has trended down.  Patient does notably have thrombocytopenia.  Previous records reviewed and thrombocytopenia dates back several years.  Perhaps related to alcohol use or perhaps patient has undiagnosed liver disease.  Plan to check INR tomorrow to better assess intrinsic liver function.  Check liver ultrasound.  Check ammonia.  Monitor glucose and adjust insulin as needed.  PT consult for debility from stroke.  Recommend alcohol cessation.  Consult access for resources for sobriety.  Withdrawal protocol.  Patient at high risk for withdrawal will start on some low-dose Ativan empirically to reduce the risk.  Treatment plan discussed with patient is agreement.    DVT Prophylaxis:  Lovenox      Disposition: Home when improved    CODE STATUS:   Code Status and Medical Interventions:   Ordered at: 06/27/24 1913     Code Status (Patient has no pulse and is not breathing):    CPR (Attempt to Resuscitate)     Medical Interventions (Patient has pulse or is breathing):    Full Support       Brain Carlos MD  06/28/24

## 2024-06-29 ENCOUNTER — APPOINTMENT (OUTPATIENT)
Dept: ULTRASOUND IMAGING | Facility: HOSPITAL | Age: 34
End: 2024-06-29
Payer: MEDICARE

## 2024-06-29 LAB
ALBUMIN SERPL-MCNC: 2.7 G/DL (ref 3.5–5.2)
ALP SERPL-CCNC: 189 U/L (ref 39–117)
ALT SERPL W P-5'-P-CCNC: 24 U/L (ref 1–41)
AMMONIA BLD-SCNC: 99 UMOL/L (ref 16–60)
ANION GAP SERPL CALCULATED.3IONS-SCNC: 9 MMOL/L (ref 5–15)
AST SERPL-CCNC: 63 U/L (ref 1–40)
BILIRUB CONJ SERPL-MCNC: 1.4 MG/DL (ref 0–0.3)
BILIRUB INDIRECT SERPL-MCNC: 0.9 MG/DL
BILIRUB SERPL-MCNC: 2.3 MG/DL (ref 0–1.2)
BUN SERPL-MCNC: 2 MG/DL (ref 6–20)
BUN/CREAT SERPL: 4.9 (ref 7–25)
CALCIUM SPEC-SCNC: 8.5 MG/DL (ref 8.6–10.5)
CHLORIDE SERPL-SCNC: 109 MMOL/L (ref 98–107)
CO2 SERPL-SCNC: 20 MMOL/L (ref 22–29)
CREAT SERPL-MCNC: 0.41 MG/DL (ref 0.76–1.27)
DEPRECATED RDW RBC AUTO: 53.3 FL (ref 37–54)
EGFRCR SERPLBLD CKD-EPI 2021: 146.6 ML/MIN/1.73
ERYTHROCYTE [DISTWIDTH] IN BLOOD BY AUTOMATED COUNT: 15.8 % (ref 12.3–15.4)
GLUCOSE SERPL-MCNC: 126 MG/DL (ref 65–99)
HCT VFR BLD AUTO: 34 % (ref 37.5–51)
HGB BLD-MCNC: 11.7 G/DL (ref 13–17.7)
INR PPP: 1.62 (ref 0.9–1.1)
MCH RBC QN AUTO: 32.1 PG (ref 26.6–33)
MCHC RBC AUTO-ENTMCNC: 34.4 G/DL (ref 31.5–35.7)
MCV RBC AUTO: 93.4 FL (ref 79–97)
PLATELET # BLD AUTO: 73 10*3/MM3 (ref 140–450)
PMV BLD AUTO: 9.9 FL (ref 6–12)
POTASSIUM SERPL-SCNC: 4.3 MMOL/L (ref 3.5–5.2)
PROT SERPL-MCNC: 6.5 G/DL (ref 6–8.5)
PROTHROMBIN TIME: 19.5 SECONDS (ref 11.7–14.2)
RBC # BLD AUTO: 3.64 10*6/MM3 (ref 4.14–5.8)
SODIUM SERPL-SCNC: 138 MMOL/L (ref 136–145)
WBC NRBC COR # BLD AUTO: 4.98 10*3/MM3 (ref 3.4–10.8)

## 2024-06-29 PROCEDURE — 76705 ECHO EXAM OF ABDOMEN: CPT

## 2024-06-29 PROCEDURE — 82140 ASSAY OF AMMONIA: CPT | Performed by: INTERNAL MEDICINE

## 2024-06-29 PROCEDURE — 25010000002 CEFTRIAXONE PER 250 MG: Performed by: INTERNAL MEDICINE

## 2024-06-29 PROCEDURE — 87077 CULTURE AEROBIC IDENTIFY: CPT | Performed by: INTERNAL MEDICINE

## 2024-06-29 PROCEDURE — 87186 SC STD MICRODIL/AGAR DIL: CPT | Performed by: INTERNAL MEDICINE

## 2024-06-29 PROCEDURE — 25010000002 ENOXAPARIN PER 10 MG: Performed by: STUDENT IN AN ORGANIZED HEALTH CARE EDUCATION/TRAINING PROGRAM

## 2024-06-29 PROCEDURE — 87205 SMEAR GRAM STAIN: CPT | Performed by: INTERNAL MEDICINE

## 2024-06-29 PROCEDURE — 25010000002 THIAMINE HCL 200 MG/2ML SOLUTION: Performed by: STUDENT IN AN ORGANIZED HEALTH CARE EDUCATION/TRAINING PROGRAM

## 2024-06-29 PROCEDURE — 80076 HEPATIC FUNCTION PANEL: CPT | Performed by: INTERNAL MEDICINE

## 2024-06-29 PROCEDURE — 87070 CULTURE OTHR SPECIMN AEROBIC: CPT | Performed by: INTERNAL MEDICINE

## 2024-06-29 PROCEDURE — 25810000003 SODIUM CHLORIDE 0.9 % SOLUTION 250 ML FLEX CONT: Performed by: NURSE PRACTITIONER

## 2024-06-29 PROCEDURE — 25810000003 SODIUM CHLORIDE 0.9 % SOLUTION: Performed by: STUDENT IN AN ORGANIZED HEALTH CARE EDUCATION/TRAINING PROGRAM

## 2024-06-29 PROCEDURE — 85610 PROTHROMBIN TIME: CPT | Performed by: INTERNAL MEDICINE

## 2024-06-29 PROCEDURE — 25010000002 VANCOMYCIN HCL 1.25 G RECONSTITUTED SOLUTION 1 EACH VIAL: Performed by: NURSE PRACTITIONER

## 2024-06-29 PROCEDURE — 85027 COMPLETE CBC AUTOMATED: CPT | Performed by: INTERNAL MEDICINE

## 2024-06-29 PROCEDURE — 80048 BASIC METABOLIC PNL TOTAL CA: CPT | Performed by: INTERNAL MEDICINE

## 2024-06-29 RX ORDER — DIAZEPAM 5 MG/1
10 TABLET ORAL ONCE
Status: COMPLETED | OUTPATIENT
Start: 2024-06-29 | End: 2024-06-29

## 2024-06-29 RX ORDER — LACTULOSE 10 G/15ML
20 SOLUTION ORAL DAILY
Status: DISCONTINUED | OUTPATIENT
Start: 2024-06-29 | End: 2024-06-30

## 2024-06-29 RX ORDER — METFORMIN HYDROCHLORIDE 500 MG/1
500 TABLET, EXTENDED RELEASE ORAL
Status: DISCONTINUED | OUTPATIENT
Start: 2024-06-29 | End: 2024-07-01 | Stop reason: HOSPADM

## 2024-06-29 RX ADMIN — THIAMINE HYDROCHLORIDE 200 MG: 100 INJECTION, SOLUTION INTRAMUSCULAR; INTRAVENOUS at 13:25

## 2024-06-29 RX ADMIN — LORAZEPAM 0.5 MG: 0.5 TABLET ORAL at 09:05

## 2024-06-29 RX ADMIN — GABAPENTIN 300 MG: 300 CAPSULE ORAL at 20:35

## 2024-06-29 RX ADMIN — LEVETIRACETAM 2000 MG: 500 TABLET, FILM COATED ORAL at 20:35

## 2024-06-29 RX ADMIN — VANCOMYCIN HYDROCHLORIDE 1250 MG: 1.25 INJECTION, POWDER, LYOPHILIZED, FOR SOLUTION INTRAVENOUS at 06:41

## 2024-06-29 RX ADMIN — THIAMINE HYDROCHLORIDE 200 MG: 100 INJECTION, SOLUTION INTRAMUSCULAR; INTRAVENOUS at 06:38

## 2024-06-29 RX ADMIN — SODIUM CHLORIDE 100 ML/HR: 9 INJECTION, SOLUTION INTRAVENOUS at 06:38

## 2024-06-29 RX ADMIN — VANCOMYCIN HYDROCHLORIDE 1250 MG: 1.25 INJECTION, POWDER, LYOPHILIZED, FOR SOLUTION INTRAVENOUS at 18:25

## 2024-06-29 RX ADMIN — LEVETIRACETAM 2000 MG: 500 TABLET, FILM COATED ORAL at 09:04

## 2024-06-29 RX ADMIN — METFORMIN HYDROCHLORIDE 500 MG: 500 TABLET, EXTENDED RELEASE ORAL at 14:30

## 2024-06-29 RX ADMIN — ENOXAPARIN SODIUM 40 MG: 100 INJECTION SUBCUTANEOUS at 20:36

## 2024-06-29 RX ADMIN — CEFTRIAXONE 2000 MG: 2 INJECTION, POWDER, FOR SOLUTION INTRAMUSCULAR; INTRAVENOUS at 13:25

## 2024-06-29 RX ADMIN — LACTULOSE 20 G: 20 SOLUTION ORAL at 13:26

## 2024-06-29 RX ADMIN — CARBAMAZEPINE 200 MG: 200 CAPSULE, EXTENDED RELEASE ORAL at 09:05

## 2024-06-29 RX ADMIN — THIAMINE HYDROCHLORIDE 200 MG: 100 INJECTION, SOLUTION INTRAMUSCULAR; INTRAVENOUS at 22:13

## 2024-06-29 RX ADMIN — Medication 1 TABLET: at 09:05

## 2024-06-29 RX ADMIN — ACETAMINOPHEN 325MG 650 MG: 325 TABLET ORAL at 20:35

## 2024-06-29 RX ADMIN — FOLIC ACID 1 MG: 1 TABLET ORAL at 09:05

## 2024-06-29 RX ADMIN — LORAZEPAM 0.5 MG: 0.5 TABLET ORAL at 20:35

## 2024-06-29 RX ADMIN — CARBAMAZEPINE 200 MG: 200 CAPSULE, EXTENDED RELEASE ORAL at 20:35

## 2024-06-29 RX ADMIN — DIAZEPAM 10 MG: 5 TABLET ORAL at 13:25

## 2024-06-29 RX ADMIN — DOXYCYCLINE 100 MG: 100 INJECTION, POWDER, LYOPHILIZED, FOR SOLUTION INTRAVENOUS at 09:04

## 2024-06-29 RX ADMIN — MIRTAZAPINE 15 MG: 15 TABLET, FILM COATED ORAL at 20:35

## 2024-06-29 NOTE — PROGRESS NOTES
[facility] Medicine Services  PROGRESS NOTE    Patient Name: Avinash Everett  : 1990  MRN: [id number]    Date of Admission: 2024  Primary Care Physician: Sada De Oliveira MD    Subjective   Subjective     CC:  Leg pain and wound    Subjective:  Patient still having some drainage from his leg.  He now is showing some new infection spot near his right axilla.  It is smaller than his right leg but still draining a little bit.  Wound care reviewed this.  Patient would like to avoid surgery.  Patient agrees to work on quitting drinking.  I discussed my concern with cirrhosis.  Patient did note some confusion at time.  He gave me permission to speak to his mother about his medical issues.  I discussed with her his liver disease and that I was starting lactulose.  I also discussed my plan with him for the infection.    Review of Systems  No current shortness of breath or cough  No current nausea, vomiting, or diarrhea  No current chest pain or palpitations       Objective   Objective     Vital Signs:   Temp:  [97.7 °F (36.5 °C)-98.8 °F (37.1 °C)] 97.7 °F (36.5 °C)  Heart Rate:  [100-115] 109  Resp:  [18] 18  BP: (128-139)/(73-86) 133/86        Physical Exam:  Constitutional:Awake, alert, no acute distress  HENT: NCAT, mucous membranes moist, neck supple  Respiratory: No cough or wheezes, normal respirations, nonlabored breathing   Cardiovascular: Pulse rate is normal, palpable radial pulses  Gastrointestinal:  soft, nontender, nondistended  Musculoskeletal: Normal musculature for age, no lower extremity edema, BMI 33  Psychiatric: Appropriate affect, cooperative, conversational  Neurologic: No slurred speech or facial droop, follows commands  Skin: No rashes or jaundice, warm      Results Reviewed:  Results from last 7 days   Lab Units 24  0453 24  0500 24  2132 24  1605   WBC 10*3/mm3 4.98 4.15  --  6.15   HEMOGLOBIN g/dL 11.7* 11.7*  --  12.9*   HEMATOCRIT % 34.0* 34.2*  --  37.1*    PLATELETS 10*3/mm3 73* 69*  --  89*   INR  1.62*  --   --   --    PROCALCITONIN ng/mL  --   --  0.10  --      Results from last 7 days   Lab Units 06/29/24  0453 06/28/24  2119 06/28/24  0500 06/27/24  1605   SODIUM mmol/L 138  --  137 133*   POTASSIUM mmol/L 4.3 4.2 3.1* 3.4*   CHLORIDE mmol/L 109*  --  103 95*   CO2 mmol/L 20.0*  --  25.1 23.0   BUN mg/dL 2*  --  3* 2*   CREATININE mg/dL 0.41*  --  0.39* 0.41*   GLUCOSE mg/dL 126*  --  140* 163*   CALCIUM mg/dL 8.5*  --  8.1* 8.5*   ALK PHOS U/L 189*  --  174* 199*   ALT (SGPT) U/L 24  --  25 31   AST (SGOT) U/L 63*  --  60* 71*     Estimated Creatinine Clearance: 335.6 mL/min (A) (by C-G formula based on SCr of 0.41 mg/dL (L)).          Imaging Results (Last 24 Hours)       Procedure Component Value Units Date/Time    US Liver [[id number]] Resulted: 06/29/24 1145     Updated: 06/29/24 1145            Results for orders placed during the hospital encounter of 08/03/20    Adult Transthoracic Echo Complete W/ Cont if Necessary Per Protocol (With Agitated Saline)    Interpretation Summary  · Left ventricular systolic function is normal. Calculated EF = 70%. Estimated EF was in agreement with the calculated EF. Estimated EF = 70%. Normal left ventricular cavity size and wall thickness noted. All left ventricular wall segments contract normally. Left ventricular diastolic dysfunction is noted (grade I) consistent with impaired relaxation.  · Mild mitral valve regurgitation is present.  · Trace tricuspid valve regurgitation is present. Estimated right ventricular systolic pressure from tricuspid regurgitation is mildly elevated (35-45 mmHg). Calculated right ventricular systolic pressure from tricuspid regurgitation is 38 mmHg.  · Normal left atrial volume noted. Saline test results are negative.  · Resting sinus tachycardia is present during the study      I have reviewed the medications:  Scheduled Meds:carBAMazepine, 200 mg, Oral, BID  doxycycline, 100 mg,  Intravenous, Q12H  enoxaparin, 40 mg, Subcutaneous, Nightly  folic acid, 1 mg, Oral, Daily  gabapentin, 300 mg, Oral, Nightly  levETIRAcetam, 2,000 mg, Oral, BID  LORazepam, 0.5 mg, Oral, BID  mirtazapine, 15 mg, Oral, Nightly  multivitamin, 1 tablet, Oral, Daily  thiamine (B-1) IV, 200 mg, Intravenous, Q8H   Followed by  [START ON 7/3/2024] thiamine, 100 mg, Oral, Daily  vancomycin, 1,250 mg, Intravenous, Q12H      Continuous Infusions:Pharmacy to dose vancomycin,   sodium chloride, 100 mL/hr, Last Rate: 100 mL/hr (06/29/24 0638)      PRN Meds:.  acetaminophen **OR** acetaminophen **OR** acetaminophen    senna-docusate sodium **AND** polyethylene glycol **AND** bisacodyl **AND** bisacodyl    Calcium Replacement - Follow Nurse / BPA Driven Protocol    LORazepam **OR** midazolam **OR** LORazepam **OR** midazolam **OR** midazolam **OR** midazolam    Magnesium Standard Dose Replacement - Follow Nurse / BPA Driven Protocol    nitroglycerin    ondansetron ODT **OR** ondansetron    Pharmacy to dose vancomycin    Phosphorus Replacement - Follow Nurse / BPA Driven Protocol    Potassium Replacement - Follow Nurse / BPA Driven Protocol    [COMPLETED] Insert Peripheral IV **AND** sodium chloride    sodium chloride    Assessment & Plan   Assessment & Plan     Active Hospital Problems    Diagnosis  POA    **Cellulitis of left lower extremity [L03.116]  Yes    MRSA (methicillin resistant Staphylococcus aureus) colonization [Z22.322]  Not Applicable    Hemiplegia of left side as late effect of cerebral infarction [I69.352]  Not Applicable    Type 2 diabetes mellitus with hyperglycemia [E11.65]  Yes    Lactic acidosis [E87.20]  Yes    History of CVA (cerebrovascular accident) [Z86.73]  Not Applicable    History of seizure [Z87.898]  Yes    Tobacco abuse [Z72.0]  Yes    Psoriasis [L40.9]  Yes    Hyperglycemia [R73.9]  Yes    Hypokalemia [E87.6]  Yes    Seizure disorder [G40.909]  Yes    Alcohol abuse [F10.10]  Yes      Resolved  Hospital Problems   No resolved problems to display.        Brief Hospital Course to date:  Avinash Everett is a 33 y.o. male with past medical history of MRSA presents to the hospital with confusion and left lower extremity cellulitis and draining abscess with lactic acidosis and tachycardia.    Discussion/plan for today:  Left leg abscess and right axilla abscess and surrounding cellulitis currently draining purulent drainage.  Repeat culture today.  Culture results are abnormal yesterday as the Gram stain showed gram-positive bacteria but the culture is growing gram-positive bacteria.  Perhaps the gram positive bacteria is not growing well in the microbiology lab.  Either way I think he needs relatively broad coverage.  Continuing vancomycin.  Plan to add biotic ceftriaxone to make sure we have adequate gram-negative coverage.  I would like to continue to follow the results in the microbiology lab and repeat the culture today.  Wound is continuing to express purulence but patient does not appear to need surgical drainage at this time and would like to avoid if possible.    I am also concerned patient has underlying cirrhosis.  INR is significantly elevated which shows some reduced intrinsic liver function.  Ammonia is quite high.  Patient does have some mild asterixis.  It appears he has some mild hepatic encephalopathy.  He is oriented currently though.  His mom notes more recent confusion in the last week or so.  I recommend we initiate lactulose once daily and he is agreeable with this.  Goal will be to get him to 2-3 bowel movements a day.  Patient will need to follow-up with a gastroenterologist doctor at discharge.  He agrees with this plan.  He needs absolute alcohol sobriety.  Liver ultrasound ordered for today.    No signs of withdrawal currently.  Patient is on some baseline low-dose Ativan.  Plan to give a one-time dose of diazepam which has a prolonged half-life and should help further with withdrawal.   Patient plans to have all the alcohol removed from his house at discharge.  He is receiving resources for sobriety.  Continue multivitamins, thiamine, and folic acid    Thrombocytopenia is likely due to splenomegaly related to liver disease.  Previous records reviewed and this dates back several years.  No bleeding noted.    PT consult for debility.    Continue gabapentin for neuropathic pain.    Continue AEDs for history of seizure disorder.  No concerning events have been reported.    Patient has new diagnosis of diabetes.  Hemoglobin A1c is 7.8.  Initiate glucose monitoring and correction scale.  Start low-dose metformin.    Treatment plan discussed with the patient is in agreement.    DVT Prophylaxis: Lovenox      Disposition: Home when improved    CODE STATUS:   Code Status and Medical Interventions:   Ordered at: 06/27/24 1913     Code Status (Patient has no pulse and is not breathing):    CPR (Attempt to Resuscitate)     Medical Interventions (Patient has pulse or is breathing):    Full Support       Brain Carlos MD  06/29/24

## 2024-06-29 NOTE — PLAN OF CARE
Problem: Adult Inpatient Plan of Care  Goal: Plan of Care Review  Outcome: Ongoing, Progressing  Flowsheets (Taken 6/29/2024 0556)  Plan of Care Reviewed With: patient  Outcome Evaluation: A/Ox4. denied any pain slept between care, has a brace for his LLE to help balance himself, meds whole with water tolerating abx well, will start bowel program pt hasn't had a BM in 4 days. WCTM.     Problem: Adult Inpatient Plan of Care  Goal: Absence of Hospital-Acquired Illness or Injury  Intervention: Identify and Manage Fall Risk  Recent Flowsheet Documentation  Taken 6/29/2024 0200 by Sangeetha Delgado, RN  Safety Promotion/Fall Prevention: safety round/check completed  Taken 6/28/2024 2200 by Sangeetha Delgado, RN  Safety Promotion/Fall Prevention: safety round/check completed  Taken 6/28/2024 2015 by Sangeetha Delgado, RN  Safety Promotion/Fall Prevention:   safety round/check completed   room organization consistent     Problem: Hypertension Comorbidity  Goal: Blood Pressure in Desired Range  Intervention: Maintain Blood Pressure Management  Recent Flowsheet Documentation  Taken 6/28/2024 2015 by Sangeetha Delgado, RN  Medication Review/Management: dosing adjusted   Goal Outcome Evaluation:  Plan of Care Reviewed With: patient           Outcome Evaluation: A/Ox4. denied any pain slept between care, has a brace for his LLE to help balance himself, meds whole with water tolerating abx well, will start bowel program pt hasn't had a BM in 4 days. WCTM.

## 2024-06-29 NOTE — NURSING NOTE
"[facility] follow-up d\/t ETOH.    Patient RIB. The patient reported he was doing good but feeling \"a little stiff.\" The patient denied any issues with ETOH withdrawal. Last CIWA 0. The patient denied any ETOH craving. The patient stated he is looking to do outpatient ETOH treatment through [facility]. The patient stated he is hopeful for discharge today but is not sure if he will be discharged. Access following.   "

## 2024-06-30 PROBLEM — E87.20 LACTIC ACIDOSIS: Status: RESOLVED | Noted: 2024-06-28 | Resolved: 2024-06-30

## 2024-06-30 PROBLEM — A49.8 KLEBSIELLA INFECTION: Status: ACTIVE | Noted: 2024-06-30

## 2024-06-30 PROBLEM — K76.82 ENCEPHALOPATHY, HEPATIC: Status: ACTIVE | Noted: 2020-08-10

## 2024-06-30 LAB
ALBUMIN SERPL-MCNC: 2.8 G/DL (ref 3.5–5.2)
ALP SERPL-CCNC: 194 U/L (ref 39–117)
ALT SERPL W P-5'-P-CCNC: 25 U/L (ref 1–41)
ANION GAP SERPL CALCULATED.3IONS-SCNC: 9 MMOL/L (ref 5–15)
AST SERPL-CCNC: 73 U/L (ref 1–40)
BACTERIA SPEC AEROBE CULT: ABNORMAL
BACTERIA SPEC AEROBE CULT: ABNORMAL
BILIRUB CONJ SERPL-MCNC: 1 MG/DL (ref 0–0.3)
BILIRUB INDIRECT SERPL-MCNC: 0.9 MG/DL
BILIRUB SERPL-MCNC: 1.9 MG/DL (ref 0–1.2)
BUN SERPL-MCNC: 3 MG/DL (ref 6–20)
BUN/CREAT SERPL: 6.4 (ref 7–25)
CALCIUM SPEC-SCNC: 8.7 MG/DL (ref 8.6–10.5)
CHLORIDE SERPL-SCNC: 107 MMOL/L (ref 98–107)
CO2 SERPL-SCNC: 22 MMOL/L (ref 22–29)
CREAT SERPL-MCNC: 0.47 MG/DL (ref 0.76–1.27)
DEPRECATED RDW RBC AUTO: 55 FL (ref 37–54)
EGFRCR SERPLBLD CKD-EPI 2021: 140.7 ML/MIN/1.73
ERYTHROCYTE [DISTWIDTH] IN BLOOD BY AUTOMATED COUNT: 15.8 % (ref 12.3–15.4)
GLUCOSE SERPL-MCNC: 118 MG/DL (ref 65–99)
GRAM STN SPEC: ABNORMAL
GRAM STN SPEC: ABNORMAL
HCT VFR BLD AUTO: 34.2 % (ref 37.5–51)
HGB BLD-MCNC: 11.4 G/DL (ref 13–17.7)
MCH RBC QN AUTO: 31.5 PG (ref 26.6–33)
MCHC RBC AUTO-ENTMCNC: 33.3 G/DL (ref 31.5–35.7)
MCV RBC AUTO: 94.5 FL (ref 79–97)
PLATELET # BLD AUTO: 88 10*3/MM3 (ref 140–450)
PMV BLD AUTO: 9.8 FL (ref 6–12)
POTASSIUM SERPL-SCNC: 3.4 MMOL/L (ref 3.5–5.2)
POTASSIUM SERPL-SCNC: 4.2 MMOL/L (ref 3.5–5.2)
PROT SERPL-MCNC: 6.8 G/DL (ref 6–8.5)
RBC # BLD AUTO: 3.62 10*6/MM3 (ref 4.14–5.8)
SODIUM SERPL-SCNC: 138 MMOL/L (ref 136–145)
WBC NRBC COR # BLD AUTO: 5.07 10*3/MM3 (ref 3.4–10.8)

## 2024-06-30 PROCEDURE — 25010000002 CEFTRIAXONE PER 250 MG: Performed by: INTERNAL MEDICINE

## 2024-06-30 PROCEDURE — 25010000002 ENOXAPARIN PER 10 MG: Performed by: STUDENT IN AN ORGANIZED HEALTH CARE EDUCATION/TRAINING PROGRAM

## 2024-06-30 PROCEDURE — 85027 COMPLETE CBC AUTOMATED: CPT | Performed by: INTERNAL MEDICINE

## 2024-06-30 PROCEDURE — 84132 ASSAY OF SERUM POTASSIUM: CPT | Performed by: INTERNAL MEDICINE

## 2024-06-30 PROCEDURE — 80076 HEPATIC FUNCTION PANEL: CPT | Performed by: INTERNAL MEDICINE

## 2024-06-30 PROCEDURE — 25010000002 THIAMINE HCL 200 MG/2ML SOLUTION: Performed by: STUDENT IN AN ORGANIZED HEALTH CARE EDUCATION/TRAINING PROGRAM

## 2024-06-30 PROCEDURE — 25010000002 VANCOMYCIN HCL 1.25 G RECONSTITUTED SOLUTION 1 EACH VIAL: Performed by: NURSE PRACTITIONER

## 2024-06-30 PROCEDURE — 80048 BASIC METABOLIC PNL TOTAL CA: CPT | Performed by: INTERNAL MEDICINE

## 2024-06-30 PROCEDURE — 25810000003 SODIUM CHLORIDE 0.9 % SOLUTION 250 ML FLEX CONT: Performed by: NURSE PRACTITIONER

## 2024-06-30 RX ORDER — LACTULOSE 10 G/15ML
20 SOLUTION ORAL 3 TIMES DAILY
Status: DISCONTINUED | OUTPATIENT
Start: 2024-06-30 | End: 2024-07-01 | Stop reason: HOSPADM

## 2024-06-30 RX ORDER — POTASSIUM CHLORIDE 750 MG/1
40 TABLET, FILM COATED, EXTENDED RELEASE ORAL EVERY 4 HOURS
Status: COMPLETED | OUTPATIENT
Start: 2024-06-30 | End: 2024-06-30

## 2024-06-30 RX ADMIN — THIAMINE HYDROCHLORIDE 200 MG: 100 INJECTION, SOLUTION INTRAMUSCULAR; INTRAVENOUS at 14:09

## 2024-06-30 RX ADMIN — Medication 1 TABLET: at 09:02

## 2024-06-30 RX ADMIN — ACETAMINOPHEN 325MG 650 MG: 325 TABLET ORAL at 14:08

## 2024-06-30 RX ADMIN — CARBAMAZEPINE 200 MG: 200 CAPSULE, EXTENDED RELEASE ORAL at 21:36

## 2024-06-30 RX ADMIN — THIAMINE HYDROCHLORIDE 200 MG: 100 INJECTION, SOLUTION INTRAMUSCULAR; INTRAVENOUS at 06:10

## 2024-06-30 RX ADMIN — MIRTAZAPINE 15 MG: 15 TABLET, FILM COATED ORAL at 21:36

## 2024-06-30 RX ADMIN — VANCOMYCIN HYDROCHLORIDE 1250 MG: 1.25 INJECTION, POWDER, LYOPHILIZED, FOR SOLUTION INTRAVENOUS at 06:26

## 2024-06-30 RX ADMIN — LACTULOSE 20 G: 20 SOLUTION ORAL at 09:02

## 2024-06-30 RX ADMIN — VANCOMYCIN HYDROCHLORIDE 1250 MG: 1.25 INJECTION, POWDER, LYOPHILIZED, FOR SOLUTION INTRAVENOUS at 17:12

## 2024-06-30 RX ADMIN — METFORMIN HYDROCHLORIDE 500 MG: 500 TABLET, EXTENDED RELEASE ORAL at 09:02

## 2024-06-30 RX ADMIN — POTASSIUM CHLORIDE 40 MEQ: 750 TABLET, EXTENDED RELEASE ORAL at 12:15

## 2024-06-30 RX ADMIN — LEVETIRACETAM 2000 MG: 500 TABLET, FILM COATED ORAL at 09:02

## 2024-06-30 RX ADMIN — LORAZEPAM 0.5 MG: 0.5 TABLET ORAL at 09:02

## 2024-06-30 RX ADMIN — ENOXAPARIN SODIUM 40 MG: 100 INJECTION SUBCUTANEOUS at 21:35

## 2024-06-30 RX ADMIN — CEFTRIAXONE 2000 MG: 2 INJECTION, POWDER, FOR SOLUTION INTRAMUSCULAR; INTRAVENOUS at 12:10

## 2024-06-30 RX ADMIN — CARBAMAZEPINE 200 MG: 200 CAPSULE, EXTENDED RELEASE ORAL at 09:02

## 2024-06-30 RX ADMIN — LACTULOSE 20 G: 20 SOLUTION ORAL at 18:23

## 2024-06-30 RX ADMIN — LEVETIRACETAM 2000 MG: 500 TABLET, FILM COATED ORAL at 21:36

## 2024-06-30 RX ADMIN — GABAPENTIN 300 MG: 300 CAPSULE ORAL at 21:36

## 2024-06-30 RX ADMIN — THIAMINE HYDROCHLORIDE 200 MG: 100 INJECTION, SOLUTION INTRAMUSCULAR; INTRAVENOUS at 21:36

## 2024-06-30 RX ADMIN — FOLIC ACID 1 MG: 1 TABLET ORAL at 09:02

## 2024-06-30 RX ADMIN — LACTULOSE 20 G: 20 SOLUTION ORAL at 21:35

## 2024-06-30 RX ADMIN — POTASSIUM CHLORIDE 40 MEQ: 750 TABLET, EXTENDED RELEASE ORAL at 09:03

## 2024-06-30 NOTE — NURSING NOTE
"Access follow-up regards ETOH:    Pt found RIB. He is generally A&O other than he cannot name month. He denied s/s withdrawal. He rated his current craving for a drink of alcohol - \"not really a craving but it'd be nice to have one\". CIWA score 0 this AM. He rated current anxiety and depression level 5/10 (10 being the worst) due to not being able to discharge today. He denied SI/HI/AVH. Regards cessation, he stated, \"I know I gotta stop\". He acknowledged VANE resources given and is willing at this point to consider starting with AA meetings with a friend who attends. Encouraged pt to explore an IOP. Access continues to follow.      "

## 2024-06-30 NOTE — PROGRESS NOTES
[facility] Medicine Services  PROGRESS NOTE    Patient Name: Avinash Everett  : 1990  MRN: [id number]    Date of Admission: 2024  Primary Care Physician: Sada De Oliveira MD    Subjective   Subjective     CC:  Leg pain and wound    Subjective:  Patient still has not had a bowel movement.  He is reporting some confusion this morning.  He does note some flapping in his right arm.  He agrees to increase lactulose dose.  He is pleased that his drainage is decreased in his right leg infection.  No other new complaints reported.  He is partially oriented but his mentation is slow.    Review of Systems  No current shortness of breath or cough  No current nausea, vomiting, or diarrhea  No current chest pain or palpitations       Objective   Objective     Vital Signs:   Temp:  [98.2 °F (36.8 °C)-98.6 °F (37 °C)] 98.2 °F (36.8 °C)  Heart Rate:  [116-119] 116  Resp:  [18] 18  BP: (136-152)/(78-87) 143/87        Physical Exam:  Constitutional:Awake, alert, no acute distress  HENT: NCAT, mucous membranes moist, neck supple  Respiratory: No cough or wheezes, normal respirations, nonlabored breathing   Cardiovascular: Pulse rate is normal, palpable radial pulses  Gastrointestinal:  soft, nontender, nondistended  Musculoskeletal: Normal musculature for age, no lower extremity edema, BMI 33  Psychiatric: Appropriate affect, cooperative, conversational  Neurologic: Mild asterixis and mentation somewhat slow, no slurred speech or facial droop, follows commands  Skin: No rashes or jaundice, warm      Results Reviewed:  Results from last 7 days   Lab Units 24  0500 24  2132   WBC 10*3/mm3 5.07 4.98 4.15  --    HEMOGLOBIN g/dL 11.4* 11.7* 11.7*  --    HEMATOCRIT % 34.2* 34.0* 34.2*  --    PLATELETS 10*3/mm3 88* 73* 69*  --    INR   --  1.62*  --   --    PROCALCITONIN ng/mL  --   --   --  0.10     Results from last 7 days   Lab Units 24  9796  06/28/24  0500 06/27/24  1605   SODIUM mmol/L 138 138  --  137 133*   POTASSIUM mmol/L 3.4* 4.3 4.2 3.1* 3.4*   CHLORIDE mmol/L 107 109*  --  103 95*   CO2 mmol/L 22.0 20.0*  --  25.1 23.0   BUN mg/dL 3* 2*  --  3* 2*   CREATININE mg/dL 0.47* 0.41*  --  0.39* 0.41*   GLUCOSE mg/dL 118* 126*  --  140* 163*   CALCIUM mg/dL 8.7 8.5*  --  8.1* 8.5*   ALK PHOS U/L  --  189*  --  174* 199*   ALT (SGPT) U/L  --  24  --  25 31   AST (SGOT) U/L  --  63*  --  60* 71*     Estimated Creatinine Clearance: 291.5 mL/min (A) (by C-G formula based on SCr of 0.47 mg/dL (L)).          Imaging Results (Last 24 Hours)       Procedure Component Value Units Date/Time    US Liver [[id number]] Collected: 06/29/24 1519     Updated: 06/29/24 1528    Narrative:      US LIVER-     INDICATIONS: Cirrhosis, ammonia. Reported history of cholecystectomy.     TECHNIQUE: Ultrasound of the right upper quadrant     COMPARISON: 8/12/2020     FINDINGS:     Assessment is limited by body habitus.     The gallbladder is not visualized.     No intrahepatic or extrahepatic biliary ductal dilatation is  demonstrated, but the common biliary duct cannot be visualized, limiting  the assessment.     The liver measures 15.2 cm, and is diffusely heterogeneous. No liver  lesion is identified.     The pancreas is obscured. The right kidney, 11.2 cm, appears  unremarkable.                Impression:         Status post cholecystectomy. Heterogeneous liver.  No biliary ductal  dilatation or liver lesion identified, limited exam. If further imaging  evaluation is indicated, enhanced cross-sectional imaging of the liver  can be obtained.     This report was finalized on 6/29/2024 3:25 PM by Dr. Rell Hernández M.D on Workstation: [id number]               Results for orders placed during the hospital encounter of 08/03/20    Adult Transthoracic Echo Complete W/ Cont if Necessary Per Protocol (With Agitated Saline)    Interpretation Summary  · Left ventricular systolic  function is normal. Calculated EF = 70%. Estimated EF was in agreement with the calculated EF. Estimated EF = 70%. Normal left ventricular cavity size and wall thickness noted. All left ventricular wall segments contract normally. Left ventricular diastolic dysfunction is noted (grade I) consistent with impaired relaxation.  · Mild mitral valve regurgitation is present.  · Trace tricuspid valve regurgitation is present. Estimated right ventricular systolic pressure from tricuspid regurgitation is mildly elevated (35-45 mmHg). Calculated right ventricular systolic pressure from tricuspid regurgitation is 38 mmHg.  · Normal left atrial volume noted. Saline test results are negative.  · Resting sinus tachycardia is present during the study      I have reviewed the medications:  Scheduled Meds:carBAMazepine, 200 mg, Oral, BID  cefTRIAXone, 2,000 mg, Intravenous, Q24H  enoxaparin, 40 mg, Subcutaneous, Nightly  folic acid, 1 mg, Oral, Daily  gabapentin, 300 mg, Oral, Nightly  lactulose, 20 g, Oral, TID  levETIRAcetam, 2,000 mg, Oral, BID  metFORMIN ER, 500 mg, Oral, Daily With Breakfast  mirtazapine, 15 mg, Oral, Nightly  multivitamin, 1 tablet, Oral, Daily  thiamine (B-1) IV, 200 mg, Intravenous, Q8H   Followed by  [START ON 7/3/2024] thiamine, 100 mg, Oral, Daily  vancomycin, 1,250 mg, Intravenous, Q12H      Continuous Infusions:Pharmacy to dose vancomycin,       PRN Meds:.  acetaminophen **OR** acetaminophen **OR** acetaminophen    senna-docusate sodium **AND** polyethylene glycol **AND** bisacodyl **AND** bisacodyl    Calcium Replacement - Follow Nurse / BPA Driven Protocol    LORazepam **OR** midazolam **OR** LORazepam **OR** midazolam **OR** midazolam **OR** midazolam    Magnesium Standard Dose Replacement - Follow Nurse / BPA Driven Protocol    nitroglycerin    ondansetron ODT **OR** ondansetron    Pharmacy to dose vancomycin    Phosphorus Replacement - Follow Nurse / BPA Driven Protocol    Potassium Replacement -  Follow Nurse / BPA Driven Protocol    [COMPLETED] Insert Peripheral IV **AND** sodium chloride    sodium chloride    Assessment & Plan   Assessment & Plan     Active Hospital Problems    Diagnosis  POA    **Cellulitis of left lower extremity [L03.116]  Yes    Klebsiella infection [A49.8]  Yes    MRSA (methicillin resistant Staphylococcus aureus) colonization [Z22.322]  Not Applicable    Hemiplegia of left side as late effect of cerebral infarction [I69.352]  Not Applicable    Type 2 diabetes mellitus with hyperglycemia [E11.65]  Yes    History of CVA (cerebrovascular accident) [Z86.73]  Not Applicable    History of seizure [Z87.898]  Yes    Tobacco abuse [Z72.0]  Yes    Psoriasis [L40.9]  Yes    Hyperglycemia [R73.9]  Yes    Hypokalemia [E87.6]  Yes    Seizure disorder [G40.909]  Yes    Alcohol abuse [F10.10]  Yes    Encephalopathy, hepatic [K76.82]  Yes      Resolved Hospital Problems    Diagnosis Date Resolved POA    Lactic acidosis [E87.20] 06/30/2024 Yes        Brief Hospital Course to date:  Avinash Everett is a 33 y.o. male with past medical history of MRSA presents to the hospital with confusion and left lower extremity cellulitis and draining abscess with lactic acidosis and tachycardia.    Discussion/plan for today:  Patient with worsening hepatic encephalopathy.  Significantly elevated ammonia noted.  Plan to increase lactulose to 3 times daily and try to titrate to 2-4 bowel movements daily.  He has asterixis on examination and his mentation is a little slower.  Culture returned Klebsiella.  Plan to treat to bacterial susceptibilities.  Repeat culture is pending.  Wounds appear much improved.  Currently on ceftriaxone and vancomycin for broad-spectrum coverage.  Clinically it still did appear to be possible gram-positive infection although the gram-positive's may not have grown well in the microbiology lab with history of MRSA I think we need to cover possible MRSA infection as well per ID  recommendations.    Left leg abscess and right axilla abscess and surrounding cellulitis:  Improved.  Culture grew Klebsiella but Gram stain showed significant gram-positive bacteria.  Covering for possible MRSA as well.  Clinically improving.  Local wound care.    Cirrhosis with hepatic encephalopathy:  Findings consistent with cirrhosis.  Patient does have elevated ammonia and hyperbilirubinemia.  He was found to have asked rhexis consistent with hepatic encephalopathy and was started on lactulose.  Patient has significant alcohol history and I am suspicious this is alcoholic cirrhosis.  He will need outpatient evaluation in the liver clinic.  I have counseled him about the need for absolute sobriety.  Patient is family agreed to make sure all the alcohol was removed from his house before he returns home.    Alcohol use disorder/alcohol dependence:  [facility] protocol.  Patient has been provided with resources for sobriety.  Multivitamins, thiamine, folic acid.    Debility: PT.  Patient is chronically debilitated due to stroke.    Peripheral neuropathy: Continue low-dose gabapentin.  Pain controlled..    Continue AEDs for history of seizure disorder.  No concerning events have been reported.    Patient has new diagnosis of type 2 diabetes.  Hemoglobin A1c is 7.8.  Initiate glucose monitoring and correction scale.  Start low-dose metformin.    Treatment plan discussed with the patient is in agreement.    DVT Prophylaxis: Lovenox      Disposition: Home when improved    CODE STATUS:   Code Status and Medical Interventions:   Ordered at: 06/27/24 1913     Code Status (Patient has no pulse and is not breathing):    CPR (Attempt to Resuscitate)     Medical Interventions (Patient has pulse or is breathing):    Full Support       Brain Carlos MD  06/30/24

## 2024-07-01 ENCOUNTER — READMISSION MANAGEMENT (OUTPATIENT)
Dept: CALL CENTER | Facility: HOSPITAL | Age: 34
End: 2024-07-01
Payer: MEDICARE

## 2024-07-01 VITALS
RESPIRATION RATE: 18 BRPM | TEMPERATURE: 99.8 F | WEIGHT: 250.44 LBS | HEIGHT: 72 IN | BODY MASS INDEX: 33.92 KG/M2 | SYSTOLIC BLOOD PRESSURE: 144 MMHG | DIASTOLIC BLOOD PRESSURE: 76 MMHG | HEART RATE: 119 BPM | OXYGEN SATURATION: 98 %

## 2024-07-01 PROBLEM — L02.411 ABSCESS OF AXILLA, RIGHT: Status: ACTIVE | Noted: 2024-07-01

## 2024-07-01 PROBLEM — L02.416 ABSCESS OF LEG, LEFT: Status: ACTIVE | Noted: 2024-07-01

## 2024-07-01 PROBLEM — G93.41 METABOLIC ENCEPHALOPATHY: Status: RESOLVED | Noted: 2024-07-01 | Resolved: 2024-07-01

## 2024-07-01 PROBLEM — F10.20 ALCOHOL DEPENDENCE: Status: ACTIVE | Noted: 2024-07-01

## 2024-07-01 PROBLEM — D68.9 COAGULOPATHY: Status: ACTIVE | Noted: 2024-07-01

## 2024-07-01 PROBLEM — D69.6 THROMBOCYTOPENIA: Status: ACTIVE | Noted: 2024-07-01

## 2024-07-01 PROBLEM — G93.41 METABOLIC ENCEPHALOPATHY: Status: ACTIVE | Noted: 2024-07-01

## 2024-07-01 LAB
ALBUMIN SERPL-MCNC: 3.1 G/DL (ref 3.5–5.2)
ALBUMIN/GLOB SERPL: 0.8 G/DL
ALP SERPL-CCNC: 207 U/L (ref 39–117)
ALT SERPL W P-5'-P-CCNC: 28 U/L (ref 1–41)
ANION GAP SERPL CALCULATED.3IONS-SCNC: 11.2 MMOL/L (ref 5–15)
AST SERPL-CCNC: 75 U/L (ref 1–40)
BILIRUB SERPL-MCNC: 2.9 MG/DL (ref 0–1.2)
BUN SERPL-MCNC: 3 MG/DL (ref 6–20)
BUN/CREAT SERPL: 6.7 (ref 7–25)
CALCIUM SPEC-SCNC: 8.9 MG/DL (ref 8.6–10.5)
CHLORIDE SERPL-SCNC: 102 MMOL/L (ref 98–107)
CO2 SERPL-SCNC: 19.8 MMOL/L (ref 22–29)
CREAT SERPL-MCNC: 0.45 MG/DL (ref 0.76–1.27)
D-LACTATE SERPL-SCNC: 2 MMOL/L (ref 0.5–2)
DEPRECATED RDW RBC AUTO: 54.9 FL (ref 37–54)
EGFRCR SERPLBLD CKD-EPI 2021: 142.6 ML/MIN/1.73
ERYTHROCYTE [DISTWIDTH] IN BLOOD BY AUTOMATED COUNT: 16.6 % (ref 12.3–15.4)
GLOBULIN UR ELPH-MCNC: 4.1 GM/DL
GLUCOSE SERPL-MCNC: 151 MG/DL (ref 65–99)
HCT VFR BLD AUTO: 34.5 % (ref 37.5–51)
HGB BLD-MCNC: 11.9 G/DL (ref 13–17.7)
MCH RBC QN AUTO: 32.2 PG (ref 26.6–33)
MCHC RBC AUTO-ENTMCNC: 34.5 G/DL (ref 31.5–35.7)
MCV RBC AUTO: 93.2 FL (ref 79–97)
PLATELET # BLD AUTO: 111 10*3/MM3 (ref 140–450)
PMV BLD AUTO: 9.6 FL (ref 6–12)
POTASSIUM SERPL-SCNC: 3.9 MMOL/L (ref 3.5–5.2)
PROT SERPL-MCNC: 7.2 G/DL (ref 6–8.5)
RBC # BLD AUTO: 3.7 10*6/MM3 (ref 4.14–5.8)
SODIUM SERPL-SCNC: 133 MMOL/L (ref 136–145)
VANCOMYCIN TROUGH SERPL-MCNC: <4 MCG/ML (ref 5–20)
WBC NRBC COR # BLD AUTO: 8.17 10*3/MM3 (ref 3.4–10.8)

## 2024-07-01 PROCEDURE — 80202 ASSAY OF VANCOMYCIN: CPT | Performed by: NURSE PRACTITIONER

## 2024-07-01 PROCEDURE — 25810000003 SODIUM CHLORIDE 0.9 % SOLUTION 250 ML FLEX CONT: Performed by: NURSE PRACTITIONER

## 2024-07-01 PROCEDURE — 25010000002 THIAMINE HCL 200 MG/2ML SOLUTION: Performed by: STUDENT IN AN ORGANIZED HEALTH CARE EDUCATION/TRAINING PROGRAM

## 2024-07-01 PROCEDURE — 25010000002 CEFTRIAXONE PER 250 MG: Performed by: INTERNAL MEDICINE

## 2024-07-01 PROCEDURE — 80053 COMPREHEN METABOLIC PANEL: CPT | Performed by: INTERNAL MEDICINE

## 2024-07-01 PROCEDURE — 25010000002 VANCOMYCIN HCL 1.25 G RECONSTITUTED SOLUTION 1 EACH VIAL: Performed by: NURSE PRACTITIONER

## 2024-07-01 PROCEDURE — 25810000003 SODIUM CHLORIDE 0.9 % SOLUTION 250 ML FLEX CONT: Performed by: INTERNAL MEDICINE

## 2024-07-01 PROCEDURE — 85027 COMPLETE CBC AUTOMATED: CPT | Performed by: INTERNAL MEDICINE

## 2024-07-01 RX ORDER — FAMOTIDINE 20 MG/1
20 TABLET, FILM COATED ORAL 2 TIMES DAILY PRN
Qty: 180 TABLET | Refills: 0 | Status: SHIPPED | OUTPATIENT
Start: 2024-07-01

## 2024-07-01 RX ORDER — DIPHENOXYLATE HYDROCHLORIDE AND ATROPINE SULFATE 2.5; .025 MG/1; MG/1
1 TABLET ORAL DAILY
Start: 2024-07-06

## 2024-07-01 RX ORDER — VANCOMYCIN/0.9 % SOD CHLORIDE 1.5G/250ML
1500 PLASTIC BAG, INJECTION (ML) INTRAVENOUS EVERY 8 HOURS
Status: DISCONTINUED | OUTPATIENT
Start: 2024-07-01 | End: 2024-07-01 | Stop reason: HOSPADM

## 2024-07-01 RX ORDER — LACTULOSE 10 G/15ML
20 SOLUTION ORAL
Qty: 946 ML | Refills: 0 | Status: SHIPPED | OUTPATIENT
Start: 2024-07-01

## 2024-07-01 RX ORDER — DOXYCYCLINE HYCLATE 100 MG/1
100 CAPSULE ORAL 2 TIMES DAILY
Qty: 6 CAPSULE | Refills: 0 | Status: SHIPPED | OUTPATIENT
Start: 2024-07-01 | End: 2024-07-04

## 2024-07-01 RX ORDER — BLOOD-GLUCOSE METER
KIT MISCELLANEOUS
Qty: 1 EACH | Refills: 0 | Status: SHIPPED | OUTPATIENT
Start: 2024-07-01

## 2024-07-01 RX ORDER — METFORMIN HYDROCHLORIDE 500 MG/1
500 TABLET, EXTENDED RELEASE ORAL
Qty: 30 TABLET | Refills: 0 | Status: SHIPPED | OUTPATIENT
Start: 2024-07-02

## 2024-07-01 RX ORDER — LANCETS 30 GAUGE
EACH MISCELLANEOUS
Qty: 100 EACH | Refills: 12 | Status: SHIPPED | OUTPATIENT
Start: 2024-07-01

## 2024-07-01 RX ADMIN — VANCOMYCIN HYDROCHLORIDE 1250 MG: 1.25 INJECTION, POWDER, LYOPHILIZED, FOR SOLUTION INTRAVENOUS at 06:44

## 2024-07-01 RX ADMIN — THIAMINE HYDROCHLORIDE 200 MG: 100 INJECTION, SOLUTION INTRAMUSCULAR; INTRAVENOUS at 06:44

## 2024-07-01 RX ADMIN — METFORMIN HYDROCHLORIDE 500 MG: 500 TABLET, EXTENDED RELEASE ORAL at 08:33

## 2024-07-01 RX ADMIN — LACTULOSE 20 G: 20 SOLUTION ORAL at 08:32

## 2024-07-01 RX ADMIN — LEVETIRACETAM 2000 MG: 500 TABLET, FILM COATED ORAL at 08:33

## 2024-07-01 RX ADMIN — FOLIC ACID 1 MG: 1 TABLET ORAL at 08:33

## 2024-07-01 RX ADMIN — Medication 1 TABLET: at 08:33

## 2024-07-01 RX ADMIN — CARBAMAZEPINE 200 MG: 200 CAPSULE, EXTENDED RELEASE ORAL at 08:33

## 2024-07-01 RX ADMIN — CEFTRIAXONE 2000 MG: 2 INJECTION, POWDER, FOR SOLUTION INTRAMUSCULAR; INTRAVENOUS at 08:32

## 2024-07-01 NOTE — PROGRESS NOTES
Enter Query Response Below      Query Response:     - Left leg Cellulitis is possibly due to type 2 Diabetes Mellitus   Electronically signed by Brain Carlos MD, 24, 3:36 PM EDT.           If applicable, please update the problem list.     Patient: Avinash Everett        : 1990  Account: [id number]           Admit Date:         How to Respond to this query:       a. Click New Note     b. Answer query within the yellow box.                c. Update the Problem List, if applicable.    ,    33 year old male with PMH of a CVA () with Left sided Paralysis, Seizures, Alcohol abuse admitted  for Left leg abscess/ right axilla abscess with surrounding cellulitis, Cirrhosis with hepatic encephalopathy and a new diagnosis of type 2 DM.  Admit A1c was 7.8.  Admit glucose was 163.  Subsequent glucose levels were between 118-151.  Diabetes educator consulted he stated he will try to cut down on sugary drinks and Alcohol.  Patient was treated with Vancomycin (-), Vibramycin (, ), Rocephin (, , , ), Glucophage and Neurontin.  Patient is discharged on Vibramycin, Chronulac and Metformin    Please clarify the following:    - Left leg Cellulitis is due to type 2 Diabetes Mellitus  - Left leg Cellulitis is not due to diabetes mellitus  - Other- specify_____________  - Unable to determine      By submitting this query, we are merely seeking further clarification of documentation to accurately reflect all conditions that you are monitoring, evaluating, treating or that extend the hospitalization or utilize additional resources of care. Please utilize your independent clinical judgment when addressing the question(s) above.     This query and your response, once completed, will be entered into the legal medical record.    Sincerely,  Mamadou Dunlap RN CDIS  Clinical Documentation Integrity Program   [email or web address]

## 2024-07-01 NOTE — CONSULTS
"Nutrition Services    Patient Name:  Avinash Everett  YOB: 1990  MRN: [id number]  Admit Date:  6/27/2024  Assessment Date:  07/01/24    CLINICAL NUTRITION - EDUCATION     ASSESSMENT  Encounter Information         Consult from Physician    Education topic Diabetes    Learner Patient    Learning readiness Motivated to learn    Pertinent nutrition history ETOH abuse     Anthropometrics         Height Height: 182.9 cm (72\")    Weight Weight: 114 kg (250 lb 7.1 oz) (06/30/24 0520)    BMI  Body mass index is 33.97 kg/m².   Obese, Class I (30 - 34.9)    Comments      Medication/Biochemical          Pertinent medications Oral hypoglycemic agent       Pertinent lab values Results from last 7 days   Lab Units 07/01/24  0527 06/30/24  1722 06/30/24  0412 06/29/24  0453   SODIUM mmol/L 133*  --  138 138   POTASSIUM mmol/L 3.9 4.2 3.4* 4.3   CHLORIDE mmol/L 102  --  107 109*   CO2 mmol/L 19.8*  --  22.0 20.0*   BUN mg/dL 3*  --  3* 2*   CREATININE mg/dL 0.45*  --  0.47* 0.41*   CALCIUM mg/dL 8.9  --  8.7 8.5*   BILIRUBIN mg/dL 2.9*  --  1.9* 2.3*   ALK PHOS U/L 207*  --  194* 189*   ALT (SGPT) U/L 28  --  25 24   AST (SGOT) U/L 75*  --  73* 63*   GLUCOSE mg/dL 151*  --  118* 126*       Lab Results   Component Value Date    HGBA1C 7.80 (H) 06/28/2024        DIAGNOSIS  PES Statement         Problem  Food and nutrition knowledge deficit    Etiology Medical Diagnosis - New DM, Hx of ETOH abuse, leg cellulitis    Signs/Symptoms Information deficit, Report/observation     INTERVENTION  Intervention/Evaluation         Goal   Disease management/therapy, Improved nutrition related labs, Provide information , Reduce/improve symptoms    Educated regarding Appropriate portions, Beverage choices, Eating pattern, Food choices, Food preparation, Food shopping, Label reading, Snacks    Resources given Discussion only, Printed materials    Monitor/evaluation  Per protocol    Discharge planning Contact RD if questions/concerns "     RD to follow per protocol.     Electronically signed by:  Melisa Luo RD  07/01/24 10:20 EDT

## 2024-07-01 NOTE — CONSULTS
"Diabetes Education  Assessment/Teaching    Patient Name:  Avinash Everett  YOB: 1990  MRN: [id number]  Admit Date:  6/27/2024      Assessment Date:  7/1/2024  Flowsheet Row Most Recent Value   General Information     Referral From: A1c, Database  [A1C 7.8%]   Height 182.9 cm (72\")   Height Method Stated   Weight 114 kg (250 lb 7.1 oz)   Weight Method Bed scale   Are you currently involved in an activity/exercise program?  No   Patient expressed need I know alot about diabetes ,as lots of family members have diabetes   Pregnancy Assessment    Diabetes History    What type of diabetes do you have? Type 2   Length of Diabetes Diagnosis Newly diagnosed <6 months   Current DM knowledge poor   Have you had diabetes education/teaching in the past? no   Do you test your blood sugar at home? no   Have you had low blood sugar? (<70mg/dl) no   Have you had high blood sugar? (>140mg/dl) no   How would you rate your diabetes control? poor   How do you feel about having diabetes? feel like i was just waiting to get it because all my family does   What makes it difficult for you to take care of your diabetes or yourself? previous stroke has left him without use left arm   Education Preferences    What areas of diabetes would you like to learn about? avoiding high blood sugar, medications for diabetes, testing my blood sugar at home, diet information   Barriers to Learning manual dexterity problems   Nutrition Information    Assessment Topics    Healthy Eating - Assessment Needs education   Being Active - Assessment Needs education   Taking Medication - Assessment Needs education   Problem Solving - Assessment Needs education   Reducing Risk - Assessment Needs education   Healthy Coping - Assessment Needs education   Monitoring - Assessment Needs education   DM Goals    Healthy Eating - Goal 0-7 days from discharge   Being Active - Goal 0-7 days from discharge   Taking Medication - Goal 0-7 days from discharge "   Problem Solving - Goal 0-7 days from discharge   Healthy Coping - Goal 0-7 days from discharge   Monitoring - Goal 0-7 days from discharge            Flowsheet Row Most Recent Value   DM Education Needs    Meter Meter provided  [RX sent to his home pharmacy]   Medication Oral  [metformin at discharge]   Healthy Eating Reviewed meal plan   Physical Activity Other (comment)  [very limited from a previous stroke]   Physical Activity Frequency Never, Other (comment)  [very limited]   Healthy Coping Appropriate   Discharge Plan Home, Follow-up with MD   Motivation Moderate   Teaching Method Explanation, Discussion, Handouts   Patient Response Verbalized understanding, Needs reinforcement              Other Comments:  Discussed with pt his diabetes diagnosis. Pt states this is a new diagnosis, but that he is not surprised because everyone in his family has diabetes. He states he lives alone but on the same property as his mom and family.so they could be stopping by frequently to help him He is very limited in the use of his left arm and hand,so testing his Bg would be a challenge. He states his family could test when they stop by.    Meter RX sent to his home pharmacy,his insurance coverage for a meter is part B and needs to be filled at his home pharmacy.     Discussed with pt his eating/drinking habits and reviewed how these would impact his diabetes.Pt states he is willing to reduce or eliminate sugary drinks.  Discussed the oral medication he will be sent home with,metformin. Also, a basic survival skills written material booklet given on all that was discussed.         Electronically signed by:  Ame Stratton RN  07/01/24 11:06 EDT

## 2024-07-01 NOTE — SIGNIFICANT NOTE
07/01/24 1056   OTHER   Discipline occupational therapist   Rehab Time/Intention   Session Not Performed other (see comments)  (d/c orders in place, pt plans home at d/c. Will sign off at this time.)   Therapy Assessment/Plan (PT)   Criteria for Skilled Interventions Met (PT) no problems identified which require skilled intervention

## 2024-07-01 NOTE — DISCHARGE SUMMARY
[facility] Medicine Services  DISCHARGE SUMMARY    Patient Name: Avinash Everett  : 1990  MRN: [id number]    Date of Admission: 2024  3:08 PM  Date of Discharge: 2024  Primary Care Physician: Sada De Oliveira MD    Consults       Date and Time Order Name Status Description    2024  8:04 AM Inpatient Infectious Diseases Consult Completed     2024  5:56 PM [facility] (on-call MD unless specified) Details              Hospital Course       Active Hospital Problems    Diagnosis  POA    **Cellulitis of left lower extremity [L03.116]  Yes    Abscess of leg, left [L02.416]  Yes    Abscess of axilla, right [L02.411]  Yes    Thrombocytopenia [D69.6]  Yes    Coagulopathy likely due to liver disease [D68.9]  Yes    Alcohol dependence [F10.20]  Yes    Klebsiella infection [A49.8]  Yes    MRSA (methicillin resistant Staphylococcus aureus) colonization [Z22.322]  Not Applicable    Hemiplegia of left side as late effect of cerebral infarction [I69.352]  Not Applicable    Type 2 diabetes mellitus with hyperglycemia [E11.65]  Yes    History of CVA (cerebrovascular accident) [Z86.73]  Not Applicable    History of seizure [Z87.898]  Yes    Tobacco abuse [Z72.0]  Yes    Psoriasis [L40.9]  Yes    Hyperglycemia [R73.9]  Yes    Hypokalemia [E87.6]  Yes    Seizure disorder [G40.909]  Yes    Alcohol abuse [F10.10]  Yes    Encephalopathy, hepatic [K76.82]  Yes      Resolved Hospital Problems    Diagnosis Date Resolved POA    Metabolic encephalopathy [G93.41] 2024 Yes    Lactic acidosis [E87.20] 2024 Yes          Hospital Course:  Avinash Everett is a 33 y.o. male with past medical history of MRSA presents to the hospital with confusion and left lower extremity cellulitis and draining abscess with lactic acidosis and tachycardia.  Patient was found to have findings concerning for cirrhosis and had elevated ammonia and asterixis concerning for hepatic encephalopathy.  Patient was treated with lactulose and  encephalopathy has resolved.  Patient has received counseling for sobriety and appears motivated to quit from his alcohol dependence.  He is not currently having alcohol withdrawal but was monitored in the hospital.  He has stabilized and is hemodynamically stable to discharge.     Discussion/plan for today:  Hepatic encephalopathy appears to be controlled currently with lactulose.  Planning to discharge patient on lactulose with breakfast and lunch and instructions to titrate to 2-4 bowel movements daily.  Consult nutrition for diabetic diet education.  Consult diabetic educator for general education for new diabetes.  Convert to doxycycline per infectious disease recommendations.  Klebsiella is susceptible to doxycycline.  Still suspicious as Gram stain shows gram-positive in clusters that patient also had MRSA that may not have grown in the microbiology lab.  Doxycycline should cover for both these organisms.  Abscess is much improved with now only minimal drainage.  I do think patient needs a few more days of antibiotics.  I have also instructed him and his mother on appropriate hygiene and wound care.  I have provided patient with gastroenterology contact otherwise he can get a referral to gastroenterologist for liver disease by his primary care provider.  Patient appears stable to discharge and is very eager to do so.     Left leg abscess and right axilla abscess and surrounding cellulitis:  Improved.  Culture grew Klebsiella but Gram stain showed significant gram-positive bacteria.  Covering for possible MRSA as well.  Local wound care and hygiene.  Discharged on doxycycline per ID recommendations and for Klebsiella susceptibilities and MRSA coverage.     Cirrhosis with hepatic encephalopathy:  Findings consistent with cirrhosis.  Patient does have elevated ammonia and hyperbilirubinemia.  He was found to have asked rhexis consistent with hepatic encephalopathy and was started on lactulose.  Patient has  significant alcohol history and I am suspicious this is alcoholic cirrhosis.  He will need outpatient evaluation in the liver clinic.  I have counseled him about the need for absolute sobriety.  Patient is family agreed to make sure all the alcohol was removed from his house before he returns home.     Alcohol use disorder/alcohol dependence:  CIWA protocol.  Patient has been provided with resources for sobriety.  Multivitamins, thiamine, folic acid.  No current withdrawal.  Patient was given low-dose lorazepam to prevent withdrawal in the hospital but has been off this now.     Debility: PT.  Patient is chronically debilitated due to stroke.  Patient reports he is back to his typical baseline.     Peripheral neuropathy: Continue low-dose gabapentin.  Pain controlled..     Continue AEDs for history of seizure disorder.  No concerning events have been reported.     Patient has new diagnosis of type 2 diabetes.  Hemoglobin A1c is 7.8.  Initiate glucose monitoring and correction scale.  Start low-dose metformin.  Diabetic educator.  Primary care follow-up    At the time of discharge patient was told to take all medications as prescribed, keep all follow-up appointments, and call their doctor or return to the hospital with any worsening or concerning symptoms.    Please note that this note was made using Dragon voice recognition software        Day of Discharge     Subjective today:  Patient having bowel movements.  Patient is able to answer orientation questions correctly today.  His asterixis is resolved.  He feels back to his typical baseline mentation.  Patient says he is talking to his family about getting on the alcohol out of his house and says he may stay with family for a week or so while he recovers.  He agrees to get a pill organizer to make sure he is organized in his medications.  No other new complaints.     Vital Signs:   Temp:  [97.7 °F (36.5 °C)-99.8 °F (37.7 °C)] 99.8 °F (37.7 °C)  Heart Rate:   "[102-119] 119  Resp:  [18] 18  BP: (128-144)/(72-85) 144/76     Physical Exam:    Constitutional:Awake, alert, no acute distress  HENT: NCAT, mucous membranes moist, neck supple  Respiratory: No cough or wheezes, normal respirations, nonlabored breathing   Cardiovascular: Pulse rate is normal, palpable radial pulses  Gastrointestinal:  soft, nontender, nondistended  Musculoskeletal: Normal musculature for age, no lower extremity edema, BMI 33  Psychiatric: Appropriate affect, cooperative, conversational  Neurologic: No further asterixis and mentation normal and much improved, no slurred speech or facial droop, follows commands  Skin: No rashes or jaundice, warm    Pertinent  and/or Most Recent Results     Results from last 7 days   Lab Units 07/01/24  0527 06/30/24  1722 06/30/24  0412 06/29/24  0453 06/28/24  2119 06/28/24  0500 06/27/24  1605   WBC 10*3/mm3 8.17  --  5.07 4.98  --  4.15 6.15   HEMOGLOBIN g/dL 11.9*  --  11.4* 11.7*  --  11.7* 12.9*   HEMATOCRIT % 34.5*  --  34.2* 34.0*  --  34.2* 37.1*   PLATELETS 10*3/mm3 111*  --  88* 73*  --  69* 89*   SODIUM mmol/L 133*  --  138 138  --  137 133*   POTASSIUM mmol/L 3.9 4.2 3.4* 4.3 4.2 3.1* 3.4*   CHLORIDE mmol/L 102  --  107 109*  --  103 95*   CO2 mmol/L 19.8*  --  22.0 20.0*  --  25.1 23.0   BUN mg/dL 3*  --  3* 2*  --  3* 2*   CREATININE mg/dL 0.45*  --  0.47* 0.41*  --  0.39* 0.41*   GLUCOSE mg/dL 151*  --  118* 126*  --  140* 163*   CALCIUM mg/dL 8.9  --  8.7 8.5*  --  8.1* 8.5*     Results from last 7 days   Lab Units 07/01/24  0527 06/30/24  0412 06/29/24  0453 06/28/24  0500 06/27/24  1605   BILIRUBIN mg/dL 2.9* 1.9* 2.3* 1.9* 2.3*   ALK PHOS U/L 207* 194* 189* 174* 199*   ALT (SGPT) U/L 28 25 24 25 31   AST (SGOT) U/L 75* 73* 63* 60* 71*   PROTIME Seconds  --   --  19.5*  --   --    INR   --   --  1.62*  --   --            Invalid input(s): \"TG\", \"LDLCALC\", \"LDLREALC\"  Results from last 7 days   Lab Units 06/28/24  0500 06/28/24  0223 06/27/24  2306 " 06/27/24 2132 06/27/24 2018   HEMOGLOBIN A1C % 7.80*  --   --   --   --    PROCALCITONIN ng/mL  --   --   --  0.10  --    LACTATE mmol/L  --  2.0 2.5*  --  2.7*       Microbiology Results Abnormal       Procedure Component Value - Date/Time    Blood Culture - Blood, Arm, Right [[id number]]  (Normal) Collected: 06/27/24 1716    Lab Status: Preliminary result Specimen: Blood from Arm, Right Updated: 06/30/24 1730     Blood Culture No growth at 3 days    Narrative:      Less than seven (7) mL's of blood was collected.  Insufficient quantity may yield false negative results.    Blood Culture - Blood, Arm, Right [[id number]]  (Normal) Collected: 06/27/24 1716    Lab Status: Preliminary result Specimen: Blood from Arm, Right Updated: 06/30/24 1730     Blood Culture No growth at 3 days    Narrative:      Less than seven (7) mL's of blood was collected.  Insufficient quantity may yield false negative results.          Wound culture 6/28:  Klebsiella pneumoniae ssp pneumoniae , Gram stain shows gram positive cocci in pairs and clusters ?  Possible MRSA that did not grow in the microbiology lab    Imaging Results (All)       Procedure Component Value Units Date/Time    US Liver [[id number]] Collected: 06/29/24 1519     Updated: 06/29/24 1528    Narrative:      US LIVER-     INDICATIONS: Cirrhosis, ammonia. Reported history of cholecystectomy.     TECHNIQUE: Ultrasound of the right upper quadrant     COMPARISON: 8/12/2020     FINDINGS:     Assessment is limited by body habitus.     The gallbladder is not visualized.     No intrahepatic or extrahepatic biliary ductal dilatation is  demonstrated, but the common biliary duct cannot be visualized, limiting  the assessment.     The liver measures 15.2 cm, and is diffusely heterogeneous. No liver  lesion is identified.     The pancreas is obscured. The right kidney, 11.2 cm, appears  unremarkable.                Impression:         Status post cholecystectomy. Heterogeneous liver.   No biliary ductal  dilatation or liver lesion identified, limited exam. If further imaging  evaluation is indicated, enhanced cross-sectional imaging of the liver  can be obtained.     This report was finalized on 6/29/2024 3:25 PM by Dr. Rell Hernández M.D on Workstation: [id number]       CT Head Without Contrast [[id number]] Collected: 06/27/24 1748     Updated: 06/27/24 1753    Narrative:      CT HEAD WO CONTRAST-     INDICATIONS: Confusion     TECHNIQUE: Radiation dose reduction techniques were utilized, including  automated exposure control and exposure modulation based on body size.  Noncontrast head CT     COMPARISON: 1/10/2022     FINDINGS:           No acute intracranial hemorrhage, midline shift or mass effect. No acute  territorial infarct is identified. Right frontal parietal  encephalomalacia is noted, with overlying craniotomy change.        Ventricles, cisterns, cerebral sulci are unremarkable for patient age.     The visualized paranasal sinuses, orbits, mastoid air cells are  unremarkable.                   Impression:         No acute intracranial hemorrhage or hydrocephalus. Chronic changes of  the brain. If there is further clinical concern, MRI could be considered  for further evaluation.     This report was finalized on 6/27/2024 5:50 PM by Dr. Rell Hernández M.D on Workstation: [id number]               Results for orders placed during the hospital encounter of 08/03/20    Duplex Venous Upper Extremity - Bilateral CAR    Interpretation Summary  · Acute right upper extremity superficial thrombophlebitis noted in the cephalic (upper arm) and cephalic (forearm).  · All other vessels appear normal.      Results for orders placed during the hospital encounter of 08/03/20    Duplex Venous Upper Extremity - Bilateral CAR    Interpretation Summary  · Acute right upper extremity superficial thrombophlebitis noted in the cephalic (upper arm) and cephalic (forearm).  · All other vessels appear  normal.      Results for orders placed during the hospital encounter of 08/03/20    Adult Transthoracic Echo Complete W/ Cont if Necessary Per Protocol (With Agitated Saline)    Interpretation Summary  · Left ventricular systolic function is normal. Calculated EF = 70%. Estimated EF was in agreement with the calculated EF. Estimated EF = 70%. Normal left ventricular cavity size and wall thickness noted. All left ventricular wall segments contract normally. Left ventricular diastolic dysfunction is noted (grade I) consistent with impaired relaxation.  · Mild mitral valve regurgitation is present.  · Trace tricuspid valve regurgitation is present. Estimated right ventricular systolic pressure from tricuspid regurgitation is mildly elevated (35-45 mmHg). Calculated right ventricular systolic pressure from tricuspid regurgitation is 38 mmHg.  · Normal left atrial volume noted. Saline test results are negative.  · Resting sinus tachycardia is present during the study        Discharge Details        Discharge Medications        New Medications        Instructions Start Date   doxycycline 100 MG capsule  Commonly known as: VIBRAMYCIN   100 mg, Oral, 2 Times Daily      glucose blood test strip   Use to test blood glucose daily. Formulary Compliance Approval. Diagnosis: Type 2 Diabetes - Not Insulin Dependent.  Diagnosis Code E11.9      glucose monitor monitoring kit   Use to test blood glucose daily. Formulary Compliance Approval. Diagnosis: Type 2 Diabetes - Not Insulin Dependent.  Diagnosis Code E11.9      lactulose 10 GM/15ML solution  Commonly known as: CHRONULAC   20 g, Oral, Daily With Breakfast & Lunch      Lancets misc   Use to test blood glucose daily. Formulary Compliance Approval. Diagnosis: Type 2 Diabetes - Not Insulin Dependent.  Diagnosis Code E11.9      metFORMIN  MG 24 hr tablet  Commonly known as: GLUCOPHAGE-XR   500 mg, Oral, Daily With Breakfast   Start Date: July 2, 2024     multivitamin tablet  tablet   1 tablet, Oral, Daily, OTC   Start Date: July 6, 2024            Changes to Medications        Instructions Start Date   cetaphil lotion  What changed: how much to take   Topical, Every 12 Hours Scheduled      famotidine 20 MG tablet  Commonly known as: PEPCID  What changed:   when to take this  reasons to take this   20 mg, Oral, 2 Times Daily PRN             Continue These Medications        Instructions Start Date   acetaminophen 325 MG tablet  Commonly known as: TYLENOL   650 mg, Oral, Every 6 Hours PRN      carBAMazepine 100 MG 12 hr capsule  Commonly known as: CARBATROL   100 mg, Oral, Every Morning      carBAMazepine 200 MG 12 hr capsule  Commonly known as: CARBATROL   200 mg, Oral, 2 Times Daily      FISH OIL PO   Oral      folic acid 1 MG tablet  Commonly known as: FOLVITE   1 mg, Oral, Daily      gabapentin 300 MG capsule  Commonly known as: NEURONTIN   300 mg, Oral, Nightly      levETIRAcetam 1000 MG tablet  Commonly known as: KEPPRA   2,000 mg, Oral, 2 Times Daily      mirtazapine 15 MG tablet  Commonly known as: REMERON   15 mg, Oral, Nightly      thiamine 100 MG tablet  Commonly known as: VITAMIN B1   100 mg, Oral, Daily      Valtoco 20 MG Dose 10 MG/0.1ML liquid therapy pack  Generic drug: diazePAM (20 MG Dose)   1 spray, Nasal, As Needed             Stop These Medications      potassium chloride 10 MEQ CR capsule  Commonly known as: MICRO-K              Allergies   Allergen Reactions    Citrus Hives     Hives in mouth      Promethazine Hives         Discharge Disposition:  Home or Self Care    Diet:  Hospital:  Diet Order   Procedures    Diet: Regular/House; Fluid Consistency: Thin (IDDSI 0)       Activity:  Activity Instructions       Activity as Tolerated      Up WIth Assist                   CODE STATUS:    Code Status and Medical Interventions:   Ordered at: 06/27/24 1913     Code Status (Patient has no pulse and is not breathing):    CPR (Attempt to Resuscitate)     Medical  Interventions (Patient has pulse or is breathing):    Full Support       Additional Instructions for the Follow-ups that You Need to Schedule       Discharge Follow-up with PCP   As directed       Currently Documented PCP:    Sada De Oliveira MD    PCP Phone Number:    [phone]     Follow Up Details: 1 week               Follow-up Information       Sada De Oliveira MD. Schedule an appointment as soon as possible for a visit in 1 week(s).    Specialty: Internal Medicine  Why: 1 week  Contact information:  [address] [place], [place] [address] [phone]               Azael Navas MD Follow up.    Specialty: Gastroenterology  Why: Recommend establishing with a gastroenterologist for concern for liver disease as discussed and I recommend Dr. Navas.  Please call to schedule.  If you would prefer a different gastroenterologist or have difficulty scheduling.  Please speak with your primary care provider for referral.  Contact information:  [facility] [address] [address] [phone]                                 Brain Carlos MD  07/01/24      Time Spent on Discharge:  I spent greater than 40 minutes on this discharge activity which included: face-to-face encounter with the patient, reviewing the data in the system, coordination of the care with the nursing staff as well as consultants, documentation, and entering orders.

## 2024-07-01 NOTE — PROGRESS NOTES
"Access Ctr note.    Chart reviewed.    Met w/Pt in room #540. Introduced self/role. Pt agreed to f/u.     Pt reported he has \"started the ball rolling\" with [facility] for VANE tx. He described the program he will enter once discharged. What he described was c/w IOP. He denied any need for medicines to help w/withdrawal at this time. CIWA scores have been 0 for more than 24 hours.    No SI/HI. With 10 being most, Pt rated depression \"4\" & anxiety \"6 or 7\" 7 cravings \"1 or 2\".   No other needs identified at this time. Pt requested continued follow up.    Access following.     "

## 2024-07-01 NOTE — NURSING NOTE
No new issues overnight. On lactulose, 2 large BM this shift. Aox4, VSS, Ra. CIWA 0. ST all shift.

## 2024-07-01 NOTE — PLAN OF CARE
Goal Outcome Evaluation:  Plan of Care Reviewed With: patient, family      Goals met pt to dc

## 2024-07-01 NOTE — PROGRESS NOTES
"[facility] Clinical Pharmacy Services: Vancomycin Monitoring Note    Avinash Everett is a 33 y.o. male who is on day 5 (end date 7/4) of pharmacy to dose vancomycin for Skin and Soft Tissue.    Previous Vancomycin Dose:  1250 mg IV every  12  hours    Updated Cultures and Sensitivities:   6/27: Blood cultures 2/2 NGTD (3 days)  6/28: Wound culture- Klebsiella pneumoniae, corynebacterium           Gram statin- gram pos cocci in pairs and clusters    Results from last 7 days   Lab Units 07/01/24  0527 06/28/24  0500   VANCOMYCIN TR mcg/mL <4.00* 5.80     Vitals/Labs  Ht: 182.9 cm (72\"); Wt: 114 kg (250 lb 7.1 oz)   Temp Readings from Last 1 Encounters:   07/01/24 99.8 °F (37.7 °C) (Oral)     Estimated Creatinine Clearance: 304.5 mL/min (A) (by C-G formula based on SCr of 0.45 mg/dL (L)).     Results from last 7 days   Lab Units 07/01/24  0527 06/30/24  0412 06/29/24  0453   CREATININE mg/dL 0.45* 0.47* 0.41*   WBC 10*3/mm3 8.17 5.07 4.98     Assessment/Plan    Will increase dose to 1500 mg IV every 8 hours today which provides a predicted  mg/L.hr   Will order a trough on 7/2 at 1430 (prior to fourth dose of new regimen) to ensure that he is staying therapeutic.   Will continue to monitor patient changes and renal function and order if indicated.     Thank you for involving pharmacy in this patient's care. Please contact pharmacy with any questions or concerns.       Nasir Oquendo formerly [facility]  Clinical Pharmacist         "

## 2024-07-02 LAB
BACTERIA SPEC AEROBE CULT: ABNORMAL
BACTERIA SPEC AEROBE CULT: NORMAL
BACTERIA SPEC AEROBE CULT: NORMAL
GRAM STN SPEC: ABNORMAL
GRAM STN SPEC: ABNORMAL

## 2024-07-02 NOTE — OUTREACH NOTE
Prep Survey      Flowsheet Row Responses   [facility] facility patient discharged from? [facility]   Is LACE score < 7 ? No   Eligibility Readm Mgmt   Discharge diagnosis Cellulitis of left lower extremity   Does the patient have one of the following disease processes/diagnoses(primary or secondary)? Other   Does the patient have Home health ordered? No   Is there a DME ordered? No   Prep survey completed? Yes            Parul KEYES - Registered Nurse

## 2024-07-03 NOTE — CASE MANAGEMENT/SOCIAL WORK
Case Management Discharge Note      Final Note: Dc home         Selected Continued Care - Discharged on 7/1/2024 Admission date: 6/27/2024 - Discharge disposition: Home or Self Care      Destination    No services have been selected for the patient.                Durable Medical Equipment    No services have been selected for the patient.                Dialysis/Infusion    No services have been selected for the patient.                Home Medical Care    No services have been selected for the patient.                Therapy    No services have been selected for the patient.                Community Resources    No services have been selected for the patient.                Community & DME    No services have been selected for the patient.                         Final Discharge Disposition Code: 01 - home or self-care

## 2024-07-05 RX ORDER — LEVETIRACETAM 1000 MG/1
2000 TABLET ORAL 2 TIMES DAILY
Qty: 360 TABLET | Refills: 1 | Status: SHIPPED | OUTPATIENT
Start: 2024-07-05

## 2024-07-05 RX ORDER — CARBAMAZEPINE 200 MG/1
200 CAPSULE, EXTENDED RELEASE ORAL 2 TIMES DAILY
Qty: 180 CAPSULE | Refills: 1 | Status: SHIPPED | OUTPATIENT
Start: 2024-07-05

## 2024-07-05 RX ORDER — CARBAMAZEPINE 100 MG/1
100 CAPSULE, EXTENDED RELEASE ORAL EVERY MORNING
Qty: 90 CAPSULE | Refills: 3 | Status: SHIPPED | OUTPATIENT
Start: 2024-07-05

## 2024-07-05 NOTE — TELEPHONE ENCOUNTER
Caller: RUFINA    Relationship: Grandparent    Best call back number: [phone]    Requested Prescriptions:   Requested Prescriptions     Pending Prescriptions Disp Refills    carBAMazepine (CARBATROL) 200 MG 12 hr capsule 180 capsule 3     Sig: Take 1 capsule by mouth 2 (Two) Times a Day.    levETIRAcetam (KEPPRA) 1000 MG tablet 120 tablet 2     Sig: Take 2 tablets by mouth 2 (Two) Times a Day.        Pharmacy where request should be sent: [address] - [phone] [facility] [phone] FX     Last office visit with prescribing clinician: Visit date not found   Last telemedicine visit with prescribing clinician: Visit date not found   Next office visit with prescribing clinician: 11/18/2024     Additional details provided by patient: PATIENT HAS 2 WEEKS WORTH LEFT OF THE CARBAMAZEPINE    AND THEY THINK 1 WEEKS WORTH OF THE KEPPRA    Does the patient have less than a 3 day supply:  [] Yes  [x] No    Would you like a call back once the refill request has been completed: [] Yes [x] No    If the office needs to give you a call back, can they leave a voicemail: [] Yes [x] No    Olga Ang Rep   07/05/24 14:50 EDT

## 2024-07-08 ENCOUNTER — READMISSION MANAGEMENT (OUTPATIENT)
Dept: CALL CENTER | Facility: HOSPITAL | Age: 34
End: 2024-07-08
Payer: MEDICARE

## 2024-07-08 NOTE — OUTREACH NOTE
Medical Week 1 Survey      Flowsheet Row Responses   [facility] patient discharged from? [facility]   Does the patient have one of the following disease processes/diagnoses(primary or secondary)? Other   Call start time 1314   Call end time 1321   Discharge diagnosis Cellulitis of left lower extremity   Meds reviewed with patient/caregiver? Yes   Is the patient having any side effects they believe may be caused by any medication additions or changes? No   Does the patient have all medications ordered at discharge? Yes   Is the patient taking all medications as directed (includes completed medication regime)? Yes   Does the patient have a primary care provider?  Yes   Does the patient have an appointment with their PCP within 7 days of discharge? Yes   Has the patient kept scheduled appointments due by today? --  [Pt's mother is unsure if the pt went ot his PCP f/u appt today, she reports]   Psychosocial comments Alcohol.   Comments Pt's mother reports that the pt has ongoing issues with alcohol abuse. Pt's LLE edema/redness and drainage has improved per pt's mother. Pt has not c/o fever, chills at this time, per pt's mother. The pt is tolerating po intake per pt's mother. Pt is currently not monitoring his glucose levels at home.Per pt's mother the pt lives next door to his mother and grandmother on the [place].   Did the patient receive a copy of their discharge instructions? Yes   What is the patient's perception of their health status since discharge? Improving   Is the patient/caregiver able to teach back signs and symptoms related to disease process for when to call PCP? Yes   Is the patient/caregiver able to teach back signs and symptoms related to disease process for when to call 911? Yes   Revoked No further contact(revokes)-requires comment   Call end time 1321            Maribel SPAIN - Registered Nurse

## 2024-11-21 ENCOUNTER — TELEPHONE (OUTPATIENT)
Dept: NEUROLOGY | Facility: CLINIC | Age: 34
End: 2024-11-21

## 2024-11-21 NOTE — TELEPHONE ENCOUNTER
PT HAS APPT TODAY AT 3:00 PM  - GRANDMOTHER DOES NOT WANT TO DRIVE IN TODAYS WEATHER AND THEY ARE REQUESTING A TELE-A-HEALTH VISIT FOR TODAY    PLEASE ADVISE

## 2025-02-09 RX ORDER — LEVETIRACETAM 1000 MG/1
2000 TABLET ORAL 2 TIMES DAILY
Qty: 360 TABLET | Refills: 0 | OUTPATIENT
Start: 2025-02-09

## 2025-02-14 ENCOUNTER — TELEPHONE (OUTPATIENT)
Dept: NEUROLOGY | Facility: CLINIC | Age: 35
End: 2025-02-14
Payer: MEDICARE

## 2025-02-14 RX ORDER — LEVETIRACETAM 1000 MG/1
2000 TABLET ORAL 2 TIMES DAILY
Qty: 120 TABLET | Refills: 0 | Status: SHIPPED | OUTPATIENT
Start: 2025-02-14

## 2025-02-14 NOTE — TELEPHONE ENCOUNTER
I have sent a My Chart message and left a voice message about scheduling a 60 minute appointment with Dr Yang.   Dr Yang has never seen patient

## 2025-02-14 NOTE — TELEPHONE ENCOUNTER
February 13, 2025  Me to Rolan Yang MD Regarding result: levETIRAcetam (KEPPRA) 1000 MG tablet   DG    2/13/25  9:33 AM  Last appointment was with Dr Baker 11/7/2023  No show 5/8/2024, 1/22/2025               I will refill for 1 month. Call him and schedule next available 60 min slot. When he's scheduled let me know and I can refill him up to his next appointment. If he does not schedule an appointment, then I may write him for a supply of medication but move to discharge him from clinic as he has never been seen by me.

## 2025-02-17 RX ORDER — LEVETIRACETAM 1000 MG/1
2000 TABLET ORAL 2 TIMES DAILY
Qty: 360 TABLET | Refills: 0 | OUTPATIENT
Start: 2025-02-17

## 2025-03-11 RX ORDER — LEVETIRACETAM 1000 MG/1
2000 TABLET ORAL 2 TIMES DAILY
Qty: 120 TABLET | Refills: 0 | OUTPATIENT
Start: 2025-03-11

## 2025-03-17 RX ORDER — LEVETIRACETAM 1000 MG/1
2000 TABLET ORAL 2 TIMES DAILY
Qty: 120 TABLET | Refills: 0 | OUTPATIENT
Start: 2025-03-17

## 2025-03-21 ENCOUNTER — TELEPHONE (OUTPATIENT)
Dept: NEUROLOGY | Facility: CLINIC | Age: 35
End: 2025-03-21
Payer: MEDICARE

## 2025-03-21 RX ORDER — CARBAMAZEPINE 200 MG/1
200 CAPSULE, EXTENDED RELEASE ORAL 2 TIMES DAILY
Qty: 112 CAPSULE | Refills: 0 | Status: SHIPPED | OUTPATIENT
Start: 2025-03-21

## 2025-03-21 RX ORDER — CARBAMAZEPINE 100 MG/1
100 CAPSULE, EXTENDED RELEASE ORAL EVERY MORNING
Qty: 56 CAPSULE | Refills: 0 | Status: SHIPPED | OUTPATIENT
Start: 2025-03-21

## 2025-03-21 RX ORDER — LEVETIRACETAM 1000 MG/1
2000 TABLET ORAL 2 TIMES DAILY
Qty: 224 TABLET | Refills: 0 | Status: SHIPPED | OUTPATIENT
Start: 2025-03-21

## 2025-03-21 NOTE — TELEPHONE ENCOUNTER
Caller: [facility] [address] [place] [address] - [phone]  - [phone]    Relationship: Pharmacy    What was the call regarding: ACCORDING TO B[place]RON AT THE [facility]. THE PATIENT ADVISED HE IS OUT OF HIS MEDS. A REFILL REQUEST IS SENT FOR TODAY FOR THE KEPPRA BUT HE ALSO NEEDS HIS CARBATROL.     PLEASE REVIEW AND ADVISE  THANK YOU

## 2025-05-05 RX ORDER — LEVETIRACETAM 1000 MG/1
2000 TABLET ORAL 2 TIMES DAILY
Qty: 224 TABLET | Refills: 0 | Status: SHIPPED | OUTPATIENT
Start: 2025-05-05

## 2025-05-12 RX ORDER — LEVETIRACETAM 1000 MG/1
2000 TABLET ORAL 2 TIMES DAILY
Qty: 224 TABLET | Refills: 0 | OUTPATIENT
Start: 2025-05-12

## 2025-05-14 ENCOUNTER — OFFICE VISIT (OUTPATIENT)
Dept: NEUROLOGY | Facility: CLINIC | Age: 35
End: 2025-05-14
Payer: MEDICARE

## 2025-05-14 VITALS
OXYGEN SATURATION: 97 % | DIASTOLIC BLOOD PRESSURE: 72 MMHG | BODY MASS INDEX: 34 KG/M2 | SYSTOLIC BLOOD PRESSURE: 118 MMHG | WEIGHT: 251 LBS | HEART RATE: 112 BPM | HEIGHT: 72 IN

## 2025-05-14 DIAGNOSIS — G40.209 PARTIAL SYMPTOMATIC EPILEPSY WITH COMPLEX PARTIAL SEIZURES, NOT INTRACTABLE, WITHOUT STATUS EPILEPTICUS: Primary | ICD-10-CM

## 2025-05-14 RX ORDER — CARBAMAZEPINE 200 MG/1
200 CAPSULE, EXTENDED RELEASE ORAL 2 TIMES DAILY
Qty: 180 CAPSULE | Refills: 3 | Status: SHIPPED | OUTPATIENT
Start: 2025-05-14

## 2025-05-14 RX ORDER — LEVETIRACETAM 1000 MG/1
2000 TABLET ORAL 2 TIMES DAILY
Qty: 360 TABLET | Refills: 3 | Status: SHIPPED | OUTPATIENT
Start: 2025-05-14

## 2025-05-14 RX ORDER — CARBAMAZEPINE 100 MG/1
100 CAPSULE, EXTENDED RELEASE ORAL EVERY MORNING
Qty: 180 CAPSULE | Refills: 3 | Status: SHIPPED | OUTPATIENT
Start: 2025-05-14

## 2025-05-14 NOTE — PROGRESS NOTES
Chief Complaint   Patient presents with    Seizures       Patient ID: Avinash Everett is a 34 y.o. male.    HPI:    The following portions of the patient's history were reviewed and updated as appropriate: allergies, current medications, past family history, past medical history, past social history, past surgical history and problem list.    Interval history:    Review of Systems   Musculoskeletal:  Positive for gait problem (couple of falls).   Neurological:  Positive for weakness (left side) and numbness (left side). Negative for dizziness, tremors, seizures, syncope, facial asymmetry, speech difficulty, light-headedness and headaches.      The patient is a 33-year-old right-handed male with a history of an AVM status post surgery, cirrhosis, and epilepsy who presents to the neurology clinic today as established patient for follow-up for seizures. The patient was prev seen by Dr. Baker 2021 to 2023. In 2020 his AVM bled and he had 2 surgeries. After that he had left-sided weakness and he was started on levetiracetam. He had his first seizure in February 2021. He typically has generalized tonic-clonic seizures. His EEG in 2021 showed mild diffuse slowing and right hemispheric slowing as well as a single right frontal interictal epileptiform discharge. His brain MRI in 2021 showed his previous resection of an intracranial hemorrhage in the right temporal parietal region. He had a seizure in January 2022 and April 2023. Since last seeing Dr. Baker, no seizures. He denies any side effects to his medications and reports that his medications are affordable. Liver enzymes elevated in pattern seen in alcohol use. TSH normal, low WBC count and low platelets. Had breakthrough seizures on Keppra  monotherapy. On disability. Driving again. Not drinking currently.    Vitals:    05/14/25 1310   BP: 118/72   Pulse: 112   SpO2: 97%       Neurological Exam    Motor    Weak on left side of body, possible left arm  contracture.    Gait    Able to walk in context of left sided weakness, difficulty transferring to standing position.      Physical Exam    Procedures    Assessment/Plan:    The patient is a 34-year-old right-handed male with a history of an AVM status post surgery, psoriasis, and epilepsy who presents today for follow-up.     1.  Structural epilepsy- no seizures in over a year on carbamazepine 300/200 and Keppra 2000mg BID. Refills provided. I think his CBC and liver enzyme tests could be explained by alcohol related liver disease alone but if there is concern from others that carbamazepine is contributing I would be happy to help transition him to a different medication.     Return in about 11 months (around 4/14/2026).    I spent 40 minutes caring for this patient on this date of service. This time includes time spent by me in the following activities as necessary: preparing for the visit, reviewing tests, medical records and previous visits, obtaining and/or reviewing a separately obtained history, performing a medically appropriate exam and/or evaluation, counseling and educating the patient, and/or communicating with other healthcare professionals, documenting information in the medical record, independently interpreting results and communicating that information with the patient, and developing a medically appropriate treatment plan with consideration of other conditions, medications, and treatments.         Diagnoses and all orders for this visit:    1. Partial symptomatic epilepsy with complex partial seizures, not intractable, without status epilepticus (Primary)    Other orders  -     carBAMazepine (CARBATROL) 100 MG 12 hr capsule; Take 1 capsule by mouth Every Morning.  Dispense: 180 capsule; Refill: 3  -     carBAMazepine (CARBATROL) 200 MG 12 hr capsule; Take 1 capsule by mouth 2 (Two) Times a Day.  Dispense: 180 capsule; Refill: 3  -     levETIRAcetam (KEPPRA) 1000 MG tablet; Take 2 tablets by mouth 2  (Two) Times a Day.  Dispense: 360 tablet; Refill: 3           Rolan Yang MD

## 2025-05-14 NOTE — LETTER
May 14, 2025     Sada De Oliveira MD  [address] [place] [address]  [address]  [facility] [address]    Patient: Avinash Everett   YOB: 1990   Date of Visit: 5/14/2025     Dear Sada De Oliveira MD:       Below are the relevant portions of my assessment and plan of care.    If you have questions, please do not hesitate to call me. I look forward to following Avinash along with you.         Sincerely,        Rolan Yang MD        CC: Rolan Lopez MD  05/14/25 1340  Sign when Signing Visit  Chief Complaint   Patient presents with   • Seizures       Patient ID: Avinash Everett is a 34 y.o. male.    HPI:    The following portions of the patient's history were reviewed and updated as appropriate: allergies, current medications, past family history, past medical history, past social history, past surgical history and problem list.    Interval history:    Review of Systems   Musculoskeletal:  Positive for gait problem (couple of falls).   Neurological:  Positive for weakness (left side) and numbness (left side). Negative for dizziness, tremors, seizures, syncope, facial asymmetry, speech difficulty, light-headedness and headaches.      The patient is a 33-year-old right-handed male with a history of an AVM status post surgery, cirrhosis, and epilepsy who presents to the neurology clinic today as established patient for follow-up for seizures. The patient was prev seen by Dr. Baker 2021 to 2023. In 2020 his AVM bled and he had 2 surgeries. After that he had left-sided weakness and he was started on levetiracetam. He had his first seizure in February 2021. He typically has generalized tonic-clonic seizures. His EEG in 2021 showed mild diffuse slowing and right hemispheric slowing as well as a single right frontal interictal epileptiform discharge. His brain MRI in 2021 showed his previous resection of an intracranial hemorrhage in the right temporal parietal region. He had a seizure in January 2022 and April  2023. Since last seeing Dr. Baker, no seizures. He denies any side effects to his medications and reports that his medications are affordable. Liver enzymes elevated in pattern seen in alcohol use. TSH normal, low WBC count and low platelets. Had breakthrough seizures on Keppra  monotherapy. On disability. Driving again. Not drinking currently.    Vitals:    05/14/25 1310   BP: 118/72   Pulse: 112   SpO2: 97%       Neurological Exam    Motor    Weak on left side of body, possible left arm contracture.    Gait    Able to walk in context of left sided weakness, difficulty transferring to standing position.      Physical Exam    Procedures    Assessment/Plan:    The patient is a 34-year-old right-handed male with a history of an AVM status post surgery, psoriasis, and epilepsy who presents today for follow-up.     1.  Structural epilepsy- no seizures in over a year on carbamazepine 300/200 and Keppra 2000mg BID. Refills provided. I think his CBC and liver enzyme tests could be explained by alcohol related liver disease alone but if there is concern from others that carbamazepine is contributing I would be happy to help transition him to a different medication.     Return in about 11 months (around 4/14/2026).    I spent 40 minutes caring for this patient on this date of service. This time includes time spent by me in the following activities as necessary: preparing for the visit, reviewing tests, medical records and previous visits, obtaining and/or reviewing a separately obtained history, performing a medically appropriate exam and/or evaluation, counseling and educating the patient, and/or communicating with other healthcare professionals, documenting information in the medical record, independently interpreting results and communicating that information with the patient, and developing a medically appropriate treatment plan with consideration of other conditions, medications, and treatments.         Diagnoses and  all orders for this visit:    1. Partial symptomatic epilepsy with complex partial seizures, not intractable, without status epilepticus (Primary)    Other orders  -     carBAMazepine (CARBATROL) 100 MG 12 hr capsule; Take 1 capsule by mouth Every Morning.  Dispense: 180 capsule; Refill: 3  -     carBAMazepine (CARBATROL) 200 MG 12 hr capsule; Take 1 capsule by mouth 2 (Two) Times a Day.  Dispense: 180 capsule; Refill: 3  -     levETIRAcetam (KEPPRA) 1000 MG tablet; Take 2 tablets by mouth 2 (Two) Times a Day.  Dispense: 360 tablet; Refill: 3           Rolan Yang MD

## 2025-07-31 RX ORDER — CARBAMAZEPINE 100 MG/1
100 CAPSULE, EXTENDED RELEASE ORAL EVERY MORNING
Qty: 90 CAPSULE | Refills: 0 | Status: SHIPPED | OUTPATIENT
Start: 2025-07-31

## (undated) DEVICE — GLV SURG BIOGEL LTX PF 6 1/2

## (undated) DEVICE — GLV SURG PREMIERPRO ORTHO LTX PF SZ8 BRN

## (undated) DEVICE — RADIFOCUS TORQUE DEVICE MULTI-TORQUE VISE: Brand: RADIFOCUS TORQUE DEVICE

## (undated) DEVICE — GOWN,NON-REINFORCED,SIRUS,SET IN SLV,XXL: Brand: MEDLINE

## (undated) DEVICE — DISPOSABLE BIPOLAR FORCEPS 7 3/4" (19.7CM) SCOVILLE BAYONET, 1.5MM TIP AND 12 FT. (3.6M) CABLE: Brand: KIRWAN

## (undated) DEVICE — GLV SURG SENSICARE POLYISPRN W/ALOE PF LF 6.5 GRN STRL

## (undated) DEVICE — MAYFIELD® DISPOSABLE ADULT SKULL PIN (PLASTIC BASE): Brand: MAYFIELD®

## (undated) DEVICE — PREMIUM WET SKIN PREP TRAY: Brand: MEDLINE INDUSTRIES, INC.

## (undated) DEVICE — GLV SURG PREMIERPRO ORTHO LTX PF SZ6.5 BRN

## (undated) DEVICE — RANEY SCALP CLIP STERILE: Brand: AESCULAP

## (undated) DEVICE — SUT NUROLON 4/0 TF18 CR8 I8IN C584D

## (undated) DEVICE — 6.0MM PRECISION ACORN

## (undated) DEVICE — SOL NACL 0.9PCT 1000ML

## (undated) DEVICE — #1020 STERI DRAPE 41MM X 41MM SMALL: Brand: STERI-DRAPE™

## (undated) DEVICE — BG WAST DISPOSABLE DEPOT W/TBG48IN S/COCK SPK1400

## (undated) DEVICE — SOL IRR PHYSIOSOL BTL 1000ML

## (undated) DEVICE — CVR PROB 96IN LF STRL

## (undated) DEVICE — DRSNG SURESITE WNDW 4X4.5

## (undated) DEVICE — TRAP FLD MINIVAC MEGADYNE 100ML

## (undated) DEVICE — PK CRANI 40

## (undated) DEVICE — PK ANGIO CERBRL RAD 40

## (undated) DEVICE — COPILOT BLEEDBACK CONTROL VALVE: Brand: COPILOT

## (undated) DEVICE — CATH ANGIO TRCN NB BCN .038 5F 100CM VERT

## (undated) DEVICE — SMOKE EVACUATION TUBING WITH 7/8 IN TO 1/4 IN REDUCER: Brand: BUFFALO FILTER

## (undated) DEVICE — 0.2 MICRON INTRAVENOUS FILTER FOR 96 HOUR USE WITH MICRO-BORE EXTENSION TUBING AN LUER-LOCK ADAPTER: Brand: PALL POSIDYNE® ELD FILTER

## (undated) DEVICE — ADAPT Y ROT GATEWAY PLS

## (undated) DEVICE — GLV SURG BIOGEL LTX PF 8

## (undated) DEVICE — PENCL E/S ULTRAVAC TELESCP NOSE HOLSTR 10FT

## (undated) DEVICE — RADIFOCUS GLIDEWIRE: Brand: GLIDEWIRE

## (undated) DEVICE — CODMAN® ISOCOOL® AHT® BIPOLAR FORCEPS TIPS 8 1/2" (22CM) TOTAL LENGTH 1MM TIP DIAMETER 3 1/2" (8.9CM) WORKING LENGTH: Brand: CODMAN® ISOCOOL® AHT®

## (undated) DEVICE — COTTON BALLS, DOUBLE STRUNG: Brand: DEROYAL

## (undated) DEVICE — APPL CHLORAPREP HI/LITE 26ML ORNG

## (undated) DEVICE — BANDAGE ROLL,100% COTTON, 6 PLY, LARGE: Brand: KERLIX

## (undated) DEVICE — SEAL DURL ADHERUS/AUTOSPRAY HYDROGEL DS

## (undated) DEVICE — ANTIBACTERIAL UNDYED BRAIDED (POLYGLACTIN 910), SYNTHETIC ABSORBABLE SUTURE: Brand: COATED VICRYL

## (undated) DEVICE — SUT VIC 4/0 P3 18IN J494G

## (undated) DEVICE — ST ACC MICROPUNCTURE STFF .018 ECHO/PLDM/TP 4F/10CM 21G/7CM

## (undated) DEVICE — MANIFLD BLCK 200PSI 2PORT OFF RT

## (undated) DEVICE — MYNXGRIP 5F: Brand: MYNXGRIP

## (undated) DEVICE — DRSNG WND GZ PAD BORDERED 4X8IN STRL

## (undated) DEVICE — STPCK 3WY HP ROT